# Patient Record
Sex: FEMALE | Race: WHITE | NOT HISPANIC OR LATINO | Employment: OTHER | URBAN - METROPOLITAN AREA
[De-identification: names, ages, dates, MRNs, and addresses within clinical notes are randomized per-mention and may not be internally consistent; named-entity substitution may affect disease eponyms.]

---

## 2017-12-14 ENCOUNTER — OFFICE VISIT (OUTPATIENT)
Dept: FAMILY MEDICINE | Facility: CLINIC | Age: 47
End: 2017-12-14
Payer: MEDICARE

## 2017-12-14 VITALS
OXYGEN SATURATION: 96 % | BODY MASS INDEX: 44.41 KG/M2 | HEART RATE: 81 BPM | HEIGHT: 68 IN | SYSTOLIC BLOOD PRESSURE: 118 MMHG | TEMPERATURE: 100 F | RESPIRATION RATE: 16 BRPM | DIASTOLIC BLOOD PRESSURE: 86 MMHG | WEIGHT: 293 LBS

## 2017-12-14 DIAGNOSIS — I27.20 PULMONARY HYPERTENSION: ICD-10-CM

## 2017-12-14 DIAGNOSIS — J45.909 ASTHMA, UNSPECIFIED ASTHMA SEVERITY, UNSPECIFIED WHETHER COMPLICATED, UNSPECIFIED WHETHER PERSISTENT: ICD-10-CM

## 2017-12-14 DIAGNOSIS — M79.7 FIBROMYALGIA: ICD-10-CM

## 2017-12-14 DIAGNOSIS — F32.A DEPRESSION, UNSPECIFIED DEPRESSION TYPE: ICD-10-CM

## 2017-12-14 DIAGNOSIS — B96.89 ACUTE BACTERIAL PHARYNGITIS: ICD-10-CM

## 2017-12-14 DIAGNOSIS — J02.8 ACUTE BACTERIAL PHARYNGITIS: ICD-10-CM

## 2017-12-14 DIAGNOSIS — F41.9 ANXIETY: ICD-10-CM

## 2017-12-14 DIAGNOSIS — B37.0 ORAL CANDIDIASIS: ICD-10-CM

## 2017-12-14 DIAGNOSIS — Z23 NEEDS FLU SHOT: Primary | ICD-10-CM

## 2017-12-14 PROCEDURE — 94640 AIRWAY INHALATION TREATMENT: CPT | Mod: PBBFAC,59,PO

## 2017-12-14 PROCEDURE — 99999 PR PBB SHADOW E&M-NEW PATIENT-LVL III: CPT | Mod: PBBFAC,,, | Performed by: FAMILY MEDICINE

## 2017-12-14 PROCEDURE — 99214 OFFICE O/P EST MOD 30 MIN: CPT | Mod: S$PBB,,, | Performed by: FAMILY MEDICINE

## 2017-12-14 PROCEDURE — 96372 THER/PROPH/DIAG INJ SC/IM: CPT | Mod: PBBFAC,PO

## 2017-12-14 PROCEDURE — 99203 OFFICE O/P NEW LOW 30 MIN: CPT | Mod: PBBFAC,PO | Performed by: FAMILY MEDICINE

## 2017-12-14 RX ORDER — NAPROXEN 500 MG/1
500 TABLET ORAL 2 TIMES DAILY
COMMUNITY
End: 2018-02-15

## 2017-12-14 RX ORDER — TOPIRAMATE 200 MG/1
TABLET ORAL DAILY
COMMUNITY
End: 2018-04-17 | Stop reason: SDUPTHER

## 2017-12-14 RX ORDER — OXYBUTYNIN CHLORIDE 5 MG/1
5 TABLET ORAL 3 TIMES DAILY
COMMUNITY
End: 2018-06-04 | Stop reason: SDUPTHER

## 2017-12-14 RX ORDER — GABAPENTIN 800 MG/1
800 TABLET ORAL 2 TIMES DAILY
COMMUNITY
End: 2018-05-02 | Stop reason: DRUGHIGH

## 2017-12-14 RX ORDER — ALBUTEROL SULFATE 90 UG/1
2 AEROSOL, METERED RESPIRATORY (INHALATION) EVERY 6 HOURS PRN
COMMUNITY
End: 2017-12-14 | Stop reason: SDUPTHER

## 2017-12-14 RX ORDER — GABAPENTIN 600 MG/1
600 TABLET ORAL DAILY
COMMUNITY
End: 2018-04-17 | Stop reason: SDUPTHER

## 2017-12-14 RX ORDER — NABUMETONE 750 MG/1
750 TABLET, FILM COATED ORAL 2 TIMES DAILY
COMMUNITY
End: 2018-02-15

## 2017-12-14 RX ORDER — ALBUTEROL SULFATE 2.5 MG/.5ML
2.5 SOLUTION RESPIRATORY (INHALATION)
Status: COMPLETED | OUTPATIENT
Start: 2017-12-14 | End: 2017-12-14

## 2017-12-14 RX ORDER — LITHIUM CARBONATE 300 MG/1
300 CAPSULE ORAL
COMMUNITY
End: 2018-06-01 | Stop reason: SDUPTHER

## 2017-12-14 RX ORDER — METHOCARBAMOL 750 MG/1
750 TABLET, FILM COATED ORAL 4 TIMES DAILY
COMMUNITY
End: 2020-02-26

## 2017-12-14 RX ORDER — ALBUTEROL SULFATE 90 UG/1
2 AEROSOL, METERED RESPIRATORY (INHALATION) EVERY 6 HOURS PRN
Qty: 18 G | Refills: 2 | Status: SHIPPED | OUTPATIENT
Start: 2017-12-14 | End: 2019-03-19 | Stop reason: SDUPTHER

## 2017-12-14 RX ORDER — AZITHROMYCIN 250 MG/1
TABLET, FILM COATED ORAL
Qty: 6 TABLET | Refills: 0 | Status: SHIPPED | OUTPATIENT
Start: 2017-12-14 | End: 2018-02-07 | Stop reason: ALTCHOICE

## 2017-12-14 RX ORDER — FLUOXETINE 20 MG/1
20 TABLET ORAL 3 TIMES DAILY
COMMUNITY
End: 2018-04-20 | Stop reason: SDUPTHER

## 2017-12-14 RX ORDER — LORAZEPAM 1 MG/1
1 TABLET ORAL
COMMUNITY
End: 2018-09-13 | Stop reason: SDUPTHER

## 2017-12-14 RX ORDER — NYSTATIN 100000 [USP'U]/ML
4 SUSPENSION ORAL 2 TIMES DAILY
Qty: 80 ML | Refills: 0 | Status: SHIPPED | OUTPATIENT
Start: 2017-12-14 | End: 2017-12-24

## 2017-12-14 RX ORDER — OMEPRAZOLE 20 MG/1
20 CAPSULE, DELAYED RELEASE ORAL 2 TIMES DAILY
COMMUNITY
End: 2018-04-30 | Stop reason: SDUPTHER

## 2017-12-14 RX ORDER — FUROSEMIDE 40 MG/1
40 TABLET ORAL 2 TIMES DAILY
COMMUNITY
End: 2020-02-26 | Stop reason: SDUPTHER

## 2017-12-14 RX ORDER — POTASSIUM CHLORIDE 750 MG/1
10 TABLET, EXTENDED RELEASE ORAL 2 TIMES DAILY
COMMUNITY

## 2017-12-14 RX ORDER — RISPERIDONE 0.5 MG/1
0.5 TABLET ORAL 2 TIMES DAILY
COMMUNITY
End: 2018-04-17 | Stop reason: SDUPTHER

## 2017-12-14 RX ADMIN — ALBUTEROL SULFATE 2.5 MG: 2.5 SOLUTION RESPIRATORY (INHALATION) at 11:12

## 2017-12-14 RX ADMIN — METHYLPREDNISOLONE SODIUM SUCCINATE 125 MG: 125 INJECTION, POWDER, FOR SOLUTION INTRAMUSCULAR; INTRAVENOUS at 11:12
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
## 2017-12-14 NOTE — PROGRESS NOTES
Patient given solumedrol 125 mg  Injection left deltoid, tolerated well, no complaints, no reaction noted  Breathing treatment administered as ordered, patient tolerated well, no complaints, no reactions

## 2018-01-04 ENCOUNTER — LAB VISIT (OUTPATIENT)
Dept: LAB | Facility: HOSPITAL | Age: 48
End: 2018-01-04
Attending: FAMILY MEDICINE
Payer: MEDICARE

## 2018-01-04 ENCOUNTER — OFFICE VISIT (OUTPATIENT)
Dept: FAMILY MEDICINE | Facility: CLINIC | Age: 48
End: 2018-01-04
Payer: MEDICARE

## 2018-01-04 VITALS
DIASTOLIC BLOOD PRESSURE: 80 MMHG | RESPIRATION RATE: 16 BRPM | BODY MASS INDEX: 44.41 KG/M2 | WEIGHT: 293 LBS | OXYGEN SATURATION: 97 % | HEIGHT: 68 IN | TEMPERATURE: 98 F | HEART RATE: 90 BPM | SYSTOLIC BLOOD PRESSURE: 112 MMHG

## 2018-01-04 DIAGNOSIS — J45.901 EXACERBATION OF ASTHMA, UNSPECIFIED ASTHMA SEVERITY, UNSPECIFIED WHETHER PERSISTENT: ICD-10-CM

## 2018-01-04 DIAGNOSIS — Z23 NEEDS FLU SHOT: Primary | ICD-10-CM

## 2018-01-04 DIAGNOSIS — F41.9 ANXIETY: ICD-10-CM

## 2018-01-04 DIAGNOSIS — I27.20 PULMONARY HYPERTENSION: ICD-10-CM

## 2018-01-04 DIAGNOSIS — F31.9 BIPOLAR 1 DISORDER: ICD-10-CM

## 2018-01-04 DIAGNOSIS — F32.A DEPRESSION, UNSPECIFIED DEPRESSION TYPE: ICD-10-CM

## 2018-01-04 DIAGNOSIS — E78.00 ELEVATED CHOLESTEROL: ICD-10-CM

## 2018-01-04 LAB
ALBUMIN SERPL BCP-MCNC: 3.1 G/DL
ALP SERPL-CCNC: 75 U/L
ALT SERPL W/O P-5'-P-CCNC: 12 U/L
ANION GAP SERPL CALC-SCNC: 5 MMOL/L
AST SERPL-CCNC: 10 U/L
BASOPHILS # BLD AUTO: 0.09 K/UL
BASOPHILS NFR BLD: 1.3 %
BILIRUB SERPL-MCNC: 0.3 MG/DL
BUN SERPL-MCNC: 10 MG/DL
CALCIUM SERPL-MCNC: 9 MG/DL
CHLORIDE SERPL-SCNC: 109 MMOL/L
CHOLEST SERPL-MCNC: 188 MG/DL
CHOLEST/HDLC SERPL: 3.5 {RATIO}
CO2 SERPL-SCNC: 24 MMOL/L
CREAT SERPL-MCNC: 0.8 MG/DL
DIFFERENTIAL METHOD: ABNORMAL
EOSINOPHIL # BLD AUTO: 0.3 K/UL
EOSINOPHIL NFR BLD: 4.6 %
ERYTHROCYTE [DISTWIDTH] IN BLOOD BY AUTOMATED COUNT: 13.2 %
EST. GFR  (AFRICAN AMERICAN): >60 ML/MIN/1.73 M^2
EST. GFR  (NON AFRICAN AMERICAN): >60 ML/MIN/1.73 M^2
GLUCOSE SERPL-MCNC: 98 MG/DL
HCT VFR BLD AUTO: 42.5 %
HDLC SERPL-MCNC: 54 MG/DL
HDLC SERPL: 28.7 %
HGB BLD-MCNC: 13.2 G/DL
IMM GRANULOCYTES # BLD AUTO: 0.02 K/UL
IMM GRANULOCYTES NFR BLD AUTO: 0.3 %
LDLC SERPL CALC-MCNC: 112.6 MG/DL
LITHIUM SERPL-SCNC: 0.9 MMOL/L
LYMPHOCYTES # BLD AUTO: 2.3 K/UL
LYMPHOCYTES NFR BLD: 32.2 %
MCH RBC QN AUTO: 29.1 PG
MCHC RBC AUTO-ENTMCNC: 31.1 G/DL
MCV RBC AUTO: 94 FL
MONOCYTES # BLD AUTO: 0.6 K/UL
MONOCYTES NFR BLD: 8.3 %
NEUTROPHILS # BLD AUTO: 3.8 K/UL
NEUTROPHILS NFR BLD: 53.3 %
NONHDLC SERPL-MCNC: 134 MG/DL
NRBC BLD-RTO: 0 /100 WBC
PLATELET # BLD AUTO: 296 K/UL
PMV BLD AUTO: 9.7 FL
POTASSIUM SERPL-SCNC: 4.6 MMOL/L
PROT SERPL-MCNC: 6.8 G/DL
RBC # BLD AUTO: 4.54 M/UL
SODIUM SERPL-SCNC: 138 MMOL/L
T4 FREE SERPL-MCNC: 0.89 NG/DL
TRIGL SERPL-MCNC: 107 MG/DL
TSH SERPL DL<=0.005 MIU/L-ACNC: 2.33 UIU/ML
WBC # BLD AUTO: 7.2 K/UL

## 2018-01-04 PROCEDURE — 80061 LIPID PANEL: CPT

## 2018-01-04 PROCEDURE — 80178 ASSAY OF LITHIUM: CPT

## 2018-01-04 PROCEDURE — 36415 COLL VENOUS BLD VENIPUNCTURE: CPT | Mod: PO

## 2018-01-04 PROCEDURE — 99214 OFFICE O/P EST MOD 30 MIN: CPT | Mod: S$PBB,25,, | Performed by: FAMILY MEDICINE

## 2018-01-04 PROCEDURE — 94640 AIRWAY INHALATION TREATMENT: CPT | Mod: PBBFAC,PO

## 2018-01-04 PROCEDURE — 99213 OFFICE O/P EST LOW 20 MIN: CPT | Mod: PBBFAC,PO,25 | Performed by: FAMILY MEDICINE

## 2018-01-04 PROCEDURE — 84439 ASSAY OF FREE THYROXINE: CPT

## 2018-01-04 PROCEDURE — 85025 COMPLETE CBC W/AUTO DIFF WBC: CPT

## 2018-01-04 PROCEDURE — 99999 PR PBB SHADOW E&M-EST. PATIENT-LVL III: CPT | Mod: PBBFAC,,, | Performed by: FAMILY MEDICINE

## 2018-01-04 PROCEDURE — 80053 COMPREHEN METABOLIC PANEL: CPT

## 2018-01-04 PROCEDURE — 84443 ASSAY THYROID STIM HORMONE: CPT

## 2018-01-04 RX ORDER — ALBUTEROL SULFATE 0.83 MG/ML
2.5 SOLUTION RESPIRATORY (INHALATION) EVERY 6 HOURS PRN
Qty: 60 ML | Refills: 1 | Status: SHIPPED | OUTPATIENT
Start: 2018-01-04 | End: 2019-01-04

## 2018-01-04 RX ORDER — ALBUTEROL SULFATE 2.5 MG/.5ML
2.5 SOLUTION RESPIRATORY (INHALATION)
Status: COMPLETED | OUTPATIENT
Start: 2018-01-04 | End: 2018-01-04

## 2018-01-04 RX ORDER — PREDNISONE 20 MG/1
TABLET ORAL
Qty: 12 TABLET | Refills: 0 | Status: SHIPPED | OUTPATIENT
Start: 2018-01-04 | End: 2018-02-07 | Stop reason: SDUPTHER

## 2018-01-04 RX ADMIN — ALBUTEROL SULFATE 2.5 MG: 2.5 SOLUTION RESPIRATORY (INHALATION) at 08:01

## 2018-01-04 NOTE — PROGRESS NOTES
Chief Complaint   Patient presents with    Asthma     having uncontrollable coughing       HPI  Zaira Vinson is a 47 y.o. female with multiple medical diagnoses as listed in the medical history and problem list that presents for URI.    URI/cough - previously treated with abx, improved for about a week, now cough has returned/worse, inhaler/breathing machine only helps, states neb meds have . Non prod cough, prog worsening, no emesis, +chest congestion, no fever, no diarrhea. Neb therapy few times per day improves. Meds: OTC mild relief. Hx of asthma.     Pt is known to me and was last seen by me on 2017.    PAST MEDICAL HISTORY:  Past Medical History:   Diagnosis Date    Anxiety     Arthritis     Asthma     Depression     GERD (gastroesophageal reflux disease)     Hypertension     Neuromuscular disorder        PAST SURGICAL HISTORY:  Past Surgical History:   Procedure Laterality Date    CARPAL TUNNEL RELEASE      CHOLECYSTECTOMY      HYSTERECTOMY      JOINT REPLACEMENT      KNEE SURGERY      pinched nerve      SHOULDER SURGERY      WRIST SURGERY      had ganlin cyst       SOCIAL HISTORY:  Social History     Social History    Marital status: Single     Spouse name: N/A    Number of children: N/A    Years of education: N/A     Occupational History    Not on file.     Social History Main Topics    Smoking status: Current Every Day Smoker    Smokeless tobacco: Never Used    Alcohol use Not on file    Drug use: Unknown    Sexual activity: Not on file     Other Topics Concern    Not on file     Social History Narrative    No narrative on file       FAMILY HISTORY:  Family History   Problem Relation Age of Onset    Diabetes Mother     [place]'s disease Mother     Hypertension Mother     Heart disease Father     Stroke Father     Mental illness Father     Hypertension Father     Diabetes Father     Depression Father     Heart disease Maternal Grandmother     Depression  Maternal Grandmother     COPD Maternal Grandfather     Heart disease Maternal Grandfather     Stroke Maternal Grandfather     Kidney disease Paternal Grandmother     Heart disease Paternal Grandmother     Heart disease Paternal Grandfather        ALLERGIES AND MEDICATIONS: updated and reviewed.  Review of patient's allergies indicates:  No Known Allergies  Current Outpatient Prescriptions   Medication Sig Dispense Refill    albuterol 90 mcg/actuation inhaler Inhale 2 puffs into the lungs every 6 (six) hours as needed for Wheezing. Rescue 18 g 2    FLUoxetine 20 MG tablet Take 20 mg by mouth 3 (three) times daily.      furosemide (LASIX) 40 MG tablet Take 40 mg by mouth 2 (two) times daily.      gabapentin (NEURONTIN) 600 MG tablet Take 600 mg by mouth once daily.      gabapentin (NEURONTIN) 800 MG tablet Take 800 mg by mouth 2 (two) times daily.      lithium (ESKALITH) 300 MG capsule Take 300 mg by mouth 3 (three) times daily with meals.      LORazepam (ATIVAN) 1 MG tablet Take 1 mg by mouth as needed for Anxiety.      methocarbamol (ROBAXIN) 750 MG Tab Take 750 mg by mouth 4 (four) times daily.       nabumetone (RELAFEN) 750 MG tablet Take 750 mg by mouth 2 (two) times daily.      naproxen (NAPROSYN) 500 MG tablet Take 500 mg by mouth 2 (two) times daily.      omeprazole (PRILOSEC) 20 MG capsule Take 20 mg by mouth 2 (two) times daily.      oxybutynin (DITROPAN) 5 MG Tab Take 5 mg by mouth 3 (three) times daily.      potassium chloride (KLOR-CON) 10 MEQ TbSR Take 10 mEq by mouth 2 (two) times daily.      risperiDONE (RISPERDAL) 0.5 MG Tab Take 0.5 mg by mouth 2 (two) times daily.      topiramate (TOPAMAX) 200 MG Tab Take by mouth once daily.      albuterol (PROVENTIL) 2.5 mg /3 mL (0.083 %) nebulizer solution Take 3 mLs (2.5 mg total) by nebulization every 6 (six) hours as needed for Wheezing. Rescue 60 mL 1    azithromycin (ZITHROMAX Z-ERIN) 250 MG tablet 2 tabs today, then 1 tab per day for  "4 days. 6 tablet 0    predniSONE (DELTASONE) 20 MG tablet 3 tablets per day for 2 days, 2 tablets per day for 2 days, 1 tablet per day for 2 days. 12 tablet 0     No current facility-administered medications for this visit.        ROS  Review of Systems   Constitutional: Negative for chills and fever.   HENT: Positive for rhinorrhea. Negative for ear pain and postnasal drip.    Respiratory: Positive for cough and shortness of breath.    Cardiovascular: Negative for chest pain.   Allergic/Immunologic: Negative for environmental allergies.       Physical Exam  Vitals:    01/04/18 0750   BP: 112/80   Pulse: 90   Resp: 16   Temp: 98.4 °F (36.9 °C)    Body mass index is 49.33 kg/m².  Weight: (!) 145 kg (319 lb 10.7 oz)   Height: 5' 7.5" (171.5 cm)     Physical Exam   Constitutional: She is oriented to person, place, and time. She appears well-developed and well-nourished.   HENT:   Head: Normocephalic and atraumatic.   Mouth/Throat: No oropharyngeal exudate.   Erythematous pharynx  Erythematous, edematous nares  Mild dull TMs bilaterally   Eyes: Conjunctivae and EOM are normal. Pupils are equal, round, and reactive to light. No scleral icterus.   Neck: Normal range of motion. Neck supple.   Cardiovascular: Normal rate, regular rhythm and normal heart sounds.    Pulmonary/Chest: Effort normal and breath sounds normal. No respiratory distress.   Abdominal: Soft. Bowel sounds are normal. There is no tenderness.   Musculoskeletal: Normal range of motion.   Lymphadenopathy:     She has cervical adenopathy.   Neurological: She is alert and oriented to person, place, and time. She has normal reflexes.   Skin: Skin is warm and dry. No rash noted.   Psychiatric: She has a normal mood and affect. Her behavior is normal. Judgment and thought content normal.       Health Maintenance       Date Due Completion Date    Lipid Panel 1971 ---    TETANUS VACCINE 11/24/1989 ---    Pneumococcal PPSV23 (Medium Risk) (1) 11/24/1989 --- "    Influenza Vaccine 08/01/2017 ---    Mammogram 04/26/2018 (Originally 11/24/2011) ---          Assessment & Plan    Anxiety, Depression, unspecified depression type, Bipolar 1 disorder  -     Lithium level; Future; Expected date: 01/04/2018  -     CBC auto differential; Future; Expected date: 01/04/2018  -     Comprehensive metabolic panel; Future; Expected date: 01/04/2018  -     T4, free; Future; Expected date: 01/04/2018  -     TSH; Future; Expected date: 01/04/2018    Exacerbation of asthma, unspecified asthma severity, unspecified whether persistent  -     albuterol sulfate nebulizer solution 2.5 mg; Take 2.5 mg by nebulization one time.  -     predniSONE (DELTASONE) 20 MG tablet; 3 tablets per day for 2 days, 2 tablets per day for 2 days, 1 tablet per day for 2 days.  Dispense: 12 tablet; Refill: 0  -     albuterol (PROVENTIL) 2.5 mg /3 mL (0.083 %) nebulizer solution; Take 3 mLs (2.5 mg total) by nebulization every 6 (six) hours as needed for Wheezing. Rescue  Dispense: 60 mL; Refill: 1    Pulmonary hypertension  - Continue current medication regimen as prescribed    Elevated cholesterol  -     Lipid panel; Future; Expected date: 01/04/2018      Return in about 4 weeks (around 2/1/2018), or if symptoms worsen or fail to improve.      Answers for HPI/ROS submitted by the patient on 1/4/2018   Cough  Chronicity: recurrent  Onset: 1 to 4 weeks ago  Progression since onset: waxing and waning  Frequency: hourly  Cough characteristics: productive of sputum  heartburn: No  hemoptysis: No  nasal congestion: Yes  sweats: No  weight loss: No  Aggravated by: nothing, dust, fumes, lying down, pollens  Risk factors for lung disease: animal exposure, smoking/tobacco exposure  asthma: Yes  bronchiectasis: No  bronchitis: Yes  emphysema: No  pneumonia: No  Treatments tried: OTC cough suppressant, a beta-agonist inhaler, body position changes, cool air, rest  Improvement on treatment: mild

## 2018-02-07 ENCOUNTER — OFFICE VISIT (OUTPATIENT)
Dept: FAMILY MEDICINE | Facility: CLINIC | Age: 48
End: 2018-02-07
Payer: MEDICARE

## 2018-02-07 VITALS
RESPIRATION RATE: 20 BRPM | HEART RATE: 73 BPM | WEIGHT: 293 LBS | TEMPERATURE: 98 F | DIASTOLIC BLOOD PRESSURE: 80 MMHG | BODY MASS INDEX: 44.41 KG/M2 | HEIGHT: 68 IN | SYSTOLIC BLOOD PRESSURE: 118 MMHG | OXYGEN SATURATION: 97 %

## 2018-02-07 DIAGNOSIS — J45.909 ASTHMA, UNSPECIFIED ASTHMA SEVERITY, UNSPECIFIED WHETHER COMPLICATED, UNSPECIFIED WHETHER PERSISTENT: ICD-10-CM

## 2018-02-07 DIAGNOSIS — M79.7 FIBROMYALGIA: Primary | ICD-10-CM

## 2018-02-07 DIAGNOSIS — J45.901 EXACERBATION OF ASTHMA, UNSPECIFIED ASTHMA SEVERITY, UNSPECIFIED WHETHER PERSISTENT: ICD-10-CM

## 2018-02-07 PROCEDURE — 99999 PR PBB SHADOW E&M-EST. PATIENT-LVL III: CPT | Mod: PBBFAC,,, | Performed by: FAMILY MEDICINE

## 2018-02-07 PROCEDURE — 99213 OFFICE O/P EST LOW 20 MIN: CPT | Mod: PBBFAC,PO | Performed by: FAMILY MEDICINE

## 2018-02-07 PROCEDURE — 99214 OFFICE O/P EST MOD 30 MIN: CPT | Mod: S$PBB,,, | Performed by: FAMILY MEDICINE

## 2018-02-07 RX ORDER — PREDNISONE 20 MG/1
TABLET ORAL
Qty: 12 TABLET | Refills: 0 | Status: SHIPPED | OUTPATIENT
Start: 2018-02-07 | End: 2018-05-02 | Stop reason: SDUPTHER

## 2018-02-07 RX ORDER — AZITHROMYCIN 250 MG/1
TABLET, FILM COATED ORAL
Qty: 6 TABLET | Refills: 0 | Status: SHIPPED | OUTPATIENT
Start: 2018-02-07 | End: 2018-02-19 | Stop reason: ALTCHOICE

## 2018-02-07 NOTE — PROGRESS NOTES
Chief Complaint   Patient presents with    Sinus Problem    Asthma       HPI  Zaira Vinson is a 47 y.o. female with multiple medical diagnoses as listed in the medical history and problem list that presents for asthma.     Asthma/URI - 3 day hx of worsening HA, sinus congestion, then used nasal spray (azelastin), had asthma exacerbation.     Hx of SHARON, previously seen by Cardiology for Pulm HTN.     Fibromyalgia - Followed     Migraine - followed by Neurology    Pt is known to me and was last seen by me on 1/4/2018.    PAST MEDICAL HISTORY:  Past Medical History:   Diagnosis Date    Anxiety     Arthritis     Asthma     Depression     GERD (gastroesophageal reflux disease)     Hypertension     Neuromuscular disorder        PAST SURGICAL HISTORY:  Past Surgical History:   Procedure Laterality Date    CARPAL TUNNEL RELEASE      CHOLECYSTECTOMY      HYSTERECTOMY      JOINT REPLACEMENT      KNEE SURGERY      pinched nerve      SHOULDER SURGERY      WRIST SURGERY      had ganlin cyst       SOCIAL HISTORY:  Social History     Social History    Marital status: Single     Spouse name: N/A    Number of children: N/A    Years of education: N/A     Occupational History    Not on file.     Social History Main Topics    Smoking status: Current Every Day Smoker    Smokeless tobacco: Never Used    Alcohol use Not on file    Drug use: Unknown    Sexual activity: Not on file     Other Topics Concern    Not on file     Social History Narrative    No narrative on file       FAMILY HISTORY:  Family History   Problem Relation Age of Onset    Diabetes Mother     [place]'s disease Mother     Hypertension Mother     Heart disease Father     Stroke Father     Mental illness Father     Hypertension Father     Diabetes Father     Depression Father     Heart disease Maternal Grandmother     Depression Maternal Grandmother     COPD Maternal Grandfather     Heart disease Maternal Grandfather     Stroke  Maternal Grandfather     Kidney disease Paternal Grandmother     Heart disease Paternal Grandmother     Heart disease Paternal Grandfather        ALLERGIES AND MEDICATIONS: updated and reviewed.  Review of patient's allergies indicates:  No Known Allergies  Current Outpatient Prescriptions   Medication Sig Dispense Refill    albuterol (PROVENTIL) 2.5 mg /3 mL (0.083 %) nebulizer solution Take 3 mLs (2.5 mg total) by nebulization every 6 (six) hours as needed for Wheezing. Rescue 60 mL 1    albuterol 90 mcg/actuation inhaler Inhale 2 puffs into the lungs every 6 (six) hours as needed for Wheezing. Rescue 18 g 2    FLUoxetine 20 MG tablet Take 20 mg by mouth 3 (three) times daily.      furosemide (LASIX) 40 MG tablet Take 40 mg by mouth 2 (two) times daily.      gabapentin (NEURONTIN) 600 MG tablet Take 600 mg by mouth once daily.      gabapentin (NEURONTIN) 800 MG tablet Take 800 mg by mouth 2 (two) times daily.      lithium (ESKALITH) 300 MG capsule Take 300 mg by mouth 3 (three) times daily with meals.      LORazepam (ATIVAN) 1 MG tablet Take 1 mg by mouth as needed for Anxiety.      methocarbamol (ROBAXIN) 750 MG Tab Take 750 mg by mouth 4 (four) times daily.       omeprazole (PRILOSEC) 20 MG capsule Take 20 mg by mouth 2 (two) times daily.      oxybutynin (DITROPAN) 5 MG Tab Take 5 mg by mouth 3 (three) times daily.      potassium chloride (KLOR-CON) 10 MEQ TbSR Take 10 mEq by mouth 2 (two) times daily.      risperiDONE (RISPERDAL) 0.5 MG Tab Take 0.5 mg by mouth 2 (two) times daily.      topiramate (TOPAMAX) 200 MG Tab Take by mouth once daily.      azithromycin (ZITHROMAX Z-ERIN) 250 MG tablet 2 tabs today, then 1 tab per day for 4 days. 6 tablet 0    indomethacin (INDOCIN) 50 MG capsule Take 1 capsule (50 mg total) by mouth 3 (three) times daily. 30 capsule 1    predniSONE (DELTASONE) 20 MG tablet 3 tablets per day for 2 days, 2 tablets per day for 2 days, 1 tablet per day for 2 days. 12  "tablet 0     No current facility-administered medications for this visit.        ROS  Review of Systems   Constitutional: Negative for activity change and unexpected weight change.   HENT: Negative for hearing loss, rhinorrhea and trouble swallowing.    Eyes: Positive for discharge. Negative for visual disturbance.   Respiratory: Positive for chest tightness and wheezing.    Cardiovascular: Negative for chest pain and palpitations.   Gastrointestinal: Negative for blood in stool, constipation, diarrhea and vomiting.   Endocrine: Negative for polydipsia and polyuria.   Genitourinary: Negative for difficulty urinating, dysuria, hematuria and menstrual problem.   Musculoskeletal: Positive for arthralgias, joint swelling and neck pain.   Neurological: Positive for weakness and headaches.   Psychiatric/Behavioral: Positive for dysphoric mood. Negative for confusion.       Physical Exam  Vitals:    02/07/18 0934   BP: 118/80   Pulse: 73   Resp: 20   Temp: 98 °F (36.7 °C)    Body mass index is 50.62 kg/m².  Weight: (!) 148.8 kg (328 lb 0.7 oz)   Height: 5' 7.5" (171.5 cm)     Physical Exam   Constitutional: She is oriented to person, place, and time. She appears well-developed and well-nourished.   HENT:   Head: Normocephalic and atraumatic.   Mouth/Throat: No oropharyngeal exudate.   Erythematous pharynx  Erythematous, edematous nares  Mild dull TMs bilaterally   Eyes: Conjunctivae and EOM are normal. Pupils are equal, round, and reactive to light. No scleral icterus.   Neck: Normal range of motion. Neck supple.   Cardiovascular: Normal rate, regular rhythm and normal heart sounds.    Pulmonary/Chest: Effort normal and breath sounds normal. No respiratory distress.   Abdominal: Soft. Bowel sounds are normal. There is no tenderness.   Musculoskeletal: Normal range of motion.   Lymphadenopathy:     She has cervical adenopathy.   Neurological: She is alert and oriented to person, place, and time. She has normal reflexes. "   Skin: Skin is warm and dry. No rash noted.   Psychiatric: She has a normal mood and affect. Her behavior is normal. Judgment and thought content normal.       Health Maintenance       Date Due Completion Date    Mammogram 04/26/2018 (Originally 11/24/2011) ---    Pneumococcal PPSV23 (Medium Risk) (1) 02/07/2019 (Originally 11/24/1989) ---    Lipid Panel 01/04/2023 1/4/2018    TETANUS VACCINE 02/07/2028 2/7/2018 (Declined)    Override on 2/7/2018: Declined          Assessment & Plan    Fibromyalgia  - Continue current medication regimen as prescribed    Exacerbation of asthma, unspecified asthma severity, unspecified whether persistent  -     predniSONE (DELTASONE) 20 MG tablet; 3 tablets per day for 2 days, 2 tablets per day for 2 days, 1 tablet per day for 2 days.  Dispense: 12 tablet; Refill: 0    Asthma, unspecified asthma severity, unspecified whether complicated, unspecified whether persistent  - Will treat with steroid pack at this time  -     azithromycin (ZITHROMAX Z-ERIN) 250 MG tablet; 2 tabs today, then 1 tab per day for 4 days.  Dispense: 6 tablet; Refill: 0        Follow-up in about 4 weeks (around 3/7/2018).

## 2018-02-15 ENCOUNTER — OFFICE VISIT (OUTPATIENT)
Dept: FAMILY MEDICINE | Facility: CLINIC | Age: 48
End: 2018-02-15
Payer: MEDICARE

## 2018-02-15 ENCOUNTER — TELEPHONE (OUTPATIENT)
Dept: FAMILY MEDICINE | Facility: CLINIC | Age: 48
End: 2018-02-15

## 2018-02-15 VITALS
DIASTOLIC BLOOD PRESSURE: 84 MMHG | BODY MASS INDEX: 44.41 KG/M2 | SYSTOLIC BLOOD PRESSURE: 120 MMHG | HEART RATE: 86 BPM | RESPIRATION RATE: 18 BRPM | TEMPERATURE: 98 F | OXYGEN SATURATION: 97 % | WEIGHT: 293 LBS | HEIGHT: 68 IN

## 2018-02-15 DIAGNOSIS — M10.072 ACUTE IDIOPATHIC GOUT INVOLVING TOE OF LEFT FOOT: Primary | ICD-10-CM

## 2018-02-15 PROCEDURE — 99999 PR PBB SHADOW E&M-EST. PATIENT-LVL III: CPT | Mod: PBBFAC,,, | Performed by: FAMILY MEDICINE

## 2018-02-15 PROCEDURE — 99213 OFFICE O/P EST LOW 20 MIN: CPT | Mod: S$PBB,,, | Performed by: FAMILY MEDICINE

## 2018-02-15 PROCEDURE — 99213 OFFICE O/P EST LOW 20 MIN: CPT | Mod: PBBFAC,PO | Performed by: FAMILY MEDICINE

## 2018-02-15 RX ORDER — INDOMETHACIN 50 MG/1
50 CAPSULE ORAL 3 TIMES DAILY
Qty: 30 CAPSULE | Refills: 1 | Status: SHIPPED | OUTPATIENT
Start: 2018-02-15 | End: 2018-09-13

## 2018-02-15 NOTE — TELEPHONE ENCOUNTER
Spoke with patient. Notified of appointment scheduled for 11:40 with Dr.Lombard. Verbalized understanding.

## 2018-02-15 NOTE — PROGRESS NOTES
Chief Complaint   Patient presents with    Foot Swelling     lt foot        Zaira Vinson is a 47 y.o. female who presents per the Chief Complaint.  Pt is known to this practice but has not been seen by me previously.  All known chronic medical issues have been documented.          Toe Pain    The incident occurred 3 to 5 days ago. The incident occurred at home. There was no injury mechanism. The pain is present in the left toes. The quality of the pain is described as aching and burning. The pain is at a severity of 7/10. The pain is moderate. The pain has been constant since onset. Associated symptoms include an inability to bear weight. Pertinent negatives include no loss of motion, loss of sensation, muscle weakness, numbness or tingling. She reports no foreign bodies present. The symptoms are aggravated by movement, weight bearing and palpation. She has tried nothing for the symptoms.        ROS  Review of Systems   Constitutional: Negative.  Negative for activity change, appetite change, chills, diaphoresis, fatigue, fever and unexpected weight change.   HENT: Negative.  Negative for congestion, ear pain, hearing loss, nosebleeds, postnasal drip, rhinorrhea, sinus pressure, sneezing, sore throat and trouble swallowing.    Eyes: Negative for pain and visual disturbance.   Respiratory: Negative for cough, choking and shortness of breath.    Cardiovascular: Negative for chest pain and leg swelling.   Gastrointestinal: Positive for abdominal pain. Negative for constipation, diarrhea, nausea and vomiting.   Genitourinary: Negative for difficulty urinating, dysuria, frequency and urgency.   Musculoskeletal: Positive for arthralgias (left big toe), gait problem, joint swelling and myalgias. Negative for back pain, neck pain and neck stiffness.   Skin: Positive for color change.   Allergic/Immunologic: Negative for environmental allergies and food allergies.   Neurological: Negative for dizziness, tingling,  "seizures, syncope, weakness, light-headedness, numbness and headaches.   Psychiatric/Behavioral: Negative.  Negative for confusion, decreased concentration, dysphoric mood and sleep disturbance. The patient is not nervous/anxious.        Physical Exam  Vitals:    02/15/18 1208   BP: 120/84   Pulse: 86   Resp: 18   Temp: 98.4 °F (36.9 °C)    Body mass index is 49.67 kg/m².  Weight: (!) 146 kg (321 lb 14 oz)   Height: 5' 7.5" (171.5 cm)     Physical Exam   Constitutional: She is oriented to person, place, and time. She appears well-developed and well-nourished. She is active and cooperative.  Non-toxic appearance. She does not have a sickly appearance. She does not appear ill. No distress.   HENT:   Head: Normocephalic and atraumatic.   Right Ear: Hearing and external ear normal. No decreased hearing is noted.   Left Ear: Hearing and external ear normal. No decreased hearing is noted.   Nose: Nose normal. No rhinorrhea or nasal deformity.   Mouth/Throat: Uvula is midline and oropharynx is clear and moist. She does not have dentures. Normal dentition.   Eyes: Conjunctivae, EOM and lids are normal. Pupils are equal, round, and reactive to light. Right eye exhibits no chemosis, no discharge and no exudate. No foreign body present in the right eye. Left eye exhibits no chemosis, no discharge and no exudate. No foreign body present in the left eye. No scleral icterus.   Neck: Normal range of motion and full passive range of motion without pain. Neck supple.   Cardiovascular: Normal rate, regular rhythm, S1 normal, S2 normal and normal heart sounds.  Exam reveals no gallop and no friction rub.    No murmur heard.  Pulmonary/Chest: Effort normal and breath sounds normal. No accessory muscle usage. No respiratory distress. She has no decreased breath sounds. She has no wheezes. She has no rhonchi. She has no rales.   Abdominal: Soft. Normal appearance. She exhibits no distension. There is no hepatosplenomegaly. There is no " tenderness. There is no rigidity, no rebound and no guarding.   Musculoskeletal: Normal range of motion.        Left foot: There is tenderness and swelling. There is normal range of motion, no bony tenderness, normal capillary refill, no crepitus, no deformity and no laceration.        Feet:    Neurological: She is alert and oriented to person, place, and time. She has normal strength. No cranial nerve deficit or sensory deficit. She exhibits normal muscle tone. She displays no seizure activity. Coordination and gait normal.   Skin: Skin is warm, dry and intact. No rash noted. She is not diaphoretic.   Psychiatric: She has a normal mood and affect. Her speech is normal and behavior is normal. Judgment and thought content normal. Cognition and memory are normal. She is attentive.       Assessment & Plan    1. Acute idiopathic gout involving toe of left foot  Acute episode unrelated to ankle injury.  Likely acute gout; patient reports seafood intake over the weekend for [place].  Will start NSAID therapy as needed and consider oral steroid if necessary; patient recently discontinued steroid use.  - indomethacin (INDOCIN) 50 MG capsule; Take 1 capsule (50 mg total) by mouth 3 (three) times daily.  Dispense: 30 capsule; Refill: 1      Follow up documented    ACTIVE MEDICAL ISSUES:  Documented in Problem List    PAST MEDICAL HISTORY  Documented    PAST SURGICAL HISTORY:  Documented    SOCIAL HISTORY:  Documented    FAMILY HISTORY:  Documented    ALLERGIES AND MEDICATIONS: updated and reviewed.  Documented    Health Maintenance       Date Due Completion Date    Mammogram 04/26/2018 (Originally 11/24/2011) ---    Pneumococcal PPSV23 (Medium Risk) (1) 02/07/2019 (Originally 11/24/1989) ---    Lipid Panel 01/04/2023 1/4/2018    TETANUS VACCINE 02/07/2028 2/7/2018 (Declined)    Override on 2/7/2018: Declined

## 2018-02-19 ENCOUNTER — OFFICE VISIT (OUTPATIENT)
Dept: FAMILY MEDICINE | Facility: CLINIC | Age: 48
End: 2018-02-19
Payer: MEDICARE

## 2018-02-19 VITALS
SYSTOLIC BLOOD PRESSURE: 112 MMHG | DIASTOLIC BLOOD PRESSURE: 78 MMHG | HEIGHT: 68 IN | WEIGHT: 293 LBS | BODY MASS INDEX: 44.41 KG/M2 | HEART RATE: 92 BPM | OXYGEN SATURATION: 96 % | TEMPERATURE: 99 F | RESPIRATION RATE: 20 BRPM

## 2018-02-19 DIAGNOSIS — F31.9 BIPOLAR 1 DISORDER: ICD-10-CM

## 2018-02-19 DIAGNOSIS — F41.9 ANXIETY: ICD-10-CM

## 2018-02-19 DIAGNOSIS — M79.7 FIBROMYALGIA: ICD-10-CM

## 2018-02-19 DIAGNOSIS — M1A.0720 CHRONIC IDIOPATHIC GOUT INVOLVING TOE OF LEFT FOOT WITHOUT TOPHUS: ICD-10-CM

## 2018-02-19 DIAGNOSIS — I27.20 PULMONARY HYPERTENSION: ICD-10-CM

## 2018-02-19 DIAGNOSIS — F32.A DEPRESSION, UNSPECIFIED DEPRESSION TYPE: Primary | ICD-10-CM

## 2018-02-19 PROCEDURE — 99999 PR PBB SHADOW E&M-EST. PATIENT-LVL III: CPT | Mod: PBBFAC,,, | Performed by: FAMILY MEDICINE

## 2018-02-19 PROCEDURE — 96372 THER/PROPH/DIAG INJ SC/IM: CPT | Mod: PBBFAC,PO

## 2018-02-19 PROCEDURE — 99213 OFFICE O/P EST LOW 20 MIN: CPT | Mod: PBBFAC,PO | Performed by: FAMILY MEDICINE

## 2018-02-19 PROCEDURE — 99214 OFFICE O/P EST MOD 30 MIN: CPT | Mod: S$PBB,,, | Performed by: FAMILY MEDICINE

## 2018-02-19 RX ORDER — COLCHICINE 0.6 MG/1
TABLET ORAL
Qty: 6 TABLET | Refills: 1 | Status: SHIPPED | OUTPATIENT
Start: 2018-02-19 | End: 2018-04-20

## 2018-02-19 RX ORDER — KETOROLAC TROMETHAMINE 30 MG/ML
60 INJECTION, SOLUTION INTRAMUSCULAR; INTRAVENOUS
Status: COMPLETED | OUTPATIENT
Start: 2018-02-19 | End: 2018-02-19

## 2018-02-19 RX ADMIN — KETOROLAC TROMETHAMINE 60 MG: 60 INJECTION, SOLUTION INTRAMUSCULAR at 03:02

## 2018-02-19 NOTE — PROGRESS NOTES
Chief Complaint   Patient presents with    Gout     Left Great Toe       HPI  Zaira Vinson is a 47 y.o. female with multiple medical diagnoses as listed in the medical history and problem list that presents for follow up.    L great toe pain - given indomethacin 1 week ago, states meds = dizziness.     Fibromyalgia - R shoulder, neck, burning sensation, chronic, currently on multiple medications, dec ROM.      Cough - hx of asthma, chronic.     Pt is known to me and was last seen by me on 2/7/2018.    PAST MEDICAL HISTORY:  Past Medical History:   Diagnosis Date    Anxiety     Arthritis     Asthma     Depression     GERD (gastroesophageal reflux disease)     Hypertension     Neuromuscular disorder        PAST SURGICAL HISTORY:  Past Surgical History:   Procedure Laterality Date    CARPAL TUNNEL RELEASE      CHOLECYSTECTOMY      HYSTERECTOMY      JOINT REPLACEMENT      KNEE SURGERY      pinched nerve      SHOULDER SURGERY      WRIST SURGERY      had ganlin cyst       SOCIAL HISTORY:  Social History     Social History    Marital status: Single     Spouse name: N/A    Number of children: N/A    Years of education: N/A     Occupational History    Not on file.     Social History Main Topics    Smoking status: Current Every Day Smoker    Smokeless tobacco: Never Used    Alcohol use Not on file    Drug use: Unknown    Sexual activity: Not on file     Other Topics Concern    Not on file     Social History Narrative    No narrative on file       FAMILY HISTORY:  Family History   Problem Relation Age of Onset    Diabetes Mother     Camden's disease Mother     Hypertension Mother     Heart disease Father     Stroke Father     Mental illness Father     Hypertension Father     Diabetes Father     Depression Father     Heart disease Maternal Grandmother     Depression Maternal Grandmother     COPD Maternal Grandfather     Heart disease Maternal Grandfather     Stroke Maternal  Grandfather     Kidney disease Paternal Grandmother     Heart disease Paternal Grandmother     Heart disease Paternal Grandfather        ALLERGIES AND MEDICATIONS: updated and reviewed.  Review of patient's allergies indicates:  No Known Allergies  Current Outpatient Prescriptions   Medication Sig Dispense Refill    albuterol (PROVENTIL) 2.5 mg /3 mL (0.083 %) nebulizer solution Take 3 mLs (2.5 mg total) by nebulization every 6 (six) hours as needed for Wheezing. Rescue 60 mL 1    albuterol 90 mcg/actuation inhaler Inhale 2 puffs into the lungs every 6 (six) hours as needed for Wheezing. Rescue 18 g 2    colchicine 0.6 mg tablet 2 tablets now, then 1 more in 1 hour. 6 tablet 1    FLUoxetine 20 MG tablet Take 20 mg by mouth 3 (three) times daily.      furosemide (LASIX) 40 MG tablet Take 40 mg by mouth 2 (two) times daily.      gabapentin (NEURONTIN) 600 MG tablet Take 600 mg by mouth once daily.      gabapentin (NEURONTIN) 800 MG tablet Take 800 mg by mouth 2 (two) times daily.      indomethacin (INDOCIN) 50 MG capsule Take 1 capsule (50 mg total) by mouth 3 (three) times daily. 30 capsule 1    lithium (ESKALITH) 300 MG capsule Take 300 mg by mouth 3 (three) times daily with meals.      LORazepam (ATIVAN) 1 MG tablet Take 1 mg by mouth as needed for Anxiety.      methocarbamol (ROBAXIN) 750 MG Tab Take 750 mg by mouth 4 (four) times daily.       omeprazole (PRILOSEC) 20 MG capsule Take 20 mg by mouth 2 (two) times daily.      oxybutynin (DITROPAN) 5 MG Tab Take 5 mg by mouth 3 (three) times daily.      potassium chloride (KLOR-CON) 10 MEQ TbSR Take 10 mEq by mouth 2 (two) times daily.      predniSONE (DELTASONE) 20 MG tablet 3 tablets per day for 2 days, 2 tablets per day for 2 days, 1 tablet per day for 2 days. 12 tablet 0    risperiDONE (RISPERDAL) 0.5 MG Tab Take 0.5 mg by mouth 2 (two) times daily.      topiramate (TOPAMAX) 200 MG Tab Take by mouth once daily.       No current  "facility-administered medications for this visit.        ROS  Review of Systems   Constitutional: Negative for activity change and unexpected weight change.   HENT: Negative for hearing loss, rhinorrhea and trouble swallowing.    Eyes: Negative for discharge and visual disturbance.   Respiratory: Positive for wheezing. Negative for chest tightness.    Cardiovascular: Negative for chest pain and palpitations.   Gastrointestinal: Negative for blood in stool, constipation, diarrhea and vomiting.   Endocrine: Negative for polydipsia and polyuria.   Genitourinary: Negative for difficulty urinating, dysuria, hematuria and menstrual problem.   Musculoskeletal: Positive for arthralgias, joint swelling and neck pain.   Neurological: Negative for weakness and headaches.   Psychiatric/Behavioral: Positive for dysphoric mood. Negative for confusion.       Physical Exam  Vitals:    02/19/18 1404   BP: 112/78   Pulse: 92   Resp: 20   Temp: 98.8 °F (37.1 °C)    Body mass index is 50.69 kg/m².  Weight: (!) 149 kg (328 lb 7.8 oz)   Height: 5' 7.5" (171.5 cm)     Physical Exam   Constitutional: She is oriented to person, place, and time. She appears well-developed and well-nourished.   HENT:   Head: Normocephalic and atraumatic.   Mouth/Throat: No oropharyngeal exudate.   Erythematous pharynx  Erythematous, edematous nares  Mild dull TMs bilaterally   Eyes: Conjunctivae and EOM are normal. Pupils are equal, round, and reactive to light. No scleral icterus.   Neck: Normal range of motion. Neck supple.   Cardiovascular: Normal rate, regular rhythm and normal heart sounds.    Pulmonary/Chest: Effort normal and breath sounds normal. No respiratory distress.   Abdominal: Soft. Bowel sounds are normal. There is no tenderness.   Musculoskeletal: Normal range of motion.   R shoulder, neck - +TTP, burning sensation   Lymphadenopathy:     She has cervical adenopathy.   Neurological: She is alert and oriented to person, place, and time. She has " normal reflexes.   Skin: Skin is warm and dry. No rash noted.   Psychiatric: She has a normal mood and affect. Her behavior is normal. Judgment and thought content normal.       Health Maintenance       Date Due Completion Date    Mammogram 04/26/2018 (Originally 11/24/2011) ---    Pneumococcal PPSV23 (Medium Risk) (1) 02/07/2019 (Originally 11/24/1989) ---    Lipid Panel 01/04/2023 1/4/2018    TETANUS VACCINE 02/07/2028 2/7/2018 (Declined)    Override on 2/7/2018: Declined          Assessment & Plan    Depression, unspecified depression type, Anxiety, Bipolar 1 disorder  - Continue current medication regimen as prescribed    Pulmonary hypertension  - Continue current medication regimen as prescribed    Fibromyalgia  - Continue current medication regimen as prescribed    Chronic idiopathic gout involving toe of left foot without tophus  -     colchicine 0.6 mg tablet; 2 tablets now, then 1 more in 1 hour.  Dispense: 6 tablet; Refill: 1  - Will treat Gout flare today.     Other orders  -     ketorolac injection 60 mg; Inject 2 mLs (60 mg total) into the muscle one time.        Follow-up in about 4 weeks (around 3/19/2018).

## 2018-02-19 NOTE — PROGRESS NOTES
Patient given toradol 60 mg injection left deltoid, tolerated well, no complaints, no reaction noted

## 2018-02-19 NOTE — LETTER
February 19, 2018    Zaira Vinson  [address] [place] [address]      [facility] [address] [place] [address]  Phone: [phone]  Fax: [phone] Zaira Vinson    Was treated here on 2/08/2018.    May Return to work/school on 2/22/2018.    No Restrictions        Sincerely,        Kerwin Mccullough MD

## 2018-04-17 ENCOUNTER — PATIENT MESSAGE (OUTPATIENT)
Dept: FAMILY MEDICINE | Facility: CLINIC | Age: 48
End: 2018-04-17

## 2018-04-17 RX ORDER — TOPIRAMATE 200 MG/1
200 TABLET ORAL DAILY
Qty: 90 TABLET | Refills: 1 | Status: SHIPPED | OUTPATIENT
Start: 2018-04-17 | End: 2018-10-10 | Stop reason: SDUPTHER

## 2018-04-17 RX ORDER — GABAPENTIN 600 MG/1
600 TABLET ORAL DAILY
Qty: 30 TABLET | Refills: 1 | Status: SHIPPED | OUTPATIENT
Start: 2018-04-17 | End: 2018-05-02 | Stop reason: SDUPTHER

## 2018-04-17 RX ORDER — RISPERIDONE 0.5 MG/1
0.5 TABLET ORAL 2 TIMES DAILY
Qty: 60 TABLET | Refills: 0 | Status: SHIPPED | OUTPATIENT
Start: 2018-04-17 | End: 2018-05-18 | Stop reason: SDUPTHER

## 2018-04-20 ENCOUNTER — OFFICE VISIT (OUTPATIENT)
Dept: SLEEP MEDICINE | Facility: CLINIC | Age: 48
End: 2018-04-20
Payer: MEDICARE

## 2018-04-20 VITALS
DIASTOLIC BLOOD PRESSURE: 80 MMHG | SYSTOLIC BLOOD PRESSURE: 122 MMHG | BODY MASS INDEX: 45.99 KG/M2 | HEIGHT: 67 IN | OXYGEN SATURATION: 97 % | WEIGHT: 293 LBS | HEART RATE: 85 BPM

## 2018-04-20 DIAGNOSIS — R09.81 NASAL CONGESTION: ICD-10-CM

## 2018-04-20 DIAGNOSIS — J45.40 ASTHMA, MODERATE PERSISTENT, POORLY-CONTROLLED: ICD-10-CM

## 2018-04-20 DIAGNOSIS — G47.33 OSA (OBSTRUCTIVE SLEEP APNEA): Primary | ICD-10-CM

## 2018-04-20 PROCEDURE — 99999 PR PBB SHADOW E&M-EST. PATIENT-LVL III: CPT | Mod: PBBFAC,,, | Performed by: INTERNAL MEDICINE

## 2018-04-20 PROCEDURE — 99204 OFFICE O/P NEW MOD 45 MIN: CPT | Mod: S$PBB,,, | Performed by: INTERNAL MEDICINE

## 2018-04-20 PROCEDURE — 99213 OFFICE O/P EST LOW 20 MIN: CPT | Mod: PBBFAC,PO | Performed by: INTERNAL MEDICINE

## 2018-04-20 RX ORDER — FLUOXETINE HYDROCHLORIDE 20 MG/1
CAPSULE ORAL
COMMUNITY
Start: 2018-04-09 | End: 2018-05-09 | Stop reason: SDUPTHER

## 2018-04-20 RX ORDER — NABUMETONE 750 MG/1
TABLET, FILM COATED ORAL
COMMUNITY
Start: 2018-04-17 | End: 2018-04-30 | Stop reason: SDUPTHER

## 2018-04-20 RX ORDER — BUDESONIDE AND FORMOTEROL FUMARATE DIHYDRATE 80; 4.5 UG/1; UG/1
2 AEROSOL RESPIRATORY (INHALATION) 2 TIMES DAILY
Qty: 10.2 G | Refills: 3 | Status: SHIPPED | OUTPATIENT
Start: 2018-04-20 | End: 2018-09-13 | Stop reason: SDUPTHER

## 2018-04-20 NOTE — PROGRESS NOTES
Zaira Vinson  was seen as a new patient for the evaluation of SHARON.    CHIEF COMPLAINT:    Chief Complaint   Patient presents with    Sleep Apnea       HISTORY OF PRESENT ILLNESS: Zaira Vinson is a 47 y.o. female is here for sleep evaluation.   Patient was diagnosed with sharon 10/17 in [place], [place].  Patient was using cpap 7 cm H20.  Patient moved to [place] 11/17.  Patient is using cpap nightly without issue.  Sleep quality much improve with cpap.      With cpap, patient denied snoring.  No witnessed apnea.  Feeling rested upon awake.  No parasomnia.  No cataplexy.  No rls symptoms.    [place] Sleepiness Scale score during initial sleep evaluation was 18.    SLEEP ROUTINE:  Activity the hour prior to sleep: tablet in bed    Bed partner:  frances  Time to bed:  11 pm   Lights off:  off  Sleep onset latency:  5 minutes        Disruptions or awakenings:    3-5 times     Wakeup time:      7 am   Perceived sleep quality:  rested       Daytime naps:      Rarely.    Weekend sleep routine:      same  Caffeine use: 5-6 diet Dr. 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 tophus 02/19/2018     Resolved Ambulatory Problems     Diagnosis Date Noted    No Resolved Ambulatory Problems     Past Medical History:   Diagnosis Date    Anxiety     Arthritis     Asthma     Depression     Fibromyalgia     GERD (gastroesophageal reflux disease)     Obesity     SHRAON (obstructive sleep apnea)                 PAST SURGICAL HISTORY:    Past Surgical History:   Procedure Laterality Date    CARPAL TUNNEL RELEASE      CHOLECYSTECTOMY      HYSTERECTOMY      KNEE SURGERY      pinched nerve      SHOULDER SURGERY      WRIST SURGERY      had ganlin cyst          FAMILY HISTORY:                Family History   Problem Relation Age of Onset    Diabetes Mother     Redmond's disease Mother     Hypertension Mother     Heart disease Father     Stroke Father     Mental illness Father     Hypertension Father     Diabetes Father     Depression Father     Heart disease Maternal Grandmother     Depression Maternal Grandmother     COPD Maternal Grandfather     Heart disease Maternal Grandfather     Stroke Maternal Grandfather     Kidney disease Paternal Grandmother     Heart disease Paternal Grandmother     Heart disease Paternal Grandfather        SOCIAL HISTORY:          Tobacco:   History   Smoking Status    Current Every Day Smoker    Packs/day: 1.00    Years: 25.00   Smokeless Tobacco    Never Used       alcohol use:    History   Alcohol Use No                 Occupation:  disable    ALLERGIES:    Review of patient's allergies indicates:   Allergen Reactions    Vicodin [hydrocodone-acetaminophen] Nausea And Vomiting       CURRENT MEDICATIONS:    Current Outpatient Prescriptions   Medication Sig Dispense Refill    albuterol (PROVENTIL) 2.5 mg /3 mL (0.083 %) nebulizer solution Take 3 mLs (2.5 mg total) by nebulization every 6 (six) hours as needed for Wheezing. Rescue 60 mL 1    albuterol 90 mcg/actuation inhaler Inhale 2 puffs into the lungs every 6 (six) hours as needed for Wheezing. Rescue 18 g 2    budesonide-formoterol 80-4.5 mcg (SYMBICORT) 80-4.5 mcg/actuation HFAA Inhale 2 puffs into the lungs 2 (two) times daily. 10.2 g 3    FLUoxetine (PROZAC) 20 MG capsule       furosemide (LASIX) 40 MG tablet Take 40 mg by mouth 2 (two) times daily.      gabapentin (NEURONTIN) 600 MG tablet Take 1 tablet (600 mg total) by mouth once daily. 30 tablet 1    gabapentin (NEURONTIN) 800 MG tablet Take 800 mg by mouth 2 (two) times daily.      indomethacin (INDOCIN) 50 MG capsule Take 1 capsule (50 mg total) by mouth 3 (three) times daily. 30 capsule 1     "lithium (ESKALITH) 300 MG capsule Take 300 mg by mouth 3 (three) times daily with meals.      LORazepam (ATIVAN) 1 MG tablet Take 1 mg by mouth as needed for Anxiety.      methocarbamol (ROBAXIN) 750 MG Tab Take 750 mg by mouth 4 (four) times daily.       nabumetone (RELAFEN) 750 MG tablet       omeprazole (PRILOSEC) 20 MG capsule Take 20 mg by mouth 2 (two) times daily.      oxybutynin (DITROPAN) 5 MG Tab Take 5 mg by mouth 3 (three) times daily.      potassium chloride (KLOR-CON) 10 MEQ TbSR Take 10 mEq by mouth 2 (two) times daily.      predniSONE (DELTASONE) 20 MG tablet 3 tablets per day for 2 days, 2 tablets per day for 2 days, 1 tablet per day for 2 days. 12 tablet 0    risperiDONE (RISPERDAL) 0.5 MG Tab Take 1 tablet (0.5 mg total) by mouth 2 (two) times daily. 60 tablet 0    topiramate (TOPAMAX) 200 MG Tab Take 1 tablet (200 mg total) by mouth once daily. 90 tablet 1     No current facility-administered medications for this visit.                   REVIEW OF SYSTEMS:     Sleep related symptoms as per HPI.  CONST:Denies weight gain    HEENT: + sinus congestion  PULM: Denies dyspnea  CARD:  Denies palpitations   GI:  +intermittent acid reflux  : Denies polyuria  NEURO: + headaches that improve with cpap  PSYCH: Denies mood disturbance  HEME: Denies anemia   Otherwise, a balance of systems reviewed is negative.          PHYSICAL EXAM:  Vitals:    04/20/18 0836   BP: 122/80   Pulse: 85   SpO2: 97%   Weight: (!) 151.5 kg (334 lb)   Height: 5' 7" (1.702 m)   PainSc: 0-No pain     Body mass index is 52.31 kg/m².     GENERAL: Normal development, well groomed  HEENT:  Conjunctivae are non-erythematous; Pupils equal, round, and reactive to light; Nose is symmetrical; Nasal mucosa is pink and moist; Septum is midline; Inferior turbinates are normal; Nasal airflow is mildly congested; Posterior pharynx is pink; Modified Mallampati: 4; Posterior palate is normal; Tonsils +1; Uvula is normal and pink;Tongue is " normal; Dentition is fair; No TMJ tenderness; Jaw opening and protrusion without click and without discomfort.  NECK: Supple. Neck circumference is 17.5 inches. No thyromegaly. No palpable nodes.     SKIN: On face and neck: No abrasions, no rashes, no lesions.  No subcutaneous nodules are palpable.  RESPIRATORY: mild expiratory wheeze.  Normal chest expansion and non-labored breathing at rest.  CARDIOVASCULAR: Normal S1, S2.  No murmurs, gallops or rubs. No carotid bruits bilaterally.  EXTREMITIES: No edema. No clubbing. No cyanosis. Station normal. Gait normal.        NEURO/PSYCH: Oriented to time, place and person. Normal attention span and concentration. Affect is full. Mood is normal.                                              DATA prior sleep study not available.    Lab Results   Component Value Date    TSH 2.325 01/04/2018     ASSESSMENT    ICD-10-CM ICD-9-CM    1. SHARON (obstructive sleep apnea) G47.33 327.23 CPAP/BIPAP SUPPLIES   2. Asthma, moderate persistent, poorly-controlled J45.40 493.90 budesonide-formoterol 80-4.5 mcg (SYMBICORT) 80-4.5 mcg/actuation HFAA   3. Nasal congestion R09.81 478.19    4. Class 3 obesity with serious comorbidity and body mass index (BMI) of 50.0 to 59.9 in adult, unspecified obesity type E66.9 278.00 Ambulatory Referral to [facility] (Results [facility])    Z68.43 V85.43 [facility] (Results [facility])      [facility] Patient Entered [facility] (Results [facility])       PLAN:    Sleep Apnea NEC. - unable to obtained record of sleep study (records from outside reviewed).  Will try to get sleep record from [facility].  Interrogation of cpap confirmed pressure of 7.  Will switched to apap 7-15 to help with air hunger.  Patient is benefiting from cpap.  Will provide chin strap to help with dry mouth.      Obesity - aware of need for drastic weight loss.  Candidate for bariatric surgery.  Will refer to [facility].      Insomnia - maintenance is the issue.  Improve with cpap.   Will observe with apap.     Nasal congestion - sinus irrigation and nasal steroid.      Asthma - prn albuterol for flare up.  Optimize nasal congestion.  Will start ics/laba.  Baseline pft.      Education: During our discussion today, we talked about the etiology of obstructive sleep apnea as well as the potential ramifications of untreated sleep apnea, which could include daytime sleepiness, hypertension, heart disease and/or stroke.     Precautions: The patient was advised to abstain from driving should they feel sleepy or drowsy.     Patient will Follow-up in about 4 months (around 8/20/2018). with md/np.

## 2018-04-20 NOTE — PATIENT INSTRUCTIONS
1.  [facility] Sinus Rinse Regular Kit    Recipe 1/4 teaspoon of salt and 1/4 teaspoon of baking soda in distilled water.  Be sure to tilt head forward as shown in picture.            flonase or nasonex over the counter.  2 sprays per nostril daily. Be sure to tilt head forward when dispensing medication.       Bariatric surgery [phone]     [facility]  [address]  [phone]    Programs include   *30 day free membership   *1 hour complimentary personal training session   *1 hour nutrition talk   *discounted [facility] membership

## 2018-04-29 ENCOUNTER — PATIENT MESSAGE (OUTPATIENT)
Dept: FAMILY MEDICINE | Facility: CLINIC | Age: 48
End: 2018-04-29

## 2018-04-30 RX ORDER — OMEPRAZOLE 20 MG/1
20 CAPSULE, DELAYED RELEASE ORAL 2 TIMES DAILY
Qty: 90 CAPSULE | Refills: 1 | Status: SHIPPED | OUTPATIENT
Start: 2018-04-30 | End: 2018-07-27 | Stop reason: SDUPTHER

## 2018-04-30 RX ORDER — NABUMETONE 750 MG/1
750 TABLET, FILM COATED ORAL DAILY
Qty: 90 TABLET | Refills: 1 | Status: SHIPPED | OUTPATIENT
Start: 2018-04-30 | End: 2018-06-13 | Stop reason: SDUPTHER

## 2018-04-30 NOTE — TELEPHONE ENCOUNTER
LOV 2/19/2018  Patient states she takes Relafen BID. She would also like for you to change her script for Gabapentin to BID. Please advise.

## 2018-05-02 ENCOUNTER — OFFICE VISIT (OUTPATIENT)
Dept: FAMILY MEDICINE | Facility: CLINIC | Age: 48
End: 2018-05-02
Payer: MEDICARE

## 2018-05-02 ENCOUNTER — TELEPHONE (OUTPATIENT)
Dept: FAMILY MEDICINE | Facility: CLINIC | Age: 48
End: 2018-05-02

## 2018-05-02 VITALS
SYSTOLIC BLOOD PRESSURE: 118 MMHG | HEIGHT: 67 IN | DIASTOLIC BLOOD PRESSURE: 80 MMHG | OXYGEN SATURATION: 97 % | HEART RATE: 74 BPM | TEMPERATURE: 99 F | WEIGHT: 293 LBS | BODY MASS INDEX: 45.99 KG/M2 | RESPIRATION RATE: 24 BRPM

## 2018-05-02 DIAGNOSIS — J45.901 EXACERBATION OF ASTHMA, UNSPECIFIED ASTHMA SEVERITY, UNSPECIFIED WHETHER PERSISTENT: ICD-10-CM

## 2018-05-02 DIAGNOSIS — M79.7 FIBROMYALGIA: Primary | ICD-10-CM

## 2018-05-02 PROCEDURE — 94640 AIRWAY INHALATION TREATMENT: CPT | Mod: PBBFAC,59,PO

## 2018-05-02 PROCEDURE — 99214 OFFICE O/P EST MOD 30 MIN: CPT | Mod: S$PBB,,, | Performed by: FAMILY MEDICINE

## 2018-05-02 PROCEDURE — 99213 OFFICE O/P EST LOW 20 MIN: CPT | Mod: PBBFAC,PO | Performed by: FAMILY MEDICINE

## 2018-05-02 PROCEDURE — 96372 THER/PROPH/DIAG INJ SC/IM: CPT | Mod: PBBFAC,PO

## 2018-05-02 PROCEDURE — 99999 PR PBB SHADOW E&M-EST. PATIENT-LVL III: CPT | Mod: PBBFAC,,, | Performed by: FAMILY MEDICINE

## 2018-05-02 RX ORDER — AZITHROMYCIN 250 MG/1
TABLET, FILM COATED ORAL
Qty: 6 TABLET | Refills: 0 | Status: SHIPPED | OUTPATIENT
Start: 2018-05-02 | End: 2018-07-17 | Stop reason: ALTCHOICE

## 2018-05-02 RX ORDER — GABAPENTIN 600 MG/1
600 TABLET ORAL 2 TIMES DAILY
Qty: 60 TABLET | Refills: 2 | Status: SHIPPED | OUTPATIENT
Start: 2018-05-02 | End: 2018-07-27 | Stop reason: SDUPTHER

## 2018-05-02 RX ORDER — PREDNISONE 20 MG/1
TABLET ORAL
Qty: 12 TABLET | Refills: 0 | Status: SHIPPED | OUTPATIENT
Start: 2018-05-02 | End: 2018-07-17 | Stop reason: ALTCHOICE

## 2018-05-02 RX ORDER — GABAPENTIN 800 MG/1
800 TABLET ORAL NIGHTLY
Qty: 30 TABLET | Refills: 2 | Status: SHIPPED | OUTPATIENT
Start: 2018-05-02 | End: 2018-08-10 | Stop reason: SDUPTHER

## 2018-05-02 RX ORDER — METHYLPREDNISOLONE SOD SUCC 125 MG
125 VIAL (EA) INJECTION
Status: COMPLETED | OUTPATIENT
Start: 2018-05-02 | End: 2018-05-02

## 2018-05-02 RX ORDER — LEVALBUTEROL INHALATION SOLUTION 1.25 MG/3ML
1.25 SOLUTION RESPIRATORY (INHALATION)
Status: COMPLETED | OUTPATIENT
Start: 2018-05-02 | End: 2018-05-02

## 2018-05-02 RX ADMIN — METHYLPREDNISOLONE SODIUM SUCCINATE 125 MG: 125 INJECTION, POWDER, FOR SOLUTION INTRAMUSCULAR; INTRAVENOUS at 09:05

## 2018-05-02 RX ADMIN — LEVALBUTEROL 1.25 MG: 1.25 SOLUTION RESPIRATORY (INHALATION) at 09:05

## 2018-05-02 NOTE — PROGRESS NOTES
Solu-Medrol 125 mg administered IM to right ventrogluteal. Tolerated well.No complaints. No adverse reaction noted.  Levalbuterol 1.25 mg administered orally through nebulizer. Tolerated well. No complaints. No adverse reaction noted.

## 2018-05-02 NOTE — PROGRESS NOTES
Chief Complaint   Patient presents with    Asthma    URI    Fibromyalgia       HPI  Zaira Vinson is a 47 y.o. female with multiple medical diagnoses as listed in the medical history and problem list that presents for URI.    URI - 2 weeks, non prod, prog worsening, here from asthma exac, +smoker.     Bipolar - overall doing well, stable on current medications.     Fibromyalgia - 2 day hx, exacerbation, neck, shoulders and legs,     Pt is known to me and was last seen by me on 2/19/2018.    PAST MEDICAL HISTORY:  Past Medical History:   Diagnosis Date    Anxiety     Arthritis     Asthma     Depression     Fibromyalgia     GERD (gastroesophageal reflux disease)     Obesity     SHARON (obstructive sleep apnea)        PAST SURGICAL HISTORY:  Past Surgical History:   Procedure Laterality Date    CARPAL TUNNEL RELEASE      CHOLECYSTECTOMY      HYSTERECTOMY      KNEE SURGERY      pinched nerve      SHOULDER SURGERY      WRIST SURGERY      had ganlin cyst       SOCIAL HISTORY:  Social History     Social History    Marital status: Single     Spouse name: N/A    Number of children: N/A    Years of education: N/A     Occupational History    Not on file.     Social History Main Topics    Smoking status: Current Every Day Smoker     Packs/day: 1.00     Years: 25.00    Smokeless tobacco: Never Used    Alcohol use No    Drug use: No    Sexual activity: Yes     Partners: Male     Other Topics Concern    Not on file     Social History Narrative    No narrative on file       FAMILY HISTORY:  Family History   Problem Relation Age of Onset    Diabetes Mother     Jose Juan's disease Mother     Hypertension Mother     Heart disease Father     Stroke Father     Mental illness Father     Hypertension Father     Diabetes Father     Depression Father     Heart disease Maternal Grandmother     Depression Maternal Grandmother     COPD Maternal Grandfather     Heart disease Maternal Grandfather     Stroke  Maternal Grandfather     Kidney disease Paternal Grandmother     Heart disease Paternal Grandmother     Heart disease Paternal Grandfather        ALLERGIES AND MEDICATIONS: updated and reviewed.  Review of patient's allergies indicates:   Allergen Reactions    Vicodin [hydrocodone-acetaminophen] Nausea And Vomiting     Current Outpatient Prescriptions   Medication Sig Dispense Refill    albuterol (PROVENTIL) 2.5 mg /3 mL (0.083 %) nebulizer solution Take 3 mLs (2.5 mg total) by nebulization every 6 (six) hours as needed for Wheezing. Rescue 60 mL 1    albuterol 90 mcg/actuation inhaler Inhale 2 puffs into the lungs every 6 (six) hours as needed for Wheezing. Rescue 18 g 2    budesonide-formoterol 80-4.5 mcg (SYMBICORT) 80-4.5 mcg/actuation HFAA Inhale 2 puffs into the lungs 2 (two) times daily. 10.2 g 3    FLUoxetine (PROZAC) 20 MG capsule       furosemide (LASIX) 40 MG tablet Take 40 mg by mouth 2 (two) times daily.      gabapentin (NEURONTIN) 600 MG tablet Take 1 tablet (600 mg total) by mouth 2 (two) times daily. 60 tablet 2    indomethacin (INDOCIN) 50 MG capsule Take 1 capsule (50 mg total) by mouth 3 (three) times daily. 30 capsule 1    lithium (ESKALITH) 300 MG capsule Take 300 mg by mouth 3 (three) times daily with meals.      LORazepam (ATIVAN) 1 MG tablet Take 1 mg by mouth as needed for Anxiety.      methocarbamol (ROBAXIN) 750 MG Tab Take 750 mg by mouth 4 (four) times daily.       nabumetone (RELAFEN) 750 MG tablet Take 1 tablet (750 mg total) by mouth once daily. 90 tablet 1    omeprazole (PRILOSEC) 20 MG capsule Take 1 capsule (20 mg total) by mouth 2 (two) times daily. 90 capsule 1    oxybutynin (DITROPAN) 5 MG Tab Take 5 mg by mouth 3 (three) times daily.      potassium chloride (KLOR-CON) 10 MEQ TbSR Take 10 mEq by mouth 2 (two) times daily.      risperiDONE (RISPERDAL) 0.5 MG Tab Take 1 tablet (0.5 mg total) by mouth 2 (two) times daily. 60 tablet 0    topiramate (TOPAMAX) 200  "MG Tab Take 1 tablet (200 mg total) by mouth once daily. 90 tablet 1    azithromycin (ZITHROMAX Z-ERIN) 250 MG tablet 2 tabs today, then 1 tab per day for 4 days. 6 tablet 0    gabapentin (NEURONTIN) 800 MG tablet Take 1 tablet (800 mg total) by mouth every evening. 30 tablet 2    predniSONE (DELTASONE) 20 MG tablet 3 tablets per day for 2 days, 2 tablets per day for 2 days, 1 tablet per day for 2 days. 12 tablet 0     No current facility-administered medications for this visit.        ROS  Review of Systems   Constitutional: Negative for chills and fever.   HENT: Positive for ear pain, postnasal drip and sore throat. Negative for rhinorrhea.    Respiratory: Positive for shortness of breath and wheezing.    Cardiovascular: Negative for chest pain.   Musculoskeletal: Positive for myalgias.   Skin: Negative for rash.   Allergic/Immunologic: Negative for environmental allergies.   Neurological: Positive for headaches.       Physical Exam  Vitals:    05/02/18 0902   BP: 118/80   Pulse: 74   Resp: (!) 24   Temp: 98.7 °F (37.1 °C)    Body mass index is 52.55 kg/m².  Weight: (!) 152.2 kg (335 lb 8.6 oz)   Height: 5' 7" (170.2 cm)     Physical Exam   Constitutional: She is oriented to person, place, and time. She appears well-developed and well-nourished.   HENT:   Head: Normocephalic and atraumatic.   Mouth/Throat: No oropharyngeal exudate.   Erythematous pharynx  Erythematous, edematous nares  Mild dull TMs bilaterally   Eyes: Conjunctivae and EOM are normal. Pupils are equal, round, and reactive to light. No scleral icterus.   Neck: Normal range of motion. Neck supple.   Cardiovascular: Normal rate, regular rhythm and normal heart sounds.    Pulmonary/Chest: Effort normal and breath sounds normal. No respiratory distress.   Abdominal: Soft. Bowel sounds are normal. There is no tenderness.   Musculoskeletal: Normal range of motion. She exhibits tenderness.   Lymphadenopathy:     She has cervical adenopathy. "   Neurological: She is alert and oriented to person, place, and time. She has normal reflexes.   Skin: Skin is warm and dry. No rash noted.   Psychiatric: She has a normal mood and affect. Her behavior is normal. Judgment and thought content normal.       Health Maintenance       Date Due Completion Date    Mammogram 11/24/2011 ---    Pneumococcal PPSV23 (Medium Risk) (1) 02/07/2019 (Originally 11/24/1989) ---    Influenza Vaccine 08/01/2018 2/7/2018 (Done)    Override on 2/7/2018: Done    Lipid Panel 01/04/2023 1/4/2018    TETANUS VACCINE 02/07/2028 2/7/2018 (Declined)    Override on 2/7/2018: Declined          Assessment & Plan    Fibromyalgia  -     gabapentin (NEURONTIN) 600 MG tablet; Take 1 tablet (600 mg total) by mouth 2 (two) times daily.  Dispense: 60 tablet; Refill: 2  -     gabapentin (NEURONTIN) 800 MG tablet; Take 1 tablet (800 mg total) by mouth every evening.  Dispense: 30 tablet; Refill: 2  - Increased dose to 600/600/800    Exacerbation of asthma, unspecified asthma severity, unspecified whether persistent  -     azithromycin (ZITHROMAX Z-ERIN) 250 MG tablet; 2 tabs today, then 1 tab per day for 4 days.  Dispense: 6 tablet; Refill: 0  -     methylPREDNISolone sodium succinate injection 125 mg; Inject 125 mg into the muscle one time.  -     predniSONE (DELTASONE) 20 MG tablet; 3 tablets per day for 2 days, 2 tablets per day for 2 days, 1 tablet per day for 2 days.  Dispense: 12 tablet; Refill: 0  -     levalbuterol nebulizer solution 1.25 mg; Take 3 mLs (1.25 mg total) by nebulization one time.  - Will treat today, ABX provided as well.         Follow-up in about 4 weeks (around 5/30/2018).          Answers for HPI/ROS submitted by the patient on 5/1/2018   Cough  Chronicity: recurrent  Onset: 1 to 4 weeks ago  Progression since onset: gradually worsening  Frequency: every few minutes  Cough characteristics: non-productive, productive of sputum  ear congestion: Yes  heartburn: No  hemoptysis:  No  nasal congestion: Yes  sweats: No  weight loss: No  Aggravated by: dust, exercise, fumes, pollens, stress  Risk factors for lung disease: animal exposure, smoking/tobacco exposure  asthma: Yes  bronchiectasis: No  bronchitis: Yes  emphysema: No  pneumonia: No  Treatments tried: a beta-agonist inhaler, body position changes, cool air, rest, steroid inhaler  Improvement on treatment: no relief

## 2018-05-02 NOTE — TELEPHONE ENCOUNTER
Pharmacy calling to clarify pt is supposed to be on both gabapentin 600mg BID and 800mg every evening; I did tell them yes; please verify

## 2018-05-08 ENCOUNTER — PATIENT MESSAGE (OUTPATIENT)
Dept: FAMILY MEDICINE | Facility: CLINIC | Age: 48
End: 2018-05-08

## 2018-05-09 RX ORDER — FLUOXETINE HYDROCHLORIDE 20 MG/1
20 CAPSULE ORAL DAILY
Qty: 30 CAPSULE | Refills: 2 | Status: SHIPPED | OUTPATIENT
Start: 2018-05-09 | End: 2018-05-10 | Stop reason: SDUPTHER

## 2018-05-10 ENCOUNTER — PATIENT MESSAGE (OUTPATIENT)
Dept: FAMILY MEDICINE | Facility: CLINIC | Age: 48
End: 2018-05-10

## 2018-05-10 DIAGNOSIS — Z12.39 BREAST CANCER SCREENING: ICD-10-CM

## 2018-05-10 RX ORDER — FLUOXETINE HYDROCHLORIDE 20 MG/1
60 CAPSULE ORAL DAILY
Qty: 90 CAPSULE | Refills: 2 | Status: SHIPPED | OUTPATIENT
Start: 2018-05-10 | End: 2018-08-16 | Stop reason: SDUPTHER

## 2018-05-18 RX ORDER — RISPERIDONE 0.5 MG/1
0.5 TABLET ORAL 2 TIMES DAILY
Qty: 60 TABLET | Refills: 0 | Status: CANCELLED | OUTPATIENT
Start: 2018-05-18

## 2018-05-18 RX ORDER — RISPERIDONE 0.5 MG/1
0.5 TABLET ORAL 2 TIMES DAILY
Qty: 60 TABLET | Refills: 3 | Status: SHIPPED | OUTPATIENT
Start: 2018-05-18 | End: 2018-09-12 | Stop reason: SDUPTHER

## 2018-05-31 ENCOUNTER — PATIENT MESSAGE (OUTPATIENT)
Dept: FAMILY MEDICINE | Facility: CLINIC | Age: 48
End: 2018-05-31

## 2018-06-01 RX ORDER — LITHIUM CARBONATE 300 MG/1
300 CAPSULE ORAL
Qty: 90 CAPSULE | Refills: 3 | Status: SHIPPED | OUTPATIENT
Start: 2018-06-01 | End: 2018-10-03 | Stop reason: SDUPTHER

## 2018-06-03 ENCOUNTER — PATIENT MESSAGE (OUTPATIENT)
Dept: FAMILY MEDICINE | Facility: CLINIC | Age: 48
End: 2018-06-03

## 2018-06-04 RX ORDER — OXYBUTYNIN CHLORIDE 5 MG/1
5 TABLET ORAL 3 TIMES DAILY
Qty: 90 TABLET | Refills: 2 | Status: SHIPPED | OUTPATIENT
Start: 2018-06-04 | End: 2018-11-30 | Stop reason: SDUPTHER

## 2018-06-11 ENCOUNTER — PATIENT MESSAGE (OUTPATIENT)
Dept: FAMILY MEDICINE | Facility: CLINIC | Age: 48
End: 2018-06-11

## 2018-06-13 RX ORDER — NABUMETONE 750 MG/1
750 TABLET, FILM COATED ORAL 2 TIMES DAILY
Qty: 180 TABLET | Refills: 2 | Status: SHIPPED | OUTPATIENT
Start: 2018-06-13 | End: 2019-03-19 | Stop reason: SDUPTHER

## 2018-06-25 ENCOUNTER — PATIENT MESSAGE (OUTPATIENT)
Dept: FAMILY MEDICINE | Facility: CLINIC | Age: 48
End: 2018-06-25

## 2018-07-03 DIAGNOSIS — I27.20 PULMONARY HYPERTENSION: ICD-10-CM

## 2018-07-03 DIAGNOSIS — F31.9 BIPOLAR 1 DISORDER: Primary | ICD-10-CM

## 2018-07-05 ENCOUNTER — TELEPHONE (OUTPATIENT)
Dept: ADMINISTRATIVE | Facility: HOSPITAL | Age: 48
End: 2018-07-05

## 2018-07-09 ENCOUNTER — TELEPHONE (OUTPATIENT)
Dept: ADMINISTRATIVE | Facility: HOSPITAL | Age: 48
End: 2018-07-09

## 2018-07-09 NOTE — TELEPHONE ENCOUNTER
----- Message from Danielle Aquino sent at 7/6/2018 12:26 PM CDT -----  Contact: spouse /[phone]  Returning call back to you

## 2018-07-17 ENCOUNTER — OFFICE VISIT (OUTPATIENT)
Dept: CARDIOLOGY | Facility: CLINIC | Age: 48
End: 2018-07-17
Payer: MEDICARE

## 2018-07-17 VITALS
RESPIRATION RATE: 15 BRPM | HEIGHT: 67 IN | HEART RATE: 76 BPM | WEIGHT: 293 LBS | BODY MASS INDEX: 45.99 KG/M2 | DIASTOLIC BLOOD PRESSURE: 56 MMHG | SYSTOLIC BLOOD PRESSURE: 102 MMHG

## 2018-07-17 DIAGNOSIS — R06.09 DOE (DYSPNEA ON EXERTION): ICD-10-CM

## 2018-07-17 DIAGNOSIS — R94.31 ABNORMAL EKG: ICD-10-CM

## 2018-07-17 DIAGNOSIS — G47.33 OSA (OBSTRUCTIVE SLEEP APNEA): ICD-10-CM

## 2018-07-17 DIAGNOSIS — E66.01 MORBID OBESITY: ICD-10-CM

## 2018-07-17 DIAGNOSIS — Z72.0 TOBACCO ABUSE: ICD-10-CM

## 2018-07-17 DIAGNOSIS — I27.20 PULMONARY HYPERTENSION: Primary | ICD-10-CM

## 2018-07-17 PROCEDURE — 99214 OFFICE O/P EST MOD 30 MIN: CPT | Mod: PBBFAC | Performed by: INTERNAL MEDICINE

## 2018-07-17 PROCEDURE — 99204 OFFICE O/P NEW MOD 45 MIN: CPT | Mod: S$PBB,,, | Performed by: INTERNAL MEDICINE

## 2018-07-17 PROCEDURE — 93010 ELECTROCARDIOGRAM REPORT: CPT | Mod: ,,, | Performed by: INTERNAL MEDICINE

## 2018-07-17 PROCEDURE — 99999 PR PBB SHADOW E&M-EST. PATIENT-LVL IV: CPT | Mod: PBBFAC,,, | Performed by: INTERNAL MEDICINE

## 2018-07-17 NOTE — PROGRESS NOTES
CARDIOVASCULAR CONSULTATION    REASON FOR CONSULT:   Zaira Vinson is a 47 y.o. female who presents for eval of SHE PRAKASH.    Req/PCP: Jamel  HISTORY OF PRESENT ILLNESS:   The patient is a very pleasant 47-year-old  woman referred at the request of her primary care physician for evaluation of pulmonary hypertension.  She reports a long history of dyspnea on exertion, without angina.  She does report orthopnea and lower extremity edema.  She has had no palpitations, lightheadedness, dizziness, or syncope.  There is no melena, hematuria, or claudicant symptoms.    Family history is notable for father with coronary artery disease in his 60s.    The patient is a current smoker, but seems to be agreeable to enrollment in the Ambulatory smoking cessation program.    The patient tells me that she has had cardiac testing performed within the last year at a cardiologist in [place] including an echocardiogram and nuclear stress test.  She apparently was told that she had pulmonary hypertension at that time. This seems to be secondary pulmonary hypertension from a multifactorial perspective.    CARDIOVASCULAR HISTORY:   ?PHTN  SHARON on CPAP    PAST MEDICAL HISTORY:     Past Medical History:   Diagnosis Date    Anxiety     Arthritis     Asthma     Depression     Fibromyalgia     GERD (gastroesophageal reflux disease)     Obesity     SHARON (obstructive sleep apnea)        PAST SURGICAL HISTORY:     Past Surgical History:   Procedure Laterality Date    CARPAL TUNNEL RELEASE      CHOLECYSTECTOMY      HYSTERECTOMY      KNEE SURGERY      pinched nerve      SHOULDER SURGERY      WRIST SURGERY      had ganlin cyst       ALLERGIES AND MEDICATION:     Review of patient's allergies indicates:   Allergen Reactions    Vicodin [hydrocodone-acetaminophen] Nausea And Vomiting     Previous Medications    ALBUTEROL (PROVENTIL) 2.5 MG /3 ML (0.083 %) NEBULIZER SOLUTION    Take 3 mLs (2.5 mg total) by nebulization every  6 (six) hours as needed for Wheezing. Rescue    ALBUTEROL 90 MCG/ACTUATION INHALER    Inhale 2 puffs into the lungs every 6 (six) hours as needed for Wheezing. Rescue    BUDESONIDE-FORMOTEROL 80-4.5 MCG (SYMBICORT) 80-4.5 MCG/ACTUATION HFAA    Inhale 2 puffs into the lungs 2 (two) times daily.    FLUOXETINE (PROZAC) 20 MG CAPSULE    Take 3 capsules (60 mg total) by mouth once daily.    FUROSEMIDE (LASIX) 40 MG TABLET    Take 40 mg by mouth 2 (two) times daily.    GABAPENTIN (NEURONTIN) 600 MG TABLET    Take 1 tablet (600 mg total) by mouth 2 (two) times daily.    GABAPENTIN (NEURONTIN) 800 MG TABLET    Take 1 tablet (800 mg total) by mouth every evening.    INDOMETHACIN (INDOCIN) 50 MG CAPSULE    Take 1 capsule (50 mg total) by mouth 3 (three) times daily.    LITHIUM (ESKALITH) 300 MG CAPSULE    Take 1 capsule (300 mg total) by mouth 3 (three) times daily with meals.    LORAZEPAM (ATIVAN) 1 MG TABLET    Take 1 mg by mouth as needed for Anxiety.    METHOCARBAMOL (ROBAXIN) 750 MG TAB    Take 750 mg by mouth 4 (four) times daily.     NABUMETONE (RELAFEN) 750 MG TABLET    Take 1 tablet (750 mg total) by mouth 2 (two) times daily.    OMEPRAZOLE (PRILOSEC) 20 MG CAPSULE    Take 1 capsule (20 mg total) by mouth 2 (two) times daily.    OXYBUTYNIN (DITROPAN) 5 MG TAB    Take 1 tablet (5 mg total) by mouth 3 (three) times daily.    POTASSIUM CHLORIDE (KLOR-CON) 10 MEQ TBSR    Take 10 mEq by mouth 2 (two) times daily.    RISPERIDONE (RISPERDAL) 0.5 MG TAB    Take 1 tablet (0.5 mg total) by mouth 2 (two) times daily.    TOPIRAMATE (TOPAMAX) 200 MG TAB    Take 1 tablet (200 mg total) by mouth once daily.       SOCIAL HISTORY:     Social History     Social History    Marital status:      Spouse name: N/A    Number of children: N/A    Years of education: N/A     Occupational History    Not on file.     Social History Main Topics    Smoking status: Current Every Day Smoker     Packs/day: 1.00     Years: 25.00     "Smokeless tobacco: Never Used    Alcohol use No    Drug use: No    Sexual activity: Yes     Partners: Male     Other Topics Concern    Not on file     Social History Narrative    No narrative on file       FAMILY HISTORY:     Family History   Problem Relation Age of Onset    Diabetes Mother     Jose Juan's disease Mother     Hypertension Mother     Heart disease Father     Stroke Father     Mental illness Father     Hypertension Father     Diabetes Father     Depression Father     Heart disease Maternal Grandmother     Depression Maternal Grandmother     COPD Maternal Grandfather     Heart disease Maternal Grandfather     Stroke Maternal Grandfather     Kidney disease Paternal Grandmother     Heart disease Paternal Grandmother     Heart disease Paternal Grandfather        REVIEW OF SYSTEMS:   Review of Systems   Constitutional: Negative for chills, diaphoresis and fever.   HENT: Negative for nosebleeds.    Eyes: Negative for blurred vision, double vision and photophobia.   Respiratory: Positive for shortness of breath. Negative for hemoptysis and wheezing.    Cardiovascular: Positive for orthopnea and leg swelling. Negative for chest pain, palpitations, claudication and PND.   Gastrointestinal: Negative for abdominal pain, blood in stool, heartburn, melena, nausea and vomiting.   Genitourinary: Negative for flank pain and hematuria.   Musculoskeletal: Negative for falls, myalgias and neck pain.   Skin: Negative for rash.   Neurological: Negative for dizziness, seizures, loss of consciousness, weakness and headaches.   Endo/Heme/Allergies: Negative for polydipsia. Does not bruise/bleed easily.   Psychiatric/Behavioral: Negative for depression and memory loss. The patient is not nervous/anxious.        PHYSICAL EXAM:     Vitals:    07/17/18 1348   BP: (!) 102/56   Pulse: 76   Resp: 15    Body mass index is 51.93 kg/m².  Weight: (!) 150.4 kg (331 lb 9.2 oz)   Height: 5' 7" (170.2 cm)     Physical " Exam   Constitutional: She is oriented to person, place, and time. She appears well-developed and well-nourished. She is cooperative.  Non-toxic appearance. No distress.   HENT:   Head: Normocephalic and atraumatic.   Eyes: Conjunctivae and EOM are normal. Pupils are equal, round, and reactive to light. No scleral icterus.   Neck: Trachea normal and normal range of motion. Neck supple. Normal carotid pulses and no JVD present. Carotid bruit is not present. No neck rigidity. No tracheal deviation and no edema present. No thyromegaly present.   Cardiovascular: Normal rate, regular rhythm, S1 normal and S2 normal.  PMI is not displaced.  Exam reveals no gallop and no friction rub.    No murmur heard.  Pulses:       Carotid pulses are 2+ on the right side, and 2+ on the left side.  Pulmonary/Chest: Effort normal and breath sounds normal. No respiratory distress. She has no wheezes. She has no rales. She exhibits no tenderness.   Abdominal: Soft. She exhibits no distension. There is no hepatosplenomegaly.   obese   Musculoskeletal: She exhibits no edema or tenderness.   Feet:   Right Foot:   Skin Integrity: Negative for ulcer.   Left Foot:   Skin Integrity: Negative for ulcer.   Neurological: She is alert and oriented to person, place, and time. No cranial nerve deficit.   Skin: Skin is warm and dry. No rash noted. No erythema.   Psychiatric: She has a normal mood and affect. Her speech is normal and behavior is normal.   Vitals reviewed.      DATA:   EKG: (personally reviewed tracing)  7/17/18 SR 77, PRWP    Laboratory:  CBC:    Recent Labs  Lab 01/04/18  0848   WHITE BLOOD CELL COUNT 7.20   HEMOGLOBIN 13.2   HEMATOCRIT 42.5   PLATELETS 296       CHEMISTRIES:    Recent Labs  Lab 01/04/18  0848   GLUCOSE 98   SODIUM 138   POTASSIUM 4.6   BUN BLD 10   CREATININE 0.8   EGFR IF AFRICAN AMERICAN >60.0   EGFR IF NON- >60.0   CALCIUM 9.0       CARDIAC BIOMARKERS:        COAGS:        LIPIDS/LFTS:    Recent  Labs  Lab 01/04/18  0848   CHOLESTEROL 188   TRIGLYCERIDES 107   HDL 54   LDL CHOLESTEROL 112.6   NON-HDL CHOLESTEROL 134   AST 10   ALT 12     Lab Results   Component Value Date    TSH 2.325 01/04/2018         Cardiovascular Testing:  Ordered  Echo    ASSESSMENT:   # NOWAK, ?PHTN (likely multifactorial secondary PHTN: SHARON, HTN, BMI, Tob, etc)  # BMI 52  # SHARON on CPAP  # Tob abuse    PLAN:   Cont med rx  Echo  Obtain records from prior cardiologist: echo/MPI  Diet/exercise/weight loss  Amb smoking cessation program  Bariatric surgery referral  RTC 1 month    Ltio Colmenares MD, FACC

## 2018-07-17 NOTE — LETTER
July 17, 2018      Kerwin Mccullough MD  [address] [place] [address]           [facility] - Cardiology  [address] [facility] [address]  [place] [address]  Phone: [phone]          Patient: Zaira Vinson   MR Number: [id number]   YOB: 1970   Date of Visit: 7/17/2018       Dear Dr. Kerwin Mccullough:    Thank you for referring Zaira Vinson to me for evaluation. Attached you will find relevant portions of my assessment and plan of care.    If you have questions, please do not hesitate to call me. I look forward to following Zaira Vinson along with you.    Sincerely,    Lito Colmenares MD    Enclosure  CC:  No Recipients    If you would like to receive this communication electronically, please contact [email or web address] or [phone] to request more information on [employer] Link access.    For providers and/or their staff who would like to refer a patient to [facility], please contact us through our one-stop-shop provider referral line, [facility], at [phone].    If you feel you have received this communication in error or would no longer like to receive these types of communications, please e-mail [email or web address]

## 2018-07-27 DIAGNOSIS — M79.7 FIBROMYALGIA: ICD-10-CM

## 2018-07-27 RX ORDER — GABAPENTIN 600 MG/1
600 TABLET ORAL 2 TIMES DAILY
Qty: 60 TABLET | Refills: 2 | Status: SHIPPED | OUTPATIENT
Start: 2018-07-27 | End: 2019-03-19 | Stop reason: SDUPTHER

## 2018-07-27 RX ORDER — OMEPRAZOLE 20 MG/1
20 CAPSULE, DELAYED RELEASE ORAL 2 TIMES DAILY
Qty: 90 CAPSULE | Refills: 1 | Status: SHIPPED | OUTPATIENT
Start: 2018-07-27 | End: 2018-09-12 | Stop reason: SDUPTHER

## 2018-08-10 ENCOUNTER — HOSPITAL ENCOUNTER (OUTPATIENT)
Dept: CARDIOLOGY | Facility: HOSPITAL | Age: 48
Discharge: HOME | End: 2018-08-10
Attending: INTERNAL MEDICINE
Payer: MEDICARE

## 2018-08-10 DIAGNOSIS — M79.7 FIBROMYALGIA: ICD-10-CM

## 2018-08-10 DIAGNOSIS — I27.20 PULMONARY HYPERTENSION: ICD-10-CM

## 2018-08-10 LAB
ESTIMATED PA SYSTOLIC PRESSURE: 19
GLOBAL PERICARDIAL EFFUSION: NORMAL
RETIRED EF AND QEF - SEE NOTES: 60 (ref 55–65)
TRICUSPID VALVE REGURGITATION: NORMAL

## 2018-08-10 PROCEDURE — 93306 TTE W/DOPPLER COMPLETE: CPT

## 2018-08-10 PROCEDURE — 93306 TTE W/DOPPLER COMPLETE: CPT | Mod: 26,,, | Performed by: INTERNAL MEDICINE

## 2018-08-10 RX ORDER — GABAPENTIN 800 MG/1
800 TABLET ORAL NIGHTLY
Qty: 30 TABLET | Refills: 2 | Status: SHIPPED | OUTPATIENT
Start: 2018-08-10 | End: 2018-08-15 | Stop reason: ALTCHOICE

## 2018-08-10 RX ORDER — GABAPENTIN 800 MG/1
800 TABLET ORAL NIGHTLY
Qty: 30 TABLET | Refills: 2 | Status: CANCELLED | OUTPATIENT
Start: 2018-08-10 | End: 2019-08-10

## 2018-08-15 ENCOUNTER — OFFICE VISIT (OUTPATIENT)
Dept: CARDIOLOGY | Facility: CLINIC | Age: 48
End: 2018-08-15
Payer: MEDICARE

## 2018-08-15 VITALS
RESPIRATION RATE: 15 BRPM | WEIGHT: 293 LBS | HEIGHT: 67 IN | OXYGEN SATURATION: 95 % | SYSTOLIC BLOOD PRESSURE: 116 MMHG | HEART RATE: 74 BPM | DIASTOLIC BLOOD PRESSURE: 78 MMHG | BODY MASS INDEX: 45.99 KG/M2

## 2018-08-15 DIAGNOSIS — R06.09 DOE (DYSPNEA ON EXERTION): Primary | ICD-10-CM

## 2018-08-15 DIAGNOSIS — G47.33 OSA (OBSTRUCTIVE SLEEP APNEA): ICD-10-CM

## 2018-08-15 DIAGNOSIS — Z72.0 TOBACCO ABUSE: ICD-10-CM

## 2018-08-15 DIAGNOSIS — E66.01 MORBID OBESITY: ICD-10-CM

## 2018-08-15 PROCEDURE — 99999 PR PBB SHADOW E&M-EST. PATIENT-LVL IV: CPT | Mod: PBBFAC,,, | Performed by: INTERNAL MEDICINE

## 2018-08-15 PROCEDURE — 99214 OFFICE O/P EST MOD 30 MIN: CPT | Mod: S$PBB,,, | Performed by: INTERNAL MEDICINE

## 2018-08-15 PROCEDURE — 99214 OFFICE O/P EST MOD 30 MIN: CPT | Mod: PBBFAC | Performed by: INTERNAL MEDICINE

## 2018-08-16 RX ORDER — FLUOXETINE HYDROCHLORIDE 20 MG/1
60 CAPSULE ORAL DAILY
Qty: 90 CAPSULE | Refills: 2 | Status: SHIPPED | OUTPATIENT
Start: 2018-08-16 | End: 2018-11-15 | Stop reason: SDUPTHER

## 2018-09-12 RX ORDER — RISPERIDONE 0.5 MG/1
0.5 TABLET ORAL 2 TIMES DAILY
Qty: 60 TABLET | Refills: 3 | Status: SHIPPED | OUTPATIENT
Start: 2018-09-12 | End: 2019-01-11 | Stop reason: SDUPTHER

## 2018-09-12 RX ORDER — OMEPRAZOLE 20 MG/1
20 CAPSULE, DELAYED RELEASE ORAL 2 TIMES DAILY
Qty: 90 CAPSULE | Refills: 1 | Status: SHIPPED | OUTPATIENT
Start: 2018-09-12 | End: 2018-12-14 | Stop reason: SDUPTHER

## 2018-09-13 ENCOUNTER — HOSPITAL ENCOUNTER (OUTPATIENT)
Dept: RADIOLOGY | Facility: HOSPITAL | Age: 48
Discharge: HOME | End: 2018-09-13
Attending: FAMILY MEDICINE
Payer: MEDICARE

## 2018-09-13 ENCOUNTER — OFFICE VISIT (OUTPATIENT)
Dept: FAMILY MEDICINE | Facility: CLINIC | Age: 48
End: 2018-09-13
Payer: MEDICARE

## 2018-09-13 VITALS
WEIGHT: 293 LBS | SYSTOLIC BLOOD PRESSURE: 122 MMHG | BODY MASS INDEX: 45.99 KG/M2 | HEIGHT: 67 IN | DIASTOLIC BLOOD PRESSURE: 80 MMHG | TEMPERATURE: 98 F | OXYGEN SATURATION: 97 % | RESPIRATION RATE: 20 BRPM | HEART RATE: 82 BPM

## 2018-09-13 DIAGNOSIS — J45.40 ASTHMA, MODERATE PERSISTENT, POORLY-CONTROLLED: ICD-10-CM

## 2018-09-13 DIAGNOSIS — M70.72 BURSITIS OF LEFT HIP, UNSPECIFIED BURSA: ICD-10-CM

## 2018-09-13 DIAGNOSIS — M25.552 LEFT HIP PAIN: ICD-10-CM

## 2018-09-13 DIAGNOSIS — I27.20 PULMONARY HYPERTENSION: ICD-10-CM

## 2018-09-13 DIAGNOSIS — E66.01 MORBID OBESITY: ICD-10-CM

## 2018-09-13 DIAGNOSIS — G47.33 OSA (OBSTRUCTIVE SLEEP APNEA): ICD-10-CM

## 2018-09-13 DIAGNOSIS — B96.89 ACUTE BACTERIAL SINUSITIS: ICD-10-CM

## 2018-09-13 DIAGNOSIS — M79.7 FIBROMYALGIA: ICD-10-CM

## 2018-09-13 DIAGNOSIS — F32.A DEPRESSION, UNSPECIFIED DEPRESSION TYPE: ICD-10-CM

## 2018-09-13 DIAGNOSIS — F31.9 BIPOLAR 1 DISORDER: ICD-10-CM

## 2018-09-13 DIAGNOSIS — J01.90 ACUTE BACTERIAL SINUSITIS: ICD-10-CM

## 2018-09-13 DIAGNOSIS — F41.9 ANXIETY: Primary | ICD-10-CM

## 2018-09-13 PROCEDURE — 99999 PR PBB SHADOW E&M-EST. PATIENT-LVL III: CPT | Mod: PBBFAC,,, | Performed by: FAMILY MEDICINE

## 2018-09-13 PROCEDURE — 99213 OFFICE O/P EST LOW 20 MIN: CPT | Mod: PBBFAC,25,PO | Performed by: FAMILY MEDICINE

## 2018-09-13 PROCEDURE — 73502 X-RAY EXAM HIP UNI 2-3 VIEWS: CPT | Mod: 26,LT,, | Performed by: RADIOLOGY

## 2018-09-13 PROCEDURE — 96372 THER/PROPH/DIAG INJ SC/IM: CPT | Mod: PBBFAC,PO

## 2018-09-13 PROCEDURE — 99214 OFFICE O/P EST MOD 30 MIN: CPT | Mod: S$PBB,,, | Performed by: FAMILY MEDICINE

## 2018-09-13 PROCEDURE — 73502 X-RAY EXAM HIP UNI 2-3 VIEWS: CPT | Mod: TC,FY,PO,LT

## 2018-09-13 RX ORDER — LORAZEPAM 1 MG/1
1 TABLET ORAL
Qty: 30 TABLET | Refills: 3 | Status: SHIPPED | OUTPATIENT
Start: 2018-09-13 | End: 2018-10-09 | Stop reason: SDUPTHER

## 2018-09-13 RX ORDER — KETOROLAC TROMETHAMINE 30 MG/ML
60 INJECTION, SOLUTION INTRAMUSCULAR; INTRAVENOUS
Status: COMPLETED | OUTPATIENT
Start: 2018-09-13 | End: 2018-09-13

## 2018-09-13 RX ORDER — BUDESONIDE AND FORMOTEROL FUMARATE DIHYDRATE 80; 4.5 UG/1; UG/1
2 AEROSOL RESPIRATORY (INHALATION) 2 TIMES DAILY
Qty: 10.2 G | Refills: 3 | Status: SHIPPED | OUTPATIENT
Start: 2018-09-13 | End: 2019-06-05

## 2018-09-13 RX ORDER — MELOXICAM 15 MG/1
15 TABLET ORAL DAILY
Qty: 30 TABLET | Refills: 1 | Status: SHIPPED | OUTPATIENT
Start: 2018-09-13 | End: 2018-12-28 | Stop reason: SDUPTHER

## 2018-09-13 RX ORDER — GABAPENTIN 800 MG/1
800 TABLET ORAL 3 TIMES DAILY
COMMUNITY
End: 2018-09-13 | Stop reason: SDUPTHER

## 2018-09-13 RX ORDER — GABAPENTIN 800 MG/1
800 TABLET ORAL DAILY
Qty: 90 TABLET | Refills: 2 | Status: SHIPPED | OUTPATIENT
Start: 2018-09-13 | End: 2019-03-19 | Stop reason: SDUPTHER

## 2018-09-13 RX ORDER — ACETAZOLAMIDE 250 MG/1
TABLET ORAL DAILY
COMMUNITY
Start: 2018-09-04 | End: 2020-04-09 | Stop reason: CLARIF

## 2018-09-13 RX ORDER — AMOXICILLIN AND CLAVULANATE POTASSIUM 500; 125 MG/1; MG/1
1 TABLET, FILM COATED ORAL 2 TIMES DAILY
Qty: 14 TABLET | Refills: 0 | Status: SHIPPED | OUTPATIENT
Start: 2018-09-13 | End: 2018-10-09 | Stop reason: ALTCHOICE

## 2018-09-13 RX ADMIN — KETOROLAC TROMETHAMINE 60 MG: 60 INJECTION, SOLUTION INTRAMUSCULAR at 03:09

## 2018-09-13 NOTE — PROGRESS NOTES
Chief Complaint   Patient presents with    Sinus Problem    Nasal Congestion    Asthma       HPI  Zaiar Dowell is a 47 y.o. female with multiple medical diagnoses as listed in the medical history and problem list that presents for sinus congestion.    Asthma - symbicort out of meds, needs refill.    Fibromyalgia - needs gabapentin refill.     L sided pain/hip - 4-5 month hx, prog worsening, radiating to LLE, worse in 1 past month. Diff ambulating initially, improves with activity, lying on L side worsens pain, no improvement with meds.     Needs disability forms completed.     Pt is known to me and was last seen by me on 5/2/2018.    PAST MEDICAL HISTORY:  Past Medical History:   Diagnosis Date    Anxiety     Arthritis     Asthma     Depression     Fibromyalgia     GERD (gastroesophageal reflux disease)     Obesity     SHARON (obstructive sleep apnea)        PAST SURGICAL HISTORY:  Past Surgical History:   Procedure Laterality Date    CARPAL TUNNEL RELEASE      CHOLECYSTECTOMY      HYSTERECTOMY      KNEE SURGERY      pinched nerve      SHOULDER SURGERY      WRIST SURGERY      had ganlin cyst       SOCIAL HISTORY:  Social History     Socioeconomic History    Marital status:      Spouse name: Not on file    Number of children: Not on file    Years of education: Not on file    Highest education level: Not on file   Social Needs    Financial resource strain: Not on file    Food insecurity - worry: Not on file    Food insecurity - inability: Not on file    Transportation needs - medical: Not on file    Transportation needs - non-medical: Not on file   Occupational History    Not on file   Tobacco Use    Smoking status: Current Every Day Smoker     Packs/day: 1.00     Years: 25.00     Pack years: 25.00    Smokeless tobacco: Never Used   Substance and Sexual Activity    Alcohol use: No    Drug use: No    Sexual activity: Yes     Partners: Male   Other Topics Concern    Not on file    Social History Narrative    Not on file       FAMILY HISTORY:  Family History   Problem Relation Age of Onset    Diabetes Mother     [place]'s disease Mother     Hypertension Mother     Heart disease Father     Stroke Father     Mental illness Father     Hypertension Father     Diabetes Father     Depression Father     Heart disease Maternal Grandmother     Depression Maternal Grandmother     COPD Maternal Grandfather     Heart disease Maternal Grandfather     Stroke Maternal Grandfather     Kidney disease Paternal Grandmother     Heart disease Paternal Grandmother     Heart disease Paternal Grandfather        ALLERGIES AND MEDICATIONS: updated and reviewed.  Review of patient's allergies indicates:   Allergen Reactions    Vicodin [hydrocodone-acetaminophen] Nausea And Vomiting     Current Outpatient Medications   Medication Sig Dispense Refill    acetaZOLAMIDE (DIAMOX) 250 MG tablet 2 (two) times daily.      albuterol (PROVENTIL) 2.5 mg /3 mL (0.083 %) nebulizer solution Take 3 mLs (2.5 mg total) by nebulization every 6 (six) hours as needed for Wheezing. Rescue 60 mL 1    albuterol 90 mcg/actuation inhaler Inhale 2 puffs into the lungs every 6 (six) hours as needed for Wheezing. Rescue 18 g 2    budesonide-formoterol 80-4.5 mcg (SYMBICORT) 80-4.5 mcg/actuation HFAA Inhale 2 puffs into the lungs 2 (two) times daily. 10.2 g 3    FLUoxetine (PROZAC) 20 MG capsule Take 3 capsules (60 mg total) by mouth once daily. 90 capsule 2    furosemide (LASIX) 40 MG tablet Take 40 mg by mouth 2 (two) times daily.      gabapentin (NEURONTIN) 600 MG tablet Take 1 tablet (600 mg total) by mouth 2 (two) times daily. 60 tablet 2    lithium (ESKALITH) 300 MG capsule Take 1 capsule (300 mg total) by mouth 3 (three) times daily with meals. 90 capsule 3    LORazepam (ATIVAN) 1 MG tablet Take 1 tablet (1 mg total) by mouth as needed for Anxiety. 30 tablet 3    nabumetone (RELAFEN) 750 MG tablet Take 1 tablet  (750 mg total) by mouth 2 (two) times daily. 180 tablet 2    omeprazole (PRILOSEC) 20 MG capsule Take 1 capsule (20 mg total) by mouth 2 (two) times daily. 90 capsule 1    oxybutynin (DITROPAN) 5 MG Tab Take 1 tablet (5 mg total) by mouth 3 (three) times daily. 90 tablet 2    potassium chloride (KLOR-CON) 10 MEQ TbSR Take 10 mEq by mouth 2 (two) times daily.      risperiDONE (RISPERDAL) 0.5 MG Tab Take 1 tablet (0.5 mg total) by mouth 2 (two) times daily. 60 tablet 3    topiramate (TOPAMAX) 200 MG Tab Take 1 tablet (200 mg total) by mouth once daily. 90 tablet 1    amoxicillin-clavulanate 500-125mg (AUGMENTIN) 500-125 mg Tab Take 1 tablet (500 mg total) by mouth 2 (two) times daily. 14 tablet 0    gabapentin (NEURONTIN) 800 MG tablet Take 1 tablet (800 mg total) by mouth once daily. 90 tablet 2    meloxicam (MOBIC) 15 MG tablet Take 1 tablet (15 mg total) by mouth once daily. 30 tablet 1    methocarbamol (ROBAXIN) 750 MG Tab Take 750 mg by mouth 4 (four) times daily.        No current facility-administered medications for this visit.        ROS  Review of Systems   Constitutional: Negative for activity change and unexpected weight change.   HENT: Positive for rhinorrhea. Negative for hearing loss and trouble swallowing.    Eyes: Positive for discharge and visual disturbance.   Respiratory: Positive for wheezing. Negative for chest tightness.    Cardiovascular: Negative for chest pain and palpitations.   Gastrointestinal: Positive for constipation. Negative for blood in stool, diarrhea and vomiting.   Endocrine: Positive for polyuria. Negative for polydipsia.   Genitourinary: Negative for difficulty urinating, dysuria, hematuria and menstrual problem.   Musculoskeletal: Positive for arthralgias and neck pain. Negative for joint swelling.   Neurological: Positive for headaches. Negative for weakness.   Psychiatric/Behavioral: Positive for dysphoric mood. Negative for confusion.       Physical Exam  Vitals:     "09/13/18 1413   BP: 122/80   Pulse: 82   Resp: 20   Temp: 98.1 °F (36.7 °C)    Body mass index is 51.45 kg/m².  Weight: (!) 149 kg (328 lb 7.8 oz)   Height: 5' 7" (170.2 cm)     Physical Exam   Constitutional: She is oriented to person, place, and time. She appears well-developed and well-nourished.   HENT:   Head: Normocephalic and atraumatic.   Mouth/Throat: No oropharyngeal exudate.   Erythematous pharynx  Erythematous, edematous nares  Mild dull TMs bilaterally   Eyes: Conjunctivae and EOM are normal. Pupils are equal, round, and reactive to light. No scleral icterus.   Neck: Normal range of motion. Neck supple.   Cardiovascular: Normal rate, regular rhythm and normal heart sounds.   Pulmonary/Chest: Effort normal and breath sounds normal. No respiratory distress.   Abdominal: Soft. Bowel sounds are normal. There is no tenderness.   Musculoskeletal: Normal range of motion. She exhibits tenderness.   Lymphadenopathy:     She has cervical adenopathy.   Neurological: She is alert and oriented to person, place, and time. She has normal reflexes.   Skin: Skin is warm and dry. No rash noted.   Psychiatric: She has a normal mood and affect. Her behavior is normal. Judgment and thought content normal.       Health Maintenance       Date Due Completion Date    Mammogram 11/24/2011 ---    Influenza Vaccine 08/01/2018 2/7/2018 (Done)    Override on 2/7/2018: Done    Pneumococcal PPSV23 (Medium Risk) (1) 02/07/2019 (Originally [birth year]/24/1989) ---    Lipid Panel 01/04/2023 1/4/2018    TETANUS VACCINE 02/07/2028 2/7/2018 (Declined)    Override on 2/7/2018: Declined          Assessment & Plan    Asthma, moderate persistent, poorly-controlled  -     budesonide-formoterol 80-4.5 mcg (SYMBICORT) 80-4.5 mcg/actuation HFAA; Inhale 2 puffs into the lungs 2 (two) times daily.  Dispense: 10.2 g; Refill: 3    Bipolar 1 disorder, Anxiety  -     LORazepam (ATIVAN) 1 MG tablet; Take 1 tablet (1 mg total) by mouth as needed for Anxiety.  " Dispense: 30 tablet; Refill: 3    Depression, unspecified depression type  - Continue current medication regimen as prescribed    Fibromyalgia  -     gabapentin (NEURONTIN) 800 MG tablet; Take 1 tablet (800 mg total) by mouth once daily.  Dispense: 90 tablet; Refill: 2    Acute bacterial sinusitis  -     amoxicillin-clavulanate 500-125mg (AUGMENTIN) 500-125 mg Tab; Take 1 tablet (500 mg total) by mouth 2 (two) times daily.  Dispense: 14 tablet; Refill: 0    Left hip pain  -     X-Ray Hip 2 View Left; Future; Expected date: 09/13/2018    Bursitis of left hip, unspecified bursa  -     ketorolac injection 60 mg; Inject 2 mLs (60 mg total) into the muscle one time.  -     meloxicam (MOBIC) 15 MG tablet; Take 1 tablet (15 mg total) by mouth once daily.  Dispense: 30 tablet; Refill: 1  - Will conservatively treat at this time.         Follow-up in about 4 weeks (around 10/11/2018).

## 2018-09-19 ENCOUNTER — PATIENT MESSAGE (OUTPATIENT)
Dept: FAMILY MEDICINE | Facility: CLINIC | Age: 48
End: 2018-09-19

## 2018-09-28 ENCOUNTER — PATIENT MESSAGE (OUTPATIENT)
Dept: FAMILY MEDICINE | Facility: CLINIC | Age: 48
End: 2018-09-28

## 2018-10-02 DIAGNOSIS — B37.9 CANDIDA INFECTION: Primary | ICD-10-CM

## 2018-10-02 RX ORDER — NYSTATIN 100000 [USP'U]/ML
4 SUSPENSION ORAL 4 TIMES DAILY
Qty: 160 ML | Refills: 0 | Status: SHIPPED | OUTPATIENT
Start: 2018-10-02 | End: 2018-10-12

## 2018-10-02 RX ORDER — FLUCONAZOLE 150 MG/1
150 TABLET ORAL DAILY
Qty: 1 TABLET | Refills: 0 | Status: SHIPPED | OUTPATIENT
Start: 2018-10-02 | End: 2018-10-03

## 2018-10-03 RX ORDER — LITHIUM CARBONATE 300 MG/1
300 CAPSULE ORAL
Qty: 90 CAPSULE | Refills: 3 | Status: SHIPPED | OUTPATIENT
Start: 2018-10-03 | End: 2019-02-02 | Stop reason: SDUPTHER

## 2018-10-09 ENCOUNTER — OFFICE VISIT (OUTPATIENT)
Dept: FAMILY MEDICINE | Facility: CLINIC | Age: 48
End: 2018-10-09
Payer: MEDICARE

## 2018-10-09 ENCOUNTER — HOSPITAL ENCOUNTER (OUTPATIENT)
Dept: RADIOLOGY | Facility: HOSPITAL | Age: 48
Discharge: HOME | End: 2018-10-09
Attending: FAMILY MEDICINE
Payer: MEDICARE

## 2018-10-09 VITALS
DIASTOLIC BLOOD PRESSURE: 76 MMHG | HEIGHT: 67 IN | RESPIRATION RATE: 20 BRPM | BODY MASS INDEX: 45.99 KG/M2 | HEART RATE: 70 BPM | WEIGHT: 293 LBS | OXYGEN SATURATION: 97 % | SYSTOLIC BLOOD PRESSURE: 110 MMHG | TEMPERATURE: 99 F
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
## 2018-10-09 DIAGNOSIS — F41.9 ANXIETY: ICD-10-CM

## 2018-10-09 DIAGNOSIS — M1A.0720 CHRONIC IDIOPATHIC GOUT INVOLVING TOE OF LEFT FOOT WITHOUT TOPHUS: ICD-10-CM

## 2018-10-09 DIAGNOSIS — M25.512 ACUTE PAIN OF LEFT SHOULDER: ICD-10-CM

## 2018-10-09 DIAGNOSIS — F32.A DEPRESSION, UNSPECIFIED DEPRESSION TYPE: ICD-10-CM

## 2018-10-09 DIAGNOSIS — F31.9 BIPOLAR 1 DISORDER: ICD-10-CM

## 2018-10-09 DIAGNOSIS — M79.7 FIBROMYALGIA: ICD-10-CM

## 2018-10-09 DIAGNOSIS — M79.641 RIGHT HAND PAIN: ICD-10-CM

## 2018-10-09 DIAGNOSIS — Z23 NEED FOR INFLUENZA VACCINATION: Primary | ICD-10-CM

## 2018-10-09 PROCEDURE — 73130 X-RAY EXAM OF HAND: CPT | Mod: TC,FY,PO,RT

## 2018-10-09 PROCEDURE — 99214 OFFICE O/P EST MOD 30 MIN: CPT | Mod: S$PBB,,, | Performed by: FAMILY MEDICINE

## 2018-10-09 PROCEDURE — 73030 X-RAY EXAM OF SHOULDER: CPT | Mod: TC,FY,PO,LT

## 2018-10-09 PROCEDURE — 73030 X-RAY EXAM OF SHOULDER: CPT | Mod: 26,LT,, | Performed by: RADIOLOGY

## 2018-10-09 PROCEDURE — 73130 X-RAY EXAM OF HAND: CPT | Mod: 26,RT,, | Performed by: RADIOLOGY

## 2018-10-09 PROCEDURE — 90686 IIV4 VACC NO PRSV 0.5 ML IM: CPT | Mod: PBBFAC,PO

## 2018-10-09 PROCEDURE — 96372 THER/PROPH/DIAG INJ SC/IM: CPT | Mod: PBBFAC,59,PO

## 2018-10-09 PROCEDURE — 99214 OFFICE O/P EST MOD 30 MIN: CPT | Mod: PBBFAC,25,PO | Performed by: FAMILY MEDICINE

## 2018-10-09 PROCEDURE — 99999 PR PBB SHADOW E&M-EST. PATIENT-LVL IV: CPT | Mod: PBBFAC,,, | Performed by: FAMILY MEDICINE

## 2018-10-09 RX ORDER — TRIAMCINOLONE ACETONIDE 40 MG/ML
40 INJECTION, SUSPENSION INTRA-ARTICULAR; INTRAMUSCULAR
Status: COMPLETED | OUTPATIENT
Start: 2018-10-09 | End: 2018-10-09

## 2018-10-09 RX ORDER — LORAZEPAM 1 MG/1
1 TABLET ORAL
Qty: 30 TABLET | Refills: 3 | Status: SHIPPED | OUTPATIENT
Start: 2018-10-09 | End: 2019-03-19 | Stop reason: SDUPTHER

## 2018-10-09 RX ADMIN — TRIAMCINOLONE ACETONIDE 40 MG: 40 INJECTION, SUSPENSION INTRA-ARTICULAR; INTRAMUSCULAR at 02:10

## 2018-10-09 NOTE — PROGRESS NOTES
Chief Complaint   Patient presents with    Fall    Hand Injury     right    Arm Pain     left    Cough       HPI  Zaira Dowell is a 47 y.o. female with multiple medical diagnoses as listed in the medical history and problem list that presents for fall.    Fall - tripped and fell onto L arm, early am, s/p multiple shoulder surgeries, 2 day hx, also associated with R hand pain, throbbing sensation,     Pt is known to me and was last seen by me on 9/13/2018.    PAST MEDICAL HISTORY:  Past Medical History:   Diagnosis Date    Anxiety     Arthritis     Asthma     Depression     Fibromyalgia     GERD (gastroesophageal reflux disease)     Obesity     SHARON (obstructive sleep apnea)        PAST SURGICAL HISTORY:  Past Surgical History:   Procedure Laterality Date    CARPAL TUNNEL RELEASE      CHOLECYSTECTOMY      HYSTERECTOMY      KNEE SURGERY      pinched nerve      SHOULDER SURGERY      WRIST SURGERY      had ganlin cyst       SOCIAL HISTORY:  Social History     Socioeconomic History    Marital status:      Spouse name: Not on file    Number of children: Not on file    Years of education: Not on file    Highest education level: Not on file   Social Needs    Financial resource strain: Not on file    Food insecurity - worry: Not on file    Food insecurity - inability: Not on file    Transportation needs - medical: Not on file    Transportation needs - non-medical: Not on file   Occupational History    Not on file   Tobacco Use    Smoking status: Current Every Day Smoker     Packs/day: 1.00     Years: 25.00     Pack years: 25.00    Smokeless tobacco: Never Used   Substance and Sexual Activity    Alcohol use: No    Drug use: No    Sexual activity: Yes     Partners: Male   Other Topics Concern    Not on file   Social History Narrative    Not on file       FAMILY HISTORY:  Family History   Problem Relation Age of Onset    Diabetes Mother     [place]'s disease Mother      Hypertension Mother     Heart disease Father     Stroke Father     Mental illness Father     Hypertension Father     Diabetes Father     Depression Father     Heart disease Maternal Grandmother     Depression Maternal Grandmother     COPD Maternal Grandfather     Heart disease Maternal Grandfather     Stroke Maternal Grandfather     Kidney disease Paternal Grandmother     Heart disease Paternal Grandmother     Heart disease Paternal Grandfather        ALLERGIES AND MEDICATIONS: updated and reviewed.  Review of patient's allergies indicates:   Allergen Reactions    Vicodin [hydrocodone-acetaminophen] Nausea And Vomiting     Current Outpatient Medications   Medication Sig Dispense Refill    acetaZOLAMIDE (DIAMOX) 250 MG tablet 2 (two) times daily.      albuterol (PROVENTIL) 2.5 mg /3 mL (0.083 %) nebulizer solution Take 3 mLs (2.5 mg total) by nebulization every 6 (six) hours as needed for Wheezing. Rescue 60 mL 1    albuterol 90 mcg/actuation inhaler Inhale 2 puffs into the lungs every 6 (six) hours as needed for Wheezing. Rescue 18 g 2    budesonide-formoterol 80-4.5 mcg (SYMBICORT) 80-4.5 mcg/actuation HFAA Inhale 2 puffs into the lungs 2 (two) times daily. 10.2 g 3    FLUoxetine (PROZAC) 20 MG capsule Take 3 capsules (60 mg total) by mouth once daily. 90 capsule 2    furosemide (LASIX) 40 MG tablet Take 40 mg by mouth 2 (two) times daily.      gabapentin (NEURONTIN) 600 MG tablet Take 1 tablet (600 mg total) by mouth 2 (two) times daily. 60 tablet 2    gabapentin (NEURONTIN) 800 MG tablet Take 1 tablet (800 mg total) by mouth once daily. 90 tablet 2    lithium (ESKALITH) 300 MG capsule Take 1 capsule (300 mg total) by mouth 3 (three) times daily with meals. 90 capsule 3    LORazepam (ATIVAN) 1 MG tablet Take 1 tablet (1 mg total) by mouth as needed for Anxiety. 30 tablet 3    meloxicam (MOBIC) 15 MG tablet Take 1 tablet (15 mg total) by mouth once daily. 30 tablet 1    methocarbamol  "(ROBAXIN) 750 MG Tab Take 750 mg by mouth 4 (four) times daily.       nabumetone (RELAFEN) 750 MG tablet Take 1 tablet (750 mg total) by mouth 2 (two) times daily. 180 tablet 2    omeprazole (PRILOSEC) 20 MG capsule Take 1 capsule (20 mg total) by mouth 2 (two) times daily. 90 capsule 1    oxybutynin (DITROPAN) 5 MG Tab Take 1 tablet (5 mg total) by mouth 3 (three) times daily. 90 tablet 2    potassium chloride (KLOR-CON) 10 MEQ TbSR Take 10 mEq by mouth 2 (two) times daily.      risperiDONE (RISPERDAL) 0.5 MG Tab Take 1 tablet (0.5 mg total) by mouth 2 (two) times daily. 60 tablet 3    topiramate (TOPAMAX) 200 MG Tab Take 1 tablet (200 mg total) by mouth once daily. 90 tablet 1     No current facility-administered medications for this visit.        ROS  Review of Systems   Constitutional: Positive for activity change. Negative for unexpected weight change.   HENT: Negative for hearing loss, rhinorrhea and trouble swallowing.    Eyes: Positive for visual disturbance. Negative for discharge.   Respiratory: Negative for chest tightness and wheezing.    Cardiovascular: Negative for chest pain and palpitations.   Gastrointestinal: Positive for constipation. Negative for blood in stool, diarrhea and vomiting.   Endocrine: Negative for polydipsia and polyuria.   Genitourinary: Negative for difficulty urinating, dysuria, hematuria and menstrual problem.   Musculoskeletal: Positive for arthralgias and neck pain. Negative for joint swelling.   Neurological: Positive for headaches. Negative for weakness.   Psychiatric/Behavioral: Positive for dysphoric mood. Negative for confusion.       Physical Exam  Vitals:    10/09/18 1403   BP: 110/76   Pulse: 70   Resp: 20   Temp: 98.5 °F (36.9 °C)    Body mass index is 51.21 kg/m².  Weight: (!) 148.3 kg (326 lb 15.1 oz)   Height: 5' 7" (170.2 cm)     Physical Exam   Constitutional: She is oriented to person, place, and time. She appears well-developed and well-nourished.   HENT: "   Head: Normocephalic and atraumatic.   Mouth/Throat: No oropharyngeal exudate.   Erythematous pharynx  Erythematous, edematous nares  Mild dull TMs bilaterally   Eyes: Conjunctivae and EOM are normal. Pupils are equal, round, and reactive to light. No scleral icterus.   Neck: Normal range of motion. Neck supple.   Cardiovascular: Normal rate, regular rhythm and normal heart sounds.   Pulmonary/Chest: Effort normal and breath sounds normal. No respiratory distress.   Abdominal: Soft. Bowel sounds are normal. There is no tenderness.   Musculoskeletal: Normal range of motion. She exhibits tenderness.   Lymphadenopathy:     She has cervical adenopathy.   Neurological: She is alert and oriented to person, place, and time. She has normal reflexes.   Skin: Skin is warm and dry. No rash noted.   Psychiatric: She has a normal mood and affect. Her behavior is normal. Judgment and thought content normal.       Health Maintenance       Date Due Completion Date    Mammogram 11/24/2011 ---    Influenza Vaccine 08/01/2018 2/7/2018 (Done)    Override on 2/7/2018: Done    Pneumococcal PPSV23 (Medium Risk) (1) 02/07/2019 (Originally 11[birth year]24/1989) ---    Lipid Panel 01/04/2023 1/4/2018    TETANUS VACCINE 02/07/2028 2/7/2018 (Declined)    Override on 2/7/2018: Declined          Assessment & Plan    Need for influenza vaccination  -     Influenza - Quadrivalent (3 years & older) (PF)    Bipolar 1 disorder, Depression, unspecified depression type, Anxiety-     LORazepam (ATIVAN) 1 MG tablet; Take 1 tablet (1 mg total) by mouth as needed for Anxiety.  Dispense: 30 tablet; Refill: 3    Fibromyalgia  - Continue current medication regimen as prescribed    Chronic idiopathic gout involving toe of left foot without tophus  - Continue current medication regimen as prescribed    Acute pain of left shoulder  -     X-Ray Hand Complete Right; Future; Expected date: 10/09/2018  -     X-Ray Shoulder Trauma 3 view Left; Future; Expected date:  10/09/2018  -     triamcinolone acetonide injection 40 mg; Inject 1 mL (40 mg total) into the muscle one time.  - Assess imaging at this time as pain has worsened    Fall, initial encounter  -     X-Ray Hand Complete Right; Future; Expected date: 10/09/2018  -     X-Ray Shoulder Trauma 3 view Left; Future; Expected date: 10/09/2018  -     triamcinolone acetonide injection 40 mg; Inject 1 mL (40 mg total) into the muscle one time.    Right hand pain  -     X-Ray Hand Complete Right; Future; Expected date: 10/09/2018  -     triamcinolone acetonide injection 40 mg; Inject 1 mL (40 mg total) into the muscle one time.        Follow-up in about 4 weeks (around 11/6/2018).

## 2018-10-09 NOTE — PROGRESS NOTES
Patient given kenalog 40 mg injection right deltoid and influenza vaccine left deltoid, tolerated well, no complaints, no reaction noted

## 2018-10-10 RX ORDER — TOPIRAMATE 200 MG/1
200 TABLET ORAL DAILY
Qty: 90 TABLET | Refills: 1 | Status: SHIPPED | OUTPATIENT
Start: 2018-10-10 | End: 2019-03-19 | Stop reason: SDUPTHER

## 2018-11-15 DIAGNOSIS — F41.9 ANXIETY: Primary | ICD-10-CM

## 2018-11-15 RX ORDER — FLUOXETINE HYDROCHLORIDE 20 MG/1
60 CAPSULE ORAL DAILY
Qty: 90 CAPSULE | Refills: 2 | Status: SHIPPED | OUTPATIENT
Start: 2018-11-15 | End: 2019-02-15 | Stop reason: SDUPTHER

## 2018-11-16 RX ORDER — FLUTICASONE PROPIONATE 50 MCG
1 SPRAY, SUSPENSION (ML) NASAL DAILY
Qty: 16 G | Refills: 12 | Status: SHIPPED | OUTPATIENT
Start: 2018-11-16 | End: 2018-12-16

## 2018-11-16 RX ORDER — AZITHROMYCIN 250 MG/1
TABLET, FILM COATED ORAL
Qty: 6 TABLET | Refills: 0 | Status: SHIPPED | OUTPATIENT
Start: 2018-11-16 | End: 2019-01-10

## 2018-11-30 RX ORDER — OXYBUTYNIN CHLORIDE 5 MG/1
5 TABLET ORAL 3 TIMES DAILY
Qty: 90 TABLET | Refills: 2 | Status: SHIPPED | OUTPATIENT
Start: 2018-11-30 | End: 2019-04-23 | Stop reason: SDUPTHER

## 2018-12-14 RX ORDER — OMEPRAZOLE 20 MG/1
20 CAPSULE, DELAYED RELEASE ORAL 2 TIMES DAILY
Qty: 90 CAPSULE | Refills: 1 | Status: SHIPPED | OUTPATIENT
Start: 2018-12-14 | End: 2019-03-19 | Stop reason: SDUPTHER

## 2018-12-27 ENCOUNTER — HOSPITAL ENCOUNTER (OUTPATIENT)
Dept: RADIOLOGY | Facility: HOSPITAL | Age: 48
Discharge: HOME | End: 2018-12-27
Attending: PHYSICIAN ASSISTANT
Payer: MEDICARE

## 2018-12-27 ENCOUNTER — OFFICE VISIT (OUTPATIENT)
Dept: FAMILY MEDICINE | Facility: CLINIC | Age: 48
End: 2018-12-27
Payer: MEDICARE

## 2018-12-27 ENCOUNTER — TELEPHONE (OUTPATIENT)
Dept: FAMILY MEDICINE | Facility: CLINIC | Age: 48
End: 2018-12-27

## 2018-12-27 VITALS
HEART RATE: 67 BPM | BODY MASS INDEX: 45.99 KG/M2 | DIASTOLIC BLOOD PRESSURE: 76 MMHG | SYSTOLIC BLOOD PRESSURE: 120 MMHG | OXYGEN SATURATION: 96 % | TEMPERATURE: 99 F | RESPIRATION RATE: 16 BRPM | HEIGHT: 67 IN | WEIGHT: 293 LBS
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
## 2018-12-27 DIAGNOSIS — H93.8X3 CONGESTION OF BOTH EARS: Primary | ICD-10-CM
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
## 2018-12-27 DIAGNOSIS — M79.675 GREAT TOE PAIN, LEFT: ICD-10-CM

## 2018-12-27 DIAGNOSIS — M70.72 BURSITIS OF LEFT HIP, UNSPECIFIED BURSA: ICD-10-CM

## 2018-12-27 PROCEDURE — 99214 OFFICE O/P EST MOD 30 MIN: CPT | Mod: S$PBB,,, | Performed by: PHYSICIAN ASSISTANT

## 2018-12-27 PROCEDURE — 73660 X-RAY EXAM OF TOE(S): CPT | Mod: 26,TA,, | Performed by: RADIOLOGY

## 2018-12-27 PROCEDURE — 99215 OFFICE O/P EST HI 40 MIN: CPT | Mod: PBBFAC,25,PO | Performed by: PHYSICIAN ASSISTANT

## 2018-12-27 PROCEDURE — 99999 PR PBB SHADOW E&M-EST. PATIENT-LVL V: CPT | Mod: PBBFAC,,, | Performed by: PHYSICIAN ASSISTANT

## 2018-12-27 PROCEDURE — 73660 X-RAY EXAM OF TOE(S): CPT | Mod: TC,FY,PO,LT

## 2018-12-27 RX ORDER — MELOXICAM 15 MG/1
15 TABLET ORAL DAILY
Qty: 30 TABLET | Refills: 1 | Status: CANCELLED | OUTPATIENT
Start: 2018-12-27

## 2018-12-27 RX ORDER — LEVOCETIRIZINE DIHYDROCHLORIDE 5 MG/1
5 TABLET, FILM COATED ORAL NIGHTLY
Qty: 30 TABLET | Refills: 0 | Status: SHIPPED | OUTPATIENT
Start: 2018-12-27 | End: 2019-03-19

## 2018-12-27 NOTE — TELEPHONE ENCOUNTER
----- Message from Molly Arvizu PA-C sent at 12/27/2018 12:36 PM CST -----  No fracture of the toe! Recommend ice and elevation

## 2018-12-27 NOTE — PROGRESS NOTES
Subjective:       Patient ID: Zaira Dowell is a 48 y.o. female.    Chief Complaint: Sinus Problem (sore throat, ear pain, cough, ); Weight Loss; Fall (neck, elbow, foot); and Medication Management (meloxicam)    Sore Throat    This is a new problem. The current episode started in the past 7 days. The problem has been gradually improving. Neither side of throat is experiencing more pain than the other. There has been no fever. Associated symptoms include congestion, ear pain (both), headaches, swollen glands and trouble swallowing. Pertinent negatives include no coughing. She has had no exposure to strep. She has tried acetaminophen for the symptoms. The treatment provided no relief.   Weight loss: pt states her optic nerve in the right eye is being compressed and was told she needs to lose weight or will lose her vision. She would like a referral to weight loss clinic  Fall: pt fell about 2-3 weeks ago in her home. Slipped down two stairs fell on left side. Swelling in left elbow and great toe.  Chronic left hip pain: sleeping in recliner. Was taking mobic with relief. Dx with bursitis. Xray hip showed no abnormality.   Social History     Socioeconomic History    Marital status:      Spouse name: Not on file    Number of children: Not on file    Years of education: Not on file    Highest education level: Not on file   Social Needs    Financial resource strain: Not on file    Food insecurity - worry: Not on file    Food insecurity - inability: Not on file    Transportation needs - medical: Not on file    Transportation needs - non-medical: Not on file   Occupational History    Not on file   Tobacco Use    Smoking status: Current Every Day Smoker     Packs/day: 1.00     Years: 25.00     Pack years: 25.00    Smokeless tobacco: Never Used   Substance and Sexual Activity    Alcohol use: No    Drug use: No    Sexual activity: Yes     Partners: Male   Other Topics Concern    Not on file   Social  History Narrative    Not on file       Review of Systems   Constitutional: Positive for unexpected weight change (gain). Negative for fever.   HENT: Positive for congestion, ear pain (both), sore throat and trouble swallowing. Negative for postnasal drip and rhinorrhea.    Respiratory: Negative for cough.    Musculoskeletal: Positive for arthralgias.   Neurological: Positive for headaches.       Objective:      Physical Exam   Constitutional: She is oriented to person, place, and time. She appears well-developed.   obese   HENT:   Head: Normocephalic and atraumatic.   Right Ear: Tympanic membrane and ear canal normal.   Left Ear: Tympanic membrane and ear canal normal.   Mouth/Throat: No oropharyngeal exudate, posterior oropharyngeal edema or posterior oropharyngeal erythema.   Pulmonary/Chest: Effort normal and breath sounds normal.   Neurological: She is alert and oriented to person, place, and time.   Psychiatric: She has a normal mood and affect. Her behavior is normal.   Vitals reviewed.      Assessment:       1. Congestion of both ears    2. Bursitis of left hip, unspecified bursa    3. Fall in home, initial encounter    4. Great toe pain, left        Plan:         Zaira was seen today for sinus problem, weight loss, fall and medication management.    Diagnoses and all orders for this visit:    Congestion of both ears  -     levocetirizine (XYZAL) 5 MG tablet; Take 1 tablet (5 mg total) by mouth every evening.    Bursitis of left hip, unspecified bursa  -     Ambulatory referral to Orthopedics  -     Advised pt she cannot take mobic along with relafen    Fall in home, initial encounter  -     X-Ray Toe 2 or More Views Left; no fracture    Great toe pain, left  -     X-Ray Toe 2 or More Views Left; Future

## 2018-12-28 ENCOUNTER — PATIENT MESSAGE (OUTPATIENT)
Dept: FAMILY MEDICINE | Facility: CLINIC | Age: 48
End: 2018-12-28

## 2018-12-28 DIAGNOSIS — M70.72 BURSITIS OF LEFT HIP, UNSPECIFIED BURSA: ICD-10-CM

## 2018-12-28 RX ORDER — MELOXICAM 15 MG/1
15 TABLET ORAL DAILY
Qty: 14 TABLET | Refills: 0 | Status: SHIPPED | OUTPATIENT
Start: 2018-12-28 | End: 2019-03-19

## 2019-01-09 DIAGNOSIS — M25.559 ARTHRALGIA OF HIP, UNSPECIFIED LATERALITY: Primary | ICD-10-CM

## 2019-01-10 ENCOUNTER — OFFICE VISIT (OUTPATIENT)
Dept: ORTHOPEDICS | Facility: CLINIC | Age: 49
End: 2019-01-10
Payer: MEDICARE

## 2019-01-10 VITALS
WEIGHT: 293 LBS | DIASTOLIC BLOOD PRESSURE: 80 MMHG | HEIGHT: 67 IN | HEART RATE: 77 BPM | BODY MASS INDEX: 45.99 KG/M2 | SYSTOLIC BLOOD PRESSURE: 120 MMHG

## 2019-01-10 DIAGNOSIS — M70.61 TROCHANTERIC BURSITIS OF BOTH HIPS: Primary | ICD-10-CM

## 2019-01-10 DIAGNOSIS — M70.62 TROCHANTERIC BURSITIS OF BOTH HIPS: Primary | ICD-10-CM

## 2019-01-10 PROCEDURE — 20610 DRAIN/INJ JOINT/BURSA W/O US: CPT | Mod: 50,PBBFAC,PN | Performed by: ORTHOPAEDIC SURGERY

## 2019-01-10 PROCEDURE — 99999 PR PBB SHADOW E&M-EST. PATIENT-LVL V: CPT | Mod: PBBFAC,,, | Performed by: ORTHOPAEDIC SURGERY

## 2019-01-10 PROCEDURE — 99215 OFFICE O/P EST HI 40 MIN: CPT | Mod: PBBFAC,25,PN | Performed by: ORTHOPAEDIC SURGERY

## 2019-01-10 PROCEDURE — 99204 OFFICE O/P NEW MOD 45 MIN: CPT | Mod: 25,S$PBB,, | Performed by: ORTHOPAEDIC SURGERY

## 2019-01-10 PROCEDURE — 99204 PR OFFICE/OUTPT VISIT, NEW, LEVL IV, 45-59 MIN: ICD-10-PCS | Mod: 25,S$PBB,, | Performed by: ORTHOPAEDIC SURGERY

## 2019-01-10 PROCEDURE — 20610 LARGE JOINT ASPIRATION/INJECTION: R GREATER TROCHANTERIC BURSA, L GREATER TROCHANTERIC BURSA: ICD-10-PCS | Mod: 50,S$PBB,, | Performed by: ORTHOPAEDIC SURGERY

## 2019-01-10 PROCEDURE — 99999 PR PBB SHADOW E&M-EST. PATIENT-LVL V: ICD-10-PCS | Mod: PBBFAC,,, | Performed by: ORTHOPAEDIC SURGERY

## 2019-01-10 RX ORDER — TRIAMCINOLONE ACETONIDE 40 MG/ML
40 INJECTION, SUSPENSION INTRA-ARTICULAR; INTRAMUSCULAR
Status: DISCONTINUED | OUTPATIENT
Start: 2019-01-10 | End: 2019-01-10 | Stop reason: HOSPADM

## 2019-01-10 RX ADMIN — TRIAMCINOLONE ACETONIDE 40 MG: 40 INJECTION, SUSPENSION INTRA-ARTICULAR; INTRAMUSCULAR at 02:01

## 2019-01-10 NOTE — PATIENT INSTRUCTIONS
Your hips were injected with two medications today: a numbing medicine (lidocaine) and a corticosteroid (kenalog).  The numbing medicine works immediately and works for a few hours. The steroid medication takes 2-3 days to work.   You may notice that your pain returns or even worsens after the numbing medicine wears off.    If pain worsens, treat with ice 30 minutes on and 10 minutes off the area, over the counter or prescription anti-inflammatories and rest.    Call for any redness, fevers, chills or inability to move the joint. Call the office if pain does not improve in 2-3 days.

## 2019-01-10 NOTE — PROCEDURES
Large Joint Aspiration/Injection: R greater trochanteric bursa, L greater trochanteric bursa  Date/Time: 1/10/2019 2:35 PM  Performed by: Kylie Thorpe MD  Authorized by: Kylie Thorpe MD     Consent Done?:  Yes (Verbal)  Indications:  Pain  Timeout: Prior to procedure the correct patient, procedure, and site was verified      Location:  Hip  Site:  R greater trochanteric bursa and L greater trochanteric bursa  Prep: Patient was prepped and draped in usual sterile fashion    Ultrasonic Guidance for needle placement: No  Needle size:  22 G  Approach:  Lateral  Medications:  40 mg triamcinolone acetonide 40 mg/mL; 40 mg triamcinolone acetonide 40 mg/mL  Patient tolerance:  Patient tolerated the procedure well with no immediate complications

## 2019-01-10 NOTE — PROGRESS NOTES
Chief Complaint   Patient presents with    Left Hip - Pain    Right Hip - Pain       This patient was seen in consultation at the request of Molly Arvizu     HPI: Zaira Dowell is a 48 y.o. female who presents today complaining of bilateral hip pain -- localized to lateral hip  Duration of symptoms:  Several months  No trauma  Pain is intermittent   3/10 at best and 10/10 at its worst  Aggravating factors: laying on each side, going from sit to stand  Relieving factors: sleeping in recliner, rest  Radicular symptoms: yes numbness and paresthesias -- associated with a medication she takes (diamox for optic nerve swelling), not associated with hip pain  Associated symptoms:  none.    Prior treatment:  prescription NSAIDs with improvement in pain.     Pain does interfere with sleep.    This is the extent of the patient's complaints at this time.      Occupation: Disabled due to fibromyalgia    Review of Systems   Constitutional: Negative.    HENT: Negative.    Eyes: Negative.    Respiratory: Negative.    Cardiovascular: Negative.    Gastrointestinal: Negative.    Genitourinary: Negative.    Skin: Negative.    Neurological: Negative.    Endo/Heme/Allergies: Negative.    Psychiatric/Behavioral: Positive for depression. The patient is nervous/anxious.          Review of patient's allergies indicates:   Allergen Reactions    Vicodin [hydrocodone-acetaminophen] Nausea And Vomiting         Current Outpatient Medications:     acetaZOLAMIDE (DIAMOX) 250 MG tablet, 2 (two) times daily. , Disp: , Rfl:     albuterol 90 mcg/actuation inhaler, Inhale 2 puffs into the lungs every 6 (six) hours as needed for Wheezing. Rescue, Disp: 18 g, Rfl: 2    budesonide-formoterol 80-4.5 mcg (SYMBICORT) 80-4.5 mcg/actuation HFAA, Inhale 2 puffs into the lungs 2 (two) times daily., Disp: 10.2 g, Rfl: 3    FLUoxetine (PROZAC) 20 MG capsule, Take 3 capsules (60 mg total) by mouth once daily., Disp: 90 capsule, Rfl: 2    furosemide  (LASIX) 40 MG tablet, Take 40 mg by mouth 2 (two) times daily., Disp: , Rfl:     gabapentin (NEURONTIN) 600 MG tablet, Take 1 tablet (600 mg total) by mouth 2 (two) times daily., Disp: 60 tablet, Rfl: 2    gabapentin (NEURONTIN) 800 MG tablet, Take 1 tablet (800 mg total) by mouth once daily., Disp: 90 tablet, Rfl: 2    levocetirizine (XYZAL) 5 MG tablet, Take 1 tablet (5 mg total) by mouth every evening., Disp: 30 tablet, Rfl: 0    lithium (ESKALITH) 300 MG capsule, Take 1 capsule (300 mg total) by mouth 3 (three) times daily with meals., Disp: 90 capsule, Rfl: 3    LORazepam (ATIVAN) 1 MG tablet, Take 1 tablet (1 mg total) by mouth as needed for Anxiety., Disp: 30 tablet, Rfl: 3    meloxicam (MOBIC) 15 MG tablet, Take 1 tablet (15 mg total) by mouth once daily., Disp: 14 tablet, Rfl: 0    methocarbamol (ROBAXIN) 750 MG Tab, Take 750 mg by mouth 4 (four) times daily. , Disp: , Rfl:     nabumetone (RELAFEN) 750 MG tablet, Take 1 tablet (750 mg total) by mouth 2 (two) times daily., Disp: 180 tablet, Rfl: 2    omeprazole (PRILOSEC) 20 MG capsule, Take 1 capsule (20 mg total) by mouth 2 (two) times daily., Disp: 90 capsule, Rfl: 1    oxybutynin (DITROPAN) 5 MG Tab, Take 1 tablet (5 mg total) by mouth 3 (three) times daily., Disp: 90 tablet, Rfl: 2    potassium chloride (KLOR-CON) 10 MEQ TbSR, Take 10 mEq by mouth 2 (two) times daily., Disp: , Rfl:     risperiDONE (RISPERDAL) 0.5 MG Tab, Take 1 tablet (0.5 mg total) by mouth 2 (two) times daily., Disp: 60 tablet, Rfl: 3    topiramate (TOPAMAX) 200 MG Tab, Take 1 tablet (200 mg total) by mouth once daily., Disp: 90 tablet, Rfl: 1    Past Medical History:   Diagnosis Date    Anxiety     Arthritis     Asthma     Depression     Fibromyalgia     GERD (gastroesophageal reflux disease)     Obesity     SHARON (obstructive sleep apnea)        Patient Active Problem List   Diagnosis    Fibromyalgia    Pulmonary hypertension    Anxiety    Depression     Bipolar 1 disorder    Chronic idiopathic gout involving toe of left foot without tophus    Tobacco abuse    Morbid obesity    SHARON (obstructive sleep apnea)       Past Surgical History:   Procedure Laterality Date    CARPAL TUNNEL RELEASE      CHOLECYSTECTOMY      HYSTERECTOMY      KNEE SURGERY      pinched nerve      SHOULDER SURGERY      WRIST SURGERY      had ganlin cyst       Social History     Tobacco Use    Smoking status: Current Every Day Smoker     Packs/day: 1.00     Years: 25.00     Pack years: 25.00    Smokeless tobacco: Never Used   Substance Use Topics    Alcohol use: No    Drug use: No       Family History   Problem Relation Age of Onset    Diabetes Mother     Jose Juan's disease Mother     Hypertension Mother     Heart disease Father     Stroke Father     Mental illness Father     Hypertension Father     Diabetes Father     Depression Father     Heart disease Maternal Grandmother     Depression Maternal Grandmother     COPD Maternal Grandfather     Heart disease Maternal Grandfather     Stroke Maternal Grandfather     Kidney disease Paternal Grandmother     Heart disease Paternal Grandmother     Heart disease Paternal Grandfather        Physical Exam:     Vitals:    01/10/19 1353   BP: 120/80   Pulse: 77           General: Weight: (!) 149.7 kg (330 lb 0.5 oz) Body mass index is 51.69 kg/m².  Patient is alert, awake and oriented to time, place and person. Mood and affect are appropriate.  Patient does not appear to be in any distress, denies any constitutional symptoms and appears stated age.   HEENT: Pupils are equal and round, sclera are not injected. External examination of ears and nose reveals no abnormalities. Cranial nerves II-X are grossly intact  Skin: no rashes, abrasions or open wounds on the affected extremity   Resp: No respiratory distress or audible wheezing   CV: 2+  pulses, all extremities warm and well perfused   Bilateral hips:   Flexion 110, ER 40, IR  30 without pain   Negative stinchfield   + TTP over greater trochanter   No swelling or tenderness of calves   ltsi s/s/sp/dp/t  2+ DP  + ehl/fhl/ta/gs     Imagin views bilateral hips: No significant degenerative changes. No acute fracture/dislocation     Assessment: 48 y.o. female with bilateral greater trochanteric bursitis     I explained my diagnostic impression and the reasoning behind it in detail, using layman's terms.  Models and/or pictures were used to help in the explanation.    Plan:   - Continue mobic  - Bilateral injection, see note for details   - Physical therapy referral   - Return to clinic in 4 months. Return sooner if symptoms worsen or fail to improve.    All questions were answered in detail. The patient is in full agreement with the treatment plan and will proceed accordingly.    A note notifying Molly Arvizu of my findings was sent via the electronic medical record

## 2019-01-10 NOTE — LETTER
January 10, 2019      Molly Arvizu PA-C  [address]  [place] [address]           [facility]  [address] [place] [address]  Phone: [phone]          Patient: Zaira Dowell   MR Number: [id number]   YOB: 1970   Date of Visit: 1/10/2019       Dear Molly Arvizu:    Thank you for referring Zaira Dowell to me for evaluation. Attached you will find relevant portions of my assessment and plan of care.    If you have questions, please do not hesitate to call me. I look forward to following Zaira Dowell along with you.    Sincerely,    Kylie Thorpe MD    Enclosure  CC:  No Recipients    If you would like to receive this communication electronically, please contact [email or web address] or [phone] to request more information on [employer] Link access.    For providers and/or their staff who would like to refer a patient to [facility], please contact us through our one-stop-shop provider referral line, [facility], at [phone].    If you feel you have received this communication in error or would no longer like to receive these types of communications, please e-mail [email or web address]

## 2019-01-11 RX ORDER — RISPERIDONE 0.5 MG/1
0.5 TABLET ORAL 2 TIMES DAILY
Qty: 60 TABLET | Refills: 3 | Status: SHIPPED | OUTPATIENT
Start: 2019-01-11 | End: 2019-03-19 | Stop reason: SDUPTHER

## 2019-01-15 ENCOUNTER — OFFICE VISIT (OUTPATIENT)
Dept: FAMILY MEDICINE | Facility: CLINIC | Age: 49
End: 2019-01-15
Payer: MEDICARE

## 2019-01-15 ENCOUNTER — PATIENT OUTREACH (OUTPATIENT)
Dept: ADMINISTRATIVE | Facility: HOSPITAL | Age: 49
End: 2019-01-15

## 2019-01-15 VITALS
HEART RATE: 68 BPM | TEMPERATURE: 99 F | DIASTOLIC BLOOD PRESSURE: 84 MMHG | HEIGHT: 67 IN | OXYGEN SATURATION: 96 % | WEIGHT: 293 LBS | RESPIRATION RATE: 20 BRPM | BODY MASS INDEX: 45.99 KG/M2 | SYSTOLIC BLOOD PRESSURE: 130 MMHG

## 2019-01-15 DIAGNOSIS — M70.61 TROCHANTERIC BURSITIS OF BOTH HIPS: ICD-10-CM

## 2019-01-15 DIAGNOSIS — M70.62 TROCHANTERIC BURSITIS OF BOTH HIPS: ICD-10-CM

## 2019-01-15 DIAGNOSIS — M79.7 FIBROMYALGIA: ICD-10-CM

## 2019-01-15 DIAGNOSIS — R11.0 NAUSEA: ICD-10-CM

## 2019-01-15 DIAGNOSIS — F41.9 ANXIETY: ICD-10-CM

## 2019-01-15 DIAGNOSIS — F31.9 BIPOLAR 1 DISORDER: ICD-10-CM

## 2019-01-15 DIAGNOSIS — E66.01 MORBID OBESITY: ICD-10-CM

## 2019-01-15 DIAGNOSIS — H66.90 OTITIS MEDIA, UNSPECIFIED LATERALITY, UNSPECIFIED OTITIS MEDIA TYPE: Primary | ICD-10-CM

## 2019-01-15 DIAGNOSIS — F32.A DEPRESSION, UNSPECIFIED DEPRESSION TYPE: ICD-10-CM

## 2019-01-15 DIAGNOSIS — J45.909 ASTHMA, UNSPECIFIED ASTHMA SEVERITY, UNSPECIFIED WHETHER COMPLICATED, UNSPECIFIED WHETHER PERSISTENT: ICD-10-CM

## 2019-01-15 DIAGNOSIS — M1A.0720 CHRONIC IDIOPATHIC GOUT INVOLVING TOE OF LEFT FOOT WITHOUT TOPHUS: ICD-10-CM

## 2019-01-15 DIAGNOSIS — R52 BODY ACHES: ICD-10-CM

## 2019-01-15 LAB
BILIRUB SERPL-MCNC: NORMAL MG/DL
BLOOD URINE, POC: NORMAL
COLOR, POC UA: YELLOW
CTP QC/QA: YES
FLUAV AG NPH QL: NEGATIVE
FLUBV AG NPH QL: NEGATIVE
GLUCOSE UR QL STRIP: NORMAL
KETONES UR QL STRIP: NORMAL
LEUKOCYTE ESTERASE URINE, POC: NORMAL
NITRITE, POC UA: NORMAL
PH, POC UA: 5
PROTEIN, POC: NORMAL
SPECIFIC GRAVITY, POC UA: 1.02
UROBILINOGEN, POC UA: NORMAL

## 2019-01-15 PROCEDURE — 87804 INFLUENZA ASSAY W/OPTIC: CPT | Mod: PBBFAC,PO | Performed by: FAMILY MEDICINE

## 2019-01-15 PROCEDURE — 99214 OFFICE O/P EST MOD 30 MIN: CPT | Mod: S$PBB,,, | Performed by: FAMILY MEDICINE

## 2019-01-15 PROCEDURE — 99999 PR PBB SHADOW E&M-EST. PATIENT-LVL IV: CPT | Mod: PBBFAC,,, | Performed by: FAMILY MEDICINE

## 2019-01-15 PROCEDURE — 99214 OFFICE O/P EST MOD 30 MIN: CPT | Mod: PBBFAC,PO | Performed by: FAMILY MEDICINE

## 2019-01-15 PROCEDURE — 99999 PR PBB SHADOW E&M-EST. PATIENT-LVL IV: ICD-10-PCS | Mod: PBBFAC,,, | Performed by: FAMILY MEDICINE

## 2019-01-15 PROCEDURE — 99214 PR OFFICE/OUTPT VISIT, EST, LEVL IV, 30-39 MIN: ICD-10-PCS | Mod: S$PBB,,, | Performed by: FAMILY MEDICINE

## 2019-01-15 PROCEDURE — 81001 URINALYSIS AUTO W/SCOPE: CPT | Mod: PBBFAC,PO | Performed by: FAMILY MEDICINE

## 2019-01-15 RX ORDER — ONDANSETRON 4 MG/1
4 TABLET, ORALLY DISINTEGRATING ORAL EVERY 8 HOURS PRN
Qty: 30 TABLET | Refills: 1 | Status: SHIPPED | OUTPATIENT
Start: 2019-01-15 | End: 2019-05-08 | Stop reason: SDUPTHER

## 2019-01-15 RX ORDER — FLUTICASONE PROPIONATE AND SALMETEROL 250; 50 UG/1; UG/1
1 POWDER RESPIRATORY (INHALATION) 2 TIMES DAILY
Qty: 60 EACH | Refills: 3 | Status: SHIPPED | OUTPATIENT
Start: 2019-01-15 | End: 2019-06-05

## 2019-01-15 RX ORDER — AMOXICILLIN AND CLAVULANATE POTASSIUM 500; 125 MG/1; MG/1
1 TABLET, FILM COATED ORAL 2 TIMES DAILY
Qty: 14 TABLET | Refills: 0 | Status: SHIPPED | OUTPATIENT
Start: 2019-01-15 | End: 2019-03-19

## 2019-01-15 NOTE — PROGRESS NOTES
Chief Complaint   Patient presents with    Dizziness     with vomiting past week    Chills       HPI  Zaira Dowell is a 48 y.o. female with multiple medical diagnoses as listed in the medical history and problem list that presents for dizziness.    5 day hx of +nausea, +emesis x2, +diarrhea x2, +chills, +body aches, +fluids adequate. +off balanced, +congestion mild.       Pt is known to me and was last seen by me on 10/9/2018.    PAST MEDICAL HISTORY:  Past Medical History:   Diagnosis Date    Anxiety     Arthritis     Asthma     Depression     Fibromyalgia     GERD (gastroesophageal reflux disease)     Obesity     SHARON (obstructive sleep apnea)        PAST SURGICAL HISTORY:  Past Surgical History:   Procedure Laterality Date    CARPAL TUNNEL RELEASE      CHOLECYSTECTOMY      HYSTERECTOMY      KNEE SURGERY      pinched nerve      SHOULDER SURGERY      WRIST SURGERY      had ganlin cyst       SOCIAL HISTORY:  Social History     Socioeconomic History    Marital status:      Spouse name: Not on file    Number of children: Not on file    Years of education: Not on file    Highest education level: Not on file   Social Needs    Financial resource strain: Not on file    Food insecurity - worry: Not on file    Food insecurity - inability: Not on file    Transportation needs - medical: Not on file    Transportation needs - non-medical: Not on file   Occupational History    Not on file   Tobacco Use    Smoking status: Former Smoker     Types: Vaping with nicotine    Smokeless tobacco: Never Used    Tobacco comment: 1/15/19 vaping with 12% nicotine   Substance and Sexual Activity    Alcohol use: No    Drug use: No    Sexual activity: Yes     Partners: Male   Other Topics Concern    Not on file   Social History Narrative    Not on file       FAMILY HISTORY:  Family History   Problem Relation Age of Onset    Diabetes Mother     Jose Juan's disease Mother     Hypertension Mother      Heart disease Father     Stroke Father     Mental illness Father     Hypertension Father     Diabetes Father     Depression Father     Heart disease Maternal Grandmother     Depression Maternal Grandmother     COPD Maternal Grandfather     Heart disease Maternal Grandfather     Stroke Maternal Grandfather     Kidney disease Paternal Grandmother     Heart disease Paternal Grandmother     Heart disease Paternal Grandfather        ALLERGIES AND MEDICATIONS: updated and reviewed.  Review of patient's allergies indicates:   Allergen Reactions    Vicodin [hydrocodone-acetaminophen] Nausea And Vomiting     Current Outpatient Medications   Medication Sig Dispense Refill    acetaZOLAMIDE (DIAMOX) 250 MG tablet 2 (two) times daily.       albuterol 90 mcg/actuation inhaler Inhale 2 puffs into the lungs every 6 (six) hours as needed for Wheezing. Rescue 18 g 2    FLUoxetine (PROZAC) 20 MG capsule Take 3 capsules (60 mg total) by mouth once daily. 90 capsule 2    furosemide (LASIX) 40 MG tablet Take 40 mg by mouth 2 (two) times daily.      gabapentin (NEURONTIN) 600 MG tablet Take 1 tablet (600 mg total) by mouth 2 (two) times daily. 60 tablet 2    gabapentin (NEURONTIN) 800 MG tablet Take 1 tablet (800 mg total) by mouth once daily. 90 tablet 2    levocetirizine (XYZAL) 5 MG tablet Take 1 tablet (5 mg total) by mouth every evening. 30 tablet 0    lithium (ESKALITH) 300 MG capsule Take 1 capsule (300 mg total) by mouth 3 (three) times daily with meals. 90 capsule 3    LORazepam (ATIVAN) 1 MG tablet Take 1 tablet (1 mg total) by mouth as needed for Anxiety. 30 tablet 3    meloxicam (MOBIC) 15 MG tablet Take 1 tablet (15 mg total) by mouth once daily. 14 tablet 0    methocarbamol (ROBAXIN) 750 MG Tab Take 750 mg by mouth 4 (four) times daily.       nabumetone (RELAFEN) 750 MG tablet Take 1 tablet (750 mg total) by mouth 2 (two) times daily. 180 tablet 2    omeprazole (PRILOSEC) 20 MG capsule Take 1  "capsule (20 mg total) by mouth 2 (two) times daily. 90 capsule 1    oxybutynin (DITROPAN) 5 MG Tab Take 1 tablet (5 mg total) by mouth 3 (three) times daily. 90 tablet 2    potassium chloride (KLOR-CON) 10 MEQ TbSR Take 10 mEq by mouth 2 (two) times daily.      risperiDONE (RISPERDAL) 0.5 MG Tab Take 1 tablet (0.5 mg total) by mouth 2 (two) times daily. 60 tablet 3    topiramate (TOPAMAX) 200 MG Tab Take 1 tablet (200 mg total) by mouth once daily. 90 tablet 1    amoxicillin-clavulanate 500-125mg (AUGMENTIN) 500-125 mg Tab Take 1 tablet (500 mg total) by mouth 2 (two) times daily. 14 tablet 0    budesonide-formoterol 80-4.5 mcg (SYMBICORT) 80-4.5 mcg/actuation HFAA Inhale 2 puffs into the lungs 2 (two) times daily. 10.2 g 3    fluticasone-salmeterol 250-50 mcg/dose (ADVAIR) 250-50 mcg/dose diskus inhaler Inhale 1 puff into the lungs 2 (two) times daily. Controller 60 each 3    ondansetron (ZOFRAN-ODT) 4 MG TbDL Take 1 tablet (4 mg total) by mouth every 8 (eight) hours as needed. 30 tablet 1     No current facility-administered medications for this visit.        ROS  Review of Systems   PER HPI    Physical Exam  Vitals:    01/15/19 1617   BP: 130/84   Pulse: 68   Resp: 20   Temp: 98.5 °F (36.9 °C)    Body mass index is 51.21 kg/m².  Weight: (!) 148.3 kg (326 lb 15.1 oz)   Height: 5' 7" (170.2 cm)     Physical Exam   Constitutional: She is oriented to person, place, and time. She appears well-developed.   obese   HENT:   Head: Normocephalic and atraumatic.   Right Ear: Tympanic membrane and ear canal normal.   Left Ear: Tympanic membrane and ear canal normal.   Mouth/Throat: No oropharyngeal exudate, posterior oropharyngeal edema or posterior oropharyngeal erythema.   Pulmonary/Chest: Effort normal and breath sounds normal.   Neurological: She is alert and oriented to person, place, and time.   Psychiatric: She has a normal mood and affect. Her behavior is normal.   Vitals reviewed.      Health Maintenance     "   Date Due Completion Date    Mammogram 11/24/2011 ---    Pneumococcal Vaccine (Medium Risk) (1 of 1 - PPSV23) 02/07/2019 (Originally 11/24/1990) ---    Lipid Panel 01/04/2023 1/4/2018    TETANUS VACCINE 02/07/2028 2/7/2018 (Declined)    Override on 2/7/2018: Declined          Assessment & Plan    Otitis media, unspecified laterality, unspecified otitis media type  -     amoxicillin-clavulanate 500-125mg (AUGMENTIN) 500-125 mg Tab; Take 1 tablet (500 mg total) by mouth 2 (two) times daily.  Dispense: 14 tablet; Refill: 0    Nausea  -     ondansetron (ZOFRAN-ODT) 4 MG TbDL; Take 1 tablet (4 mg total) by mouth every 8 (eight) hours as needed.  Dispense: 30 tablet; Refill: 1  -     POCT URINE DIPSTICK WITH MICROSCOPE, AUTOMATED  -     POCT Influenza A/B  -     T4, free; Future; Expected date: 01/15/2019  -     TSH; Future; Expected date: 01/15/2019  -     CBC auto differential; Future; Expected date: 01/15/2019  -     Comprehensive metabolic panel; Future; Expected date: 01/15/2019  - Assess labs    Body aches  -     POCT Influenza A/B  -     T4, free; Future; Expected date: 01/15/2019  -     TSH; Future; Expected date: 01/15/2019  -     CBC auto differential; Future; Expected date: 01/15/2019  -     Comprehensive metabolic panel; Future; Expected date: 01/15/2019    Asthma, unspecified asthma severity, unspecified whether complicated, unspecified whether persistent  -     fluticasone-salmeterol 250-50 mcg/dose (ADVAIR) 250-50 mcg/dose diskus inhaler; Inhale 1 puff into the lungs 2 (two) times daily. Controller  Dispense: 60 each; Refill: 3    Anxiety, Depression, unspecified depression type, Bipolar 1 disorder  - Continue current medication regimen as prescribed    Morbid obesity    Fibromyalgia, Chronic idiopathic gout involving toe of left foot without tophus, Trochanteric bursitis of both hips  - Continue current medication regimen as prescribed        Follow-up in about 4 weeks (around 2/12/2019).

## 2019-01-16 ENCOUNTER — LAB VISIT (OUTPATIENT)
Dept: LAB | Facility: HOSPITAL | Age: 49
End: 2019-01-16
Attending: FAMILY MEDICINE
Payer: MEDICARE

## 2019-01-16 DIAGNOSIS — R52 BODY ACHES: ICD-10-CM

## 2019-01-16 DIAGNOSIS — R11.0 NAUSEA: ICD-10-CM

## 2019-01-16 LAB
ALBUMIN SERPL BCP-MCNC: 3.6 G/DL
ALP SERPL-CCNC: 75 U/L
ALT SERPL W/O P-5'-P-CCNC: 13 U/L
ANION GAP SERPL CALC-SCNC: 7 MMOL/L
AST SERPL-CCNC: 9 U/L
BASOPHILS # BLD AUTO: 0.09 K/UL
BASOPHILS NFR BLD: 0.8 %
BILIRUB SERPL-MCNC: 0.3 MG/DL
BUN SERPL-MCNC: 12 MG/DL
CALCIUM SERPL-MCNC: 9.4 MG/DL
CHLORIDE SERPL-SCNC: 108 MMOL/L
CO2 SERPL-SCNC: 23 MMOL/L
CREAT SERPL-MCNC: 1 MG/DL
DIFFERENTIAL METHOD: ABNORMAL
EOSINOPHIL # BLD AUTO: 0.2 K/UL
EOSINOPHIL NFR BLD: 1.6 %
ERYTHROCYTE [DISTWIDTH] IN BLOOD BY AUTOMATED COUNT: 13.1 %
EST. GFR  (AFRICAN AMERICAN): >60 ML/MIN/1.73 M^2
EST. GFR  (NON AFRICAN AMERICAN): >60 ML/MIN/1.73 M^2
GLUCOSE SERPL-MCNC: 99 MG/DL
HCT VFR BLD AUTO: 45.7 %
HGB BLD-MCNC: 14.2 G/DL
IMM GRANULOCYTES # BLD AUTO: 0.08 K/UL
IMM GRANULOCYTES NFR BLD AUTO: 0.7 %
LYMPHOCYTES # BLD AUTO: 2.7 K/UL
LYMPHOCYTES NFR BLD: 23.9 %
MCH RBC QN AUTO: 29.8 PG
MCHC RBC AUTO-ENTMCNC: 31.1 G/DL
MCV RBC AUTO: 96 FL
MONOCYTES # BLD AUTO: 0.6 K/UL
MONOCYTES NFR BLD: 5.7 %
NEUTROPHILS # BLD AUTO: 7.6 K/UL
NEUTROPHILS NFR BLD: 67.3 %
NRBC BLD-RTO: 0 /100 WBC
PLATELET # BLD AUTO: 317 K/UL
PMV BLD AUTO: 9.5 FL
POTASSIUM SERPL-SCNC: 4.6 MMOL/L
PROT SERPL-MCNC: 7.5 G/DL
RBC # BLD AUTO: 4.77 M/UL
SODIUM SERPL-SCNC: 138 MMOL/L
T4 FREE SERPL-MCNC: 0.84 NG/DL
TSH SERPL DL<=0.005 MIU/L-ACNC: 3.24 UIU/ML
WBC # BLD AUTO: 11.29 K/UL

## 2019-01-16 PROCEDURE — 85025 COMPLETE CBC W/AUTO DIFF WBC: CPT

## 2019-01-16 PROCEDURE — 84439 ASSAY OF FREE THYROXINE: CPT

## 2019-01-16 PROCEDURE — 84443 ASSAY THYROID STIM HORMONE: CPT

## 2019-01-16 PROCEDURE — 80053 COMPREHEN METABOLIC PANEL: CPT

## 2019-01-16 PROCEDURE — 36415 COLL VENOUS BLD VENIPUNCTURE: CPT | Mod: PO

## 2019-01-17 ENCOUNTER — TELEPHONE (OUTPATIENT)
Dept: FAMILY MEDICINE | Facility: CLINIC | Age: 49
End: 2019-01-17

## 2019-01-17 ENCOUNTER — HOSPITAL ENCOUNTER (OUTPATIENT)
Dept: RADIOLOGY | Facility: HOSPITAL | Age: 49
Discharge: HOME | End: 2019-01-17
Attending: FAMILY MEDICINE
Payer: MEDICARE

## 2019-01-17 DIAGNOSIS — Z12.39 BREAST CANCER SCREENING: ICD-10-CM

## 2019-01-17 PROCEDURE — 77067 MAMMO DIGITAL SCREENING BILAT WITH TOMOSYNTHESIS_CAD: ICD-10-PCS | Mod: 26,,, | Performed by: RADIOLOGY

## 2019-01-17 PROCEDURE — 77063 MAMMO DIGITAL SCREENING BILAT WITH TOMOSYNTHESIS_CAD: ICD-10-PCS | Mod: 26,,, | Performed by: RADIOLOGY

## 2019-01-17 PROCEDURE — 77063 BREAST TOMOSYNTHESIS BI: CPT | Mod: 26,,, | Performed by: RADIOLOGY

## 2019-01-17 PROCEDURE — 77067 SCR MAMMO BI INCL CAD: CPT | Mod: 26,,, | Performed by: RADIOLOGY

## 2019-01-17 PROCEDURE — 77067 SCR MAMMO BI INCL CAD: CPT | Mod: TC,PO

## 2019-01-17 NOTE — TELEPHONE ENCOUNTER
----- Message from Swathi Resee sent at 1/17/2019  1:00 PM CST -----  Contact: PT Portal Request  Appointment Request From: Zaira Dowell    With Provider: Burak smith    Preferred Date Range: 1/15/2019 - 1/15/2019    Preferred Times: Any time    Reason for visit: Existing Patient    Comments:  Dizzy, and flushed

## 2019-01-21 ENCOUNTER — CLINICAL SUPPORT (OUTPATIENT)
Dept: REHABILITATION | Facility: HOSPITAL | Age: 49
End: 2019-01-21
Attending: ORTHOPAEDIC SURGERY
Payer: MEDICARE

## 2019-01-21 DIAGNOSIS — M70.61 TROCHANTERIC BURSITIS OF BOTH HIPS: ICD-10-CM

## 2019-01-21 DIAGNOSIS — M25.551 PAIN OF BOTH HIP JOINTS: ICD-10-CM

## 2019-01-21 DIAGNOSIS — M25.552 PAIN OF BOTH HIP JOINTS: ICD-10-CM

## 2019-01-21 DIAGNOSIS — M70.62 TROCHANTERIC BURSITIS OF BOTH HIPS: ICD-10-CM

## 2019-01-21 PROBLEM — M70.60 TROCHANTERIC BURSITIS: Status: ACTIVE | Noted: 2019-01-21

## 2019-01-21 PROCEDURE — 97161 PT EVAL LOW COMPLEX 20 MIN: CPT | Mod: PN

## 2019-01-21 PROCEDURE — 97110 THERAPEUTIC EXERCISES: CPT | Mod: PN

## 2019-01-21 NOTE — PLAN OF CARE
[facility]  Physical Therapy Initial Evaluation    Name: Zaira Dowell  Clinic Number: [id number]    Therapy Diagnosis:   Encounter Diagnoses   Name Primary?    Trochanteric bursitis of both hips     Pain of both hip joints      Physician: Kylie Thorpe MD    Physician Orders: PT Eval and Treat   Medical Diagnosis from Referral: Trochanteric bursitis of both hips  Evaluation Date: 1/21/2019  Authorization Period Expiration: 1/10/2020  Plan of Care Expiration: 3/21/19  Visit # / Visits authorized: 2/ 1    Time In: 1:00 pm  Time Out: 2:00 pm  Total Billable Time: 60  minutes    Precautions: Fibromyalgia     Subjective   Date of onset: 8 weeks   History of current condition - Zaira reports: that she has pain in B hip. Pt states she has pain in the lateral side of both hips. Pt states that she feels her hip is not connected with her right hip. Pt states she has problem with sit to stand. Pt reports that she also has back pain and she has fibromyalgia. Pt states her left hip hurts more when she lie down on it. Right hip her more when she stand up. Pt describe B hip pain is burning, sharp, aching. Pt states pain radiates down to radiates side of her left thigh. Pt states she has been sleeping in recliner due to pain.  Pt states B hip pain alleviates when she walks for long period of time.  Pt states she has had steroid injection in both hip last Friday. Pt states she had injection 1 week and half ago. Pt states B hip injection did not help her. Pt states she cannot take pain medication. Pt insisted that her right hip feels like it is not connected to her body.      Medical History:   Past Medical History:   Diagnosis Date    Anxiety     Arthritis     Asthma     Depression     Fibromyalgia     GERD (gastroesophageal reflux disease)     Obesity     SHARON (obstructive sleep apnea)        Surgical History:   Zaira Dowell  has a past surgical history that includes  Cholecystectomy; pinched nerve; Hysterectomy; Shoulder surgery; Knee surgery; Wrist surgery; and Carpal tunnel release.    Medications:   Zaira has a current medication list which includes the following prescription(s): acetazolamide, albuterol, amoxicillin-clavulanate 500-125mg, budesonide-formoterol 80-4.5 mcg, fluoxetine, fluticasone-salmeterol 250-50 mcg/dose, furosemide, gabapentin, gabapentin, levocetirizine, lithium, lorazepam, meloxicam, methocarbamol, nabumetone, omeprazole, ondansetron, oxybutynin, potassium chloride, risperidone, and topiramate.    Allergies:   Review of patient's allergies indicates:   Allergen Reactions    Vicodin [hydrocodone-acetaminophen] Nausea And Vomiting        Imaging, x-rays:   FINDINGS:  The bones are intact.  There is no evidence for acute fracture or bone destruction.  Hip joints appear well maintained.  Sacroiliac joints are unremarkable.  Phleboliths are present within the pelvis.    Prior Therapy: left Shoulder 2012 and knee surgeries 2013.   Social History: she lives with their spouse  Occupation: Disability  Prior Level of Function: Independent, but she could sleep in her bed, she could walk for prolong distance .  Current Level of Function: Impaired     Pain:  Current 6/10, worst 9/10, best 3/10   Location: bilateral hip   Description: Aching, Burning and Sharp  Aggravating Factors: Sitting, Standing, Laying, Walking and Getting out of bed/chair  Easing Factors: None    Pts goals: That her goal her to alleviate some of the pain.     Objective     Observation: BMI: 51.21 km/m2    Posture Alignment:     Sensation: Light touch: intact to light touch    DTR: decrease LE.     GAIT DEVIATIONS: Zaira displays antalgic gait;decreased step length;decreased base of support;decreased arm swing    Hip Range of Motion:   Left active Right active    Flexion 90 degrees with pain 90 degrees with pain   Abduction 15 degrees with pain  35 degrees with pain    Extension NP NP     Supine Ext. Rotation 5 degrees with pain 10 degrees  With pain   Supine Int. Rotation WFL with pain WFL with pain       Lower Extremity Strength  Right LE  Left LE    Knee extension: 4+/5 Knee extension: 4+/5   Knee flexion: 4+/5 Knee flexion: 4+/5   Hip flexion: 3+/5 Hip flexion: 3+/5   Hip Internal Rotation:  3+/5    Hip Internal Rotation: 3+/5      Hip External Rotation: 3+/5    Hip External Rotation: 3+/5      Hip extension:  3+/5 Hip extension: 3+/5   Hip abduction: 3+/5 Hip abduction: 3+/5   Hip adduction: 3+/5 Hip adduction: 3+/5   Ankle dorsiflexion: 4+/5 Ankle dorsiflexion: 4+/5   Ankle plantarflexion: 4+/5 Ankle plantarflexion: 4+/5     Special Tests:  Bridge Test:positive  VALDEZ: positive  FADIR: negative  Scour: negative  Hip extension movement pattern: NP    Flexibility:    Ely's test: NP   Hamstring length 90/90: R = 131 degrees ; L = 136 degrees   Terence's test: NP   Molina test: NP   Piriformis test: negative     Joint Mobility: Hypomobile with hip ER     Palpation:Increase pain and tenderness right and left trochanter bursa region.     Edema: No      CMS Impairment/Limitation/Restriction for FOTO Hip Survey  Status Limitation G-Code CMS Severity Modifier  Intake 40% 60% Current Status CL - At least 60 percent but less than 80 percent  Predicted 52% 48% Goal Status+ CK - At least 40 percent but less than 60 percent  D/C Status CL **only report if this is a one time visit  +Based on FOTO predicted change score    TREATMENT   Treatment Time In: 1:45 pm  Treatment Time Out: 1:55 pm  Total Treatment time separate from Evaluation: 10 minutes    Zaira received therapeutic exercises to develop strength, endurance and ROM for 10 minutes including:  ITB stretch   Hamstring stretch   hooklying hip abd yellow T-band       Zaira received cold pack for 10 minutes to B hip to decrease inflammation    Home Exercises and Patient Education Provided    Education provided:     Written Home Exercises Provided:  Patient instructed to cont prior HEP.  Exercises were reviewed and Zaira was able to demonstrate them prior to the end of the session.  Zaira demonstrated good  understanding of the education provided.     See EMR under Patient Instructions for exercises provided 1/21/2019.    Assessment   Zaira is a 48 y.o. female referred to outpatient Physical Therapy with a medical diagnosis of bilaterally hip pain . Pt presents with signs and symptoms of bilateral trochanteric bursitis. Pt demonstrated with high BMI contributing with issue. Pt demonstrated  Bilateral hip pain to impair functional mobility and quality of life. Pt has fibromyalgia, which aggravates at times. Pt demonstrated decrease bilateral hip ROM and hip muscle strength. Pt presented with limitations B hip ER, which caused hip abd back pain. During hip joint mobility, pt demonstrated hypomobility of hip ER. Pt demonstrated poor hamstring flexibility contributing for hip pain. During palpation, pt demonstrated severe pain and tenderness at trochanteric bursa. Pt was positive for VALDEZ test and bridge test. FOTO hip survey demonstrated 60% hip impairment. Pt will benefit from skilled PT services to decrease functional limitations.     Pt prognosis is Good minus.  Pt will benefit from skilled outpatient Physical Therapy to address the deficits stated above and in the chart below, provide pt/family education, and to maximize pt's level of independence.     Plan of care discussed with patient: Yes  Pt's spiritual, cultural and educational needs considered and patient is agreeable to the plan of care and goals as stated below:     Anticipated Barriers for therapy: Fibromyalgia     Medical Necessity is demonstrated by the following  History  Co-morbidities and personal factors that may impact the plan of care Co-morbidities:   Fibromyalgia and BMI    Personal Factors:   no deficits     low   Examination  Body Structures and Functions, activity limitations and  participation restrictions that may impact the plan of care Body Regions:   hip     Body Systems:    ROM  strength    Participation Restrictions:   Community participations     Activity limitations:   Learning and applying knowledge  no deficits    General Tasks and Commands  no deficits    Communication  no deficits    Mobility  walking    Self care  no deficits    Domestic Life  shopping  cooking  doing house work (cleaning house, washing dishes, laundry)    Interactions/Relationships  no deficits    Life Areas  no deficits    Community and Social Life  community life  recreation and leisure         low   Clinical Presentation stable and uncomplicated low   Decision Making/ Complexity Score: low     GOALS: Short Term Goals:  2-3 weeks  1.Report decreased in pain at worse less than <   / =  5  /10  to increase tolerance for functional activities.  2. Pt to improve B hip range of motion by 15% to allow for improved functional mobility to allow for improvement in IADLs.   3. Increased B hip muscles MMT 1/2  to increase tolerance for ADL and work activities.  4. Pt to tolerate HEP to improve ROM and independence with ADL's    Long Term Goals:  6-8 weeks  1.Report decreased in pain at worse less than  <   / =  1 /10  to increase tolerance for functional mobility  2.Pt to improve B hip muscles  range of motion by 50% to allow for improved functional mobility to allow for improvement in IADLs.   3.Increased B hip muscles  MMT 1 grade  to increase tolerance for ADL and work activities.  4. Pt will scores report at less 45% limitations on FOTO hip survey  to demonstrate increase in LE function with every day tasks.   5. Pt to be Independent with HEP to improve ROM and independence with ADL's    Plan   Plan of care Certification: 1/21/2019 to 4/21/19.    Outpatient Physical Therapy 2 times weekly for 6 weeks to include the following interventions: Iontophoresis (with dexamethasone ), Manual Therapy, Neuromuscular Re-ed,  Patient Education, Self Care and Therapeutic Exercise.     Elton Fowler, PT

## 2019-01-23 ENCOUNTER — CLINICAL SUPPORT (OUTPATIENT)
Dept: REHABILITATION | Facility: HOSPITAL | Age: 49
End: 2019-01-23
Attending: ORTHOPAEDIC SURGERY
Payer: MEDICARE

## 2019-01-23 DIAGNOSIS — M70.62 TROCHANTERIC BURSITIS OF BOTH HIPS: Primary | ICD-10-CM

## 2019-01-23 DIAGNOSIS — M25.552 PAIN OF BOTH HIP JOINTS: ICD-10-CM

## 2019-01-23 DIAGNOSIS — M25.551 PAIN OF BOTH HIP JOINTS: ICD-10-CM

## 2019-01-23 DIAGNOSIS — M70.61 TROCHANTERIC BURSITIS OF BOTH HIPS: Primary | ICD-10-CM

## 2019-01-23 PROCEDURE — 97110 THERAPEUTIC EXERCISES: CPT | Mod: PN

## 2019-01-23 NOTE — PROGRESS NOTES
"  Physical Therapy Daily Treatment Note     Name: Zaira Dowell  Clinic Number: [id number]    Therapy Diagnosis:   Encounter Diagnoses   Name Primary?    Trochanteric bursitis of both hips Yes    Pain of both hip joints      Physician: Kylie Thorpe MD    Visit Date: 1/23/2019    Physician Orders: PT to Eval and Treat   Medical Diagnosis: Trochanteric bursitis of both hips  Evaluation Date: 1/21/19  Authorization Period Expiration: 12/31/19  Plan of Care Certification Period: 1/21/2019 to 4/21/19    Visit #/Visits authorized: 2/ 20     Time In: 1435  Time Out: 1530  Total Billable Time: 30 minutes    Precautions: Fibromyalgia     Subjective     Pt reports: Increased pain today, has been walking at store. Noticed increased low back pain recently as well.    .  She was compliant with home exercise program.  Response to previous treatment: Sore  Functional change: No change since initial evaluation.     Pain: 9/10  Location: bilateral hips.     Objective     Zaira received therapeutic exercises to develop strength and ROM for 50 minutes including:    +LTR x 15 ea  ITB stretch w/strap 3 x 30" ea  Hamstring stretch 3 x 30" ea  Hooklying hip abd yellow T-band 2 x 15  +HL hip add 2 x 15  +Bridge 3 x 10   +Supine quad stretch w/strap 3 x 30"  +Clamshell x 15   +SLR 2 x 10       Zaira received the following manual therapy techniques: Joint mobilizations and Myofacial release were applied for 10 minutes, including:  IASTM with theraband roller IT band bilaterally     Zaira participated in neuromuscular re-education activities to improve: Balance and Coordination for 0 minutes. The following activities were included:      Zaira received hot pack for 0 minutes to .    Zaira received cold pack for 10 minutes to Bilateral hips.      Home Exercises Provided and Patient Education Provided     Education provided:   - Reviewed HEP from initial session     Written Home Exercises Provided: Patient instructed to cont " prior HEP.  Exercises were reviewed and Zaira was able to demonstrate them prior to the end of the session.  Zaira demonstrated good  understanding of the education provided.     See EMR under Patient Instructions for exercises provided prior visit.    Assessment     Reviewed HEP from initial session with patient demonstrating proper technique. Patient encouraged to continue with exercises for maximum benefit. Minor cues required for proper technique with exercises.   Zaira is progressing well towards her goals.   Pt prognosis is Good.     Pt will continue to benefit from skilled outpatient physical therapy to address the deficits listed in the problem list box on initial evaluation, provide pt/family education and to maximize pt's level of independence in the home and community environment.     Pt's spiritual, cultural and educational needs considered and pt agreeable to plan of care and goals.     Anticipated barriers to physical therapy: Pain    Goals:  Short Term Goals:  2-3 weeks  1.Report decreased in pain at worse less than <   / =  5  /10  to increase tolerance for functional activities.  2. Pt to improve B hip range of motion by 15% to allow for improved functional mobility to allow for improvement in IADLs.   3. Increased B hip muscles MMT 1/2  to increase tolerance for ADL and work activities.  4. Pt to tolerate HEP to improve ROM and independence with ADL's     Long Term Goals:  6-8 weeks  1.Report decreased in pain at worse less than  <   / =  1 /10  to increase tolerance for functional mobility  2.Pt to improve B hip muscles  range of motion by 50% to allow for improved functional mobility to allow for improvement in IADLs.   3.Increased B hip muscles  MMT 1 grade  to increase tolerance for ADL and work activities.  4. Pt will scores report at less 45% limitations on FOTO hip survey  to demonstrate increase in LE function with every day tasks.   5. Pt to be Independent with HEP to improve ROM  and independence with ADL's      Plan     Plan of care Certification: 1/21/2019 to 4/21/19.     Outpatient Physical Therapy 2 times weekly for 6 weeks to include the following interventions: Iontophoresis (with dexamethasone ), Manual Therapy, Neuromuscular Re-ed, Patient Education, Self Care and Therapeutic Exercise.         Gio Rea, PTA

## 2019-01-25 ENCOUNTER — CLINICAL SUPPORT (OUTPATIENT)
Dept: REHABILITATION | Facility: HOSPITAL | Age: 49
End: 2019-01-25
Attending: ORTHOPAEDIC SURGERY
Payer: MEDICARE

## 2019-01-25 DIAGNOSIS — M70.62 TROCHANTERIC BURSITIS OF BOTH HIPS: ICD-10-CM

## 2019-01-25 DIAGNOSIS — M70.61 TROCHANTERIC BURSITIS OF BOTH HIPS: ICD-10-CM

## 2019-01-25 DIAGNOSIS — M25.552 PAIN OF BOTH HIP JOINTS: ICD-10-CM

## 2019-01-25 DIAGNOSIS — M25.551 PAIN OF BOTH HIP JOINTS: ICD-10-CM

## 2019-01-25 PROCEDURE — 97110 THERAPEUTIC EXERCISES: CPT | Mod: PN

## 2019-01-25 NOTE — PROGRESS NOTES
"  Physical Therapy Daily Treatment Note     Name: Zaira Dowell  Clinic Number: [id number]    Therapy Diagnosis:   Encounter Diagnoses   Name Primary?    Trochanteric bursitis of both hips     Pain of both hip joints      Physician: Kylie Thorpe MD    Visit Date: 1/25/2019    Physician Orders: PT to Eval and Treat   Medical Diagnosis: Trochanteric bursitis of both hips  Evaluation Date: 1/21/19  Authorization Period Expiration: 12/31/19  Plan of Care Certification Period: 1/21/2019 to 4/21/19    Visit #/Visits authorized: 3/ 20     Time In: 1103 am  Time Out: 1200 pm   Total Billable Time: 57  Minutes (1:1 with PT 57 minutes)    Precautions: Fibromyalgia     Subjective     Pt reports: having a good day today.  No new complaints.   .  She was compliant with home exercise program.  Response to previous treatment: Sore  Functional change: No change since initial evaluation.     Pain: 4/10  Location: bilateral hips.     Objective     Zaira received therapeutic exercises to develop strength and ROM for 47 minutes including:    +Nu step 6' - rest break required  + standing calf stretch 30"x3  LTR x 15 ea  ITB stretch w/strap 3 x 30" ea  Hamstring stretch 3 x 30" ea  Hooklying hip abd yellow T-band 2 x 15  HL hip add 2 x 15  Bridge 3 x 10   Supine quad stretch w/strap 3 x 30"  Clamshell x 15   SLR 2 x 10   +supine marching with brace 2x10  +SAQ    Zaira received the following manual therapy techniques: Joint mobilizations and Myofacial release were applied for 10 minutes, including:  IASTM with theraband roller IT band bilaterally     Zaira participated in neuromuscular re-education activities to improve: Balance and Coordination for 0 minutes. The following activities were included:      Zaira received hot pack for 0 minutes to .    Zaira received cold pack for 0 minutes to Bilateral hips.      Home Exercises Provided and Patient Education Provided     Education provided:   - Reviewed HEP from initial " session     Written Home Exercises Provided: Patient instructed to cont prior HEP. New exercise was provided and reviewed with patient.   Exercises were reviewed and Zaira was able to demonstrate them prior to the end of the session.  Zaira demonstrated good  understanding of the education provided.     See EMR under Patient Instructions for exercises provided prior visit.    Assessment     Patient demonstrates decreased endurance and requires occasional rest breaks between sets of exercises. She demonstrates significant glute medius weakness and quickly fatigues with exercises which target this muscle.  She requires encouragement throughout the session to continue progression of exercises.  She will continue to benefit from advancing her exercises in order to challenge her and maximize overall strength and endurance gains.     Zaira is progressing well towards her goals.   Pt prognosis is Good.     Pt will continue to benefit from skilled outpatient physical therapy to address the deficits listed in the problem list box on initial evaluation, provide pt/family education and to maximize pt's level of independence in the home and community environment.     Pt's spiritual, cultural and educational needs considered and pt agreeable to plan of care and goals.     Anticipated barriers to physical therapy: Pain    Goals:  Short Term Goals:  2-3 weeks  1.Report decreased in pain at worse less than <   / =  5  /10  to increase tolerance for functional activities.  2. Pt to improve B hip range of motion by 15% to allow for improved functional mobility to allow for improvement in IADLs.   3. Increased B hip muscles MMT 1/2  to increase tolerance for ADL and work activities.  4. Pt to tolerate HEP to improve ROM and independence with ADL's     Long Term Goals:  6-8 weeks  1.Report decreased in pain at worse less than  <   / =  1 /10  to increase tolerance for functional mobility  2.Pt to improve B hip muscles  range of  motion by 50% to allow for improved functional mobility to allow for improvement in IADLs.   3.Increased B hip muscles  MMT 1 grade  to increase tolerance for ADL and work activities.  4. Pt will scores report at less 45% limitations on FOTO hip survey  to demonstrate increase in LE function with every day tasks.   5. Pt to be Independent with HEP to improve ROM and independence with ADL's      Plan     Plan of care Certification: 1/21/2019 to 4/21/19.     Outpatient Physical Therapy 2 times weekly for 6 weeks to include the following interventions: Iontophoresis (with dexamethasone ), Manual Therapy, Neuromuscular Re-ed, Patient Education, Self Care and Therapeutic Exercise.         Quin Hicks, PT

## 2019-01-29 ENCOUNTER — OFFICE VISIT (OUTPATIENT)
Dept: FAMILY MEDICINE | Facility: CLINIC | Age: 49
End: 2019-01-29
Payer: MEDICARE

## 2019-01-29 VITALS
DIASTOLIC BLOOD PRESSURE: 78 MMHG | BODY MASS INDEX: 45.99 KG/M2 | WEIGHT: 293 LBS | HEART RATE: 80 BPM | TEMPERATURE: 98 F | HEIGHT: 67 IN | SYSTOLIC BLOOD PRESSURE: 120 MMHG | OXYGEN SATURATION: 98 %

## 2019-01-29 DIAGNOSIS — I27.20 PULMONARY HYPERTENSION: Primary | ICD-10-CM

## 2019-01-29 DIAGNOSIS — E66.01 MORBID OBESITY: ICD-10-CM

## 2019-01-29 PROCEDURE — 99999 PR PBB SHADOW E&M-EST. PATIENT-LVL IV: ICD-10-PCS | Mod: PBBFAC,,, | Performed by: FAMILY MEDICINE

## 2019-01-29 PROCEDURE — 99214 OFFICE O/P EST MOD 30 MIN: CPT | Mod: S$PBB,,, | Performed by: FAMILY MEDICINE

## 2019-01-29 PROCEDURE — 99214 PR OFFICE/OUTPT VISIT, EST, LEVL IV, 30-39 MIN: ICD-10-PCS | Mod: S$PBB,,, | Performed by: FAMILY MEDICINE

## 2019-01-29 PROCEDURE — 99999 PR PBB SHADOW E&M-EST. PATIENT-LVL IV: CPT | Mod: PBBFAC,,, | Performed by: FAMILY MEDICINE

## 2019-01-29 PROCEDURE — 99214 OFFICE O/P EST MOD 30 MIN: CPT | Mod: PBBFAC,PO | Performed by: FAMILY MEDICINE

## 2019-01-29 RX ORDER — PHENTERMINE HYDROCHLORIDE 37.5 MG/1
18.25 TABLET ORAL
Qty: 15 TABLET | Refills: 0 | Status: SHIPPED | OUTPATIENT
Start: 2019-01-29 | End: 2019-02-26 | Stop reason: SDUPTHER

## 2019-01-29 NOTE — PROGRESS NOTES
Assessment & Plan  Problem List Items Addressed This Visit        Pulmonary    Pulmonary hypertension - Primary       Endocrine    Morbid obesity    Relevant Medications    phentermine (ADIPEX-P) 37.5 mg tablet        Continue to monitor patient's pulmonary status.  Continue to monitor blood pressure.  Patient can benefit from weight loss.  Consider use of bupropion in this patient.  Patient may benefit from Qsymia or metformin.     Health Maintenance reviewed.    Follow-up: No Follow-up on file.    ______________________________________________________________________    Chief Complaint  Chief Complaint   Patient presents with    Weight Loss       HPI  Zaira Dowell is a 48 y.o. female with multiple medical diagnoses as listed in the medical history and problem list that presents for weight loss.  Pt is known to me with last appointment Visit date not found.    She has used adipex in the past for weight loss.  She used it when she lived in [place].  She went to a weight loss clinic.    She has talked about weight     PAST MEDICAL HISTORY:  Past Medical History:   Diagnosis Date    Anxiety     Arthritis     Asthma     Depression     Fibromyalgia     GERD (gastroesophageal reflux disease)     Obesity     SHARON (obstructive sleep apnea)        PAST SURGICAL HISTORY:  Past Surgical History:   Procedure Laterality Date    CARPAL TUNNEL RELEASE      CHOLECYSTECTOMY      HYSTERECTOMY      KNEE SURGERY      pinched nerve      SHOULDER SURGERY      WRIST SURGERY      had ganlin cyst       SOCIAL HISTORY:  Social History     Socioeconomic History    Marital status:      Spouse name: Not on file    Number of children: Not on file    Years of education: Not on file    Highest education level: Not on file   Social Needs    Financial resource strain: Not on file    Food insecurity - worry: Not on file    Food insecurity - inability: Not on file    Transportation needs - medical: Not on file     Transportation needs - non-medical: Not on file   Occupational History    Not on file   Tobacco Use    Smoking status: Former Smoker     Types: Vaping with nicotine    Smokeless tobacco: Never Used    Tobacco comment: 1/15/19 vaping with 12% nicotine   Substance and Sexual Activity    Alcohol use: No    Drug use: No    Sexual activity: Yes     Partners: Male   Other Topics Concern    Not on file   Social History Narrative    Not on file       FAMILY HISTORY:  Family History   Problem Relation Age of Onset    Diabetes Mother     Lampasas's disease Mother     Hypertension Mother     Heart disease Father     Stroke Father     Mental illness Father     Hypertension Father     Diabetes Father     Depression Father     Heart disease Maternal Grandmother     Depression Maternal Grandmother     COPD Maternal Grandfather     Heart disease Maternal Grandfather     Stroke Maternal Grandfather     Kidney disease Paternal Grandmother     Heart disease Paternal Grandmother     Heart disease Paternal Grandfather        ALLERGIES AND MEDICATIONS: updated and reviewed.  Review of patient's allergies indicates:   Allergen Reactions    Vicodin [hydrocodone-acetaminophen] Nausea And Vomiting     Current Outpatient Medications   Medication Sig Dispense Refill    acetaZOLAMIDE (DIAMOX) 250 MG tablet 2 (two) times daily.       albuterol 90 mcg/actuation inhaler Inhale 2 puffs into the lungs every 6 (six) hours as needed for Wheezing. Rescue 18 g 2    amoxicillin-clavulanate 500-125mg (AUGMENTIN) 500-125 mg Tab Take 1 tablet (500 mg total) by mouth 2 (two) times daily. 14 tablet 0    budesonide-formoterol 80-4.5 mcg (SYMBICORT) 80-4.5 mcg/actuation HFAA Inhale 2 puffs into the lungs 2 (two) times daily. 10.2 g 3    FLUoxetine (PROZAC) 20 MG capsule Take 3 capsules (60 mg total) by mouth once daily. 90 capsule 2    fluticasone-salmeterol 250-50 mcg/dose (ADVAIR) 250-50 mcg/dose diskus inhaler Inhale 1  puff into the lungs 2 (two) times daily. Controller 60 each 3    furosemide (LASIX) 40 MG tablet Take 40 mg by mouth 2 (two) times daily.      gabapentin (NEURONTIN) 600 MG tablet Take 1 tablet (600 mg total) by mouth 2 (two) times daily. 60 tablet 2    gabapentin (NEURONTIN) 800 MG tablet Take 1 tablet (800 mg total) by mouth once daily. 90 tablet 2    levocetirizine (XYZAL) 5 MG tablet Take 1 tablet (5 mg total) by mouth every evening. 30 tablet 0    LORazepam (ATIVAN) 1 MG tablet Take 1 tablet (1 mg total) by mouth as needed for Anxiety. 30 tablet 3    meloxicam (MOBIC) 15 MG tablet Take 1 tablet (15 mg total) by mouth once daily. 14 tablet 0    methocarbamol (ROBAXIN) 750 MG Tab Take 750 mg by mouth 4 (four) times daily.       nabumetone (RELAFEN) 750 MG tablet Take 1 tablet (750 mg total) by mouth 2 (two) times daily. 180 tablet 2    omeprazole (PRILOSEC) 20 MG capsule Take 1 capsule (20 mg total) by mouth 2 (two) times daily. 90 capsule 1    ondansetron (ZOFRAN-ODT) 4 MG TbDL Take 1 tablet (4 mg total) by mouth every 8 (eight) hours as needed. 30 tablet 1    oxybutynin (DITROPAN) 5 MG Tab Take 1 tablet (5 mg total) by mouth 3 (three) times daily. 90 tablet 2    potassium chloride (KLOR-CON) 10 MEQ TbSR Take 10 mEq by mouth 2 (two) times daily.      risperiDONE (RISPERDAL) 0.5 MG Tab Take 1 tablet (0.5 mg total) by mouth 2 (two) times daily. 60 tablet 3    topiramate (TOPAMAX) 200 MG Tab Take 1 tablet (200 mg total) by mouth once daily. 90 tablet 1    lithium (ESKALITH) 300 MG capsule Take 1 capsule (300 mg total) by mouth 3 (three) times daily with meals. 90 capsule 0    phentermine (ADIPEX-P) 37.5 mg tablet Take 0.5 tablets (18.75 mg total) by mouth before breakfast. 15 tablet 0     No current facility-administered medications for this visit.          ROS  Review of Systems   Constitutional: Negative for activity change, appetite change, fatigue, fever and unexpected weight change.   HENT:  "Negative.  Negative for ear discharge, ear pain, rhinorrhea and sore throat.    Eyes: Negative.    Respiratory: Negative for apnea, cough, chest tightness, shortness of breath and wheezing.    Cardiovascular: Negative for chest pain, palpitations and leg swelling.   Gastrointestinal: Negative for abdominal distention, abdominal pain, constipation, diarrhea and vomiting.   Endocrine: Negative for cold intolerance, heat intolerance, polydipsia and polyuria.   Genitourinary: Negative for decreased urine volume and urgency.   Musculoskeletal: Negative.    Skin: Negative for rash.   Neurological: Negative for dizziness and headaches.   Hematological: Does not bruise/bleed easily.   Psychiatric/Behavioral: Negative for agitation, sleep disturbance and suicidal ideas.           Physical Exam  Vitals:    01/29/19 0921   BP: 120/78   BP Location: Left arm   Patient Position: Sitting   BP Method: Large (Manual)   Pulse: 80   Temp: 97.8 °F (36.6 °C)   TempSrc: Oral   SpO2: 98%   Weight: (!) 149.3 kg (329 lb 2.4 oz)   Height: 5' 7" (1.702 m)    Body mass index is 51.55 kg/m².  Weight: (!) 149.3 kg (329 lb 2.4 oz)   Height: 5' 7" (170.2 cm)   Physical Exam   Constitutional: She is oriented to person, place, and time. She appears well-developed and well-nourished.   HENT:   Head: Normocephalic.   Right Ear: External ear normal.   Left Ear: External ear normal.   Nose: Nose normal.   Mouth/Throat: Oropharynx is clear and moist.   Eyes: Conjunctivae and EOM are normal. Pupils are equal, round, and reactive to light.   Cardiovascular: Normal rate, regular rhythm and normal heart sounds.   Pulmonary/Chest: Effort normal and breath sounds normal.   Neurological: She is alert and oriented to person, place, and time.   Skin: Skin is warm and dry.   Vitals reviewed.        Health Maintenance       Date Due Completion Date    Mammogram 01/17/2021 1/17/2019    Lipid Panel 01/04/2023 1/4/2018    TETANUS VACCINE 02/07/2028 2/7/2018 " (Declined)    Override on 2/7/2018: Declined              Patient note was created using [employer].  Any errors in syntax or even information may not have been identified and edited on initial review prior to signing this note.

## 2019-02-01 ENCOUNTER — CLINICAL SUPPORT (OUTPATIENT)
Dept: REHABILITATION | Facility: HOSPITAL | Age: 49
End: 2019-02-01
Attending: ORTHOPAEDIC SURGERY
Payer: MEDICARE

## 2019-02-01 DIAGNOSIS — M25.552 PAIN OF BOTH HIP JOINTS: ICD-10-CM

## 2019-02-01 DIAGNOSIS — M70.61 TROCHANTERIC BURSITIS OF BOTH HIPS: ICD-10-CM

## 2019-02-01 DIAGNOSIS — M25.551 PAIN OF BOTH HIP JOINTS: ICD-10-CM

## 2019-02-01 DIAGNOSIS — M70.62 TROCHANTERIC BURSITIS OF BOTH HIPS: ICD-10-CM

## 2019-02-01 PROCEDURE — 97110 THERAPEUTIC EXERCISES: CPT | Mod: PN

## 2019-02-01 NOTE — PROGRESS NOTES
"  Physical Therapy Daily Treatment Note     Name: Zaira Dowell  Clinic Number: [id number]    Therapy Diagnosis:   Encounter Diagnoses   Name Primary?    Trochanteric bursitis of both hips     Pain of both hip joints      Physician: Kylie Thorpe MD    Visit Date: 2/1/2019    Physician Orders: PT to Eval and Treat   Medical Diagnosis: Trochanteric bursitis of both hips  Evaluation Date: 1/21/19  Authorization Period Expiration: 12/31/19  Plan of Care Certification Period: 1/21/2019 to 4/21/19    Visit #/Visits authorized: 4/ 20   PTA 1/6     Time In: 1330  Time Out: 1430  Total Billable Time: 30  Minutes (1:1 with PTA 30 minutes)    Precautions: Fibromyalgia     Subjective     Pt reports: Pain in about the same today.  .  She was compliant with home exercise program.  Response to previous treatment: Sore  Functional change: No change since initial evaluation.     Pain: 4/10  Location: bilateral hips.     Objective     Zaira received therapeutic exercises to develop strength and ROM for 60 minutes including:    Nu step 6' - rest break required  Standing calf stretch 30"x3  LTR x 15 ea  ITB stretch w/strap 3 x 30" ea  Hamstring stretch 3 x 30" ea  Hooklying hip abd red T-band 2 x 15  HL hip add 2 x 15  Bridge 3 x 10   Supine quad stretch w/strap 3 x 30"  Clamshell x 15   SLR 2 x 10   Supine marching with brace 2x10  SAQ x 25  +Standing hip abd x 15    Zaira received the following manual therapy techniques: Joint mobilizations and Myofacial release were applied for 0 minutes, including:  IASTM with theraband roller IT band bilaterally     Zaira participated in neuromuscular re-education activities to improve: Balance and Coordination for 0 minutes. The following activities were included:      Zaira received hot pack for 0 minutes to .    Zaira received cold pack for 0 minutes to Bilateral hips.      Home Exercises Provided and Patient Education Provided     Education provided:   - Reviewed HEP from " initial session     Written Home Exercises Provided: Patient instructed to cont prior HEP. New exercise was provided and reviewed with patient.   Exercises were reviewed and Zaira was able to demonstrate them prior to the end of the session.  Zaira demonstrated good  understanding of the education provided.     See EMR under Patient Instructions for exercises provided prior visit.    Assessment     Patient tolerated today's session well. Staff progressed to more weight bearing exercises with increased LE fatigue noted. Patient continues to progress in therapies and benefit from skilled progression of exercise routine.     Zaira is progressing well towards her goals.   Pt prognosis is Good.     Pt will continue to benefit from skilled outpatient physical therapy to address the deficits listed in the problem list box on initial evaluation, provide pt/family education and to maximize pt's level of independence in the home and community environment.     Pt's spiritual, cultural and educational needs considered and pt agreeable to plan of care and goals.     Anticipated barriers to physical therapy: Pain    Goals:  Short Term Goals:  2-3 weeks  1.Report decreased in pain at worse less than <   / =  5  /10  to increase tolerance for functional activities.  2. Pt to improve B hip range of motion by 15% to allow for improved functional mobility to allow for improvement in IADLs.   3. Increased B hip muscles MMT 1/2  to increase tolerance for ADL and work activities.  4. Pt to tolerate HEP to improve ROM and independence with ADL's     Long Term Goals:  6-8 weeks  1.Report decreased in pain at worse less than  <   / =  1 /10  to increase tolerance for functional mobility  2.Pt to improve B hip muscles  range of motion by 50% to allow for improved functional mobility to allow for improvement in IADLs.   3.Increased B hip muscles  MMT 1 grade  to increase tolerance for ADL and work activities.  4. Pt will scores report  at less 45% limitations on FOTO hip survey  to demonstrate increase in LE function with every day tasks.   5. Pt to be Independent with HEP to improve ROM and independence with ADL's      Plan     Plan of care Certification: 1/21/2019 to 4/21/19.     Outpatient Physical Therapy 2 times weekly for 6 weeks to include the following interventions: Iontophoresis (with dexamethasone ), Manual Therapy, Neuromuscular Re-ed, Patient Education, Self Care and Therapeutic Exercise.         Gio Rea, PTA

## 2019-02-02 ENCOUNTER — NURSE TRIAGE (OUTPATIENT)
Dept: ADMINISTRATIVE | Facility: CLINIC | Age: 49
End: 2019-02-02

## 2019-02-02 RX ORDER — LITHIUM CARBONATE 300 MG/1
300 CAPSULE ORAL
Qty: 9 CAPSULE | Refills: 0 | Status: SHIPPED | OUTPATIENT
Start: 2019-02-02 | End: 2019-02-04

## 2019-02-02 NOTE — TELEPHONE ENCOUNTER
"    Reason for Disposition   [1] Request for URGENT new prescription or refill of "essential" medication (i.e., likelihood of harm to patient if not taken) AND [2] triager unable to fill per unit policy    Protocols used: ST MEDICATION QUESTION CALL-A-     calling regarding medication refill for Lithium.   states Pt is becoming out of control and needs medication.  Called and discussed with On Call (SANAZ Jimenez MD); MD approved for 3 days and  must follow up with Dr. Mccullough on Monday.  Medication phoned into pharmacy.  Called and notified , verbalized understanding.  "

## 2019-02-04 RX ORDER — LITHIUM CARBONATE 300 MG/1
300 CAPSULE ORAL
Qty: 90 CAPSULE | Refills: 0 | Status: SHIPPED | OUTPATIENT
Start: 2019-02-04 | End: 2019-03-01 | Stop reason: SDUPTHER

## 2019-02-12 ENCOUNTER — CLINICAL SUPPORT (OUTPATIENT)
Dept: REHABILITATION | Facility: HOSPITAL | Age: 49
End: 2019-02-12
Attending: ORTHOPAEDIC SURGERY
Payer: MEDICARE

## 2019-02-12 DIAGNOSIS — M70.61 TROCHANTERIC BURSITIS OF BOTH HIPS: ICD-10-CM

## 2019-02-12 DIAGNOSIS — M25.552 PAIN OF BOTH HIP JOINTS: ICD-10-CM

## 2019-02-12 DIAGNOSIS — M25.551 PAIN OF BOTH HIP JOINTS: ICD-10-CM

## 2019-02-12 DIAGNOSIS — M70.62 TROCHANTERIC BURSITIS OF BOTH HIPS: ICD-10-CM

## 2019-02-12 PROCEDURE — 97110 THERAPEUTIC EXERCISES: CPT | Mod: PN

## 2019-02-12 NOTE — PROGRESS NOTES
Physical Therapy Daily Treatment Note   Discharge Note:     Name: Zaira Dowell  Clinic Number: [id number]    Therapy Diagnosis:   Encounter Diagnoses   Name Primary?    Trochanteric bursitis of both hips     Pain of both hip joints      Physician: Kylie Thorpe MD    Visit Date: 2/12/2019    Physician Orders: PT to Eval and Treat   Medical Diagnosis: Trochanteric bursitis of both hips  Evaluation Date: 1/21/19  Authorization Period Expiration: 12/31/19  Plan of Care Certification Period: 1/21/2019 to 4/21/19    Visit #/Visits authorized: 5/ 20   PTA 1/6     Time In: 2:00p  Time Out: 2:50p  Total Billable Time: 30 Minutes (1:1 with PT 30 minutes)    Precautions: Fibromyalgia     Subjective     Pt reports: Pain in about the same today. Pt reports she wants to be discharged today because she feels like she wants to do exercise on her own and she thinks PT has helped as much as it could due to her fibromyalgia.   She was compliant with home exercise program.  Response to previous treatment: Sore  Functional change: No change since initial evaluation.     Pain: 4/10  Location: bilateral hips.     Objective     CMS Impairment/Limitation/Restriction for FOTO Hip Survey  Status Limitation G-Code CMS Severity Modifier  Intake 40% 60%  Predicted 52% 48% Goal Status+ CK - At least 40 percent but less than 60 percent  2/12/2019 48% 52% Current Status CK - At least 40 percent but less than 60 percent     Hip Range of Motion:    Left active Right active    Flexion 98 degrees with no pain 105 degrees with no pain (just sore)   Abduction 30 degrees with no pain  35 degrees with no pain    Extension NP NP    Supine Ext. Rotation 30 degrees with no pain 25 degrees  With pain   Supine Int. Rotation WFL with no pain WFL with no pain     Lower Extremity Strength  Right LE   Left LE       Hip flexion: 4-/5 Hip flexion: 4/5   Hip Internal Rotation:  4-/5    Hip Internal Rotation: 4/5      Hip External Rotation: 3+/5    Hip  "External Rotation: 4/5      Hip extension:  3+/5 Hip extension: 3+/5   Hip abduction: 4/5 Hip abduction: 4-/5   Hip adduction: 3+/5 Hip adduction: 3+/5         Zaira received therapeutic exercises to develop strength and ROM for 60 minutes including:    Nu step 6' - rest break required  Standing calf stretch 30"x3  LTR x 2 min  ITB stretch w/strap 3 x 30" ea  Hamstring stretch 3 x 30" ea  Hooklying hip abd red T-band 2 x 15  HL hip add 2 x 15  Bridge, RTB around thighs 3 x 10   Supine quad stretch w/strap 3 x 30"  Clamshell x 15   SLR 2 x 15  Supine marching with brace 2x10  SAQ x 25  +Standing hip abd x 15    Zaira received the following manual therapy techniques: Joint mobilizations and Myofacial release were applied for 0 minutes, including:  IASTM with theraband roller IT band bilaterally     Zaira participated in neuromuscular re-education activities to improve: Balance and Coordination for 0 minutes. The following activities were included:      Zaira received hot pack for 0 minutes to .    Zaira received cold pack for 0 minutes to Bilateral hips.      Home Exercises Provided and Patient Education Provided     Education provided:   - Reviewed HEP from initial session     Written Home Exercises Provided: Patient instructed to cont prior HEP. New exercise was provided and reviewed with patient.   Exercises were reviewed and Zaira was able to demonstrate them prior to the end of the session.  Zaira demonstrated good  understanding of the education provided.     See EMR under Patient Instructions for exercises provided D/c     Assessment     Discharge summary: Pt is being self-discharged from therapy today, despite recommendations from therapist to continue further and improve strength and functional mobility and to decrease pain. Pt has met goals as noted. Since start of care, pt has been able to return to sleeping in bed instead of recliner due to decrease in pain, she has improved her hip ROM " (abduction and external rotation) and strength (flexion, IR/ER, abduction). However, pt would continue to benefit further from therapy. Pt was given an HEP to continue with self-management. She demonstrated good understanding and would come back to therapy if she noticed regression. Patient tolerated today's session well.    Zaira is progressing well towards her goals.   Pt prognosis is Good.     Pt will continue to benefit from skilled outpatient physical therapy to address the deficits listed in the problem list box on initial evaluation, provide pt/family education and to maximize pt's level of independence in the home and community environment.     Pt's spiritual, cultural and educational needs considered and pt agreeable to plan of care and goals.     Anticipated barriers to physical therapy: Pain    Goals:  Short Term Goals:  2-3 weeks updated 2/12/2019  1.Report decreased in pain at worse less than <   / =  5  /10  to increase tolerance for functional activities. Goal not met (8/10 - randomly at any part of the day)   2. Pt to improve B hip range of motion by 15% to allow for improved functional mobility to allow for improvement in IADLs. Partially met.   3. Increased B hip muscles MMT 1/2  to increase tolerance for ADL and work activities. Met mostly, except hip adduction and extension.   4. Pt to tolerate HEP to improve ROM and independence with ADL's. Goal met      Long Term Goals:  6-8 weeks updated 2/12/2019  1.Report decreased in pain at worse less than  <   / =  1 /10  to increase tolerance for functional mobility. Goal not met (8/10)   2.Pt to improve B hip muscles  range of motion by 50% to allow for improved functional mobility to allow for improvement in IADLs. Goal not met   3.Increased B hip muscles  MMT 1 grade  to increase tolerance for ADL and work activities. Mostly met  4. Pt will scores report at less 45% limitations on FOTO hip survey  to demonstrate increase in LE function with every  day tasks. Goal not met, pt has met 52% limitation   5. Pt to be Independent with HEP to improve ROM and independence with ADL's. Goal met       Plan     Pt will be d/c today with HEP.       Elton Fowler, PT    02/12/2019

## 2019-02-15 DIAGNOSIS — F41.9 ANXIETY: ICD-10-CM

## 2019-02-15 RX ORDER — FLUOXETINE HYDROCHLORIDE 20 MG/1
60 CAPSULE ORAL DAILY
Qty: 90 CAPSULE | Refills: 2 | Status: SHIPPED | OUTPATIENT
Start: 2019-02-15 | End: 2019-03-19 | Stop reason: SDUPTHER

## 2019-02-21 ENCOUNTER — PATIENT OUTREACH (OUTPATIENT)
Dept: ADMINISTRATIVE | Facility: HOSPITAL | Age: 49
End: 2019-02-21

## 2019-02-26 ENCOUNTER — OFFICE VISIT (OUTPATIENT)
Dept: FAMILY MEDICINE | Facility: CLINIC | Age: 49
End: 2019-02-26
Payer: MEDICARE

## 2019-02-26 VITALS
TEMPERATURE: 98 F | WEIGHT: 293 LBS | BODY MASS INDEX: 45.99 KG/M2 | DIASTOLIC BLOOD PRESSURE: 70 MMHG | OXYGEN SATURATION: 97 % | SYSTOLIC BLOOD PRESSURE: 114 MMHG | HEIGHT: 67 IN | HEART RATE: 76 BPM

## 2019-02-26 DIAGNOSIS — M79.7 FIBROMYALGIA: Primary | ICD-10-CM

## 2019-02-26 DIAGNOSIS — E66.01 MORBID OBESITY: ICD-10-CM

## 2019-02-26 PROCEDURE — 99999 PR PBB SHADOW E&M-EST. PATIENT-LVL III: ICD-10-PCS | Mod: PBBFAC,,, | Performed by: FAMILY MEDICINE

## 2019-02-26 PROCEDURE — 99213 OFFICE O/P EST LOW 20 MIN: CPT | Mod: PBBFAC,PO | Performed by: FAMILY MEDICINE

## 2019-02-26 PROCEDURE — 99214 OFFICE O/P EST MOD 30 MIN: CPT | Mod: S$PBB,,, | Performed by: FAMILY MEDICINE

## 2019-02-26 PROCEDURE — 99214 PR OFFICE/OUTPT VISIT, EST, LEVL IV, 30-39 MIN: ICD-10-PCS | Mod: S$PBB,,, | Performed by: FAMILY MEDICINE

## 2019-02-26 PROCEDURE — 99999 PR PBB SHADOW E&M-EST. PATIENT-LVL III: CPT | Mod: PBBFAC,,, | Performed by: FAMILY MEDICINE

## 2019-02-26 RX ORDER — PHENTERMINE HYDROCHLORIDE 37.5 MG/1
37.5 TABLET ORAL
Qty: 30 TABLET | Refills: 0 | Status: SHIPPED | OUTPATIENT
Start: 2019-03-28 | End: 2019-04-27

## 2019-02-26 RX ORDER — PHENTERMINE HYDROCHLORIDE 37.5 MG/1
37.5 TABLET ORAL
Qty: 30 TABLET | Refills: 0 | Status: SHIPPED | OUTPATIENT
Start: 2019-04-27 | End: 2019-05-27

## 2019-02-26 RX ORDER — PHENTERMINE HYDROCHLORIDE 37.5 MG/1
37.5 TABLET ORAL
Qty: 30 TABLET | Refills: 0 | Status: SHIPPED | OUTPATIENT
Start: 2019-02-26 | End: 2019-03-28

## 2019-02-26 NOTE — PROGRESS NOTES
Assessment & Plan  Problem List Items Addressed This Visit        Endocrine    Morbid obesity    Relevant Medications    phentermine (ADIPEX-P) 37.5 mg tablet    phentermine (ADIPEX-P) 37.5 mg tablet (Start on 3/28/2019)    phentermine (ADIPEX-P) 37.5 mg tablet (Start on 4/27/2019)       Orthopedic    Fibromyalgia - Primary        Continue with weight loss and exercise.  Unlimited green vegetables, meat, poultry, seafood, eggs and hard cheeses.   Avoid fried foods  Dressings, seasonings, condiments, etc should have less than 2 g sugars.   beans or nuts can have 1 x a day.   2-3 servings of citrus fruits, berries, pineapple or melon a day (1 cup)  Milk and plain yogurt     No soda, sweet tea, juices or lemonade     Exercise 20 min 3 times a week this month     Patient warned of common side effects of phentermine including anxiety, insomnia, palpitations and increased blood pressure. It was also explained that it is for short-term usage along with diet and exercise, and that stopping the medication without making lifestyle changes will result in regain of weight. Patient states understanding.     Start phentermine with 1/2 pill a day to see if that will control your appetite. Go up to a full pill when needed.         Weight loss medications are controlled substances. They require routine follow up. Prescription or pills that are lost or destroyed will not be replaced.     Inconsistency in diet will cause no change or increase in weight.        Health Maintenance reviewed.    Follow-up: Follow-up in about 3 months (around 5/26/2019).    ______________________________________________________________________    Chief Complaint  Chief Complaint   Patient presents with    Weight Check       HPI  Zaira Dowell is a 48 y.o. female with multiple medical diagnoses as listed in the medical history and problem list that presents for weight check.  Pt is known to me with last appointment 1/29/2019.    She has not lost  weight since her last evaluation.  She reports that she knows what to do to improve her weight loss.  She does report increased stress.  She is working on this at this time.  Patient denies any new symptoms including chest pain, SOB, blurry vision, N/V, diarrhea.  She has made diet changes.      PAST MEDICAL HISTORY:  Past Medical History:   Diagnosis Date    Anxiety     Arthritis     Asthma     Depression     Fibromyalgia     GERD (gastroesophageal reflux disease)     Obesity     SHARON (obstructive sleep apnea)        PAST SURGICAL HISTORY:  Past Surgical History:   Procedure Laterality Date    CARPAL TUNNEL RELEASE      CHOLECYSTECTOMY      HYSTERECTOMY      KNEE SURGERY      pinched nerve      SHOULDER SURGERY      WRIST SURGERY      had ganlin cyst       SOCIAL HISTORY:  Social History     Socioeconomic History    Marital status:      Spouse name: Not on file    Number of children: Not on file    Years of education: Not on file    Highest education level: Not on file   Social Needs    Financial resource strain: Not on file    Food insecurity - worry: Not on file    Food insecurity - inability: Not on file    Transportation needs - medical: Not on file    Transportation needs - non-medical: Not on file   Occupational History    Not on file   Tobacco Use    Smoking status: Former Smoker     Types: Vaping with nicotine    Smokeless tobacco: Never Used    Tobacco comment: 1/15/19 vaping with 12% nicotine   Substance and Sexual Activity    Alcohol use: No    Drug use: No    Sexual activity: Yes     Partners: Male   Other Topics Concern    Not on file   Social History Narrative    Not on file       FAMILY HISTORY:  Family History   Problem Relation Age of Onset    Diabetes Mother     Fauquier's disease Mother     Hypertension Mother     Heart disease Father     Stroke Father     Mental illness Father     Hypertension Father     Diabetes Father     Depression Father      Heart disease Maternal Grandmother     Depression Maternal Grandmother     COPD Maternal Grandfather     Heart disease Maternal Grandfather     Stroke Maternal Grandfather     Kidney disease Paternal Grandmother     Heart disease Paternal Grandmother     Heart disease Paternal Grandfather        ALLERGIES AND MEDICATIONS: updated and reviewed.  Review of patient's allergies indicates:   Allergen Reactions    Vicodin [hydrocodone-acetaminophen] Nausea And Vomiting     Current Outpatient Medications   Medication Sig Dispense Refill    acetaZOLAMIDE (DIAMOX) 250 MG tablet 2 (two) times daily.       albuterol 90 mcg/actuation inhaler Inhale 2 puffs into the lungs every 6 (six) hours as needed for Wheezing. Rescue 18 g 2    amoxicillin-clavulanate 500-125mg (AUGMENTIN) 500-125 mg Tab Take 1 tablet (500 mg total) by mouth 2 (two) times daily. 14 tablet 0    budesonide-formoterol 80-4.5 mcg (SYMBICORT) 80-4.5 mcg/actuation HFAA Inhale 2 puffs into the lungs 2 (two) times daily. 10.2 g 3    FLUoxetine 20 MG capsule Take 3 capsules (60 mg total) by mouth once daily. 90 capsule 2    fluticasone-salmeterol 250-50 mcg/dose (ADVAIR) 250-50 mcg/dose diskus inhaler Inhale 1 puff into the lungs 2 (two) times daily. Controller 60 each 3    furosemide (LASIX) 40 MG tablet Take 40 mg by mouth 2 (two) times daily.      gabapentin (NEURONTIN) 600 MG tablet Take 1 tablet (600 mg total) by mouth 2 (two) times daily. 60 tablet 2    gabapentin (NEURONTIN) 800 MG tablet Take 1 tablet (800 mg total) by mouth once daily. 90 tablet 2    levocetirizine (XYZAL) 5 MG tablet Take 1 tablet (5 mg total) by mouth every evening. 30 tablet 0    LORazepam (ATIVAN) 1 MG tablet Take 1 tablet (1 mg total) by mouth as needed for Anxiety. 30 tablet 3    meloxicam (MOBIC) 15 MG tablet Take 1 tablet (15 mg total) by mouth once daily. 14 tablet 0    methocarbamol (ROBAXIN) 750 MG Tab Take 750 mg by mouth 4 (four) times daily.        nabumetone (RELAFEN) 750 MG tablet Take 1 tablet (750 mg total) by mouth 2 (two) times daily. 180 tablet 2    omeprazole (PRILOSEC) 20 MG capsule Take 1 capsule (20 mg total) by mouth 2 (two) times daily. 90 capsule 1    ondansetron (ZOFRAN-ODT) 4 MG TbDL Take 1 tablet (4 mg total) by mouth every 8 (eight) hours as needed. 30 tablet 1    oxybutynin (DITROPAN) 5 MG Tab Take 1 tablet (5 mg total) by mouth 3 (three) times daily. 90 tablet 2    phentermine (ADIPEX-P) 37.5 mg tablet Take 1 tablet (37.5 mg total) by mouth before breakfast. 30 tablet 0    [START ON 3/28/2019] phentermine (ADIPEX-P) 37.5 mg tablet Take 1 tablet (37.5 mg total) by mouth before breakfast. 30 tablet 0    [START ON 4/27/2019] phentermine (ADIPEX-P) 37.5 mg tablet Take 1 tablet (37.5 mg total) by mouth before breakfast. 30 tablet 0    potassium chloride (KLOR-CON) 10 MEQ TbSR Take 10 mEq by mouth 2 (two) times daily.      risperiDONE (RISPERDAL) 0.5 MG Tab Take 1 tablet (0.5 mg total) by mouth 2 (two) times daily. 60 tablet 3    topiramate (TOPAMAX) 200 MG Tab Take 1 tablet (200 mg total) by mouth once daily. 90 tablet 1    lithium (ESKALITH) 300 MG capsule Take 1 capsule (300 mg total) by mouth 3 (three) times daily with meals. 90 capsule 0     No current facility-administered medications for this visit.          ROS  Review of Systems   Constitutional: Negative for activity change and unexpected weight change.   HENT: Negative for hearing loss, rhinorrhea and trouble swallowing.    Eyes: Negative for discharge and visual disturbance.   Respiratory: Negative for chest tightness and wheezing.    Cardiovascular: Negative for chest pain and palpitations.   Gastrointestinal: Positive for constipation. Negative for blood in stool, diarrhea and vomiting.   Endocrine: Negative for polydipsia and polyuria.   Genitourinary: Negative for difficulty urinating, dysuria, hematuria and menstrual problem.   Musculoskeletal: Positive for arthralgias  "and neck pain. Negative for joint swelling.   Neurological: Positive for headaches. Negative for weakness.   Psychiatric/Behavioral: Positive for dysphoric mood. Negative for confusion.         Physical Exam  Vitals:    02/26/19 1003   BP: 114/70   BP Location: Left arm   Patient Position: Sitting   BP Method: Large (Manual)   Pulse: 76   Temp: 97.6 °F (36.4 °C)   TempSrc: Oral   SpO2: 97%   Weight: (!) 149.3 kg (329 lb 2.4 oz)   Height: 5' 7" (1.702 m)    Body mass index is 51.55 kg/m².  Weight: (!) 149.3 kg (329 lb 2.4 oz)   Height: 5' 7" (170.2 cm)   Physical Exam   Constitutional: She is oriented to person, place, and time. She appears well-developed and well-nourished.   HENT:   Head: Normocephalic.   Right Ear: External ear normal.   Left Ear: External ear normal.   Nose: Nose normal.   Mouth/Throat: Oropharynx is clear and moist.   Eyes: Conjunctivae and EOM are normal. Pupils are equal, round, and reactive to light.   Cardiovascular: Normal rate, regular rhythm and normal heart sounds.   Pulmonary/Chest: Effort normal and breath sounds normal.   Neurological: She is alert and oriented to person, place, and time.   Skin: Skin is warm and dry.   Vitals reviewed.      Health Maintenance       Date Due Completion Date    Mammogram 01/17/2021 1/17/2019    Lipid Panel 01/04/2023 1/4/2018    TETANUS VACCINE 02/07/2028 2/7/2018 (Declined)    Override on 2/7/2018: Declined              Patient note was created using [employer].  Any errors in syntax or even information may not have been identified and edited on initial review prior to signing this note.  "

## 2019-03-01 RX ORDER — LITHIUM CARBONATE 300 MG/1
300 CAPSULE ORAL
Qty: 90 CAPSULE | Refills: 0 | Status: SHIPPED | OUTPATIENT
Start: 2019-03-01 | End: 2019-03-19 | Stop reason: SDUPTHER

## 2019-03-19 ENCOUNTER — OFFICE VISIT (OUTPATIENT)
Dept: FAMILY MEDICINE | Facility: CLINIC | Age: 49
End: 2019-03-19
Payer: MEDICARE

## 2019-03-19 ENCOUNTER — TELEPHONE (OUTPATIENT)
Dept: FAMILY MEDICINE | Facility: CLINIC | Age: 49
End: 2019-03-19

## 2019-03-19 VITALS
OXYGEN SATURATION: 98 % | BODY MASS INDEX: 45.99 KG/M2 | WEIGHT: 293 LBS | SYSTOLIC BLOOD PRESSURE: 118 MMHG | RESPIRATION RATE: 20 BRPM | DIASTOLIC BLOOD PRESSURE: 84 MMHG | HEART RATE: 82 BPM | HEIGHT: 67 IN | TEMPERATURE: 98 F

## 2019-03-19 DIAGNOSIS — F31.9 BIPOLAR 1 DISORDER: ICD-10-CM

## 2019-03-19 DIAGNOSIS — K21.9 GASTROESOPHAGEAL REFLUX DISEASE WITHOUT ESOPHAGITIS: ICD-10-CM

## 2019-03-19 DIAGNOSIS — J45.909 ASTHMA, UNSPECIFIED ASTHMA SEVERITY, UNSPECIFIED WHETHER COMPLICATED, UNSPECIFIED WHETHER PERSISTENT: ICD-10-CM

## 2019-03-19 DIAGNOSIS — F32.A DEPRESSION, UNSPECIFIED DEPRESSION TYPE: ICD-10-CM

## 2019-03-19 DIAGNOSIS — F41.9 ANXIETY: Primary | ICD-10-CM

## 2019-03-19 DIAGNOSIS — M79.7 FIBROMYALGIA: ICD-10-CM

## 2019-03-19 DIAGNOSIS — G43.809 OTHER MIGRAINE WITHOUT STATUS MIGRAINOSUS, NOT INTRACTABLE: ICD-10-CM

## 2019-03-19 PROCEDURE — 99215 OFFICE O/P EST HI 40 MIN: CPT | Mod: S$PBB,,, | Performed by: FAMILY MEDICINE

## 2019-03-19 PROCEDURE — 99999 PR PBB SHADOW E&M-EST. PATIENT-LVL IV: ICD-10-PCS | Mod: PBBFAC,,, | Performed by: FAMILY MEDICINE

## 2019-03-19 PROCEDURE — 99214 OFFICE O/P EST MOD 30 MIN: CPT | Mod: PBBFAC,PO | Performed by: FAMILY MEDICINE

## 2019-03-19 PROCEDURE — 99215 PR OFFICE/OUTPT VISIT, EST, LEVL V, 40-54 MIN: ICD-10-PCS | Mod: S$PBB,,, | Performed by: FAMILY MEDICINE

## 2019-03-19 PROCEDURE — 99999 PR PBB SHADOW E&M-EST. PATIENT-LVL IV: CPT | Mod: PBBFAC,,, | Performed by: FAMILY MEDICINE

## 2019-03-19 RX ORDER — ALBUTEROL SULFATE 90 UG/1
2 AEROSOL, METERED RESPIRATORY (INHALATION) EVERY 6 HOURS PRN
Qty: 18 G | Refills: 2 | Status: SHIPPED | OUTPATIENT
Start: 2019-03-19 | End: 2023-01-10 | Stop reason: SDUPTHER

## 2019-03-19 RX ORDER — OMEPRAZOLE 20 MG/1
20 CAPSULE, DELAYED RELEASE ORAL 2 TIMES DAILY
Qty: 90 CAPSULE | Refills: 1 | Status: SHIPPED | OUTPATIENT
Start: 2019-03-19 | End: 2019-11-04 | Stop reason: ALTCHOICE

## 2019-03-19 RX ORDER — NABUMETONE 750 MG/1
750 TABLET, FILM COATED ORAL 2 TIMES DAILY
Qty: 180 TABLET | Refills: 2 | Status: SHIPPED | OUTPATIENT
Start: 2019-03-19 | End: 2019-11-26

## 2019-03-19 RX ORDER — FLUOXETINE HYDROCHLORIDE 20 MG/1
60 CAPSULE ORAL DAILY
Qty: 90 CAPSULE | Refills: 2 | Status: SHIPPED | OUTPATIENT
Start: 2019-03-19 | End: 2019-05-17 | Stop reason: SDUPTHER

## 2019-03-19 RX ORDER — LORAZEPAM 1 MG/1
1 TABLET ORAL
Qty: 30 TABLET | Refills: 2 | Status: SHIPPED | OUTPATIENT
Start: 2019-03-19 | End: 2021-02-17 | Stop reason: DRUGHIGH

## 2019-03-19 RX ORDER — GABAPENTIN 600 MG/1
600 TABLET ORAL 2 TIMES DAILY
Qty: 60 TABLET | Refills: 2 | Status: SHIPPED | OUTPATIENT
Start: 2019-03-19 | End: 2019-06-19 | Stop reason: SDUPTHER

## 2019-03-19 RX ORDER — TOPIRAMATE 200 MG/1
200 TABLET ORAL DAILY
Qty: 90 TABLET | Refills: 1 | Status: SHIPPED | OUTPATIENT
Start: 2019-03-19 | End: 2019-06-19 | Stop reason: SDUPTHER

## 2019-03-19 RX ORDER — RISPERIDONE 0.5 MG/1
0.5 TABLET ORAL 2 TIMES DAILY
Qty: 180 TABLET | Refills: 0 | Status: SHIPPED | OUTPATIENT
Start: 2019-03-19 | End: 2019-05-16 | Stop reason: SDUPTHER

## 2019-03-19 RX ORDER — LITHIUM CARBONATE 300 MG/1
300 CAPSULE ORAL
Qty: 90 CAPSULE | Refills: 0 | Status: SHIPPED | OUTPATIENT
Start: 2019-03-19 | End: 2019-05-08 | Stop reason: SDUPTHER

## 2019-03-19 RX ORDER — GABAPENTIN 800 MG/1
800 TABLET ORAL DAILY
Qty: 90 TABLET | Refills: 2 | Status: SHIPPED | OUTPATIENT
Start: 2019-03-19 | End: 2020-03-10 | Stop reason: SDUPTHER

## 2019-03-19 NOTE — TELEPHONE ENCOUNTER
----- Message from Lalitlauren Graham sent at 3/19/2019  4:38 PM CDT -----  Contact: [facility]  Name of Who is Calling: Pharmacist        What is the request in detail: Please verify which mg should be dispensed regarding  gabapentin (NEURONTIN). 600 mg  or 800 mg. Thanks-          Can the clinic reply by [facility]: N    What Number to Call Back if not in [facility]: [facility] - [place], [place] - [address] [phone]

## 2019-03-20 NOTE — TELEPHONE ENCOUNTER
Spoke to pharmacy. States they were inquiring if patient was on 2 strengths of Gabapentin. Per  she is on both strengths. Verbalized understanding.

## 2019-03-20 NOTE — PROGRESS NOTES
Subjective:       Patient ID: Zaira Dowell is a 48 y.o. female.    Chief Complaint: Hypertension; Fibromyalgia; Anxiety; Depression; and Otalgia (Bilateral)    HPI   49 yo female presents for multiple complaints. Need refills on most of her medications. Has a hx of anxiety, depression, bipolar, HTN, asthma, GERD, migraines, and fibromyalgia. She does not follow with psych. Pt stated she did before. Complains about b/l ear pain as well. Denies any f/c.     Review of Systems   Constitutional: Negative for activity change and unexpected weight change.   HENT: Negative for hearing loss, rhinorrhea and trouble swallowing.    Eyes: Negative for discharge and visual disturbance.   Respiratory: Negative for chest tightness and wheezing.    Cardiovascular: Negative for chest pain and palpitations.   Gastrointestinal: Positive for constipation. Negative for blood in stool, diarrhea and vomiting.   Endocrine: Negative for polydipsia and polyuria.   Genitourinary: Negative for difficulty urinating, dysuria, hematuria and menstrual problem.   Musculoskeletal: Positive for arthralgias and neck pain. Negative for joint swelling.   Neurological: Positive for headaches. Negative for weakness.   Psychiatric/Behavioral: Positive for dysphoric mood. Negative for confusion.        Past Medical History:   Diagnosis Date    Anxiety     Arthritis     Asthma     Depression     Fibromyalgia     GERD (gastroesophageal reflux disease)     Obesity     SHARON (obstructive sleep apnea)      Past Surgical History:   Procedure Laterality Date    CARPAL TUNNEL RELEASE      CHOLECYSTECTOMY      HYSTERECTOMY      KNEE SURGERY      pinched nerve      SHOULDER SURGERY      WRIST SURGERY      had ganlin cyst     Family History   Problem Relation Age of Onset    Diabetes Mother     Banco's disease Mother     Hypertension Mother     Heart disease Father     Stroke Father     Mental illness Father     Hypertension Father      Diabetes Father     Depression Father     Heart disease Maternal Grandmother     Depression Maternal Grandmother     COPD Maternal Grandfather     Heart disease Maternal Grandfather     Stroke Maternal Grandfather     Kidney disease Paternal Grandmother     Heart disease Paternal Grandmother     Heart disease Paternal Grandfather      Social History     Socioeconomic History    Marital status:      Spouse name: None    Number of children: None    Years of education: None    Highest education level: None   Social Needs    Financial resource strain: None    Food insecurity - worry: None    Food insecurity - inability: None    Transportation needs - medical: None    Transportation needs - non-medical: None   Occupational History    None   Tobacco Use    Smoking status: Former Smoker     Types: Vaping with nicotine    Smokeless tobacco: Never Used    Tobacco comment: 1/15/19 vaping with 12% nicotine   Substance and Sexual Activity    Alcohol use: No    Drug use: No    Sexual activity: Yes     Partners: Male   Other Topics Concern    None   Social History Narrative    None        Objective:      Vitals:    03/19/19 1525   BP: 118/84   Pulse: 82   Resp: 20   Temp: 98.3 °F (36.8 °C)     Physical Exam   Constitutional: She is oriented to person, place, and time. No distress.   HENT:   Head: Normocephalic and atraumatic.   Right Ear: Tympanic membrane and ear canal normal.   Left Ear: Tympanic membrane and ear canal normal.   Eyes: Conjunctivae are normal.   Neck: Neck supple. No JVD present.   Cardiovascular: Normal rate, regular rhythm and normal heart sounds. Exam reveals no gallop and no friction rub.   No murmur heard.  Pulmonary/Chest: Effort normal and breath sounds normal. She has no wheezes. She has no rales.   Neurological: She is alert and oriented to person, place, and time.   Skin: Skin is warm and dry.   Psychiatric: She has a normal mood and affect. Her behavior is normal.  Judgment and thought content normal.        Assessment:       1. Anxiety    2. Depression, unspecified depression type    3. Bipolar 1 disorder    4. Asthma, unspecified asthma severity, unspecified whether complicated, unspecified whether persistent    5. Fibromyalgia    6. Gastroesophageal reflux disease without esophagitis    7. Other migraine without status migrainosus, not intractable        Plan:       Anxiety  -     Ambulatory referral to Psychiatry  -     FLUoxetine 20 MG capsule; Take 3 capsules (60 mg total) by mouth once daily.  Dispense: 90 capsule; Refill: 2  -     LORazepam (ATIVAN) 1 MG tablet; Take 1 tablet (1 mg total) by mouth as needed for Anxiety.  Dispense: 30 tablet; Refill: 2    Depression, unspecified depression type  -     Ambulatory referral to Psychiatry    Bipolar 1 disorder  - Meds were started by previous pcp. Will wean off ativan.   -     Ambulatory referral to Psychiatry  -     lithium (ESKALITH) 300 MG capsule; Take 1 capsule (300 mg total) by mouth 3 (three) times daily with meals.  Dispense: 90 capsule; Refill: 0  -     risperiDONE (RISPERDAL) 0.5 MG Tab; Take 1 tablet (0.5 mg total) by mouth 2 (two) times daily.  Dispense: 180 tablet; Refill: 0    Asthma, unspecified asthma severity, unspecified whether complicated, unspecified whether persistent  -     albuterol (PROVENTIL/VENTOLIN HFA) 90 mcg/actuation inhaler; Inhale 2 puffs into the lungs every 6 (six) hours as needed for Wheezing. Rescue  Dispense: 18 g; Refill: 2    Fibromyalgia  -     Ambulatory referral to Psychiatry  -     FLUoxetine 20 MG capsule; Take 3 capsules (60 mg total) by mouth once daily.  Dispense: 90 capsule; Refill: 2  -     gabapentin (NEURONTIN) 600 MG tablet; Take 1 tablet (600 mg total) by mouth 2 (two) times daily.  Dispense: 60 tablet; Refill: 2  -     gabapentin (NEURONTIN) 800 MG tablet; Take 1 tablet (800 mg total) by mouth once daily.  Dispense: 90 tablet; Refill: 2  -     nabumetone (RELAFEN) 750  MG tablet; Take 1 tablet (750 mg total) by mouth 2 (two) times daily.  Dispense: 180 tablet; Refill: 2    Gastroesophageal reflux disease without esophagitis  - Has been on prilosec qd chronically. States she gets GERD whenever she stops. Referred to gi.  -     omeprazole (PRILOSEC) 20 MG capsule; Take 1 capsule (20 mg total) by mouth 2 (two) times daily.  Dispense: 90 capsule; Refill: 1  -     Ambulatory referral to Gastroenterology    Other migraine without status migrainosus, not intractable  -     topiramate (TOPAMAX) 200 MG Tab; Take 1 tablet (200 mg total) by mouth once daily.  Dispense: 90 tablet; Refill: 1    Most medications were given by previous PCP. Advised pt she will be weaned on her ativan. Unsure why patient is not following psych due to her hx of multiple psych issues and multiple psych meds. Placed a referral.       Follow-up in about 3 months (around 6/19/2019).            Burak Rosales MD  3/19/2019 8:14 PM

## 2019-04-26 RX ORDER — OXYBUTYNIN CHLORIDE 5 MG/1
5 TABLET ORAL 3 TIMES DAILY
Qty: 90 TABLET | Refills: 2 | Status: SHIPPED | OUTPATIENT
Start: 2019-04-26 | End: 2019-06-19 | Stop reason: SDUPTHER

## 2019-05-08 ENCOUNTER — OFFICE VISIT (OUTPATIENT)
Dept: GASTROENTEROLOGY | Facility: CLINIC | Age: 49
End: 2019-05-08
Payer: MEDICARE

## 2019-05-08 ENCOUNTER — OFFICE VISIT (OUTPATIENT)
Dept: FAMILY MEDICINE | Facility: CLINIC | Age: 49
End: 2019-05-08
Payer: MEDICARE

## 2019-05-08 VITALS
RESPIRATION RATE: 16 BRPM | DIASTOLIC BLOOD PRESSURE: 76 MMHG | WEIGHT: 293 LBS | HEART RATE: 77 BPM | TEMPERATURE: 98 F | OXYGEN SATURATION: 98 % | BODY MASS INDEX: 45.99 KG/M2 | HEIGHT: 67 IN | SYSTOLIC BLOOD PRESSURE: 122 MMHG

## 2019-05-08 VITALS
HEART RATE: 69 BPM | BODY MASS INDEX: 51.62 KG/M2 | WEIGHT: 293 LBS | DIASTOLIC BLOOD PRESSURE: 66 MMHG | SYSTOLIC BLOOD PRESSURE: 132 MMHG

## 2019-05-08 DIAGNOSIS — L60.0 INGROWING NAIL, RIGHT GREAT TOE: Primary | ICD-10-CM

## 2019-05-08 DIAGNOSIS — Z12.11 SCREENING FOR MALIGNANT NEOPLASM OF COLON: ICD-10-CM

## 2019-05-08 DIAGNOSIS — F31.9 BIPOLAR 1 DISORDER: ICD-10-CM

## 2019-05-08 DIAGNOSIS — H81.10 BENIGN PAROXYSMAL POSITIONAL VERTIGO, UNSPECIFIED LATERALITY: ICD-10-CM

## 2019-05-08 DIAGNOSIS — J32.0 MAXILLARY SINUSITIS, UNSPECIFIED CHRONICITY: ICD-10-CM

## 2019-05-08 DIAGNOSIS — K21.9 GASTROESOPHAGEAL REFLUX DISEASE, ESOPHAGITIS PRESENCE NOT SPECIFIED: Primary | ICD-10-CM

## 2019-05-08 PROCEDURE — 99999 PR PBB SHADOW E&M-EST. PATIENT-LVL V: ICD-10-PCS | Mod: PBBFAC,,, | Performed by: INTERNAL MEDICINE

## 2019-05-08 PROCEDURE — 99204 OFFICE O/P NEW MOD 45 MIN: CPT | Mod: S$PBB,,, | Performed by: INTERNAL MEDICINE

## 2019-05-08 PROCEDURE — 99215 OFFICE O/P EST HI 40 MIN: CPT | Mod: PBBFAC,27,PO | Performed by: FAMILY MEDICINE

## 2019-05-08 PROCEDURE — 99999 PR PBB SHADOW E&M-EST. PATIENT-LVL V: ICD-10-PCS | Mod: PBBFAC,,, | Performed by: FAMILY MEDICINE

## 2019-05-08 PROCEDURE — 99999 PR PBB SHADOW E&M-EST. PATIENT-LVL V: CPT | Mod: PBBFAC,,, | Performed by: FAMILY MEDICINE

## 2019-05-08 PROCEDURE — 99999 PR PBB SHADOW E&M-EST. PATIENT-LVL V: CPT | Mod: PBBFAC,,, | Performed by: INTERNAL MEDICINE

## 2019-05-08 PROCEDURE — 99204 PR OFFICE/OUTPT VISIT, NEW, LEVL IV, 45-59 MIN: ICD-10-PCS | Mod: S$PBB,,, | Performed by: INTERNAL MEDICINE

## 2019-05-08 PROCEDURE — 99214 OFFICE O/P EST MOD 30 MIN: CPT | Mod: S$PBB,,, | Performed by: FAMILY MEDICINE

## 2019-05-08 PROCEDURE — 99214 PR OFFICE/OUTPT VISIT, EST, LEVL IV, 30-39 MIN: ICD-10-PCS | Mod: S$PBB,,, | Performed by: FAMILY MEDICINE

## 2019-05-08 PROCEDURE — 99215 OFFICE O/P EST HI 40 MIN: CPT | Mod: PBBFAC,PO | Performed by: INTERNAL MEDICINE

## 2019-05-08 RX ORDER — ONDANSETRON 4 MG/1
TABLET, FILM COATED ORAL
Refills: 0 | COMMUNITY
Start: 2019-03-23 | End: 2019-11-04

## 2019-05-08 RX ORDER — FLUTICASONE PROPIONATE 50 MCG
1 SPRAY, SUSPENSION (ML) NASAL DAILY
Qty: 1 BOTTLE | Refills: 0 | Status: SHIPPED | OUTPATIENT
Start: 2019-05-08 | End: 2019-09-10 | Stop reason: SDUPTHER

## 2019-05-08 RX ORDER — LITHIUM CARBONATE 300 MG/1
300 CAPSULE ORAL
Qty: 270 CAPSULE | Refills: 1 | Status: SHIPPED | OUTPATIENT
Start: 2019-05-08 | End: 2019-09-06 | Stop reason: SDUPTHER

## 2019-05-08 RX ORDER — MECLIZINE HYDROCHLORIDE 25 MG/1
25 TABLET ORAL 3 TIMES DAILY PRN
Qty: 30 TABLET | Refills: 0 | Status: SHIPPED | OUTPATIENT
Start: 2019-05-08 | End: 2023-04-02

## 2019-05-08 RX ORDER — LORATADINE 10 MG/1
TABLET ORAL
Refills: 0 | COMMUNITY
Start: 2019-03-23 | End: 2019-11-04

## 2019-05-08 RX ORDER — SULFAMETHOXAZOLE AND TRIMETHOPRIM 800; 160 MG/1; MG/1
1 TABLET ORAL 2 TIMES DAILY
Qty: 14 TABLET | Refills: 0 | Status: SHIPPED | OUTPATIENT
Start: 2019-05-08 | End: 2019-05-15

## 2019-05-08 NOTE — LETTER
May 8, 2019      Burak Rosales MD  [address]  [place] [address]           [facility]  [address] [place] [id number]  Phone: [phone]          Patient: Zaira Dowell   MR Number: [id number]   YOB: 1970   Date of Visit: 5/8/2019       Dear Dr. Burak Rosales:    Thank you for referring Zaira Dowell to me for evaluation. Attached you will find relevant portions of my assessment and plan of care.    If you have questions, please do not hesitate to call me. I look forward to following Zaira Dowell along with you.    Sincerely,    Michelle Mc MD    Enclosure  CC:  No Recipients    If you would like to receive this communication electronically, please contact [email or web address] or [phone] to request more information on [employer] Link access.    For providers and/or their staff who would like to refer a patient to [facility], please contact us through our one-stop-shop provider referral line, [facility], at [phone].    If you feel you have received this communication in error or would no longer like to receive these types of communications, please e-mail [email or web address]

## 2019-05-08 NOTE — PROGRESS NOTES
Subjective:       Patient ID: Zaira Dowell is a 48 y.o. female.    Chief Complaint: Gastroesophageal Reflux    Gastroesophageal Reflux   She complains of heartburn and nausea. She reports no abdominal pain or no chest pain. This is a chronic problem. The current episode started more than 1 year ago. The problem occurs constantly. The problem has been unchanged. The heartburn duration is more than one hour. The heartburn is located in the substernum. The heartburn is of severe intensity. The heartburn doesn't change with position. Nothing aggravates the symptoms. Pertinent negatives include no anemia or melena. Risk factors include obesity and smoking/tobacco exposure. She has tried a PPI for the symptoms. The treatment provided moderate relief. Past procedures do not include an abdominal ultrasound or an EGD. Past invasive treatments do not include gastroplasty.     Been on PPI x 15+ years, reports remote UGI series with HH. Considering gastric bypass. +tob use until recently. No dysphagia.     Review of Systems   Constitutional: Negative for appetite change and fever.   HENT: Negative for postnasal drip and trouble swallowing.    Eyes: Negative for pain and redness.   Respiratory: Negative for chest tightness and shortness of breath.    Cardiovascular: Negative for chest pain and leg swelling.   Gastrointestinal: Positive for heartburn and nausea. Negative for abdominal distention, abdominal pain, anal bleeding, blood in stool, melena and rectal pain.   Endocrine: Negative for cold intolerance and heat intolerance.   Genitourinary: Negative for difficulty urinating and hematuria.   Musculoskeletal: Positive for arthralgias and back pain.   Skin: Negative for color change and pallor.   Allergic/Immunologic: Negative for environmental allergies and food allergies.   Neurological: Negative for dizziness and light-headedness.   Hematological: Negative for adenopathy. Does not bruise/bleed easily.    Psychiatric/Behavioral: Negative for agitation and behavioral problems.       Objective:      Physical Exam   Constitutional: She is oriented to person, place, and time. She appears well-developed and well-nourished. No distress.   HENT:   Head: Normocephalic and atraumatic.   Eyes: Conjunctivae are normal. No scleral icterus.   Neck: Normal range of motion. Neck supple. No tracheal deviation present. No thyromegaly present.   Cardiovascular: Normal rate and regular rhythm. Exam reveals no gallop and no friction rub.   No murmur heard.  Pulmonary/Chest: Effort normal and breath sounds normal. No respiratory distress. She has no wheezes.   Abdominal: Soft. Bowel sounds are normal. She exhibits no distension. There is no tenderness.   Musculoskeletal:        Right wrist: She exhibits normal range of motion and no tenderness.        Left wrist: She exhibits normal range of motion and no tenderness.   Lymphadenopathy:        Head (right side): No submental and no submandibular adenopathy present.        Head (left side): No submental and no submandibular adenopathy present.   Neurological: She is alert and oriented to person, place, and time.   Skin: Skin is warm and dry. No rash noted. She is not diaphoretic. No erythema.   Psychiatric: She has a normal mood and affect. Her behavior is normal.   Nursing note and vitals reviewed.      Assessment:       1. Gastroesophageal reflux disease, esophagitis presence not specified    2. Screening for malignant neoplasm of colon        Plan:   1. EGD, barretts screening  2. Continue ppi bid for now, will do h2 blocker trial after PPI  3. Will need bariatric surgery referral to consider bypass options, consideration for fundoplication if needed  4. Colon oscopy age 51

## 2019-05-08 NOTE — PATIENT INSTRUCTIONS
EGD Prep Instructions    [facility] [address] Clinic Office [phone]  Endoscopy Lab [phone]    You are scheduled for an EGD with Dr. Mc on 5/21/2019 at [facility].  You will check in at Admit on the first floor of the hospital.    Nothing to eat or drink after midnight before the procedure.  You MAY brush your teeth.    You MAY take your blood pressure, heart, and seizure medication on the morning of the procedure, with a SIP of water.  Hold ALL other medications until after the procedure.    If you are on blood thinners THAT YOU HAVE BEEN INSTRUCTED TO HOLD BY YOUR DOCTOR FOR THIS PROCEDURE, then do NOT take this the morning of your EGD.  Do NOT stop these medications on your own, they must be approved to be held by your doctor.  Your EGD can NOT be done if you are on these medications.  Examples of blood thinners include: Coumadin, Aggrenox, Plavix, Pradaxa, Reapro, Pletal, Xarelto, Ticagrelor, Brilinta, Eliquis, and high dose aspirin (325 mg).  You do not have to stop baby aspirin 81 mg.    You will receive a call 2 days before your EGD to tell you the time to arrive.  If you have not received a call by the day before your procedure, call the Endoscopy Lab at [phone].

## 2019-05-14 NOTE — PROGRESS NOTES
Subjective:       Patient ID: Zaira Dowell is a 48 y.o. female.    Chief Complaint: Nail Problem (Big toe on right foot )    HPI   47 yo female presents for toe pain. Pt had multiple ingrown nail issues. States she had multiple removals but states she still has pain and swelling. Pt complains of dizziness as well. Endorses congestion and would like a refill on flonase and antivert for her dizziness. States she had an episode of BPPV previously many yrs ago. Denies any f/c.    Review of Systems   Constitutional: Negative for activity change and unexpected weight change.   HENT: Positive for congestion. Negative for hearing loss, rhinorrhea and trouble swallowing.    Eyes: Positive for visual disturbance. Negative for discharge.   Respiratory: Negative for chest tightness and wheezing.    Cardiovascular: Negative for chest pain and palpitations.   Gastrointestinal: Negative for blood in stool, constipation, diarrhea and vomiting.   Endocrine: Negative for polydipsia and polyuria.   Genitourinary: Negative for difficulty urinating, dysuria, hematuria and menstrual problem.   Musculoskeletal: Positive for arthralgias and neck pain. Negative for joint swelling.   Neurological: Positive for dizziness and headaches. Negative for weakness.   Psychiatric/Behavioral: Negative for confusion and dysphoric mood.        Past Medical History:   Diagnosis Date    Anxiety     Arthritis     Asthma     Depression     Fibromyalgia     GERD (gastroesophageal reflux disease)     Obesity     SHARON (obstructive sleep apnea)      Past Surgical History:   Procedure Laterality Date    CARPAL TUNNEL RELEASE      CHOLECYSTECTOMY      HYSTERECTOMY      KNEE SURGERY      pinched nerve      SHOULDER SURGERY      WRIST SURGERY      had ganlin cyst     Family History   Problem Relation Age of Onset    Diabetes Mother     [place]'s disease Mother     Hypertension Mother     Heart disease Father     Stroke Father     Mental  illness Father     Hypertension Father     Diabetes Father     Depression Father     Heart disease Maternal Grandmother     Depression Maternal Grandmother     COPD Maternal Grandfather     Heart disease Maternal Grandfather     Stroke Maternal Grandfather     Kidney disease Paternal Grandmother     Heart disease Paternal Grandmother     Heart disease Paternal Grandfather      Social History     Socioeconomic History    Marital status:      Spouse name: Not on file    Number of children: Not on file    Years of education: Not on file    Highest education level: Not on file   Occupational History    Not on file   Social Needs    Financial resource strain: Not very hard    Food insecurity:     Worry: Never true     Inability: Never true    Transportation needs:     Medical: No     Non-medical: No   Tobacco Use    Smoking status: Former Smoker     Types: Vaping with nicotine    Smokeless tobacco: Never Used    Tobacco comment: 1/15/19 vaping with 12% nicotine   Substance and Sexual Activity    Alcohol use: No     Frequency: Monthly or less     Drinks per session: 1 or 2     Binge frequency: Never    Drug use: No    Sexual activity: Yes     Partners: Male   Lifestyle    Physical activity:     Days per week: 1 day     Minutes per session: 30 min    Stress: Rather much   Relationships    Social connections:     Talks on phone: More than three times a week     Gets together: Patient refused     Attends Shinto service: Not on file     Active member of club or organization: No     Attends meetings of clubs or organizations: Never     Relationship status:    Other Topics Concern    Not on file   Social History Narrative    Not on file        Objective:      Vitals:    05/08/19 1326   BP: 122/76   Pulse: 77   Resp: 16   Temp: 98.4 °F (36.9 °C)     Physical Exam   Constitutional: She is oriented to person, place, and time. No distress.   HENT:   Head: Normocephalic and atraumatic.    B/l nasal turbinate swelling   Eyes: Conjunctivae are normal.   Neck: Neck supple.   Cardiovascular: Normal rate, regular rhythm and normal heart sounds. Exam reveals no gallop and no friction rub.   No murmur heard.  Pulmonary/Chest: Effort normal and breath sounds normal. She has no wheezes. She has no rales.   Abdominal: Soft. Bowel sounds are normal. There is no tenderness.   Musculoskeletal:   R toe tenderness and swelling   Lymphadenopathy:     She has no cervical adenopathy.   Neurological: She is alert and oriented to person, place, and time.   Skin: Skin is warm and dry.   Psychiatric: She has a normal mood and affect. Her behavior is normal. Judgment and thought content normal.        Assessment:       1. Ingrowing nail, right great toe    2. Bipolar 1 disorder    3. Benign paroxysmal positional vertigo, unspecified laterality    4. Maxillary sinusitis, unspecified chronicity        Plan:       Ingrowing nail, right great toe  -     Ambulatory referral to Podiatry  -     sulfamethoxazole-trimethoprim 800-160mg (BACTRIM DS) 800-160 mg Tab; Take 1 tablet by mouth 2 (two) times daily. for 7 days  Dispense: 14 tablet; Refill: 0    Bipolar 1 disorder  -     lithium (ESKALITH) 300 MG capsule; Take 1 capsule (300 mg total) by mouth 3 (three) times daily with meals.  Dispense: 270 capsule; Refill: 1    Benign paroxysmal positional vertigo, unspecified laterality  - started on antivert. Most likely due to sinusitis     Maxillary sinusitis, unspecified chronicity  -     fluticasone propionate (FLONASE) 50 mcg/actuation nasal spray; 1 spray (50 mcg total) by Each Nare route once daily.  Dispense: 1 Bottle; Refill: 0  -     meclizine (ANTIVERT) 25 mg tablet; Take 1 tablet (25 mg total) by mouth 3 (three) times daily as needed for Dizziness or Nausea.  Dispense: 30 tablet; Refill: 0      Follow up if symptoms worsen or fail to improve.            Burak Rosales MD  5/14/2019 3:09 PM

## 2019-05-16 DIAGNOSIS — F31.9 BIPOLAR 1 DISORDER: ICD-10-CM

## 2019-05-17 ENCOUNTER — TELEPHONE (OUTPATIENT)
Dept: ENDOSCOPY | Facility: HOSPITAL | Age: 49
End: 2019-05-17

## 2019-05-17 DIAGNOSIS — F41.9 ANXIETY: ICD-10-CM

## 2019-05-17 DIAGNOSIS — M79.7 FIBROMYALGIA: ICD-10-CM

## 2019-05-17 DIAGNOSIS — F31.9 BIPOLAR 1 DISORDER: ICD-10-CM

## 2019-05-17 NOTE — TELEPHONE ENCOUNTER
Spoke with patient about arrival time @ *.     NPO status reviewed: Patient must have nothing to eat after midnight.  Pt may have CLEAR liquids ONLY until completely NPO after midnight.    Medications: Do not take Insulin or oral diabetic medications the day of the procedure.  Take as prescribed: heart, seizure and blood pressure medication in the morning with a sip of water (less than an ounce).  Take any breathing medications and bring inhalers to hospital with you Leave all valuables and jewelry at home.     Wear comfortable clothes to procedure to change into hospital gown You cannot drive for 24 hours after your procedure because you will receive sedation for your procedure to make you comfortable.  A ride must be provided at discharge.

## 2019-05-20 RX ORDER — RISPERIDONE 0.5 MG/1
0.5 TABLET ORAL 2 TIMES DAILY
Qty: 180 TABLET | Refills: 0 | Status: SHIPPED | OUTPATIENT
Start: 2019-05-20 | End: 2019-06-19 | Stop reason: SDUPTHER

## 2019-05-20 RX ORDER — RISPERIDONE 0.5 MG/1
0.5 TABLET ORAL 2 TIMES DAILY
Qty: 180 TABLET | Refills: 1 | OUTPATIENT
Start: 2019-05-20

## 2019-05-20 RX ORDER — FLUOXETINE HYDROCHLORIDE 20 MG/1
60 CAPSULE ORAL DAILY
Qty: 90 CAPSULE | Refills: 1 | Status: SHIPPED | OUTPATIENT
Start: 2019-05-20 | End: 2019-06-19

## 2019-05-21 ENCOUNTER — HOSPITAL ENCOUNTER (OUTPATIENT)
Facility: HOSPITAL | Age: 49
Discharge: HOME | End: 2019-05-21
Attending: INTERNAL MEDICINE | Admitting: INTERNAL MEDICINE
Payer: MEDICARE

## 2019-05-21 ENCOUNTER — ANESTHESIA EVENT (OUTPATIENT)
Dept: ENDOSCOPY | Facility: HOSPITAL | Age: 49
End: 2019-05-21
Payer: MEDICARE

## 2019-05-21 ENCOUNTER — ANESTHESIA (OUTPATIENT)
Dept: ENDOSCOPY | Facility: HOSPITAL | Age: 49
End: 2019-05-21
Payer: MEDICARE

## 2019-05-21 VITALS
WEIGHT: 293 LBS | HEART RATE: 62 BPM | RESPIRATION RATE: 16 BRPM | OXYGEN SATURATION: 98 % | SYSTOLIC BLOOD PRESSURE: 135 MMHG | BODY MASS INDEX: 44.41 KG/M2 | DIASTOLIC BLOOD PRESSURE: 81 MMHG | HEIGHT: 68 IN | TEMPERATURE: 99 F

## 2019-05-21 DIAGNOSIS — K21.9 GERD (GASTROESOPHAGEAL REFLUX DISEASE): ICD-10-CM

## 2019-05-21 PROCEDURE — 27201012 HC FORCEPS, HOT/COLD, DISP: Performed by: INTERNAL MEDICINE

## 2019-05-21 PROCEDURE — 88305 TISSUE EXAM BY PATHOLOGIST: CPT | Mod: 26,,, | Performed by: PATHOLOGY

## 2019-05-21 PROCEDURE — 88305 TISSUE SPECIMEN TO PATHOLOGY - SURGERY: ICD-10-PCS | Mod: 26,,, | Performed by: PATHOLOGY

## 2019-05-21 PROCEDURE — 43239 EGD BIOPSY SINGLE/MULTIPLE: CPT | Mod: ,,, | Performed by: INTERNAL MEDICINE

## 2019-05-21 PROCEDURE — 37000009 HC ANESTHESIA EA ADD 15 MINS: Performed by: INTERNAL MEDICINE

## 2019-05-21 PROCEDURE — 63600175 PHARM REV CODE 636 W HCPCS: Performed by: NURSE ANESTHETIST, CERTIFIED REGISTERED

## 2019-05-21 PROCEDURE — 43239 PR EGD, FLEX, W/BIOPSY, SGL/MULTI: ICD-10-PCS | Mod: ,,, | Performed by: INTERNAL MEDICINE

## 2019-05-21 PROCEDURE — 37000008 HC ANESTHESIA 1ST 15 MINUTES: Performed by: INTERNAL MEDICINE

## 2019-05-21 PROCEDURE — 43239 EGD BIOPSY SINGLE/MULTIPLE: CPT | Performed by: INTERNAL MEDICINE

## 2019-05-21 PROCEDURE — 25000003 PHARM REV CODE 250: Performed by: INTERNAL MEDICINE

## 2019-05-21 PROCEDURE — 88305 TISSUE EXAM BY PATHOLOGIST: CPT | Performed by: PATHOLOGY

## 2019-05-21 RX ORDER — ONDANSETRON 2 MG/ML
INJECTION INTRAMUSCULAR; INTRAVENOUS
Status: DISCONTINUED | OUTPATIENT
Start: 2019-05-21 | End: 2019-05-21

## 2019-05-21 RX ORDER — SODIUM CHLORIDE 9 MG/ML
INJECTION, SOLUTION INTRAVENOUS CONTINUOUS
Status: DISCONTINUED | OUTPATIENT
Start: 2019-05-21 | End: 2019-05-21 | Stop reason: HOSPADM

## 2019-05-21 RX ORDER — LIDOCAINE HCL/PF 100 MG/5ML
SYRINGE (ML) INTRAVENOUS
Status: DISCONTINUED | OUTPATIENT
Start: 2019-05-21 | End: 2019-05-21

## 2019-05-21 RX ORDER — PROPOFOL 10 MG/ML
VIAL (ML) INTRAVENOUS
Status: DISCONTINUED | OUTPATIENT
Start: 2019-05-21 | End: 2019-05-21

## 2019-05-21 RX ORDER — SODIUM CHLORIDE 0.9 % (FLUSH) 0.9 %
10 SYRINGE (ML) INJECTION
Status: DISCONTINUED | OUTPATIENT
Start: 2019-05-21 | End: 2019-05-21 | Stop reason: HOSPADM

## 2019-05-21 RX ADMIN — SODIUM CHLORIDE: 0.9 INJECTION, SOLUTION INTRAVENOUS at 07:05

## 2019-05-21 RX ADMIN — LIDOCAINE HYDROCHLORIDE 100 MG: 20 INJECTION, SOLUTION INTRAVENOUS at 08:05

## 2019-05-21 RX ADMIN — PROPOFOL 50 MG: 10 INJECTION, EMULSION INTRAVENOUS at 08:05

## 2019-05-21 RX ADMIN — ONDANSETRON 4 MG: 2 INJECTION, SOLUTION INTRAMUSCULAR; INTRAVENOUS at 07:05

## 2019-05-21 NOTE — ANESTHESIA POSTPROCEDURE EVALUATION
Anesthesia Post Evaluation    Patient: Zaira Dowell    Procedure(s) Performed: Procedure(s) (LRB):  EGD (ESOPHAGOGASTRODUODENOSCOPY) (N/A)    Final Anesthesia Type: MAC  Patient location during evaluation: GI PACU  Patient participation: Yes- Able to Participate  Level of consciousness: awake and alert  Post-procedure vital signs: reviewed and stable  Pain management: adequate  Airway patency: patent  PONV status at discharge: No PONV  Anesthetic complications: no      Cardiovascular status: hemodynamically stable and blood pressure returned to baseline  Respiratory status: unassisted, spontaneous ventilation and room air  Hydration status: euvolemic  Follow-up not needed.          Vitals Value Taken Time   /81 5/21/2019  8:45 AM   Temp 36.9 °C (98.5 °F) 5/21/2019  8:15 AM   Pulse 62 5/21/2019  8:45 AM   Resp 16 5/21/2019  8:45 AM   SpO2 98 % 5/21/2019  8:45 AM         Event Time     Out of Recovery 08:45:52          Pain/Tima Score: Tima Score: 10 (5/21/2019  8:45 AM)

## 2019-05-21 NOTE — PLAN OF CARE
Past Medical History:   Diagnosis Date    Anxiety     Arthritis     Asthma     Depression     Fibromyalgia     GERD (gastroesophageal reflux disease)     Obesity     SHARON (obstructive sleep apnea)      Admission process complete. Patient ready for procedure. Plan of care reviewed with patient. Preoperative fasting appropriate for procedure and sedation. Call light in reach and bed in lowest position.

## 2019-05-21 NOTE — TRANSFER OF CARE
"Anesthesia Transfer of Care Note    Patient: Zaira Dowell    Procedure(s) Performed: Procedure(s) (LRB):  EGD (ESOPHAGOGASTRODUODENOSCOPY) (N/A)    Patient location: GI    Anesthesia Type: MAC    Transport from OR: Transported from OR on room air with adequate spontaneous ventilation    Post pain: adequate analgesia    Post assessment: no apparent anesthetic complications and tolerated procedure well    Post vital signs: stable    Level of consciousness: awake, alert and oriented    Nausea/Vomiting: no nausea/vomiting    Complications: none    Transfer of care protocol was followed      Last vitals:   Visit Vitals  /67   Pulse 60   Temp 36.7 °C (98.1 °F)   Resp 20   Ht 5' 7.5" (1.715 m)   Wt (!) 149.2 kg (329 lb)   SpO2 97%   Breastfeeding? No   BMI 50.77 kg/m²     "

## 2019-05-21 NOTE — [employer] PATIENT INSTRUCTIONS
Discharge Summary/Instructions after an Endoscopic Procedure  Patient Name: Zaira Dowell  Patient MRN: [id number]  Patient YOB: 1970  Tuesday, May 21, 2019  Michelle Mc MD  RESTRICTIONS:  During your procedure today, you received medications for sedation.  These   medications may affect your judgment, balance and coordination.  Therefore,   for 24 hours, you have the following restrictions:   - DO NOT drive a car, operate machinery, make legal/financial decisions,   sign important papers or drink alcohol.    ACTIVITY:  Today: no heavy lifting, straining or running due to procedural   sedation/anesthesia.  The following day: return to full activity including work.  DIET:  Eat and drink normally unless instructed otherwise.     TREATMENT FOR COMMON SIDE EFFECTS:  - Mild abdominal pain, nausea, belching, bloating or excessive gas:  rest,   eat lightly and use a heating pad.  - Sore Throat: treat with throat lozenges and/or gargle with warm salt   water.  - Because air was used during the procedure, expelling large amounts of air   from your rectum or belching is normal.  - If a bowel prep was taken, you may not have a bowel movement for 1-3 days.    This is normal.  SYMPTOMS TO WATCH FOR AND REPORT TO YOUR PHYSICIAN:  1. Abdominal pain or bloating, other than gas cramps.  2. Chest pain.  3. Back pain.  4. Signs of infection such as: chills or fever occurring within 24 hours   after the procedure.  5. Rectal bleeding, which would show as bright red, maroon, or black stools.   (A tablespoon of blood from the rectum is not serious, especially if   hemorrhoids are present.)  6. Vomiting.  7. Weakness or dizziness.  GO DIRECTLY TO THE NEAREST EMERGENCY ROOM IF YOU HAVE ANY OF THE FOLLOWING:      Difficulty breathing              Chills and/or fever over 101 F   Persistent vomiting and/or vomiting blood   Severe abdominal pain   Severe chest pain   Black, tarry stools   Bleeding- more than one  tablespoon   Any other symptom or condition that you feel may need urgent attention  Your doctor recommends these additional instructions:  If any biopsies were taken, your doctors clinic will contact you in 1 to 2   weeks with any results.  - Discharge patient to home (via wheelchair).   - Patient has a contact number available for emergencies.  The signs and   symptoms of potential delayed complications were discussed with the   patient.  Return to normal activities tomorrow.  Written discharge   instructions were provided to the patient.   - Resume previous diet.   - Continue present medications.   - Await pathology results.  For questions, problems or results please call your physician - Michelle Mc MD at Work:  [phone].  EMERGENCY PHONE NUMBER: [phone],  LAB RESULTS: [phone]  IF A COMPLICATION OR EMERGENCY SITUATION ARISES AND YOU ARE UNABLE TO REACH   YOUR PHYSICIAN - GO DIRECTLY TO THE EMERGENCY ROOM.  Michelle Mc MD  5/21/2019 8:12:38 AM  This report has been verified and signed electronically.  [employer]

## 2019-05-21 NOTE — ANESTHESIA PREPROCEDURE EVALUATION
05/21/2019  Zaira Dowell is a 48 y.o., female for EGD under MAC    Past Medical History:   Diagnosis Date    Anxiety     Arthritis     Asthma     Depression     Fibromyalgia     GERD (gastroesophageal reflux disease)     Obesity     SHARON (obstructive sleep apnea)          Anesthesia Evaluation    I have reviewed the Patient Summary Reports.    I have reviewed the Nursing Notes.   I have reviewed the Medications.     Review of Systems  Social:  Former Smoker        Physical Exam  General:  Obesity    Airway/Jaw/Neck:  Airway Findings: Mallampati: II      Chest/Lungs:  Chest/Lungs Clear    Heart/Vascular:  Heart Findings: Normal          CONCLUSIONS     1 - Normal left ventricular systolic function (EF 60-65%).     2 - Concentric hypertrophy.     3 - Grade I LV diastolic function.             This document has been electronically    SIGNED BY: [place] MD On: 08/10/2018 12:36            Anesthesia Plan  Type of Anesthesia, risks & benefits discussed:  Anesthesia Type:  MAC  Patient's Preference:   Intra-op Monitoring Plan:   Intra-op Monitoring Plan Comments:   Post Op Pain Control Plan:   Post Op Pain Control Plan Comments:   Induction:    Beta Blocker:  Patient is not currently on a Beta-Blocker (No further documentation required).       Informed Consent: Patient understands risks and agrees with Anesthesia plan.  Questions answered. Anesthesia consent signed with patient.  ASA Score: 2     Day of Surgery Review of History & Physical:            Ready For Surgery From Anesthesia Perspective.

## 2019-05-23 ENCOUNTER — OFFICE VISIT (OUTPATIENT)
Dept: FAMILY MEDICINE | Facility: CLINIC | Age: 49
End: 2019-05-23
Payer: MEDICARE

## 2019-05-23 VITALS
SYSTOLIC BLOOD PRESSURE: 120 MMHG | HEIGHT: 67 IN | WEIGHT: 293 LBS | HEART RATE: 91 BPM | OXYGEN SATURATION: 97 % | TEMPERATURE: 99 F | BODY MASS INDEX: 45.99 KG/M2 | DIASTOLIC BLOOD PRESSURE: 85 MMHG

## 2019-05-23 DIAGNOSIS — E66.01 MORBID OBESITY: ICD-10-CM

## 2019-05-23 DIAGNOSIS — G93.2 PSEUDOTUMOR CEREBRI: Primary | ICD-10-CM

## 2019-05-23 PROCEDURE — 99999 PR PBB SHADOW E&M-EST. PATIENT-LVL III: ICD-10-PCS | Mod: PBBFAC,,, | Performed by: FAMILY MEDICINE

## 2019-05-23 PROCEDURE — 99214 OFFICE O/P EST MOD 30 MIN: CPT | Mod: S$PBB,,, | Performed by: FAMILY MEDICINE

## 2019-05-23 PROCEDURE — 99999 PR PBB SHADOW E&M-EST. PATIENT-LVL III: CPT | Mod: PBBFAC,,, | Performed by: FAMILY MEDICINE

## 2019-05-23 PROCEDURE — 99213 OFFICE O/P EST LOW 20 MIN: CPT | Mod: PBBFAC,PO | Performed by: FAMILY MEDICINE

## 2019-05-23 PROCEDURE — 99214 PR OFFICE/OUTPT VISIT, EST, LEVL IV, 30-39 MIN: ICD-10-PCS | Mod: S$PBB,,, | Performed by: FAMILY MEDICINE

## 2019-05-23 NOTE — PROGRESS NOTES
Assessment & Plan  Problem List Items Addressed This Visit        Neuro    Pseudotumor cerebri - Primary    Current Assessment & Plan     Increased risk for vision loss.  Continue with weight loss endeavors            Endocrine    Morbid obesity    Relevant Orders    Ambulatory Referral to Bariatric Surgery ([facility])      Greater than 25 minutes was spent with this patient with greater than 50% spent with face-to-face counseling on above listed conditions.          Health Maintenance reviewed.    Follow-up: Follow up if symptoms worsen or fail to improve.    ______________________________________________________________________    Chief Complaint  Chief Complaint   Patient presents with    Weight Check       HPI  Zaira Dowell is a 48 y.o. female with multiple medical diagnoses as listed in the medical history and problem list that presents for weight check.  Pt is known to me with last appointment 2019.    She has been going to the hospital due to her sick mother in law.  Her mother in law recently .  She discovered that she is an emotional eater.  She was not able to take the medication.    She would like to pursue with surgical intervention.  Patient has a condition noted as pseudotumor cerebral.  She is very concerned about losing her vision.  We discussed that weight loss will be imperative in order prevent vision loss.  She would like to proceed with gastric sleeve surgery. Patient denies any new symptoms including chest pain, SOB, blurry vision, N/V, diarrhea.      PAST MEDICAL HISTORY:  Past Medical History:   Diagnosis Date    Anxiety     Arthritis     Asthma     Depression     Fibromyalgia     GERD (gastroesophageal reflux disease)     Obesity     SHARON (obstructive sleep apnea)        PAST SURGICAL HISTORY:  Past Surgical History:   Procedure Laterality Date    CARPAL TUNNEL RELEASE      CHOLECYSTECTOMY      EGD (ESOPHAGOGASTRODUODENOSCOPY) N/A 2019    Performed by Michelle  LAZARO Mc MD at [facility] ENDO    HYSTERECTOMY      KNEE SURGERY      pinched nerve      SHOULDER SURGERY      TONSILLECTOMY      WRIST SURGERY      had ganlin cyst       SOCIAL HISTORY:  Social History     Socioeconomic History    Marital status:      Spouse name: Not on file    Number of children: Not on file    Years of education: Not on file    Highest education level: Not on file   Occupational History    Not on file   Social Needs    Financial resource strain: Not very hard    Food insecurity:     Worry: Never true     Inability: Never true    Transportation needs:     Medical: No     Non-medical: No   Tobacco Use    Smoking status: Former Smoker     Types: Vaping with nicotine    Smokeless tobacco: Never Used    Tobacco comment: 1/15/19 vaping with 12% nicotine   Substance and Sexual Activity    Alcohol use: No     Frequency: Monthly or less     Drinks per session: 1 or 2     Binge frequency: Never    Drug use: No    Sexual activity: Yes     Partners: Male   Lifestyle    Physical activity:     Days per week: 1 day     Minutes per session: 30 min    Stress: Rather much   Relationships    Social connections:     Talks on phone: More than three times a week     Gets together: Patient refused     Attends Uatsdin service: Not on file     Active member of club or organization: No     Attends meetings of clubs or organizations: Never     Relationship status:    Other Topics Concern    Not on file   Social History Narrative    Not on file       FAMILY HISTORY:  Family History   Problem Relation Age of Onset    Diabetes Mother     Jose Juan's disease Mother     Hypertension Mother     Heart disease Father     Stroke Father     Mental illness Father     Hypertension Father     Diabetes Father     Depression Father     Heart disease Maternal Grandmother     Depression Maternal Grandmother     COPD Maternal Grandfather     Heart disease Maternal Grandfather     Stroke  Maternal Grandfather     Kidney disease Paternal Grandmother     Heart disease Paternal Grandmother     Heart disease Paternal Grandfather        ALLERGIES AND MEDICATIONS: updated and reviewed.  Review of patient's allergies indicates:   Allergen Reactions    Vicodin [hydrocodone-acetaminophen] Nausea And Vomiting     Current Outpatient Medications   Medication Sig Dispense Refill    acetaZOLAMIDE (DIAMOX) 250 MG tablet 2 (two) times daily.       albuterol (PROVENTIL/VENTOLIN HFA) 90 mcg/actuation inhaler Inhale 2 puffs into the lungs every 6 (six) hours as needed for Wheezing. Rescue 18 g 2    budesonide-formoterol 80-4.5 mcg (SYMBICORT) 80-4.5 mcg/actuation HFAA Inhale 2 puffs into the lungs 2 (two) times daily. 10.2 g 3    FLUoxetine 20 MG capsule Take 3 capsules (60 mg total) by mouth once daily. 90 capsule 1    fluticasone propionate (FLONASE) 50 mcg/actuation nasal spray 1 spray (50 mcg total) by Each Nare route once daily. 1 Bottle 0    fluticasone-salmeterol 250-50 mcg/dose (ADVAIR) 250-50 mcg/dose diskus inhaler Inhale 1 puff into the lungs 2 (two) times daily. Controller 60 each 3    furosemide (LASIX) 40 MG tablet Take 40 mg by mouth 2 (two) times daily.      gabapentin (NEURONTIN) 600 MG tablet Take 1 tablet (600 mg total) by mouth 2 (two) times daily. 60 tablet 2    gabapentin (NEURONTIN) 800 MG tablet Take 1 tablet (800 mg total) by mouth once daily. 90 tablet 2    lithium (ESKALITH) 300 MG capsule Take 1 capsule (300 mg total) by mouth 3 (three) times daily with meals. 270 capsule 1    loratadine (CLARITIN) 10 mg tablet   0    LORazepam (ATIVAN) 1 MG tablet Take 1 tablet (1 mg total) by mouth as needed for Anxiety. 30 tablet 2    meclizine (ANTIVERT) 25 mg tablet Take 1 tablet (25 mg total) by mouth 3 (three) times daily as needed for Dizziness or Nausea. 30 tablet 0    methocarbamol (ROBAXIN) 750 MG Tab Take 750 mg by mouth 4 (four) times daily.       nabumetone (RELAFEN) 750 MG  tablet Take 1 tablet (750 mg total) by mouth 2 (two) times daily. 180 tablet 2    omeprazole (PRILOSEC) 20 MG capsule Take 1 capsule (20 mg total) by mouth 2 (two) times daily. 90 capsule 1    ondansetron (ZOFRAN) 4 MG tablet   0    oxybutynin (DITROPAN) 5 MG Tab Take 1 tablet (5 mg total) by mouth 3 (three) times daily. 90 tablet 2    phentermine (ADIPEX-P) 37.5 mg tablet Take 1 tablet (37.5 mg total) by mouth before breakfast. 30 tablet 0    potassium chloride (KLOR-CON) 10 MEQ TbSR Take 10 mEq by mouth 2 (two) times daily.      risperiDONE (RISPERDAL) 0.5 MG Tab Take 1 tablet (0.5 mg total) by mouth 2 (two) times daily. 180 tablet 0    topiramate (TOPAMAX) 200 MG Tab Take 1 tablet (200 mg total) by mouth once daily. 90 tablet 1     No current facility-administered medications for this visit.          ROS  Review of Systems   Constitutional: Negative for activity change, appetite change, fatigue, fever and unexpected weight change.   HENT: Negative.  Negative for ear discharge, ear pain, rhinorrhea and sore throat.    Eyes: Negative.    Respiratory: Negative for apnea, cough, chest tightness, shortness of breath and wheezing.    Cardiovascular: Negative for chest pain, palpitations and leg swelling.   Gastrointestinal: Negative for abdominal distention, abdominal pain, constipation, diarrhea and vomiting.   Endocrine: Negative for cold intolerance, heat intolerance, polydipsia and polyuria.   Genitourinary: Negative for decreased urine volume and urgency.   Musculoskeletal: Negative.    Skin: Negative for rash.   Neurological: Negative for dizziness and headaches.   Hematological: Does not bruise/bleed easily.   Psychiatric/Behavioral: Negative for agitation, sleep disturbance and suicidal ideas.       Physical Exam  Vitals:    05/23/19 1418   BP: 120/85   BP Location: Right arm   Patient Position: Sitting   BP Method: Large (Manual)   Pulse: 91   Temp: 98.7 °F (37.1 °C)   TempSrc: Oral   SpO2: 97%  "  Weight: (!) 150.1 kg (330 lb 14.6 oz)   Height: 5' 7" (1.702 m)    Body mass index is 51.83 kg/m².  Weight: (!) 150.1 kg (330 lb 14.6 oz)   Height: 5' 7" (170.2 cm)   Physical Exam   Constitutional: She is oriented to person, place, and time. She appears well-developed and well-nourished.   HENT:   Head: Normocephalic and atraumatic.   Eyes: Pupils are equal, round, and reactive to light. Conjunctivae and EOM are normal.   Neurological: She is alert and oriented to person, place, and time.   Skin: Skin is warm and dry.   Vitals reviewed.      Health Maintenance       Date Due Completion Date    Influenza Vaccine 08/01/2019 10/9/2018    Override on 2/7/2018: Done    Mammogram 01/17/2021 1/17/2019    Lipid Panel 01/04/2023 1/4/2018    TETANUS VACCINE 02/07/2028 2/7/2018 (Declined)    Override on 2/7/2018: Declined              Patient note was created using [employer].  Any errors in syntax or even information may not have been identified and edited on initial review prior to signing this note.  "

## 2019-05-28 ENCOUNTER — TELEPHONE (OUTPATIENT)
Dept: FAMILY MEDICINE | Facility: CLINIC | Age: 49
End: 2019-05-28

## 2019-05-28 NOTE — TELEPHONE ENCOUNTER
----- Message from Danielle Mohamud sent at 5/28/2019 10:16 AM CDT -----  Contact: tawny [phone]  Type: RX Refill Request    Who Called:     Refill or New Rx:gabapentin (NEURONTIN) 600 MG tablet - dosage clarification. Call pharmacy    RX Name and Strength:    How is the patient currently taking it? (ex. 1XDay):    Is this a 30 day or 90 day RX:    Preferred Pharmacy with phone number:    Local or Mail Order:    Ordering Provider:    Would the patient rather a call back or a response via [facility]?     Best Call Back Number:    Additional Information:

## 2019-05-28 NOTE — TELEPHONE ENCOUNTER
Called pt to clarify which dosage pt is taking. Pt states that she is taking the 600 MG in the morning and the 800 MG  nightly.     Called pharmacy and clarified the directions.

## 2019-05-30 ENCOUNTER — OFFICE VISIT (OUTPATIENT)
Dept: PODIATRY | Facility: CLINIC | Age: 49
End: 2019-05-30
Payer: MEDICARE

## 2019-05-30 VITALS — HEIGHT: 67 IN | BODY MASS INDEX: 45.99 KG/M2 | WEIGHT: 293 LBS

## 2019-05-30 DIAGNOSIS — M79.674 GREAT TOE PAIN, RIGHT: ICD-10-CM

## 2019-05-30 DIAGNOSIS — L60.0 INGROWN NAIL: Primary | ICD-10-CM

## 2019-05-30 PROCEDURE — 99214 OFFICE O/P EST MOD 30 MIN: CPT | Mod: PBBFAC,PO | Performed by: PODIATRIST

## 2019-05-30 PROCEDURE — 99203 OFFICE O/P NEW LOW 30 MIN: CPT | Mod: S$PBB,,, | Performed by: PODIATRIST

## 2019-05-30 PROCEDURE — 99999 PR PBB SHADOW E&M-EST. PATIENT-LVL IV: ICD-10-PCS | Mod: PBBFAC,,, | Performed by: PODIATRIST

## 2019-05-30 PROCEDURE — 99203 PR OFFICE/OUTPT VISIT, NEW, LEVL III, 30-44 MIN: ICD-10-PCS | Mod: S$PBB,,, | Performed by: PODIATRIST

## 2019-05-30 PROCEDURE — 99999 PR PBB SHADOW E&M-EST. PATIENT-LVL IV: CPT | Mod: PBBFAC,,, | Performed by: PODIATRIST

## 2019-05-30 RX ORDER — KETOCONAZOLE 20 MG/G
CREAM TOPICAL DAILY
Qty: 1 TUBE | Refills: 3 | Status: SHIPPED | OUTPATIENT
Start: 2019-05-30 | End: 2019-11-04

## 2019-05-30 NOTE — LETTER
May 30, 2019      Burak Rosales MD  [address] [place] [address]           [facility]  [address] [facility] [address]  Phone: [phone]          Patient: Zaira Dowell   MR Number: [id number]   YOB: 1970   Date of Visit: 5/30/2019       Dear Dr. Burak Rosales:    Thank you for referring Zaira Dowell to me for evaluation. Attached you will find relevant portions of my assessment and plan of care.    If you have questions, please do not hesitate to call me. I look forward to following Zaira Dowell along with you.    Sincerely,    Nahomi Elise DPM    Enclosure  CC:  No Recipients    If you would like to receive this communication electronically, please contact [email or web address] or [phone] to request more information on [facility] Link access.    For providers and/or their staff who would like to refer a patient to [facility], please contact us through our one-stop-shop provider referral line, [facility], at [phone].    If you feel you have received this communication in error or would no longer like to receive these types of communications, please e-mail [email or web address]

## 2019-05-30 NOTE — PROGRESS NOTES
Subjective:      Patient ID: Zaira Dowell is a 48 y.o. female.    Chief Complaint: Ingrown Toenail (right foot great toenail (PCP Dr Rosales)) and Nail Care    Zaira is a 48 y.o. female who presents to the clinic complaining of painful ingrown toenail on the right foot. Reports h/o nail avulsion in the past 3 years however continues to have pain. Also complains of dry, flaky skin B/L plantar foot.     Review of Systems   Constitution: Negative for chills, diaphoresis and fever.   Cardiovascular: Negative for claudication, cyanosis, leg swelling and syncope.   Respiratory: Negative for cough and shortness of breath.    Skin: Positive for color change, nail changes and suspicious lesions.   Musculoskeletal: Negative for falls, joint pain, muscle cramps and muscle weakness.   Gastrointestinal: Negative for diarrhea, nausea and vomiting.   Neurological: Negative for disturbances in coordination, numbness, paresthesias, sensory change, tremors and weakness.   Psychiatric/Behavioral: Negative for altered mental status.           Objective:      Physical Exam   Constitutional: She appears well-developed. She is cooperative.   Oriented to time, place, and person.   Cardiovascular:   DP and PT pulses are diminished bilaterally. 3 sec capillary refill time and toes and feet are warm to touch proximally .  There is  hair growth on the feet and toes b/l. There is no edema b/l. No spider veins or varicosities present b/l.      Musculoskeletal:   Equinus noted b/l ankles with < 10 deg DF noted. MMT 5/5 in DF/PF/Inv/Ev resistance with no reproduction of pain in any direction. Passive range of motion of ankle and pedal joints is painless b/l.     Feet:   Right Foot:   Skin Integrity: Negative for callus or dry skin.   Left Foot:   Skin Integrity: Negative for callus or dry skin.   Lymphadenopathy:   Negative lymphadenopathy bilateral popliteal fossa and tarsal tunnel.   Neurological: She is alert.   Light touch,  proprioception, and sharp/dull sensation are all intact bilaterally. Protective threshold with the [place] monofilament is intact bilaterally.    Skin:   No open lesions, lacerations or wounds noted.Interdigital spaces clean, dry and intact b/l. No erythema noted to b/l foot.      Lateral hallux nail margin of right foot with ingrown nail plate. No purulent drainage noted.        Psychiatric: She has a normal mood and affect.             Assessment:       Encounter Diagnoses   Name Primary?    Ingrown nail Yes    Great toe pain, right          Plan:       Zaira was seen today for ingrown toenail and nail care.    Diagnoses and all orders for this visit:    Ingrown nail  -     CV Ankle Brachial Indices W Stress W Toe Tracings; Future    Great toe pain, right  -     CV Ankle Brachial Indices W Stress W Toe Tracings; Future    Other orders  -     ketoconazole (NIZORAL) 2 % cream; Apply topically once daily.      I counseled the patient on her conditions, their implications and medical management.    Diminished pulses OLIVER ordered.     Utilizing sterile toenail clippers I aggressively trimmed  the offending right hallux lateral nail border approximately 3 mm from its edge and carried the nail plate incision down at an angle in order to wedge out the offending cryptotic portion of the nail plate. The offending border was then removed in total. The remaining nail was grinded down with an electric  down to nail bed.  No blood was drawn. Patient tolerated the procedure well and related significant relief.    Ketoconazole 2% topical cream prescribed for treatment of aforementioned tinea pedis. Patient will use this medication as directed in addition to thourougly drying between toes daily, and applying powder as needed    Will plan for ingrown nail removal pending OLIVER.     F/u 6 weeks    Nahomi Elise DPM

## 2019-05-31 ENCOUNTER — TELEPHONE (OUTPATIENT)
Dept: BARIATRICS | Facility: CLINIC | Age: 49
End: 2019-05-31

## 2019-05-31 ENCOUNTER — TELEPHONE (OUTPATIENT)
Dept: PULMONOLOGY | Facility: CLINIC | Age: 49
End: 2019-05-31

## 2019-05-31 NOTE — TELEPHONE ENCOUNTER
----- Message from Cedric Pretty sent at 5/31/2019 12:27 PM CDT -----  Contact: pt   Needs Advice    Reason for call: pt states she would like to schedule an appt with the doctor from her referral. Pt states she can come Tuesday through Thursday any time before 11. I was unable to schedule the pts appt as it did not give me any options.         Communication Preference: [phone]     Additional Information: please contact pt regarding this matter

## 2019-06-05 ENCOUNTER — CLINICAL SUPPORT (OUTPATIENT)
Dept: BARIATRICS | Facility: CLINIC | Age: 49
End: 2019-06-05
Payer: MEDICARE

## 2019-06-05 ENCOUNTER — LAB VISIT (OUTPATIENT)
Dept: LAB | Facility: HOSPITAL | Age: 49
End: 2019-06-05
Payer: MEDICARE

## 2019-06-05 ENCOUNTER — INITIAL CONSULT (OUTPATIENT)
Dept: BARIATRICS | Facility: CLINIC | Age: 49
End: 2019-06-05
Payer: MEDICARE

## 2019-06-05 VITALS
HEIGHT: 67 IN | HEART RATE: 70 BPM | DIASTOLIC BLOOD PRESSURE: 72 MMHG | BODY MASS INDEX: 45.99 KG/M2 | WEIGHT: 293 LBS | SYSTOLIC BLOOD PRESSURE: 106 MMHG

## 2019-06-05 VITALS — WEIGHT: 293 LBS | BODY MASS INDEX: 50.69 KG/M2

## 2019-06-05 DIAGNOSIS — R73.9 HYPERGLYCEMIA: ICD-10-CM

## 2019-06-05 DIAGNOSIS — G47.33 OSA (OBSTRUCTIVE SLEEP APNEA): ICD-10-CM

## 2019-06-05 DIAGNOSIS — F31.9 BIPOLAR 1 DISORDER: ICD-10-CM

## 2019-06-05 DIAGNOSIS — G93.2 PSEUDOTUMOR CEREBRI: ICD-10-CM

## 2019-06-05 DIAGNOSIS — F32.A DEPRESSION, UNSPECIFIED DEPRESSION TYPE: ICD-10-CM

## 2019-06-05 DIAGNOSIS — Z72.0 TOBACCO ABUSE: Primary | ICD-10-CM

## 2019-06-05 DIAGNOSIS — D52.0 DIETARY FOLATE DEFICIENCY ANEMIA: ICD-10-CM

## 2019-06-05 DIAGNOSIS — R78.71 ABNORMAL LEAD LEVEL IN BLOOD: ICD-10-CM

## 2019-06-05 DIAGNOSIS — E66.01 MORBID OBESITY WITH BODY MASS INDEX (BMI) OF 50.0 TO 59.9 IN ADULT: ICD-10-CM

## 2019-06-05 DIAGNOSIS — R94.31 EKG ABNORMALITIES: ICD-10-CM

## 2019-06-05 DIAGNOSIS — D51.9 ANEMIA DUE TO VITAMIN B12 DEFICIENCY, UNSPECIFIED B12 DEFICIENCY TYPE: ICD-10-CM

## 2019-06-05 DIAGNOSIS — F41.9 ANXIETY: ICD-10-CM

## 2019-06-05 DIAGNOSIS — I27.20 PULMONARY HYPERTENSION: ICD-10-CM

## 2019-06-05 DIAGNOSIS — K21.9 GASTROESOPHAGEAL REFLUX DISEASE, ESOPHAGITIS PRESENCE NOT SPECIFIED: ICD-10-CM

## 2019-06-05 DIAGNOSIS — R79.89 LOW VITAMIN D LEVEL: Primary | ICD-10-CM

## 2019-06-05 LAB
25(OH)D3+25(OH)D2 SERPL-MCNC: 6 NG/ML (ref 30–96)
ALBUMIN SERPL BCP-MCNC: 3.8 G/DL (ref 3.5–5.2)
ALP SERPL-CCNC: 75 U/L (ref 55–135)
ALT SERPL W/O P-5'-P-CCNC: 23 U/L (ref 10–44)
ANION GAP SERPL CALC-SCNC: 6 MMOL/L (ref 8–16)
AST SERPL-CCNC: 13 U/L (ref 10–40)
BASOPHILS # BLD AUTO: 0.06 K/UL (ref 0–0.2)
BASOPHILS NFR BLD: 0.8 % (ref 0–1.9)
BILIRUB DIRECT SERPL-MCNC: 0.2 MG/DL (ref 0.1–0.3)
BILIRUB SERPL-MCNC: 0.4 MG/DL (ref 0.1–1)
BUN SERPL-MCNC: 10 MG/DL (ref 6–20)
CALCIUM SERPL-MCNC: 9.8 MG/DL (ref 8.7–10.5)
CHLORIDE SERPL-SCNC: 112 MMOL/L (ref 95–110)
CHOLEST SERPL-MCNC: 185 MG/DL (ref 120–199)
CHOLEST/HDLC SERPL: 3.3 {RATIO} (ref 2–5)
CO2 SERPL-SCNC: 23 MMOL/L (ref 23–29)
CREAT SERPL-MCNC: 0.8 MG/DL (ref 0.5–1.4)
DIFFERENTIAL METHOD: ABNORMAL
EOSINOPHIL # BLD AUTO: 0.2 K/UL (ref 0–0.5)
EOSINOPHIL NFR BLD: 2.4 % (ref 0–8)
ERYTHROCYTE [DISTWIDTH] IN BLOOD BY AUTOMATED COUNT: 12.7 % (ref 11.5–14.5)
EST. GFR  (AFRICAN AMERICAN): >60 ML/MIN/1.73 M^2
EST. GFR  (NON AFRICAN AMERICAN): >60 ML/MIN/1.73 M^2
ESTIMATED AVG GLUCOSE: 100 MG/DL (ref 68–131)
FOLATE SERPL-MCNC: 8.3 NG/ML (ref 4–24)
GLUCOSE SERPL-MCNC: 91 MG/DL (ref 70–110)
HBA1C MFR BLD HPLC: 5.1 % (ref 4–5.6)
HCT VFR BLD AUTO: 44.3 % (ref 37–48.5)
HDLC SERPL-MCNC: 56 MG/DL (ref 40–75)
HDLC SERPL: 30.3 % (ref 20–50)
HGB BLD-MCNC: 14 G/DL (ref 12–16)
IMM GRANULOCYTES # BLD AUTO: 0.02 K/UL (ref 0–0.04)
IMM GRANULOCYTES NFR BLD AUTO: 0.3 % (ref 0–0.5)
IRON SERPL-MCNC: 74 UG/DL (ref 30–160)
LDLC SERPL CALC-MCNC: 103.4 MG/DL (ref 63–159)
LYMPHOCYTES # BLD AUTO: 2 K/UL (ref 1–4.8)
LYMPHOCYTES NFR BLD: 28.4 % (ref 18–48)
MAGNESIUM SERPL-MCNC: 1.9 MG/DL (ref 1.6–2.6)
MCH RBC QN AUTO: 30.6 PG (ref 27–31)
MCHC RBC AUTO-ENTMCNC: 31.6 G/DL (ref 32–36)
MCV RBC AUTO: 97 FL (ref 82–98)
MONOCYTES # BLD AUTO: 0.4 K/UL (ref 0.3–1)
MONOCYTES NFR BLD: 6.1 % (ref 4–15)
NEUTROPHILS # BLD AUTO: 4.4 K/UL (ref 1.8–7.7)
NEUTROPHILS NFR BLD: 62 % (ref 38–73)
NONHDLC SERPL-MCNC: 129 MG/DL
NRBC BLD-RTO: 0 /100 WBC
PHOSPHATE SERPL-MCNC: 3.3 MG/DL (ref 2.7–4.5)
PLATELET # BLD AUTO: 318 K/UL (ref 150–350)
PMV BLD AUTO: 9.3 FL (ref 9.2–12.9)
POTASSIUM SERPL-SCNC: 4.1 MMOL/L (ref 3.5–5.1)
PROT SERPL-MCNC: 7.4 G/DL (ref 6–8.4)
RBC # BLD AUTO: 4.57 M/UL (ref 4–5.4)
SATURATED IRON: 18 % (ref 20–50)
SODIUM SERPL-SCNC: 141 MMOL/L (ref 136–145)
T3 SERPL-MCNC: 90 NG/DL (ref 60–180)
T4 FREE SERPL-MCNC: 0.88 NG/DL (ref 0.71–1.51)
T4 SERPL-MCNC: 7.8 UG/DL (ref 4.5–11.5)
TOTAL IRON BINDING CAPACITY: 420 UG/DL (ref 250–450)
TRANSFERRIN SERPL-MCNC: 284 MG/DL (ref 200–375)
TRIGL SERPL-MCNC: 128 MG/DL (ref 30–150)
TSH SERPL DL<=0.005 MIU/L-ACNC: 2.15 UIU/ML (ref 0.4–4)
VIT B12 SERPL-MCNC: 274 PG/ML (ref 210–950)
WBC # BLD AUTO: 7.07 K/UL (ref 3.9–12.7)

## 2019-06-05 PROCEDURE — 80061 LIPID PANEL: CPT

## 2019-06-05 PROCEDURE — 99499 UNLISTED E&M SERVICE: CPT | Mod: S$PBB,,, | Performed by: DIETITIAN, REGISTERED

## 2019-06-05 PROCEDURE — 84100 ASSAY OF PHOSPHORUS: CPT

## 2019-06-05 PROCEDURE — 84436 ASSAY OF TOTAL THYROXINE: CPT

## 2019-06-05 PROCEDURE — 99999 PR PBB SHADOW E&M-EST. PATIENT-LVL IV: CPT | Mod: PBBFAC,,, | Performed by: PHYSICIAN ASSISTANT

## 2019-06-05 PROCEDURE — 84439 ASSAY OF FREE THYROXINE: CPT

## 2019-06-05 PROCEDURE — 99215 PR OFFICE/OUTPT VISIT, EST, LEVL V, 40-54 MIN: ICD-10-PCS | Mod: S$PBB,,, | Performed by: PHYSICIAN ASSISTANT

## 2019-06-05 PROCEDURE — 84425 ASSAY OF VITAMIN B-1: CPT

## 2019-06-05 PROCEDURE — 80048 BASIC METABOLIC PNL TOTAL CA: CPT

## 2019-06-05 PROCEDURE — 84480 ASSAY TRIIODOTHYRONINE (T3): CPT

## 2019-06-05 PROCEDURE — 99999 PR PBB SHADOW E&M-EST. PATIENT-LVL IV: ICD-10-PCS | Mod: PBBFAC,,, | Performed by: PHYSICIAN ASSISTANT

## 2019-06-05 PROCEDURE — 80076 HEPATIC FUNCTION PANEL: CPT

## 2019-06-05 PROCEDURE — 36415 COLL VENOUS BLD VENIPUNCTURE: CPT

## 2019-06-05 PROCEDURE — 99999 PR PBB SHADOW E&M-EST. PATIENT-LVL I: CPT | Mod: PBBFAC,,, | Performed by: DIETITIAN, REGISTERED

## 2019-06-05 PROCEDURE — 83540 ASSAY OF IRON: CPT

## 2019-06-05 PROCEDURE — 84443 ASSAY THYROID STIM HORMONE: CPT

## 2019-06-05 PROCEDURE — 99499 NO LOS: ICD-10-PCS | Mod: S$PBB,,, | Performed by: DIETITIAN, REGISTERED

## 2019-06-05 PROCEDURE — 83735 ASSAY OF MAGNESIUM: CPT

## 2019-06-05 PROCEDURE — 82607 VITAMIN B-12: CPT

## 2019-06-05 PROCEDURE — 99214 OFFICE O/P EST MOD 30 MIN: CPT | Mod: PBBFAC | Performed by: PHYSICIAN ASSISTANT

## 2019-06-05 PROCEDURE — 85025 COMPLETE CBC W/AUTO DIFF WBC: CPT

## 2019-06-05 PROCEDURE — 86677 HELICOBACTER PYLORI ANTIBODY: CPT

## 2019-06-05 PROCEDURE — 82306 VITAMIN D 25 HYDROXY: CPT

## 2019-06-05 PROCEDURE — 82746 ASSAY OF FOLIC ACID SERUM: CPT

## 2019-06-05 PROCEDURE — 99215 OFFICE O/P EST HI 40 MIN: CPT | Mod: S$PBB,,, | Performed by: PHYSICIAN ASSISTANT

## 2019-06-05 PROCEDURE — 99999 PR PBB SHADOW E&M-EST. PATIENT-LVL I: ICD-10-PCS | Mod: PBBFAC,,, | Performed by: DIETITIAN, REGISTERED

## 2019-06-05 PROCEDURE — 83036 HEMOGLOBIN GLYCOSYLATED A1C: CPT

## 2019-06-05 PROCEDURE — 99211 OFF/OP EST MAY X REQ PHY/QHP: CPT | Mod: PBBFAC,27 | Performed by: DIETITIAN, REGISTERED

## 2019-06-05 RX ORDER — PHENTERMINE HYDROCHLORIDE 37.5 MG/1
37.5 TABLET ORAL
COMMUNITY
End: 2019-11-04

## 2019-06-05 RX ORDER — ERGOCALCIFEROL 1.25 MG/1
50000 CAPSULE ORAL
Qty: 24 CAPSULE | Refills: 0 | Status: SHIPPED | OUTPATIENT
Start: 2019-06-06 | End: 2019-09-03

## 2019-06-05 NOTE — PROGRESS NOTES
BARIATRIC NEW PATIENT EVALUATION    CHIEF COMPLAINT:   Morbid Obesity Body mass index is 50.69 kg/m². and inability to lose weight and maintain weight loss .    HISTORY OF PRESENT ILLNESS:  Zaira Dowell  is a 48 y.o. female presenting for morbid obesity Body mass index is 50.69 kg/m². and inability to lose weight and maintain weight loss . Pt states that she has always been overweight, states that the older she has gotten the worse the weight has been. The patient has tried portion control, Adipex, atkins diet, low carb/high protein. States that she notices that the medication and diet changes work, but something occurs, and she is an emotional eater so she gains the weight back. Presently at  highest weight. Needs to lose weight to avoid a shunt for psuedotumor cerebri. Pt states that she would like to weigh 175 lbs.       Admitted in patient psych facility in 2015, in [facility], at [facility]. PEC, for medication changes. No SI attempts.     PAST MEDICAL HISTORY:  Past Medical History:   Diagnosis Date    Anxiety     Arthritis     Asthma     Depression     Fibromyalgia     GERD (gastroesophageal reflux disease)     Obesity     SHARON (obstructive sleep apnea)          PAST SURGICAL HISTORY:  Past Surgical History:   Procedure Laterality Date    CARPAL TUNNEL RELEASE      CHOLECYSTECTOMY      EGD (ESOPHAGOGASTRODUODENOSCOPY) N/A 5/21/2019    Performed by [facility] at [facility]    HYSTERECTOMY      partial     KNEE SURGERY      pinched nerve      SHOULDER SURGERY      TONSILLECTOMY      WRIST SURGERY      had ganlin cyst       FAMILY HISTORY:  Family History   Problem Relation Age of Onset    Diabetes Mother     Jose Juan's disease Mother     Hypertension Mother     Heart disease Father     Stroke Father     Mental illness Father     Hypertension Father     Diabetes Father     Depression Father     Heart disease Maternal Grandmother     Depression Maternal Grandmother      COPD Maternal Grandfather     Heart disease Maternal Grandfather     Stroke Maternal Grandfather     Kidney disease Paternal Grandmother     Heart disease Paternal Grandmother     Heart disease Paternal Grandfather     No Known Problems Brother     Hypertension Son        SOCIAL HISTORY:  Social History     Socioeconomic History    Marital status:      Spouse name: Not on file    Number of children: Not on file    Years of education: Not on file    Highest education level: Not on file   Occupational History    Not on file   Social Needs    Financial resource strain: Not very hard    Food insecurity:     Worry: Never true     Inability: Never true    Transportation needs:     Medical: No     Non-medical: No   Tobacco Use    Smoking status: Former Smoker     Types: Vaping with nicotine    Smokeless tobacco: Never Used    Tobacco comment: 1/15/19 vaping with 12% nicotine. Smoking cessation refused will quit cold turkey.   Substance and Sexual Activity    Alcohol use: No     Frequency: Monthly or less     Drinks per session: 1 or 2     Binge frequency: Never    Drug use: No    Sexual activity: Yes     Partners: Male   Lifestyle    Physical activity:     Days per week: 1 day     Minutes per session: 30 min    Stress: Rather much   Relationships    Social connections:     Talks on phone: More than three times a week     Gets together: Patient refused     Attends [employer] service: Not on file     Active member of club or organization: No     Attends meetings of clubs or organizations: Never     Relationship status:    Other Topics Concern    Not on file   Social History Narrative    Not on file       MEDICATIONS:  Current Outpatient Medications   Medication Sig Dispense Refill    acetaZOLAMIDE (DIAMOX) 250 MG tablet 2 (two) times daily.       albuterol (PROVENTIL/VENTOLIN HFA) 90 mcg/actuation inhaler Inhale 2 puffs into the lungs every 6 (six) hours as needed for Wheezing.  Rescue 18 g 2    FLUoxetine 20 MG capsule Take 3 capsules (60 mg total) by mouth once daily. 90 capsule 1    fluticasone propionate (FLONASE) 50 mcg/actuation nasal spray 1 spray (50 mcg total) by Each Nare route once daily. 1 Bottle 0    furosemide (LASIX) 40 MG tablet Take 40 mg by mouth 2 (two) times daily.      gabapentin (NEURONTIN) 600 MG tablet Take 1 tablet (600 mg total) by mouth 2 (two) times daily. 60 tablet 2    gabapentin (NEURONTIN) 800 MG tablet Take 1 tablet (800 mg total) by mouth once daily. 90 tablet 2    ketoconazole (NIZORAL) 2 % cream Apply topically once daily. 1 Tube 3    lithium (ESKALITH) 300 MG capsule Take 1 capsule (300 mg total) by mouth 3 (three) times daily with meals. 270 capsule 1    loratadine (CLARITIN) 10 mg tablet   0    LORazepam (ATIVAN) 1 MG tablet Take 1 tablet (1 mg total) by mouth as needed for Anxiety. 30 tablet 2    meclizine (ANTIVERT) 25 mg tablet Take 1 tablet (25 mg total) by mouth 3 (three) times daily as needed for Dizziness or Nausea. 30 tablet 0    methocarbamol (ROBAXIN) 750 MG Tab Take 750 mg by mouth 4 (four) times daily.       nabumetone (RELAFEN) 750 MG tablet Take 1 tablet (750 mg total) by mouth 2 (two) times daily. 180 tablet 2    omeprazole (PRILOSEC) 20 MG capsule Take 1 capsule (20 mg total) by mouth 2 (two) times daily. 90 capsule 1    ondansetron (ZOFRAN) 4 MG tablet   0    oxybutynin (DITROPAN) 5 MG Tab Take 1 tablet (5 mg total) by mouth 3 (three) times daily. 90 tablet 2    phentermine (ADIPEX-P) 37.5 mg tablet Take 37.5 mg by mouth before breakfast.      potassium chloride (KLOR-CON) 10 MEQ TbSR Take 10 mEq by mouth 2 (two) times daily.      risperiDONE (RISPERDAL) 0.5 MG Tab Take 1 tablet (0.5 mg total) by mouth 2 (two) times daily. 180 tablet 0    topiramate (TOPAMAX) 200 MG Tab Take 1 tablet (200 mg total) by mouth once daily. 90 tablet 1     No current facility-administered medications for this visit.   "      ALLERGIES:  Review of patient's allergies indicates:   Allergen Reactions    Vicodin [hydrocodone-acetaminophen] Nausea And Vomiting       ROS:  Review of Systems   Constitutional: Negative for chills and fever.   HENT: Negative for tinnitus.    Eyes: Positive for blurred vision. Negative for double vision.   Respiratory: Negative for cough and shortness of breath.    Cardiovascular: Negative for chest pain, palpitations and leg swelling.   Gastrointestinal: Negative for abdominal pain, constipation, diarrhea, heartburn, nausea and vomiting.   Genitourinary: Negative for dysuria and hematuria.   Musculoskeletal: Positive for joint pain and neck pain. Negative for back pain and falls.   Skin: Negative for rash.   Neurological: Negative for dizziness, tingling and headaches.   Psychiatric/Behavioral: Negative for depression and suicidal ideas. The patient is not nervous/anxious.        PE:  Vitals:    06/05/19 0930   BP: 106/72   Pulse: 70   Weight: (!) 146.8 kg (323 lb 10.2 oz)   Height: 5' 7" (1.702 m)       Physical Exam   Constitutional: She is oriented to person, place, and time. She appears well-developed and well-nourished.   HENT:   Head: Normocephalic and atraumatic.   Eyes: Pupils are equal, round, and reactive to light. Conjunctivae and EOM are normal.   Neck: Normal range of motion. Neck supple. No JVD present. No thyromegaly present.   Cardiovascular: Normal rate, regular rhythm, normal heart sounds and intact distal pulses.   No murmur heard.  Pulmonary/Chest: Effort normal and breath sounds normal.   Abdominal: Soft. Bowel sounds are normal. There is no tenderness. There is no rebound and no guarding.   Musculoskeletal: Normal range of motion. She exhibits no edema or tenderness.   Neurological: She is alert and oriented to person, place, and time. Coordination normal.   Skin: Skin is warm and dry. Capillary refill takes less than 2 seconds. No rash noted.   Psychiatric: She has a normal mood and " affect. Her behavior is normal. Judgment and thought content normal.        DIAGNOSIS:  1. Morbid Obesity with Body mass index is 50.69 kg/m². and inability to lose weight and maintain weight loss .  2. Co-morbidities: Bipolar disorder, anxiety and depression.     PLAN:  The patient is  Good  candidate for Bariatric Surgery. she is interested in sleeve gastrectomy with Dr. Kohli. The surgery and post-op care were discussed in detail with the patient. All questions were answered.    she understands that bariatric surgery is a tool to aid in weight loss and that she needs to be committed to the diet and exercise post-operatively for successful weight loss.  Discussed expected weight loss outcomes after surgery which is 50% of the excess weight on her frame.  Goal weight is 175#s.  Discussed with patient that bariatric surgery is not the easy way out and that it will take plenty of dedication on the patient's part to be successful. Also discussed the possibility of weight regain if the patient strays from the diet guidelines or exercise requirements. Patient verbalized understanding and wishes to proceed with the work-up.    ORDERS:  1. Chest X-Ray and EKG  2. Psychological Consult, Bariatric Dietician Consult and PCP Clearance  3. Bariatric Labs: BMP, CBC, Folate Serum, H. pylori, HgA1C, Hepatic Panel/LFT, Iron & TIBC, Lipid Profile, Magnesium, Phosphate, T3, T4, TSH, Free T4, Vitamin B12, and Vitamin B1.  4. Nicotine testing  5. Mental health contract    Primary Physician: Burak Rosales MD  RTC: As scheduled.    60 minute visit, over 50% of time spent counseling patient face to face on surgical options, risks, benefits, expected diet, recommended exercise regimen, and expected weight loss.

## 2019-06-05 NOTE — PROGRESS NOTES
NUTRITIONAL CONSULT    Referring Physician: Ean Kohli M.D.  Reason for MNT Referral: Initial assessment for sleeve gastrectomy work-up    PAST MEDICAL HISTORY:   48 y.o. female  Body mass index is 50.69 kg/m².     Weight history includes states that she has always been overweight, states that the older she has gotten the worse the weight has been.     Dieting attempts include portion control, Adipex, atkins diet, low carb/high protein. States that she notices that the medication and diet changes work, but something occurs, and she is an emotional eater so she gains the weight back. Presently at  highest weight. Needs to lose weight to avoid a shunt for psuedotumor cerebri. Pt states that she would like to weigh 175 lbs.     In the past 2 weeks she restarted her Adipex and has been trying to cook more at home, healthier cooking, healthier snacking (fruits and yogurt instead of junk), eating smaller portions. Ready to resume walking.      Past Medical History:   Diagnosis Date    Anxiety     Arthritis     Asthma     Depression     Fibromyalgia     GERD (gastroesophageal reflux disease)     Obesity     SHARON (obstructive sleep apnea)        CLINICAL DATA:  48 y.o.-year-old White female.  Height: 5 ft. 7 in.  Weight: 323 lbs  IBW: 147 lbs  BMI: 50.6  The patient's goal weight (50% EBW): 235 lbs  Personal goal weight: 175 lbs    Goal for Bariatric Surgery: to improve health, to improve quality of life, to lose weight and to prevent future medical conditions    NUTRITIONAL NEEDS:  3790-2069 Calories (for 1-2 lbs wt loss per week)   Grams Protein    NUTRITION & HEALTH HISTORY:  Greatest challenge: starchy CHO, portion control, irregular meal patterns and emotional eating     Current diet recall:     9:30am wake up  10am: Atkins shake  Watch tv  12pm: home cooked spaghetti and meatsauce  Watch tv  Nap  5pm housework  9pm: orange    Current Diet:  Meal pattern: 1 meal + 1 shake + snacking  Protein  supplements: Atkins shakes  Snacking: Used to be junk food/sweets. Now healthier choices like fruit and yogurt.  Vegetables: Likes a few. Eats 2-3 times per week.  Fruits: Likes a variety. Eats almost daily.  Beverages: water, diet soda, sugar-free beverages and sweet tea  Dining out: Weekly. Mostly fast food, restaurants and take-out.  Cooking at home: Almost Daily. Mostly baked, grilled and smothered meat, fish, starchy CHO and vegetables.    Exercise:  Past exercise: Fair. Walking with  in the mornings.    Current exercise: None. Wants to buy a recumbent bike.  Restrictions to exercise: hip pain - painful when walking. Can walk for about 1/2 mile, 30 min.     Vitamins / Minerals / Herbs:   none      Labs:   none available at this time    Food Allergies:   None known    Social:  Disabled.  Lives with her .  Grocery shopping and food prep done by the pt.  Patient believes the household will be supportive after surgery.  Alcohol: None.  Smoking: Trying to quit. Vapes daily with nicotine.    ASSESSMENT:  · Patient reports attempts at weight loss, only to regain lost weight.  · Patient demonstrated knowledge of healthy eating behaviors and exercise patterns; admits to not eating healthy and not exercising at this point.  · Patient states willingness and demonstrates willingness to change lifestyle and make behavior modifications as evidenced by weight loss, dietary changes, including protein drinks, increased fruits, increased vegetables, healthier cooking at home and healthier snacking at home.    Insurance requires no medically supervised diet prior to consideration for bariatric surgery.    Barriers to Education: none    Stage of change: action    NUTRITION DIAGNOSIS:    Obesity related to Excessive carbohydrate intake as evidence by BMI.    BARIATRIC DIET DISCUSSION/PLAN:  Discussed diet after surgery and related to patient's food record.  Reviewed nutrition guidelines for before and after  surgery.  Answered all questions.  Continue to review Bariatric Nutrition Guidebook at home and call with any questions.  Work on Bariatric Nutrition Checklist.  Work on expanding variety of vegetables.  Work on gradually cutting back on starchy CHO in the diet.  Begin trying various protein supplements to determine preference.  1200-calorie diet.  5-6 meals per day.  Increase exercise.     Try Premier shakes for double the protein per shake.  Eliminate sweet tea. May have unsweetened or diet tea or CL tea.    RECOMMENDATIONS:  Patient is a good candidate for bariatric surgery - Sleeve.    Patient and Spouse verbalized understanding.    Expect good  compliance after surgery at this time.    Communicated nutrition plan with bariatric team.    SESSION TIME:  60 minutes

## 2019-06-05 NOTE — PATIENT INSTRUCTIONS
Prior to surgery you will need to complete:  - Dietitian consult and follow up appointments as needed  - PCP clearance  - Labs  - Chest X-ray  - EKG  - Psychological evaluation, Please call psychiatry [phone] to schedule    In preparation for bariatric surgery, please complete the following:   · Discuss your current medications with your primary care provider, remember your medications will need to be crushed, chewable, or in liquid form for the first 3-6 months after a gastric bypass or sleeve.  For a gastric band, your medications will need to be crushed indefinitely.    · If you take a blood thinner such as: Coumadin (warfarin), Pradaxa (dabigatran), or Plavix (clopidogrel), you will need to speak with your prescribing provider on how or if this medication can be stopped before surgery.   · If you take a medication for depression or anxiety, you will need to begin crushing or opening the capsule 1-3 months prior to surgery.  Remember to discuss this with the psychologist or psychiatrist that you see.   · If you take medication for arthritis on a daily basis that is considered a non-steroidal anti-inflammatory (NSAID), please discuss with your prescribing physician an alternative medication.  After having gastric bypass or gastric sleeve, this group of medications is not appropriate to take due to increased risk of bleeding stomach ulcers.

## 2019-06-05 NOTE — LETTER
June 5, 2019      Michelle Mc MD  [address]  [address]  [place] [address]           [facility] - Bariatric Surgery  [address]  [place] [address]  Phone: [phone]  Fax: [phone]          Patient: Zaira Dowell   MR Number: [id number]   YOB: 1970   Date of Visit: 6/5/2019       Dear Dr. Michelle Mc:    Thank you for referring Zaira Dowell to me for evaluation. Attached you will find relevant portions of my assessment and plan of care.    If you have questions, please do not hesitate to call me. I look forward to following Zaira Dowell along with you.    Sincerely,    Terri Astudillo PA-C    Enclosure  CC:  No Recipients    If you would like to receive this communication electronically, please contact [email or web address] or [phone] to request more information on [facility] Link access.    For providers and/or their staff who would like to refer a patient to [facility], please contact us through our one-stop-shop provider referral line, [facility] , at [phone].    If you feel you have received this communication in error or would no longer like to receive these types of communications, please e-mail [email or web address]

## 2019-06-06 ENCOUNTER — DOCUMENTATION ONLY (OUTPATIENT)
Dept: BARIATRICS | Facility: CLINIC | Age: 49
End: 2019-06-06

## 2019-06-06 ENCOUNTER — HOSPITAL ENCOUNTER (OUTPATIENT)
Dept: CARDIOLOGY | Facility: HOSPITAL | Age: 49
Discharge: HOME | End: 2019-06-06
Attending: PODIATRIST
Payer: MEDICARE

## 2019-06-06 ENCOUNTER — TELEPHONE (OUTPATIENT)
Dept: PODIATRY | Facility: CLINIC | Age: 49
End: 2019-06-06

## 2019-06-06 ENCOUNTER — HOSPITAL ENCOUNTER (OUTPATIENT)
Dept: RADIOLOGY | Facility: HOSPITAL | Age: 49
Discharge: HOME | End: 2019-06-06
Attending: PHYSICIAN ASSISTANT
Payer: MEDICARE

## 2019-06-06 DIAGNOSIS — F31.9 BIPOLAR 1 DISORDER: ICD-10-CM

## 2019-06-06 DIAGNOSIS — G47.33 OSA (OBSTRUCTIVE SLEEP APNEA): ICD-10-CM

## 2019-06-06 DIAGNOSIS — F41.9 ANXIETY: ICD-10-CM

## 2019-06-06 DIAGNOSIS — I27.20 PULMONARY HYPERTENSION: ICD-10-CM

## 2019-06-06 DIAGNOSIS — K21.9 GASTROESOPHAGEAL REFLUX DISEASE, ESOPHAGITIS PRESENCE NOT SPECIFIED: ICD-10-CM

## 2019-06-06 DIAGNOSIS — G93.2 PSEUDOTUMOR CEREBRI: ICD-10-CM

## 2019-06-06 DIAGNOSIS — L60.0 INGROWN NAIL: ICD-10-CM

## 2019-06-06 DIAGNOSIS — F32.A DEPRESSION, UNSPECIFIED DEPRESSION TYPE: ICD-10-CM

## 2019-06-06 DIAGNOSIS — M79.674 GREAT TOE PAIN, RIGHT: ICD-10-CM

## 2019-06-06 DIAGNOSIS — E66.01 MORBID OBESITY WITH BODY MASS INDEX (BMI) OF 50.0 TO 59.9 IN ADULT: ICD-10-CM

## 2019-06-06 DIAGNOSIS — M79.674 GREAT TOE PAIN, RIGHT: Primary | ICD-10-CM

## 2019-06-06 LAB
IMMEDIATE ARM BP: 121 MMHG
IMMEDIATE LEFT ABI: 1.02
IMMEDIATE LEFT TIBIAL: 123 MMHG
IMMEDIATE RIGHT ABI: 1.05
IMMEDIATE RIGHT TIBIAL: 127 MMHG
LEFT ABI: 1.34
LEFT ARM BP: 111 MMHG
LEFT DORSALIS PEDIS: 155 MMHG
LEFT POSTERIOR TIBIAL: 149 MMHG
LEFT TBI: 2.19
LEFT TOE PRESSURE: 254 MMHG
RIGHT ABI: 1.36
RIGHT ARM BP: 116 MMHG
RIGHT DORSALIS PEDIS: 152 MMHG
RIGHT POSTERIOR TIBIAL: 158 MMHG
RIGHT TBI: 2.19
RIGHT TOE PRESSURE: 254 MMHG
TOE RAISES: 44

## 2019-06-06 PROCEDURE — 93005 ELECTROCARDIOGRAM TRACING: CPT

## 2019-06-06 PROCEDURE — 93924 CV US ANKLE BRACHIAL INDICES W STRESS W TOE TRACINGS (CUPID ONLY): ICD-10-PCS | Mod: 26,,, | Performed by: INTERNAL MEDICINE

## 2019-06-06 PROCEDURE — 93924 LWR XTR VASC STDY BILAT: CPT | Mod: 26,,, | Performed by: INTERNAL MEDICINE

## 2019-06-06 PROCEDURE — 93010 EKG 12-LEAD: ICD-10-PCS | Mod: ,,, | Performed by: INTERNAL MEDICINE

## 2019-06-06 PROCEDURE — 71046 X-RAY EXAM CHEST 2 VIEWS: CPT | Mod: 26,,, | Performed by: RADIOLOGY

## 2019-06-06 PROCEDURE — 71046 XR CHEST PA AND LATERAL: ICD-10-PCS | Mod: 26,,, | Performed by: RADIOLOGY

## 2019-06-06 PROCEDURE — 71046 X-RAY EXAM CHEST 2 VIEWS: CPT | Mod: TC,FY

## 2019-06-06 PROCEDURE — 93924 LWR XTR VASC STDY BILAT: CPT | Mod: 50

## 2019-06-06 PROCEDURE — 93010 ELECTROCARDIOGRAM REPORT: CPT | Mod: ,,, | Performed by: INTERNAL MEDICINE

## 2019-06-06 NOTE — TELEPHONE ENCOUNTER
----- Message from Nahomi Elise DPM sent at 6/6/2019  2:17 PM CDT -----  Hi could you please contact patient regarding her abnormal OLIVER result. Also could you set her up with a vascular surgery appt. Referral placed. Thanks.

## 2019-06-06 NOTE — PROGRESS NOTES
Seminar date added  Nutrition consult date added    Date: 05- 11:24:59  Patient's Name: Zaira Dowell  YOB: 1970 Email: [email or web address]  Phone: [phone]   Patient Address: [address] [place] [address]  Preferred Surgical Location: [facility] [place]   I certify that I am 18 years of age or older: Yes   Confirmation Code: [id number]  Verification of Bariatric Seminar: yes  Insurance Information  Insurance or Self Pay? Insurance - Fill out fields below  Insurance Company Name: Medicare   Type of Coverage/Coverage Plan (i.e. PPO, HMO): Part A and Part B   Group Name:   Subscriber Name:   Member Name (patient's name): Zaira Dowell   Member ID/Policy #:[id number]   Plan Effective Date: 02/01/2015  Card Issuance date: 02/01/2015

## 2019-06-06 NOTE — TELEPHONE ENCOUNTER
Spoke to pt about abnormal pablo and vascular appt is 7/11/19 at 11 am and also put pt on waiting list.

## 2019-06-08 LAB — H PYLORI IGG SERPL QL IA: ABNORMAL

## 2019-06-10 ENCOUNTER — INITIAL CONSULT (OUTPATIENT)
Dept: VASCULAR SURGERY | Facility: CLINIC | Age: 49
End: 2019-06-10
Payer: MEDICARE

## 2019-06-10 ENCOUNTER — TELEPHONE (OUTPATIENT)
Dept: GASTROENTEROLOGY | Facility: CLINIC | Age: 49
End: 2019-06-10

## 2019-06-10 VITALS
HEIGHT: 67 IN | BODY MASS INDEX: 45.99 KG/M2 | SYSTOLIC BLOOD PRESSURE: 152 MMHG | HEART RATE: 95 BPM | DIASTOLIC BLOOD PRESSURE: 90 MMHG | WEIGHT: 293 LBS

## 2019-06-10 DIAGNOSIS — I83.90 SPIDER VEIN OF LOWER EXTREMITY: Primary | ICD-10-CM

## 2019-06-10 DIAGNOSIS — R60.0 BILATERAL LEG EDEMA: ICD-10-CM

## 2019-06-10 PROCEDURE — 99999 PR PBB SHADOW E&M-EST. PATIENT-LVL III: ICD-10-PCS | Mod: PBBFAC,,, | Performed by: SURGERY

## 2019-06-10 PROCEDURE — 99213 OFFICE O/P EST LOW 20 MIN: CPT | Mod: PBBFAC | Performed by: SURGERY

## 2019-06-10 PROCEDURE — 99204 OFFICE O/P NEW MOD 45 MIN: CPT | Mod: S$PBB,,, | Performed by: SURGERY

## 2019-06-10 PROCEDURE — 99999 PR PBB SHADOW E&M-EST. PATIENT-LVL III: CPT | Mod: PBBFAC,,, | Performed by: SURGERY

## 2019-06-10 PROCEDURE — 99204 PR OFFICE/OUTPT VISIT, NEW, LEVL IV, 45-59 MIN: ICD-10-PCS | Mod: S$PBB,,, | Performed by: SURGERY

## 2019-06-10 NOTE — TELEPHONE ENCOUNTER
----- Message from Michelle Mc MD sent at 6/3/2019  8:33 PM CDT -----  Gastric polyps benign. Ok for gastric bypass surgery from my standpoint. If still with significant GI symptoms I'm happy to f/u in GI clinic. Esophageal bx negative

## 2019-06-10 NOTE — PROGRESS NOTES
Luis Wolf MD [facility]                         06/10/2019    HPI:  Zaira Dowell is a 48 y.o. female with   Patient Active Problem List   Diagnosis    Fibromyalgia    Pulmonary hypertension    Anxiety    Depression    Bipolar 1 disorder    Chronic idiopathic gout involving toe of left foot without tophus    Tobacco abuse    Morbid obesity    SHARON (obstructive sleep apnea)    Trochanteric bursitis of both hips    Trochanteric bursitis    Pain of both hip joints    GERD (gastroesophageal reflux disease)    Pseudotumor cerebri    being managed by PCP and specialists who is here today for evaluation of abnormal vascular studies and concern for PVD.  States recently had a R great toe ingrown toenail treatment by Dr. Elise.  Decreased pulses for which exercise OLIVER were obtained with abnormal results.  Referred here for further mgmt.  Patient states location is R great ingrown toenail and L calf pain occurring for a few weeks to months.  Associated signs and symptoms include leg edema and varicose veins BLE.  Quality is dull/aching in calf and severity is 10/10 at worst.  Symptoms began a few months ago.  Alleviating factors include massage.  Worsening factors include sitting.  No claudication, rest pain or tissue loss.    no MI  no Stroke  Tobacco use: quit 10/2018, 1-1.5 ppd x 20 yrs    Past Medical History:   Diagnosis Date    Anxiety     Arthritis     Asthma     Depression     Fibromyalgia     GERD (gastroesophageal reflux disease)     Obesity     SHARON (obstructive sleep apnea)      Past Surgical History:   Procedure Laterality Date    CARPAL TUNNEL RELEASE      CHOLECYSTECTOMY      EGD (ESOPHAGOGASTRODUODENOSCOPY) N/A 5/21/2019    Performed by Michelle Mc [facility] at [facility]    HYSTERECTOMY      partial     KNEE SURGERY      pinched nerve      SHOULDER SURGERY      TONSILLECTOMY      WRIST SURGERY      had ganlin cyst      Family History   Problem Relation Age of Onset    Diabetes Mother     Cimarron's disease Mother     Hypertension Mother     Heart disease Father     Stroke Father     Mental illness Father     Hypertension Father     Diabetes Father     Depression Father     Heart disease Maternal Grandmother     Depression Maternal Grandmother     COPD Maternal Grandfather     Heart disease Maternal Grandfather     Stroke Maternal Grandfather     Kidney disease Paternal Grandmother     Heart disease Paternal Grandmother     Heart disease Paternal Grandfather     No Known Problems Brother     Hypertension Son      Social History     Socioeconomic History    Marital status:      Spouse name: Not on file    Number of children: Not on file    Years of education: Not on file    Highest education level: Not on file   Occupational History    Not on file   Social Needs    Financial resource strain: Not very hard    Food insecurity:     Worry: Never true     Inability: Never true    Transportation needs:     Medical: No     Non-medical: No   Tobacco Use    Smoking status: Former Smoker     Types: Vaping with nicotine    Smokeless tobacco: Never Used    Tobacco comment: 1/15/19 vaping with 12% nicotine. Smoking cessation refused will quit cold turkey.   Substance and Sexual Activity    Alcohol use: No     Frequency: Monthly or less     Drinks per session: 1 or 2     Binge frequency: Never    Drug use: No    Sexual activity: Yes     Partners: Male   Lifestyle    Physical activity:     Days per week: 1 day     Minutes per session: 30 min    Stress: Rather much   Relationships    Social connections:     Talks on phone: More than three times a week     Gets together: Patient refused     Attends Yarsanism service: Not on file     Active member of club or organization: No     Attends meetings of clubs or organizations: Never     Relationship status:    Other Topics Concern    Not on file    Social History Narrative    Not on file       Current Outpatient Medications:     acetaZOLAMIDE (DIAMOX) 250 MG tablet, 2 (two) times daily. , Disp: , Rfl:     albuterol (PROVENTIL/VENTOLIN HFA) 90 mcg/actuation inhaler, Inhale 2 puffs into the lungs every 6 (six) hours as needed for Wheezing. Rescue, Disp: 18 g, Rfl: 2    ergocalciferol (ERGOCALCIFEROL) 50,000 unit Cap, Take 1 capsule (50,000 Units total) by mouth twice a week., Disp: 24 capsule, Rfl: 0    FLUoxetine 20 MG capsule, Take 3 capsules (60 mg total) by mouth once daily., Disp: 90 capsule, Rfl: 1    fluticasone propionate (FLONASE) 50 mcg/actuation nasal spray, 1 spray (50 mcg total) by Each Nare route once daily., Disp: 1 Bottle, Rfl: 0    furosemide (LASIX) 40 MG tablet, Take 40 mg by mouth 2 (two) times daily., Disp: , Rfl:     gabapentin (NEURONTIN) 600 MG tablet, Take 1 tablet (600 mg total) by mouth 2 (two) times daily., Disp: 60 tablet, Rfl: 2    gabapentin (NEURONTIN) 800 MG tablet, Take 1 tablet (800 mg total) by mouth once daily., Disp: 90 tablet, Rfl: 2    ketoconazole (NIZORAL) 2 % cream, Apply topically once daily., Disp: 1 Tube, Rfl: 3    lithium (ESKALITH) 300 MG capsule, Take 1 capsule (300 mg total) by mouth 3 (three) times daily with meals., Disp: 270 capsule, Rfl: 1    loratadine (CLARITIN) 10 mg tablet, , Disp: , Rfl: 0    LORazepam (ATIVAN) 1 MG tablet, Take 1 tablet (1 mg total) by mouth as needed for Anxiety., Disp: 30 tablet, Rfl: 2    meclizine (ANTIVERT) 25 mg tablet, Take 1 tablet (25 mg total) by mouth 3 (three) times daily as needed for Dizziness or Nausea., Disp: 30 tablet, Rfl: 0    methocarbamol (ROBAXIN) 750 MG Tab, Take 750 mg by mouth 4 (four) times daily. , Disp: , Rfl:     nabumetone (RELAFEN) 750 MG tablet, Take 1 tablet (750 mg total) by mouth 2 (two) times daily., Disp: 180 tablet, Rfl: 2    omeprazole (PRILOSEC) 20 MG capsule, Take 1 capsule (20 mg total) by mouth 2 (two) times daily., Disp: 90  capsule, Rfl: 1    ondansetron (ZOFRAN) 4 MG tablet, , Disp: , Rfl: 0    oxybutynin (DITROPAN) 5 MG Tab, Take 1 tablet (5 mg total) by mouth 3 (three) times daily., Disp: 90 tablet, Rfl: 2    phentermine (ADIPEX-P) 37.5 mg tablet, Take 37.5 mg by mouth before breakfast., Disp: , Rfl:     potassium chloride (KLOR-CON) 10 MEQ TbSR, Take 10 mEq by mouth 2 (two) times daily., Disp: , Rfl:     risperiDONE (RISPERDAL) 0.5 MG Tab, Take 1 tablet (0.5 mg total) by mouth 2 (two) times daily., Disp: 180 tablet, Rfl: 0    topiramate (TOPAMAX) 200 MG Tab, Take 1 tablet (200 mg total) by mouth once daily., Disp: 90 tablet, Rfl: 1    REVIEW OF SYSTEMS:  General: No fevers or chills; ENT: No sore throat; Allergy and Immunology: no persistent infections; Hematological and Lymphatic: No history of bleeding or easy bruising; Endocrine: negative; Respiratory: no cough, shortness of breath, or wheezing; Cardiovascular: no chest pain or dyspnea on exertion; Gastrointestinal: no abdominal pain/back, change in bowel habits, or bloody stools; Genito-Urinary: no dysuria, trouble voiding, or hematuria; Musculoskeletal: negative; Neurological: no TIA or stroke symptoms; Psychiatric: no nervousness, anxiety or depression.    PHYSICAL EXAM:      Pulse: 95         General appearance:  Alert, well-appearing, and in no distress.  Oriented to person, place, and time                    Neurological: Normal speech, no focal findings noted; CN II - XII grossly intact. RLE with sensation to light touch, LLE with sensation to light touch.            Musculoskeletal: Digits/nail without cyanosis/clubbing.  Strength 5/5 BLE.                    Neck: Supple, no significant adenopathy, no carotid bruit can be auscultated                  Chest:  Clear to auscultation, no wheezes, rales or rhonchi, symmetric air entry. No use of accessory muscles               Cardiac: Normal rate and regular rhythm, S1 and S2 normal            Abdomen: Soft, nontender,  nondistended, no masses or organomegaly, no hernia     No rebound tenderness noted; bowel sounds normal     Pulsatile aortic mass is non palpable.     No groin adenopathy      Extremities:   2+ R femoral pulse, 2+ L femoral pulse     2+ R popliteal pulse, 2+ L popliteal pulse     2+ R PT pulse, 2+ L PT pulse     2+ R DP pulse, 2+ L DP pulse     2+ RLE edema, 2+ LLE edema    Skin: RLE without tissue loss; LLE without tissue loss    LAB RESULTS:  No results found for: CBC  No results found for: LABPROT, INR  Lab Results   Component Value Date     06/05/2019    K 4.1 06/05/2019     (H) 06/05/2019    CO2 23 06/05/2019    GLU 91 06/05/2019    BUN 10 06/05/2019    CREATININE 0.8 06/05/2019    CALCIUM 9.8 06/05/2019    ANIONGAP 6 (L) 06/05/2019    EGFRNONAA >60.0 06/05/2019     Lab Results   Component Value Date    WBC 7.07 06/05/2019    RBC 4.57 06/05/2019    HGB 14.0 06/05/2019    HCT 44.3 06/05/2019    MCV 97 06/05/2019    MCH 30.6 06/05/2019    MCHC 31.6 (L) 06/05/2019    RDW 12.7 06/05/2019     06/05/2019    MPV 9.3 06/05/2019    GRAN 4.4 06/05/2019    GRAN 62.0 06/05/2019    LYMPH 2.0 06/05/2019    LYMPH 28.4 06/05/2019    MONO 0.4 06/05/2019    MONO 6.1 06/05/2019    EOS 0.2 06/05/2019    BASO 0.06 06/05/2019    EOSINOPHIL 2.4 06/05/2019    BASOPHIL 0.8 06/05/2019    DIFFMETHOD Automated 06/05/2019     .  Lab Results   Component Value Date    HGBA1C 5.1 06/05/2019       IMAGING:  All pertinent imaging has been reviewed and interpreted independently.    OLIVER 1.36/1.34, normal waveforms at rest; Exercise OLIVER 1.05/1.02    IMP/PLAN:  48 y.o. female with   Patient Active Problem List   Diagnosis    Fibromyalgia    Pulmonary hypertension    Anxiety    Depression    Bipolar 1 disorder    Chronic idiopathic gout involving toe of left foot without tophus    Tobacco abuse    Morbid obesity    SHARON (obstructive sleep apnea)    Trochanteric bursitis of both hips    Trochanteric bursitis    Pain of  both hip joints    GERD (gastroesophageal reflux disease)    Pseudotumor cerebri    being managed by PCP and specialists who is here today for evaluation of PVD.    -BLE edema - recommend compression with Rx stockings, elevation, dietary changes associated with water and sodium intake discussed at length with patient; not interested in evaluation for EVLT as symptoms not terribly bothersome  -OLIVER normal at rest, decreased with exercise although >1 bilaterally without claudication or CLI, toenail fully treated by Dr. Elise - rec heart healthy lifestyle  -Cont smoking cessation  -RTC prn      I spent 20 minutes evaluating this patient and greater than 50% of the time was spent counseling, coordinator care and discussing the plan of care.  All questions were answered and patient stated understanding with agreement with the above treatment plan.    Luis Wolf MD [facility]  Vascular and Endovascular Surgery

## 2019-06-10 NOTE — PATIENT INSTRUCTIONS
Putting on Compression Stockings     Turn the stocking inside-out, then fit it over your toes and heel.          Roll the stocking up your leg.            Once stockings are on, make sure the top of the stocking is about two fingers width below the crease of the knee (or the groin if you wear thigh-high stockings).          Use equipment, such as a stocking ap, or wear rubber gloves to make it easier to put on compression stockings.         Elastic compression stockings are prescribed to treat many vein problems. Wearing them may be the most important thing you do to manage your symptoms. The stockings fit tightly around your ankle, gradually reducing in pressure as they go up your legs. This helps keep blood flowing to your heart. As a result, swelling is reduced. Your healthcare provider will prescribe stockings at a safe pressure for you. He or she will also tell you how often to wear and remove the stockings. Follow these instructions closely. Also, do not buy or wear compression stockings without first seeing your healthcare provider.  Tips for wear and care  To wear stockings safely and to get the most benefit:  · Wear the length prescribed by your healthcare provider.  · Pull them to the designated height and no farther. Dont let them bunch at the top. This can restrict blood flow and increase swelling.  · Wear the stockings for the amount of time your healthcare provider recommends. Replace them when they start to feel loose. This will likely be every 3 to 6 months.  · Remove them as your healthcare provider directs. When removed, wash your legs. Then check your legs and feet for sores. Call your healthcare provider if you find a sore. Dont put the stockings back on unless your healthcare provider directs.   · Wash the stockings as instructed. They may need to be hand-washed.  Date Last Reviewed: 5/1/2016  © 7896-4005 [employer]. [address] [place] [address], [place] [address]. All rights  reserved. This information is not intended as a substitute for professional medical care. Always follow your healthcare professional's instructions.        Tips for Using Less Salt    Most people with heart problems need to eat less salt (sodium). Reducing the amount of salt you eat may help control your blood pressure. The higher your blood pressure, the greater your risk for heart disease, stroke, blindness, and kidney problems.  At the store  · Make low-salt choices by reading labels carefully. Look for the total amount of sodium per serving.  · Use more fresh food. Buy more fruits and vegetables. Select lean meats, fish, and poultry.  · Use fewer frozen, canned, and packaged foods which often contain a lot of sodium.  · Use plain frozen vegetables without sauces or toppings. These products are often low- or no-sodium.  · Opt for reduced-sodium or no-salt-added versions of canned vegetables and soups.  In the kitchen  · Don't add salt to food when you're cooking. Season with flavorings such as onion, garlic, pepper, salt-free herbal blends, and lemon or lime juice.  · Use a cookbook containing low-salt recipes. It can give you ideas for tasty meals that are healthy for your heart.  · Sprinkle salt-free herbal blends on vegetables and meat.  · Drain and rinse canned foods, such as canned beans and vegetables, before cooking or eating.  Eating out  · Tell the  you're on a low-salt diet. Ask questions about the menu.  · Order fish, chicken, and meat broiled, baked, poached, or grilled without salt, butter, or breading.  · Use lemon, pepper, and salt-free herb mixes to add flavor.  · Choose plain steamed rice, boiled noodles, and baked or boiled potatoes. Top potatoes with chives and a little sour cream.     Beware! Salt goes by many other names. Limit foods with these words listed as ingredients: salt, sodium, soy sauce, baking soda, baking powder, MSG, monosodium, Na (the chemical symbol for sodium). Some  antacids are also high in salt.   Date Last Reviewed: 6/19/2015  © 5095-4795 [employer]. [address] [place] [address], [place] [address]. All rights reserved. This information is not intended as a substitute for professional medical care. Always follow your healthcare professional's instructions.        Low-Salt Diet  This diet removes foods that are high in salt. It also limits the amount of salt you use when cooking. It is most often used for people with high blood pressure, edema (fluid retention), and kidney, liver, or heart disease.  Table salt contains the mineral sodium. Your body needs sodium to work normally. But too much sodium can make your health problems worse. Your healthcare provider is recommending a low-salt (also called low-sodium) diet for you. Your total daily allowance of salt is 1,500 to 2,300 milligrams (mg). It is less than 1 teaspoon of table salt. This means you can have only about 500 to 700 mg of sodium at each meal. People with certain health problems should limit salt intake to the lower end of the recommended range.    When you cook, dont add much salt. If you can cook without using salt, even better. Dont add salt to your food at the table.  When shopping, read food labels. Salt is often called sodium on the label. Choose foods that are salt-free, low salt, or very low salt. Note that foods with reduced salt may not lower your salt intake enough.    Beans, potatoes, and pasta  Ok: Dry beans, split peas, lentils, potatoes, rice, macaroni, pasta, spaghetti without added salt  Avoid: Potato chips, tortilla chips, and similar products  Breads and cereals  Ok: Low-sodium breads, rolls, cereals, and cakes; low-salt crackers, matzo crackers  Avoid: Salted crackers, pretzels, popcorn, Malagasy toast, pancakes, muffins  Dairy  Ok: Milk, chocolate milk, hot chocolate mix, low-salt cheeses, and yogurt  Avoid: Processed cheese and cheese spreads; Roquefort, Camembert, and cottage cheese;  buttermilk, instant breakfast drink  Desserts  Ok: Ice cream, frozen yogurt, juice bars, gelatin, cookies and pies, sugar, honey, jelly, hard candy  Avoid: Most pies, cakes and cookies prepared or processed with salt; instant pudding  Drinks  Ok: Tea, coffee, fizzy (carbonated) drinks, juices  Avoid: Flavored coffees, electrolyte replacement drinks, sports drinks  Meats  Ok: All fresh meat, fish, poultry, low-salt tuna, eggs, egg substitute  Avoid: Smoked, pickled, brine-cured, or salted meats and fish. This includes chin, chipped beef, corned beef, hot dogs, deli meats, ham, kosher meats, salt pork, sausage, canned tuna, salted codfish, smoked salmon, herring, sardines, or anchovies.  Seasonings and spices  Ok: Most seasonings are okay. Good substitutes for salt include: fresh herb blends, hot sauce, lemon, garlic, madera, vinegar, dry mustard, parsley, cilantro, horseradish, tomato paste, regular margarine, mayonnaise, unsalted butter, cream cheese, vegetable oil, cream, low-salt salad dressing and gravy.  Avoid: Regular ketchup, relishes, pickles, soy sauce, teriyaki sauce, Worcestershire sauce, BBQ sauce, tartar sauce, meat tenderizer, chili sauce, regular gravy, regular salad dressing, salted butter  Soups  Ok: Low-salt soups and broths made with allowed foods  Avoid: Bouillon cubes, soups with smoked or salted meats, regular soup and broth  Vegetables  Ok: Most vegetables are okay; also low-salt tomato and vegetable juices  Avoid: Sauerkraut and other brine-soaked vegetables; pickles and other pickled vegetables; tomato juice, olives  Date Last Reviewed: 8/1/2016  © 4336-1826 [employer]. [address] [place] [address]. All rights reserved. This information is not intended as a substitute for professional medical care. Always follow your healthcare professional's instructions.        Low-Salt Choices  Eating salt (sodium) can make your body retain too much water. Excess water makes  your heart work harder. Canned, packaged, and frozen foods are easy to prepare, but they are often high in sodium. Here are some ideas for low-salt foods you can easily prepare yourself.    For breakfast  · Fruit or 100% fruit juice  · Whole-wheat bread or an English muffin. Compare sodium content on labels.  · Low-fat milk or yogurt  · Unsalted eggs  · Shredded wheat  · Corn tortillas  · Unsalted steamed rice  · Regular (not instant) hot cereal, made without salt  Stay away from:  · Sausage, chin, and ham  · Flour tortillas  · Packaged muffins, pancakes, and biscuits  · Instant hot cereals  · Cottage cheese  For lunch and dinner  · Fresh fish, chicken, turkey, or meat--baked, broiled, or roasted without salt  · Dry beans, cooked without salt  · Tofu, stir-fried without salt  · Unsalted fresh fruit and vegetables, or frozen or canned fruit and vegetables with no added salt  Stay away from:  · Lunch or deli meat that is cured or smoked  · Cheese  · Tomato juice and catsup  · Canned vegetables, soups, and fish not labeled as no-salt-added or reduced sodium  · Packaged gravies and sauces  · Olives, pickles, and relish  · Bottled salad dressings  For snacks and desserts  · Yogurt  · Unsalted, air popped popcorn  · Unsalted nuts or seeds  Stay away from:  · Pies and cakes  · Packaged dessert mixes  · Pizza  · Canned and packaged puddings  · Pretzels, chips, crackers, and nuts--unless the label says unsalted  Date Last Reviewed: 6/17/2015  © 2004-0976 [employer]. [address] [place] [address], [place] [address]. All rights reserved. This information is not intended as a substitute for professional medical care. Always follow your healthcare professional's instructions.

## 2019-06-10 NOTE — LETTER
Florencia 10, 2019      Nahomi Elise, DPGAIL  [address]  [place] [address]           [facility]  [address] [facility] [address]  [place] [address]  Phone: [phone]  Fax: [phone]          Patient: Zaira Dowell   MR Number: [id number]   YOB: 1970   Date of Visit: 6/10/2019       Dear Dr. Nahomi Elise:    Thank you for referring Zaira Dowell to me for evaluation. Attached you will find relevant portions of my assessment and plan of care.    If you have questions, please do not hesitate to call me. I look forward to following Zaira Dowell along with you.    Sincerely,    Luis Wolf MD    Enclosure  CC:  No Recipients    If you would like to receive this communication electronically, please contact [email or web address] or [phone] to request more information on [employer] access.    For providers and/or their staff who would like to refer a patient to [facility], please contact us through our one-stop-shop provider referral line, [facility], at [phone].    If you feel you have received this communication in error or would no longer like to receive these types of communications, please e-mail [email or web address]

## 2019-06-11 LAB — VIT B1 BLD-MCNC: 67 UG/L (ref 38–122)

## 2019-06-12 ENCOUNTER — PATIENT MESSAGE (OUTPATIENT)
Dept: BARIATRICS | Facility: CLINIC | Age: 49
End: 2019-06-12

## 2019-06-12 DIAGNOSIS — A04.8 POSITIVE H. PYLORI TEST: Primary | ICD-10-CM

## 2019-06-12 RX ORDER — AMOXICILLIN 500 MG/1
1000 TABLET, FILM COATED ORAL EVERY 12 HOURS
Qty: 56 TABLET | Refills: 0 | Status: SHIPPED | OUTPATIENT
Start: 2019-06-12 | End: 2019-06-14 | Stop reason: SDUPTHER

## 2019-06-12 RX ORDER — OMEPRAZOLE 20 MG/1
20 CAPSULE, DELAYED RELEASE ORAL 2 TIMES DAILY
Qty: 28 CAPSULE | Refills: 0 | Status: SHIPPED | OUTPATIENT
Start: 2019-06-12 | End: 2019-06-14 | Stop reason: SDUPTHER

## 2019-06-12 RX ORDER — CLARITHROMYCIN 500 MG/1
500 TABLET, FILM COATED ORAL EVERY 12 HOURS
Qty: 28 TABLET | Refills: 0 | Status: SHIPPED | OUTPATIENT
Start: 2019-06-12 | End: 2019-06-14 | Stop reason: SDUPTHER

## 2019-06-12 NOTE — PROGRESS NOTES
+ H. Pylori , pt notified via [facility]. Rx sent to pharmacy.    Please make sure pt picks up prescription and takes.    Thanks,  BM

## 2019-06-14 DIAGNOSIS — A04.8 POSITIVE H. PYLORI TEST: Primary | ICD-10-CM

## 2019-06-14 RX ORDER — AMOXICILLIN 500 MG/1
1000 TABLET, FILM COATED ORAL EVERY 12 HOURS
Qty: 56 TABLET | Refills: 0 | Status: SHIPPED | OUTPATIENT
Start: 2019-06-14 | End: 2019-06-28

## 2019-06-14 RX ORDER — CLARITHROMYCIN 500 MG/1
500 TABLET, FILM COATED ORAL EVERY 12 HOURS
Qty: 28 TABLET | Refills: 0 | Status: SHIPPED | OUTPATIENT
Start: 2019-06-14 | End: 2019-06-28

## 2019-06-14 RX ORDER — OMEPRAZOLE 20 MG/1
20 CAPSULE, DELAYED RELEASE ORAL 2 TIMES DAILY
Qty: 28 CAPSULE | Refills: 0 | Status: SHIPPED | OUTPATIENT
Start: 2019-06-14 | End: 2019-06-19 | Stop reason: SDUPTHER

## 2019-06-19 ENCOUNTER — TELEPHONE (OUTPATIENT)
Dept: FAMILY MEDICINE | Facility: CLINIC | Age: 49
End: 2019-06-19

## 2019-06-19 ENCOUNTER — LAB VISIT (OUTPATIENT)
Dept: LAB | Facility: HOSPITAL | Age: 49
End: 2019-06-19
Attending: FAMILY MEDICINE
Payer: MEDICARE

## 2019-06-19 ENCOUNTER — OFFICE VISIT (OUTPATIENT)
Dept: FAMILY MEDICINE | Facility: CLINIC | Age: 49
End: 2019-06-19
Payer: MEDICARE

## 2019-06-19 VITALS
TEMPERATURE: 99 F | DIASTOLIC BLOOD PRESSURE: 82 MMHG | OXYGEN SATURATION: 96 % | HEIGHT: 67 IN | WEIGHT: 293 LBS | RESPIRATION RATE: 20 BRPM | HEART RATE: 99 BPM | SYSTOLIC BLOOD PRESSURE: 124 MMHG | BODY MASS INDEX: 45.99 KG/M2

## 2019-06-19 DIAGNOSIS — A04.8 POSITIVE H. PYLORI TEST: ICD-10-CM

## 2019-06-19 DIAGNOSIS — Z79.899 LITHIUM USE: ICD-10-CM

## 2019-06-19 DIAGNOSIS — F41.9 ANXIETY: ICD-10-CM

## 2019-06-19 DIAGNOSIS — F31.9 BIPOLAR 1 DISORDER: ICD-10-CM

## 2019-06-19 DIAGNOSIS — M79.7 FIBROMYALGIA: ICD-10-CM

## 2019-06-19 DIAGNOSIS — G43.809 OTHER MIGRAINE WITHOUT STATUS MIGRAINOSUS, NOT INTRACTABLE: ICD-10-CM

## 2019-06-19 DIAGNOSIS — R79.89 LOW VITAMIN D LEVEL: Primary | ICD-10-CM

## 2019-06-19 DIAGNOSIS — N32.89 BLADDER SPASM: Primary | ICD-10-CM

## 2019-06-19 LAB — LITHIUM SERPL-SCNC: 0.6 MMOL/L (ref 0.6–1.2)

## 2019-06-19 PROCEDURE — 99999 PR PBB SHADOW E&M-EST. PATIENT-LVL IV: CPT | Mod: PBBFAC,,, | Performed by: FAMILY MEDICINE

## 2019-06-19 PROCEDURE — 99214 OFFICE O/P EST MOD 30 MIN: CPT | Mod: PBBFAC,PO | Performed by: FAMILY MEDICINE

## 2019-06-19 PROCEDURE — 36415 COLL VENOUS BLD VENIPUNCTURE: CPT | Mod: PO

## 2019-06-19 PROCEDURE — 99214 PR OFFICE/OUTPT VISIT, EST, LEVL IV, 30-39 MIN: ICD-10-PCS | Mod: S$PBB,,, | Performed by: FAMILY MEDICINE

## 2019-06-19 PROCEDURE — 99214 OFFICE O/P EST MOD 30 MIN: CPT | Mod: S$PBB,,, | Performed by: FAMILY MEDICINE

## 2019-06-19 PROCEDURE — 80178 ASSAY OF LITHIUM: CPT

## 2019-06-19 PROCEDURE — 99999 PR PBB SHADOW E&M-EST. PATIENT-LVL IV: ICD-10-PCS | Mod: PBBFAC,,, | Performed by: FAMILY MEDICINE

## 2019-06-19 RX ORDER — OMEPRAZOLE 20 MG/1
20 CAPSULE, DELAYED RELEASE ORAL DAILY PRN
Qty: 90 CAPSULE | Refills: 1 | Status: SHIPPED | OUTPATIENT
Start: 2019-06-19 | End: 2019-07-03

## 2019-06-19 RX ORDER — TOPIRAMATE 200 MG/1
200 TABLET ORAL DAILY
Qty: 90 TABLET | Refills: 1 | Status: SHIPPED | OUTPATIENT
Start: 2019-06-19 | End: 2020-03-30 | Stop reason: SDUPTHER

## 2019-06-19 RX ORDER — RISPERIDONE 0.5 MG/1
0.5 TABLET ORAL 2 TIMES DAILY
Qty: 180 TABLET | Refills: 1 | Status: SHIPPED | OUTPATIENT
Start: 2019-06-19 | End: 2019-08-15 | Stop reason: SDUPTHER

## 2019-06-19 RX ORDER — LITHIUM CARBONATE 300 MG/1
300 CAPSULE ORAL
Qty: 270 CAPSULE | Refills: 1 | Status: CANCELLED | OUTPATIENT
Start: 2019-06-19

## 2019-06-19 RX ORDER — GABAPENTIN 600 MG/1
600 TABLET ORAL 2 TIMES DAILY
Qty: 60 TABLET | Refills: 2 | Status: SHIPPED | OUTPATIENT
Start: 2019-06-19 | End: 2020-01-03 | Stop reason: SDUPTHER

## 2019-06-19 RX ORDER — OXYBUTYNIN CHLORIDE 5 MG/1
5 TABLET ORAL 3 TIMES DAILY
Qty: 180 TABLET | Refills: 1 | Status: SHIPPED | OUTPATIENT
Start: 2019-06-19 | End: 2020-06-06 | Stop reason: SDUPTHER

## 2019-06-19 RX ORDER — LITHIUM CARBONATE 300 MG
300 TABLET ORAL 3 TIMES DAILY
Qty: 270 TABLET | Refills: 1 | Status: SHIPPED | OUTPATIENT
Start: 2019-06-19 | End: 2019-12-17 | Stop reason: SDUPTHER

## 2019-06-19 RX ORDER — FLUOXETINE HYDROCHLORIDE 20 MG/1
60 CAPSULE ORAL DAILY
Qty: 90 CAPSULE | Refills: 1 | Status: CANCELLED | OUTPATIENT
Start: 2019-06-19

## 2019-06-19 RX ORDER — FLUOXETINE 20 MG/1
60 TABLET ORAL DAILY
Qty: 270 TABLET | Refills: 1 | Status: SHIPPED | OUTPATIENT
Start: 2019-06-19 | End: 2019-06-20

## 2019-06-19 NOTE — LETTER
June 19, 2019      [facility] [address] [place] [address]  Phone: [phone]  Fax: [phone]       Patient: Zaira Dowell   YOB: 1970  Date of Visit: 06/19/2019    To Whom It May Concern:    Reese Dowell  was at [facility] on 06/19/2019. Her psychiatry medications can be crushed. If you have any questions or concerns, or if I can be of further assistance, please do not hesitate to contact me.    Sincerely,      Burak Rosales MD

## 2019-06-19 NOTE — PROGRESS NOTES
Subjective:       Patient ID: Zaira Dowell is a 48 y.o. female.    Chief Complaint: Follow-up    HPI   47 yo female presents for 3 month f/u. Pt states she needs refills on her psych meds. Pt is unable to find psych for follow. Pt has hx of anxiety and bipolar. Last lithium lvl was checked on 1/2018. Pt is going through bariatric med. States she was found to have h pylori. Pt is on abx and ppi currently. States she is being evaluated currently. Has a f/u with psych for surgery. Has lost about 8lbs. States she was told her meds need to be able to be crushed and she needs a note. Denies any other issues at this time.     Review of Systems   Constitutional: Negative for activity change and unexpected weight change.   HENT: Negative for hearing loss, rhinorrhea and trouble swallowing.    Eyes: Positive for visual disturbance. Negative for discharge.   Respiratory: Negative for chest tightness and wheezing.    Cardiovascular: Negative for chest pain and palpitations.   Gastrointestinal: Negative for blood in stool, constipation and vomiting.   Endocrine: Negative for polyuria.   Genitourinary: Negative for difficulty urinating, dysuria, hematuria and menstrual problem.   Musculoskeletal: Positive for arthralgias and neck pain. Negative for joint swelling.   Neurological: Negative.    Psychiatric/Behavioral: Positive for dysphoric mood. Negative for confusion.        Past Medical History:   Diagnosis Date    Anxiety     Arthritis     Asthma     Depression     Fibromyalgia     GERD (gastroesophageal reflux disease)     Obesity     SHARON (obstructive sleep apnea)      Past Surgical History:   Procedure Laterality Date    CARPAL TUNNEL RELEASE      CHOLECYSTECTOMY      EGD (ESOPHAGOGASTRODUODENOSCOPY) N/A 5/21/2019    Performed by Michelle Mc MD at [facility]    HYSTERECTOMY      partial     KNEE SURGERY      pinched nerve      SHOULDER SURGERY      TONSILLECTOMY      WRIST SURGERY      had ganlin  cyst     Family History   Problem Relation Age of Onset    Diabetes Mother     Jose Juan's disease Mother     Hypertension Mother     Heart disease Father     Stroke Father     Mental illness Father     Hypertension Father     Diabetes Father     Depression Father     Heart disease Maternal Grandmother     Depression Maternal Grandmother     COPD Maternal Grandfather     Heart disease Maternal Grandfather     Stroke Maternal Grandfather     Kidney disease Paternal Grandmother     Heart disease Paternal Grandmother     Heart disease Paternal Grandfather     No Known Problems Brother     Hypertension Son      Social History     Socioeconomic History    Marital status:      Spouse name: Not on file    Number of children: Not on file    Years of education: Not on file    Highest education level: Not on file   Occupational History    Not on file   Social Needs    Financial resource strain: Not very hard    Food insecurity:     Worry: Never true     Inability: Never true    Transportation needs:     Medical: No     Non-medical: No   Tobacco Use    Smoking status: Former Smoker     Types: Vaping with nicotine    Smokeless tobacco: Never Used    Tobacco comment: 1/15/19 vaping with 12% nicotine. Smoking cessation refused will quit cold turkey.   Substance and Sexual Activity    Alcohol use: No     Frequency: Monthly or less     Drinks per session: 1 or 2     Binge frequency: Never    Drug use: No    Sexual activity: Yes     Partners: Male   Lifestyle    Physical activity:     Days per week: 1 day     Minutes per session: 30 min    Stress: Rather much   Relationships    Social connections:     Talks on phone: More than three times a week     Gets together: Patient refused     Attends [employer] service: Not on file     Active member of club or organization: No     Attends meetings of clubs or organizations: Never     Relationship status:    Other Topics Concern    Not on file    Social History Narrative    Not on file        Objective:      Vitals:    06/19/19 1357   BP: 124/82   Pulse: 99   Resp: 20   Temp: 98.5 °F (36.9 °C)     Physical Exam   Constitutional: She is oriented to person, place, and time. No distress.   HENT:   Head: Normocephalic and atraumatic.   Eyes: Conjunctivae are normal.   Neck: Neck supple.   Cardiovascular: Normal rate, regular rhythm and normal heart sounds. Exam reveals no gallop and no friction rub.   No murmur heard.  Pulmonary/Chest: Effort normal and breath sounds normal. She has no wheezes. She has no rales.   Neurological: She is alert and oriented to person, place, and time.   Skin: Skin is warm and dry.   Psychiatric: She has a normal mood and affect. Her behavior is normal. Judgment and thought content normal.        Assessment:       1. Bladder spasm    2. Bipolar 1 disorder    3. Positive H. pylori test    4. Fibromyalgia    5. Other migraine without status migrainosus, not intractable    6. Anxiety    7. Lithium use        Plan:       Bladder spasm  -     oxybutynin (DITROPAN) 5 MG Tab; Take 1 tablet (5 mg total) by mouth 3 (three) times daily.  Dispense: 180 tablet; Refill: 1    Bipolar 1 disorder  -     risperiDONE (RISPERDAL) 0.5 MG Tab; Take 1 tablet (0.5 mg total) by mouth 2 (two) times daily.  Dispense: 180 tablet; Refill: 1  -     lithium (LITHOTAB) 300 mg tablet; Take 1 tablet (300 mg total) by mouth 3 (three) times daily.  Dispense: 270 tablet; Refill: 1    Positive H. pylori test  -     omeprazole (PRILOSEC) 20 MG capsule; Take 1 capsule (20 mg total) by mouth daily as needed (GERD). Take with amoxil and biaxin  Dispense: 90 capsule; Refill: 1    Fibromyalgia  -     gabapentin (NEURONTIN) 600 MG tablet; Take 1 tablet (600 mg total) by mouth 2 (two) times daily.  Dispense: 60 tablet; Refill: 2    Other migraine without status migrainosus, not intractable  -     topiramate (TOPAMAX) 200 MG Tab; Take 1 tablet (200 mg total) by mouth once  daily.  Dispense: 90 tablet; Refill: 1    Anxiety  -     FLUoxetine 20 MG tablet; Take 3 tablets (60 mg total) by mouth once daily.  Dispense: 270 tablet; Refill: 1    Lithium use  -     Lithium level; Future; Expected date: 06/19/2019    changed all meds to tabs and wrote pt a note. Pt was again advised to f/u with psych due to her psych meds. She was asked for a referral from psych for her surgery eval.            Burak Rosales MD

## 2019-06-19 NOTE — TELEPHONE ENCOUNTER
----- Message from Lucretia Steele sent at 6/19/2019  2:46 PM CDT -----  Contact: Iban  Type:  Pharmacy Calling to Clarify an RX    Name of Caller: Jennifer    Pharmacy Name: [facility]    Prescription Name: oxybutynin (DITROPAN) 5 MG Tab    What do they need to clarify? Qty of script 180 for 2 months or 270 for a 90 day supply instructions state 3 times a day    Best Call Back Number: [phone]

## 2019-06-19 NOTE — TELEPHONE ENCOUNTER
Pharmacy calling for clarification for ditropan Qty of script 180 for 2 months or 270 for a 90 day supply instructions state 3 times a day  Please advise, thank you

## 2019-06-20 ENCOUNTER — TELEPHONE (OUTPATIENT)
Dept: FAMILY MEDICINE | Facility: CLINIC | Age: 49
End: 2019-06-20

## 2019-06-20 DIAGNOSIS — F31.9 BIPOLAR 1 DISORDER: Primary | ICD-10-CM

## 2019-06-20 RX ORDER — SERTRALINE HYDROCHLORIDE 50 MG/1
50 TABLET, FILM COATED ORAL DAILY
Qty: 30 TABLET | Refills: 0 | Status: SHIPPED | OUTPATIENT
Start: 2019-06-20 | End: 2019-07-11

## 2019-06-20 NOTE — TELEPHONE ENCOUNTER
Pharmacy stated fluoxetine is not covered, they will cover citalopram, sertraline, lexapro or paroxetine . Please advise, thank you

## 2019-06-20 NOTE — TELEPHONE ENCOUNTER
Jennifer notified of instructions as noted below,       Pharmacy also inquiring if patient can have lithium capsules instead of tablets stating the patient has always gotten capsules, please advise, thank you

## 2019-06-20 NOTE — TELEPHONE ENCOUNTER
Sent zoloft. Discussed it with patient about medication. Pt was advised to stop prozac. Advised pt about w/d effects but should be minimum since pt is taking similar med in same class.

## 2019-07-02 ENCOUNTER — PATIENT MESSAGE (OUTPATIENT)
Dept: BARIATRICS | Facility: CLINIC | Age: 49
End: 2019-07-02

## 2019-07-02 ENCOUNTER — OFFICE VISIT (OUTPATIENT)
Dept: PSYCHIATRY | Facility: CLINIC | Age: 49
End: 2019-07-02
Payer: MEDICARE

## 2019-07-02 DIAGNOSIS — G93.2 PSEUDOTUMOR CEREBRI: ICD-10-CM

## 2019-07-02 DIAGNOSIS — F41.9 ANXIETY: ICD-10-CM

## 2019-07-02 DIAGNOSIS — F41.0 PANIC ATTACKS: ICD-10-CM

## 2019-07-02 DIAGNOSIS — G47.00 INSOMNIA, UNSPECIFIED TYPE: ICD-10-CM

## 2019-07-02 DIAGNOSIS — Z87.891 HISTORY OF TOBACCO ABUSE: ICD-10-CM

## 2019-07-02 DIAGNOSIS — E66.01 MORBID OBESITY DUE TO EXCESS CALORIES: ICD-10-CM

## 2019-07-02 DIAGNOSIS — I27.20 PULMONARY HYPERTENSION: ICD-10-CM

## 2019-07-02 DIAGNOSIS — F31.75 BIPOLAR AFFECTIVE DISORDER, DEPRESSED IN PARTIAL REMISSION: ICD-10-CM

## 2019-07-02 DIAGNOSIS — G47.33 OSA (OBSTRUCTIVE SLEEP APNEA): ICD-10-CM

## 2019-07-02 DIAGNOSIS — K21.9 GASTROESOPHAGEAL REFLUX DISEASE, ESOPHAGITIS PRESENCE NOT SPECIFIED: ICD-10-CM

## 2019-07-02 DIAGNOSIS — Z01.818 PREOP EXAMINATION: Primary | ICD-10-CM

## 2019-07-02 PROCEDURE — 90791 PR PSYCHIATRIC DIAGNOSTIC EVALUATION: ICD-10-PCS | Mod: ,,, | Performed by: PSYCHOLOGIST

## 2019-07-02 PROCEDURE — 96131 PSYCL TST EVAL PHYS/QHP EA: CPT | Mod: ,,, | Performed by: PSYCHOLOGIST

## 2019-07-02 PROCEDURE — 90791 PSYCH DIAGNOSTIC EVALUATION: CPT | Mod: ,,, | Performed by: PSYCHOLOGIST

## 2019-07-02 PROCEDURE — 99999 PR PBB SHADOW E&M-EST. PATIENT-LVL II: ICD-10-PCS | Mod: PBBFAC,,, | Performed by: PSYCHOLOGIST

## 2019-07-02 PROCEDURE — 99212 OFFICE O/P EST SF 10 MIN: CPT | Mod: PBBFAC | Performed by: PSYCHOLOGIST

## 2019-07-02 PROCEDURE — 96138 PR PSYCH/NEUROPSYCH TEST ADMIN/SCORING, BY TECH, 2+ TESTS, 1ST 30 MIN: ICD-10-PCS | Mod: ,,, | Performed by: PSYCHOLOGIST

## 2019-07-02 PROCEDURE — 96131 PR PSYCHOLOGIC TEST EVAL SVCS, EA ADDTL HR: ICD-10-PCS | Mod: ,,, | Performed by: PSYCHOLOGIST

## 2019-07-02 PROCEDURE — 96139 PSYCL/NRPSYC TST TECH EA: CPT | Mod: ,,, | Performed by: PSYCHOLOGIST

## 2019-07-02 PROCEDURE — 96139 PR PSYCH/NEUROPSYCH TEST ADMIN/SCORING, BY TECH, 2+ TESTS, EA ADDTL 30 MIN: ICD-10-PCS | Mod: ,,, | Performed by: PSYCHOLOGIST

## 2019-07-02 PROCEDURE — 96130 PSYCL TST EVAL PHYS/QHP 1ST: CPT | Mod: ,,, | Performed by: PSYCHOLOGIST

## 2019-07-02 PROCEDURE — 99999 PR PBB SHADOW E&M-EST. PATIENT-LVL II: CPT | Mod: PBBFAC,,, | Performed by: PSYCHOLOGIST

## 2019-07-02 PROCEDURE — 96138 PSYCL/NRPSYC TECH 1ST: CPT | Mod: ,,, | Performed by: PSYCHOLOGIST

## 2019-07-02 PROCEDURE — 96130 PR PSYCHOLOGIC TEST EVAL SVCS, 1ST HR: ICD-10-PCS | Mod: ,,, | Performed by: PSYCHOLOGIST
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
## 2019-07-02 NOTE — PSYCH TESTING
[facility] [address] [place]  [address]  [phone]    REPORT OF PSYCHOLOGICAL TESTING    NAME: Zaira Dowell  OC #: [id number]  : 1970    REFERRED BY: Ean Kohli M.D.    EVALUATED BY:  Breana Helm, Ph.D., Clinical Psychologist  NIKKIE Casey Psychometrician    DATES OF EVALUATION: 2019, 2019    EVALUATION PROCEDURES AND TIMES:  Conducted by Psychologist (2 hours, 30 minutes):  Integration of patient data, interpretation of standardized test results and clinical data, clinical decision-making, treatment planning and report, and interactive feedback to the patient  CPT Codes:  16448 - 1 hour; 18424 - 1 hour  Conducted by Technician (1 hour, 42 minutes):  Psychological test administration and scoring by technician, two or more tests, any method:  [place] Multiphasic Personality Inventory - 2 - Restructured Form (MMPI-2-RF); [facility] Behavioral Medicine Diagnostic (MBMD)  CPT Codes:  08639 - 30 minutes; 44322 - 30 minutes, 76169 - 30 minutes, 97479 - 30 minutes (3 additional units)    EVALUATION FINDINGS:  Before this evaluation was initiated, the purposes and process of the assessment and the limits of confidentiality were discussed with the patient who expressed understanding of these issues and orally consented to proceed with the evaluation.    Ms. Zaira Dowell is a 48-year-old White  female who is pursuing bariatric surgery to improve her health and quality of life.  She has a history of Bipolar Disorder, depression, anxiety, and panic attacks that appear to be somewhat managed with psychotropic medication (lithium, Zoloft, Ativan, and Risperdal).  Her symptoms currently meet criteria for Other Specified Bipolar and Related Disorder, Other Specified Anxiety Disorder, and Insomnia.  She acknowledged past passive suicidal ideation but denied active suicidal ideation and intent, suicide attempts, and non-suicidal self-injury.       Ms. Dowell has struggled with weight since childhood.  Emotional eating and unhealthy foods have contributed to her weight gain over the years.  She denied problems with late-night eating and grazing.  She is working on increasing her coping mechanisms for stress.  She has tried many weight loss methods on her own with some success and regain.  Her motivation for seeking surgery now is to improve health.      At her initial consultation with Terri Astudillo PA-C, on 06/05/2019, her BMI was 50.69.  Her medical comorbidities include: Pulmonary hypertension; Gastroesophageal reflux disease, esophagitis presence not specified; SHARON (obstructive sleep apnea); Pseudotumor cerebri; Dietary folate deficiency anemia;  Hyperglycemia;  Abnormal lead level in blood;  Anemia due to vitamin B12 deficiency, unspecified B12 deficiency type; Tobacco abuse.  She completed a nutritional consultation with Sharla Santana RD, bariatric nutritionist, and reports that she has made many changes to her diet since beginning the program.  She has a good understanding regarding the risks and benefits of the procedure and appears motivated for change.  She was fully cooperative and engaged in the assessment process.     Ms. Dowell produced an interpretable MMPI-2-RF profile.  Of note, validity scale results suggest Ms. Dowell tended to endorse items that may be considered infrequent as compared to the population.  This level of responding may occur in individuals with genuine, severe problems; however, in the absence of corroborating evidence it may indicate over-reporting or exaggeration.  This profile should be interpreted with some caution due to these issues.  Somatic/Cognitive.  Ms. Dowell reports somatic complaints including poor health, head pain, neurological symptoms, and gastrointestinal problems.  She reports cognitive complaints such as difficulties in memory and concentration.  Emotional.  Ms. Dowell reports significant  emotional distress including demoralization, depression and self-doubt.  She also experiences anxiety, stress and worry, and anger.  Overall, her profile indicates the presence of significant negative emotional experiences.  Thought.  Ms. Dowell reports persecutory ideation such as believing that others seek to harm her.  She also reports unusual thoughts and perceptions, which may include somatic delusions.  Behavioral.  Ms. Dowell reports excessive activation, and should be queried for a history of hypomania or lida.  Interpersonal.  Ms. Dowell reports conflictual family relationships, and is likely to have a lack of support from family members.  She reports some social avoidance and may not enjoy social situations.    The MBMD was marginally valid with indicators of debasement, in which Ms. Dowell may have reported more emotional symptoms than objectively exist.  Although scoring adjustments were made to correct for these tendencies, this profile should be interpreted with some caution due to these issues.  Ms. Dowell is reporting significant difficulties in managing her emotions and anxiety, which could be due to recent medical diagnoses.  She may be prone toward impulsive emotionality, and may react to medical procedures with impulsive displays.  She typically lizzie with problems by denying them, but this may result in bursts of emotionality that are not characteristic for her.  She reports a marked detachment from social support, but could benefit from a support group for low-intimacy support.  Patients with similar profiles may experience somewhat slower recoveries due to anxiety, risk for impulsive reactions, lack of social support, overeating, lack of routine exercise, and excessive caffeine and tobacco.  Compared to other patients seeking bariatric surgery, her likelihood to experience improvements and follow through on behavioral changes is low.  Test data suggests pre-surgery and post-surgery  psychological intervention to improve her chances for success with surgery.    Of note, Ms. Dowell responded to test results acknowledging a history of depression, anxiety, and activation; however, she denied current distress.  She reported, I do not feel like Im in distress now.  When queried about her elevations, she stated that she was extremely agitated and irritated when I started taking the test because she felt brushed off by the test cowart.  She felt all worked up, pissed off, upset prior to taking the tests.  She believes that this mind set may have elevated some of her responses.    DIAGNOSTIC IMPRESSIONS:    ICD-10-CM ICD-9-CM   1. Preop examination Z01.818 V72.84   2. Morbid obesity due to excess calories E66.01 278.01   3. Pulmonary hypertension I27.20 416.8   4. Gastroesophageal reflux disease, esophagitis presence not specified K21.9 530.81   5. SHARON (obstructive sleep apnea) G47.33 327.23   6. Pseudotumor cerebri G93.2 348.2   7. BMI 50.0-59.9, adult Z68.43 V85.43   8. Bipolar affective disorder, depressed in partial remission F31.75 296.55   9. Anxiety F41.9 300.00   10. Panic attacks F41.0 300.01   11. Insomnia, unspecified type G47.00 780.52   12. History of tobacco abuse Z87.891 V15.82       SUMMARY AND RECOMMENDATIONS:  Ms. Dowell has a long history of weight problems and is pursuing bariatric surgery at this time in an effort to improve her health and quality of life.  Test results revealed a tendency for Ms. Dowell to overreport symptoms, which may have inflated elevations on her profiles.  Her results indicated complaints of poor health and memory, as well as significant emotional distress (including symptoms related to depression, anxiety, hypomania/lida) and lack of social support.  She acknowledged the presence of these symptoms, but denied feeling distressed and noted her irritation during testing may have impacted her scores.      In the clinical interview, Ms. Dowell  reports a history of Bipolar Disorder, depression, anxiety, panic attacks, and insomnia.  It appears her symptoms are somewhat managed with psychotropic medication, but are not ideally controlled (she would like Zoloft increased to improve mood).  She has been encouraged to establish care with a psychiatrist due to her history, and she is scheduled to meet with Dr. Nagy on 09/06/2019.  Based on her test results and history, Ms. Dowell was provided with recommendations to establish treatment with a psychiatrist (and therapist if needed) prior to or concurrently with bariatric surgery.  Based on prior department guidelines, it is also recommended that she crush psychotropic medications for 3 months prior to clearance to ensure emotional stability.  This may be especially important since she does not believe her Zoloft has been titrated to an adequate dosage level for her at this time.  Ms. Dowell was provided with these recommendations and was notified that her case would likely be discussed at the selection committee meeting, who ultimately makes the decision whether she proceeds.

## 2019-07-02 NOTE — Clinical Note
"[facility] 3 mo. lithium, Zoloft, Ativan, Risperdal. Please see note for more info. May be cleared 10/02/19. I will send a message to Dr. Rosales. She will establish care with psychiatry on 09/06/19. Her test results indicated complaints of health and memory, as well as significant emotional distress (including symptoms related to depression, anxiety, hypomania/lida) and lack of social support. She acknowledged these symptoms, but denied feeling "distressed" and noted her irritation during testing may have impacted her scores. In the clinical interview, she reports a history of Bipolar Disorder, depression, anxiety, panic attacks, and insomnia.  It appears her symptoms are somewhat managed with psychotropic medication, but are not ideally controlled (she would like Zoloft increased to improve mood).  She was provided with these recommendations and was notified that her case would likely be discussed at the selection committee meeting, who ultimately makes the decision whether she proceeds. JR Rich."

## 2019-07-03 ENCOUNTER — OFFICE VISIT (OUTPATIENT)
Dept: PULMONOLOGY | Facility: CLINIC | Age: 49
End: 2019-07-03
Payer: MEDICARE

## 2019-07-03 VITALS
DIASTOLIC BLOOD PRESSURE: 80 MMHG | HEART RATE: 73 BPM | SYSTOLIC BLOOD PRESSURE: 134 MMHG | WEIGHT: 293 LBS | HEIGHT: 67 IN | OXYGEN SATURATION: 98 % | BODY MASS INDEX: 45.99 KG/M2

## 2019-07-03 DIAGNOSIS — E66.01 MORBID OBESITY: ICD-10-CM

## 2019-07-03 DIAGNOSIS — G47.33 OSA (OBSTRUCTIVE SLEEP APNEA): Primary | ICD-10-CM

## 2019-07-03 DIAGNOSIS — J45.20 MILD INTERMITTENT ASTHMA WITHOUT COMPLICATION: ICD-10-CM

## 2019-07-03 PROCEDURE — 99999 PR PBB SHADOW E&M-EST. PATIENT-LVL IV: ICD-10-PCS | Mod: PBBFAC,,, | Performed by: INTERNAL MEDICINE

## 2019-07-03 PROCEDURE — 99214 PR OFFICE/OUTPT VISIT, EST, LEVL IV, 30-39 MIN: ICD-10-PCS | Mod: S$PBB,,, | Performed by: INTERNAL MEDICINE

## 2019-07-03 PROCEDURE — 99214 OFFICE O/P EST MOD 30 MIN: CPT | Mod: S$PBB,,, | Performed by: INTERNAL MEDICINE

## 2019-07-03 PROCEDURE — 99214 OFFICE O/P EST MOD 30 MIN: CPT | Mod: PBBFAC | Performed by: INTERNAL MEDICINE

## 2019-07-03 PROCEDURE — 99999 PR PBB SHADOW E&M-EST. PATIENT-LVL IV: CPT | Mod: PBBFAC,,, | Performed by: INTERNAL MEDICINE

## 2019-07-03 NOTE — ASSESSMENT & PLAN NOTE
improve with smoking cessation.  Optimize nasal congestion. Unable to get coverage for advair.  Baseline pft.

## 2019-07-03 NOTE — ASSESSMENT & PLAN NOTE
unable to obtained record of sleep study (records from outside reviewed).  Interrogation of cpap confirmed pressure of 7-15.  Poor compliance with apap.  Low residual ahi when using apap.  Compliance requirement d/w patient.  Poor compliance may relate to motivation vs claustrophobia.  Patient endorse pulling mask off in the middle of the night.  Will switch to more secure mask.  Desensitization protocol d/w patient.  May consider requalification and retitration if sleep does not improve.

## 2019-07-03 NOTE — PROGRESS NOTES
Zaira Dowell  was seen as a follow up.    CHIEF COMPLAINT:    Chief Complaint   Patient presents with    Sleep Apnea     Cpap machine with        HISTORY OF PRESENT ILLNESS: Zaira Dowell is a 48 y.o. female is here for sleep evaluation.   Patient was diagnosed with atruro 10/17 in [place], [place].  Patient was using cpap 7 cm H20.  Patient moved to [place] 11/17.  Patient is using cpap nightly without issue.  Sleep quality much improve with cpap.      With cpap, patient denied snoring.  No witnessed apnea.  Feeling rested upon awake.  No parasomnia.  No cataplexy.  No rls symptoms.    [place] Sleepiness Scale score during initial sleep evaluation was 18.  Today's ESS 14    During last visit, patient was switched from cpap 7 cm H20 to APAP.  Patient admitted to not using apap due to bursitis requiring her to sleep in recliner.  Patient denied issue with apap.      SLEEP ROUTINE:  Activity the hour prior to sleep: tablet in bed    Bed partner:  fiprema  Time to bed:  11 pm   Lights off:  off  Sleep onset latency:  5 minutes        Disruptions or awakenings:    3-5 times     Wakeup time:      7 am   Perceived sleep quality:  rested       Daytime naps:      Rarely.    Weekend sleep routine:      same  Caffeine use: 5-6 diet Dr. 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 tophus 02/19/2018    History of tobacco abuse 07/17/2018    Morbid obesity 07/17/2018    ARTURO (obstructive sleep apnea) 07/17/2018    Trochanteric bursitis of both hips 01/10/2019    Trochanteric bursitis 01/21/2019    Pain of both hip joints 01/21/2019    GERD (gastroesophageal reflux disease) 05/21/2019    Pseudotumor cerebri 05/23/2019    Mild intermittent  asthma without complication 07/03/2019     Resolved Ambulatory Problems     Diagnosis Date Noted    No Resolved Ambulatory Problems     Past Medical History:   Diagnosis Date    Anxiety     Arthritis     Asthma     Depression     Fibromyalgia     GERD (gastroesophageal reflux disease)     Obesity     SHARON (obstructive sleep apnea)                 PAST SURGICAL HISTORY:    Past Surgical History:   Procedure Laterality Date    CARPAL TUNNEL RELEASE      CHOLECYSTECTOMY      EGD (ESOPHAGOGASTRODUODENOSCOPY) N/A 5/21/2019    Per[facility]    HYSTERECTOMY      partial     KNEE SURGERY      pinched nerve      SHOULDER SURGERY      TONSILLECTOMY      WRIST SURGERY      had ganlin cyst         FAMILY HISTORY:                Family History   Problem Relation Age of Onset    Diabetes Mother     Vanderburgh's disease Mother     Hypertension Mother     Heart disease Father     Stroke Father     Mental illness Father     Hypertension Father     Diabetes Father     Depression Father     Heart disease Maternal Grandmother     Depression Maternal Grandmother     COPD Maternal Grandfather     Heart disease Maternal Grandfather     Stroke Maternal Grandfather     Kidney disease Paternal Grandmother     Heart disease Paternal Grandmother     Heart disease Paternal Grandfather     No Known Problems Brother     Hypertension Son        SOCIAL HISTORY:          Tobacco:   Social History     Tobacco Use   Smoking Status Former Smoker    Types: Vaping with nicotine   Smokeless Tobacco Never Used   Tobacco Comment    1/15/19 vaping with 12% nicotine. Smoking cessation refused will quit cold turkey.       alcohol use:    Social History     Substance and Sexual Activity   Alcohol Use No    Frequency: Monthly or less    Drinks per session: 1 or 2    Binge frequency: Never                 Occupation:  disable    ALLERGIES:    Review of patient's allergies indicates:   Allergen  Reactions    Vicodin [hydrocodone-acetaminophen] Nausea And Vomiting       CURRENT MEDICATIONS:    Current Outpatient Medications   Medication Sig Dispense Refill    acetaZOLAMIDE (DIAMOX) 250 MG tablet 2 (two) times daily.       albuterol (PROVENTIL/VENTOLIN HFA) 90 mcg/actuation inhaler Inhale 2 puffs into the lungs every 6 (six) hours as needed for Wheezing. Rescue 18 g 2    ergocalciferol (ERGOCALCIFEROL) 50,000 unit Cap Take 1 capsule (50,000 Units total) by mouth twice a week. 24 capsule 0    fluticasone propionate (FLONASE) 50 mcg/actuation nasal spray 1 spray (50 mcg total) by Each Nare route once daily. 1 Bottle 0    furosemide (LASIX) 40 MG tablet Take 40 mg by mouth 2 (two) times daily.      gabapentin (NEURONTIN) 600 MG tablet Take 1 tablet (600 mg total) by mouth 2 (two) times daily. 60 tablet 2    gabapentin (NEURONTIN) 800 MG tablet Take 1 tablet (800 mg total) by mouth once daily. 90 tablet 2    ketoconazole (NIZORAL) 2 % cream Apply topically once daily. 1 Tube 3    lithium (ESKALITH) 300 MG capsule Take 1 capsule (300 mg total) by mouth 3 (three) times daily with meals. 270 capsule 1    lithium (LITHOTAB) 300 mg tablet Take 1 tablet (300 mg total) by mouth 3 (three) times daily. 270 tablet 1    loratadine (CLARITIN) 10 mg tablet   0    LORazepam (ATIVAN) 1 MG tablet Take 1 tablet (1 mg total) by mouth as needed for Anxiety. 30 tablet 2    meclizine (ANTIVERT) 25 mg tablet Take 1 tablet (25 mg total) by mouth 3 (three) times daily as needed for Dizziness or Nausea. 30 tablet 0    methocarbamol (ROBAXIN) 750 MG Tab Take 750 mg by mouth 4 (four) times daily.       nabumetone (RELAFEN) 750 MG tablet Take 1 tablet (750 mg total) by mouth 2 (two) times daily. 180 tablet 2    omeprazole (PRILOSEC) 20 MG capsule Take 1 capsule (20 mg total) by mouth 2 (two) times daily. 90 capsule 1    omeprazole (PRILOSEC) 20 MG capsule Take 1 capsule (20 mg total) by mouth daily as needed (GERD). Take  "with amoxil and biaxin 90 capsule 1    ondansetron (ZOFRAN) 4 MG tablet   0    oxybutynin (DITROPAN) 5 MG Tab Take 1 tablet (5 mg total) by mouth 3 (three) times daily. 180 tablet 1    phentermine (ADIPEX-P) 37.5 mg tablet Take 37.5 mg by mouth before breakfast.      potassium chloride (KLOR-CON) 10 MEQ TbSR Take 10 mEq by mouth 2 (two) times daily.      risperiDONE (RISPERDAL) 0.5 MG Tab Take 1 tablet (0.5 mg total) by mouth 2 (two) times daily. 180 tablet 1    sertraline (ZOLOFT) 50 MG tablet Take 1 tablet (50 mg total) by mouth once daily. 30 tablet 0    topiramate (TOPAMAX) 200 MG Tab Take 1 tablet (200 mg total) by mouth once daily. 90 tablet 1     No current facility-administered medications for this visit.                   REVIEW OF SYSTEMS:     Sleep related symptoms as per HPI.  CONST:Denies weight gain    HEENT: + sinus congestion  PULM: Denies dyspnea  CARD:  Denies palpitations   GI:  +intermittent acid reflux  : Denies polyuria  NEURO: + headaches that improve with cpap  PSYCH: Denies mood disturbance  HEME: Denies anemia   Otherwise, a balance of systems reviewed is negative.          PHYSICAL EXAM:  Vitals:    07/03/19 1502   BP: 134/80   Pulse: 73   SpO2: 98%   Weight: (!) 150.1 kg (330 lb 14.2 oz)   Height: 5' 7" (1.702 m)   PainSc:   5   PainLoc: Generalized     Body mass index is 51.82 kg/m².     GENERAL: Normal development, well groomed  HEENT:  Conjunctivae are non-erythematous; Pupils equal, round, and reactive to light; Nose is symmetrical; Nasal mucosa is pink and moist; Septum is midline; Inferior turbinates are normal; Nasal airflow is mildly congested; Posterior pharynx is pink; Modified Mallampati: 4; Posterior palate is normal; Tonsils +1; Uvula is normal and pink;Tongue is normal; Dentition is fair; No TMJ tenderness; Jaw opening and protrusion without click and without discomfort.  NECK: Supple. Neck circumference is 17.5 inches. No thyromegaly. No palpable nodes.     SKIN: " On face and neck: No abrasions, no rashes, no lesions.  No subcutaneous nodules are palpable.  RESPIRATORY: mild expiratory wheeze.  Normal chest expansion and non-labored breathing at rest.  CARDIOVASCULAR: Normal S1, S2.  No murmurs, gallops or rubs. No carotid bruits bilaterally.  EXTREMITIES: No edema. No clubbing. No cyanosis. Station normal. Gait normal.        NEURO/PSYCH: Oriented to time, place and person. Normal attention span and concentration. Affect is full. Mood is normal.                                              DATA prior sleep study not available.    Lab Results   Component Value Date    TSH 2.155 06/05/2019     ASSESSMENT  Problem List Items Addressed This Visit     Mild intermittent asthma without complication    Current Assessment & Plan      improve with smoking cessation.  Optimize nasal congestion. Unable to get coverage for advair.  Baseline pft.           Relevant Orders    Complete PFT with bronchodilator    Morbid obesity    Current Assessment & Plan     Enrolled in bariatric surgery         SHARON (obstructive sleep apnea) - Primary    Current Assessment & Plan     unable to obtained record of sleep study (records from outside reviewed).  Interrogation of cpap confirmed pressure of 7-15.  Poor compliance with apap.  Low residual ahi when using apap.  Compliance requirement d/w patient.  Poor compliance may relate to motivation vs claustrophobia.  Patient endorse pulling mask off in the middle of the night.  Will switch to more secure mask.  Desensitization protocol d/w patient.  May consider requalification and retitration if sleep does not improve.             Relevant Orders    CPAP/BIPAP SUPPLIES           Insomnia - maintenance is the issue.  Improve with apap.     Nasal congestion - sinus irrigation and nasal steroid.      Education: During our discussion today, we talked about the etiology of obstructive sleep apnea as well as the potential ramifications of untreated sleep apnea,  which could include daytime sleepiness, hypertension, heart disease and/or stroke.     Precautions: The patient was advised to abstain from driving should they feel sleepy or drowsy.     Patient will No follow-ups on file. with md/np.

## 2019-07-09 ENCOUNTER — OFFICE VISIT (OUTPATIENT)
Dept: PODIATRY | Facility: CLINIC | Age: 49
End: 2019-07-09
Payer: MEDICARE

## 2019-07-09 VITALS
WEIGHT: 293 LBS | DIASTOLIC BLOOD PRESSURE: 92 MMHG | SYSTOLIC BLOOD PRESSURE: 138 MMHG | BODY MASS INDEX: 45.99 KG/M2 | HEIGHT: 67 IN

## 2019-07-09 DIAGNOSIS — B35.1 PAIN DUE TO ONYCHOMYCOSIS OF TOENAIL OF RIGHT FOOT: Primary | ICD-10-CM

## 2019-07-09 DIAGNOSIS — M79.674 PAIN DUE TO ONYCHOMYCOSIS OF TOENAIL OF RIGHT FOOT: Primary | ICD-10-CM

## 2019-07-09 PROCEDURE — 99214 OFFICE O/P EST MOD 30 MIN: CPT | Mod: S$PBB,,, | Performed by: PODIATRIST

## 2019-07-09 PROCEDURE — 99213 OFFICE O/P EST LOW 20 MIN: CPT | Mod: PBBFAC,PO | Performed by: PODIATRIST

## 2019-07-09 PROCEDURE — 99214 PR OFFICE/OUTPT VISIT, EST, LEVL IV, 30-39 MIN: ICD-10-PCS | Mod: S$PBB,,, | Performed by: PODIATRIST

## 2019-07-09 PROCEDURE — 99999 PR PBB SHADOW E&M-EST. PATIENT-LVL III: ICD-10-PCS | Mod: PBBFAC,,, | Performed by: PODIATRIST

## 2019-07-09 PROCEDURE — 99999 PR PBB SHADOW E&M-EST. PATIENT-LVL III: CPT | Mod: PBBFAC,,, | Performed by: PODIATRIST

## 2019-07-09 NOTE — PATIENT INSTRUCTIONS
Recommend lotions: eucerin, eucerin for diabetics, aquaphor, A&D ointment, gold bond for diabetics, sween, Kristopher's Bees all purpose baby ointment,  urea 40 with aloe (found on amazon.com)    Shoe recommendations: (try 6pm.com, zappos.K[place] , nordstromrack.[place] or shoes.Kili [place] for discounted prices) you can visit [employer] shoes in [place]  or [facility] in the [facility] (there are also several shoe brand outlets in the [facility])    Asics (GT 2000 or gel foundations), new balance stability type shoes (such as the 940 series), saucony (stabil c3),  Howard (GTS or Beast or transcend), propet (tennis shoe)    Sofft Brand (women) Audrey&Herbie (men), clarks, crocs, aerosoles, naturalizers, SAS, ecco, born, diomedes ann, rockports (dress shoes)    Vionic, burkenstocks, fitflops, propet (sandals)  Nike comfort thong sandals, crocs, propet (house shoes)    Nail Home remedy:  Vicks Vapor rub to nails for easier manageability    Athletes Foot     Athletes Foot is caused by a fungal infection in the skin. It affects the skin between the toes where it causes fissures (cracks in the skin). It can also affect the bottom of the foot where it causes dry white scales and peeling of the skin. This infection is more likely to occur when the foot is in hot, sweaty socks and shoes for long periods of time.   This infection is treated with skin creams or oral medicine.     Home Care:   It is important to keep the feet dry. Use absorbent cotton socks and change them if they become sweaty; or wear an open-toe shoe or sandal. Wash the feet at least once a day with soap and water.   Rotate your shoes. If you must wear the same shoes everyday then spray the shoes with lysol or antifungal spray and allow that to dry overnight before wearing the shoes again  Apply the antifungal cream as prescribed. Some antifungal creams are available without a prescription (Lotrimin, Tinactin).   It may take a week before the rash starts to improve and it can  take about three to four weeks to completely clear. Continue the medicine until the rash is all gone.   Use over-the-counter antifungal powders or sprays on your feet after exposure to high-risk environments (public showers, gyms and locker rooms) may prevent future infections. You may wish to use appropriate footwear to reduce exposure.  Clean tubs and bathroom floor with bleach  Clean feet with Nizoral shampoo or dial antibacterial soap and then dry completely.    Follow Up   with your doctor as recommended by our staff if the rash is not starting to improve after TEN days of treatment, or if the rash continues to spread.     Get Prompt Medical Attention   if any of the following occur:   Increasing redness or swelling of the foot   Pus draining from cracks in the skin   Fever of 100.4ºF (38ºC) or higher, or as directed by your healthcare provider    © 0479-3264 Lorraine Murillo, [address] [place] [address]. All rights reserved. This information is not intended as a substitute for professional medical care. Always follow your healthcare professional's instructions.

## 2019-07-09 NOTE — PROGRESS NOTES
Subjective:      Patient ID: Zaira Dowell is a 48 y.o. female.    Chief Complaint: Ingrown Toenail (right foot great toe Pcp Dr. Rosales 6/19/19)      Zaira Dowell is a 48 y.o. female who presents to the clinic complaining of thick yellowing right hallux nail toenail. Patient has attempted self treatment with OTC antifungals, rx antifungals without change.  She has also had the nail removed twice previously and it returned in the same condition.  Patient denies history of trauma. Patient  denies purulent drainage.  She is here to inquire on if she removes the nail again if it will return clear.    Current shoe gear:  Slip-on shoes    Patient Active Problem List   Diagnosis    Fibromyalgia    Pulmonary hypertension    Anxiety    Depression    Bipolar 1 disorder    Chronic idiopathic gout involving toe of left foot without tophus    History of tobacco abuse    Morbid obesity    SHARON (obstructive sleep apnea)    Trochanteric bursitis of both hips    Trochanteric bursitis    Pain of both hip joints    GERD (gastroesophageal reflux disease)    Pseudotumor cerebri    Mild intermittent asthma without complication       Current Outpatient Medications on File Prior to Visit   Medication Sig Dispense Refill    acetaZOLAMIDE (DIAMOX) 250 MG tablet 2 (two) times daily.       albuterol (PROVENTIL/VENTOLIN HFA) 90 mcg/actuation inhaler Inhale 2 puffs into the lungs every 6 (six) hours as needed for Wheezing. Rescue 18 g 2    ergocalciferol (ERGOCALCIFEROL) 50,000 unit Cap Take 1 capsule (50,000 Units total) by mouth twice a week. 24 capsule 0    fluticasone propionate (FLONASE) 50 mcg/actuation nasal spray 1 spray (50 mcg total) by Each Nare route once daily. 1 Bottle 0    furosemide (LASIX) 40 MG tablet Take 40 mg by mouth 2 (two) times daily.      gabapentin (NEURONTIN) 600 MG tablet Take 1 tablet (600 mg total) by mouth 2 (two) times daily. 60 tablet 2    gabapentin (NEURONTIN) 800 MG tablet Take  1 tablet (800 mg total) by mouth once daily. 90 tablet 2    ketoconazole (NIZORAL) 2 % cream Apply topically once daily. 1 Tube 3    lithium (ESKALITH) 300 MG capsule Take 1 capsule (300 mg total) by mouth 3 (three) times daily with meals. 270 capsule 1    lithium (LITHOTAB) 300 mg tablet Take 1 tablet (300 mg total) by mouth 3 (three) times daily. 270 tablet 1    loratadine (CLARITIN) 10 mg tablet   0    LORazepam (ATIVAN) 1 MG tablet Take 1 tablet (1 mg total) by mouth as needed for Anxiety. 30 tablet 2    meclizine (ANTIVERT) 25 mg tablet Take 1 tablet (25 mg total) by mouth 3 (three) times daily as needed for Dizziness or Nausea. 30 tablet 0    methocarbamol (ROBAXIN) 750 MG Tab Take 750 mg by mouth 4 (four) times daily.       nabumetone (RELAFEN) 750 MG tablet Take 1 tablet (750 mg total) by mouth 2 (two) times daily. 180 tablet 2    omeprazole (PRILOSEC) 20 MG capsule Take 1 capsule (20 mg total) by mouth 2 (two) times daily. 90 capsule 1    ondansetron (ZOFRAN) 4 MG tablet   0    oxybutynin (DITROPAN) 5 MG Tab Take 1 tablet (5 mg total) by mouth 3 (three) times daily. 180 tablet 1    phentermine (ADIPEX-P) 37.5 mg tablet Take 37.5 mg by mouth before breakfast.      potassium chloride (KLOR-CON) 10 MEQ TbSR Take 10 mEq by mouth 2 (two) times daily.      risperiDONE (RISPERDAL) 0.5 MG Tab Take 1 tablet (0.5 mg total) by mouth 2 (two) times daily. 180 tablet 1    sertraline (ZOLOFT) 50 MG tablet Take 1 tablet (50 mg total) by mouth once daily. 30 tablet 0    topiramate (TOPAMAX) 200 MG Tab Take 1 tablet (200 mg total) by mouth once daily. 90 tablet 1     No current facility-administered medications on file prior to visit.        Review of patient's allergies indicates:   Allergen Reactions    Vicodin [hydrocodone-acetaminophen] Nausea And Vomiting       Past Surgical History:   Procedure Laterality Date    CARPAL TUNNEL RELEASE      CHOLECYSTECTOMY      EGD (ESOPHAGOGASTRODUODENOSCOPY) N/A  5/21/2019    [facility]    HYSTERECTOMY      partial     KNEE SURGERY      pinched nerve      SHOULDER SURGERY      TONSILLECTOMY      WRIST SURGERY      had ganlin cyst       Family History   Problem Relation Age of Onset    Diabetes Mother     [place]'s disease Mother     Hypertension Mother     Heart disease Father     Stroke Father     Mental illness Father     Hypertension Father     Diabetes Father     Depression Father     Heart disease Maternal Grandmother     Depression Maternal Grandmother     COPD Maternal Grandfather     Heart disease Maternal Grandfather     Stroke Maternal Grandfather     Kidney disease Paternal Grandmother     Heart disease Paternal Grandmother     Heart disease Paternal Grandfather     No Known Problems Brother     Hypertension Son        Social History     Socioeconomic History    Marital status:      Spouse name: Not on file    Number of children: Not on file    Years of education: Not on file    Highest education level: Not on file   Occupational History    Not on file   Social Needs    Financial resource strain: Not very hard    Food insecurity:     Worry: Never true     Inability: Never true    Transportation needs:     Medical: No     Non-medical: No   Tobacco Use    Smoking status: Former Smoker     Types: Vaping with nicotine    Smokeless tobacco: Never Used    Tobacco comment: 1/15/19 vaping with 12% nicotine. Smoking cessation refused will quit cold turkey.   Substance and Sexual Activity    Alcohol use: No     Frequency: Monthly or less     Drinks per session: 1 or 2     Binge frequency: Never    Drug use: No    Sexual activity: Yes     Partners: Male   Lifestyle    Physical activity:     Days per week: 1 day     Minutes per session: 30 min    Stress: Rather much   Relationships    Social connections:     Talks on phone: More than three times a week     Gets together: Patient refused      "Attends [employer] service: Not on file     Active member of club or organization: No     Attends meetings of clubs or organizations: Never     Relationship status:    Other Topics Concern    Not on file   Social History Narrative    Not on file       Review of Systems   Constitution: Negative for chills and fever.   Cardiovascular: Negative for claudication and leg swelling.   Respiratory: Negative for cough and shortness of breath.    Skin: Positive for dry skin and nail changes. Negative for itching and rash.   Musculoskeletal: Positive for joint pain and myalgias. Negative for falls, joint swelling and muscle weakness.   Gastrointestinal: Negative for diarrhea, nausea and vomiting.   Neurological: Positive for paresthesias. Negative for numbness, tremors and weakness.   Psychiatric/Behavioral: Negative for altered mental status and hallucinations.           Objective:      Vitals:    07/09/19 0820   BP: (!) 138/92   Weight: (!) 149.7 kg (330 lb)   Height: 5' 7" (1.702 m)   PainSc:   4   PainLoc: Toe       Physical Exam   Constitutional:  Non-toxic appearance. She does not have a sickly appearance. No distress.   Cardiovascular:   Pulses:       Dorsalis pedis pulses are 2+ on the right side, and 2+ on the left side.        Posterior tibial pulses are 2+ on the right side, and 2+ on the left side.   dorsalis pedis and posterior tibial pulses are palpable bilaterally. Capillary refill time is within normal limits.   Pulmonary/Chest: No respiratory distress.   Musculoskeletal:        Right ankle: She exhibits no swelling. No tenderness. No lateral malleolus, no medial malleolus, no AITFL, no CF ligament and no posterior TFL tenderness found. Achilles tendon exhibits no pain, no defect and normal Villa's test results.        Left ankle: She exhibits no swelling. No tenderness. No lateral malleolus, no medial malleolus, no AITFL, no CF ligament and no posterior TFL tenderness found. Achilles tendon exhibits " no pain, no defect and normal Villa's test results.        Right foot: There is tenderness (toenail). There is no bony tenderness.        Left foot: There is no tenderness and no bony tenderness.   Equinus noted b/l ankles with < 10 deg DF noted. MMT 5/5 in DF/PF/Inv/Ev resistance with no reproduction of pain in any direction. Passive range of motion of ankle and pedal joints is painless b/l.   Neurological: She has normal reflexes. She displays no atrophy and no tremor. No sensory deficit. She exhibits normal muscle tone.   Berwick-Dorothy 5.07 monofilament is intact bilateral feet. Sharp/dull sensation is also intact Bilateral feet.     Skin: Skin is warm, dry and intact. No bruising, no burn, no laceration, no lesion and no rash noted. She is not diaphoretic. No cyanosis. No pallor. Nails show no clubbing.   Right hallux and bilateral 5th digit nails are thickened by 2-3 mm, discolored/yellowed, dystrophic, brittle with subungual debris. Tender to distal nail plate pressure, without periungual skin abnormality of each.         Psychiatric: Her mood appears not anxious. Her affect is not inappropriate. Her speech is not slurred. She is not combative. She is communicative. She is attentive.   Nursing note reviewed.            Assessment:       Encounter Diagnosis   Name Primary?    Pain due to onychomycosis of toenail of right foot Yes         Plan:       Zaira was seen today for ingrown toenail.    Diagnoses and all orders for this visit:    Pain due to onychomycosis of toenail of right foot      I counseled the patient on her conditions, their implications and medical management.    Discussed condition and treatment options with pt, as well as poor results with most treatments. Discussed filing nails, using antifungal topical ointment vs oral treatment options. Instructed patient on the importance of keeping fungus off of skin while treating nails. Patient instructed to use absorbent cotton socks and  change them if they become sweaty; or wear an open-toe shoe or sandal. Wash the feet at least once a day with soap and water. Patient wants to try topical. Rx medicated foot soaks/antifungal topicals from [facility] to be delivered to patient home.  Soaks not to exceed 10 minutes, patient is to dry thoroughly between toes     Instructed patient that it takes time for symptoms to completely dissipate. Patient instructed to use lysol or over-the-counter antifungal powders or sprays to shoes daily and allow them to air dry, switching shoes from every other day would be optimal. Patient is to avoid barefoot walking in  high-risk environments (public showers, gyms and locker rooms) may prevent future infections.     Informed patient that many nail problems can be prevented by wearing the right shoes and trimming your nails properly.   The right shoes: Feet were measured.  Patient is to wear shoes that are supportive and roomy enough for toes to wiggle. Look for shoes made of natural materials such as leather, which allow  feet to breathe.   Proper trimming: To avoid problems, she was instructed to trim toenails straight across without cutting down into the corners.      RTC PRN or if patient wishes to pursue ingrown nail procedure        Procedures

## 2019-07-11 ENCOUNTER — HOSPITAL ENCOUNTER (OUTPATIENT)
Dept: RESPIRATORY THERAPY | Facility: HOSPITAL | Age: 49
Discharge: HOME | End: 2019-07-11
Attending: INTERNAL MEDICINE
Payer: MEDICARE

## 2019-07-11 ENCOUNTER — OFFICE VISIT (OUTPATIENT)
Dept: FAMILY MEDICINE | Facility: CLINIC | Age: 49
End: 2019-07-11
Payer: MEDICARE

## 2019-07-11 VITALS
WEIGHT: 293 LBS | OXYGEN SATURATION: 96 % | HEIGHT: 67 IN | SYSTOLIC BLOOD PRESSURE: 128 MMHG | RESPIRATION RATE: 20 BRPM | DIASTOLIC BLOOD PRESSURE: 82 MMHG | HEART RATE: 71 BPM | TEMPERATURE: 98 F | BODY MASS INDEX: 45.99 KG/M2

## 2019-07-11 VITALS — HEART RATE: 82 BPM | OXYGEN SATURATION: 95 % | RESPIRATION RATE: 18 BRPM

## 2019-07-11 DIAGNOSIS — F31.9 BIPOLAR 1 DISORDER: Primary | ICD-10-CM

## 2019-07-11 DIAGNOSIS — J45.20 MILD INTERMITTENT ASTHMA WITHOUT COMPLICATION: ICD-10-CM

## 2019-07-11 DIAGNOSIS — B37.31 CANDIDA VAGINITIS: ICD-10-CM

## 2019-07-11 LAB
BRPFT: ABNORMAL
DLCO ADJ PRE: 24.67 ML/(MIN*MMHG) (ref 20.73–32.2)
DLCO SINGLE BREATH LLN: 20.73
DLCO SINGLE BREATH PRE REF: 93.2 %
DLCO SINGLE BREATH REF: 26.46
DLCOC SBVA LLN: 3.49
DLCOC SBVA PRE REF: 105.5 %
DLCOC SBVA REF: 4.87
DLCOC SINGLE BREATH LLN: 20.73
DLCOC SINGLE BREATH PRE REF: 93.2 %
DLCOC SINGLE BREATH REF: 26.46
DLCOVA LLN: 3.49
DLCOVA PRE REF: 105.5 %
DLCOVA PRE: 5.14 ML/(MIN*MMHG*L) (ref 3.49–6.25)
DLCOVA REF: 4.87
DLVAADJ PRE: 5.14 ML/(MIN*MMHG*L) (ref 3.49–6.25)
ERV LLN: 1.03
ERV REF: 1.03
ERVN2 LLN: 1.03
ERVN2 PRE REF: 1.9 %
ERVN2 PRE: 0.02 L (ref 1.03–1.03)
ERVN2 REF: 1.03
FEF 25 75 CHG: 8.6 %
FEF 25 75 LLN: 1.74
FEF 25 75 POST REF: 108 %
FEF 25 75 PRE REF: 99.5 %
FEF 25 75 REF: 3.03
FET100 CHG: -8.1 %
FEV1 CHG: 1.6 %
FEV1 FVC CHG: 2.1 %
FEV1 FVC LLN: 70
FEV1 FVC POST REF: 101.8 %
FEV1 FVC PRE REF: 99.7 %
FEV1 FVC REF: 80
FEV1 LLN: 2.44
FEV1 POST REF: 93.2 %
FEV1 PRE REF: 91.7 %
FEV1 REF: 3.11
FRCN2 LLN: 2.03
FRCN2 PRE REF: 47.6 %
FRCN2 REF: 2.86
FRCPLETH LLN: 2.03
FRCPLETH REF: 2.86
FVC CHG: -0.5 %
FVC LLN: 3.06
FVC POST REF: 91 %
FVC PRE REF: 91.4 %
FVC REF: 3.89
IVC PRE: 3.36 L (ref 3.06–4.72)
IVC SINGLE BREATH LLN: 3.06
IVC SINGLE BREATH PRE REF: 86.4 %
IVC SINGLE BREATH REF: 3.89
MVV LLN: 100
MVV REF: 118
PEF CHG: 14.3 %
PEF LLN: 5.42
PEF POST REF: 105.6 %
PEF PRE REF: 92.4 %
PEF REF: 7.29
POST FEF 25 75: 3.27 L/S (ref 1.74–4.32)
POST FET 100: 8.66 SEC
POST FEV1 FVC: 81.93 % (ref 69.5–91.47)
POST FEV1: 2.9 L (ref 2.44–3.78)
POST FVC: 3.54 L (ref 3.06–4.72)
POST PEF: 7.7 L/S (ref 5.42–9.16)
PRE DLCO: 24.67 ML/(MIN*MMHG) (ref 20.73–32.2)
PRE FEF 25 75: 3.01 L/S (ref 1.74–4.32)
PRE FET 100: 9.42 SEC
PRE FEV1 FVC: 80.22 % (ref 69.5–91.47)
PRE FEV1: 2.85 L (ref 2.44–3.78)
PRE FRC N2: 1.36 L
PRE FVC: 3.55 L (ref 3.06–4.72)
PRE PEF: 6.74 L/S (ref 5.42–9.16)
RAW LLN: 3.06
RAW REF: 3.06
RV LLN: 1.25
RV REF: 1.83
RVN2 LLN: 1.25
RVN2 PRE REF: 73.3 %
RVN2 PRE: 1.34 L (ref 1.25–2.4)
RVN2 REF: 1.83
RVN2TLCN2 LLN: 25.35
RVN2TLCN2 PRE REF: 82.8 %
RVN2TLCN2 PRE: 28.94 % (ref 25.35–44.53)
RVN2TLCN2 REF: 34.94
RVTLC LLN: 25
RVTLC REF: 35
TLC LLN: 4.44
TLC REF: 5.43
TLCN2 LLN: 4.44
TLCN2 PRE REF: 85.3 %
TLCN2 PRE: 4.63 L (ref 4.44–6.42)
TLCN2 REF: 5.43
VA PRE: 4.8 L (ref 5.28–5.28)
VA SINGLE BREATH LLN: 5.28
VA SINGLE BREATH PRE REF: 90.9 %
VA SINGLE BREATH REF: 5.28
VC LLN: 3.06
VC REF: 3.89
VCMAXN2 LLN: 3.06
VCMAXN2 PRE REF: 84.6 %
VCMAXN2 PRE: 3.29 L (ref 3.06–4.72)
VCMAXN2 REF: 3.89

## 2019-07-11 PROCEDURE — 99214 PR OFFICE/OUTPT VISIT, EST, LEVL IV, 30-39 MIN: ICD-10-PCS | Mod: S$PBB,,, | Performed by: FAMILY MEDICINE

## 2019-07-11 PROCEDURE — 25000242 PHARM REV CODE 250 ALT 637 W/ HCPCS: Performed by: INTERNAL MEDICINE

## 2019-07-11 PROCEDURE — 99214 OFFICE O/P EST MOD 30 MIN: CPT | Mod: S$PBB,,, | Performed by: FAMILY MEDICINE

## 2019-07-11 PROCEDURE — 94060 EVALUATION OF WHEEZING: CPT | Mod: 26,,, | Performed by: INTERNAL MEDICINE

## 2019-07-11 PROCEDURE — 94729 DIFFUSING CAPACITY: CPT | Mod: 26,,, | Performed by: INTERNAL MEDICINE

## 2019-07-11 PROCEDURE — 99999 PR PBB SHADOW E&M-EST. PATIENT-LVL V: ICD-10-PCS | Mod: PBBFAC,,, | Performed by: FAMILY MEDICINE

## 2019-07-11 PROCEDURE — 94727 GAS DIL/WSHOT DETER LNG VOL: CPT | Mod: 26,,, | Performed by: INTERNAL MEDICINE

## 2019-07-11 PROCEDURE — 94727 PR PULM FUNCTION TEST BY GAS: ICD-10-PCS | Mod: 26,,, | Performed by: INTERNAL MEDICINE

## 2019-07-11 PROCEDURE — 94060 PR EVAL OF BRONCHOSPASM: ICD-10-PCS | Mod: 26,,, | Performed by: INTERNAL MEDICINE

## 2019-07-11 PROCEDURE — 99999 PR PBB SHADOW E&M-EST. PATIENT-LVL V: CPT | Mod: PBBFAC,,, | Performed by: FAMILY MEDICINE

## 2019-07-11 PROCEDURE — 99215 OFFICE O/P EST HI 40 MIN: CPT | Mod: PBBFAC,PO | Performed by: FAMILY MEDICINE

## 2019-07-11 PROCEDURE — 94729 PR C02/MEMBANE DIFFUSE CAPACITY: ICD-10-PCS | Mod: 26,,, | Performed by: INTERNAL MEDICINE

## 2019-07-11 RX ORDER — SERTRALINE HYDROCHLORIDE 100 MG/1
100 TABLET, FILM COATED ORAL DAILY
Qty: 30 TABLET | Refills: 0 | Status: SHIPPED | OUTPATIENT
Start: 2019-07-11 | End: 2019-08-05 | Stop reason: SDUPTHER

## 2019-07-11 RX ORDER — ALBUTEROL SULFATE 2.5 MG/.5ML
2.5 SOLUTION RESPIRATORY (INHALATION) ONCE
Status: COMPLETED | OUTPATIENT
Start: 2019-07-11 | End: 2019-07-11

## 2019-07-11 RX ORDER — FLUCONAZOLE 150 MG/1
150 TABLET ORAL DAILY
Qty: 1 TABLET | Refills: 0 | Status: SHIPPED | OUTPATIENT
Start: 2019-07-11 | End: 2019-07-12

## 2019-07-11 RX ADMIN — ALBUTEROL SULFATE 2.5 MG: 2.5 SOLUTION RESPIRATORY (INHALATION) at 01:07

## 2019-07-11 NOTE — PROGRESS NOTES
Subjective:       Patient ID: Zaira Dowell is a 48 y.o. female.    Chief Complaint: Follow-up and Vaginitis    HPI   49 yo female presents for follow up. Pt was switched from prozac to zoloft. Pt states initially the medicine was working but feels the last few days she has mood swings and feels she is crying over everything. Denies SI or HI.     Had finished her course of abx for her h.pylori infection. Feels she has a fungal infection now. Endorses pruritus around her inguinal region.    Review of Systems   Constitutional: Negative for activity change and unexpected weight change.   HENT: Negative for hearing loss, rhinorrhea and trouble swallowing.    Eyes: Negative for discharge and visual disturbance.   Respiratory: Negative for chest tightness and wheezing.    Cardiovascular: Negative for chest pain and palpitations.   Gastrointestinal: Negative for blood in stool, constipation and vomiting.   Endocrine: Positive for polydipsia. Negative for polyuria.   Genitourinary: Negative for difficulty urinating, dysuria, hematuria and menstrual problem.   Musculoskeletal: Positive for arthralgias, joint swelling and neck pain.   Neurological: Positive for weakness and headaches.   Psychiatric/Behavioral: Positive for dysphoric mood. Negative for confusion.          Past Medical History:   Diagnosis Date    Anxiety     Arthritis     Asthma     Depression     Fibromyalgia     GERD (gastroesophageal reflux disease)     Obesity     SHARON (obstructive sleep apnea)      Past Surgical History:   Procedure Laterality Date    CARPAL TUNNEL RELEASE      CHOLECYSTECTOMY      EGD (ESOPHAGOGASTRODUODENOSCOPY) N/A 5/21/2019    [facility] ENDO    HYSTERECTOMY      partial     KNEE SURGERY      pinched nerve      SHOULDER SURGERY      TONSILLECTOMY      WRIST SURGERY      had ganlin cyst     Family History   Problem Relation Age of Onset    Diabetes Mother     [place]'s disease Mother      Hypertension Mother     Heart disease Father     Stroke Father     Mental illness Father     Hypertension Father     Diabetes Father     Depression Father     Heart disease Maternal Grandmother     Depression Maternal Grandmother     COPD Maternal Grandfather     Heart disease Maternal Grandfather     Stroke Maternal Grandfather     Kidney disease Paternal Grandmother     Heart disease Paternal Grandmother     Heart disease Paternal Grandfather     No Known Problems Brother     Hypertension Son      Social History     Socioeconomic History    Marital status:      Spouse name: Not on file    Number of children: Not on file    Years of education: Not on file    Highest education level: Not on file   Occupational History    Not on file   Social Needs    Financial resource strain: Not very hard    Food insecurity:     Worry: Never true     Inability: Never true    Transportation needs:     Medical: No     Non-medical: No   Tobacco Use    Smoking status: Former Smoker     Types: Vaping with nicotine    Smokeless tobacco: Never Used    Tobacco comment: 1/15/19 vaping with 12% nicotine. Smoking cessation refused will quit cold turkey.   Substance and Sexual Activity    Alcohol use: No     Frequency: Monthly or less     Drinks per session: 1 or 2     Binge frequency: Never    Drug use: No    Sexual activity: Yes     Partners: Male   Lifestyle    Physical activity:     Days per week: 1 day     Minutes per session: 30 min    Stress: Rather much   Relationships    Social connections:     Talks on phone: More than three times a week     Gets together: Patient refused     Attends Buddhist service: Not on file     Active member of club or organization: No     Attends meetings of clubs or organizations: Never     Relationship status:    Other Topics Concern    Not on file   Social History Narrative    Not on file       Current Outpatient Medications:     acetaZOLAMIDE (DIAMOX)  250 MG tablet, 2 (two) times daily. , Disp: , Rfl:     albuterol (PROVENTIL/VENTOLIN HFA) 90 mcg/actuation inhaler, Inhale 2 puffs into the lungs every 6 (six) hours as needed for Wheezing. Rescue, Disp: 18 g, Rfl: 2    ergocalciferol (ERGOCALCIFEROL) 50,000 unit Cap, Take 1 capsule (50,000 Units total) by mouth twice a week., Disp: 24 capsule, Rfl: 0    fluticasone propionate (FLONASE) 50 mcg/actuation nasal spray, 1 spray (50 mcg total) by Each Nare route once daily., Disp: 1 Bottle, Rfl: 0    furosemide (LASIX) 40 MG tablet, Take 40 mg by mouth 2 (two) times daily., Disp: , Rfl:     gabapentin (NEURONTIN) 600 MG tablet, Take 1 tablet (600 mg total) by mouth 2 (two) times daily., Disp: 60 tablet, Rfl: 2    gabapentin (NEURONTIN) 800 MG tablet, Take 1 tablet (800 mg total) by mouth once daily., Disp: 90 tablet, Rfl: 2    ketoconazole (NIZORAL) 2 % cream, Apply topically once daily., Disp: 1 Tube, Rfl: 3    lithium (ESKALITH) 300 MG capsule, Take 1 capsule (300 mg total) by mouth 3 (three) times daily with meals., Disp: 270 capsule, Rfl: 1    lithium (LITHOTAB) 300 mg tablet, Take 1 tablet (300 mg total) by mouth 3 (three) times daily., Disp: 270 tablet, Rfl: 1    loratadine (CLARITIN) 10 mg tablet, , Disp: , Rfl: 0    LORazepam (ATIVAN) 1 MG tablet, Take 1 tablet (1 mg total) by mouth as needed for Anxiety., Disp: 30 tablet, Rfl: 2    meclizine (ANTIVERT) 25 mg tablet, Take 1 tablet (25 mg total) by mouth 3 (three) times daily as needed for Dizziness or Nausea., Disp: 30 tablet, Rfl: 0    methocarbamol (ROBAXIN) 750 MG Tab, Take 750 mg by mouth 4 (four) times daily. , Disp: , Rfl:     nabumetone (RELAFEN) 750 MG tablet, Take 1 tablet (750 mg total) by mouth 2 (two) times daily., Disp: 180 tablet, Rfl: 2    omeprazole (PRILOSEC) 20 MG capsule, Take 1 capsule (20 mg total) by mouth 2 (two) times daily., Disp: 90 capsule, Rfl: 1    ondansetron (ZOFRAN) 4 MG tablet, , Disp: , Rfl: 0    oxybutynin  "(DITROPAN) 5 MG Tab, Take 1 tablet (5 mg total) by mouth 3 (three) times daily., Disp: 180 tablet, Rfl: 1    phentermine (ADIPEX-P) 37.5 mg tablet, Take 37.5 mg by mouth before breakfast., Disp: , Rfl:     potassium chloride (KLOR-CON) 10 MEQ TbSR, Take 10 mEq by mouth 2 (two) times daily., Disp: , Rfl:     risperiDONE (RISPERDAL) 0.5 MG Tab, Take 1 tablet (0.5 mg total) by mouth 2 (two) times daily., Disp: 180 tablet, Rfl: 1    sertraline (ZOLOFT) 100 MG tablet, Take 1 tablet (100 mg total) by mouth once daily., Disp: 30 tablet, Rfl: 0    topiramate (TOPAMAX) 200 MG Tab, Take 1 tablet (200 mg total) by mouth once daily., Disp: 90 tablet, Rfl: 1    fluconazole (DIFLUCAN) 150 MG Tab, Take 1 tablet (150 mg total) by mouth once daily. for 1 day, Disp: 1 tablet, Rfl: 0   Objective:      Vitals:    07/11/19 1023   BP: 128/82   BP Location: Right arm   Patient Position: Sitting   BP Method: Large (Manual)   Pulse: 71   Resp: 20   Temp: 98.4 °F (36.9 °C)   TempSrc: Oral   SpO2: 96%   Weight: (!) 151.6 kg (334 lb 3.5 oz)   Height: 5' 7" (1.702 m)       Physical Exam   Constitutional: She is oriented to person, place, and time. No distress.   HENT:   Head: Normocephalic and atraumatic.   Eyes: Conjunctivae are normal.   Neck: Neck supple.   Cardiovascular: Normal rate, regular rhythm and normal heart sounds. Exam reveals no gallop and no friction rub.   No murmur heard.  Pulmonary/Chest: Effort normal and breath sounds normal. She has no wheezes. She has no rales.   Neurological: She is alert and oriented to person, place, and time.   Skin: Skin is warm and dry.   Psychiatric: She has a normal mood and affect. Her behavior is normal. Judgment and thought content normal.        Assessment:       1. Bipolar 1 disorder    2. Candida vaginitis        Plan:       Bipolar 1 disorder  - Increased zoloft to 100mg. May be having w/d from prozac since she was on it chronically and zoloft was too low of a dose. Pt has a f/u w/ " psych in 2 months. Advised pt to call in 3 weeks to see if medicine is working well.  -     sertraline (ZOLOFT) 100 MG tablet; Take 1 tablet (100 mg total) by mouth once daily.  Dispense: 30 tablet; Refill: 0    Candida vaginitis  -     fluconazole (DIFLUCAN) 150 MG Tab; Take 1 tablet (150 mg total) by mouth once daily. for 1 day  Dispense: 1 tablet; Refill: 0      Follow up in about 3 months (around 10/11/2019).            Burak Rosales MD

## 2019-07-16 DIAGNOSIS — Z72.0 TOBACCO ABUSE: Primary | ICD-10-CM

## 2019-07-17 ENCOUNTER — LAB VISIT (OUTPATIENT)
Dept: LAB | Facility: HOSPITAL | Age: 49
End: 2019-07-17
Attending: PHYSICIAN ASSISTANT
Payer: MEDICARE

## 2019-07-17 ENCOUNTER — PATIENT MESSAGE (OUTPATIENT)
Dept: BARIATRICS | Facility: CLINIC | Age: 49
End: 2019-07-17

## 2019-07-17 DIAGNOSIS — A04.8 POSITIVE H. PYLORI TEST: ICD-10-CM

## 2019-07-17 DIAGNOSIS — Z72.0 TOBACCO ABUSE: ICD-10-CM

## 2019-07-17 PROCEDURE — 36415 COLL VENOUS BLD VENIPUNCTURE: CPT

## 2019-07-17 PROCEDURE — 87338 HPYLORI STOOL AG IA: CPT

## 2019-07-19 LAB
COTININE SERPL-MCNC: <3 NG/ML
NICOTINE SERPL-MCNC: <3 NG/ML

## 2019-07-22 ENCOUNTER — PATIENT MESSAGE (OUTPATIENT)
Dept: PULMONOLOGY | Facility: CLINIC | Age: 49
End: 2019-07-22

## 2019-07-25 LAB — H PYLORI AG STL QL IA: NOT DETECTED

## 2019-08-05 DIAGNOSIS — F31.9 BIPOLAR 1 DISORDER: ICD-10-CM

## 2019-08-07 RX ORDER — SERTRALINE HYDROCHLORIDE 100 MG/1
100 TABLET, FILM COATED ORAL DAILY
Qty: 90 TABLET | Refills: 0 | Status: SHIPPED | OUTPATIENT
Start: 2019-08-07 | End: 2019-09-06 | Stop reason: DRUGHIGH

## 2019-08-15 DIAGNOSIS — F31.9 BIPOLAR 1 DISORDER: ICD-10-CM

## 2019-08-21 RX ORDER — RISPERIDONE 0.5 MG/1
0.5 TABLET ORAL 2 TIMES DAILY
Qty: 180 TABLET | Refills: 1 | Status: SHIPPED | OUTPATIENT
Start: 2019-08-21 | End: 2020-01-14 | Stop reason: SDUPTHER

## 2019-08-26 ENCOUNTER — PATIENT MESSAGE (OUTPATIENT)
Dept: BARIATRICS | Facility: CLINIC | Age: 49
End: 2019-08-26

## 2019-08-30 ENCOUNTER — LAB VISIT (OUTPATIENT)
Dept: LAB | Facility: HOSPITAL | Age: 49
End: 2019-08-30
Attending: CHIROPRACTOR
Payer: MEDICARE

## 2019-08-30 DIAGNOSIS — R79.89 LOW VITAMIN D LEVEL: ICD-10-CM

## 2019-08-30 LAB — 25(OH)D3+25(OH)D2 SERPL-MCNC: 23 NG/ML (ref 30–96)

## 2019-08-30 PROCEDURE — 36415 COLL VENOUS BLD VENIPUNCTURE: CPT

## 2019-08-30 PROCEDURE — 82306 VITAMIN D 25 HYDROXY: CPT

## 2019-09-03 ENCOUNTER — TELEPHONE (OUTPATIENT)
Dept: BARIATRICS | Facility: CLINIC | Age: 49
End: 2019-09-03

## 2019-09-03 DIAGNOSIS — R79.89 LOW VITAMIN D LEVEL: Primary | ICD-10-CM

## 2019-09-03 RX ORDER — ERGOCALCIFEROL 1.25 MG/1
50000 CAPSULE ORAL
Qty: 12 CAPSULE | Refills: 0 | Status: SHIPPED | OUTPATIENT
Start: 2019-09-03 | End: 2019-11-20

## 2019-09-03 NOTE — TELEPHONE ENCOUNTER
Called and spoke with pt concerning vitamin D rx.  Pt asked if low vitamin D affected surgery date.  I replied that it does not however we will retest her in 12 weeks.  Pt v/u.----- Message from Terri Astudillo PA-C sent at 9/3/2019 10:12 AM CDT -----  Low Vitamin D, pt notified via [email or web address], rx sent to pharmacy

## 2019-09-06 ENCOUNTER — OFFICE VISIT (OUTPATIENT)
Dept: PSYCHIATRY | Facility: CLINIC | Age: 49
End: 2019-09-06
Payer: MEDICARE

## 2019-09-06 VITALS
DIASTOLIC BLOOD PRESSURE: 81 MMHG | SYSTOLIC BLOOD PRESSURE: 138 MMHG | BODY MASS INDEX: 45.99 KG/M2 | HEART RATE: 64 BPM | WEIGHT: 293 LBS | HEIGHT: 67 IN

## 2019-09-06 DIAGNOSIS — F31.9 BIPOLAR 1 DISORDER: Primary | ICD-10-CM

## 2019-09-06 PROCEDURE — 99999 PR PBB SHADOW E&M-EST. PATIENT-LVL II: CPT | Mod: PBBFAC,,, | Performed by: PSYCHIATRY & NEUROLOGY

## 2019-09-06 PROCEDURE — 90791 PSYCH DIAGNOSTIC EVALUATION: CPT | Mod: ,,, | Performed by: PSYCHIATRY & NEUROLOGY

## 2019-09-06 PROCEDURE — 99999 PR PBB SHADOW E&M-EST. PATIENT-LVL II: ICD-10-PCS | Mod: PBBFAC,,, | Performed by: PSYCHIATRY & NEUROLOGY

## 2019-09-06 PROCEDURE — 99212 OFFICE O/P EST SF 10 MIN: CPT | Mod: PBBFAC | Performed by: PSYCHIATRY & NEUROLOGY

## 2019-09-06 PROCEDURE — 90791 PR PSYCHIATRIC DIAGNOSTIC EVALUATION: ICD-10-PCS | Mod: ,,, | Performed by: PSYCHIATRY & NEUROLOGY

## 2019-09-06 RX ORDER — SERTRALINE HYDROCHLORIDE 100 MG/1
150 TABLET, FILM COATED ORAL DAILY
Qty: 45 TABLET | Refills: 3 | Status: SHIPPED | OUTPATIENT
Start: 2019-09-06 | End: 2019-10-17
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
## 2019-09-10 ENCOUNTER — LAB VISIT (OUTPATIENT)
Dept: LAB | Facility: HOSPITAL | Age: 49
End: 2019-09-10
Attending: FAMILY MEDICINE
Payer: MEDICARE

## 2019-09-10 ENCOUNTER — OFFICE VISIT (OUTPATIENT)
Dept: FAMILY MEDICINE | Facility: CLINIC | Age: 49
End: 2019-09-10
Payer: MEDICARE

## 2019-09-10 VITALS
TEMPERATURE: 99 F | BODY MASS INDEX: 45.99 KG/M2 | WEIGHT: 293 LBS | DIASTOLIC BLOOD PRESSURE: 76 MMHG | SYSTOLIC BLOOD PRESSURE: 110 MMHG | OXYGEN SATURATION: 96 % | HEIGHT: 67 IN | RESPIRATION RATE: 20 BRPM | HEART RATE: 72 BPM

## 2019-09-10 DIAGNOSIS — J32.0 MAXILLARY SINUSITIS, UNSPECIFIED CHRONICITY: ICD-10-CM

## 2019-09-10 DIAGNOSIS — R63.1 POLYDIPSIA: ICD-10-CM

## 2019-09-10 DIAGNOSIS — Z23 NEED FOR INFLUENZA VACCINATION: ICD-10-CM

## 2019-09-10 DIAGNOSIS — R22.32 MASS OF SKIN OF LEFT SHOULDER: ICD-10-CM

## 2019-09-10 DIAGNOSIS — M25.561 ACUTE PAIN OF RIGHT KNEE: ICD-10-CM

## 2019-09-10 DIAGNOSIS — B96.89 ACUTE BACTERIAL SINUSITIS: Primary | ICD-10-CM

## 2019-09-10 DIAGNOSIS — R79.9 ABNORMAL FINDING OF BLOOD CHEMISTRY: ICD-10-CM

## 2019-09-10 DIAGNOSIS — J01.90 ACUTE BACTERIAL SINUSITIS: Primary | ICD-10-CM

## 2019-09-10 DIAGNOSIS — R22.31 MASS OF SKIN OF RIGHT SHOULDER: ICD-10-CM

## 2019-09-10 LAB
ALBUMIN SERPL BCP-MCNC: 3.7 G/DL (ref 3.5–5.2)
ALP SERPL-CCNC: 66 U/L (ref 55–135)
ALT SERPL W/O P-5'-P-CCNC: 29 U/L (ref 10–44)
ANION GAP SERPL CALC-SCNC: 6 MMOL/L (ref 8–16)
AST SERPL-CCNC: 16 U/L (ref 10–40)
BILIRUB SERPL-MCNC: 0.3 MG/DL (ref 0.1–1)
BUN SERPL-MCNC: 8 MG/DL (ref 6–20)
CALCIUM SERPL-MCNC: 9.6 MG/DL (ref 8.7–10.5)
CHLORIDE SERPL-SCNC: 113 MMOL/L (ref 95–110)
CO2 SERPL-SCNC: 22 MMOL/L (ref 23–29)
CREAT SERPL-MCNC: 0.8 MG/DL (ref 0.5–1.4)
EST. GFR  (AFRICAN AMERICAN): >60 ML/MIN/1.73 M^2
EST. GFR  (NON AFRICAN AMERICAN): >60 ML/MIN/1.73 M^2
ESTIMATED AVG GLUCOSE: 100 MG/DL (ref 68–131)
GLUCOSE SERPL-MCNC: 88 MG/DL (ref 70–110)
HBA1C MFR BLD HPLC: 5.1 % (ref 4–5.6)
POTASSIUM SERPL-SCNC: 4.9 MMOL/L (ref 3.5–5.1)
PROT SERPL-MCNC: 7 G/DL (ref 6–8.4)
SODIUM SERPL-SCNC: 141 MMOL/L (ref 136–145)

## 2019-09-10 PROCEDURE — 36415 COLL VENOUS BLD VENIPUNCTURE: CPT | Mod: PO

## 2019-09-10 PROCEDURE — 99214 OFFICE O/P EST MOD 30 MIN: CPT | Mod: S$PBB,,, | Performed by: FAMILY MEDICINE

## 2019-09-10 PROCEDURE — 99999 PR PBB SHADOW E&M-EST. PATIENT-LVL IV: CPT | Mod: PBBFAC,,, | Performed by: FAMILY MEDICINE

## 2019-09-10 PROCEDURE — 99999 PR PBB SHADOW E&M-EST. PATIENT-LVL IV: ICD-10-PCS | Mod: PBBFAC,,, | Performed by: FAMILY MEDICINE

## 2019-09-10 PROCEDURE — 80053 COMPREHEN METABOLIC PANEL: CPT

## 2019-09-10 PROCEDURE — 99214 OFFICE O/P EST MOD 30 MIN: CPT | Mod: PBBFAC,PO,25 | Performed by: FAMILY MEDICINE

## 2019-09-10 PROCEDURE — 96372 THER/PROPH/DIAG INJ SC/IM: CPT | Mod: PBBFAC,59,PO

## 2019-09-10 PROCEDURE — 99214 PR OFFICE/OUTPT VISIT, EST, LEVL IV, 30-39 MIN: ICD-10-PCS | Mod: S$PBB,,, | Performed by: FAMILY MEDICINE

## 2019-09-10 PROCEDURE — 83036 HEMOGLOBIN GLYCOSYLATED A1C: CPT

## 2019-09-10 PROCEDURE — 90686 IIV4 VACC NO PRSV 0.5 ML IM: CPT | Mod: PBBFAC,PO

## 2019-09-10 RX ORDER — CLINDAMYCIN HYDROCHLORIDE 150 MG/1
CAPSULE ORAL
Refills: 1 | COMMUNITY
Start: 2019-07-11 | End: 2020-03-13

## 2019-09-10 RX ORDER — METHYLPREDNISOLONE SODIUM SUCCINATE 125 MG/2ML
125 INJECTION INTRAMUSCULAR; INTRAVENOUS
Status: COMPLETED | OUTPATIENT
Start: 2019-09-10 | End: 2019-09-10

## 2019-09-10 RX ORDER — TERBINAFINE HYDROCHLORIDE 250 MG/1
TABLET ORAL
Refills: 1 | COMMUNITY
Start: 2019-07-11 | End: 2020-03-13

## 2019-09-10 RX ORDER — AZITHROMYCIN 250 MG/1
TABLET, FILM COATED ORAL
Qty: 6 TABLET | Refills: 0 | Status: SHIPPED | OUTPATIENT
Start: 2019-09-10 | End: 2019-09-15

## 2019-09-10 RX ORDER — FLUTICASONE PROPIONATE 50 MCG
1 SPRAY, SUSPENSION (ML) NASAL DAILY
Qty: 1 BOTTLE | Refills: 0 | Status: SHIPPED | OUTPATIENT
Start: 2019-09-10 | End: 2020-06-06

## 2019-09-10 RX ADMIN — METHYLPREDNISOLONE SODIUM SUCCINATE 125 MG: 125 INJECTION, POWDER, FOR SOLUTION INTRAMUSCULAR; INTRAVENOUS at 03:09

## 2019-09-10 NOTE — PROGRESS NOTES
"Subjective:       Patient ID: Zaira Dowell is a 48 y.o. female.    Chief Complaint: Sinus Problem; Headache; Knee Pain (Right); and Cyst    49 yo F presents with a one week h/o R knee pain. Pain is worse with movement especially walking up or down stairs. She reports that it occasionally locks up on her. She reports no trauma. She has a h/o R meniscus tear about 6 years ago. She had an arthroscopy with meniscal repair "per patient". She has not had recent imaging of her knee.   She also reports a 1 week history of URI symptoms causing her to have sinus pressure and congestion. She also thinks her headaches are related to the congestion. She has had no fever or cough  She is also reporting a lump on her left anterior shoulder. She says it has been growing over the past several weeks. It is not painful.   She is also reporting increased urination and increased thirst. Her parents both had diabetes.     Review of Systems   Constitutional: Negative for activity change, fever and unexpected weight change.   HENT: Negative for hearing loss, rhinorrhea and trouble swallowing.    Eyes: Negative for discharge and visual disturbance.   Respiratory: Negative for chest tightness and wheezing.    Cardiovascular: Negative for chest pain and palpitations.   Gastrointestinal: Positive for diarrhea. Negative for blood in stool, constipation and vomiting.   Endocrine: Positive for polydipsia and polyuria.   Genitourinary: Negative for difficulty urinating, dysuria, hematuria and menstrual problem.   Musculoskeletal: Positive for arthralgias, joint swelling and neck pain.   Neurological: Positive for weakness and headaches.   Psychiatric/Behavioral: Positive for dysphoric mood. Negative for confusion.       Past Medical History:   Diagnosis Date    Anxiety     Arthritis     Asthma     BMI 50.0-59.9, adult     Depression     Fibromyalgia     GERD (gastroesophageal reflux disease)     Obesity     SHARON (obstructive sleep " apnea)        Past Surgical History:   Procedure Laterality Date    CARPAL TUNNEL RELEASE      CHOLECYSTECTOMY      ESOPHAGOGASTRODUODENOSCOPY N/A 5/21/2019    Procedure: EGD (ESOPHAGOGASTRODUODENOSCOPY);  Surgeon: Michelle Mc MD;  Location[facility];  Service: Endoscopy;  Laterality: N/A;    HYSTERECTOMY      partial     KNEE SURGERY      pinched nerve      SHOULDER SURGERY      TONSILLECTOMY      WRIST SURGERY      had ganlin cyst       Family History   Problem Relation Age of Onset    Diabetes Mother     Jose Juan's disease Mother     Hypertension Mother     Heart disease Father     Stroke Father     Mental illness Father     Hypertension Father     Diabetes Father     Depression Father     Heart disease Maternal Grandmother     Depression Maternal Grandmother     COPD Maternal Grandfather     Heart disease Maternal Grandfather     Stroke Maternal Grandfather     Kidney disease Paternal Grandmother     Heart disease Paternal Grandmother     Heart disease Paternal Grandfather     No Known Problems Brother     Hypertension Son        Social History     Socioeconomic History    Marital status:      Spouse name: Not on file    Number of children: Not on file    Years of education: Not on file    Highest education level: Not on file   Occupational History    Not on file   Social Needs    Financial resource strain: Not very hard    Food insecurity:     Worry: Never true     Inability: Never true    Transportation needs:     Medical: No     Non-medical: No   Tobacco Use    Smoking status: Former Smoker     Types: Vaping with nicotine    Smokeless tobacco: Never Used    Tobacco comment: Quit vaping 7/19, 1/15/19 vaping with 12% nicotine. Smoking cessation refused will quit cold turkey.   Substance and Sexual Activity    Alcohol use: No     Frequency: Monthly or less     Drinks per session: 1 or 2     Binge frequency: Never    Drug use: No    Sexual activity: Yes      Partners: Male   Lifestyle    Physical activity:     Days per week: 1 day     Minutes per session: 30 min    Stress: Rather much   Relationships    Social connections:     Talks on phone: More than three times a week     Gets together: Patient refused     Attends [employer] service: Not on file     Active member of club or organization: No     Attends meetings of clubs or organizations: Never     Relationship status:    Other Topics Concern    Not on file   Social History Narrative    Not on file       Current Outpatient Medications   Medication Sig Dispense Refill    acetaZOLAMIDE (DIAMOX) 250 MG tablet 2 (two) times daily.       albuterol (PROVENTIL/VENTOLIN HFA) 90 mcg/actuation inhaler Inhale 2 puffs into the lungs every 6 (six) hours as needed for Wheezing. Rescue 18 g 2    clindamycin (CLEOCIN) 150 MG capsule ADD 3 CAPSULES TO THE SOAKING DEVICE AND ALLOW WATER TO AGITATE. PLACE AFFECTED AREAS INTO WATER AND SOAK FOR 20 MINUTES ONCE DAILY  1    ergocalciferol (ERGOCALCIFEROL) 50,000 unit Cap Take 1 capsule (50,000 Units total) by mouth every 7 days. for 12 doses 12 capsule 0    fluticasone propionate (FLONASE) 50 mcg/actuation nasal spray 1 spray (50 mcg total) by Each Nostril route once daily. 1 Bottle 0    furosemide (LASIX) 40 MG tablet Take 40 mg by mouth 2 (two) times daily.      gabapentin (NEURONTIN) 600 MG tablet Take 1 tablet (600 mg total) by mouth 2 (two) times daily. 60 tablet 2    gabapentin (NEURONTIN) 800 MG tablet Take 1 tablet (800 mg total) by mouth once daily. 90 tablet 2    hydrocodone-acetaminophen (HYCET) solution 7.5-325 mg/15mL Take 15 mLs by mouth 4 (four) times daily as needed for Pain. 473 mL 0    ketoconazole (NIZORAL) 2 % cream Apply topically once daily. (Patient not taking: Reported on 10/1/2019) 1 Tube 3    lithium (LITHOTAB) 300 mg tablet Take 1 tablet (300 mg total) by mouth 3 (three) times daily. 270 tablet 1    loratadine (CLARITIN) 10 mg tablet   0     LORazepam (ATIVAN) 1 MG tablet Take 1 tablet (1 mg total) by mouth as needed for Anxiety. 30 tablet 2    meclizine (ANTIVERT) 25 mg tablet Take 1 tablet (25 mg total) by mouth 3 (three) times daily as needed for Dizziness or Nausea. 30 tablet 0    methocarbamol (ROBAXIN) 750 MG Tab Take 750 mg by mouth 4 (four) times daily.       nabumetone (RELAFEN) 750 MG tablet Take 1 tablet (750 mg total) by mouth 2 (two) times daily. 180 tablet 2    omeprazole (PRILOSEC) 20 MG capsule Take 1 capsule (20 mg total) by mouth 2 (two) times daily. 90 capsule 1    ondansetron (ZOFRAN) 4 MG tablet   0    ondansetron (ZOFRAN-ODT) 8 MG TbDL Dissolve 1 tablet (8 mg total) by mouth every 6 (six) hours as needed. 30 tablet 0    oxybutynin (DITROPAN) 5 MG Tab Take 1 tablet (5 mg total) by mouth 3 (three) times daily. 180 tablet 1    phentermine (ADIPEX-P) 37.5 mg tablet Take 37.5 mg by mouth before breakfast.      polyethylene glycol (GLYCOLAX) 17 gram/dose powder Mix 1 capful (17 g) with fluids and drink by mouth once daily. 510 g 3    potassium chloride (KLOR-CON) 10 MEQ TbSR Take 10 mEq by mouth 2 (two) times daily.      risperiDONE (RISPERDAL) 0.5 MG Tab Take 1 tablet (0.5 mg total) by mouth 2 (two) times daily. 180 tablet 1    sertraline (ZOLOFT) 100 MG tablet Take 1.5 tablets (150 mg total) by mouth once daily. 45 tablet 3    terbinafine HCl (LAMISIL) 250 mg tablet PLACE 3 TABLETS INTO THE SOAKING DEVICE AND ALLOW WATER TO AGITATE. PLACE AFFECTED AREAS INTO WATER AND SOAK FOR 20 MINUTES ONCE DAILY  1    topiramate (TOPAMAX) 200 MG Tab Take 1 tablet (200 mg total) by mouth once daily. 90 tablet 1     No current facility-administered medications for this visit.        Review of patient's allergies indicates:   Allergen Reactions    Vicodin [hydrocodone-acetaminophen] Nausea And Vomiting     Objective:       Vitals:    09/10/19 1400   BP: 110/76   BP Location: Left arm   Patient Position: Sitting   BP Method: Large  "(Manual)   Pulse: 72   Resp: 20   Temp: 98.6 °F (37 °C)   TempSrc: Oral   SpO2: 96%   Weight: (!) 152 kg (335 lb 1.6 oz)   Height: 5' 7" (1.702 m)     Physical Exam   Constitutional: She is oriented to person, place, and time. She appears well-developed and well-nourished.   HENT:   Head: Normocephalic and atraumatic.   Eyes: Pupils are equal, round, and reactive to light. EOM are normal.   Erythematous conjunctivae   Neck: Normal range of motion. Neck supple. No thyromegaly present.   Cardiovascular: Normal rate, regular rhythm, normal heart sounds and intact distal pulses. Exam reveals no gallop and no friction rub.   No murmur heard.  Pulmonary/Chest: Effort normal and breath sounds normal. No stridor. She has no wheezes. She has no rales.   Abdominal: Soft. Bowel sounds are normal. There is no tenderness.   Musculoskeletal: She exhibits tenderness.   Tenderness to palpation just medial to the R inferior patella. Kiersten test positive on R knee   Neurological: She is alert and oriented to person, place, and time. No sensory deficit.       Assessment:       1. Need for influenza vaccination    2. Maxillary sinusitis, unspecified chronicity    3. Acute bacterial sinusitis        5. Acute pain of right knee    6. Mass of skin of left shoulder    7. Polydipsia    8. Abnormal finding of blood chemistry         Plan:       Acute bacterial sinusitis  -     azithromycin (Z-ERIN) 250 MG tablet; Take 2 tablets by mouth on day 1; Take 1 tablet by mouth on days 2-5  Dispense: 6 tablet; Refill: 0  -     fluticasone propionate (FLONASE) 50 mcg/actuation nasal spray; 1 spray (50 mcg total) by Each Nostril route once daily.  Dispense: 1 Bottle; Refill: 0  -     methylPREDNISolone sodium succinate injection 125 mg    Need for influenza vaccination  -     Influenza - Quadrivalent (3 years & older) (PF)    Maxillary sinusitis, unspecified chronicity        - fluticasone propionate (FLONASE) 50 mcg/actuation nasal spray; 1 spray (50 " mcg total) by Each Nostril route once daily.  Dispense: 1 Bottle; Refill: 0    Acute pain of right knee  -     Ambulatory referral/consult to Orthopedics; Future; Expected date: 09/10/2019    Mass of skin of left shoulder  -     US Soft Tissue Misc; Future; Expected date: 09/10/2019    Polydipsia  -     Hemoglobin A1c; Future; Expected date: 09/10/2019  -     Comprehensive metabolic panel; Future; Expected date: 09/10/2019

## 2019-09-10 NOTE — PROGRESS NOTES
Patient given solumedrol 125 mg injection right ventrogluteal, tolerated well, no complaints, no reaction noted  Patient given influenza vaccine left deltoi, tolerated well, no complaints, no reaction noted

## 2019-09-17 ENCOUNTER — HOSPITAL ENCOUNTER (OUTPATIENT)
Dept: RADIOLOGY | Facility: HOSPITAL | Age: 49
Discharge: HOME | End: 2019-09-17
Attending: FAMILY MEDICINE
Payer: MEDICARE

## 2019-09-17 ENCOUNTER — TELEPHONE (OUTPATIENT)
Dept: FAMILY MEDICINE | Facility: CLINIC | Age: 49
End: 2019-09-17

## 2019-09-17 DIAGNOSIS — R22.32 MASS OF SKIN OF LEFT SHOULDER: Primary | ICD-10-CM

## 2019-09-17 DIAGNOSIS — R22.32 MASS OF SKIN OF LEFT SHOULDER: ICD-10-CM

## 2019-09-17 PROCEDURE — 76882 US LMTD JT/FCL EVL NVASC XTR: CPT | Mod: TC,LT

## 2019-09-17 PROCEDURE — 76882 US LMTD JT/FCL EVL NVASC XTR: CPT | Mod: 26,LT,, | Performed by: RADIOLOGY

## 2019-09-17 PROCEDURE — 76882 US EXTREMITY NON VASCULAR LIMITED LEFT: ICD-10-PCS | Mod: 26,LT,, | Performed by: RADIOLOGY

## 2019-09-17 NOTE — TELEPHONE ENCOUNTER
----- Message from Molly Olivarez PA-C sent at 9/17/2019  4:55 PM CDT -----  Unable to categorize mass on US, recommend referral to general surgery for further eval

## 2019-09-17 NOTE — TELEPHONE ENCOUNTER
Patient notified of results as noted below, patient verbalized understanding. Patient ok with referral, notified of referral process, verbalized understanding

## 2019-09-18 ENCOUNTER — PATIENT MESSAGE (OUTPATIENT)
Dept: FAMILY MEDICINE | Facility: CLINIC | Age: 49
End: 2019-09-18

## 2019-09-18 ENCOUNTER — PATIENT MESSAGE (OUTPATIENT)
Dept: BARIATRICS | Facility: CLINIC | Age: 49
End: 2019-09-18

## 2019-09-19 ENCOUNTER — PATIENT MESSAGE (OUTPATIENT)
Dept: FAMILY MEDICINE | Facility: CLINIC | Age: 49
End: 2019-09-19

## 2019-09-23 ENCOUNTER — TELEPHONE (OUTPATIENT)
Dept: BARIATRICS | Facility: CLINIC | Age: 49
End: 2019-09-23

## 2019-09-23 DIAGNOSIS — Z98.84 S/P LAPAROSCOPIC SLEEVE GASTRECTOMY: ICD-10-CM

## 2019-09-23 DIAGNOSIS — K21.9 GASTROESOPHAGEAL REFLUX DISEASE, ESOPHAGITIS PRESENCE NOT SPECIFIED: ICD-10-CM

## 2019-09-23 DIAGNOSIS — G47.33 OSA (OBSTRUCTIVE SLEEP APNEA): ICD-10-CM

## 2019-09-23 DIAGNOSIS — K91.2 POSTSURGICAL MALABSORPTION, NOT ELSEWHERE CLASSIFIED: ICD-10-CM

## 2019-09-23 DIAGNOSIS — R73.9 HYPERGLYCEMIA: ICD-10-CM

## 2019-09-23 DIAGNOSIS — I27.20 PULMONARY HYPERTENSION: ICD-10-CM

## 2019-09-23 DIAGNOSIS — E66.01 MORBID OBESITY: Primary | ICD-10-CM

## 2019-09-23 DIAGNOSIS — R79.89 LOW VITAMIN D LEVEL: ICD-10-CM

## 2019-09-23 NOTE — TELEPHONE ENCOUNTER
Spoke with patient and scheduled preop appts/ surgery date/ post op appts. All dates and times agreed upon. Pt aware that they must have PCP clearance within 60 days of surgery- it should be dated, signed and in chart for preop appointment. Pt aware that protein liquid diet start date is 10/4/19.  Screened patient for history of UTI per protocol.   Discussed with patient to avoid antibiotics and elective procedures involving sedation/anesthesia within 30 days of surgery.  Patient instructed to call the bariatric clinic post op for any s/s of UTI.  Pt aware of all appts can be seen in my [facility] patient portal at this time and appt reminders mailed to patient's address today by RN.  Office phone and fax number given to patient for any future questions/concerns.

## 2019-09-24 ENCOUNTER — PATIENT MESSAGE (OUTPATIENT)
Dept: INTENSIVE CARE | Facility: OTHER | Age: 49
End: 2019-09-24

## 2019-09-24 ENCOUNTER — PATIENT MESSAGE (OUTPATIENT)
Dept: FAMILY MEDICINE | Facility: CLINIC | Age: 49
End: 2019-09-24

## 2019-09-24 NOTE — PROGRESS NOTES
Based on my evaluation on September 6 of this year, patient is stable at this time for scheduled bariatric surgery. She was cognitively intact at the time of the evaluation and in agreement with the plan.

## 2019-09-25 ENCOUNTER — HOSPITAL ENCOUNTER (OUTPATIENT)
Dept: RADIOLOGY | Facility: HOSPITAL | Age: 49
Discharge: HOME | End: 2019-09-25
Attending: FAMILY MEDICINE
Payer: MEDICARE

## 2019-09-25 ENCOUNTER — OFFICE VISIT (OUTPATIENT)
Dept: FAMILY MEDICINE | Facility: CLINIC | Age: 49
End: 2019-09-25
Payer: MEDICARE

## 2019-09-25 VITALS
SYSTOLIC BLOOD PRESSURE: 116 MMHG | BODY MASS INDEX: 45.99 KG/M2 | DIASTOLIC BLOOD PRESSURE: 82 MMHG | HEART RATE: 71 BPM | HEIGHT: 67 IN | WEIGHT: 293 LBS | TEMPERATURE: 99 F | RESPIRATION RATE: 20 BRPM | OXYGEN SATURATION: 97 %

## 2019-09-25 DIAGNOSIS — M79.7 FIBROMYALGIA: ICD-10-CM

## 2019-09-25 DIAGNOSIS — Z01.818 PREOP EXAMINATION: ICD-10-CM

## 2019-09-25 DIAGNOSIS — E66.01 MORBID OBESITY: ICD-10-CM

## 2019-09-25 DIAGNOSIS — Z01.818 PREOP EXAMINATION: Primary | ICD-10-CM

## 2019-09-25 PROCEDURE — 99999 PR PBB SHADOW E&M-EST. PATIENT-LVL III: ICD-10-PCS | Mod: PBBFAC,,, | Performed by: FAMILY MEDICINE

## 2019-09-25 PROCEDURE — 99999 PR PBB SHADOW E&M-EST. PATIENT-LVL III: CPT | Mod: PBBFAC,,, | Performed by: FAMILY MEDICINE

## 2019-09-25 PROCEDURE — 93010 ELECTROCARDIOGRAM REPORT: CPT | Mod: S$PBB,,, | Performed by: INTERNAL MEDICINE

## 2019-09-25 PROCEDURE — 99214 PR OFFICE/OUTPT VISIT, EST, LEVL IV, 30-39 MIN: ICD-10-PCS | Mod: S$PBB,,, | Performed by: FAMILY MEDICINE

## 2019-09-25 PROCEDURE — 71045 X-RAY EXAM CHEST 1 VIEW: CPT | Mod: 26,,, | Performed by: RADIOLOGY

## 2019-09-25 PROCEDURE — 99213 OFFICE O/P EST LOW 20 MIN: CPT | Mod: PBBFAC,25,PO | Performed by: FAMILY MEDICINE

## 2019-09-25 PROCEDURE — 71045 XR CHEST 1 VIEW: ICD-10-PCS | Mod: 26,,, | Performed by: RADIOLOGY

## 2019-09-25 PROCEDURE — 99214 OFFICE O/P EST MOD 30 MIN: CPT | Mod: S$PBB,,, | Performed by: FAMILY MEDICINE

## 2019-09-25 PROCEDURE — 93010 EKG 12-LEAD: ICD-10-PCS | Mod: S$PBB,,, | Performed by: INTERNAL MEDICINE

## 2019-09-25 PROCEDURE — 71045 X-RAY EXAM CHEST 1 VIEW: CPT | Mod: TC,FY,PO

## 2019-09-25 PROCEDURE — 93005 ELECTROCARDIOGRAM TRACING: CPT | Mod: PBBFAC,PO | Performed by: INTERNAL MEDICINE

## 2019-09-25 NOTE — PROGRESS NOTES
Subjective:       Patient ID: Zaira Dowell is a 48 y.o. female.    Chief Complaint: Pre-op Exam    HPI   49 yo female presents for preop. Is scheduled for a gastric sleeve in 10/2019. Pt states she is doing well overall. Had tx for her h. Pylori. Is on vit d supplement. Denies issues w/ anesthesia in the past. Denies any other issues at this time.    Review of Systems   Constitutional: Negative for activity change and unexpected weight change.   HENT: Negative for hearing loss, rhinorrhea and trouble swallowing.    Eyes: Positive for visual disturbance. Negative for discharge.   Respiratory: Negative for chest tightness and wheezing.    Cardiovascular: Negative for chest pain and palpitations.   Gastrointestinal: Positive for constipation and diarrhea. Negative for blood in stool and vomiting.   Endocrine: Positive for polydipsia and polyuria.   Genitourinary: Negative for difficulty urinating, dysuria, hematuria and menstrual problem.   Musculoskeletal: Positive for arthralgias, joint swelling and neck pain.   Neurological: Positive for headaches. Negative for weakness.   Psychiatric/Behavioral: Positive for dysphoric mood. Negative for confusion.          Past Medical History:   Diagnosis Date    Anxiety     Arthritis     Asthma     Depression     Fibromyalgia     GERD (gastroesophageal reflux disease)     Obesity     SHARON (obstructive sleep apnea)      Past Surgical History:   Procedure Laterality Date    CARPAL TUNNEL RELEASE      CHOLECYSTECTOMY      ESOPHAGOGASTRODUODENOSCOPY N/A 5/21/2019    Procedure: EGD (ESOPHAGOGASTRODUODENOSCOPY);  Surgeon: Michelle Mc MD;  Location: [facility];  Service: Endoscopy;  Laterality: N/A;    HYSTERECTOMY      partial     KNEE SURGERY      pinched nerve      SHOULDER SURGERY      TONSILLECTOMY      WRIST SURGERY      had ganlin cyst     Family History   Problem Relation Age of Onset    Diabetes Mother     Jose Juan's disease Mother     Hypertension  Mother     Heart disease Father     Stroke Father     Mental illness Father     Hypertension Father     Diabetes Father     Depression Father     Heart disease Maternal Grandmother     Depression Maternal Grandmother     COPD Maternal Grandfather     Heart disease Maternal Grandfather     Stroke Maternal Grandfather     Kidney disease Paternal Grandmother     Heart disease Paternal Grandmother     Heart disease Paternal Grandfather     No Known Problems Brother     Hypertension Son      Social History     Socioeconomic History    Marital status:      Spouse name: Not on file    Number of children: Not on file    Years of education: Not on file    Highest education level: Not on file   Occupational History    Not on file   Social Needs    Financial resource strain: Not very hard    Food insecurity:     Worry: Never true     Inability: Never true    Transportation needs:     Medical: No     Non-medical: No   Tobacco Use    Smoking status: Former Smoker     Types: Vaping with nicotine    Smokeless tobacco: Never Used    Tobacco comment: 1/15/19 vaping with 12% nicotine. Smoking cessation refused will quit cold turkey.   Substance and Sexual Activity    Alcohol use: No     Frequency: Monthly or less     Drinks per session: 1 or 2     Binge frequency: Never    Drug use: No    Sexual activity: Yes     Partners: Male   Lifestyle    Physical activity:     Days per week: 1 day     Minutes per session: 30 min    Stress: Rather much   Relationships    Social connections:     Talks on phone: More than three times a week     Gets together: Patient refused     Attends [employer] service: Not on file     Active member of club or organization: No     Attends meetings of clubs or organizations: Never     Relationship status:    Other Topics Concern    Not on file   Social History Narrative    Not on file       Current Outpatient Medications:     acetaZOLAMIDE (DIAMOX) 250 MG tablet, 2  (two) times daily. , Disp: , Rfl:     albuterol (PROVENTIL/VENTOLIN HFA) 90 mcg/actuation inhaler, Inhale 2 puffs into the lungs every 6 (six) hours as needed for Wheezing. Rescue, Disp: 18 g, Rfl: 2    clindamycin (CLEOCIN) 150 MG capsule, ADD 3 CAPSULES TO THE SOAKING DEVICE AND ALLOW WATER TO AGITATE. PLACE AFFECTED AREAS INTO WATER AND SOAK FOR 20 MINUTES ONCE DAILY, Disp: , Rfl: 1    ergocalciferol (ERGOCALCIFEROL) 50,000 unit Cap, Take 1 capsule (50,000 Units total) by mouth every 7 days. for 12 doses, Disp: 12 capsule, Rfl: 0    fluticasone propionate (FLONASE) 50 mcg/actuation nasal spray, 1 spray (50 mcg total) by Each Nostril route once daily., Disp: 1 Bottle, Rfl: 0    furosemide (LASIX) 40 MG tablet, Take 40 mg by mouth 2 (two) times daily., Disp: , Rfl:     gabapentin (NEURONTIN) 600 MG tablet, Take 1 tablet (600 mg total) by mouth 2 (two) times daily., Disp: 60 tablet, Rfl: 2    gabapentin (NEURONTIN) 800 MG tablet, Take 1 tablet (800 mg total) by mouth once daily., Disp: 90 tablet, Rfl: 2    ketoconazole (NIZORAL) 2 % cream, Apply topically once daily., Disp: 1 Tube, Rfl: 3    lithium (LITHOTAB) 300 mg tablet, Take 1 tablet (300 mg total) by mouth 3 (three) times daily., Disp: 270 tablet, Rfl: 1    loratadine (CLARITIN) 10 mg tablet, , Disp: , Rfl: 0    LORazepam (ATIVAN) 1 MG tablet, Take 1 tablet (1 mg total) by mouth as needed for Anxiety., Disp: 30 tablet, Rfl: 2    meclizine (ANTIVERT) 25 mg tablet, Take 1 tablet (25 mg total) by mouth 3 (three) times daily as needed for Dizziness or Nausea., Disp: 30 tablet, Rfl: 0    methocarbamol (ROBAXIN) 750 MG Tab, Take 750 mg by mouth 4 (four) times daily. , Disp: , Rfl:     nabumetone (RELAFEN) 750 MG tablet, Take 1 tablet (750 mg total) by mouth 2 (two) times daily., Disp: 180 tablet, Rfl: 2    omeprazole (PRILOSEC) 20 MG capsule, Take 1 capsule (20 mg total) by mouth 2 (two) times daily., Disp: 90 capsule, Rfl: 1    ondansetron (ZOFRAN)  "4 MG tablet, , Disp: , Rfl: 0    oxybutynin (DITROPAN) 5 MG Tab, Take 1 tablet (5 mg total) by mouth 3 (three) times daily., Disp: 180 tablet, Rfl: 1    phentermine (ADIPEX-P) 37.5 mg tablet, Take 37.5 mg by mouth before breakfast., Disp: , Rfl:     potassium chloride (KLOR-CON) 10 MEQ TbSR, Take 10 mEq by mouth 2 (two) times daily., Disp: , Rfl:     risperiDONE (RISPERDAL) 0.5 MG Tab, Take 1 tablet (0.5 mg total) by mouth 2 (two) times daily., Disp: 180 tablet, Rfl: 1    sertraline (ZOLOFT) 100 MG tablet, Take 1.5 tablets (150 mg total) by mouth once daily., Disp: 45 tablet, Rfl: 3    terbinafine HCl (LAMISIL) 250 mg tablet, PLACE 3 TABLETS INTO THE SOAKING DEVICE AND ALLOW WATER TO AGITATE. PLACE AFFECTED AREAS INTO WATER AND SOAK FOR 20 MINUTES ONCE DAILY, Disp: , Rfl: 1    topiramate (TOPAMAX) 200 MG Tab, Take 1 tablet (200 mg total) by mouth once daily., Disp: 90 tablet, Rfl: 1   Objective:      Vitals:    09/25/19 0803   BP: 116/82   BP Location: Left arm   Patient Position: Sitting   BP Method: Large (Manual)   Pulse: 71   Resp: 20   Temp: 98.6 °F (37 °C)   TempSrc: Oral   SpO2: 97%   Weight: (!) 151.2 kg (333 lb 5.4 oz)   Height: 5' 7" (1.702 m)       Physical Exam   Constitutional: She is oriented to person, place, and time. No distress.   HENT:   Head: Normocephalic and atraumatic.   Eyes: Conjunctivae are normal.   Neck: Neck supple.   Cardiovascular: Normal rate, regular rhythm and normal heart sounds. Exam reveals no gallop and no friction rub.   No murmur heard.  Pulmonary/Chest: Effort normal and breath sounds normal. She has no wheezes. She has no rales.   Neurological: She is alert and oriented to person, place, and time.   Skin: Skin is warm and dry.   Psychiatric: She has a normal mood and affect. Her behavior is normal. Judgment and thought content normal.          Assessment:       1. Preop examination    2. Morbid obesity        Plan:       Preop examination  - EKG was wnl. CXR was wnl. " Lab work wnl. Pt is cleared for surgery.  - CBC auto differential; Future; Expected date: 09/25/2019  -     Protime-INR; Future; Expected date: 09/25/2019  -     IN OFFICE EKG 12-LEAD (to Muse)  -     X-Ray Chest 1 View; Future; Expected date: 09/25/2019    Morbid obesity              Burak Rosales MD

## 2019-09-27 ENCOUNTER — OFFICE VISIT (OUTPATIENT)
Dept: PULMONOLOGY | Facility: CLINIC | Age: 49
End: 2019-09-27
Payer: MEDICARE

## 2019-09-27 VITALS
HEART RATE: 82 BPM | OXYGEN SATURATION: 79 % | HEIGHT: 67 IN | SYSTOLIC BLOOD PRESSURE: 144 MMHG | DIASTOLIC BLOOD PRESSURE: 89 MMHG | BODY MASS INDEX: 45.99 KG/M2 | WEIGHT: 293 LBS

## 2019-09-27 DIAGNOSIS — E66.01 MORBID OBESITY: ICD-10-CM

## 2019-09-27 DIAGNOSIS — G47.33 OSA (OBSTRUCTIVE SLEEP APNEA): ICD-10-CM

## 2019-09-27 PROCEDURE — 99214 OFFICE O/P EST MOD 30 MIN: CPT | Mod: PBBFAC | Performed by: INTERNAL MEDICINE

## 2019-09-27 PROCEDURE — 99999 PR PBB SHADOW E&M-EST. PATIENT-LVL IV: ICD-10-PCS | Mod: PBBFAC,,, | Performed by: INTERNAL MEDICINE

## 2019-09-27 PROCEDURE — 99999 PR PBB SHADOW E&M-EST. PATIENT-LVL IV: CPT | Mod: PBBFAC,,, | Performed by: INTERNAL MEDICINE

## 2019-09-27 PROCEDURE — 99214 PR OFFICE/OUTPT VISIT, EST, LEVL IV, 30-39 MIN: ICD-10-PCS | Mod: S$PBB,,, | Performed by: INTERNAL MEDICINE

## 2019-09-27 PROCEDURE — 99214 OFFICE O/P EST MOD 30 MIN: CPT | Mod: S$PBB,,, | Performed by: INTERNAL MEDICINE

## 2019-09-27 NOTE — ASSESSMENT & PLAN NOTE
unable to obtained record of sleep study.  Interrogation of cpap confirmed pressure of 7-15.  Poor compliance with apap.  Low residual ahi when using apap.  Compliance requirement d/w patient.  Poor compliance may relate to motivation vs claustrophobia.  doing better with nasal eson mask.  Encourage nightly usage>4 hours.  Patient is optimized from sleep and pulmonary perspective for bariatric surgery.

## 2019-09-27 NOTE — PROGRESS NOTES
Zaira Dowell  was seen as a follow up.    CHIEF COMPLAINT:    Chief Complaint   Patient presents with    Apnea       HISTORY OF PRESENT ILLNESS: Zaira Dowell is a 48 y.o. female is here for sleep evaluation.   Our first encounter was 4/20/18.  Patient was diagnosed with arturo 10/17 in [place].  Patient was using cpap 7 cm H20.  Patient moved to [place] 11/17.  Patient is using cpap nightly without issue.  Sleep quality much improve with cpap.      With cpap, patient denied snoring.  No witnessed apnea.  Feeling rested upon awake.  No parasomnia.  No cataplexy.  No rls symptoms.    [place] Sleepiness Scale score during initial sleep evaluation was 18.  Today's ESS 14    Patient was switched from cpap 7 cm H20 to APAP during initial evaluation.  Patient has been using apap routinely for past 2 weeks.  Patient stopped apap for few days due to skin irritation.  Patient is scheduled for bariatric surgery.      SLEEP ROUTINE:  Activity the hour prior to sleep: tablet in bed    Bed partner:  fiprema  Time to bed:  11 pm   Lights off:  off  Sleep onset latency:  5 minutes        Disruptions or awakenings:    3-5 times     Wakeup time:      7 am   Perceived sleep quality:  rested       Daytime naps:      Rarely.    Weekend sleep routine:      same  Caffeine use: 5-6 diet Dr. 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 tophus 02/19/2018    History of tobacco abuse 07/17/2018    Morbid obesity 07/17/2018    ARTURO (obstructive sleep apnea) 07/17/2018    Trochanteric bursitis of both hips 01/10/2019    Trochanteric bursitis 01/21/2019    Pain of both hip joints 01/21/2019    GERD (gastroesophageal reflux disease) 05/21/2019     Pseudotumor cerebri 05/23/2019    Mild intermittent asthma without complication 07/03/2019     Resolved Ambulatory Problems     Diagnosis Date Noted    No Resolved Ambulatory Problems     Past Medical History:   Diagnosis Date    Arthritis     Asthma     Obesity                 PAST SURGICAL HISTORY:    Past Surgical History:   Procedure Laterality Date    CARPAL TUNNEL RELEASE      CHOLECYSTECTOMY      ESOPHAGOGASTRODUODENOSCOPY N/A 5/21/2019    Procedure: EGD (ESOPHAGOGASTRODUODENOSCOPY);  Surgeon: Michelle Mc MD;  Location: [facility];  Service: Endoscopy;  Laterality: N/A;    HYSTERECTOMY      partial     KNEE SURGERY      pinched nerve      SHOULDER SURGERY      TONSILLECTOMY      WRIST SURGERY      had ganlin cyst         FAMILY HISTORY:                Family History   Problem Relation Age of Onset    Diabetes Mother     Hyde's disease Mother     Hypertension Mother     Heart disease Father     Stroke Father     Mental illness Father     Hypertension Father     Diabetes Father     Depression Father     Heart disease Maternal Grandmother     Depression Maternal Grandmother     COPD Maternal Grandfather     Heart disease Maternal Grandfather     Stroke Maternal Grandfather     Kidney disease Paternal Grandmother     Heart disease Paternal Grandmother     Heart disease Paternal Grandfather     No Known Problems Brother     Hypertension Son        SOCIAL HISTORY:          Tobacco:   Social History     Tobacco Use   Smoking Status Former Smoker    Types: Vaping with nicotine   Smokeless Tobacco Never Used   Tobacco Comment    1/15/19 vaping with 12% nicotine. Smoking cessation refused will quit cold turkey.       alcohol use:    Social History     Substance and Sexual Activity   Alcohol Use No    Frequency: Monthly or less    Drinks per session: 1 or 2    Binge frequency: Never                 Occupation:  disable    ALLERGIES:    Review of patient's allergies indicates:    Allergen Reactions    Vicodin [hydrocodone-acetaminophen] Nausea And Vomiting       CURRENT MEDICATIONS:    Current Outpatient Medications   Medication Sig Dispense Refill    acetaZOLAMIDE (DIAMOX) 250 MG tablet 2 (two) times daily.       albuterol (PROVENTIL/VENTOLIN HFA) 90 mcg/actuation inhaler Inhale 2 puffs into the lungs every 6 (six) hours as needed for Wheezing. Rescue 18 g 2    clindamycin (CLEOCIN) 150 MG capsule ADD 3 CAPSULES TO THE SOAKING DEVICE AND ALLOW WATER TO AGITATE. PLACE AFFECTED AREAS INTO WATER AND SOAK FOR 20 MINUTES ONCE DAILY  1    ergocalciferol (ERGOCALCIFEROL) 50,000 unit Cap Take 1 capsule (50,000 Units total) by mouth every 7 days. for 12 doses 12 capsule 0    fluticasone propionate (FLONASE) 50 mcg/actuation nasal spray 1 spray (50 mcg total) by Each Nostril route once daily. 1 Bottle 0    furosemide (LASIX) 40 MG tablet Take 40 mg by mouth 2 (two) times daily.      gabapentin (NEURONTIN) 600 MG tablet Take 1 tablet (600 mg total) by mouth 2 (two) times daily. 60 tablet 2    gabapentin (NEURONTIN) 800 MG tablet Take 1 tablet (800 mg total) by mouth once daily. 90 tablet 2    ketoconazole (NIZORAL) 2 % cream Apply topically once daily. 1 Tube 3    lithium (LITHOTAB) 300 mg tablet Take 1 tablet (300 mg total) by mouth 3 (three) times daily. 270 tablet 1    loratadine (CLARITIN) 10 mg tablet   0    LORazepam (ATIVAN) 1 MG tablet Take 1 tablet (1 mg total) by mouth as needed for Anxiety. 30 tablet 2    meclizine (ANTIVERT) 25 mg tablet Take 1 tablet (25 mg total) by mouth 3 (three) times daily as needed for Dizziness or Nausea. 30 tablet 0    methocarbamol (ROBAXIN) 750 MG Tab Take 750 mg by mouth 4 (four) times daily.       nabumetone (RELAFEN) 750 MG tablet Take 1 tablet (750 mg total) by mouth 2 (two) times daily. 180 tablet 2    omeprazole (PRILOSEC) 20 MG capsule Take 1 capsule (20 mg total) by mouth 2 (two) times daily. 90 capsule 1    ondansetron (ZOFRAN) 4 MG  "tablet   0    oxybutynin (DITROPAN) 5 MG Tab Take 1 tablet (5 mg total) by mouth 3 (three) times daily. 180 tablet 1    phentermine (ADIPEX-P) 37.5 mg tablet Take 37.5 mg by mouth before breakfast.      potassium chloride (KLOR-CON) 10 MEQ TbSR Take 10 mEq by mouth 2 (two) times daily.      risperiDONE (RISPERDAL) 0.5 MG Tab Take 1 tablet (0.5 mg total) by mouth 2 (two) times daily. 180 tablet 1    sertraline (ZOLOFT) 100 MG tablet Take 1.5 tablets (150 mg total) by mouth once daily. 45 tablet 3    terbinafine HCl (LAMISIL) 250 mg tablet PLACE 3 TABLETS INTO THE SOAKING DEVICE AND ALLOW WATER TO AGITATE. PLACE AFFECTED AREAS INTO WATER AND SOAK FOR 20 MINUTES ONCE DAILY  1    topiramate (TOPAMAX) 200 MG Tab Take 1 tablet (200 mg total) by mouth once daily. 90 tablet 1     No current facility-administered medications for this visit.                   REVIEW OF SYSTEMS:     Sleep related symptoms as per HPI.  CONST:Denies weight gain    HEENT: + sinus congestion  PULM: Denies dyspnea  CARD:  Denies palpitations   GI:  +intermittent acid reflux  : Denies polyuria  NEURO: + headaches that improve with cpap  PSYCH: Denies mood disturbance  HEME: Denies anemia   Otherwise, a balance of systems reviewed is negative.          PHYSICAL EXAM:  Vitals:    09/27/19 0956   BP: (!) 144/89   Pulse: 82   SpO2: (!) 79%   Weight: (!) 150.7 kg (332 lb 3.7 oz)   Height: 5' 7" (1.702 m)   PainSc:   7   PainLoc: Knee     Body mass index is 52.04 kg/m².     GENERAL: Normal development, well groomed  HEENT:  Conjunctivae are non-erythematous; Pupils equal, round, and reactive to light; Nose is symmetrical; Nasal mucosa is pink and moist; Septum is midline; Inferior turbinates are normal; Nasal airflow is mildly congested; Posterior pharynx is pink; Modified Mallampati: 4; Posterior palate is normal; Tonsils +1; Uvula is normal and pink;Tongue is normal; Dentition is fair; No TMJ tenderness; Jaw opening and protrusion without click " and without discomfort.  NECK: Supple. Neck circumference is 17.5 inches. No thyromegaly. No palpable nodes.     SKIN: On face and neck: No abrasions, no rashes, no lesions.  No subcutaneous nodules are palpable.  RESPIRATORY: mild expiratory wheeze.  Normal chest expansion and non-labored breathing at rest.  CARDIOVASCULAR: Normal S1, S2.  No murmurs, gallops or rubs. No carotid bruits bilaterally.  EXTREMITIES: No edema. No clubbing. No cyanosis. Station normal. Gait normal.        NEURO/PSYCH: Oriented to time, place and person. Normal attention span and concentration. Affect is full. Mood is normal.                                              DATA prior sleep study not available.    Lab Results   Component Value Date    TSH 2.155 06/05/2019     ASSESSMENT  Problem List Items Addressed This Visit     Morbid obesity    Current Assessment & Plan     Planned for bariatric surgery.           SHARON (obstructive sleep apnea)    Overview     ahi unknown.  Original cpap order by Dr. Sanderson.           Current Assessment & Plan     unable to obtained record of sleep study.  Interrogation of cpap confirmed pressure of 7-15.  Poor compliance with apap.  Low residual ahi when using apap.  Compliance requirement d/w patient.  Poor compliance may relate to motivation vs claustrophobia.  doing better with nasal eson mask.  Encourage nightly usage>4 hours.  Patient is optimized from sleep and pulmonary perspective for bariatric surgery.                ashtma - off maintenance without issue.      Insomnia - maintenance is the issue.  Improve with apap.     Nasal congestion - sinus irrigation and nasal steroid.      Education: During our discussion today, we talked about the etiology of obstructive sleep apnea as well as the potential ramifications of untreated sleep apnea, which could include daytime sleepiness, hypertension, heart disease and/or stroke.     Precautions: The patient was advised to abstain from driving should  they feel sleepy or drowsy.     Patient will No follow-ups on file. with md/np.    This is 45 minutes visit, over 50% of time spent in direct consultation with patient.

## 2019-10-01 ENCOUNTER — CLINICAL SUPPORT (OUTPATIENT)
Dept: BARIATRICS | Facility: CLINIC | Age: 49
End: 2019-10-01
Payer: MEDICARE

## 2019-10-01 ENCOUNTER — OFFICE VISIT (OUTPATIENT)
Dept: BARIATRICS | Facility: CLINIC | Age: 49
End: 2019-10-01
Payer: MEDICARE

## 2019-10-01 ENCOUNTER — LAB VISIT (OUTPATIENT)
Dept: LAB | Facility: HOSPITAL | Age: 49
End: 2019-10-01
Payer: MEDICARE

## 2019-10-01 VITALS
WEIGHT: 293 LBS | HEIGHT: 67 IN | HEART RATE: 62 BPM | SYSTOLIC BLOOD PRESSURE: 123 MMHG | DIASTOLIC BLOOD PRESSURE: 64 MMHG | BODY MASS INDEX: 45.99 KG/M2

## 2019-10-01 DIAGNOSIS — I27.20 PULMONARY HYPERTENSION: ICD-10-CM

## 2019-10-01 DIAGNOSIS — K21.9 GASTROESOPHAGEAL REFLUX DISEASE, ESOPHAGITIS PRESENCE NOT SPECIFIED: ICD-10-CM

## 2019-10-01 DIAGNOSIS — E66.01 MORBID OBESITY: Primary | ICD-10-CM

## 2019-10-01 DIAGNOSIS — Z98.84 S/P LAPAROSCOPIC SLEEVE GASTRECTOMY: ICD-10-CM

## 2019-10-01 DIAGNOSIS — G47.33 OSA (OBSTRUCTIVE SLEEP APNEA): ICD-10-CM

## 2019-10-01 DIAGNOSIS — K91.2 POSTSURGICAL MALABSORPTION, NOT ELSEWHERE CLASSIFIED: ICD-10-CM

## 2019-10-01 DIAGNOSIS — E66.01 MORBID OBESITY: ICD-10-CM

## 2019-10-01 DIAGNOSIS — Z09 POSTOP CHECK: ICD-10-CM

## 2019-10-01 DIAGNOSIS — R73.9 HYPERGLYCEMIA: ICD-10-CM

## 2019-10-01 DIAGNOSIS — R79.89 LOW VITAMIN D LEVEL: ICD-10-CM

## 2019-10-01 LAB
BASOPHILS # BLD AUTO: 0.09 K/UL (ref 0–0.2)
BASOPHILS NFR BLD: 0.9 % (ref 0–1.9)
DIFFERENTIAL METHOD: ABNORMAL
EOSINOPHIL # BLD AUTO: 0.2 K/UL (ref 0–0.5)
EOSINOPHIL NFR BLD: 2.3 % (ref 0–8)
ERYTHROCYTE [DISTWIDTH] IN BLOOD BY AUTOMATED COUNT: 13.1 % (ref 11.5–14.5)
HCT VFR BLD AUTO: 42.4 % (ref 37–48.5)
HGB BLD-MCNC: 13.5 G/DL (ref 12–16)
IMM GRANULOCYTES # BLD AUTO: 0.06 K/UL (ref 0–0.04)
IMM GRANULOCYTES NFR BLD AUTO: 0.6 % (ref 0–0.5)
LYMPHOCYTES # BLD AUTO: 2.8 K/UL (ref 1–4.8)
LYMPHOCYTES NFR BLD: 28.6 % (ref 18–48)
MCH RBC QN AUTO: 31.5 PG (ref 27–31)
MCHC RBC AUTO-ENTMCNC: 31.8 G/DL (ref 32–36)
MCV RBC AUTO: 99 FL (ref 82–98)
MONOCYTES # BLD AUTO: 0.8 K/UL (ref 0.3–1)
MONOCYTES NFR BLD: 7.9 % (ref 4–15)
NEUTROPHILS # BLD AUTO: 5.8 K/UL (ref 1.8–7.7)
NEUTROPHILS NFR BLD: 59.7 % (ref 38–73)
NRBC BLD-RTO: 0 /100 WBC
PLATELET # BLD AUTO: 276 K/UL (ref 150–350)
PMV BLD AUTO: 9.8 FL (ref 9.2–12.9)
RBC # BLD AUTO: 4.29 M/UL (ref 4–5.4)
WBC # BLD AUTO: 9.73 K/UL (ref 3.9–12.7)

## 2019-10-01 PROCEDURE — 85025 COMPLETE CBC W/AUTO DIFF WBC: CPT

## 2019-10-01 PROCEDURE — 99211 OFF/OP EST MAY X REQ PHY/QHP: CPT | Mod: PBBFAC | Performed by: DIETITIAN, REGISTERED

## 2019-10-01 PROCEDURE — 99999 PR PBB SHADOW E&M-EST. PATIENT-LVL III: ICD-10-PCS | Mod: PBBFAC,,, | Performed by: SURGERY

## 2019-10-01 PROCEDURE — 99499 UNLISTED E&M SERVICE: CPT | Mod: S$PBB,,, | Performed by: DIETITIAN, REGISTERED

## 2019-10-01 PROCEDURE — 99499 NO LOS: ICD-10-PCS | Mod: S$PBB,,, | Performed by: DIETITIAN, REGISTERED

## 2019-10-01 PROCEDURE — 99999 PR PBB SHADOW E&M-EST. PATIENT-LVL III: CPT | Mod: PBBFAC,,, | Performed by: SURGERY

## 2019-10-01 PROCEDURE — 99999 PR PBB SHADOW E&M-EST. PATIENT-LVL I: ICD-10-PCS | Mod: PBBFAC,,, | Performed by: DIETITIAN, REGISTERED

## 2019-10-01 PROCEDURE — 99999 PR PBB SHADOW E&M-EST. PATIENT-LVL I: CPT | Mod: PBBFAC,,, | Performed by: DIETITIAN, REGISTERED

## 2019-10-01 PROCEDURE — 99214 OFFICE O/P EST MOD 30 MIN: CPT | Mod: S$PBB,,, | Performed by: SURGERY

## 2019-10-01 PROCEDURE — 99214 PR OFFICE/OUTPT VISIT, EST, LEVL IV, 30-39 MIN: ICD-10-PCS | Mod: S$PBB,,, | Performed by: SURGERY

## 2019-10-01 PROCEDURE — 36415 COLL VENOUS BLD VENIPUNCTURE: CPT

## 2019-10-01 PROCEDURE — 99213 OFFICE O/P EST LOW 20 MIN: CPT | Mod: PBBFAC,27 | Performed by: SURGERY

## 2019-10-01 RX ORDER — SODIUM CITRATE AND CITRIC ACID MONOHYDRATE 334; 500 MG/5ML; MG/5ML
15 SOLUTION ORAL ONCE
Status: CANCELLED | OUTPATIENT
Start: 2019-10-01 | End: 2019-10-01

## 2019-10-01 RX ORDER — ONDANSETRON 8 MG/1
8 TABLET, ORALLY DISINTEGRATING ORAL EVERY 6 HOURS PRN
Qty: 30 TABLET | Refills: 0 | Status: SHIPPED | OUTPATIENT
Start: 2019-10-01 | End: 2020-11-23 | Stop reason: SDUPTHER

## 2019-10-01 RX ORDER — METOCLOPRAMIDE HYDROCHLORIDE 5 MG/ML
10 INJECTION INTRAMUSCULAR; INTRAVENOUS ONCE
Status: CANCELLED | OUTPATIENT
Start: 2019-10-01 | End: 2019-10-01

## 2019-10-01 RX ORDER — POLYETHYLENE GLYCOL 3350 17 G/17G
17 POWDER, FOR SOLUTION ORAL DAILY
Qty: 510 G | Refills: 3 | Status: SHIPPED | OUTPATIENT
Start: 2019-10-01 | End: 2021-11-10

## 2019-10-01 RX ORDER — HYDROCODONE BITARTRATE AND ACETAMINOPHEN 7.5; 325 MG/15ML; MG/15ML
15 SOLUTION ORAL 4 TIMES DAILY PRN
Qty: 473 ML | Refills: 0 | Status: SHIPPED | OUTPATIENT
Start: 2019-10-01 | End: 2019-11-04

## 2019-10-01 RX ORDER — HEPARIN SODIUM 5000 [USP'U]/ML
5000 INJECTION, SOLUTION INTRAVENOUS; SUBCUTANEOUS ONCE
Status: CANCELLED | OUTPATIENT
Start: 2019-10-01 | End: 2019-10-01

## 2019-10-01 RX ORDER — FAMOTIDINE 10 MG/ML
20 INJECTION INTRAVENOUS ONCE
Status: CANCELLED | OUTPATIENT
Start: 2019-10-01 | End: 2019-10-01

## 2019-10-01 RX ORDER — PROMETHAZINE HYDROCHLORIDE 6.25 MG/5ML
6.25 SYRUP ORAL EVERY 6 HOURS PRN
Qty: 118 ML | Refills: 0 | Status: SHIPPED | OUTPATIENT
Start: 2019-10-01 | End: 2019-10-25

## 2019-10-01 NOTE — LETTER
October 1, 2019      Molly Olivarez PA-C  [address]  [place] [address]           [facility]  [address]  [place] [address]  Phone: [phone]  Fax: [phone]          Patient: Zaira Dowell   MR Number: [id number]   YOB: 1970   Date of Visit: 10/1/2019       Dear Molly Olivarez:    Thank you for referring Zaira Dowell to me for evaluation. Attached you will find relevant portions of my assessment and plan of care.    If you have questions, please do not hesitate to call me. I look forward to following Zaira Dowell along with you.    Sincerely,    Ean Kohli MD    Enclosure  CC:  No Recipients    If you would like to receive this communication electronically, please contact [email or web address] or [phone] to request more information on [employer] access.    For providers and/or their staff who would like to refer a patient to [facility], please contact us through our one-stop-shop provider referral line, [facility] , at [phone].    If you feel you have received this communication in error or would no longer like to receive these types of communications, please e-mail [email or web address]

## 2019-10-01 NOTE — PROGRESS NOTES
History & Physical    SUBJECTIVE:     History of Present Illness:  Zaira Dowell is a 48 y.o. female who presents for pre-operative exam for weight loss surgery.  she has completed her pre-operative evaluation.  she has failed medical treatment for obesity.  1. Morbid Obesity with Body mass index is 50.69 kg/m². and inability to lose weight and maintain weight loss .  2. Co-morbidities: Bipolar disorder, anxiety and depression.  Also has optic nerve trouble that she says can be helped with weight loss.  3. 2 cm sub q mass anterior left shoulder (u/s hyperechoic)       No chief complaint on file.      Review of patient's allergies indicates:   Allergen Reactions    Vicodin [hydrocodone-acetaminophen] Nausea And Vomiting       Current Outpatient Medications   Medication Sig    acetaZOLAMIDE (DIAMOX) 250 MG tablet 2 (two) times daily.     albuterol (PROVENTIL/VENTOLIN HFA) 90 mcg/actuation inhaler Inhale 2 puffs into the lungs every 6 (six) hours as needed for Wheezing. Rescue    ergocalciferol (ERGOCALCIFEROL) 50,000 unit Cap Take 1 capsule (50,000 Units total) by mouth every 7 days. for 12 doses    fluticasone propionate (FLONASE) 50 mcg/actuation nasal spray 1 spray (50 mcg total) by Each Nostril route once daily.    furosemide (LASIX) 40 MG tablet Take 40 mg by mouth 2 (two) times daily.    gabapentin (NEURONTIN) 600 MG tablet Take 1 tablet (600 mg total) by mouth 2 (two) times daily.    gabapentin (NEURONTIN) 800 MG tablet Take 1 tablet (800 mg total) by mouth once daily.    lithium (LITHOTAB) 300 mg tablet Take 1 tablet (300 mg total) by mouth 3 (three) times daily.    LORazepam (ATIVAN) 1 MG tablet Take 1 tablet (1 mg total) by mouth as needed for Anxiety.    meclizine (ANTIVERT) 25 mg tablet Take 1 tablet (25 mg total) by mouth 3 (three) times daily as needed for Dizziness or Nausea.    methocarbamol (ROBAXIN) 750 MG Tab Take 750 mg by mouth 4 (four) times daily.     nabumetone (RELAFEN) 750 MG  tablet Take 1 tablet (750 mg total) by mouth 2 (two) times daily.    omeprazole (PRILOSEC) 20 MG capsule Take 1 capsule (20 mg total) by mouth 2 (two) times daily.    ondansetron (ZOFRAN) 4 MG tablet     oxybutynin (DITROPAN) 5 MG Tab Take 1 tablet (5 mg total) by mouth 3 (three) times daily.    potassium chloride (KLOR-CON) 10 MEQ TbSR Take 10 mEq by mouth 2 (two) times daily.    risperiDONE (RISPERDAL) 0.5 MG Tab Take 1 tablet (0.5 mg total) by mouth 2 (two) times daily.    sertraline (ZOLOFT) 100 MG tablet Take 1.5 tablets (150 mg total) by mouth once daily.    terbinafine HCl (LAMISIL) 250 mg tablet PLACE 3 TABLETS INTO THE SOAKING DEVICE AND ALLOW WATER TO AGITATE. PLACE AFFECTED AREAS INTO WATER AND SOAK FOR 20 MINUTES ONCE DAILY    topiramate (TOPAMAX) 200 MG Tab Take 1 tablet (200 mg total) by mouth once daily.    clindamycin (CLEOCIN) 150 MG capsule ADD 3 CAPSULES TO THE SOAKING DEVICE AND ALLOW WATER TO AGITATE. PLACE AFFECTED AREAS INTO WATER AND SOAK FOR 20 MINUTES ONCE DAILY    ketoconazole (NIZORAL) 2 % cream Apply topically once daily. (Patient not taking: Reported on 10/1/2019)    loratadine (CLARITIN) 10 mg tablet     phentermine (ADIPEX-P) 37.5 mg tablet Take 37.5 mg by mouth before breakfast.     No current facility-administered medications for this visit.        Past Medical History:   Diagnosis Date    Anxiety     Arthritis     Asthma     BMI 50.0-59.9, adult     Depression     Fibromyalgia     GERD (gastroesophageal reflux disease)     Obesity     SHARON (obstructive sleep apnea)        Past Surgical History:   Procedure Laterality Date    CARPAL TUNNEL RELEASE      CHOLECYSTECTOMY      ESOPHAGOGASTRODUODENOSCOPY N/A 5/21/2019    Procedure: EGD (ESOPHAGOGASTRODUODENOSCOPY);  Surgeon: Michelle Mc MD;  Location: [facility];  Service: Endoscopy;  Laterality: N/A;    HYSTERECTOMY      partial     KNEE SURGERY      pinched nerve      SHOULDER SURGERY      TONSILLECTOMY   "    WRIST SURGERY      had ganlin cyst       Review of Systems   Constitutional: Positive for malaise/fatigue. Negative for fever.        Fatigue from fibromyalgia   HENT: Negative for congestion and sinus pain.    Respiratory: Negative for cough and shortness of breath.    Cardiovascular: Negative for chest pain and palpitations.   Gastrointestinal: Positive for constipation, diarrhea and heartburn. Negative for abdominal pain, nausea and vomiting.        Has occasional heartburn easily controlled on daily medication   Genitourinary: Negative for dysuria and urgency.   Musculoskeletal: Positive for neck pain. Negative for back pain.   Neurological: Positive for headaches. Negative for seizures.        Has migraines   Endo/Heme/Allergies: Does not bruise/bleed easily.          OBJECTIVE:     Vitals:    10/01/19 1418   BP: 123/64   Pulse: 62   Weight: (!) 153.2 kg (337 lb 11.9 oz)   Height: 5' 7" (1.702 m)       Physical Exam     Laboratory  CBC: Reviewed  CMP: Reviewed    Diagnostic Results:  ECG: Reviewed  X-Ray: Reviewed     Dietitian: Patient has participated in pre-operative nutritional program with understanding of necessary lifelong dietary changes required with surgery.    Psych: No overt contraindications to bariatric surgery, patient has completed psychological evaluation and is able to give informed consent.    Clearance: pcp    ASSESSMENT/PLAN:     Morbid obesity with failure of medical conservative therapy.    Co Morbid Conditions:   Patient Active Problem List   Diagnosis    Fibromyalgia    Pulmonary hypertension    Anxiety    Depression    Bipolar 1 disorder    Chronic idiopathic gout involving toe of left foot without tophus    History of tobacco abuse    Morbid obesity    SHARON (obstructive sleep apnea)    Trochanteric bursitis of both hips    Trochanteric bursitis    Pain of both hip joints    GERD (gastroesophageal reflux disease)    Pseudotumor cerebri    Mild intermittent asthma " without complication       Patient wishes to undergo sleeve gastrectomy with possible hh repair.   Removal left shoulder sub q mass.    The patient was informed that risks include, but are not limited to: death, leak, obstruction, bleeding, and sepsis. Any of these could require further surgery. Other risks include DVT, PE, pneumonia, wound dehiscence, hernia, wound infection, the need for dilatations and the inability to lose appropriate weight and keep it off.     We discussed that our goal is to ameliorate her medical problems and not to obtain a specific body mass index. she understands the risks and benefits and wishes to proceed with the procedure.  she has signed a consent form.

## 2019-10-01 NOTE — H&P (VIEW-ONLY)
History & Physical    SUBJECTIVE:     History of Present Illness:  Zaira Dowell is a 48 y.o. female who presents for pre-operative exam for weight loss surgery.  she has completed her pre-operative evaluation.  she has failed medical treatment for obesity.  1. Morbid Obesity with Body mass index is 50.69 kg/m². and inability to lose weight and maintain weight loss .  2. Co-morbidities: Bipolar disorder, anxiety and depression.  Also has optic nerve trouble that she says can be helped with weight loss.  3. 2 cm sub q mass anterior left shoulder (u/s hyperechoic)       No chief complaint on file.      Review of patient's allergies indicates:   Allergen Reactions    Vicodin [hydrocodone-acetaminophen] Nausea And Vomiting       Current Outpatient Medications   Medication Sig    acetaZOLAMIDE (DIAMOX) 250 MG tablet 2 (two) times daily.     albuterol (PROVENTIL/VENTOLIN HFA) 90 mcg/actuation inhaler Inhale 2 puffs into the lungs every 6 (six) hours as needed for Wheezing. Rescue    ergocalciferol (ERGOCALCIFEROL) 50,000 unit Cap Take 1 capsule (50,000 Units total) by mouth every 7 days. for 12 doses    fluticasone propionate (FLONASE) 50 mcg/actuation nasal spray 1 spray (50 mcg total) by Each Nostril route once daily.    furosemide (LASIX) 40 MG tablet Take 40 mg by mouth 2 (two) times daily.    gabapentin (NEURONTIN) 600 MG tablet Take 1 tablet (600 mg total) by mouth 2 (two) times daily.    gabapentin (NEURONTIN) 800 MG tablet Take 1 tablet (800 mg total) by mouth once daily.    lithium (LITHOTAB) 300 mg tablet Take 1 tablet (300 mg total) by mouth 3 (three) times daily.    LORazepam (ATIVAN) 1 MG tablet Take 1 tablet (1 mg total) by mouth as needed for Anxiety.    meclizine (ANTIVERT) 25 mg tablet Take 1 tablet (25 mg total) by mouth 3 (three) times daily as needed for Dizziness or Nausea.    methocarbamol (ROBAXIN) 750 MG Tab Take 750 mg by mouth 4 (four) times daily.     nabumetone (RELAFEN) 750 MG  tablet Take 1 tablet (750 mg total) by mouth 2 (two) times daily.    omeprazole (PRILOSEC) 20 MG capsule Take 1 capsule (20 mg total) by mouth 2 (two) times daily.    ondansetron (ZOFRAN) 4 MG tablet     oxybutynin (DITROPAN) 5 MG Tab Take 1 tablet (5 mg total) by mouth 3 (three) times daily.    potassium chloride (KLOR-CON) 10 MEQ TbSR Take 10 mEq by mouth 2 (two) times daily.    risperiDONE (RISPERDAL) 0.5 MG Tab Take 1 tablet (0.5 mg total) by mouth 2 (two) times daily.    sertraline (ZOLOFT) 100 MG tablet Take 1.5 tablets (150 mg total) by mouth once daily.    terbinafine HCl (LAMISIL) 250 mg tablet PLACE 3 TABLETS INTO THE SOAKING DEVICE AND ALLOW WATER TO AGITATE. PLACE AFFECTED AREAS INTO WATER AND SOAK FOR 20 MINUTES ONCE DAILY    topiramate (TOPAMAX) 200 MG Tab Take 1 tablet (200 mg total) by mouth once daily.    clindamycin (CLEOCIN) 150 MG capsule ADD 3 CAPSULES TO THE SOAKING DEVICE AND ALLOW WATER TO AGITATE. PLACE AFFECTED AREAS INTO WATER AND SOAK FOR 20 MINUTES ONCE DAILY    ketoconazole (NIZORAL) 2 % cream Apply topically once daily. (Patient not taking: Reported on 10/1/2019)    loratadine (CLARITIN) 10 mg tablet     phentermine (ADIPEX-P) 37.5 mg tablet Take 37.5 mg by mouth before breakfast.     No current facility-administered medications for this visit.        Past Medical History:   Diagnosis Date    Anxiety     Arthritis     Asthma     BMI 50.0-59.9, adult     Depression     Fibromyalgia     GERD (gastroesophageal reflux disease)     Obesity     SHARON (obstructive sleep apnea)        Past Surgical History:   Procedure Laterality Date    CARPAL TUNNEL RELEASE      CHOLECYSTECTOMY      ESOPHAGOGASTRODUODENOSCOPY N/A 5/21/2019    Procedure: EGD (ESOPHAGOGASTRODUODENOSCOPY);  Surgeon: Michelle Mc MD;  Location: [facility];  Service: Endoscopy;  Laterality: N/A;    HYSTERECTOMY      partial     KNEE SURGERY      pinched nerve      SHOULDER SURGERY      TONSILLECTOMY   "    WRIST SURGERY      had ganlin cyst       Review of Systems   Constitutional: Positive for malaise/fatigue. Negative for fever.        Fatigue from fibromyalgia   HENT: Negative for congestion and sinus pain.    Respiratory: Negative for cough and shortness of breath.    Cardiovascular: Negative for chest pain and palpitations.   Gastrointestinal: Positive for constipation, diarrhea and heartburn. Negative for abdominal pain, nausea and vomiting.        Has occasional heartburn easily controlled on daily medication   Genitourinary: Negative for dysuria and urgency.   Musculoskeletal: Positive for neck pain. Negative for back pain.   Neurological: Positive for headaches. Negative for seizures.        Has migraines   Endo/Heme/Allergies: Does not bruise/bleed easily.          OBJECTIVE:     Vitals:    10/01/19 1418   BP: 123/64   Pulse: 62   Weight: (!) 153.2 kg (337 lb 11.9 oz)   Height: 5' 7" (1.702 m)       Physical Exam     Laboratory  CBC: Reviewed  CMP: Reviewed    Diagnostic Results:  ECG: Reviewed  X-Ray: Reviewed     Dietitian: Patient has participated in pre-operative nutritional program with understanding of necessary lifelong dietary changes required with surgery.    Psych: No overt contraindications to bariatric surgery, patient has completed psychological evaluation and is able to give informed consent.    Clearance: pcp    ASSESSMENT/PLAN:     Morbid obesity with failure of medical conservative therapy.    Co Morbid Conditions:   Patient Active Problem List   Diagnosis    Fibromyalgia    Pulmonary hypertension    Anxiety    Depression    Bipolar 1 disorder    Chronic idiopathic gout involving toe of left foot without tophus    History of tobacco abuse    Morbid obesity    SHARON (obstructive sleep apnea)    Trochanteric bursitis of both hips    Trochanteric bursitis    Pain of both hip joints    GERD (gastroesophageal reflux disease)    Pseudotumor cerebri    Mild intermittent asthma " without complication       Patient wishes to undergo sleeve gastrectomy with possible hh repair.   Removal left shoulder sub q mass.    The patient was informed that risks include, but are not limited to: death, leak, obstruction, bleeding, and sepsis. Any of these could require further surgery. Other risks include DVT, PE, pneumonia, wound dehiscence, hernia, wound infection, the need for dilatations and the inability to lose appropriate weight and keep it off.     We discussed that our goal is to ameliorate her medical problems and not to obtain a specific body mass index. she understands the risks and benefits and wishes to proceed with the procedure.  she has signed a consent form.

## 2019-10-01 NOTE — PROGRESS NOTES
Pt will use premier protein shakes.  If using protein powder, reminded pt of mixing with water for days 1 and 2, by day 3 may try using skim, 1% milk or unsweetened almond/soy milk.  By Day 4, may use RTD protein shakes as tolerated  Pt has the following vitamins and minerals to start taking once discharged from hospital  Multivitamin One a Day Women's Petits two per day  B-complex 100 mg every other day  Calcium citrate 600 mg twice a day Vitamin D 2000 IU twice a day  Vitamin B-12 500 mcg sublingually once daily  Reviewed dosage and timing of vitamin/mineral guidelines.  Reviewed nutritional guidelines for protein and fluid requirements for week 1 and week 2 post-surgery.  Handout provided to log protein and fluid daily.  1 ounce medicine cups provided for patient to measure fluid intake after surgery.  Reviewed common nutritional concerns and prevention tips after bariatric surgery  Pt verbalized understanding of information provided with appropriate questions and comments.

## 2019-10-04 ENCOUNTER — TELEPHONE (OUTPATIENT)
Dept: BARIATRICS | Facility: CLINIC | Age: 49
End: 2019-10-04

## 2019-10-04 NOTE — TELEPHONE ENCOUNTER
Called patient to discuss liquid diet and vitamins.    Pt using  4 premier protein shakes/ day or 3 premier shakes a day with 2 clear premier shakes/ day.     Discussion:  -  gms of protein per day  - 600-800 calories per day   - Less than 4gm sugar per shake  - SF, Decaf, non-carbonated Fluids  - No Fruits, juices, yogurt or pudding on liquid diet  - No vitamins or minerals for 1 week prior to surgery    Remind pt per nursing and medical team to inform our department if taking antibiotics within the 30 days post bariatric surgery or it any other surgeries/procedures are scheduled within 30 days after bariatric surgery.    Reminded pt of pre-op and surgery dates.    Pt to call back with any questions.        ----- Message from Yoanna Garcia RN sent at 9/23/2019 11:45 AM CDT -----  Regarding: liquid diet  2 week liquid diet starts 10/4/19  sleeve surgery scheduled 10/18/19  preop appt scheduled 10/1/19

## 2019-10-06 RX ORDER — NABUMETONE 750 MG/1
750 TABLET, FILM COATED ORAL 2 TIMES DAILY
Qty: 180 TABLET | Refills: 2 | OUTPATIENT
Start: 2019-10-06

## 2019-10-17 ENCOUNTER — TELEPHONE (OUTPATIENT)
Dept: BARIATRICS | Facility: CLINIC | Age: 49
End: 2019-10-17

## 2019-10-17 ENCOUNTER — ANESTHESIA EVENT (OUTPATIENT)
Dept: SURGERY | Facility: HOSPITAL | Age: 49
DRG: 621 | End: 2019-10-17
Payer: MEDICARE

## 2019-10-17 NOTE — ANESTHESIA PREPROCEDURE EVALUATION
[facility]  Anesthesia Pre-Operative Evaluation         Patient Name: Zaira Dowell  YOB: 1970  MRN: [id number]    SUBJECTIVE:     Pre-operative evaluation for Procedure(s) (LRB):  GASTRECTOMY, SLEEVE, LAPAROSCOPIC, with intraop EGD (N/A)  REPAIR, HERNIA, HIATAL, LAPAROSCOPIC (N/A)  EXCISION, MASS - subq mass left shoulder (Left)     10/17/2019    Zaira Dowell is a 48 y.o. female w/ a significant PMHx of morbid obesity, fibromyalgia, anxiety, SHARON, GERD, asthma.    Patient now presents for the above procedure(s).    Prev airway: None documented.        Patient Active Problem List   Diagnosis    Fibromyalgia    Pulmonary hypertension    Anxiety    Depression    Bipolar 1 disorder    Chronic idiopathic gout involving toe of left foot without tophus    History of tobacco abuse    Morbid obesity    SHARON (obstructive sleep apnea)    Trochanteric bursitis of both hips    Trochanteric bursitis    Pain of both hip joints    GERD (gastroesophageal reflux disease)    Pseudotumor cerebri    Mild intermittent asthma without complication       Review of patient's allergies indicates:   Allergen Reactions    Vicodin [hydrocodone-acetaminophen] Nausea And Vomiting       Current Inpatient Medications:      No current facility-administered medications on file prior to encounter.      Current Outpatient Medications on File Prior to Encounter   Medication Sig Dispense Refill    acetaZOLAMIDE (DIAMOX) 250 MG tablet 2 (two) times daily.       albuterol (PROVENTIL/VENTOLIN HFA) 90 mcg/actuation inhaler Inhale 2 puffs into the lungs every 6 (six) hours as needed for Wheezing. Rescue 18 g 2    ergocalciferol (ERGOCALCIFEROL) 50,000 unit Cap Take 1 capsule (50,000 Units total) by mouth every 7 days. for 12 doses 12 capsule 0    fluticasone propionate (FLONASE) 50 mcg/actuation nasal spray 1 spray (50 mcg total) by Each Nostril route once daily. 1 Bottle 0    gabapentin (NEURONTIN)  600 MG tablet Take 1 tablet (600 mg total) by mouth 2 (two) times daily. 60 tablet 2    gabapentin (NEURONTIN) 800 MG tablet Take 1 tablet (800 mg total) by mouth once daily. 90 tablet 2    lithium (LITHOTAB) 300 mg tablet Take 1 tablet (300 mg total) by mouth 3 (three) times daily. 270 tablet 1    nabumetone (RELAFEN) 750 MG tablet Take 1 tablet (750 mg total) by mouth 2 (two) times daily. 180 tablet 2    omeprazole (PRILOSEC) 20 MG capsule Take 1 capsule (20 mg total) by mouth 2 (two) times daily. 90 capsule 1    oxybutynin (DITROPAN) 5 MG Tab Take 1 tablet (5 mg total) by mouth 3 (three) times daily. 180 tablet 1    risperiDONE (RISPERDAL) 0.5 MG Tab Take 1 tablet (0.5 mg total) by mouth 2 (two) times daily. 180 tablet 1    sertraline (ZOLOFT) 100 MG tablet Take 1.5 tablets (150 mg total) by mouth once daily. 45 tablet 3    topiramate (TOPAMAX) 200 MG Tab Take 1 tablet (200 mg total) by mouth once daily. 90 tablet 1    clindamycin (CLEOCIN) 150 MG capsule ADD 3 CAPSULES TO THE SOAKING DEVICE AND ALLOW WATER TO AGITATE. PLACE AFFECTED AREAS INTO WATER AND SOAK FOR 20 MINUTES ONCE DAILY  1    furosemide (LASIX) 40 MG tablet Take 40 mg by mouth 2 (two) times daily.      ketoconazole (NIZORAL) 2 % cream Apply topically once daily. (Patient not taking: Reported on 10/1/2019) 1 Tube 3    loratadine (CLARITIN) 10 mg tablet   0    LORazepam (ATIVAN) 1 MG tablet Take 1 tablet (1 mg total) by mouth as needed for Anxiety. 30 tablet 2    meclizine (ANTIVERT) 25 mg tablet Take 1 tablet (25 mg total) by mouth 3 (three) times daily as needed for Dizziness or Nausea. 30 tablet 0    methocarbamol (ROBAXIN) 750 MG Tab Take 750 mg by mouth 4 (four) times daily.       ondansetron (ZOFRAN) 4 MG tablet   0    phentermine (ADIPEX-P) 37.5 mg tablet Take 37.5 mg by mouth before breakfast.      potassium chloride (KLOR-CON) 10 MEQ TbSR Take 10 mEq by mouth 2 (two) times daily.      terbinafine HCl (LAMISIL) 250 mg  tablet PLACE 3 TABLETS INTO THE SOAKING DEVICE AND ALLOW WATER TO AGITATE. PLACE AFFECTED AREAS INTO WATER AND SOAK FOR 20 MINUTES ONCE DAILY  1       Past Surgical History:   Procedure Laterality Date    CARPAL TUNNEL RELEASE      CHOLECYSTECTOMY      ESOPHAGOGASTRODUODENOSCOPY N/A 5/21/2019    Procedure: EGD (ESOPHAGOGASTRODUODENOSCOPY);  Surgeon: Michelle Mc MD;  Location: [facility];  Service: Endoscopy;  Laterality: N/A;    HYSTERECTOMY      partial     KNEE SURGERY      pinched nerve      SHOULDER SURGERY      TONSILLECTOMY      WRIST SURGERY      had ganlin cyst       Social History     Socioeconomic History    Marital status:      Spouse name: Not on file    Number of children: Not on file    Years of education: Not on file    Highest education level: Not on file   Occupational History    Not on file   Social Needs    Financial resource strain: Not very hard    Food insecurity:     Worry: Never true     Inability: Never true    Transportation needs:     Medical: No     Non-medical: No   Tobacco Use    Smoking status: Former Smoker     Types: Vaping with nicotine    Smokeless tobacco: Never Used    Tobacco comment: Quit vaping 7/19, 1/15/19 vaping with 12% nicotine. Smoking cessation refused will quit cold turkey.   Substance and Sexual Activity    Alcohol use: No     Frequency: Monthly or less     Drinks per session: 1 or 2     Binge frequency: Never    Drug use: No    Sexual activity: Yes     Partners: Male   Lifestyle    Physical activity:     Days per week: 1 day     Minutes per session: 30 min    Stress: Rather much   Relationships    Social connections:     Talks on phone: More than three times a week     Gets together: Patient refused     Attends [employer] service: Not on file     Active member of club or organization: No     Attends meetings of clubs or organizations: Never     Relationship status:    Other Topics Concern    Not on file   Social History  Narrative    Not on file       OBJECTIVE:     Vital Signs Range (Last 24H):         Significant Labs:  Lab Results   Component Value Date    WBC 9.73 10/01/2019    HGB 13.5 10/01/2019    HCT 42.4 10/01/2019     10/01/2019    CHOL 185 06/05/2019    TRIG 128 06/05/2019    HDL 56 06/05/2019    ALT 29 09/10/2019    AST 16 09/10/2019     09/10/2019    K 4.9 09/10/2019     (H) 09/10/2019    CREATININE 0.8 09/10/2019    BUN 8 09/10/2019    CO2 22 (L) 09/10/2019    TSH 2.155 06/05/2019    INR 0.9 09/25/2019    HGBA1C 5.1 09/10/2019       Diagnostic Studies: No relevant studies.    EKG:   Results for orders placed or performed in visit on 09/25/19   IN OFFICE EKG 12-LEAD (to [place])    Collection Time: 09/25/19  7:22 AM    Narrative    Test Reason : Z01.818,Z01.818,    Vent. Rate : 065 BPM     Atrial Rate : 065 BPM     P-R Int : 168 ms          QRS Dur : 084 ms      QT Int : 426 ms       P-R-T Axes : 014 001 041 degrees     QTc Int : 443 ms    Normal sinus rhythm  Normal ECG  When compared with ECG of 06-JUN-2019 10:43,  No significant change was found  Confirmed by Darlin Camp MD ([id number]) on 9/29/2019 8:45:59 PM    Referred By: MITCH SWAN           Confirmed By:Darlin Camp MD       ECHOCARDIOGRAM:  TTE:  8/2018    CONCLUSIONS     1 - Normal left ventricular systolic function (EF 60-65%).     2 - Concentric hypertrophy.     3 - Grade I LV diastolic function.       ASSESSMENT/PLAN:         Anesthesia Evaluation    I have reviewed the Patient Summary Reports.    I have reviewed the Nursing Notes.   I have reviewed the Medications.     Review of Systems  Anesthesia Hx:  No problems with previous Anesthesia Denies Hx of Anesthetic complications  History of prior surgery of interest to airway management or planning: Denies Family Hx of Anesthesia complications.   Denies Personal Hx of Anesthesia complications.   Social:  No Alcohol Use, Non-Smoker    Hematology/Oncology:  Hematology Normal         Cardiovascular:  Cardiovascular Normal  ECG has been reviewed.    Pulmonary:   Asthma Sleep Apnea    Renal/:  Renal/ Normal     Hepatic/GI:   GERD    Musculoskeletal:  Musculoskeletal Normal    Neurological:   Neuromuscular Disease,    Psych:   Psychiatric History          Physical Exam  General:  Well nourished, Morbid Obesity    Airway/Jaw/Neck:  Airway Findings: Mouth Opening: Normal Tongue: Normal  General Airway Assessment: Adult  Mallampati: II  TM Distance: Normal, at least 6 cm  Jaw/Neck Findings:  Neck ROM: Normal ROM     Eyes/Ears/Nose:  EYES/EARS/NOSE FINDINGS: Normal   Dental:  Dental Findings: In tact   Chest/Lungs:  Chest/Lungs Findings: Clear to auscultation     Heart/Vascular:  Heart Findings: Rate: Normal  Rhythm: Regular Rhythm  Sounds: Normal     Abdomen:  Abdomen Findings:  Normal     Musculoskeletal:  Musculoskeletal Findings:    Skin:  Skin Findings:     Mental Status:  Mental Status Findings:  Cooperative, Alert and Oriented         Anesthesia Plan  Type of Anesthesia, risks & benefits discussed:  Anesthesia Type:  general  Patient's Preference:   Intra-op Monitoring Plan: standard ASA monitors  Intra-op Monitoring Plan Comments:   Post Op Pain Control Plan: multimodal analgesia  Post Op Pain Control Plan Comments:   Induction:   IV  Beta Blocker:  Patient is not currently on a Beta-Blocker (No further documentation required).       Informed Consent: Patient understands risks and agrees with Anesthesia plan.  Questions answered. Anesthesia consent signed with patient.  ASA Score: 3     Day of Surgery Review of History & Physical:    H&P update referred to the surgeon.         Ready For Surgery From Anesthesia Perspective.

## 2019-10-17 NOTE — TELEPHONE ENCOUNTER
Notified patient of arrival time to the [facility] 2nd floor Surgery Center at 0800 with expected surgery start time 1000 on 10/18/19.   Instructed patient regarding pre-op instructions including npo status, showering and preop medications to hold/take per anesthesia/preop.  Instructed patient on the s/s of dehydration and for patient to call at the first sign of dehydration.  Informed patient that someone from bariatrics will be call them 1 week post op to review diet/fluid intake and to ensure adequate hydration.   Pt verbalized understanding. Pt given office phone number for any additional questions/concerns. Also reminded to not take ABx or schedule elective procedures for 30 days after surgery and the rationale.  She verbalized understanding.  She also inquired about her  staying with her because of her anxiety. Directed her to notify the nurses in the pre op department upon arrival and they might be able to assure she gets a private room.  She will definitely do that in the AM.

## 2019-10-17 NOTE — PRE-PROCEDURE INSTRUCTIONS
PREOP INSTRUCTIONS:Instructed patient to follow dietary instructions provided by ..Shower instructions as well as directions to the 2nd floor Surgery Center were given.Patient encouraged to wear loose fitting,comfortable clothing.Medication instructions for pm prior to and am of procedure reviewed.Instructed patient to avoid taking vitamins,supplements,aspirin and ibuprofen the morning of surgery.Patient stated an understanding.    Patient denies any side effects or issues with anesthesia or sedation other than PONV    Patient does not know arrival time.Explained that this information comes from the surgeon's office and if they haven't heard from them by 2 or 3 pm to call the office.Patient stated an understanding.

## 2019-10-18 ENCOUNTER — HOSPITAL ENCOUNTER (INPATIENT)
Facility: HOSPITAL | Age: 49
LOS: 1 days | Discharge: HOME | DRG: 621 | End: 2019-10-19
Attending: SURGERY | Admitting: SURGERY
Payer: MEDICARE

## 2019-10-18 ENCOUNTER — ANESTHESIA (OUTPATIENT)
Dept: SURGERY | Facility: HOSPITAL | Age: 49
DRG: 621 | End: 2019-10-18
Payer: MEDICARE

## 2019-10-18 DIAGNOSIS — E66.01 MORBID OBESITY: Primary | ICD-10-CM

## 2019-10-18 PROCEDURE — 25000003 PHARM REV CODE 250: Performed by: SURGERY

## 2019-10-18 PROCEDURE — 25000003 PHARM REV CODE 250: Performed by: STUDENT IN AN ORGANIZED HEALTH CARE EDUCATION/TRAINING PROGRAM

## 2019-10-18 PROCEDURE — 37000009 HC ANESTHESIA EA ADD 15 MINS: Performed by: SURGERY

## 2019-10-18 PROCEDURE — 88305 TISSUE EXAM BY PATHOLOGIST: CPT | Mod: 26,,, | Performed by: PATHOLOGY

## 2019-10-18 PROCEDURE — 25000003 PHARM REV CODE 250: Performed by: ANESTHESIOLOGY

## 2019-10-18 PROCEDURE — 43775 LAP SLEEVE GASTRECTOMY: CPT | Mod: GC,,, | Performed by: SURGERY

## 2019-10-18 PROCEDURE — 36000711: Performed by: SURGERY

## 2019-10-18 PROCEDURE — D9220A PRA ANESTHESIA: Mod: ,,, | Performed by: ANESTHESIOLOGY

## 2019-10-18 PROCEDURE — 25000242 PHARM REV CODE 250 ALT 637 W/ HCPCS: Performed by: STUDENT IN AN ORGANIZED HEALTH CARE EDUCATION/TRAINING PROGRAM

## 2019-10-18 PROCEDURE — 63600175 PHARM REV CODE 636 W HCPCS: Performed by: STUDENT IN AN ORGANIZED HEALTH CARE EDUCATION/TRAINING PROGRAM

## 2019-10-18 PROCEDURE — 94761 N-INVAS EAR/PLS OXIMETRY MLT: CPT

## 2019-10-18 PROCEDURE — S0028 INJECTION, FAMOTIDINE, 20 MG: HCPCS | Performed by: STUDENT IN AN ORGANIZED HEALTH CARE EDUCATION/TRAINING PROGRAM

## 2019-10-18 PROCEDURE — 23075 PR EXCISION TUMOR SOFT TISSUE SHOULDER SUBQ <3CM: ICD-10-PCS | Mod: 51,LT,GC, | Performed by: SURGERY

## 2019-10-18 PROCEDURE — 36000710: Performed by: SURGERY

## 2019-10-18 PROCEDURE — 88305 TISSUE EXAM BY PATHOLOGIST: CPT | Performed by: PATHOLOGY

## 2019-10-18 PROCEDURE — 37000008 HC ANESTHESIA 1ST 15 MINUTES: Performed by: SURGERY

## 2019-10-18 PROCEDURE — 88305 TISSUE SPECIMEN TO PATHOLOGY - SURGERY: ICD-10-PCS | Mod: 26,,, | Performed by: PATHOLOGY

## 2019-10-18 PROCEDURE — 71000033 HC RECOVERY, INTIAL HOUR: Performed by: SURGERY

## 2019-10-18 PROCEDURE — 88304 TISSUE EXAM BY PATHOLOGIST: CPT | Mod: 26,,, | Performed by: PATHOLOGY

## 2019-10-18 PROCEDURE — 27201423 OPTIME MED/SURG SUP & DEVICES STERILE SUPPLY: Performed by: SURGERY

## 2019-10-18 PROCEDURE — 63600175 PHARM REV CODE 636 W HCPCS

## 2019-10-18 PROCEDURE — D9220A PRA ANESTHESIA: ICD-10-PCS | Mod: ,,, | Performed by: ANESTHESIOLOGY

## 2019-10-18 PROCEDURE — 63600175 PHARM REV CODE 636 W HCPCS: Performed by: SURGERY

## 2019-10-18 PROCEDURE — 23075 EXC SHOULDER LES SC < 3 CM: CPT | Mod: 51,LT,GC, | Performed by: SURGERY

## 2019-10-18 PROCEDURE — 43775 PR LAP, GAST RESTRICT PROC, LONGITUDINAL GASTRECTOMY: ICD-10-PCS | Mod: GC,,, | Performed by: SURGERY

## 2019-10-18 PROCEDURE — 11000001 HC ACUTE MED/SURG PRIVATE ROOM

## 2019-10-18 PROCEDURE — 88304 TISSUE SPECIMEN TO PATHOLOGY - SURGERY: ICD-10-PCS | Mod: 26,,, | Performed by: PATHOLOGY

## 2019-10-18 PROCEDURE — S0028 INJECTION, FAMOTIDINE, 20 MG: HCPCS | Performed by: SURGERY

## 2019-10-18 RX ORDER — ACETAMINOPHEN 10 MG/ML
INJECTION, SOLUTION INTRAVENOUS
Status: DISCONTINUED | OUTPATIENT
Start: 2019-10-18 | End: 2019-10-18

## 2019-10-18 RX ORDER — SODIUM CITRATE AND CITRIC ACID MONOHYDRATE 334; 500 MG/5ML; MG/5ML
15 SOLUTION ORAL ONCE
Status: COMPLETED | OUTPATIENT
Start: 2019-10-18 | End: 2019-10-18

## 2019-10-18 RX ORDER — NEOSTIGMINE METHYLSULFATE 0.5 MG/ML
INJECTION, SOLUTION INTRAVENOUS
Status: DISCONTINUED | OUTPATIENT
Start: 2019-10-18 | End: 2019-10-18

## 2019-10-18 RX ORDER — TOPIRAMATE 100 MG/1
200 TABLET, FILM COATED ORAL DAILY
Status: DISCONTINUED | OUTPATIENT
Start: 2019-10-19 | End: 2019-10-19 | Stop reason: HOSPADM

## 2019-10-18 RX ORDER — ONDANSETRON 2 MG/ML
4 INJECTION INTRAMUSCULAR; INTRAVENOUS EVERY 6 HOURS PRN
Status: DISCONTINUED | OUTPATIENT
Start: 2019-10-18 | End: 2019-10-19 | Stop reason: HOSPADM

## 2019-10-18 RX ORDER — LIDOCAINE HYDROCHLORIDE 20 MG/ML
INJECTION, SOLUTION EPIDURAL; INFILTRATION; INTRACAUDAL; PERINEURAL
Status: DISCONTINUED | OUTPATIENT
Start: 2019-10-18 | End: 2019-10-18

## 2019-10-18 RX ORDER — OXYBUTYNIN CHLORIDE 5 MG/1
5 TABLET ORAL 3 TIMES DAILY
Status: DISCONTINUED | OUTPATIENT
Start: 2019-10-18 | End: 2019-10-19 | Stop reason: HOSPADM

## 2019-10-18 RX ORDER — FUROSEMIDE 40 MG/1
40 TABLET ORAL 2 TIMES DAILY
Status: DISCONTINUED | OUTPATIENT
Start: 2019-10-19 | End: 2019-10-19 | Stop reason: HOSPADM

## 2019-10-18 RX ORDER — HEPARIN SODIUM 5000 [USP'U]/ML
5000 INJECTION, SOLUTION INTRAVENOUS; SUBCUTANEOUS ONCE
Status: COMPLETED | OUTPATIENT
Start: 2019-10-18 | End: 2019-10-18

## 2019-10-18 RX ORDER — RISPERIDONE 0.25 MG/1
0.5 TABLET ORAL 2 TIMES DAILY
Status: DISCONTINUED | OUTPATIENT
Start: 2019-10-18 | End: 2019-10-19 | Stop reason: HOSPADM

## 2019-10-18 RX ORDER — SCOLOPAMINE TRANSDERMAL SYSTEM 1 MG/1
1 PATCH, EXTENDED RELEASE TRANSDERMAL ONCE
Status: DISCONTINUED | OUTPATIENT
Start: 2019-10-18 | End: 2019-10-19 | Stop reason: HOSPADM

## 2019-10-18 RX ORDER — FLUTICASONE PROPIONATE 50 MCG
1 SPRAY, SUSPENSION (ML) NASAL DAILY
Status: DISCONTINUED | OUTPATIENT
Start: 2019-10-18 | End: 2019-10-19 | Stop reason: HOSPADM

## 2019-10-18 RX ORDER — SODIUM CHLORIDE 9 MG/ML
INJECTION, SOLUTION INTRAVENOUS CONTINUOUS
Status: DISCONTINUED | OUTPATIENT
Start: 2019-10-18 | End: 2019-10-19

## 2019-10-18 RX ORDER — BUPIVACAINE HYDROCHLORIDE 2.5 MG/ML
INJECTION, SOLUTION EPIDURAL; INFILTRATION; INTRACAUDAL
Status: DISCONTINUED | OUTPATIENT
Start: 2019-10-18 | End: 2019-10-18

## 2019-10-18 RX ORDER — HYDROMORPHONE HCL IN 0.9% NACL 6 MG/30 ML
PATIENT CONTROLLED ANALGESIA SYRINGE INTRAVENOUS
Status: COMPLETED
Start: 2019-10-18 | End: 2019-10-18

## 2019-10-18 RX ORDER — ONDANSETRON 2 MG/ML
INJECTION INTRAMUSCULAR; INTRAVENOUS
Status: DISCONTINUED | OUTPATIENT
Start: 2019-10-18 | End: 2019-10-18

## 2019-10-18 RX ORDER — HYDROMORPHONE HCL IN 0.9% NACL 6 MG/30 ML
PATIENT CONTROLLED ANALGESIA SYRINGE INTRAVENOUS CONTINUOUS
Status: DISCONTINUED | OUTPATIENT
Start: 2019-10-18 | End: 2019-10-19

## 2019-10-18 RX ORDER — CEFAZOLIN SODIUM 1 G/3ML
INJECTION, POWDER, FOR SOLUTION INTRAMUSCULAR; INTRAVENOUS
Status: DISCONTINUED | OUTPATIENT
Start: 2019-10-18 | End: 2019-10-18

## 2019-10-18 RX ORDER — SODIUM CHLORIDE 9 MG/ML
INJECTION, SOLUTION INTRAVENOUS CONTINUOUS PRN
Status: DISCONTINUED | OUTPATIENT
Start: 2019-10-18 | End: 2019-10-18

## 2019-10-18 RX ORDER — FAMOTIDINE 10 MG/ML
20 INJECTION INTRAVENOUS 2 TIMES DAILY
Status: DISCONTINUED | OUTPATIENT
Start: 2019-10-18 | End: 2019-10-19 | Stop reason: HOSPADM

## 2019-10-18 RX ORDER — MIDAZOLAM HYDROCHLORIDE 1 MG/ML
INJECTION, SOLUTION INTRAMUSCULAR; INTRAVENOUS
Status: DISCONTINUED | OUTPATIENT
Start: 2019-10-18 | End: 2019-10-18

## 2019-10-18 RX ORDER — NALOXONE HCL 0.4 MG/ML
0.02 VIAL (ML) INJECTION
Status: DISCONTINUED | OUTPATIENT
Start: 2019-10-18 | End: 2019-10-19 | Stop reason: HOSPADM

## 2019-10-18 RX ORDER — ALBUTEROL SULFATE 90 UG/1
2 AEROSOL, METERED RESPIRATORY (INHALATION) EVERY 6 HOURS PRN
Status: DISCONTINUED | OUTPATIENT
Start: 2019-10-18 | End: 2019-10-19 | Stop reason: HOSPADM

## 2019-10-18 RX ORDER — SODIUM CHLORIDE 0.9 % (FLUSH) 0.9 %
10 SYRINGE (ML) INJECTION
Status: DISCONTINUED | OUTPATIENT
Start: 2019-10-18 | End: 2019-10-19 | Stop reason: HOSPADM

## 2019-10-18 RX ORDER — FAMOTIDINE 10 MG/ML
20 INJECTION INTRAVENOUS ONCE
Status: COMPLETED | OUTPATIENT
Start: 2019-10-18 | End: 2019-10-18

## 2019-10-18 RX ORDER — FENTANYL CITRATE 50 UG/ML
INJECTION, SOLUTION INTRAMUSCULAR; INTRAVENOUS
Status: DISCONTINUED | OUTPATIENT
Start: 2019-10-18 | End: 2019-10-18

## 2019-10-18 RX ORDER — PROPOFOL 10 MG/ML
VIAL (ML) INTRAVENOUS CONTINUOUS PRN
Status: DISCONTINUED | OUTPATIENT
Start: 2019-10-18 | End: 2019-10-18

## 2019-10-18 RX ORDER — METOCLOPRAMIDE HYDROCHLORIDE 5 MG/ML
10 INJECTION INTRAMUSCULAR; INTRAVENOUS ONCE
Status: COMPLETED | OUTPATIENT
Start: 2019-10-18 | End: 2019-10-18

## 2019-10-18 RX ORDER — DEXAMETHASONE SODIUM PHOSPHATE 4 MG/ML
INJECTION, SOLUTION INTRA-ARTICULAR; INTRALESIONAL; INTRAMUSCULAR; INTRAVENOUS; SOFT TISSUE
Status: DISCONTINUED | OUTPATIENT
Start: 2019-10-18 | End: 2019-10-18

## 2019-10-18 RX ORDER — KETAMINE HCL IN 0.9 % NACL 50 MG/5 ML
SYRINGE (ML) INTRAVENOUS
Status: DISCONTINUED | OUTPATIENT
Start: 2019-10-18 | End: 2019-10-18

## 2019-10-18 RX ORDER — ENOXAPARIN SODIUM 100 MG/ML
40 INJECTION SUBCUTANEOUS EVERY 12 HOURS
Status: DISCONTINUED | OUTPATIENT
Start: 2019-10-18 | End: 2019-10-19 | Stop reason: HOSPADM

## 2019-10-18 RX ORDER — ACETAZOLAMIDE 250 MG/1
250 TABLET ORAL 3 TIMES DAILY
Status: DISCONTINUED | OUTPATIENT
Start: 2019-10-19 | End: 2019-10-19 | Stop reason: HOSPADM

## 2019-10-18 RX ORDER — ROCURONIUM BROMIDE 10 MG/ML
INJECTION, SOLUTION INTRAVENOUS
Status: DISCONTINUED | OUTPATIENT
Start: 2019-10-18 | End: 2019-10-18

## 2019-10-18 RX ORDER — ACETAMINOPHEN 10 MG/ML
1000 INJECTION, SOLUTION INTRAVENOUS ONCE
Status: ACTIVE | OUTPATIENT
Start: 2019-10-18 | End: 2019-10-19

## 2019-10-18 RX ORDER — GLYCOPYRROLATE 0.2 MG/ML
INJECTION INTRAMUSCULAR; INTRAVENOUS
Status: DISCONTINUED | OUTPATIENT
Start: 2019-10-18 | End: 2019-10-18

## 2019-10-18 RX ORDER — ALBUTEROL SULFATE 90 UG/1
AEROSOL, METERED RESPIRATORY (INHALATION)
Status: DISCONTINUED | OUTPATIENT
Start: 2019-10-18 | End: 2019-10-18

## 2019-10-18 RX ORDER — HYDROCODONE BITARTRATE AND ACETAMINOPHEN 7.5; 325 MG/15ML; MG/15ML
15 SOLUTION ORAL EVERY 4 HOURS PRN
Status: DISCONTINUED | OUTPATIENT
Start: 2019-10-19 | End: 2019-10-19 | Stop reason: HOSPADM

## 2019-10-18 RX ADMIN — Medication: at 11:10

## 2019-10-18 RX ADMIN — HEPARIN SODIUM 5000 UNITS: 5000 INJECTION, SOLUTION INTRAVENOUS; SUBCUTANEOUS at 08:10

## 2019-10-18 RX ADMIN — SODIUM CHLORIDE: 0.9 INJECTION, SOLUTION INTRAVENOUS at 09:10

## 2019-10-18 RX ADMIN — ENOXAPARIN SODIUM 40 MG: 100 INJECTION SUBCUTANEOUS at 09:10

## 2019-10-18 RX ADMIN — ROCURONIUM BROMIDE 10 MG: 10 INJECTION, SOLUTION INTRAVENOUS at 10:10

## 2019-10-18 RX ADMIN — NEOSTIGMINE METHYLSULFATE 4 MG: 0.5 INJECTION INTRAVENOUS at 11:10

## 2019-10-18 RX ADMIN — GLYCOPYRROLATE 0.4 MG: 0.2 INJECTION, SOLUTION INTRAMUSCULAR; INTRAVENOUS at 11:10

## 2019-10-18 RX ADMIN — SCOPALAMINE 1 PATCH: 1 PATCH, EXTENDED RELEASE TRANSDERMAL at 08:10

## 2019-10-18 RX ADMIN — ONDANSETRON 4 MG: 2 INJECTION INTRAMUSCULAR; INTRAVENOUS at 11:10

## 2019-10-18 RX ADMIN — ROCURONIUM BROMIDE 50 MG: 10 INJECTION, SOLUTION INTRAVENOUS at 10:10

## 2019-10-18 RX ADMIN — Medication 30 MG: at 10:10

## 2019-10-18 RX ADMIN — SODIUM CITRATE AND CITRIC ACID MONOHYDRATE 15 ML: 500; 334 SOLUTION ORAL at 08:10

## 2019-10-18 RX ADMIN — FAMOTIDINE 20 MG: 10 INJECTION, SOLUTION INTRAVENOUS at 09:10

## 2019-10-18 RX ADMIN — FLUTICASONE PROPIONATE 50 MCG: 50 SPRAY, METERED NASAL at 01:10

## 2019-10-18 RX ADMIN — ACETAMINOPHEN 1000 MG: 10 INJECTION, SOLUTION INTRAVENOUS at 10:10

## 2019-10-18 RX ADMIN — SODIUM CHLORIDE: 0.9 INJECTION, SOLUTION INTRAVENOUS at 11:10

## 2019-10-18 RX ADMIN — ALBUTEROL SULFATE 4 PUFF: 90 AEROSOL, METERED RESPIRATORY (INHALATION) at 11:10

## 2019-10-18 RX ADMIN — LIDOCAINE HYDROCHLORIDE 80 MG: 20 INJECTION, SOLUTION EPIDURAL; INFILTRATION; INTRACAUDAL; PERINEURAL at 10:10

## 2019-10-18 RX ADMIN — METOCLOPRAMIDE 10 MG: 5 INJECTION, SOLUTION INTRAMUSCULAR; INTRAVENOUS at 08:10

## 2019-10-18 RX ADMIN — DEXAMETHASONE SODIUM PHOSPHATE 4 MG: 4 INJECTION, SOLUTION INTRAMUSCULAR; INTRAVENOUS at 10:10

## 2019-10-18 RX ADMIN — FENTANYL CITRATE 50 MCG: 50 INJECTION INTRAMUSCULAR; INTRAVENOUS at 10:10

## 2019-10-18 RX ADMIN — MIDAZOLAM HYDROCHLORIDE 2 MG: 1 INJECTION, SOLUTION INTRAMUSCULAR; INTRAVENOUS at 09:10

## 2019-10-18 RX ADMIN — CEFAZOLIN 3 G: 330 INJECTION, POWDER, FOR SOLUTION INTRAMUSCULAR; INTRAVENOUS at 10:10

## 2019-10-18 RX ADMIN — FAMOTIDINE 20 MG: 10 INJECTION, SOLUTION INTRAVENOUS at 08:10

## 2019-10-18 RX ADMIN — PROPOFOL 150 MCG/KG/MIN: 10 INJECTION, EMULSION INTRAVENOUS at 10:10

## 2019-10-18 RX ADMIN — ENOXAPARIN SODIUM 40 MG: 100 INJECTION SUBCUTANEOUS at 12:10

## 2019-10-18 NOTE — TRANSFER OF CARE
"Anesthesia Transfer of Care Note    Patient: Zaira Dowell    Procedure(s) Performed: Procedure(s) (LRB):  GASTRECTOMY, SLEEVE, LAPAROSCOPIC, with intraop EGD (N/A)  EXCISION, MASS - subq mass left shoulder (Left)    Patient location: PACU    Anesthesia Type: general    Transport from OR: Transported from OR on 6-10 L/min O2 by face mask with adequate spontaneous ventilation    Post pain: adequate analgesia    Post assessment: no apparent anesthetic complications    Post vital signs: stable    Level of consciousness: sedated    Nausea/Vomiting: no nausea/vomiting    Complications: none    Transfer of care protocol was followed      Last vitals:   Visit Vitals  BP (!) 120/58 (BP Location: Right arm, Patient Position: Lying)   Pulse 73   Temp 36.8 °C (98.3 °F) (Oral)   Resp 17   Ht 5' 7" (1.702 m)   Wt (!) 146.9 kg (323 lb 13.7 oz)   SpO2 97%   Breastfeeding? No   BMI 50.72 kg/m²     "

## 2019-10-18 NOTE — OP NOTE
DATE OF PROCEDURE: 10/18/2019    PRE OP DIAGNOSIS: Low vitamin D level [R79.89]  Gastroesophageal reflux disease, esophagitis presence not specified [K21.9]  SHARON (obstructive sleep apnea) [G47.33]  Pulmonary hypertension [I27.20]  Hyperglycemia [R73.9]  Morbid obesity [E66.01]  BMI 50.0-59.9, adult [Z68.43]    POST OP DIAGNOSIS: Low vitamin D level [R79.89]  Gastroesophageal reflux disease, esophagitis presence not specified [K21.9]  SHARON (obstructive sleep apnea) [G47.33]  Pulmonary hypertension [I27.20]  Hyperglycemia [R73.9]  Morbid obesity [E66.01]  BMI 50.0-59.9, adult [Z68.43]    PROCEDURE: Procedure(s) (LRB):  GASTRECTOMY, SLEEVE, LAPAROSCOPIC, with intraop EGD (N/A)  EXCISION, MASS - subq mass left shoulder (Left)    Surgeon(s) and Role:     * Ean Kohli MD - Primary     * Al Dukes MD - Resident - AssistingANESTHESIA: General.   INDICATIONS: A 48 y.o. with 1. Morbid Obesity with Body mass index is 50.69 kg/m². and inability to lose weight and maintain weight loss .  2. Co-morbidities: Bipolar disorder, anxiety and depression.  Also has optic nerve trouble that she says can be helped with weight loss.  3. 2 cm sub q mass anterior left shoulder (u/s hyperechoic)  DESCRIPTION OF PROCEDURE: The patient was placed under general anesthesia. The   abdomen was prepped and draped in usual manner. Access to peritoneum was   gained through the navel using Optiview trocar under direct vision.   Pneumoperitoneum to 15 mmHg with CO2 gas was obtained. Four 5 mm trocars were   placed medially, subcostally at the midclavicular and anterior axial lines   bilaterally. A 10 mm trocar was placed 1 handbreadth caudad to the right   midclavicular trocar. The liver retractor was placed. The greater curve was   taken down starting 6 cm from the pylorus going all the way to the base of the   left josee taking all posterior gastric attachments with the Harmonic scalpel. A   42-Kyrgyz bougie was passed towards the pylorus and  the stomach was resected  along the bougie starting 6 cm from the pylorus and coming out just a   little bit at the angle of His. The staple line was oversewn with a running   Quill stitch. The bougie was removed. Endoscopy was   performed. The sleeve appeared appropriate size and configuration and there   were no air leaks seen. The air was aspirated from the endoscope and the   endoscope was withdrawn. Naty was placed over the surgical areas.  Pneumoperitoneum was released for one minute and re-inflated to check for areas of bleeding which were controlled. The liver retractor was removed. The gastrectomy was   removed through the primary trochar site. That incision was then closed with 0 Vicryl. The trocars removed under direct vision. Prior to   removing the last trocar, the pneumoperitoneum was allowed to escape. The skin   incisions were infiltrated with Marcaine solution, closed with 4-0 plain catgut,   and reinforced with Dermabond.  The left shoulder was prepped and draped and a 1 inch lipomatoid lesion removed with a bovie. That incision was inspected for hemostasis and repaired with 3-0 then 4-0 vicryl and reinforced with dermabond. The patient   tolerated procedure well and was brought to Recovery Room in stable condition.   Sponge and needle counts were correct at the end of the case.    Blood loss was min, complications are none, consent was obtained and pathology was gastrectomy and lipomatoid mass.

## 2019-10-18 NOTE — NURSING TRANSFER
Nursing Transfer Note      10/18/2019     Transfer To: 507A    Transfer via stretcher    Transfer with IVF infusing, PCA Infusion, ETCO2 Monitor    Transported by PCT    Medicines sent: None    Chart send with patient: Yes    Notified: spouse    Patient reassessed at: 10/18/19 1530    Upon arrival to floor: patient oriented to room, call bell in reach and bed in lowest position

## 2019-10-18 NOTE — BRIEF OP NOTE
Operative Note       Surgery Date: 10/18/2019     Surgeon(s) and Role:     * Ean Kohli MD - Primary     * Al Dukes MD - Resident - Assisting    Pre-op Diagnosis:  Low vitamin D level [R79.89]  Gastroesophageal reflux disease, esophagitis presence not specified [K21.9]  SHARON (obstructive sleep apnea) [G47.33]  Pulmonary hypertension [I27.20]  Hyperglycemia [R73.9]  Morbid obesity [E66.01]  BMI 50.0-59.9, adult [Z68.43]    Post-op Diagnosis:  Low vitamin D level [R79.89]  Gastroesophageal reflux disease, esophagitis presence not specified [K21.9]  SHARON (obstructive sleep apnea) [G47.33]  Pulmonary hypertension [I27.20]  Hyperglycemia [R73.9]  Morbid obesity [E66.01]  BMI 50.0-59.9, adult [Z68.43]    Procedure(s) (LRB):  GASTRECTOMY, SLEEVE, LAPAROSCOPIC, with intraop EGD (N/A)  EXCISION, MASS - subq mass left shoulder (Left)    Anesthesia: General    Procedure in Detail/Findings:  Sleeve and mass removal without apparent complication    Estimated Blood Loss: Minimal           Specimens (From admission, onward)     Start     Ordered    10/18/19 1121  Specimen to Pathology - Surgery  Once      10/18/19 1122              Implants: * No implants in log *           Disposition: PACU - hemodynamically stable.           Condition: Good    Attestation:  I was present and scrubbed for the entire procedure.

## 2019-10-18 NOTE — PROGRESS NOTES
Pt reports that she showered last night and this morning with the hibiclens provided for clinic and that made her itchy. Pt requests not to wipe with hibiclens wipes to avoid being itchy again.

## 2019-10-18 NOTE — ANESTHESIA POSTPROCEDURE EVALUATION
Anesthesia Post Evaluation    Patient: Zaira Dowell    Procedure(s) Performed: Procedure(s) (LRB):  GASTRECTOMY, SLEEVE, LAPAROSCOPIC, with intraop EGD (N/A)  EXCISION, MASS - subq mass left shoulder (Left)    Final Anesthesia Type: general  Patient location during evaluation: PACU  Patient participation: No - Unable to Participate, Sedation  Level of consciousness: sedated  Post-procedure vital signs: reviewed and stable  Pain management: adequate  Airway patency: patent  PONV status at discharge: No PONV  Anesthetic complications: no      Cardiovascular status: blood pressure returned to baseline and hemodynamically stable  Respiratory status: unassisted, spontaneous ventilation and room air  Hydration status: euvolemic  Follow-up not needed.          Vitals Value Taken Time   /67 10/18/2019  1:32 PM   Temp 36.7 °C (98.1 °F) 10/18/2019  1:00 PM   Pulse 75 10/18/2019  1:39 PM   Resp 19 10/18/2019  1:39 PM   SpO2 92 % 10/18/2019  1:39 PM   Vitals shown include unvalidated device data.      No case tracking events are documented in the log.      Pain/Tima Score: Pain Rating Prior to Med Admin: 1 (10/18/2019 11:45 AM)  Tima Score: 9 (10/18/2019 12:30 PM)

## 2019-10-19 VITALS
TEMPERATURE: 97 F | OXYGEN SATURATION: 93 % | WEIGHT: 293 LBS | DIASTOLIC BLOOD PRESSURE: 81 MMHG | HEART RATE: 87 BPM | HEIGHT: 67 IN | SYSTOLIC BLOOD PRESSURE: 133 MMHG | RESPIRATION RATE: 20 BRPM | BODY MASS INDEX: 45.99 KG/M2

## 2019-10-19 LAB
ANION GAP SERPL CALC-SCNC: 6 MMOL/L (ref 8–16)
BASOPHILS # BLD AUTO: 0.05 K/UL (ref 0–0.2)
BASOPHILS # BLD AUTO: 0.07 K/UL (ref 0–0.2)
BASOPHILS NFR BLD: 0.5 % (ref 0–1.9)
BASOPHILS NFR BLD: 0.6 % (ref 0–1.9)
BUN SERPL-MCNC: 7 MG/DL (ref 6–20)
CALCIUM SERPL-MCNC: 8.3 MG/DL (ref 8.7–10.5)
CHLORIDE SERPL-SCNC: 112 MMOL/L (ref 95–110)
CO2 SERPL-SCNC: 21 MMOL/L (ref 23–29)
CREAT SERPL-MCNC: 0.7 MG/DL (ref 0.5–1.4)
DIFFERENTIAL METHOD: ABNORMAL
DIFFERENTIAL METHOD: ABNORMAL
EOSINOPHIL # BLD AUTO: 0 K/UL (ref 0–0.5)
EOSINOPHIL # BLD AUTO: 0.1 K/UL (ref 0–0.5)
EOSINOPHIL NFR BLD: 0.2 % (ref 0–8)
EOSINOPHIL NFR BLD: 0.8 % (ref 0–8)
ERYTHROCYTE [DISTWIDTH] IN BLOOD BY AUTOMATED COUNT: 13.2 % (ref 11.5–14.5)
ERYTHROCYTE [DISTWIDTH] IN BLOOD BY AUTOMATED COUNT: 13.3 % (ref 11.5–14.5)
EST. GFR  (AFRICAN AMERICAN): >60 ML/MIN/1.73 M^2
EST. GFR  (NON AFRICAN AMERICAN): >60 ML/MIN/1.73 M^2
GLUCOSE SERPL-MCNC: 116 MG/DL (ref 70–110)
HCT VFR BLD AUTO: 31.3 % (ref 37–48.5)
HCT VFR BLD AUTO: 34.8 % (ref 37–48.5)
HGB BLD-MCNC: 10.1 G/DL (ref 12–16)
HGB BLD-MCNC: 10.6 G/DL (ref 12–16)
IMM GRANULOCYTES # BLD AUTO: 0.03 K/UL (ref 0–0.04)
IMM GRANULOCYTES # BLD AUTO: 0.06 K/UL (ref 0–0.04)
IMM GRANULOCYTES NFR BLD AUTO: 0.3 % (ref 0–0.5)
IMM GRANULOCYTES NFR BLD AUTO: 0.6 % (ref 0–0.5)
LYMPHOCYTES # BLD AUTO: 1.7 K/UL (ref 1–4.8)
LYMPHOCYTES # BLD AUTO: 1.8 K/UL (ref 1–4.8)
LYMPHOCYTES NFR BLD: 16.2 % (ref 18–48)
LYMPHOCYTES NFR BLD: 16.4 % (ref 18–48)
MAGNESIUM SERPL-MCNC: 2 MG/DL (ref 1.6–2.6)
MCH RBC QN AUTO: 30.3 PG (ref 27–31)
MCH RBC QN AUTO: 31.3 PG (ref 27–31)
MCHC RBC AUTO-ENTMCNC: 30.5 G/DL (ref 32–36)
MCHC RBC AUTO-ENTMCNC: 32.3 G/DL (ref 32–36)
MCV RBC AUTO: 97 FL (ref 82–98)
MCV RBC AUTO: 99 FL (ref 82–98)
MONOCYTES # BLD AUTO: 0.7 K/UL (ref 0.3–1)
MONOCYTES # BLD AUTO: 1 K/UL (ref 0.3–1)
MONOCYTES NFR BLD: 7.1 % (ref 4–15)
MONOCYTES NFR BLD: 8.4 % (ref 4–15)
NEUTROPHILS # BLD AUTO: 7.6 K/UL (ref 1.8–7.7)
NEUTROPHILS # BLD AUTO: 8.5 K/UL (ref 1.8–7.7)
NEUTROPHILS NFR BLD: 74.3 % (ref 38–73)
NEUTROPHILS NFR BLD: 74.6 % (ref 38–73)
NRBC BLD-RTO: 0 /100 WBC
NRBC BLD-RTO: 0 /100 WBC
PHOSPHATE SERPL-MCNC: 2.3 MG/DL (ref 2.7–4.5)
PLATELET # BLD AUTO: 256 K/UL (ref 150–350)
PLATELET # BLD AUTO: 294 K/UL (ref 150–350)
PMV BLD AUTO: 10.4 FL (ref 9.2–12.9)
PMV BLD AUTO: 9.6 FL (ref 9.2–12.9)
POTASSIUM SERPL-SCNC: 4 MMOL/L (ref 3.5–5.1)
RBC # BLD AUTO: 3.23 M/UL (ref 4–5.4)
RBC # BLD AUTO: 3.5 M/UL (ref 4–5.4)
SODIUM SERPL-SCNC: 139 MMOL/L (ref 136–145)
WBC # BLD AUTO: 10.21 K/UL (ref 3.9–12.7)
WBC # BLD AUTO: 11.36 K/UL (ref 3.9–12.7)

## 2019-10-19 PROCEDURE — 85025 COMPLETE CBC W/AUTO DIFF WBC: CPT

## 2019-10-19 PROCEDURE — 36415 COLL VENOUS BLD VENIPUNCTURE: CPT

## 2019-10-19 PROCEDURE — 84100 ASSAY OF PHOSPHORUS: CPT

## 2019-10-19 PROCEDURE — 25000003 PHARM REV CODE 250: Performed by: STUDENT IN AN ORGANIZED HEALTH CARE EDUCATION/TRAINING PROGRAM

## 2019-10-19 PROCEDURE — S0028 INJECTION, FAMOTIDINE, 20 MG: HCPCS | Performed by: STUDENT IN AN ORGANIZED HEALTH CARE EDUCATION/TRAINING PROGRAM

## 2019-10-19 PROCEDURE — 83735 ASSAY OF MAGNESIUM: CPT

## 2019-10-19 PROCEDURE — 63600175 PHARM REV CODE 636 W HCPCS: Performed by: STUDENT IN AN ORGANIZED HEALTH CARE EDUCATION/TRAINING PROGRAM

## 2019-10-19 PROCEDURE — 80048 BASIC METABOLIC PNL TOTAL CA: CPT

## 2019-10-19 RX ORDER — DIPHENHYDRAMINE HYDROCHLORIDE 50 MG/ML
25 INJECTION INTRAMUSCULAR; INTRAVENOUS EVERY 6 HOURS PRN
Status: DISCONTINUED | OUTPATIENT
Start: 2019-10-19 | End: 2019-10-19 | Stop reason: HOSPADM

## 2019-10-19 RX ADMIN — FUROSEMIDE 40 MG: 40 TABLET ORAL at 09:10

## 2019-10-19 RX ADMIN — ACETAZOLAMIDE 250 MG: 250 TABLET ORAL at 09:10

## 2019-10-19 RX ADMIN — HYDROCODONE BITARTRATE AND ACETAMINOPHEN 15 ML: 7.5; 325 SOLUTION ORAL at 12:10

## 2019-10-19 RX ADMIN — DIPHENHYDRAMINE HYDROCHLORIDE 25 MG: 50 INJECTION, SOLUTION INTRAMUSCULAR; INTRAVENOUS at 03:10

## 2019-10-19 RX ADMIN — DIPHENHYDRAMINE HYDROCHLORIDE 25 MG: 50 INJECTION, SOLUTION INTRAMUSCULAR; INTRAVENOUS at 12:10

## 2019-10-19 RX ADMIN — RISPERIDONE 0.5 MG: 0.25 TABLET ORAL at 09:10

## 2019-10-19 RX ADMIN — FAMOTIDINE 20 MG: 10 INJECTION, SOLUTION INTRAVENOUS at 09:10

## 2019-10-19 RX ADMIN — OXYBUTYNIN CHLORIDE 5 MG: 5 TABLET ORAL at 09:10

## 2019-10-19 RX ADMIN — ENOXAPARIN SODIUM 40 MG: 100 INJECTION SUBCUTANEOUS at 09:10

## 2019-10-19 NOTE — SUBJECTIVE & OBJECTIVE
Interval History: No issues overnight  AFVSS  Abdominal and left shoulder soreness  Tolerating liquids    Medications:  Continuous Infusions:   sodium chloride 0.9% 75 mL/hr at 10/19/19 0613    hydromorphone in 0.9 % NaCl 6 mg/30 ml       Scheduled Meds:   acetaminophen  1,000 mg Intravenous Once    acetaZOLAMIDE  250 mg Oral TID    enoxparin  40 mg Subcutaneous Q12H    famotidine (PF)  20 mg Intravenous BID    fluticasone propionate  1 spray Each Nostril Daily    furosemide  40 mg Oral BID    oxybutynin  5 mg Oral TID    risperiDONE  0.5 mg Oral BID    scopolamine  1 patch Transdermal Once    sertraline  150 mg Oral Daily    topiramate  200 mg Oral Daily     PRN Meds:albuterol, diphenhydrAMINE, hydrocodone-apap 7.5-325 MG/15 ML, naloxone, ondansetron, promethazine (PHENERGAN) IVPB, sodium chloride 0.9%     Review of patient's allergies indicates:   Allergen Reactions    Vicodin [hydrocodone-acetaminophen] Nausea And Vomiting     Objective:     Vital Signs (Most Recent):  Temp: 98.8 °F (37.1 °C) (10/19/19 0726)  Pulse: 79 (10/19/19 0726)  Resp: 18 (10/19/19 0435)  BP: (!) 140/81 (10/19/19 0726)  SpO2: 95 % (10/19/19 0726) Vital Signs (24h Range):  Temp:  [96.6 °F (35.9 °C)-98.9 °F (37.2 °C)] 98.8 °F (37.1 °C)  Pulse:  [67-79] 79  Resp:  [16-22] 18  SpO2:  [92 %-100 %] 95 %  BP: (119-166)/(62-93) 140/81     Weight: (!) 146.9 kg (323 lb 13.7 oz)  Body mass index is 50.72 kg/m².    Intake/Output - Last 3 Shifts       10/17 0700 - 10/18 0659 10/18 0700 - 10/19 0659 10/19 0700 - 10/20 0659    I.V. (mL/kg)  800 (5.4)     Total Intake(mL/kg)  800 (5.4)     Net  +800            Urine Occurrence  1 x           Physical Exam   Constitutional: She is oriented to person, place, and time. She appears well-developed and well-nourished.   HENT:   Head: Atraumatic.   Eyes: EOM are normal.   Neck: Neck supple.   Cardiovascular: Normal rate and regular rhythm.   Pulmonary/Chest: Effort normal.   Abdominal: Soft. She  exhibits no distension.   Lap sites c/d/i  Appropriately tender   Musculoskeletal: Normal range of motion.   Neurological: She is alert and oriented to person, place, and time.   Skin: Skin is warm and dry.   Psychiatric: She has a normal mood and affect. Her behavior is normal.   Nursing note and vitals reviewed.      Significant Labs:  CBC:   Recent Labs   Lab 10/19/19  0532   WBC 11.36   RBC 3.50*   HGB 10.6*   HCT 34.8*      MCV 99*   MCH 30.3   MCHC 30.5*     BMP:   Recent Labs   Lab 10/19/19  0532   *      K 4.0   *   CO2 21*   BUN 7   CREATININE 0.7   CALCIUM 8.3*   MG 2.0       Significant Diagnostics:  I have reviewed all pertinent imaging results/findings within the past 24 hours.

## 2019-10-19 NOTE — DISCHARGE SUMMARY
[facility]  General Surgery  Discharge Summary      Patient Name: Zaira Dowell  MRN: [id number]  Admission Date: 10/18/2019  Hospital Length of Stay: 1 days  Discharge Date and Time: No discharge date for patient encounter.  Attending Physician: Ean Kohli MD   Discharging Provider: Al Dukes MD  Primary Care Provider: Burak Rosales MD     HPI: Zaira Dowell is a 48 y.o. female who presents for pre-operative exam for weight loss surgery.  she has completed her pre-operative evaluation.  she has failed medical treatment for obesity.  1. Morbid Obesity with Body mass index is 50.69 kg/m². and inability to lose weight and maintain weight loss .  2. Co-morbidities: Bipolar disorder, anxiety and depression.  Also has optic nerve trouble that she says can be helped with weight loss.  3. 2 cm sub q mass anterior left shoulder (u/s hyperechoic)    Procedure(s) (LRB):  GASTRECTOMY, SLEEVE, LAPAROSCOPIC, with intraop EGD (N/A)  EXCISION, MASS - subq mass left shoulder (Left)     Hospital Course: The patient was admitted and underwent the above listed operations without complications. She was kept overnight for observation. By POD#1, the patient was tolerating a clear liquid diet, pain was well controlled with PRN PO medications, and she was ambulating the halls with minimal assistance. Discharged home with family support and instructions to follow up in clinic.       Consults:     Significant Diagnostic Studies: Labs:   BMP:   Recent Labs   Lab 10/19/19  0532   *      K 4.0   *   CO2 21*   BUN 7   CREATININE 0.7   CALCIUM 8.3*   MG 2.0   , CMP   Recent Labs   Lab 10/19/19  0532      K 4.0   *   CO2 21*   *   BUN 7   CREATININE 0.7   CALCIUM 8.3*   ANIONGAP 6*   ESTGFRAFRICA >60.0   EGFRNONAA >60.0    and CBC   Recent Labs   Lab 10/19/19  0532   WBC 11.36   HGB 10.6*   HCT 34.8*          Pending Diagnostic Studies:     None        Final  Active Diagnoses:    Diagnosis Date Noted POA    PRINCIPAL PROBLEM:  Morbid obesity [E66.01] 07/17/2018 Yes      Problems Resolved During this Admission:      Discharged Condition: good    Disposition: Home or Self Care    Follow Up:  Follow-up Information     Ean Kohli MD In 2 weeks.    Specialties:  General Surgery, Bariatrics  Why:  s/p sleeve gastrectomy  Contact information:  [address]  [facility] [address]  [phone]                 Patient Instructions:      Diet clear liquid   Order Comments: Follow diet as instructed by pre-op Nutritionist     Lifting restrictions   Order Comments: Avoid lifting anything heavier than 10lbs for 6 weeks     Notify your health care provider if you experience any of the following:  redness, tenderness, or signs of infection (pain, swelling, redness, odor or green/yellow discharge around incision site)     Notify your health care provider if you experience any of the following:  severe uncontrolled pain     Notify your health care provider if you experience any of the following:  persistent nausea and vomiting or diarrhea     Notify your health care provider if you experience any of the following:  temperature >100.4     No dressing needed   Order Comments: Can shower in 24 hours     Medications:  Reconciled Home Medications:      Medication List      CONTINUE taking these medications    acetaZOLAMIDE 250 MG tablet  Commonly known as:  DIAMOX  2 (two) times daily.     albuterol 90 mcg/actuation inhaler  Commonly known as:  PROVENTIL/VENTOLIN HFA  Inhale 2 puffs into the lungs every 6 (six) hours as needed for Wheezing. Rescue     clindamycin 150 MG capsule  Commonly known as:  CLEOCIN  ADD 3 CAPSULES TO THE SOAKING DEVICE AND ALLOW WATER TO AGITATE. PLACE AFFECTED AREAS INTO WATER AND SOAK FOR 20 MINUTES ONCE DAILY     ergocalciferol 50,000 unit Cap  Commonly known as:  ERGOCALCIFEROL  Take 1 capsule (50,000 Units total) by mouth every 7 days. for 12  doses     fluticasone propionate 50 mcg/actuation nasal spray  Commonly known as:  FLONASE  1 spray (50 mcg total) by Each Nostril route once daily.     furosemide 40 MG tablet  Commonly known as:  LASIX  Take 40 mg by mouth 2 (two) times daily.     * gabapentin 800 MG tablet  Commonly known as:  NEURONTIN  Take 1 tablet (800 mg total) by mouth once daily.     * gabapentin 600 MG tablet  Commonly known as:  NEURONTIN  Take 1 tablet (600 mg total) by mouth 2 (two) times daily.     hydrocodone-apap 7.5-325 MG/15 ML oral solution  Commonly known as:  HYCET  Take 15 mLs by mouth 4 (four) times daily as needed for Pain.     ketoconazole 2 % cream  Commonly known as:  NIZORAL  Apply topically once daily.     lithium 300 mg tablet  Commonly known as:  LITHOTAB  Take 1 tablet (300 mg total) by mouth 3 (three) times daily.     loratadine 10 mg tablet  Commonly known as:  CLARITIN     LORazepam 1 MG tablet  Commonly known as:  ATIVAN  Take 1 tablet (1 mg total) by mouth as needed for Anxiety.     meclizine 25 mg tablet  Commonly known as:  ANTIVERT  Take 1 tablet (25 mg total) by mouth 3 (three) times daily as needed for Dizziness or Nausea.     methocarbamol 750 MG Tab  Commonly known as:  ROBAXIN  Take 750 mg by mouth 4 (four) times daily.     nabumetone 750 MG tablet  Commonly known as:  RELAFEN  Take 1 tablet (750 mg total) by mouth 2 (two) times daily.     omeprazole 20 MG capsule  Commonly known as:  PRILOSEC  Take 1 capsule (20 mg total) by mouth 2 (two) times daily.     ondansetron 4 MG tablet  Commonly known as:  ZOFRAN     ondansetron 8 MG Tbdl  Commonly known as:  ZOFRAN-ODT  Dissolve 1 tablet (8 mg total) by mouth every 6 (six) hours as needed.     oxybutynin 5 MG Tab  Commonly known as:  DITROPAN  Take 1 tablet (5 mg total) by mouth 3 (three) times daily.     phentermine 37.5 mg tablet  Commonly known as:  ADIPEX-P  Take 37.5 mg by mouth before breakfast.     polyethylene glycol 17 gram/dose powder  Commonly  known as:  GLYCOLAX  Mix 1 capful (17 g) with fluids and drink by mouth once daily.     potassium chloride 10 MEQ Tbsr  Commonly known as:  KLOR-CON  Take 10 mEq by mouth 2 (two) times daily.     risperiDONE 0.5 MG Tab  Commonly known as:  RISPERDAL  Take 1 tablet (0.5 mg total) by mouth 2 (two) times daily.     sertraline 100 MG tablet  Commonly known as:  ZOLOFT  Take 1.5 tablets (150 mg total) by mouth once daily.     terbinafine HCl 250 mg tablet  Commonly known as:  LAMISIL  PLACE 3 TABLETS INTO THE SOAKING DEVICE AND ALLOW WATER TO AGITATE. PLACE AFFECTED AREAS INTO WATER AND SOAK FOR 20 MINUTES ONCE DAILY     topiramate 200 MG Tab  Commonly known as:  TOPAMAX  Take 1 tablet (200 mg total) by mouth once daily.         * This list has 2 medication(s) that are the same as other medications prescribed for you. Read the directions carefully, and ask your doctor or other care provider to review them with you.                Al Dukes MD  General Surgery  [facility]

## 2019-10-19 NOTE — ASSESSMENT & PLAN NOTE
48 y.o. Female s/p lap sleeve gastrectomy and left shoulder mass removal on 10/18/19    - Bariatric clear liquids  - D/c PCA, IVF  - PRN Hycet for pain  - Home medications  - Ambulate/IS  - Lovenox ppx  - Dispo: home this afternoon if tolerates diet

## 2019-10-19 NOTE — PROGRESS NOTES
[facility]  General Surgery  Progress Note    Subjective:     History of Present Illness:  No notes on file    Post-Op Info:  Procedure(s) (LRB):  GASTRECTOMY, SLEEVE, LAPAROSCOPIC, with intraop EGD (N/A)  EXCISION, MASS - subq mass left shoulder (Left)   1 Day Post-Op     Interval History: No issues overnight  AFVSS  Abdominal and left shoulder soreness  Tolerating liquids  Hb down from 13.5 to 10.6    Medications:  Continuous Infusions:   sodium chloride 0.9% 75 mL/hr at 10/19/19 0613    hydromorphone in 0.9 % NaCl 6 mg/30 ml       Scheduled Meds:   acetaminophen  1,000 mg Intravenous Once    acetaZOLAMIDE  250 mg Oral TID    enoxparin  40 mg Subcutaneous Q12H    famotidine (PF)  20 mg Intravenous BID    fluticasone propionate  1 spray Each Nostril Daily    furosemide  40 mg Oral BID    oxybutynin  5 mg Oral TID    risperiDONE  0.5 mg Oral BID    scopolamine  1 patch Transdermal Once    sertraline  150 mg Oral Daily    topiramate  200 mg Oral Daily     PRN Meds:albuterol, diphenhydrAMINE, hydrocodone-apap 7.5-325 MG/15 ML, naloxone, ondansetron, promethazine (PHENERGAN) IVPB, sodium chloride 0.9%     Review of patient's allergies indicates:   Allergen Reactions    Vicodin [hydrocodone-acetaminophen] Nausea And Vomiting     Objective:     Vital Signs (Most Recent):  Temp: 98.8 °F (37.1 °C) (10/19/19 0726)  Pulse: 79 (10/19/19 0726)  Resp: 18 (10/19/19 0435)  BP: (!) 140/81 (10/19/19 0726)  SpO2: 95 % (10/19/19 0726) Vital Signs (24h Range):  Temp:  [96.6 °F (35.9 °C)-98.9 °F (37.2 °C)] 98.8 °F (37.1 °C)  Pulse:  [67-79] 79  Resp:  [16-22] 18  SpO2:  [92 %-100 %] 95 %  BP: (119-166)/(62-93) 140/81     Weight: (!) 146.9 kg (323 lb 13.7 oz)  Body mass index is 50.72 kg/m².    Intake/Output - Last 3 Shifts       10/17 0700 - 10/18 0659 10/18 0700 - 10/19 0659 10/19 0700 - 10/20 0659    I.V. (mL/kg)  800 (5.4)     Total Intake(mL/kg)  800 (5.4)     Net  +800            Urine Occurrence   1 x           Physical Exam   Constitutional: She is oriented to person, place, and time. She appears well-developed and well-nourished.   HENT:   Head: Atraumatic.   Eyes: EOM are normal.   Neck: Neck supple.   Cardiovascular: Normal rate and regular rhythm.   Pulmonary/Chest: Effort normal.   Abdominal: Soft. She exhibits no distension.   Lap sites c/d/i  Appropriately tender   Musculoskeletal: Normal range of motion.   Neurological: She is alert and oriented to person, place, and time.   Skin: Skin is warm and dry.   Psychiatric: She has a normal mood and affect. Her behavior is normal.   Nursing note and vitals reviewed.      Significant Labs:  CBC:   Recent Labs   Lab 10/19/19  0532   WBC 11.36   RBC 3.50*   HGB 10.6*   HCT 34.8*      MCV 99*   MCH 30.3   MCHC 30.5*     BMP:   Recent Labs   Lab 10/19/19  0532   *      K 4.0   *   CO2 21*   BUN 7   CREATININE 0.7   CALCIUM 8.3*   MG 2.0       Significant Diagnostics:  I have reviewed all pertinent imaging results/findings within the past 24 hours.    Assessment/Plan:     * Morbid obesity  48 y.o. Female s/p lap sleeve gastrectomy and left shoulder mass removal on 10/18/19    - Recheck CBC at noon   - Bariatric clear liquids  - D/c PCA, IVF  - PRN Hycet for pain  - Home medications  - Ambulate/IS  - Lovenox ppx  - Dispo: home this afternoon if tolerates diet        Al Dukes MD  General Surgery  [facility]

## 2019-10-21 ENCOUNTER — DOCUMENTATION ONLY (OUTPATIENT)
Dept: SURGERY | Facility: CLINIC | Age: 49
End: 2019-10-21

## 2019-10-21 NOTE — PROGRESS NOTES
Phone call: She is doing well with drinking, only had narcotics twice and no fevers.  She has no complaints.

## 2019-10-25 ENCOUNTER — TELEPHONE (OUTPATIENT)
Dept: BARIATRICS | Facility: CLINIC | Age: 49
End: 2019-10-25

## 2019-10-25 NOTE — TELEPHONE ENCOUNTER
Called to check in 1 week post op from bariatric surgery.    Water protocol began at 7 am and completed while in hospital/ medicine cups given to you by nursing to take home (y/n): y    Dehydration assessment:  Urine output/color: pale yellow   Chest pain:n  Persistent increased heart rate:n  Fatigue:n  N/V: n  Dizzy/weak: n  BM: yes   Protein and fluid intake assessment: (food diary)  Fluid intake:  40   Protein supplements: 2  Protein intake yesterday: 60 grams   Vitamins  -What vitamins are you taking?  All requires   -Are you tolerating well? Yes   Medications  Omeprazole: y   Hycet:n  How are you tolerating pain at this time? (rate on a scale from 1 to 10; >7 notify PA/MD)  Are you taking home/other medications or antibiotics as prescribed (if pt not )? Are you having any problems? (f/u with PCP, cardiologist, endocrinologist)  How is your support system at home? Good   Exercise reminder (light exercise at this time, no lifting above 10 lbs)     Questions for nurse/MA/PA:  None      Assessment:  Doing well.     Discussion:  Continue to work on fluid and protein intake.     Confirmed date and time for 2 week po labs and clinic visit

## 2019-11-04 ENCOUNTER — OFFICE VISIT (OUTPATIENT)
Dept: BARIATRICS | Facility: CLINIC | Age: 49
End: 2019-11-04
Payer: MEDICARE

## 2019-11-04 ENCOUNTER — CLINICAL SUPPORT (OUTPATIENT)
Dept: BARIATRICS | Facility: CLINIC | Age: 49
End: 2019-11-04
Payer: MEDICARE

## 2019-11-04 VITALS
BODY MASS INDEX: 45.99 KG/M2 | WEIGHT: 293 LBS | SYSTOLIC BLOOD PRESSURE: 124 MMHG | DIASTOLIC BLOOD PRESSURE: 68 MMHG | HEIGHT: 67 IN | HEART RATE: 74 BPM

## 2019-11-04 DIAGNOSIS — Z98.84 S/P LAPAROSCOPIC SLEEVE GASTRECTOMY: ICD-10-CM

## 2019-11-04 DIAGNOSIS — R63.4 WEIGHT LOSS: ICD-10-CM

## 2019-11-04 DIAGNOSIS — K21.9 GASTROESOPHAGEAL REFLUX DISEASE WITHOUT ESOPHAGITIS: ICD-10-CM

## 2019-11-04 DIAGNOSIS — E87.6 LOW BLOOD POTASSIUM: ICD-10-CM

## 2019-11-04 DIAGNOSIS — Z98.890 POSTOPERATIVE STATE: Primary | ICD-10-CM

## 2019-11-04 PROCEDURE — 99215 OFFICE O/P EST HI 40 MIN: CPT | Mod: PBBFAC | Performed by: NURSE PRACTITIONER

## 2019-11-04 PROCEDURE — 99024 POSTOP FOLLOW-UP VISIT: CPT | Mod: POP,,, | Performed by: NURSE PRACTITIONER

## 2019-11-04 PROCEDURE — 99999 PR PBB SHADOW E&M-EST. PATIENT-LVL V: CPT | Mod: PBBFAC,,, | Performed by: NURSE PRACTITIONER

## 2019-11-04 PROCEDURE — 99499 UNLISTED E&M SERVICE: CPT | Mod: S$PBB,,, | Performed by: DIETITIAN, REGISTERED

## 2019-11-04 PROCEDURE — 99999 PR PBB SHADOW E&M-EST. PATIENT-LVL V: ICD-10-PCS | Mod: PBBFAC,,, | Performed by: NURSE PRACTITIONER

## 2019-11-04 PROCEDURE — 99499 NO LOS: ICD-10-PCS | Mod: S$PBB,,, | Performed by: DIETITIAN, REGISTERED

## 2019-11-04 PROCEDURE — 99024 PR POST-OP FOLLOW-UP VISIT: ICD-10-PCS | Mod: POP,,, | Performed by: NURSE PRACTITIONER

## 2019-11-04 RX ORDER — UBIDECARENONE 75 MG
500 CAPSULE ORAL DAILY
COMMUNITY

## 2019-11-04 RX ORDER — MULTIVIT WITH MINERALS/HERBS
1 TABLET ORAL DAILY
COMMUNITY

## 2019-11-04 RX ORDER — SERTRALINE HYDROCHLORIDE 100 MG/1
TABLET, FILM COATED ORAL
COMMUNITY
Start: 2019-10-21 | End: 2019-11-13 | Stop reason: SDUPTHER

## 2019-11-04 RX ORDER — OMEPRAZOLE 40 MG/1
40 CAPSULE, DELAYED RELEASE ORAL
Qty: 60 CAPSULE | Refills: 2 | Status: SHIPPED | OUTPATIENT
Start: 2019-11-04 | End: 2020-01-27 | Stop reason: SDUPTHER

## 2019-11-04 RX ORDER — IBUPROFEN 200 MG
1 CAPSULE ORAL 2 TIMES DAILY
COMMUNITY

## 2019-11-04 NOTE — PROGRESS NOTES
NUTRITION NOTE    Referring Physician: Ean Kohli M.D.  Reason for MNT Referral: Follow-up 2 Weeks s/p Gastric Sleeve    PAST MEDICAL HISTORY:  Denies nausea, vomiting and constipation.  Reports problems, including diarrhea using laxatives.    Past Medical History:   Diagnosis Date    Anxiety     Arthritis     Asthma     BMI 50.0-59.9, adult     Depression     Fibromyalgia     GERD (gastroesophageal reflux disease)     Obesity     SHARON (obstructive sleep apnea)        CLINICAL DATA:  48 y.o. female.    There were no vitals filed for this visit.    Current Weight: 312 lbs  BMI: 48.89  Total Weight Loss: 25 lbs  Excess Weight Loss: 13%    LABS:  Reviewed.  Slightly low potassium recommended 1/2 mashed banana per day    CURRENT DIET:  Bariatric Liquid Diet    Diet Recall: 72-90 grams of protein/day; 64+ oz of fluids/day    Diet Includes: 2-3 premier protein shakes and 2 eggs  Protein Supplements: Premier Protein twice a day and 2 scrambled eggs  Diatin milk in eggs  Powerade zero or gatorade zero  Or propel water    EXERCISE:  See BA    VITAMINS/MINERALS:  See BA    ASSESSMENT:  Doing fairly well overall.  Adequate protein intake.  Adequate fluid intake.    BARIATRIC DIET DISCUSSION:  Instructed and provided written materials on bariatric pureed diet plan.  Reinforced post-op nutrition guidelines.    PLAN/RECOMMONDATIONS:  Advance to bariatric pureed diet.  Maintain protein intake.  Maintain fluid intake.  Begin light exercise.  Continue appropriate vitamins & minerals.      Return to clinic in 6 weeks.    SESSION TIME: 15 minutes

## 2019-11-04 NOTE — PROGRESS NOTES
"BARIATRIC FOLLOW UP:    Chief Complaint   Patient presents with    Follow-up       HISTORY OF PRESENT ILLNESS: Zaira Dowell is a 48 y.o. female with a Body mass index is 48.89 kg/m². who presents for follow up s/p lap sleeve with Dr. Kohli on 10/18/2019.  she is doing well and tolerating the diet without difficulty. She has nausea, only with pills, none otherwise, no vomiting. she is losing weight appropriately.    Reports diarrhea "pure water" every time she drinks protein and with purees as well. No mucus blood frequency urgency bloating. Takes 3 laxatives nightly which she has done since before surgery x 6 months because otherwise she will have constipation. States she will not take Miralax, states it will make her sick. Unsure if she has tried before. "The though of it makes her sick".     Also had left shoulder cyst removed. Site is healing well.     Review of Systems   Constitutional: Negative for chills, fever and malaise/fatigue.   Eyes: Negative for blurred vision and double vision.   Respiratory: Negative for cough, shortness of breath and wheezing.    Cardiovascular: Negative for chest pain, palpitations and leg swelling.   Gastrointestinal: Positive for diarrhea and heartburn. Negative for abdominal pain, blood in stool, constipation, melena, nausea and vomiting.        Denies dysphagia   Genitourinary: Negative for dysuria, flank pain, frequency, hematuria and urgency.   Musculoskeletal: Positive for back pain, joint pain, myalgias and neck pain.        Attributes all symptoms to FMG   Neurological: Negative for dizziness, tingling and headaches.   Psychiatric/Behavioral: Negative for depression and suicidal ideas. The patient is not nervous/anxious.        EXERCISE & VITAMINS:  Adherent with bariatric vitamin regimen  Exercise- none    DIET:  Liquid/puree Bariatric Diet.  1-2 protein shakes daily, 3 puree meals.  100-130 oz H20 and Clear SF Liquids.    See dietitian's note from today for " further details.       Vitals:    11/04/19 0813   BP: 124/68   Pulse: 74       Physical Exam   Constitutional: She is oriented to person, place, and time. She appears well-developed and well-nourished. No distress.   HENT:   Head: Normocephalic and atraumatic.   Eyes: Conjunctivae are normal. Right eye exhibits no discharge. Left eye exhibits no discharge. No scleral icterus.   Cardiovascular: Normal rate, regular rhythm and normal heart sounds.   No murmur heard.  Pulmonary/Chest: Effort normal and breath sounds normal. No respiratory distress.   Abdominal: Soft. Bowel sounds are normal. She exhibits no distension. There is no tenderness. There is no rebound and no guarding.   Well healing surgical incisions, clean, dry, and intact without signs of infection.     Musculoskeletal: She exhibits no edema.   Neurological: She is alert and oriented to person, place, and time.   Skin: Skin is warm and dry. No rash noted. She is not diaphoretic. No erythema. No pallor.   Psychiatric: She has a normal mood and affect. Her behavior is normal.   Nursing note and vitals reviewed.  Anterior left shoulder site is healing well.    ASSESSMENT:  - Diarrhea  - Heartburn  - Morbid obesity, Body mass index is 48.89 kg/m².,  s/p sleeve gastrectomy .  - Estimated goal weight is 50% EWL  - Co-morbidities:  Bipolar disorder, anxiety and depression  - Good Weight loss, 25 lbs, 13% EWL  - No Exercise regimen  - Good Vitamin Regimen  - Ok Diet    PLAN:  - Stop laxative. Can try OTC Colace. Try Miralax.  Discussed possible referral to GI for chronic constipation if above ineffective.   - STOP NSAID- talk to prescribing provider for alternate.  - Increase PPI to twice daily.  - Regular exercise and adherence to bariatric diet to achieve maximum weight loss.  - Follow-up with dietician to advance/re enforce diet.  - Full bariatric vitamin regimen  - Lifting restriction, no lifting more than 10 lbs for 6 weeks total.  - No NSAIDs, Tylenol for  pain.  - Can swallow whole pills on trial.  - RTC per post op schedule.  - Call the office for any issues.  - Check labs as scheduled.       -- Slightly low K on labs. RD to discuss oral increase. Recheck in 1 week.    30 minute visit, over 50% of time spent counseling patient face to face on diet, exercise, and weight loss.

## 2019-11-04 NOTE — PATIENT INSTRUCTIONS
High Protein Pureed Diet    2 weeks after gastric bypass and sleeve you may be ready to add pureed food to your diet.  All food should be the consistency of baby food, or thinner.  Follow pureed diet for the next 2 weeks.    Protein - It is very important to pay attention to protein intake during this time.      Inadequate protein intake can cause:  ? Delayed Wound Healing  ? Hair Loss  ? Muscle Breakdown    Meal Plan - Eat 3-4 meals per day (2-4 tbsp each), with protein supplements in between to meet protein needs.  Meeting protein needs daily will help increase healing, decrease muscle loss, and increase weight loss.  Your goal is  grams of protein a day.    Protein First - Always eat the foods with the highest protein first.  Foods high in protein include milk, yogurt, cheese, egg whites, and blenderized meat, seafood, and beans.    Fluids - Keep track in your journal of how much you are drinking; you should try to drink at least 64oz of fluids every day.      Foods allowed: Portion size Protein (g)   ? Sugar-free clear liquids As desired 0   ? Skim or 1% milk ½ cup 4   ? Sugar free pudding, light yogurt, custard (use skim or 1% milk in preparation) 3 oz 2.5   ? Strained baby food meats, or home-made pureed lean meats and shrimp 1 oz 7   ? Beans (red, white, black, lima, chacon, fat free refried, hummus) and lentils ¼ cup 4   ? Low-fat/fat free cheese.(cottage cheese, mozzarella string cheese, ricotta cheese, Laughing Cow, Baby Bell, cheddar, etc) ¼ cup 7-8   ? Scrambled eggs or Egg Beaters 1 or ¼ cup 6   ? Edamame or Tofu, mashed ¼ cup 5   ? Unflavored protein powder (add to 1 scoop to  98% fat free soups or SF pudding) 3 Tbsp 9   ? *PB2: peanut powder (45 calories) 2 Tbsp 5     *PB2 powdered peanut butter: 45 calories vs. 190 calories in 2 tbsp of regular peanut butter. Purchase online at [employer], or  at various [employer], [employer], [employer], [employer] and [employer].      Bariatric Liquid/Pureed Sample  Menu    3-4 small meals plus 2-3 protein drinks per day.      8-8:30am 1 egg or ¼ cup Egg Beaters   9-9:30am 1 cup water, or decaf coffee or tea   10-10:30am Protein drink, 30g protein   11-11:30am 2 tbsp low-fat cottage cheese, and 1 tbsp pureed peaches   12-12:30pm 1 cup water, or sugar-free lemonade    1-1:30pm 2 tbsp pureed chicken, and 1 tbsp pureed carrots    2-2:30pm 1 cup water, or sugar-free lemonade   3-3:30pm Protein drink, 30g protein   5-5:30pm 1 cup water    6-6:30pm 1 cup hi-protein creamy chicken soup 14g protein (see Recipe below)   7-7:30pm 1 cup water, or sugar-free fruit punch    8-8:30pm 1 cup water       This sample menu provides approx. 80g protein and 64oz fluids.  Liquid protein supplements should contain 20-30g protein and less than 4 grams of sugar each.    ? Sip fluids continuously in between meals.  Drink at least ¼ cup every 15 minutes.  ? For fluids: ¼ cup = 2 oz = 4 tbsp       RECIPE IDEAS for Bariatric Pureed Diet:    Hi-Protein Creamy Chicken Soup: (10g protein per 1 cup serving)  Empty 1 can of 98% fat free cream of chicken soup into saucepan. Then  blend 1 scoop of unflavored protein powder with 1 can of skim milk until smooth.  Add protein milk to saucepan and heat to warm. (Note: Do NOT boil. Protein powder may clump if heated too hot).     Hi-Protein Pudding: (14g protein per ½ cup serving)  Add 2 scoops protein powder to 2 cups cold skim milk and mix well.  Stir in dry Jell-O Sugar-Free Instant Pudding mix.  Chill and Enjoy!    Tuna Mousse (12g protein per ¼ cup serving) Page 135 in book Eating Well After Weight Loss Surgery.  In a  or , combine all ingredients and pulse until smooth.  2 6-ounce cans tuna packed in water, drained  2 tbsp low-fat mayonnaise  2 tbsp fat-free sour cream  2 tbsp fat-free cream cheese, softened  ½ cup shallots, finely chopped  1 tbsp lemon juice  ¼ tsp ground pepper  ½ tsp celery seed    Chocolate Peanut Butter Mousse  (28g  protein total)  6oz plain Greek yogurt  4 tbsp chocolate PB2

## 2019-11-11 ENCOUNTER — LAB VISIT (OUTPATIENT)
Dept: LAB | Facility: HOSPITAL | Age: 49
End: 2019-11-11
Attending: NURSE PRACTITIONER
Payer: MEDICARE

## 2019-11-11 DIAGNOSIS — E87.6 LOW BLOOD POTASSIUM: ICD-10-CM

## 2019-11-11 LAB
ANION GAP SERPL CALC-SCNC: 7 MMOL/L (ref 8–16)
BUN SERPL-MCNC: 8 MG/DL (ref 6–20)
CALCIUM SERPL-MCNC: 9.4 MG/DL (ref 8.7–10.5)
CHLORIDE SERPL-SCNC: 111 MMOL/L (ref 95–110)
CO2 SERPL-SCNC: 21 MMOL/L (ref 23–29)
CREAT SERPL-MCNC: 0.8 MG/DL (ref 0.5–1.4)
EST. GFR  (AFRICAN AMERICAN): >60 ML/MIN/1.73 M^2
EST. GFR  (NON AFRICAN AMERICAN): >60 ML/MIN/1.73 M^2
GLUCOSE SERPL-MCNC: 96 MG/DL (ref 70–110)
POTASSIUM SERPL-SCNC: 4.1 MMOL/L (ref 3.5–5.1)
SODIUM SERPL-SCNC: 139 MMOL/L (ref 136–145)

## 2019-11-11 PROCEDURE — 80048 BASIC METABOLIC PNL TOTAL CA: CPT

## 2019-11-11 PROCEDURE — 36415 COLL VENOUS BLD VENIPUNCTURE: CPT

## 2019-11-13 ENCOUNTER — OFFICE VISIT (OUTPATIENT)
Dept: PSYCHIATRY | Facility: CLINIC | Age: 49
End: 2019-11-13
Payer: MEDICARE

## 2019-11-13 VITALS
HEIGHT: 67 IN | BODY MASS INDEX: 45.99 KG/M2 | SYSTOLIC BLOOD PRESSURE: 133 MMHG | HEART RATE: 61 BPM | WEIGHT: 293 LBS | DIASTOLIC BLOOD PRESSURE: 61 MMHG

## 2019-11-13 DIAGNOSIS — F31.9 BIPOLAR 1 DISORDER: Primary | ICD-10-CM

## 2019-11-13 PROCEDURE — 99999 PR PBB SHADOW E&M-EST. PATIENT-LVL II: ICD-10-PCS | Mod: PBBFAC,,, | Performed by: PSYCHIATRY & NEUROLOGY

## 2019-11-13 PROCEDURE — 99214 OFFICE O/P EST MOD 30 MIN: CPT | Mod: S$PBB,,, | Performed by: PSYCHIATRY & NEUROLOGY

## 2019-11-13 PROCEDURE — 99999 PR PBB SHADOW E&M-EST. PATIENT-LVL II: CPT | Mod: PBBFAC,,, | Performed by: PSYCHIATRY & NEUROLOGY

## 2019-11-13 PROCEDURE — 99214 PR OFFICE/OUTPT VISIT, EST, LEVL IV, 30-39 MIN: ICD-10-PCS | Mod: S$PBB,,, | Performed by: PSYCHIATRY & NEUROLOGY

## 2019-11-13 PROCEDURE — 99212 OFFICE O/P EST SF 10 MIN: CPT | Mod: PBBFAC | Performed by: PSYCHIATRY & NEUROLOGY

## 2019-11-13 RX ORDER — SERTRALINE HYDROCHLORIDE 100 MG/1
150 TABLET, FILM COATED ORAL DAILY
Qty: 135 TABLET | Refills: 2 | Status: SHIPPED | OUTPATIENT
Start: 2019-11-13 | End: 2020-01-14 | Stop reason: SDUPTHER

## 2019-11-13 NOTE — PROGRESS NOTES
"Outpatient Psychiatry Follow-Up Visit (MD/NP)    11/13/2019    Clinical Status of Patient:  Outpatient (Ambulatory)    Chief Complaint:  Ms Dowell is a 48 y S.o. female who presents today for follow-up of bipolar disorder.     Met with patient at the office.      Interval History and Content of Current Session:  General impression:Ms Dowell successfully completed her surgery for bariatric purposes and has lost 37# of weight since that time. She is pleased with her progress, and referred to herself as "feeling great". Her mood is quite stable and she is upbeat and hopeful. She has no med side effects and is pleased with her progress. She has no signs or symptoms of depression, lida, or psychosis. She is in good self control without thoughts of harming herself or others.  She also talked in some detail today re a family conflict that started a number of years ago, and resulted in a falling out with other family members. She is now trying to help resolve that issue and hoping her efforts will be successful. She was generally brighter and more upbeat today.      Target symptoms:Unstable mood        Compliance:  Pt reports she is taking medications as directed    Side effects:  None at this timel.    Risk Parameters:  Patient reports no suicidal ideation  Patient reports no homicidal ideation  Patient reports no self-injurious behavior  Patient reports no violent behavior    Patient's Response to Intervention:  The patient's response to intervention is very accepting.    Progress Toward Goals and Other Mental Status Changes:  The patient's progress toward goals is good.    Vitals: Most recent vital signs, dated today were reviewed.     Mental Status Evaluation     Appearance:  Casually dressed and well groomed   Behavior:  Good self control   Speech:  Normal volume, tone, projection, and speed   Mood:  Upbeat, but sad re family situation   Affect:  Consistent with mood   Thought Process:  Well organized   Thought " Content:  Appropriate to her mood   Sensorium:  Clear and well oriented   Attention Span & Concentration good   Cognition:  Thinking clearly   Insight:  good   Judgment:  Understands her condition         Diagnosis:Bipolar I Disorder      Plan:(Medication and Therapy Recommendation)  · Continue Zoloft 150 mg daily, Risperdal 0.5 mg bid, and lithium 300 mg q am and 600 mg q pm. Will repeat lithium level following next visit. Renal and thyroid studies recently done were stable. I reviewed the side effects of her medicines and advised she could call if she had difficulties with the medicines or her clinical condition.    Additional Notes: Supportive psychotherapy provided during this session.    Return to Clinic: 2 months

## 2019-11-26 ENCOUNTER — OFFICE VISIT (OUTPATIENT)
Dept: FAMILY MEDICINE | Facility: CLINIC | Age: 49
End: 2019-11-26
Payer: MEDICARE

## 2019-11-26 ENCOUNTER — OFFICE VISIT (OUTPATIENT)
Dept: BARIATRICS | Facility: CLINIC | Age: 49
End: 2019-11-26
Payer: MEDICARE

## 2019-11-26 VITALS
TEMPERATURE: 98 F | WEIGHT: 293 LBS | DIASTOLIC BLOOD PRESSURE: 72 MMHG | HEART RATE: 82 BPM | SYSTOLIC BLOOD PRESSURE: 120 MMHG | OXYGEN SATURATION: 97 % | RESPIRATION RATE: 16 BRPM | BODY MASS INDEX: 45.99 KG/M2 | HEIGHT: 67 IN

## 2019-11-26 VITALS
WEIGHT: 293 LBS | DIASTOLIC BLOOD PRESSURE: 62 MMHG | BODY MASS INDEX: 45.99 KG/M2 | HEART RATE: 68 BPM | SYSTOLIC BLOOD PRESSURE: 109 MMHG | HEIGHT: 67 IN

## 2019-11-26 DIAGNOSIS — E55.9 VITAMIN D INSUFFICIENCY: ICD-10-CM

## 2019-11-26 DIAGNOSIS — N39.0 URINARY TRACT INFECTION WITH HEMATURIA, SITE UNSPECIFIED: ICD-10-CM

## 2019-11-26 DIAGNOSIS — Z98.890 POSTOPERATIVE STATE: ICD-10-CM

## 2019-11-26 DIAGNOSIS — Z98.84 S/P LAPAROSCOPIC SLEEVE GASTRECTOMY: ICD-10-CM

## 2019-11-26 DIAGNOSIS — N89.8 VAGINAL DISCHARGE: Primary | ICD-10-CM

## 2019-11-26 DIAGNOSIS — R30.0 DYSURIA: Primary | ICD-10-CM

## 2019-11-26 DIAGNOSIS — R31.9 URINARY TRACT INFECTION WITH HEMATURIA, SITE UNSPECIFIED: ICD-10-CM

## 2019-11-26 PROCEDURE — 99999 PR PBB SHADOW E&M-EST. PATIENT-LVL V: ICD-10-PCS | Mod: PBBFAC,,, | Performed by: NURSE PRACTITIONER

## 2019-11-26 PROCEDURE — 99214 OFFICE O/P EST MOD 30 MIN: CPT | Mod: S$PBB,,, | Performed by: NURSE PRACTITIONER

## 2019-11-26 PROCEDURE — 99999 PR PBB SHADOW E&M-EST. PATIENT-LVL III: ICD-10-PCS | Mod: PBBFAC,,, | Performed by: NURSE PRACTITIONER

## 2019-11-26 PROCEDURE — 87801 DETECT AGNT MULT DNA AMPLI: CPT

## 2019-11-26 PROCEDURE — 99024 POSTOP FOLLOW-UP VISIT: CPT | Mod: POP,,, | Performed by: NURSE PRACTITIONER

## 2019-11-26 PROCEDURE — 99999 PR PBB SHADOW E&M-EST. PATIENT-LVL V: CPT | Mod: PBBFAC,,, | Performed by: NURSE PRACTITIONER

## 2019-11-26 PROCEDURE — 99215 OFFICE O/P EST HI 40 MIN: CPT | Mod: PBBFAC | Performed by: NURSE PRACTITIONER

## 2019-11-26 PROCEDURE — 99024 PR POST-OP FOLLOW-UP VISIT: ICD-10-PCS | Mod: POP,,, | Performed by: NURSE PRACTITIONER

## 2019-11-26 PROCEDURE — 99999 PR PBB SHADOW E&M-EST. PATIENT-LVL III: CPT | Mod: PBBFAC,,, | Performed by: NURSE PRACTITIONER

## 2019-11-26 PROCEDURE — 99214 PR OFFICE/OUTPT VISIT, EST, LEVL IV, 30-39 MIN: ICD-10-PCS | Mod: S$PBB,,, | Performed by: NURSE PRACTITIONER

## 2019-11-26 PROCEDURE — 99213 OFFICE O/P EST LOW 20 MIN: CPT | Mod: PBBFAC,27,PN | Performed by: NURSE PRACTITIONER

## 2019-11-26 PROCEDURE — 87481 CANDIDA DNA AMP PROBE: CPT | Mod: 59

## 2019-11-26 RX ORDER — NITROFURANTOIN (MACROCRYSTALS) 100 MG/1
100 CAPSULE ORAL EVERY 12 HOURS
Qty: 10 CAPSULE | Refills: 0 | Status: SHIPPED | OUTPATIENT
Start: 2019-11-26 | End: 2020-02-26

## 2019-11-26 NOTE — PROGRESS NOTES
"Routine Office Visit    Patient Name: Zaira Dowell    : 1970  MRN: [id number]    Chief Complaint:  Urinary frequency, dysuria    Subjective:  Zaira is a 49 y.o. female who presents today for:    1.  Urinary frequency, dysuria - patient presents today for evaluation of urinary complaints of urinary frequency, urinary urgency, dysuria, low back pain, and lower abdomen tenderness. She states that the symptoms started 3-4 days ago.  Endorses a cloudy urine as well as some bloody pus"  from either her urethra or her vagina when wiping this morning.  Denies any fevers, chills, nausea, vomiting, or diarrhea.  Denies any overt hematuria, but she does state that she has an odor near her vulva which she is not sure is a vaginal or urinary tract infection.  She has tried nothing for the symptoms.  Of note, she saw a bariatric NP today who wrote urinary studies, but the patient states that she was told by this NP that she was not going to get antibiotics from her and that she needed to follow up for an urgent care appointment with someone else discuss this problem.  Patient is new to me and this is the extent of her concerns at this time.    Past Medical History  Past Medical History:   Diagnosis Date    Anxiety     Arthritis     Asthma     BMI 50.0-59.9, adult     Depression     Fibromyalgia     GERD (gastroesophageal reflux disease)     Obesity     SHARON (obstructive sleep apnea)        Past Surgical History  Past Surgical History:   Procedure Laterality Date    CARPAL TUNNEL RELEASE      CHOLECYSTECTOMY      ESOPHAGOGASTRODUODENOSCOPY N/A 2019    Procedure: EGD (ESOPHAGOGASTRODUODENOSCOPY);  Surgeon: Michelle Mc MD;  Location: [facility];  Service: Endoscopy;  Laterality: N/A;    HYSTERECTOMY      partial     KNEE SURGERY      LAPAROSCOPIC SLEEVE GASTRECTOMY N/A 10/18/2019    Procedure: GASTRECTOMY, SLEEVE, LAPAROSCOPIC, with intraop EGD;  Surgeon: Ean Kohli MD;  Location: " [facility] OR 2ND FLR;  Service: General;  Laterality: N/A;    pinched nerve      SHOULDER SURGERY      TONSILLECTOMY      WRIST SURGERY      had ganlin cyst       Family History  Family History   Problem Relation Age of Onset    Diabetes Mother     Moffat's disease Mother     Hypertension Mother     Heart disease Father     Stroke Father     Mental illness Father     Hypertension Father     Diabetes Father     Depression Father     Heart disease Maternal Grandmother     Depression Maternal Grandmother     COPD Maternal Grandfather     Heart disease Maternal Grandfather     Stroke Maternal Grandfather     Kidney disease Paternal Grandmother     Heart disease Paternal Grandmother     Heart disease Paternal Grandfather     No Known Problems Brother     Hypertension Son        Social History  Social History     Socioeconomic History    Marital status:      Spouse name: Not on file    Number of children: Not on file    Years of education: Not on file    Highest education level: Not on file   Occupational History    Not on file   Social Needs    Financial resource strain: Not very hard    Food insecurity:     Worry: Never true     Inability: Never true    Transportation needs:     Medical: No     Non-medical: No   Tobacco Use    Smoking status: Former Smoker     Types: Vaping with nicotine, Cigarettes    Smokeless tobacco: Never Used    Tobacco comment: Quit vaping 7/19, 1/15/19 vaping with 12% nicotine. Smoking cessation refused will quit cold turkey.   Substance and Sexual Activity    Alcohol use: No     Frequency: Monthly or less     Drinks per session: 1 or 2     Binge frequency: Never    Drug use: No    Sexual activity: Yes     Partners: Male   Lifestyle    Physical activity:     Days per week: 1 day     Minutes per session: 30 min    Stress: Rather much   Relationships    Social connections:     Talks on phone: More than three times a week     Gets together: Patient refused      Attends Church service: Not on file     Active member of club or organization: No     Attends meetings of clubs or organizations: Never     Relationship status:    Other Topics Concern    Not on file   Social History Narrative    Not on file       Current Medications  Current Outpatient Medications on File Prior to Visit   Medication Sig Dispense Refill    acetaZOLAMIDE (DIAMOX) 250 MG tablet 2 (two) times daily.       albuterol (PROVENTIL/VENTOLIN HFA) 90 mcg/actuation inhaler Inhale 2 puffs into the lungs every 6 (six) hours as needed for Wheezing. Rescue 18 g 2    b complex vitamins tablet Take 1 tablet by mouth once daily.      calcium citrate (CALCITRATE) 200 mg (950 mg) tablet Take 1 tablet by mouth 2 (two) times daily.      clindamycin (CLEOCIN) 150 MG capsule ADD 3 CAPSULES TO THE SOAKING DEVICE AND ALLOW WATER TO AGITATE. PLACE AFFECTED AREAS INTO WATER AND SOAK FOR 20 MINUTES ONCE DAILY  1    cyanocobalamin 500 MCG tablet Take 500 mcg by mouth once daily.      ferrous fumarate/vit Bcomp,C (SUPER B COMPLEX ORAL) Take 1 capsule by mouth once daily.      fluticasone propionate (FLONASE) 50 mcg/actuation nasal spray 1 spray (50 mcg total) by Each Nostril route once daily. 1 Bottle 0    furosemide (LASIX) 40 MG tablet Take 40 mg by mouth 2 (two) times daily.      gabapentin (NEURONTIN) 600 MG tablet Take 1 tablet (600 mg total) by mouth 2 (two) times daily. 60 tablet 2    gabapentin (NEURONTIN) 800 MG tablet Take 1 tablet (800 mg total) by mouth once daily. 90 tablet 2    lithium (LITHOTAB) 300 mg tablet Take 1 tablet (300 mg total) by mouth 3 (three) times daily. 270 tablet 1    LORazepam (ATIVAN) 1 MG tablet Take 1 tablet (1 mg total) by mouth as needed for Anxiety. 30 tablet 2    meclizine (ANTIVERT) 25 mg tablet Take 1 tablet (25 mg total) by mouth 3 (three) times daily as needed for Dizziness or Nausea. 30 tablet 0    methocarbamol (ROBAXIN) 750 MG Tab Take 750 mg by mouth 4  (four) times daily.       multivitamin capsule Take 1 capsule by mouth 2 (two) times daily.       omeprazole (PRILOSEC) 40 MG capsule Take 1 capsule (40 mg total) by mouth 2 (two) times daily before meals. Open capsule and take with apple sauce 60 capsule 2    ondansetron (ZOFRAN-ODT) 8 MG TbDL Dissolve 1 tablet (8 mg total) by mouth every 6 (six) hours as needed. 30 tablet 0    oxybutynin (DITROPAN) 5 MG Tab Take 1 tablet (5 mg total) by mouth 3 (three) times daily. 180 tablet 1    polyethylene glycol (GLYCOLAX) 17 gram/dose powder Mix 1 capful (17 g) with fluids and drink by mouth once daily. 510 g 3    potassium chloride (KLOR-CON) 10 MEQ TbSR Take 10 mEq by mouth 2 (two) times daily.      risperiDONE (RISPERDAL) 0.5 MG Tab Take 1 tablet (0.5 mg total) by mouth 2 (two) times daily. 180 tablet 1    sertraline (ZOLOFT) 100 MG tablet Take 1.5 tablets (150 mg total) by mouth once daily. 135 tablet 2    terbinafine HCl (LAMISIL) 250 mg tablet PLACE 3 TABLETS INTO THE SOAKING DEVICE AND ALLOW WATER TO AGITATE. PLACE AFFECTED AREAS INTO WATER AND SOAK FOR 20 MINUTES ONCE DAILY  1    topiramate (TOPAMAX) 200 MG Tab Take 1 tablet (200 mg total) by mouth once daily. 90 tablet 1     No current facility-administered medications on file prior to visit.        Allergies   Review of patient's allergies indicates:   Allergen Reactions    Vicodin [hydrocodone-acetaminophen] Nausea And Vomiting       Review of Systems (Pertinent positives)  Review of Systems   Constitutional: Negative for chills, diaphoresis, fever, malaise/fatigue and weight loss.   HENT: Negative.    Eyes: Negative.    Respiratory: Negative.    Cardiovascular: Negative.    Gastrointestinal: Positive for abdominal pain. Negative for blood in stool, constipation, diarrhea, heartburn, melena, nausea and vomiting.   Genitourinary: Positive for dysuria, frequency, hematuria and urgency. Negative for flank pain.   Musculoskeletal: Positive for back pain.  "Negative for falls, joint pain, myalgias and neck pain.   Skin: Negative.    Neurological: Negative.    Endo/Heme/Allergies: Negative.    Psychiatric/Behavioral: Negative.        /72 (BP Location: Right arm, Patient Position: Sitting, BP Method: Large (Automatic))   Pulse 82   Temp 98.1 °F (36.7 °C) (Oral)   Resp 16   Ht 5' 7" (1.702 m)   Wt (!) 139 kg (306 lb 7 oz)   SpO2 97%   BMI 48.00 kg/m²     Physical Exam   Constitutional: She is oriented to person, place, and time. She appears well-developed and well-nourished. No distress.   Eyes: Pupils are equal, round, and reactive to light. Conjunctivae and EOM are normal.   Neck: Normal range of motion. Neck supple.   Cardiovascular: Normal rate, regular rhythm, normal heart sounds and intact distal pulses. Exam reveals no gallop and no friction rub.   No murmur heard.  Pulmonary/Chest: Effort normal and breath sounds normal. No stridor. No respiratory distress. She has no wheezes. She has no rales. She exhibits no tenderness.   Abdominal: Soft. Normal appearance and bowel sounds are normal. There is no hepatosplenomegaly. There is tenderness in the suprapubic area. There is no rigidity, no rebound, no guarding, no CVA tenderness, no tenderness at McBurney's point and negative Stoner's sign. No hernia.   Musculoskeletal: Normal range of motion.   Neurological: She is alert and oriented to person, place, and time.   Skin: Skin is warm and dry. Capillary refill takes less than 2 seconds. She is not diaphoretic.   Vitals reviewed.       Assessment/Plan:  Zaira Dowell is a 49 y.o. female who presents today for :    Zaira was seen today for urinary frequency, hematuria, abdominal pain and back pain.    Diagnoses and all orders for this visit:    Vaginal discharge  -     VAGINOSIS SCREEN BY DNA PROBE    Urinary tract infection with hematuria, site unspecified  -     nitrofurantoin (MACRODANTIN) 100 MG capsule; Take 1 capsule (100 mg total) by mouth every " 12 (twelve) hours.    I am unsure the reasoning why the patient came to this visit today.  Given that she does report some vaginal odor so I will have the patient perform a self performed vaginal swab to check for any vaginal yeast infections or BV/trich.  Given the patient does have classic symptoms of UTI I will treat with antibiotics at this time and discuss case with previous NP who she saw today.  Potential side effects of the antibiotics were discussed.  I will personally follow up with results of urine culture and reach out to the patient's previous NP her treated her today regarding their visit earlier.    Encouraged patient to drink plenty of fluids and avoid bladder irritants   Like caffeine and alcohol.  If symptoms worsen or she develops any nausea, vomiting, fevers, chills, or flank pain then she needs to follow up in the meantime.    Wednesday 11/27 - I discussed this patient with Jessi Miranda NP over email who had seen this patient  Yesterday.  Spoke with Jessi who  Informed me that she told the patient she would not treat with antibiotics until urine culture resulted until there is a proven source of infection.  I did inform Jessi that I sent the patient antibiotics and will personally follow up with the urine culture results and vaginosis screen results as well.        This office note has been dictated.  This dictation has been generated using M-Modal Fluency Direct dictation; some phonetic errors may occur.   My collaborating physician is Dr. Tobias Lundy.

## 2019-11-26 NOTE — PROGRESS NOTES
BARIATRIC FOLLOW UP:    Chief Complaint   Patient presents with    Follow-up     incision hurts about a 7. Patient states she smells a odor from down below  she feesl she may have a bladder infection abd pain at a 8     HISTORY OF PRESENT ILLNESS: Zaira Dowell is a 49 y.o. female with a Body mass index is 48 kg/m². who presents for follow up s/p lap sleeve with Dr. Kohli on 10/18/2019.  she is doing well and tolerating the diet without difficulty.     She presents today for pain and draining to site of cyst removal to left shoulder. Drainage has occurred 2 times since her last visit 11/4/2019. Drainage described as bloody and yellowish, no odor, no pus. Site is JHONATHAN today. Denies fever.    She also reports vaginal odor and discharge. Reports purulent bloody discharge with wiping today. Also concerned that she has a urinary infection. Reports bladder pressure, urinary frequency; denies hematuria. She denies fever. Denies flank pain. Symptoms started over the weekend (3-4 days).    Denies any stool issues. Reports on Miralax only.    EXERCISE & VITAMINS:  Adherent with bariatric vitamin regimen  Exercise- none    DIET:  Soft Bariatric Diet. 2 meals, 2-3 snacks  Does not count protein  100-130 oz H20 and Clear SF Liquids.      Review of Systems   Constitutional: Negative for chills, fever and malaise/fatigue.   Eyes: Negative for blurred vision and double vision.   Respiratory: Negative for cough, shortness of breath and wheezing.    Cardiovascular: Negative for chest pain, palpitations and leg swelling.   Gastrointestinal: Negative for abdominal pain (suprapubic pain, no pain otherwise), blood in stool, constipation, diarrhea, heartburn, melena, nausea and vomiting.        Denies dysphagia   Genitourinary: Positive for dysuria and urgency. Negative for flank pain, frequency and hematuria.   Musculoskeletal: Positive for back pain, joint pain, myalgias and neck pain.        Attributes all symptoms to FMG    Neurological: Negative for dizziness, tingling and headaches.   Psychiatric/Behavioral: Negative for depression and suicidal ideas. The patient is not nervous/anxious.      Vitals:    11/26/19 0934   BP: 109/62   Pulse: 68     Physical Exam   Constitutional: She is oriented to person, place, and time. She appears well-developed and well-nourished. No distress.   HENT:   Head: Normocephalic and atraumatic.   Eyes: Conjunctivae are normal. Right eye exhibits no discharge. Left eye exhibits no discharge. No scleral icterus.   Cardiovascular: Normal rate, regular rhythm and normal heart sounds.   No murmur heard.  Pulmonary/Chest: Effort normal and breath sounds normal. No respiratory distress.   Abdominal: Soft. Bowel sounds are normal. She exhibits no distension and no mass. There is tenderness (suprapubic). There is no rebound and no guarding. No hernia.   Well healing surgical incisions, clean, dry, and intact without signs of infection.   Musculoskeletal: She exhibits no edema.   Neurological: She is alert and oriented to person, place, and time.   Skin: Skin is warm and dry. No rash noted. She is not diaphoretic. No erythema. No pallor.   Left shoulder cyst removal incision: open to air, site slight open, serosanguinous drainage with firm pressure, no pus, no odor, no warmth, no induration or fluctuance, no erythema, no streaking   Psychiatric: She has a normal mood and affect. Her behavior is normal.   Nursing note and vitals reviewed.    ASSESSMENT:  - delayed wound healing to left shoulder cyst removal site  - dysuria, suprapubic pain  - vaginal discharge  - Morbid obesity, Body mass index is 48 kg/m².,  s/p sleeve gastrectomy .  - Estimated goal weight is 50% EWL  - Co-morbidities:  Bipolar disorder, anxiety and depression  - Good Weight loss, 30 lbs, 16% EWL  - No Exercise regimen  - Good Vitamin Regimen  - Fair Diet, unsure of protein intake    PLAN:  Discussed continued monitoring to left shoulder site.    I discussed her wound site, reports and findings, with Dr. Kohli. Provided reassurance regarding limited concerns for infection at this time, however stressed that continued monitoring is required. Reviewed signs and symptoms of infection at length. Discussed that antibiotics are not currently indicated for the site and she agrees to call us for any concern of infection as discussed during this visit. Advised that she must keep f/u 12/13 to reassess. Call at any time before if she starts to develop concerns for infection. She verbalizes understanding of the plan and agrees to call for any concerns.     Urine studies ordered. Treatment pending results.  I advised her to follow up with PC for vaginal discharge/odor as we are not equipped to conduct pelvic exam in this clinic. She verbalizes understanding of this plan.  She is interested in trying a new PC provider. MA to arrange.    Regular exercise and adherence to bariatric diet to achieve maximum weight loss.  Full bariatric vitamin regimen  Vit D level  Lifting restriction, no lifting more than 10 lbs for 6 weeks total.  No NSAIDs, Tylenol for pain.  RTC per post op schedule.  Call the office for any issues.    30 minute visit, over 50% of time spent counseling patient face to face on diet, exercise, and weight loss.    Late note- I have discussed patient with PC provider who was told by patient that I would not prescribe antibiotics for urinary issues. Macrobid was prescribed at their visit.

## 2019-11-27 LAB
BACTERIAL VAGINOSIS DNA: NEGATIVE
CANDIDA GLABRATA DNA: NEGATIVE
CANDIDA KRUSEI DNA: NEGATIVE
CANDIDA RRNA VAG QL PROBE: NEGATIVE
T VAGINALIS RRNA GENITAL QL PROBE: NEGATIVE

## 2019-11-29 ENCOUNTER — PATIENT MESSAGE (OUTPATIENT)
Dept: BARIATRICS | Facility: CLINIC | Age: 49
End: 2019-11-29

## 2019-11-29 ENCOUNTER — TELEPHONE (OUTPATIENT)
Dept: BARIATRICS | Facility: CLINIC | Age: 49
End: 2019-11-29

## 2019-11-29 NOTE — TELEPHONE ENCOUNTER
Nursing, please call patient.   Are her urinary symptoms improving with antibiotic (nitrofurantoin) and is she still without fevers? If not, please advise our providers or PCP. She may require alternate antibiotic.

## 2019-12-03 ENCOUNTER — TELEPHONE (OUTPATIENT)
Dept: FAMILY MEDICINE | Facility: CLINIC | Age: 49
End: 2019-12-03

## 2019-12-03 NOTE — TELEPHONE ENCOUNTER
Called patient to discuss vaginosis swab and urine culture results.  Patient identification verified by name and date of birth.  No growth on vaginosis screen.  Urine culture should be sensitive to the antibiotics that I sent her.  She states that she took all antibiotics to completion and she is no longer having symptoms.  Encouraged patient to contact clinic or follow up in the future as needed for any further concerns.  Patient verbalized understanding of instructions.

## 2019-12-13 ENCOUNTER — CLINICAL SUPPORT (OUTPATIENT)
Dept: BARIATRICS | Facility: CLINIC | Age: 49
End: 2019-12-13
Payer: MEDICARE

## 2019-12-13 ENCOUNTER — OFFICE VISIT (OUTPATIENT)
Dept: FAMILY MEDICINE | Facility: CLINIC | Age: 49
End: 2019-12-13
Payer: MEDICARE

## 2019-12-13 ENCOUNTER — LAB VISIT (OUTPATIENT)
Dept: LAB | Facility: HOSPITAL | Age: 49
End: 2019-12-13
Payer: MEDICARE

## 2019-12-13 ENCOUNTER — OFFICE VISIT (OUTPATIENT)
Dept: BARIATRICS | Facility: CLINIC | Age: 49
End: 2019-12-13
Payer: MEDICARE

## 2019-12-13 VITALS
HEART RATE: 73 BPM | WEIGHT: 293 LBS | OXYGEN SATURATION: 97 % | SYSTOLIC BLOOD PRESSURE: 114 MMHG | BODY MASS INDEX: 45.99 KG/M2 | HEIGHT: 67 IN | TEMPERATURE: 98 F | DIASTOLIC BLOOD PRESSURE: 74 MMHG | RESPIRATION RATE: 17 BRPM

## 2019-12-13 VITALS
WEIGHT: 293 LBS | DIASTOLIC BLOOD PRESSURE: 68 MMHG | HEART RATE: 82 BPM | SYSTOLIC BLOOD PRESSURE: 130 MMHG | BODY MASS INDEX: 45.99 KG/M2 | HEIGHT: 67 IN

## 2019-12-13 DIAGNOSIS — M79.7 FIBROMYALGIA: ICD-10-CM

## 2019-12-13 DIAGNOSIS — F32.A DEPRESSION, UNSPECIFIED DEPRESSION TYPE: ICD-10-CM

## 2019-12-13 DIAGNOSIS — I27.20 PULMONARY HYPERTENSION: ICD-10-CM

## 2019-12-13 DIAGNOSIS — F31.9 BIPOLAR 1 DISORDER: ICD-10-CM

## 2019-12-13 DIAGNOSIS — R63.4 WEIGHT LOSS: ICD-10-CM

## 2019-12-13 DIAGNOSIS — R79.89 LOW VITAMIN D LEVEL: ICD-10-CM

## 2019-12-13 DIAGNOSIS — K21.9 GASTROESOPHAGEAL REFLUX DISEASE, ESOPHAGITIS PRESENCE NOT SPECIFIED: ICD-10-CM

## 2019-12-13 DIAGNOSIS — M79.7 FIBROMYALGIA MUSCLE PAIN: Primary | ICD-10-CM

## 2019-12-13 DIAGNOSIS — K91.2 POSTSURGICAL MALABSORPTION, NOT ELSEWHERE CLASSIFIED: ICD-10-CM

## 2019-12-13 DIAGNOSIS — G47.33 OSA (OBSTRUCTIVE SLEEP APNEA): ICD-10-CM

## 2019-12-13 DIAGNOSIS — R73.9 HYPERGLYCEMIA: ICD-10-CM

## 2019-12-13 DIAGNOSIS — E66.01 MORBID OBESITY: ICD-10-CM

## 2019-12-13 DIAGNOSIS — Z98.84 S/P LAPAROSCOPIC SLEEVE GASTRECTOMY: ICD-10-CM

## 2019-12-13 DIAGNOSIS — Z98.890 POST-OPERATIVE STATE: Primary | ICD-10-CM

## 2019-12-13 LAB
25(OH)D3+25(OH)D2 SERPL-MCNC: 44 NG/ML (ref 30–96)
ALBUMIN SERPL BCP-MCNC: 3.8 G/DL (ref 3.5–5.2)
ALP SERPL-CCNC: 90 U/L (ref 55–135)
ALT SERPL W/O P-5'-P-CCNC: 33 U/L (ref 10–44)
ANION GAP SERPL CALC-SCNC: 7 MMOL/L (ref 8–16)
AST SERPL-CCNC: 17 U/L (ref 10–40)
BASOPHILS # BLD AUTO: 0.08 K/UL (ref 0–0.2)
BASOPHILS NFR BLD: 0.9 % (ref 0–1.9)
BILIRUB SERPL-MCNC: 0.3 MG/DL (ref 0.1–1)
BUN SERPL-MCNC: 11 MG/DL (ref 6–20)
CALCIUM SERPL-MCNC: 9.5 MG/DL (ref 8.7–10.5)
CHLORIDE SERPL-SCNC: 110 MMOL/L (ref 95–110)
CHOLEST SERPL-MCNC: 151 MG/DL (ref 120–199)
CHOLEST/HDLC SERPL: 2.9 {RATIO} (ref 2–5)
CO2 SERPL-SCNC: 20 MMOL/L (ref 23–29)
CREAT SERPL-MCNC: 0.8 MG/DL (ref 0.5–1.4)
DIFFERENTIAL METHOD: ABNORMAL
EOSINOPHIL # BLD AUTO: 0.2 K/UL (ref 0–0.5)
EOSINOPHIL NFR BLD: 2.6 % (ref 0–8)
ERYTHROCYTE [DISTWIDTH] IN BLOOD BY AUTOMATED COUNT: 13.2 % (ref 11.5–14.5)
EST. GFR  (AFRICAN AMERICAN): >60 ML/MIN/1.73 M^2
EST. GFR  (NON AFRICAN AMERICAN): >60 ML/MIN/1.73 M^2
GLUCOSE SERPL-MCNC: 107 MG/DL (ref 70–110)
HCT VFR BLD AUTO: 43.7 % (ref 37–48.5)
HDLC SERPL-MCNC: 52 MG/DL (ref 40–75)
HDLC SERPL: 34.4 % (ref 20–50)
HGB BLD-MCNC: 13.9 G/DL (ref 12–16)
IMM GRANULOCYTES # BLD AUTO: 0.04 K/UL (ref 0–0.04)
IMM GRANULOCYTES NFR BLD AUTO: 0.4 % (ref 0–0.5)
IRON SERPL-MCNC: 59 UG/DL (ref 30–160)
LDLC SERPL CALC-MCNC: 76.6 MG/DL (ref 63–159)
LYMPHOCYTES # BLD AUTO: 1.6 K/UL (ref 1–4.8)
LYMPHOCYTES NFR BLD: 17.7 % (ref 18–48)
MCH RBC QN AUTO: 30.3 PG (ref 27–31)
MCHC RBC AUTO-ENTMCNC: 31.8 G/DL (ref 32–36)
MCV RBC AUTO: 95 FL (ref 82–98)
MONOCYTES # BLD AUTO: 0.7 K/UL (ref 0.3–1)
MONOCYTES NFR BLD: 7.4 % (ref 4–15)
NEUTROPHILS # BLD AUTO: 6.4 K/UL (ref 1.8–7.7)
NEUTROPHILS NFR BLD: 71 % (ref 38–73)
NONHDLC SERPL-MCNC: 99 MG/DL
NRBC BLD-RTO: 0 /100 WBC
PLATELET # BLD AUTO: 290 K/UL (ref 150–350)
PMV BLD AUTO: 10.7 FL (ref 9.2–12.9)
POTASSIUM SERPL-SCNC: 3.5 MMOL/L (ref 3.5–5.1)
PROT SERPL-MCNC: 7.4 G/DL (ref 6–8.4)
RBC # BLD AUTO: 4.59 M/UL (ref 4–5.4)
SATURATED IRON: 13 % (ref 20–50)
SODIUM SERPL-SCNC: 137 MMOL/L (ref 136–145)
TOTAL IRON BINDING CAPACITY: 459 UG/DL (ref 250–450)
TRANSFERRIN SERPL-MCNC: 310 MG/DL (ref 200–375)
TRIGL SERPL-MCNC: 112 MG/DL (ref 30–150)
VIT B12 SERPL-MCNC: 590 PG/ML (ref 210–950)
WBC # BLD AUTO: 9 K/UL (ref 3.9–12.7)

## 2019-12-13 PROCEDURE — 80053 COMPREHEN METABOLIC PANEL: CPT

## 2019-12-13 PROCEDURE — 99999 PR PBB SHADOW E&M-EST. PATIENT-LVL IV: ICD-10-PCS | Mod: PBBFAC,,, | Performed by: FAMILY MEDICINE

## 2019-12-13 PROCEDURE — 99999 PR PBB SHADOW E&M-EST. PATIENT-LVL IV: CPT | Mod: PBBFAC,,, | Performed by: FAMILY MEDICINE

## 2019-12-13 PROCEDURE — 82607 VITAMIN B-12: CPT

## 2019-12-13 PROCEDURE — 85025 COMPLETE CBC W/AUTO DIFF WBC: CPT

## 2019-12-13 PROCEDURE — 99024 POSTOP FOLLOW-UP VISIT: CPT | Mod: POP,,, | Performed by: PHYSICIAN ASSISTANT

## 2019-12-13 PROCEDURE — 82306 VITAMIN D 25 HYDROXY: CPT

## 2019-12-13 PROCEDURE — 99024 PR POST-OP FOLLOW-UP VISIT: ICD-10-PCS | Mod: POP,,, | Performed by: PHYSICIAN ASSISTANT

## 2019-12-13 PROCEDURE — 84425 ASSAY OF VITAMIN B-1: CPT

## 2019-12-13 PROCEDURE — 99499 UNLISTED E&M SERVICE: CPT | Mod: S$PBB,,, | Performed by: DIETITIAN, REGISTERED

## 2019-12-13 PROCEDURE — 99214 OFFICE O/P EST MOD 30 MIN: CPT | Mod: PBBFAC,25,27,PN | Performed by: FAMILY MEDICINE

## 2019-12-13 PROCEDURE — 83540 ASSAY OF IRON: CPT

## 2019-12-13 PROCEDURE — 99215 OFFICE O/P EST HI 40 MIN: CPT | Mod: PBBFAC,25 | Performed by: PHYSICIAN ASSISTANT

## 2019-12-13 PROCEDURE — 99999 PR PBB SHADOW E&M-EST. PATIENT-LVL V: ICD-10-PCS | Mod: PBBFAC,,, | Performed by: PHYSICIAN ASSISTANT

## 2019-12-13 PROCEDURE — 99214 PR OFFICE/OUTPT VISIT, EST, LEVL IV, 30-39 MIN: ICD-10-PCS | Mod: S$PBB,,, | Performed by: FAMILY MEDICINE

## 2019-12-13 PROCEDURE — 80061 LIPID PANEL: CPT

## 2019-12-13 PROCEDURE — 96372 THER/PROPH/DIAG INJ SC/IM: CPT | Mod: PBBFAC,PN

## 2019-12-13 PROCEDURE — 99499 NO LOS: ICD-10-PCS | Mod: S$PBB,,, | Performed by: DIETITIAN, REGISTERED

## 2019-12-13 PROCEDURE — 99214 OFFICE O/P EST MOD 30 MIN: CPT | Mod: S$PBB,,, | Performed by: FAMILY MEDICINE

## 2019-12-13 PROCEDURE — 99999 PR PBB SHADOW E&M-EST. PATIENT-LVL V: CPT | Mod: PBBFAC,,, | Performed by: PHYSICIAN ASSISTANT

## 2019-12-13 RX ORDER — DEXAMETHASONE SODIUM PHOSPHATE 4 MG/ML
4 INJECTION, SOLUTION INTRA-ARTICULAR; INTRALESIONAL; INTRAMUSCULAR; INTRAVENOUS; SOFT TISSUE
Status: COMPLETED | OUTPATIENT
Start: 2019-12-13 | End: 2019-12-13

## 2019-12-13 RX ADMIN — DEXAMETHASONE SODIUM PHOSPHATE 4 MG: 4 INJECTION INTRA-ARTICULAR; INTRALESIONAL; INTRAMUSCULAR; INTRAVENOUS; SOFT TISSUE at 02:12

## 2019-12-13 NOTE — PROGRESS NOTES
BARIATRIC FOLLOW UP:    Chief Complaint   Patient presents with    Follow-up     HISTORY OF PRESENT ILLNESS: Zaira Dowell is a 49 y.o. female with a Body mass index is 46.3 kg/m². who presents for follow up s/p lap sleeve with Dr. Kohli on 10/18/2019.  she is doing well and tolerating the diet without difficulty.     Pt states her UTI symptoms have resolved finished her Macrobid     Family dr today at 2, due to back pain and knee pain fibromyagia     Pt states that she is now only on one medication for her optic nerve edema.     EXERCISE & VITAMINS:  Adherent with bariatric vitamin regimen  Exercise- none    DIET:  Soft Bariatric Diet. 2 meals, 2-3 snacks  Am: protein shake ( 30g)   Lunch: protein and veggie   Dinner: leftovers from lunch or cheese roll with meat scrambled egg    Drink: h20, propel, country time lemonade packets ( 100 oz) decaf tea     Review of Systems   Constitutional: Negative for chills, fever and malaise/fatigue.   Eyes: Negative for blurred vision and double vision.   Respiratory: Negative for cough, shortness of breath and wheezing.    Cardiovascular: Negative for chest pain, palpitations and leg swelling.   Gastrointestinal: Negative for abdominal pain, blood in stool, constipation, diarrhea, heartburn, melena, nausea and vomiting.        Denies dysphagia   Genitourinary: Negative for dysuria, flank pain, frequency, hematuria and urgency.   Musculoskeletal: Positive for back pain and joint pain (knee). Negative for myalgias and neck pain.        Attributes all symptoms to FMG   Neurological: Negative for dizziness, tingling and headaches.   Psychiatric/Behavioral: Negative for depression and suicidal ideas. The patient is not nervous/anxious.      Vitals:    12/13/19 0818   BP: 130/68   Pulse: 82     Physical Exam   Constitutional: She is oriented to person, place, and time. She appears well-developed and well-nourished. No distress.   HENT:   Head: Normocephalic and atraumatic.    Eyes: Conjunctivae are normal. Right eye exhibits no discharge. Left eye exhibits no discharge. No scleral icterus.   Cardiovascular: Normal rate, regular rhythm and normal heart sounds.   No murmur heard.  Pulmonary/Chest: Effort normal and breath sounds normal. No respiratory distress.   Abdominal: Soft. Bowel sounds are normal. She exhibits no distension and no mass. There is no tenderness (suprapubic). There is no rebound and no guarding. No hernia.   Well healing surgical incisions, clean, dry, and intact without signs of infection.   Musculoskeletal: She exhibits no edema.   Neurological: She is alert and oriented to person, place, and time.   Skin: Skin is warm and dry. No rash noted. She is not diaphoretic. No erythema. No pallor.   Left shoulder wound healing well, scabbed over no erythema no drainage non tender to palpation    Psychiatric: She has a normal mood and affect. Her behavior is normal.   Nursing note and vitals reviewed.    ASSESSMENT:    - Morbid obesity, Body mass index is 46.3 kg/m².,  s/p sleeve gastrectomy .  - Estimated goal weight is 50% EWL  - Co-morbidities:  Bipolar disorder, anxiety and depression  - Good Weight loss, 42 lbs, 22% EWL  - No Exercise regimen  - Good Vitamin Regimen  - Fair Diet, unsure of protein intake    PLAN:  - Increase exercise   - Count protein intake   - F/u with PCP for FMg symptoms   Regular exercise and adherence to bariatric diet to achieve maximum weight loss.  Full bariatric vitamin regimen  Lifting restriction, no lifting more than 10 lbs for 6 weeks total.  No NSAIDs, Tylenol for pain.  RTC per post op schedule.  Call the office for any issues.    30 minute visit, over 50% of time spent counseling patient face to face on diet, exercise, and weight loss.

## 2019-12-13 NOTE — PATIENT INSTRUCTIONS
Meal Ideas for Regular Bariatric Diet  *Recipes and products available at [email or web address].com      Breakfast: (15-20g protein)    - Egg white omelet: 2 egg whites or ½ cup Egg Beaters. (Optional proteins: cheese, shrimp, black beans, chicken, sliced turkey) (Optional veggies: tomatoes, salsa, spinach, mushrooms, onions, green peppers, or small slice avocado)     - Egg and sausage: 1 egg or ¼ cup Egg Beaters (any variety), with 1 tanisha or 2 links of Turkey sausage or Veggie breakfast sausage (TRANSCORP or Popcorn5)    - Crust-less breakfast quiche: To make a glass pie dish, mix 4oz part skim Ricotta, 1 cup skim milk, and 2 eggs as your base. Add protein: shredded cheese, sliced lean ham or turkey, turkey chin/sausage. Add veggies: tomato, onion, green onion, mushroom, green pepper, spinach, etc.    - Yogurt parfait: Mix 1 - 6oz container Dannon Light N Fit vanilla yogurt, with ¼ cup crushed unsalted nuts    - Cottage cheese and fruit: ½ cup part-skim cottage cheese or ricotta cheese topped with fresh fruit or sugar free preserves     - Yanet Graham's Vanilla Egg custard* (add 2 Tbsp instant coffee granules to make Cappuccino Custard*)    - Hi-Protein café latte (skim milk, decaf coffee, 1 scoop protein powder). Optional to add Sugar free syrup or extract flavoring.    - Breakfast Lox: spread fat free cream cheese on slices of smoked salmon. Serve over scrambled or egg over easy (sauteed with nonstick cookspray) OR on a cucumber slice    - Eggwhich: Scramble or cook 1 large egg over easy using nonstick cookspray. Place between 2 slices of [place] chin and low fat cheese.     Lunch: (20-30g protein)    - ½ cup Black bean soup (Homemade or Progresso), with ¼ cup shredded low-fat cheese. Top with chopped tomato or fresh salsa.     - Lean deli turkey breast and low-fat sliced cheese, mustard or light camacho to moisten, rolled up together, or wrapped in a Griffin lettuce leaf    - Chicken salad made from dinner  leftovers, moisten with low-fat salad dressing or light camacho. Also try leftover salmon, shrimp, tuna or boiled eggs. Serve ½ cup over dark green salad    - Fat-free canned refried beans, topped with ¼ cup shredded low-fat cheese. Top with chopped tomato or fresh salsa.     - Greek salad: Top mixed greens with 1-2oz grilled chicken, tomatoes, red onions, 2-3 kalamata olives, and sprinkle lightly with feta cheese. Spritz with Balsamic vinegar to taste.     - Crust-less lunch quiche: To make a glass pie dish, mix 4oz part skim Ricotta, 1 cup skim milk, and 2 eggs as your base. Add protein: shredded cheese, sliced lean ham or turkey, shrimp, chicken. Add veggies: tomato, onion, green onion, mushroom, green pepper, spinach, artichoke, broccoli, etc.    - Pizza bake: spread a  minh kuldip mushroom with tomato sauce, low-fat shredded mozzarella and turkey pepperoni or [place] chin. Add any veggies. Roast for 10-15 minutes, until cheese melted.     - Cucumber crab bites: Spread ¼ cup crab dip (lump crabmeat + light cream cheese and green onions) over sliced cucumber.     - Chicken with light spinach and artichoke dip*: Puree in : 6oz cooked and drained spinach, 2 cloves garlic, 1 can cannelloni beans, ½ cup chopped green onions, 1 can drained artichoke hearts (not marinated in oil), lemon juice and basil. Mix in 2oz chopped up chicken.    Supper: (20-30g protein)    - Serve grilled fish over dark green salad tossed with low-fat dressing, served with grilled asparagus granado     - Rotisserie chicken salad: served with sliced strawberries, walnuts, fat-free feta cheese crumbles and 1 tbsp Ayalas Own Light Raspberry [place] Vinaigrette    - Shrimp cocktail: Dip cold boiled shrimp in homemade low-sugar cocktail sauce (1/2 cup Erwin One Carb ketchup, 2 tbsp horseradish, 1/4 tsp hot sauce, 1 tsp Worcestershire sauce, 1 tbsp freshly-squeezed lemon juice). Serve with dark green salad, walnuts, and crumbled blue  cheese drizzled with olive oil and Balsamic vinegar    - Tuna Melt: Spread tuna salad onto 2 thick slices of tomato. Top with low-fat cheese and broil until cheese is melted. May also be made with chicken salad of shrimp salad. [place] with different types of cheeses.    - Chicken or beef fajitas (no tortilla, rice, beans, chips). Top meat and veggies w/ fresh salsa, fat free sour cream.     - Homemade low-fat Chili using extra lean ground beef or ground turkey. Top with shredded cheese and salsa as desired. May add dollop fat-free sour cream if desired    - Chicken parmesan: Top chicken breast w/ low sugar marinara sauce, mozzarella cheese and bake until chicken reaches 165*.  Serve w/ spaghetti SQUASH or Indonesian cut green beans    - Dinner Omelet with shrimp or chicken and onion, green peppers and chives.    - No noodle lasagna: Use sliced zucchini or eggplant in place of noodles.  Layer with part skim ricotta cheese and low sugar meat sauce (use very lean ground beef or ground turkey).    - Mexican chicken bake: Bake chunks of chicken breast or thigh with taco seasoning, Pace brand enchilada sauce, green onions and low-fat cheese. Serve with ¼ cup black beans or fat free refried beans topped with chopped tomatoes or salsa.    - Brendan frozen meatballs, simmered in Classico Marinara sauce. Different flavors of salsa or spaghetti sauce create different dishes! Sprinkle with parmesan cheese. Serve with grilled or steamed veggies, or a dark green salad.    - Simmer boneless skinless chicken thigh chunks in Classico Marinara sauce or roasted salsa until tender with chopped onion, bell pepper, garlic, mushrooms, spinach, etc.     - Hamburger or veggie burger, without the bun, dressed the way you like. Served with grilled or steamed veggies.    - Eggplant parmesan: Bake slices of eggplant at 350 degrees for 15 minutes. Layer tomato sauce, sliced eggplant and low-fat mozzarella cheese in a baking dish and cover with  foil. Bake 30-40 more minutes or until bubbly. Uncover and bake at 400 degrees for about 15 more minutes, or until top is slightly crisp.    - Fish tacos: grilled/baked white fish, wrapped in [place] lettuce leaf, topped with salsa, shredded low-fat cheese, and light coleslaw.    - Chicken genna: Sprinkle chicken w/ 1 tsp of hidden valley ranch dip mix. Then grill chicken and top with black beans, salsa and 1 tsp fat free sour cream.     - Cauliflower pizza crust: Use cauliflower as crust (see recipe on pinterest, no flour!). Top w/ low fat cheese, turkey pepperoni and veggies and bake again    - chicken or turkey crust pizza: use ground chicken or turkey instead of cauliflower, spread in [place] and bake at 350 for about 20-30 minutes(may want to add garlic, black pepper, oregano and other herbs to ground meat mixture).  Remove and top w/ low fat cheese, turkey pepperoni and veggies and bake again for another 10 minutes or until cheese is browned.     Snacks: (100-200 calories; >5g protein)    - 1 low-fat cheese stick with 8 cherry tomatoes or 1 serving fresh fruit  - 4 thin slices fat-free turkey breast and 1 slice low-fat cheese  - 4 thin slices fat-free honey ham with wedge of melon  - 6-8 edamame pods (equivalent to about 1/4 cup edamame without pods).   - 1/4 cup unsalted nuts with ½ cup fruit  - 6-oz container Dannon Light n Fit vanilla yogurt, topped with 1oz unsalted nuts         - apple, celery or baby carrots spread with 2 Tbsp PB2  - apple slices with 1 oz slice low-fat cheese  - Apple slices dipped in 2 Tbsp of PB2  - celery, cucumber, bell pepper or baby carrots dipped in ¼ cup hummus bean spread or light spinach and artichoke dip (*recipe in lunch section)  - celery, cucumber, baby carrots dipped in high protein greek yogurt (Mix 16 oz plain greek yogurt + 1 packet of hidden valley ranch dip mix)  - Clarence Links Beef Steak - 14g protein! (similar to beef jerky)  - 2 wedges Laughing Cow - Light Herb  & Garlic Cheese with sliced cucumber or green bell pepper  - 1/2 cup low-fat cottage cheese with ¼ cup fruit or ¼ cup salsa  - RTD Protein drinks: Atkins, Low Carb Slim Fast, EAS light, Muscle Milk Light, etc.  - Homemade Protein drinks: GNC Soy95, Isopure, Nectar, UNJURY, Whey Gourmet, etc. Mix 1 scoop powder with 8oz skim/1% milk or light soymilk.  - Protein bars: Atkins, EAS, Pure Protein, Think Thin, Detour, etc. Must have 0-4 grams sugar - Read the label.    Takeout Options: No more than twice/week  Deli - Salads (no pasta or rice), meats, cheeses. Roasted chicken. Lox (salmon)    Mexican - Platters which don't include tortillas, chips, or rice. Go easy on the beans. Example: Fajitas without the tortillas. Ask the  not to bring chips to the table if they are too tempting.    Greek - Meat or fish and vegetable, but no bread or rice. Including hummus, baba ganoush, etc, is OK. Most sit-down Greek restaurants can provide you with cucumber slices for dipping instead of emanuel bread.    Fast Food (Avoid as much as possible) - Salads (no croutons and limit salad dressing to 2 tbsp), grilled chicken sandwich without the bun and ask for no camacho. Lorenes low fat chili or Taco Bell pintos and cheese.    BBQ - The meats are fine if you ask for sauces on the side, but most of the traditional side dishes are loaded with carbs. Galileo slaw, baked beans and BBQ sauce are typically made with sugar.    Chinese - Nothing deep-fried, no rice or noodles. Many Chinese sauces have starch and sugar in them, so you'll have to use your judgement. If you find that these sauces trigger cravings, or cause Dumping, you can ask for the sauce to be made without sugar or just use soy sauce.

## 2019-12-13 NOTE — PROGRESS NOTES
Pt tolerated Decadron injection well. Instructed to wait in the clinic for 15 minutes and report any adverse effects immediately to the nurse. Verbalized understanding.

## 2019-12-13 NOTE — PROGRESS NOTES
"Routine Office Visit    Patient Name: Zaira Dowell    : 1970  MRN: [id number]    Subjective:  Zaira is a 49 y.o. female who presents today for:    1. Left ear swooshing  Patient presenting with one week of "swooshing" sound in her left ear.  There has been no pain or drainage.  She states she has had a runny nose and some post nasal drip.  There has been no fevers, chills, sweats or congestion.  Patient states it sounds like a seashell is next to her ear.  She has poured alcohol into hr ear in case it was water, but that has not helped.      2.  Fibro flare  Patient presenting today stating her fibromyalgia is flaring up.  She states the pain goes across her middle back and both her hips.  She states that since she had gastric sleeve, she can't take NSAIDS>  She doesn't want pain meds due to them causing her to vomit.  There is no numbness, tingling, or weakness      Past Medical History  Past Medical History:   Diagnosis Date    Anxiety     Arthritis     Asthma     BMI 50.0-59.9, adult     Depression     Fibromyalgia     GERD (gastroesophageal reflux disease)     Obesity     SHARON (obstructive sleep apnea)        Past Surgical History  Past Surgical History:   Procedure Laterality Date    CARPAL TUNNEL RELEASE      CHOLECYSTECTOMY      ESOPHAGOGASTRODUODENOSCOPY N/A 2019    Procedure: EGD (ESOPHAGOGASTRODUODENOSCOPY);  Surgeon: Michelle Mc MD;  Location: [facility];  Service: Endoscopy;  Laterality: N/A;    HYSTERECTOMY      partial     KNEE SURGERY      LAPAROSCOPIC SLEEVE GASTRECTOMY N/A 10/18/2019    Procedure: GASTRECTOMY, SLEEVE, LAPAROSCOPIC, with intraop EGD;  Surgeon: Ean Kohli MD;  Location: [address];  Service: General;  Laterality: N/A;    pinched nerve      SHOULDER SURGERY      TONSILLECTOMY      WRIST SURGERY      had ganlin cyst       Family History  Family History   Problem Relation Age of Onset    Diabetes Mother     [place]'s disease " Mother     Hypertension Mother     Heart disease Father     Stroke Father     Mental illness Father     Hypertension Father     Diabetes Father     Depression Father     Heart disease Maternal Grandmother     Depression Maternal Grandmother     COPD Maternal Grandfather     Heart disease Maternal Grandfather     Stroke Maternal Grandfather     Kidney disease Paternal Grandmother     Heart disease Paternal Grandmother     Heart disease Paternal Grandfather     No Known Problems Brother     Hypertension Son        Social History  Social History     Socioeconomic History    Marital status:      Spouse name: Not on file    Number of children: Not on file    Years of education: Not on file    Highest education level: Not on file   Occupational History    Not on file   Social Needs    Financial resource strain: Not very hard    Food insecurity:     Worry: Never true     Inability: Never true    Transportation needs:     Medical: No     Non-medical: No   Tobacco Use    Smoking status: Former Smoker     Types: Vaping with nicotine, Cigarettes    Smokeless tobacco: Never Used    Tobacco comment: Quit vaping 7/19, 1/15/19 vaping with 12% nicotine. Smoking cessation refused will quit cold turkey.   Substance and Sexual Activity    Alcohol use: No     Frequency: Monthly or less     Drinks per session: 1 or 2     Binge frequency: Never    Drug use: No    Sexual activity: Yes     Partners: Male   Lifestyle    Physical activity:     Days per week: 1 day     Minutes per session: 30 min    Stress: Rather much   Relationships    Social connections:     Talks on phone: More than three times a week     Gets together: Patient refused     Attends Religion service: Not on file     Active member of club or organization: No     Attends meetings of clubs or organizations: Never     Relationship status:    Other Topics Concern    Not on file   Social History Narrative    Not on file        Current Medications  Current Outpatient Medications on File Prior to Visit   Medication Sig Dispense Refill    acetaZOLAMIDE (DIAMOX) 250 MG tablet once daily.       albuterol (PROVENTIL/VENTOLIN HFA) 90 mcg/actuation inhaler Inhale 2 puffs into the lungs every 6 (six) hours as needed for Wheezing. Rescue 18 g 2    b complex vitamins tablet Take 1 tablet by mouth once daily.      calcium citrate (CALCITRATE) 200 mg (950 mg) tablet Take 1 tablet by mouth 2 (two) times daily.      clindamycin (CLEOCIN) 150 MG capsule ADD 3 CAPSULES TO THE SOAKING DEVICE AND ALLOW WATER TO AGITATE. PLACE AFFECTED AREAS INTO WATER AND SOAK FOR 20 MINUTES ONCE DAILY  1    cyanocobalamin 500 MCG tablet Take 500 mcg by mouth once daily.      ferrous fumarate/vit Bcomp,C (SUPER B COMPLEX ORAL) Take 1 capsule by mouth once daily.      fluticasone propionate (FLONASE) 50 mcg/actuation nasal spray 1 spray (50 mcg total) by Each Nostril route once daily. 1 Bottle 0    furosemide (LASIX) 40 MG tablet Take 40 mg by mouth 2 (two) times daily.      gabapentin (NEURONTIN) 600 MG tablet Take 1 tablet (600 mg total) by mouth 2 (two) times daily. 60 tablet 2    gabapentin (NEURONTIN) 800 MG tablet Take 1 tablet (800 mg total) by mouth once daily. 90 tablet 2    lithium (LITHOTAB) 300 mg tablet Take 1 tablet (300 mg total) by mouth 3 (three) times daily. 270 tablet 1    LORazepam (ATIVAN) 1 MG tablet Take 1 tablet (1 mg total) by mouth as needed for Anxiety. 30 tablet 2    meclizine (ANTIVERT) 25 mg tablet Take 1 tablet (25 mg total) by mouth 3 (three) times daily as needed for Dizziness or Nausea. 30 tablet 0    methocarbamol (ROBAXIN) 750 MG Tab Take 750 mg by mouth 4 (four) times daily.       multivitamin capsule Take 1 capsule by mouth 2 (two) times daily.       nitrofurantoin (MACRODANTIN) 100 MG capsule Take 1 capsule (100 mg total) by mouth every 12 (twelve) hours. 10 capsule 0    omeprazole (PRILOSEC) 40 MG capsule Take 1  capsule (40 mg total) by mouth 2 (two) times daily before meals. Open capsule and take with apple sauce 60 capsule 2    ondansetron (ZOFRAN-ODT) 8 MG TbDL Dissolve 1 tablet (8 mg total) by mouth every 6 (six) hours as needed. 30 tablet 0    oxybutynin (DITROPAN) 5 MG Tab Take 1 tablet (5 mg total) by mouth 3 (three) times daily. 180 tablet 1    polyethylene glycol (GLYCOLAX) 17 gram/dose powder Mix 1 capful (17 g) with fluids and drink by mouth once daily. 510 g 3    potassium chloride (KLOR-CON) 10 MEQ TbSR Take 10 mEq by mouth 2 (two) times daily.      risperiDONE (RISPERDAL) 0.5 MG Tab Take 1 tablet (0.5 mg total) by mouth 2 (two) times daily. 180 tablet 1    sertraline (ZOLOFT) 100 MG tablet Take 1.5 tablets (150 mg total) by mouth once daily. 135 tablet 2    terbinafine HCl (LAMISIL) 250 mg tablet PLACE 3 TABLETS INTO THE SOAKING DEVICE AND ALLOW WATER TO AGITATE. PLACE AFFECTED AREAS INTO WATER AND SOAK FOR 20 MINUTES ONCE DAILY  1    topiramate (TOPAMAX) 200 MG Tab Take 1 tablet (200 mg total) by mouth once daily. 90 tablet 1     No current facility-administered medications on file prior to visit.        Allergies   Review of patient's allergies indicates:   Allergen Reactions    Hydrocodone-acetaminophen Nausea And Vomiting       Review of Systems (Pertinent positives)  Review of Systems   Constitutional: Negative.    HENT: Negative for hearing loss.    Eyes: Negative for discharge.   Respiratory: Negative for wheezing.    Cardiovascular: Negative for chest pain and palpitations.   Gastrointestinal: Negative for blood in stool, constipation, diarrhea and vomiting.   Genitourinary: Negative for dysuria and hematuria.   Musculoskeletal: Positive for myalgias and neck pain.   Skin: Negative.    Neurological: Positive for weakness and headaches.   Endo/Heme/Allergies: Negative for polydipsia.         /74 (BP Location: Left arm, Patient Position: Sitting, BP Method: Large (Automatic))   Pulse 73   " Temp 98 °F (36.7 °C) (Oral)   Resp 17   Ht 5' 7.01" (1.702 m)   Wt 134.1 kg (295 lb 10.2 oz)   SpO2 97%   BMI 46.29 kg/m²     GENERAL APPEARANCE: in no apparent distress and well developed and well nourished  HEENT: PERRLA, EOMI, Sclera clear, anicteric, Oropharynx clear, no lesions, Neck supple with midline trachea; bilateral TM's normal in appearance  NECK: normal, supple, no adenopathy, thyroid normal in size  RESPIRATORY: appears well, vitals normal, no respiratory distress, acyanotic, normal RR, chest clear, no wheezing, crepitations, rhonchi, normal symmetric air entry  HEART: regular rate and rhythm, S1, S2 normal, no murmur, click, rub or gallop.    ABDOMEN: abdomen is soft without tenderness, no masses, no hernias, no organomegaly, no rebound, no guarding. Suprapubic tenderness absent. No CVA tenderness.  NEUROLOGIC: normal without focal findings, CN II-XII are intact.    SKIN: no rashes, no wounds, no other lesions  PSYCH: Alert, oriented x 3, thought content appropriate, speech normal, pleasant and cooperative, good eye contact, well groomed.    Assessment/Plan:  Zaira Dowell is a 49 y.o. female who presents today for :    Zaira was seen today for establish care, ear drainage, back pain and knee pain.    Diagnoses and all orders for this visit:    Fibromyalgia muscle pain  -     dexamethasone injection 4 mg      1.  Otic exam normal  2.  Decadron for muscle aches  3.  Follow up as needed    Cyril Saucedo MD    "

## 2019-12-13 NOTE — PROGRESS NOTES
NUTRITION NOTE    Referring Physician: Ean Kohli M.D.  Reason for MNT Referral: Follow-up 8 weeks s/p Gastric Sleeve    PAST MEDICAL HISTORY:    Denies nausea, vomiting, constipation and diarrhea.  Reports problems, including inadequate protein intake.    Past Medical History:   Diagnosis Date    Anxiety     Arthritis     Asthma     BMI 50.0-59.9, adult     Depression     Fibromyalgia     GERD (gastroesophageal reflux disease)     Obesity     SHARON (obstructive sleep apnea)        CLINICAL DATA:  49 y.o. female.    There were no vitals filed for this visit.    Current Weight: 296 lbs  BMI: 46.30  Total Weight Loss: 44 lbs  Excess Weight Loss: 22%    LABS:  Being processed at time of visit    CURRENT DIET:  Bariatric Diet.  Diet Recall: 52 grams of protein/day; 64+ oz of fluids/day  B: Equate 30 gms of protein  L: meat and 1-2 veggies 1.5 oz of meat 10 gm  D: Leftovers from lunch or scrambled egg- 2 or cheese with deli lean meat 12 gms     Meal Pattern: 2 meal(s) + 0 snack(s) + 1 protein supplement(s)  Inadequate protein supplement intake.  Adequate dairy intake.  Adequate vegetable intake.   Adequate fruit intake.  Starchy CHO: none reported  Other: water country time sf lemonade, propel    EXERCISE:  See BA    VITAMINS / MINERALS:  See BA    ASSESSMENT:  Doing fairly well overall.  Weight loss.  Inadequate calorie intake.  Inadequate protein intake.  Adequate fluid intake.  Following diet appropriately.  Not exercising.  Adequate vitamins & minerals.    BARIATRIC DIET DISCUSSION:  Instructed and provided written materials on bariatric diet plan.  Reinforced post-op nutrition guidelines.    PLAN / RECOMMENDATIONS:  May begin to incorporate raw vegetables, lettuce, unsalted nuts, and light popcorn as tolerated.  May begin to swallow whole pills as tolerated.  Back on track with diet plan.  Adjust diet by adding additional protein shake or adding 2 high protein snacks.  Increase protein  intake.  Maintain fluid intake.  Begin exercise.  Continue appropriate vitamins & minerals.      Return to clinic in 4 months.    SESSION TIME: 15 minutes

## 2019-12-16 ENCOUNTER — PATIENT MESSAGE (OUTPATIENT)
Dept: PSYCHIATRY | Facility: CLINIC | Age: 49
End: 2019-12-16

## 2019-12-17 RX ORDER — LITHIUM CARBONATE 300 MG
300 TABLET ORAL 3 TIMES DAILY
Qty: 270 TABLET | Refills: 1 | Status: SHIPPED | OUTPATIENT
Start: 2019-12-17 | End: 2020-01-14 | Stop reason: SDUPTHER

## 2019-12-19 LAB — VIT B1 BLD-MCNC: 81 UG/L (ref 38–122)

## 2020-01-03 DIAGNOSIS — M79.7 FIBROMYALGIA: ICD-10-CM

## 2020-01-09 RX ORDER — GABAPENTIN 600 MG/1
600 TABLET ORAL 2 TIMES DAILY
Qty: 60 TABLET | Refills: 2 | Status: SHIPPED | OUTPATIENT
Start: 2020-01-09 | End: 2020-03-10

## 2020-01-14 ENCOUNTER — OFFICE VISIT (OUTPATIENT)
Dept: PSYCHIATRY | Facility: CLINIC | Age: 50
End: 2020-01-14
Payer: MEDICARE

## 2020-01-14 VITALS
HEART RATE: 68 BPM | SYSTOLIC BLOOD PRESSURE: 127 MMHG | DIASTOLIC BLOOD PRESSURE: 75 MMHG | BODY MASS INDEX: 46.12 KG/M2 | WEIGHT: 293 LBS

## 2020-01-14 DIAGNOSIS — F31.9 BIPOLAR 1 DISORDER: Primary | ICD-10-CM

## 2020-01-14 PROCEDURE — 99999 PR PBB SHADOW E&M-EST. PATIENT-LVL II: ICD-10-PCS | Mod: PBBFAC,HCNC,, | Performed by: PSYCHIATRY & NEUROLOGY

## 2020-01-14 PROCEDURE — 99213 PR OFFICE/OUTPT VISIT, EST, LEVL III, 20-29 MIN: ICD-10-PCS | Mod: HCNC,S$GLB,, | Performed by: PSYCHIATRY & NEUROLOGY

## 2020-01-14 PROCEDURE — 99212 OFFICE O/P EST SF 10 MIN: CPT | Mod: PBBFAC,HCNC | Performed by: PSYCHIATRY & NEUROLOGY

## 2020-01-14 PROCEDURE — 99213 OFFICE O/P EST LOW 20 MIN: CPT | Mod: HCNC,S$GLB,, | Performed by: PSYCHIATRY & NEUROLOGY

## 2020-01-14 PROCEDURE — 99999 PR PBB SHADOW E&M-EST. PATIENT-LVL II: CPT | Mod: PBBFAC,HCNC,, | Performed by: PSYCHIATRY & NEUROLOGY

## 2020-01-14 RX ORDER — LITHIUM CARBONATE 300 MG
300 TABLET ORAL 3 TIMES DAILY
Qty: 270 TABLET | Refills: 1 | Status: SHIPPED | OUTPATIENT
Start: 2020-01-14 | End: 2020-04-07 | Stop reason: SDUPTHER

## 2020-01-14 RX ORDER — RISPERIDONE 0.5 MG/1
0.5 TABLET ORAL 2 TIMES DAILY
Qty: 180 TABLET | Refills: 1 | Status: SHIPPED | OUTPATIENT
Start: 2020-01-14 | End: 2020-04-07 | Stop reason: SDUPTHER

## 2020-01-14 RX ORDER — SERTRALINE HYDROCHLORIDE 100 MG/1
150 TABLET, FILM COATED ORAL DAILY
Qty: 135 TABLET | Refills: 2 | Status: SHIPPED | OUTPATIENT
Start: 2020-01-14 | End: 2020-04-07 | Stop reason: DRUGHIGH

## 2020-01-14 NOTE — PROGRESS NOTES
Outpatient Psychiatry Follow-Up Visit (MD/NP)    01/14/2020    Clinical Status of Patient:  Outpatient (Ambulatory)    Chief Complaint:  Ms Dowell is a 49 y.o. female who presents today for follow-up of her bipolar disorder.     Met with patient at the office.      Interval History and Content of Current Session:  General impression:Ms Dowell's mood is stable, and she is more hopeful at this time. She has no signs or symptoms of lida or psychosis and her depression is better. We discussed her concerns re her son who is back home and issues with her  and her expressing her feelings with him so she is not disregarded.      Target symptoms:unstable mood.    Musculoskeletal: Ms Dowell's gait is normal and she has no tremors or signs of dyskinesia.      Compliance:  Pt reports she is taking medications as directed    Side effects:  None at this time.    Risk Parameters:  Patient reports no suicidal ideation  Patient reports no homicidal ideation  Patient reports no self-injurious behavior  Patient reports no violent behavior    Patient's Response to Intervention:  The patient's response to intervention is accepting.    Progress Toward Goals and Other Mental Status Changes:  The patient's progress toward goals is good.    Vitals: Most recent vital signs, dated today were reviewed.     Mental Status Evaluation     Appearance:  Neatly dressed and groomed   Behavior:  cooperative   Speech:  normal   Mood:  stable   Affect:  Consistent with mood   Thought Process:  logical   Thought Content:  organized   Sensorium:  Grossly intact   Attention Span & Concentration Good focus   Cognition:  Grossly intact   Insight:  Understands condition   Judgment:  Adequate to circumstances         Diagnosis:Bipolar 1 Disorder        Plan:(Medication and Therapy Recommendation)  Continue lithium carbonate 300 mg q am, and 600 mg q hs, risperdal 0.5 mg bid, and Zoloft 150 mg q am. I reviewed the side effects of her medicines and  advised she could call if she had problems with her medicines or clinical condition.  Additional Notes: Supportive psychotherapy provided during this session.    Return to Clinic: 3 months.

## 2020-01-15 ENCOUNTER — LAB VISIT (OUTPATIENT)
Dept: LAB | Facility: HOSPITAL | Age: 50
End: 2020-01-15
Attending: PSYCHIATRY & NEUROLOGY
Payer: MEDICARE

## 2020-01-15 DIAGNOSIS — F31.9 BIPOLAR 1 DISORDER: ICD-10-CM

## 2020-01-15 LAB
LITHIUM SERPL-SCNC: 0.6 MMOL/L (ref 0.6–1.2)
TSH SERPL DL<=0.005 MIU/L-ACNC: 1.63 UIU/ML (ref 0.4–4)

## 2020-01-15 PROCEDURE — 36415 COLL VENOUS BLD VENIPUNCTURE: CPT | Mod: HCNC

## 2020-01-15 PROCEDURE — 84443 ASSAY THYROID STIM HORMONE: CPT | Mod: HCNC

## 2020-01-15 PROCEDURE — 80178 ASSAY OF LITHIUM: CPT | Mod: HCNC

## 2020-01-16 ENCOUNTER — OFFICE VISIT (OUTPATIENT)
Dept: FAMILY MEDICINE | Facility: CLINIC | Age: 50
End: 2020-01-16
Payer: MEDICARE

## 2020-01-16 VITALS
HEIGHT: 67 IN | OXYGEN SATURATION: 99 % | BODY MASS INDEX: 45.99 KG/M2 | HEART RATE: 65 BPM | DIASTOLIC BLOOD PRESSURE: 79 MMHG | SYSTOLIC BLOOD PRESSURE: 118 MMHG | RESPIRATION RATE: 17 BRPM | TEMPERATURE: 98 F | WEIGHT: 293 LBS

## 2020-01-16 DIAGNOSIS — M10.072 ACUTE IDIOPATHIC GOUT OF LEFT FOOT: Primary | ICD-10-CM

## 2020-01-16 PROCEDURE — 99214 OFFICE O/P EST MOD 30 MIN: CPT | Mod: 25,HCNC,S$GLB, | Performed by: FAMILY MEDICINE

## 2020-01-16 PROCEDURE — 96372 THER/PROPH/DIAG INJ SC/IM: CPT | Mod: HCNC,S$GLB,, | Performed by: FAMILY MEDICINE

## 2020-01-16 PROCEDURE — 3008F PR BODY MASS INDEX (BMI) DOCUMENTED: ICD-10-PCS | Mod: HCNC,CPTII,S$GLB, | Performed by: FAMILY MEDICINE

## 2020-01-16 PROCEDURE — 96372 PR INJECTION,THERAP/PROPH/DIAG2ST, IM OR SUBCUT: ICD-10-PCS | Mod: HCNC,S$GLB,, | Performed by: FAMILY MEDICINE

## 2020-01-16 PROCEDURE — 99214 PR OFFICE/OUTPT VISIT, EST, LEVL IV, 30-39 MIN: ICD-10-PCS | Mod: 25,HCNC,S$GLB, | Performed by: FAMILY MEDICINE

## 2020-01-16 PROCEDURE — 99999 PR PBB SHADOW E&M-EST. PATIENT-LVL III: CPT | Mod: PBBFAC,HCNC,, | Performed by: FAMILY MEDICINE

## 2020-01-16 PROCEDURE — 99999 PR PBB SHADOW E&M-EST. PATIENT-LVL III: ICD-10-PCS | Mod: PBBFAC,HCNC,, | Performed by: FAMILY MEDICINE

## 2020-01-16 PROCEDURE — 3008F BODY MASS INDEX DOCD: CPT | Mod: HCNC,CPTII,S$GLB, | Performed by: FAMILY MEDICINE

## 2020-01-16 RX ORDER — DEXAMETHASONE SODIUM PHOSPHATE 4 MG/ML
8 INJECTION, SOLUTION INTRA-ARTICULAR; INTRALESIONAL; INTRAMUSCULAR; INTRAVENOUS; SOFT TISSUE
Status: COMPLETED | OUTPATIENT
Start: 2020-01-16 | End: 2020-01-16

## 2020-01-16 RX ORDER — PREDNISONE 20 MG/1
40 TABLET ORAL DAILY
Qty: 8 TABLET | Refills: 0 | Status: SHIPPED | OUTPATIENT
Start: 2020-01-16 | End: 2020-01-16

## 2020-01-16 RX ADMIN — DEXAMETHASONE SODIUM PHOSPHATE 8 MG: 4 INJECTION, SOLUTION INTRA-ARTICULAR; INTRALESIONAL; INTRAMUSCULAR; INTRAVENOUS; SOFT TISSUE at 09:01

## 2020-01-16 NOTE — PROGRESS NOTES
Routine Office Visit    Patient Name: Zaira Dowell    : 1970  MRN: [id number]    Subjective:  Zaira is a 49 y.o. female who presents today for:    1. Left toe pain  Patient presenting with acute onset of left great toe pain, swelling, and redness.  She states she has a history of gout and feels that this is a flare.  She is not on daily medication to prevent gout flares stating she doesn't get them often.  She is not able to take NSAIDs due to gastric sleeve surgery 6 months ago.  She states she does not get flares often.      Past Medical History  Past Medical History:   Diagnosis Date    Anxiety     Arthritis     Asthma     BMI 50.0-59.9, adult     Depression     Fibromyalgia     GERD (gastroesophageal reflux disease)     Obesity     SHARON (obstructive sleep apnea)        Past Surgical History  Past Surgical History:   Procedure Laterality Date    CARPAL TUNNEL RELEASE      CHOLECYSTECTOMY      ESOPHAGOGASTRODUODENOSCOPY N/A 2019    Procedure: EGD (ESOPHAGOGASTRODUODENOSCOPY);  Surgeon: Michelle Mc MD;  Location: [facility];  Service: Endoscopy;  Laterality: N/A;    HYSTERECTOMY      partial     KNEE SURGERY      LAPAROSCOPIC SLEEVE GASTRECTOMY N/A 10/18/2019    Procedure: GASTRECTOMY, SLEEVE, LAPAROSCOPIC, with intraop EGD;  Surgeon: Ean Kohli MD;  Location: [address];  Service: General;  Laterality: N/A;    pinched nerve      SHOULDER SURGERY      TONSILLECTOMY      WRIST SURGERY      had ganlin cyst       Family History  Family History   Problem Relation Age of Onset    Diabetes Mother     Kenefic's disease Mother     Hypertension Mother     Heart disease Father     Stroke Father     Mental illness Father     Hypertension Father     Diabetes Father     Depression Father     Heart disease Maternal Grandmother     Depression Maternal Grandmother     COPD Maternal Grandfather     Heart disease Maternal Grandfather     Stroke Maternal  Grandfather     Kidney disease Paternal Grandmother     Heart disease Paternal Grandmother     Heart disease Paternal Grandfather     No Known Problems Brother     Hypertension Son        Social History  Social History     Socioeconomic History    Marital status:      Spouse name: Not on file    Number of children: Not on file    Years of education: Not on file    Highest education level: Not on file   Occupational History    Not on file   Social Needs    Financial resource strain: Not very hard    Food insecurity:     Worry: Never true     Inability: Never true    Transportation needs:     Medical: No     Non-medical: No   Tobacco Use    Smoking status: Former Smoker     Types: Vaping with nicotine, Cigarettes    Smokeless tobacco: Never Used    Tobacco comment: Quit vaping 7/19, 1/15/19 vaping with 12% nicotine. Smoking cessation refused will quit cold turkey.   Substance and Sexual Activity    Alcohol use: No     Frequency: Monthly or less     Drinks per session: 1 or 2     Binge frequency: Never    Drug use: No    Sexual activity: Yes     Partners: Male   Lifestyle    Physical activity:     Days per week: 1 day     Minutes per session: 30 min    Stress: Rather much   Relationships    Social connections:     Talks on phone: More than three times a week     Gets together: Patient refused     Attends [employer] service: Not on file     Active member of club or organization: No     Attends meetings of clubs or organizations: Never     Relationship status:    Other Topics Concern    Not on file   Social History Narrative    Not on file       Current Medications  Current Outpatient Medications on File Prior to Visit   Medication Sig Dispense Refill    acetaZOLAMIDE (DIAMOX) 250 MG tablet once daily.       albuterol (PROVENTIL/VENTOLIN HFA) 90 mcg/actuation inhaler Inhale 2 puffs into the lungs every 6 (six) hours as needed for Wheezing. Rescue 18 g 2    b complex vitamins tablet  Take 1 tablet by mouth once daily.      calcium citrate (CALCITRATE) 200 mg (950 mg) tablet Take 1 tablet by mouth 2 (two) times daily.      clindamycin (CLEOCIN) 150 MG capsule ADD 3 CAPSULES TO THE SOAKING DEVICE AND ALLOW WATER TO AGITATE. PLACE AFFECTED AREAS INTO WATER AND SOAK FOR 20 MINUTES ONCE DAILY  1    cyanocobalamin 500 MCG tablet Take 500 mcg by mouth once daily.      ferrous fumarate/vit Bcomp,C (SUPER B COMPLEX ORAL) Take 1 capsule by mouth once daily.      fluticasone propionate (FLONASE) 50 mcg/actuation nasal spray 1 spray (50 mcg total) by Each Nostril route once daily. 1 Bottle 0    furosemide (LASIX) 40 MG tablet Take 40 mg by mouth 2 (two) times daily.      gabapentin (NEURONTIN) 600 MG tablet Take 1 tablet (600 mg total) by mouth 2 (two) times daily. 60 tablet 2    gabapentin (NEURONTIN) 800 MG tablet Take 1 tablet (800 mg total) by mouth once daily. 90 tablet 2    lithium (LITHOTAB) 300 mg tablet Take 1 tablet (300 mg total) by mouth 3 (three) times daily. 270 tablet 1    LORazepam (ATIVAN) 1 MG tablet Take 1 tablet (1 mg total) by mouth as needed for Anxiety. 30 tablet 2    meclizine (ANTIVERT) 25 mg tablet Take 1 tablet (25 mg total) by mouth 3 (three) times daily as needed for Dizziness or Nausea. 30 tablet 0    methocarbamol (ROBAXIN) 750 MG Tab Take 750 mg by mouth 4 (four) times daily.       multivitamin capsule Take 1 capsule by mouth 2 (two) times daily.       nitrofurantoin (MACRODANTIN) 100 MG capsule Take 1 capsule (100 mg total) by mouth every 12 (twelve) hours. 10 capsule 0    omeprazole (PRILOSEC) 40 MG capsule Take 1 capsule (40 mg total) by mouth 2 (two) times daily before meals. Open capsule and take with apple sauce 60 capsule 2    ondansetron (ZOFRAN-ODT) 8 MG TbDL Dissolve 1 tablet (8 mg total) by mouth every 6 (six) hours as needed. 30 tablet 0    oxybutynin (DITROPAN) 5 MG Tab Take 1 tablet (5 mg total) by mouth 3 (three) times daily. 180 tablet 1     "polyethylene glycol (GLYCOLAX) 17 gram/dose powder Mix 1 capful (17 g) with fluids and drink by mouth once daily. 510 g 3    potassium chloride (KLOR-CON) 10 MEQ TbSR Take 10 mEq by mouth 2 (two) times daily.      risperiDONE (RISPERDAL) 0.5 MG Tab Take 1 tablet (0.5 mg total) by mouth 2 (two) times daily. 180 tablet 1    sertraline (ZOLOFT) 100 MG tablet Take 1.5 tablets (150 mg total) by mouth once daily. 135 tablet 2    terbinafine HCl (LAMISIL) 250 mg tablet PLACE 3 TABLETS INTO THE SOAKING DEVICE AND ALLOW WATER TO AGITATE. PLACE AFFECTED AREAS INTO WATER AND SOAK FOR 20 MINUTES ONCE DAILY  1    topiramate (TOPAMAX) 200 MG Tab Take 1 tablet (200 mg total) by mouth once daily. 90 tablet 1     No current facility-administered medications on file prior to visit.        Allergies   Review of patient's allergies indicates:   Allergen Reactions    Hydrocodone-acetaminophen Nausea And Vomiting       Review of Systems (Pertinent positives)  Review of Systems   Constitutional: Negative.    HENT: Negative for hearing loss.    Eyes: Negative for discharge.   Respiratory: Negative for wheezing.    Cardiovascular: Negative for chest pain and palpitations.   Gastrointestinal: Negative for blood in stool, constipation, diarrhea and vomiting.   Genitourinary: Negative for dysuria and hematuria.   Musculoskeletal: Positive for joint pain.   Neurological: Negative for weakness.   Endo/Heme/Allergies: Negative for polydipsia.         /79 (BP Location: Left arm, Patient Position: Sitting, BP Method: Large (Automatic))   Pulse 65   Temp 98 °F (36.7 °C) (Oral)   Resp 17   Ht 5' 7.01" (1.702 m)   Wt 133.7 kg (294 lb 12.1 oz)   SpO2 99%   BMI 46.15 kg/m²     GENERAL APPEARANCE: in no apparent distress and well developed and well nourished  HEENT: PERRL, EOMI, Sclera clear, anicteric, Oropharynx clear, no lesions, Neck supple with midline trachea  NECK: normal, supple, no adenopathy, thyroid normal in " size  RESPIRATORY: appears well, vitals normal, no respiratory distress, acyanotic, normal RR, chest clear, no wheezing, crepitations, rhonchi, normal symmetric air entry  HEART: regular rate and rhythm, S1, S2 normal, no murmur, click, rub or gallop.    ABDOMEN: abdomen is soft without tenderness, no masses, no hernias, no organomegaly, no rebound, no guarding. Suprapubic tenderness absent. No CVA tenderness.  Extremities: warm/well perfused.  Left great toe swelling and pain on palpation  SKIN: no rashes, no wounds, no other lesions  PSYCH: Alert, oriented x 3, thought content appropriate, speech normal, pleasant and cooperative, good eye contact, well groomed    Assessment/Plan:  Zaira Dowell is a 49 y.o. female who presents today for :    Zaira was seen today for gout.    Diagnoses and all orders for this visit:    Acute idiopathic gout of left foot  -     Discontinue: predniSONE (DELTASONE) 20 MG tablet; Take 2 tablets (40 mg total) by mouth once daily. for 4 days  -     Uric acid; Future  -     Comprehensive metabolic panel; Future  -     dexamethasone injection 8 mg      1.  Labs to be done in 2 weeks  2.  Decadron in office today as she can't take oral anti-inflammatories  3.  Follow up as needed  4.  Warned of side effects including palpitations      Cyril Saucedo MD

## 2020-01-16 NOTE — PROGRESS NOTES
Patient, Zaira Dowell (MRN #[id number]), presented with a recorded BMI of 46.15 kg/m^2 consistent with the definition of morbid obesity (ICD-10 E66.01). The patient's morbid obesity was monitored, evaluated, addressed and/or treated. This addendum to the medical record is made on 01/16/2020.

## 2020-01-27 DIAGNOSIS — Z98.890 POST-OPERATIVE STATE: ICD-10-CM

## 2020-01-27 DIAGNOSIS — K21.9 GASTROESOPHAGEAL REFLUX DISEASE, ESOPHAGITIS PRESENCE NOT SPECIFIED: Primary | ICD-10-CM

## 2020-01-28 RX ORDER — OMEPRAZOLE 40 MG/1
40 CAPSULE, DELAYED RELEASE ORAL
Qty: 60 CAPSULE | Refills: 1 | Status: SHIPPED | OUTPATIENT
Start: 2020-01-28 | End: 2020-04-07 | Stop reason: SDUPTHER

## 2020-01-29 ENCOUNTER — INITIAL CONSULT (OUTPATIENT)
Dept: RHEUMATOLOGY | Facility: CLINIC | Age: 50
End: 2020-01-29
Payer: MEDICARE

## 2020-01-29 ENCOUNTER — HOSPITAL ENCOUNTER (OUTPATIENT)
Dept: RADIOLOGY | Facility: HOSPITAL | Age: 50
Discharge: HOME | End: 2020-01-29
Attending: INTERNAL MEDICINE
Payer: MEDICARE

## 2020-01-29 VITALS
WEIGHT: 287.5 LBS | HEART RATE: 60 BPM | DIASTOLIC BLOOD PRESSURE: 73 MMHG | SYSTOLIC BLOOD PRESSURE: 117 MMHG | HEIGHT: 67 IN | BODY MASS INDEX: 45.12 KG/M2

## 2020-01-29 DIAGNOSIS — M25.50 POLYARTHRALGIA: Primary | ICD-10-CM

## 2020-01-29 DIAGNOSIS — M25.50 POLYARTHRALGIA: ICD-10-CM

## 2020-01-29 PROCEDURE — 77077 JOINT SURVEY SINGLE VIEW: CPT | Mod: TC,HCNC,PO

## 2020-01-29 PROCEDURE — 99999 PR PBB SHADOW E&M-EST. PATIENT-LVL III: CPT | Mod: PBBFAC,HCNC,, | Performed by: INTERNAL MEDICINE

## 2020-01-29 PROCEDURE — 3008F BODY MASS INDEX DOCD: CPT | Mod: HCNC,CPTII,S$GLB, | Performed by: INTERNAL MEDICINE

## 2020-01-29 PROCEDURE — 99205 PR OFFICE/OUTPT VISIT, NEW, LEVL V, 60-74 MIN: ICD-10-PCS | Mod: HCNC,S$GLB,, | Performed by: INTERNAL MEDICINE

## 2020-01-29 PROCEDURE — 3008F PR BODY MASS INDEX (BMI) DOCUMENTED: ICD-10-PCS | Mod: HCNC,CPTII,S$GLB, | Performed by: INTERNAL MEDICINE

## 2020-01-29 PROCEDURE — 77077 JOINT SURVEY SINGLE VIEW: CPT | Mod: 26,HCNC,, | Performed by: RADIOLOGY

## 2020-01-29 PROCEDURE — 77077 XR ARTHRITIS SURVEY: ICD-10-PCS | Mod: 26,HCNC,, | Performed by: RADIOLOGY

## 2020-01-29 PROCEDURE — 99999 PR PBB SHADOW E&M-EST. PATIENT-LVL III: ICD-10-PCS | Mod: PBBFAC,HCNC,, | Performed by: INTERNAL MEDICINE

## 2020-01-29 PROCEDURE — 99205 OFFICE O/P NEW HI 60 MIN: CPT | Mod: HCNC,S$GLB,, | Performed by: INTERNAL MEDICINE

## 2020-01-29 RX ORDER — BACLOFEN 10 MG/1
TABLET ORAL
Qty: 120 TABLET | Refills: 11 | Status: SHIPPED | OUTPATIENT
Start: 2020-01-29 | End: 2020-08-14

## 2020-01-29 RX ORDER — BACLOFEN 10 MG/1
TABLET ORAL
Qty: 120 TABLET | Refills: 11 | Status: SHIPPED | OUTPATIENT
Start: 2020-01-29 | End: 2020-01-29
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
## 2020-01-29 NOTE — LETTER
February 2, 2020      Cyril Saucedo MD  [address] [facility]  [place] [address]           [facility]  [address]  [place] [address]  Phone: [phone]  Fax: [phone]          Patient: Zaira Dowell   MR Number: [id number]   YOB: 1970   Date of Visit: 1/29/2020       Dear Dr. Cyril SIMMONS Page:    Thank you for referring Zaira Dowell to me for evaluation. Attached you will find relevant portions of my assessment and plan of care.    If you have questions, please do not hesitate to call me. I look forward to following Zaira Dowell along with you.    Sincerely,    Swapna Clark MD    Enclosure  CC:  No Recipients    If you would like to receive this communication electronically, please contact [email or web address] or [phone] to request more information on [facility] Link access.    For providers and/or their staff who would like to refer a patient to [facility], please contact us through our one-stop-shop provider referral line, [facility], at [phone].    If you feel you have received this communication in error or would no longer like to receive these types of communications, please e-mail [email or web address]

## 2020-01-31 ENCOUNTER — LAB VISIT (OUTPATIENT)
Dept: LAB | Facility: HOSPITAL | Age: 50
End: 2020-01-31
Attending: FAMILY MEDICINE
Payer: MEDICARE

## 2020-01-31 DIAGNOSIS — M10.072 ACUTE IDIOPATHIC GOUT OF LEFT FOOT: ICD-10-CM

## 2020-01-31 LAB
ALBUMIN SERPL BCP-MCNC: 3.6 G/DL (ref 3.5–5.2)
ALP SERPL-CCNC: 73 U/L (ref 55–135)
ALT SERPL W/O P-5'-P-CCNC: 16 U/L (ref 10–44)
ANION GAP SERPL CALC-SCNC: 6 MMOL/L (ref 8–16)
AST SERPL-CCNC: 11 U/L (ref 10–40)
BILIRUB SERPL-MCNC: 0.3 MG/DL (ref 0.1–1)
BUN SERPL-MCNC: 15 MG/DL (ref 6–20)
CALCIUM SERPL-MCNC: 9.4 MG/DL (ref 8.7–10.5)
CHLORIDE SERPL-SCNC: 113 MMOL/L (ref 95–110)
CO2 SERPL-SCNC: 21 MMOL/L (ref 23–29)
CREAT SERPL-MCNC: 0.8 MG/DL (ref 0.5–1.4)
EST. GFR  (AFRICAN AMERICAN): >60 ML/MIN/1.73 M^2
EST. GFR  (NON AFRICAN AMERICAN): >60 ML/MIN/1.73 M^2
GLUCOSE SERPL-MCNC: 90 MG/DL (ref 70–110)
POTASSIUM SERPL-SCNC: 4.2 MMOL/L (ref 3.5–5.1)
PROT SERPL-MCNC: 6.9 G/DL (ref 6–8.4)
SODIUM SERPL-SCNC: 140 MMOL/L (ref 136–145)
URATE SERPL-MCNC: 6.7 MG/DL (ref 2.4–5.7)

## 2020-01-31 PROCEDURE — 36415 COLL VENOUS BLD VENIPUNCTURE: CPT | Mod: HCNC,PN

## 2020-01-31 PROCEDURE — 80053 COMPREHEN METABOLIC PANEL: CPT | Mod: HCNC

## 2020-01-31 PROCEDURE — 84550 ASSAY OF BLOOD/URIC ACID: CPT | Mod: HCNC

## 2020-02-03 ENCOUNTER — PATIENT MESSAGE (OUTPATIENT)
Dept: RHEUMATOLOGY | Facility: CLINIC | Age: 50
End: 2020-02-03

## 2020-02-26 ENCOUNTER — OFFICE VISIT (OUTPATIENT)
Dept: FAMILY MEDICINE | Facility: CLINIC | Age: 50
End: 2020-02-26
Payer: MEDICARE

## 2020-02-26 VITALS
HEART RATE: 60 BPM | SYSTOLIC BLOOD PRESSURE: 120 MMHG | RESPIRATION RATE: 17 BRPM | BODY MASS INDEX: 44.68 KG/M2 | DIASTOLIC BLOOD PRESSURE: 78 MMHG | WEIGHT: 285.25 LBS

## 2020-02-26 DIAGNOSIS — J45.20 MILD INTERMITTENT ASTHMA WITHOUT COMPLICATION: ICD-10-CM

## 2020-02-26 DIAGNOSIS — M1A.0720 IDIOPATHIC CHRONIC GOUT OF LEFT FOOT WITHOUT TOPHUS: Primary | ICD-10-CM

## 2020-02-26 DIAGNOSIS — Z91.09 ENVIRONMENTAL ALLERGIES: ICD-10-CM

## 2020-02-26 DIAGNOSIS — R39.15 URINARY URGENCY: ICD-10-CM

## 2020-02-26 DIAGNOSIS — R60.0 BILATERAL LEG EDEMA: ICD-10-CM

## 2020-02-26 DIAGNOSIS — I27.20 PULMONARY HYPERTENSION: ICD-10-CM

## 2020-02-26 DIAGNOSIS — E66.01 MORBID OBESITY: ICD-10-CM

## 2020-02-26 LAB
BILIRUB UR QL STRIP: NEGATIVE
CLARITY UR: ABNORMAL
COLOR UR: YELLOW
GLUCOSE UR QL STRIP: NEGATIVE
HGB UR QL STRIP: NEGATIVE
KETONES UR QL STRIP: NEGATIVE
LEUKOCYTE ESTERASE UR QL STRIP: NEGATIVE
NITRITE UR QL STRIP: NEGATIVE
PH UR STRIP: 7 [PH] (ref 5–8)
PROT UR QL STRIP: NEGATIVE
SP GR UR STRIP: 1 (ref 1–1.03)
URN SPEC COLLECT METH UR: ABNORMAL
UROBILINOGEN UR STRIP-ACNC: NEGATIVE EU/DL

## 2020-02-26 PROCEDURE — 81003 URINALYSIS AUTO W/O SCOPE: CPT | Mod: HCNC

## 2020-02-26 PROCEDURE — 3008F PR BODY MASS INDEX (BMI) DOCUMENTED: ICD-10-PCS | Mod: HCNC,CPTII,S$GLB, | Performed by: FAMILY MEDICINE

## 2020-02-26 PROCEDURE — 96372 THER/PROPH/DIAG INJ SC/IM: CPT | Mod: HCNC,S$GLB,, | Performed by: FAMILY MEDICINE

## 2020-02-26 PROCEDURE — 99499 UNLISTED E&M SERVICE: CPT | Mod: HCNC,S$GLB,, | Performed by: FAMILY MEDICINE

## 2020-02-26 PROCEDURE — 99999 PR PBB SHADOW E&M-EST. PATIENT-LVL III: CPT | Mod: PBBFAC,HCNC,, | Performed by: FAMILY MEDICINE

## 2020-02-26 PROCEDURE — 99214 PR OFFICE/OUTPT VISIT, EST, LEVL IV, 30-39 MIN: ICD-10-PCS | Mod: HCNC,25,S$GLB, | Performed by: FAMILY MEDICINE

## 2020-02-26 PROCEDURE — 99999 PR PBB SHADOW E&M-EST. PATIENT-LVL III: ICD-10-PCS | Mod: PBBFAC,HCNC,, | Performed by: FAMILY MEDICINE

## 2020-02-26 PROCEDURE — 3008F BODY MASS INDEX DOCD: CPT | Mod: HCNC,CPTII,S$GLB, | Performed by: FAMILY MEDICINE

## 2020-02-26 PROCEDURE — 99214 OFFICE O/P EST MOD 30 MIN: CPT | Mod: HCNC,25,S$GLB, | Performed by: FAMILY MEDICINE

## 2020-02-26 PROCEDURE — 96372 PR INJECTION,THERAP/PROPH/DIAG2ST, IM OR SUBCUT: ICD-10-PCS | Mod: HCNC,S$GLB,, | Performed by: FAMILY MEDICINE

## 2020-02-26 PROCEDURE — 99499 RISK ADDL DX/OHS AUDIT: ICD-10-PCS | Mod: HCNC,S$GLB,, | Performed by: FAMILY MEDICINE

## 2020-02-26 RX ORDER — FUROSEMIDE 40 MG/1
40 TABLET ORAL DAILY
Qty: 30 TABLET | Refills: 2 | Status: SHIPPED | OUTPATIENT
Start: 2020-02-26 | End: 2021-10-13 | Stop reason: SDUPTHER

## 2020-02-26 RX ORDER — MONTELUKAST SODIUM 10 MG/1
10 TABLET ORAL NIGHTLY
Qty: 30 TABLET | Refills: 0 | Status: SHIPPED | OUTPATIENT
Start: 2020-02-26 | End: 2020-03-27

## 2020-02-26 RX ORDER — ALLOPURINOL 100 MG/1
100 TABLET ORAL DAILY
Qty: 90 TABLET | Refills: 1 | Status: SHIPPED | OUTPATIENT
Start: 2020-02-26 | End: 2020-12-15

## 2020-02-26 NOTE — PROGRESS NOTES
Routine Office Visit    Patient Name: Zaira Dowell    : 1970  MRN: [id number]    Subjective:  Zaira is a 49 y.o. female who presents today for:    1. Gout  Patient presenting with another acute flare of gout in left great toe.  She states that she started having pain 2 days ago and now cannot wear a shoe due to the pain.  She states that she has severe pain to touch and the joint is warm to touch.  She was treated for her last flare on 20.      2.  Allergies  Patient states that she has having a lot of trouble with sinus pressure, nasal congestion, and occasional dizziness.  She states that this has been a recurring problem, but she does not take any OTC medications for allergies.  Patient does wear perfume daily (heavily).  No fevers, chills, or sweats.  No post nasal drip symptoms.      3.  Urinary urgency  Patient states that she has been taking oxybutinin for several years after seeing urology in [place] and diagnosed with bladder spasms.  Over the past 3 weeks she has had a few episodes of incontinence and is now concerned.  She takes her medication twice a day.  She states that she had one accident when she got out of her truck and one in her sleep.  There has been no associated lower abdominal pain, hematuria, or dysuria.      4.  fibromyalgia  Patient states that she has to fill out her disability paperwork and wanted to make sure I thought she was permanently disabled from her fibromyalgia.  She has recently seen rheumatology who found her RF to be elevated with normal CCP.  She is to follow up with them in 3 months to recheck things.  Patient was diagnosed by another provider with fibromyalgia.  She also is diagnosed with Bipolar 1 disorder and managed by psychiatry.  She states that she got full disability with the diagnosis of fibromyalgia and not the bipolar disorder.  She reports that fibromyalgia flares kept her from being able to maintain employment.  She has recently been started on  baclofen to help with muscle spasms and states the medication does make her tired, but does help with the pain.        Past Medical History  Past Medical History:   Diagnosis Date    Anxiety     Arthritis     Asthma     BMI 50.0-59.9, adult     Depression     Fibromyalgia     GERD (gastroesophageal reflux disease)     Obesity     SHARON (obstructive sleep apnea)        Past Surgical History  Past Surgical History:   Procedure Laterality Date    CARPAL TUNNEL RELEASE      CHOLECYSTECTOMY      ESOPHAGOGASTRODUODENOSCOPY N/A 5/21/2019    Procedure: EGD (ESOPHAGOGASTRODUODENOSCOPY);  Surgeon: Michelle Mc MD;  Location: [facility];  Service: Endoscopy;  Laterality: N/A;    HYSTERECTOMY      partial     KNEE SURGERY      LAPAROSCOPIC SLEEVE GASTRECTOMY N/A 10/18/2019    Procedure: GASTRECTOMY, SLEEVE, LAPAROSCOPIC, with intraop EGD;  Surgeon: Ean Kohli MD;  Location: [facility] OR [address] [place] [address];  Service: General;  Laterality: N/A;    pinched nerve      SHOULDER SURGERY      TONSILLECTOMY      WRIST SURGERY      had ganlin cyst       Family History  Family History   Problem Relation Age of Onset    Diabetes Mother     Salix's disease Mother     Hypertension Mother     Heart disease Father     Stroke Father     Mental illness Father     Hypertension Father     Diabetes Father     Depression Father     Heart disease Maternal Grandmother     Depression Maternal Grandmother     COPD Maternal Grandfather     Heart disease Maternal Grandfather     Stroke Maternal Grandfather     Kidney disease Paternal Grandmother     Heart disease Paternal Grandmother     Heart disease Paternal Grandfather     No Known Problems Brother     Hypertension Son        Social History  Social History     Socioeconomic History    Marital status:      Spouse name: Not on file    Number of children: Not on file    Years of education: Not on file    Highest education level: Not on file   Occupational  History    Not on file   Social Needs    Financial resource strain: Not very hard    Food insecurity:     Worry: Never true     Inability: Never true    Transportation needs:     Medical: No     Non-medical: No   Tobacco Use    Smoking status: Former Smoker     Types: Vaping with nicotine, Cigarettes    Smokeless tobacco: Never Used    Tobacco comment: Quit vaping 7/19, 1/15/19 vaping with 12% nicotine. Smoking cessation refused will quit cold turkey.   Substance and Sexual Activity    Alcohol use: No     Frequency: Monthly or less     Drinks per session: 1 or 2     Binge frequency: Never    Drug use: No    Sexual activity: Yes     Partners: Male   Lifestyle    Physical activity:     Days per week: 1 day     Minutes per session: 30 min    Stress: Rather much   Relationships    Social connections:     Talks on phone: More than three times a week     Gets together: Patient refused     Attends Jain service: Not on file     Active member of club or organization: No     Attends meetings of clubs or organizations: Never     Relationship status:    Other Topics Concern    Not on file   Social History Narrative    Not on file       Current Medications  Current Outpatient Medications on File Prior to Visit   Medication Sig Dispense Refill    acetaZOLAMIDE (DIAMOX) 250 MG tablet once daily.       albuterol (PROVENTIL/VENTOLIN HFA) 90 mcg/actuation inhaler Inhale 2 puffs into the lungs every 6 (six) hours as needed for Wheezing. Rescue 18 g 2    b complex vitamins tablet Take 1 tablet by mouth once daily.      baclofen (LIORESAL) 10 MG tablet Take one tablet in the morning, one tablet in afternoon, and 2 tablets at bedtime 120 tablet 11    calcium citrate (CALCITRATE) 200 mg (950 mg) tablet Take 1 tablet by mouth 2 (two) times daily.      clindamycin (CLEOCIN) 150 MG capsule ADD 3 CAPSULES TO THE SOAKING DEVICE AND ALLOW WATER TO AGITATE. PLACE AFFECTED AREAS INTO WATER AND SOAK FOR 20 MINUTES  ONCE DAILY  1    cyanocobalamin 500 MCG tablet Take 500 mcg by mouth once daily.      ferrous fumarate/vit Bcomp,C (SUPER B COMPLEX ORAL) Take 1 capsule by mouth once daily.      fluticasone propionate (FLONASE) 50 mcg/actuation nasal spray 1 spray (50 mcg total) by Each Nostril route once daily. 1 Bottle 0    gabapentin (NEURONTIN) 600 MG tablet Take 1 tablet (600 mg total) by mouth 2 (two) times daily. 60 tablet 2    gabapentin (NEURONTIN) 800 MG tablet Take 1 tablet (800 mg total) by mouth once daily. 90 tablet 2    lithium (LITHOTAB) 300 mg tablet Take 1 tablet (300 mg total) by mouth 3 (three) times daily. 270 tablet 1    LORazepam (ATIVAN) 1 MG tablet Take 1 tablet (1 mg total) by mouth as needed for Anxiety. 30 tablet 2    meclizine (ANTIVERT) 25 mg tablet Take 1 tablet (25 mg total) by mouth 3 (three) times daily as needed for Dizziness or Nausea. 30 tablet 0    multivitamin capsule Take 1 capsule by mouth 2 (two) times daily.       omeprazole (PRILOSEC) 40 MG capsule Take 1 capsule (40 mg total) by mouth 2 (two) times daily before meals. Open capsule and take with apple sauce 60 capsule 1    ondansetron (ZOFRAN-ODT) 8 MG TbDL Dissolve 1 tablet (8 mg total) by mouth every 6 (six) hours as needed. 30 tablet 0    oxybutynin (DITROPAN) 5 MG Tab Take 1 tablet (5 mg total) by mouth 3 (three) times daily. 180 tablet 1    polyethylene glycol (GLYCOLAX) 17 gram/dose powder Mix 1 capful (17 g) with fluids and drink by mouth once daily. 510 g 3    potassium chloride (KLOR-CON) 10 MEQ TbSR Take 10 mEq by mouth 2 (two) times daily.      risperiDONE (RISPERDAL) 0.5 MG Tab Take 1 tablet (0.5 mg total) by mouth 2 (two) times daily. 180 tablet 1    sertraline (ZOLOFT) 100 MG tablet Take 1.5 tablets (150 mg total) by mouth once daily. 135 tablet 2    terbinafine HCl (LAMISIL) 250 mg tablet PLACE 3 TABLETS INTO THE SOAKING DEVICE AND ALLOW WATER TO AGITATE. PLACE AFFECTED AREAS INTO WATER AND SOAK FOR 20  MINUTES ONCE DAILY  1    topiramate (TOPAMAX) 200 MG Tab Take 1 tablet (200 mg total) by mouth once daily. 90 tablet 1    [DISCONTINUED] furosemide (LASIX) 40 MG tablet Take 40 mg by mouth 2 (two) times daily.      [DISCONTINUED] methocarbamol (ROBAXIN) 750 MG Tab Take 750 mg by mouth 4 (four) times daily.       [DISCONTINUED] nitrofurantoin (MACRODANTIN) 100 MG capsule Take 1 capsule (100 mg total) by mouth every 12 (twelve) hours. 10 capsule 0     No current facility-administered medications on file prior to visit.        Allergies   Review of patient's allergies indicates:   Allergen Reactions    Hydrocodone-acetaminophen Nausea And Vomiting       Review of Systems (Pertinent positives)  Review of Systems   Constitutional: Negative.    HENT: Negative for hearing loss.    Eyes: Negative for discharge.   Respiratory: Positive for wheezing.    Cardiovascular: Negative for chest pain and palpitations.   Gastrointestinal: Negative for blood in stool, constipation, diarrhea and vomiting.   Genitourinary: Negative for dysuria and hematuria.   Musculoskeletal: Positive for neck pain.   Skin: Negative.    Neurological: Positive for weakness. Negative for headaches.   Endo/Heme/Allergies: Negative for polydipsia.         /78 (BP Location: Left arm, Patient Position: Sitting, BP Method: X-Large (Automatic))   Pulse 60   Resp 17   Wt 129.4 kg (285 lb 4.4 oz)   BMI 44.68 kg/m²     GENERAL APPEARANCE: in no apparent distress and well developed and well nourished  HEENT: PERRL, EOMI, Sclera clear, anicteric, Oropharynx clear, no lesions, Neck supple with midline trachea  NECK: normal, supple, no adenopathy, thyroid normal in size  RESPIRATORY: appears well, vitals normal, no respiratory distress, acyanotic, normal RR, chest clear, no wheezing, crepitations, rhonchi, normal symmetric air entry  HEART: regular rate and rhythm, S1, S2 normal, no murmur, click, rub or gallop.    ABDOMEN: abdomen is soft without  "tenderness, no masses, no hernias, no organomegaly, no rebound, no guarding. Suprapubic tenderness absent. No CVA tenderness.  NEUROLOGIC: normal without focal findings, CN II-XII are intact.    Extremities: warm/well perfused.  No abnormal hair patterns.  No clubbing, cyanosis or edema.  Pain on palpation and swelling noted on left great toe  SKIN: no rashes, no wounds, no other lesions  PSYCH: Alert, oriented x 3, thought content appropriate, speech normal, pleasant and cooperative, good eye contact, well groomed    Assessment/Plan:  Zaira Dowell is a 49 y.o. female who presents today for :    Zaira was seen today for gout and sinus problem.    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  -     methylPREDNISolone sod suc(PF) injection 125 mg    Bilateral leg edema  -     furosemide (LASIX) 40 MG tablet; Take 1 tablet (40 mg total) by mouth once daily.    Urinary urgency  -     Urinalysis, Reflex to Urine Culture Urine, Clean Catch    Environmental allergies  -     montelukast (SINGULAIR) 10 mg tablet; Take 1 tablet (10 mg total) by mouth every evening.    Morbid obesity    Pulmonary hypertension    Mild intermittent asthma without complication      1.  Solumedrol for acute gout flare  2.  Lasix for recurring leg edema  3.  Urinalysis to check for UTI causing urgency  4.  singulair to help with "sinuses" and encouraged patient to cut back on perfume use  5.  Continue weight loss  6.  Asthma stable  7.  HTN stable  8.  Allopurinol for chronic gout  9.  Follow up with psychiatry for bipolar disorder  10.  Follow up with Dr. Clark for elevated RF  11.  Follow up with me in 3 months or sooner if needed      Cyril Saucedo MD    "

## 2020-03-10 DIAGNOSIS — M79.7 FIBROMYALGIA: ICD-10-CM

## 2020-03-10 RX ORDER — GABAPENTIN 800 MG/1
800 TABLET ORAL DAILY
Qty: 90 TABLET | Refills: 2 | Status: SHIPPED | OUTPATIENT
Start: 2020-03-10 | End: 2020-06-06 | Stop reason: SDUPTHER

## 2020-03-10 NOTE — TELEPHONE ENCOUNTER
Last Office Visit Info:   The patient's last visit with Cyril Saucedo MD was on 9/25/2019.    The patient's last visit in current department was on Visit date not found.  Saw  9/25/19  Last refill 1/9/20        Last CBC Results:   Lab Results   Component Value Date    WBC 9.00 12/13/2019    HGB 13.9 12/13/2019    HCT 43.7 12/13/2019     12/13/2019       Last CMP Results  Lab Results   Component Value Date     01/31/2020    K 4.2 01/31/2020     (H) 01/31/2020    CO2 21 (L) 01/31/2020    BUN 15 01/31/2020    CREATININE 0.8 01/31/2020    CALCIUM 9.4 01/31/2020    ALBUMIN 3.6 01/31/2020    AST 11 01/31/2020    ALT 16 01/31/2020       Last Lipids  Lab Results   Component Value Date    CHOL 151 12/13/2019    TRIG 112 12/13/2019    HDL 52 12/13/2019    LDLCALC 76.6 12/13/2019       Last A1C  Lab Results   Component Value Date    HGBA1C 5.1 09/10/2019       Last TSH  Lab Results   Component Value Date    TSH 1.634 01/15/2020         Current Med Refills  Medication List with Changes/Refills   Current Medications    ACETAZOLAMIDE (DIAMOX) 250 MG TABLET    once daily.        Start Date: 9/4/2018  End Date: --    ALBUTEROL (PROVENTIL/VENTOLIN HFA) 90 MCG/ACTUATION INHALER    Inhale 2 puffs into the lungs every 6 (six) hours as needed for Wheezing. Rescue       Start Date: 3/19/2019 End Date: --    ALLOPURINOL (ZYLOPRIM) 100 MG TABLET    Take 1 tablet (100 mg total) by mouth once daily.       Start Date: 2/26/2020 End Date: --    B COMPLEX VITAMINS TABLET    Take 1 tablet by mouth once daily.       Start Date: --        End Date: --    BACLOFEN (LIORESAL) 10 MG TABLET    Take one tablet in the morning, one tablet in afternoon, and 2 tablets at bedtime       Start Date: 1/29/2020 End Date: --    CALCIUM CITRATE (CALCITRATE) 200 MG (950 MG) TABLET    Take 1 tablet by mouth 2 (two) times daily.       Start Date: --        End Date: --    CLINDAMYCIN (CLEOCIN) 150 MG CAPSULE    ADD 3 CAPSULES TO THE  SOAKING DEVICE AND ALLOW WATER TO AGITATE. PLACE AFFECTED AREAS INTO WATER AND SOAK FOR 20 MINUTES ONCE DAILY       Start Date: 7/11/2019 End Date: --    CYANOCOBALAMIN 500 MCG TABLET    Take 500 mcg by mouth once daily.       Start Date: --        End Date: --    FERROUS FUMARATE/VIT BCOMP,C (SUPER B COMPLEX ORAL)    Take 1 capsule by mouth once daily.       Start Date: --        End Date: --    FLUTICASONE PROPIONATE (FLONASE) 50 MCG/ACTUATION NASAL SPRAY    1 spray (50 mcg total) by Each Nostril route once daily.       Start Date: 9/10/2019 End Date: --    FUROSEMIDE (LASIX) 40 MG TABLET    Take 1 tablet (40 mg total) by mouth once daily.       Start Date: 2/26/2020 End Date: --    GABAPENTIN (NEURONTIN) 600 MG TABLET    Take 1 tablet (600 mg total) by mouth 2 (two) times daily.       Start Date: 1/9/2020  End Date: --    GABAPENTIN (NEURONTIN) 800 MG TABLET    Take 1 tablet (800 mg total) by mouth once daily.       Start Date: 3/19/2019 End Date: --    LITHIUM (LITHOTAB) 300 MG TABLET    Take 1 tablet (300 mg total) by mouth 3 (three) times daily.       Start Date: 1/14/2020 End Date: 7/12/2020    LORAZEPAM (ATIVAN) 1 MG TABLET    Take 1 tablet (1 mg total) by mouth as needed for Anxiety.       Start Date: 3/19/2019 End Date: --    MECLIZINE (ANTIVERT) 25 MG TABLET    Take 1 tablet (25 mg total) by mouth 3 (three) times daily as needed for Dizziness or Nausea.       Start Date: 5/8/2019  End Date: --    MONTELUKAST (SINGULAIR) 10 MG TABLET    Take 1 tablet (10 mg total) by mouth every evening.       Start Date: 2/26/2020 End Date: 3/27/2020    MULTIVITAMIN CAPSULE    Take 1 capsule by mouth 2 (two) times daily.        Start Date: --        End Date: --    OMEPRAZOLE (PRILOSEC) 40 MG CAPSULE    Take 1 capsule (40 mg total) by mouth 2 (two) times daily before meals. Open capsule and take with apple sauce       Start Date: 1/28/2020 End Date: --    ONDANSETRON (ZOFRAN-ODT) 8 MG TBDL    Dissolve 1 tablet (8 mg  total) by mouth every 6 (six) hours as needed.       Start Date: 10/1/2019 End Date: --    OXYBUTYNIN (DITROPAN) 5 MG TAB    Take 1 tablet (5 mg total) by mouth 3 (three) times daily.       Start Date: 6/19/2019 End Date: --    POLYETHYLENE GLYCOL (GLYCOLAX) 17 GRAM/DOSE POWDER    Mix 1 capful (17 g) with fluids and drink by mouth once daily.       Start Date: 10/1/2019 End Date: --    POTASSIUM CHLORIDE (KLOR-CON) 10 MEQ TBSR    Take 10 mEq by mouth 2 (two) times daily.       Start Date: --        End Date: --    RISPERIDONE (RISPERDAL) 0.5 MG TAB    Take 1 tablet (0.5 mg total) by mouth 2 (two) times daily.       Start Date: 1/14/2020 End Date: --    SERTRALINE (ZOLOFT) 100 MG TABLET    Take 1.5 tablets (150 mg total) by mouth once daily.       Start Date: 1/14/2020 End Date: --    TERBINAFINE HCL (LAMISIL) 250 MG TABLET    PLACE 3 TABLETS INTO THE SOAKING DEVICE AND ALLOW WATER TO AGITATE. PLACE AFFECTED AREAS INTO WATER AND SOAK FOR 20 MINUTES ONCE DAILY       Start Date: 7/11/2019 End Date: --    TOPIRAMATE (TOPAMAX) 200 MG TAB    Take 1 tablet (200 mg total) by mouth once daily.       Start Date: 6/19/2019 End Date: --

## 2020-03-13 ENCOUNTER — LAB VISIT (OUTPATIENT)
Dept: LAB | Facility: HOSPITAL | Age: 50
End: 2020-03-13
Payer: MEDICARE

## 2020-03-13 ENCOUNTER — TELEPHONE (OUTPATIENT)
Dept: BARIATRICS | Facility: CLINIC | Age: 50
End: 2020-03-13

## 2020-03-13 ENCOUNTER — OFFICE VISIT (OUTPATIENT)
Dept: BARIATRICS | Facility: CLINIC | Age: 50
End: 2020-03-13
Payer: MEDICARE

## 2020-03-13 VITALS
BODY MASS INDEX: 44.15 KG/M2 | HEART RATE: 65 BPM | DIASTOLIC BLOOD PRESSURE: 67 MMHG | HEIGHT: 67 IN | WEIGHT: 281.31 LBS | SYSTOLIC BLOOD PRESSURE: 120 MMHG

## 2020-03-13 DIAGNOSIS — R63.4 WEIGHT LOSS: Primary | ICD-10-CM

## 2020-03-13 DIAGNOSIS — K21.9 GASTROESOPHAGEAL REFLUX DISEASE, ESOPHAGITIS PRESENCE NOT SPECIFIED: ICD-10-CM

## 2020-03-13 DIAGNOSIS — I27.20 PULMONARY HYPERTENSION: ICD-10-CM

## 2020-03-13 DIAGNOSIS — M79.7 FIBROMYALGIA: ICD-10-CM

## 2020-03-13 DIAGNOSIS — E66.01 MORBID OBESITY: ICD-10-CM

## 2020-03-13 DIAGNOSIS — R79.89 LOW VITAMIN D LEVEL: ICD-10-CM

## 2020-03-13 DIAGNOSIS — K91.2 POSTSURGICAL MALABSORPTION, NOT ELSEWHERE CLASSIFIED: ICD-10-CM

## 2020-03-13 DIAGNOSIS — G47.33 OSA (OBSTRUCTIVE SLEEP APNEA): ICD-10-CM

## 2020-03-13 DIAGNOSIS — Z98.84 S/P LAPAROSCOPIC SLEEVE GASTRECTOMY: ICD-10-CM

## 2020-03-13 DIAGNOSIS — R73.9 HYPERGLYCEMIA: ICD-10-CM

## 2020-03-13 LAB
ALBUMIN SERPL BCP-MCNC: 3.2 G/DL (ref 3.5–5.2)
ALP SERPL-CCNC: 77 U/L (ref 55–135)
ALT SERPL W/O P-5'-P-CCNC: 15 U/L (ref 10–44)
ANION GAP SERPL CALC-SCNC: 9 MMOL/L (ref 8–16)
AST SERPL-CCNC: 14 U/L (ref 10–40)
BASOPHILS # BLD AUTO: 0.12 K/UL (ref 0–0.2)
BASOPHILS NFR BLD: 1.3 % (ref 0–1.9)
BILIRUB SERPL-MCNC: 0.2 MG/DL (ref 0.1–1)
BUN SERPL-MCNC: 8 MG/DL (ref 6–20)
CALCIUM SERPL-MCNC: 9 MG/DL (ref 8.7–10.5)
CHLORIDE SERPL-SCNC: 115 MMOL/L (ref 95–110)
CO2 SERPL-SCNC: 19 MMOL/L (ref 23–29)
CREAT SERPL-MCNC: 0.7 MG/DL (ref 0.5–1.4)
DIFFERENTIAL METHOD: ABNORMAL
EOSINOPHIL # BLD AUTO: 0.4 K/UL (ref 0–0.5)
EOSINOPHIL NFR BLD: 4.3 % (ref 0–8)
ERYTHROCYTE [DISTWIDTH] IN BLOOD BY AUTOMATED COUNT: 14.5 % (ref 11.5–14.5)
EST. GFR  (AFRICAN AMERICAN): >60 ML/MIN/1.73 M^2
EST. GFR  (NON AFRICAN AMERICAN): >60 ML/MIN/1.73 M^2
GLUCOSE SERPL-MCNC: 89 MG/DL (ref 70–110)
HCT VFR BLD AUTO: 45.8 % (ref 37–48.5)
HGB BLD-MCNC: 14.1 G/DL (ref 12–16)
IMM GRANULOCYTES # BLD AUTO: 0.03 K/UL (ref 0–0.04)
IMM GRANULOCYTES NFR BLD AUTO: 0.3 % (ref 0–0.5)
LYMPHOCYTES # BLD AUTO: 2.3 K/UL (ref 1–4.8)
LYMPHOCYTES NFR BLD: 24.3 % (ref 18–48)
MCH RBC QN AUTO: 29.9 PG (ref 27–31)
MCHC RBC AUTO-ENTMCNC: 30.8 G/DL (ref 32–36)
MCV RBC AUTO: 97 FL (ref 82–98)
MONOCYTES # BLD AUTO: 0.6 K/UL (ref 0.3–1)
MONOCYTES NFR BLD: 6.3 % (ref 4–15)
NEUTROPHILS # BLD AUTO: 6.1 K/UL (ref 1.8–7.7)
NEUTROPHILS NFR BLD: 63.5 % (ref 38–73)
NRBC BLD-RTO: 0 /100 WBC
PLATELET # BLD AUTO: 288 K/UL (ref 150–350)
PMV BLD AUTO: 10.4 FL (ref 9.2–12.9)
POTASSIUM SERPL-SCNC: 4 MMOL/L (ref 3.5–5.1)
PROT SERPL-MCNC: 6.5 G/DL (ref 6–8.4)
RBC # BLD AUTO: 4.71 M/UL (ref 4–5.4)
SODIUM SERPL-SCNC: 143 MMOL/L (ref 136–145)
VIT B12 SERPL-MCNC: 676 PG/ML (ref 210–950)
WBC # BLD AUTO: 9.59 K/UL (ref 3.9–12.7)

## 2020-03-13 PROCEDURE — 99213 OFFICE O/P EST LOW 20 MIN: CPT | Mod: HCNC,S$GLB,, | Performed by: PHYSICIAN ASSISTANT

## 2020-03-13 PROCEDURE — 3008F BODY MASS INDEX DOCD: CPT | Mod: HCNC,CPTII,S$GLB, | Performed by: PHYSICIAN ASSISTANT

## 2020-03-13 PROCEDURE — 80053 COMPREHEN METABOLIC PANEL: CPT | Mod: HCNC

## 2020-03-13 PROCEDURE — 36415 COLL VENOUS BLD VENIPUNCTURE: CPT | Mod: HCNC

## 2020-03-13 PROCEDURE — 99999 PR PBB SHADOW E&M-EST. PATIENT-LVL V: ICD-10-PCS | Mod: PBBFAC,HCNC,, | Performed by: PHYSICIAN ASSISTANT

## 2020-03-13 PROCEDURE — 85025 COMPLETE CBC W/AUTO DIFF WBC: CPT | Mod: HCNC

## 2020-03-13 PROCEDURE — 99213 PR OFFICE/OUTPT VISIT, EST, LEVL III, 20-29 MIN: ICD-10-PCS | Mod: HCNC,S$GLB,, | Performed by: PHYSICIAN ASSISTANT

## 2020-03-13 PROCEDURE — 84425 ASSAY OF VITAMIN B-1: CPT | Mod: HCNC

## 2020-03-13 PROCEDURE — 3008F PR BODY MASS INDEX (BMI) DOCUMENTED: ICD-10-PCS | Mod: HCNC,CPTII,S$GLB, | Performed by: PHYSICIAN ASSISTANT

## 2020-03-13 PROCEDURE — 99999 PR PBB SHADOW E&M-EST. PATIENT-LVL V: CPT | Mod: PBBFAC,HCNC,, | Performed by: PHYSICIAN ASSISTANT

## 2020-03-13 PROCEDURE — 82607 VITAMIN B-12: CPT | Mod: HCNC

## 2020-03-13 NOTE — TELEPHONE ENCOUNTER
Called to schedule f/u visit, back on track. [facility]

## 2020-03-13 NOTE — PROGRESS NOTES
BARIATRIC FOLLOW UP:    Chief Complaint   Patient presents with    Follow-up     HISTORY OF PRESENT ILLNESS: Zaira Dowell is a 49 y.o. female with a Body mass index is 44.06 kg/m². who presents for follow up s/p lap sleeve with Dr. Kohli on 10/18/2019.      Going to rheumatologist for fibromyalgia    Disappointed in weight loss     EXERCISE & VITAMINS:  Adherent with bariatric vitamin regimen  Exercise- none    DIET:    Reg diet    Low in protein   Snacks: chips/cookies    Drink: h20, propel, country time lemonade packets ( 100 oz) decaf tea     Review of Systems   Constitutional: Negative for chills, fever and malaise/fatigue.   Eyes: Negative for blurred vision and double vision.   Respiratory: Negative for cough, shortness of breath and wheezing.    Cardiovascular: Negative for chest pain, palpitations and leg swelling.   Gastrointestinal: Negative for abdominal pain, blood in stool, constipation, diarrhea, heartburn, melena, nausea and vomiting.        Denies dysphagia   Genitourinary: Negative for dysuria, flank pain, frequency, hematuria and urgency.   Musculoskeletal: Positive for back pain and joint pain (knee). Negative for myalgias and neck pain.        Attributes all symptoms to FMG   Neurological: Negative for dizziness, tingling and headaches.   Psychiatric/Behavioral: Negative for depression and suicidal ideas. The patient is not nervous/anxious.      Vitals:    03/13/20 0814   BP: 120/67   Pulse: 65     Physical Exam   Constitutional: She is oriented to person, place, and time. She appears well-developed and well-nourished. No distress.   HENT:   Head: Normocephalic and atraumatic.   Eyes: Conjunctivae are normal. Right eye exhibits no discharge. Left eye exhibits no discharge. No scleral icterus.   Cardiovascular: Normal rate, regular rhythm and normal heart sounds.   No murmur heard.  Pulmonary/Chest: Effort normal and breath sounds normal. No respiratory distress.   Abdominal: Soft.  Bowel sounds are normal. She exhibits no distension and no mass. There is no tenderness (suprapubic). There is no rebound and no guarding. No hernia.   Well healing surgical incisions, clean, dry, and intact without signs of infection.   Musculoskeletal: She exhibits no edema.   Neurological: She is alert and oriented to person, place, and time.   Skin: Skin is warm and dry. No rash noted. She is not diaphoretic. No erythema. No pallor.   Left shoulder wound healing well, scabbed over no erythema no drainage non tender to palpation    Psychiatric: She has a normal mood and affect. Her behavior is normal.   Nursing note and vitals reviewed.    ASSESSMENT:    - Morbid obesity, Body mass index is 44.06 kg/m².,  s/p sleeve gastrectomy .  - Estimated goal weight is 50% EWL  - Co-morbidities:  Bipolar disorder, anxiety and depression  - Poor Weight loss, 56 lbs, 29% EWL  - No Exercise regimen  - Good Vitamin Regimen  - Poor diet       PLAN:  - Increase exercise   - Back on track with dietician   - Food logs   - Increase protein decrease carbs change snacking to better choices.   - RTC in 3 months       30 minute visit, over 50% of time spent counseling patient face to face on diet, exercise, and weight loss.

## 2020-03-13 NOTE — PATIENT INSTRUCTIONS
"Snacks: (100-200 calories; >5g protein)    - 1 low-fat cheese stick with 8 cherry tomatoes or 1 serving fresh fruit  - 4 thin slices fat-free turkey breast and 1 slice low-fat cheese  - 4 thin slices fat-free honey ham with wedge of melon  - 2 slices of turkey chin  - Boiled eggs (can buy at [employer] already boiled w/ shell removed)  - for convenience,  [place] read, snack, go (deli meat and cheese rolls)  - P3 packets (Protein packs w/ cheese, nuts, lean deli meat)  - MHP Fit and Lean Protein Pudding (find at [employer] - per 1 cup serving = 100 calories, 15 g protein, 0 g sugar)  - 6-8 edamame pods (equivalent to about 1/4 cup edamame without pods).   - 1/4 cup unsalted nuts with ½ cup fruit  - 6-oz container Dannon Light n Fit vanilla yogurt, topped with 1oz unsalted nuts         - apple, celery or baby carrots spread with 2 Tbsp PB2  - apple slices with 1 oz slice low-fat cheese  - Apple slices dipped in 2 Tbsp of PB2  - 2 Tbsp PB2 mixed in light or greek yogurt or sugar-free pudding  - celery, cucumber, bell pepper or baby carrots dipped in ¼ cup hummus bean spread   - celery, cucumber, baby carrots dipped in high protein greek yogurt (Mix 16 oz plain greek yogurt + 1 packet of hidden valley ranch dip mix)  - Clarence Links Beef Steak - 14g protein! (similar to beef jerky but very lean)  - 2 wedges Laughing Cow - Light Herb & Garlic Cheese with sliced cucumber or green bell pepper  - 1/2 cup low-fat cottage cheese with ¼ cup fruit or ¼ cup salsa  - 1/2 cup low fat cottage cheese with 10-15 cherry tomatoes  - 8 oz glass of FAIRLIFE fat free milk (13 g protein)  - 8 oz glass of FAIRLIFE fat free milk + 1 packet of sugar-free hot cocoa  - Add Atkins advantage Cafe Caramel shake to decaf coffee. Serve hot or blend with ice for "frappaccino" like drink  - RTD Protein drinks: Atkins, Low Carb Slim Fast, EAS light, Muscle Milk Light, etc.  - Homemade Protein drinks: GNC Soy95, Isopure, Nectar, UNJURY, Whey " Gourmet, etc. Mix 1 scoop powder with 8oz skim/1% milk or light soymilk.  - Protein bars: Atkins, EAS, Pure Protein,  Quest, Think Thin, Detour, etc. Must have 0-4 grams sugar - Read the label.    ** Be CREATIVE. You can always snack on bites of grilled chicken or tuna salad made with low fat camacho, if needed!

## 2020-03-13 NOTE — TELEPHONE ENCOUNTER
----- Message from Terri Astudillo PA-C sent at 3/13/2020  9:59 AM CDT -----  Regarding: back on track visit   Davon Eisenberg,    Can someone please get Mrs. Meneses back in for back on track visits. She has not gained any weight but her diet is way off track where weight gain will start.      Thanks much,  BM

## 2020-03-17 LAB — VIT B1 BLD-MCNC: 107 UG/L (ref 38–122)

## 2020-03-30 DIAGNOSIS — G43.809 OTHER MIGRAINE WITHOUT STATUS MIGRAINOSUS, NOT INTRACTABLE: ICD-10-CM

## 2020-03-30 RX ORDER — TOPIRAMATE 200 MG/1
200 TABLET ORAL DAILY
Qty: 90 TABLET | Refills: 1 | Status: SHIPPED | OUTPATIENT
Start: 2020-03-30 | End: 2020-10-01 | Stop reason: SDUPTHER

## 2020-04-01 ENCOUNTER — PATIENT MESSAGE (OUTPATIENT)
Dept: BARIATRICS | Facility: CLINIC | Age: 50
End: 2020-04-01

## 2020-04-01 DIAGNOSIS — K21.9 GASTROESOPHAGEAL REFLUX DISEASE, ESOPHAGITIS PRESENCE NOT SPECIFIED: ICD-10-CM

## 2020-04-01 DIAGNOSIS — Z98.890 POST-OPERATIVE STATE: ICD-10-CM

## 2020-04-01 RX ORDER — OMEPRAZOLE 40 MG/1
40 CAPSULE, DELAYED RELEASE ORAL
Qty: 60 CAPSULE | Refills: 1 | Status: CANCELLED | OUTPATIENT
Start: 2020-04-01

## 2020-04-06 ENCOUNTER — PATIENT MESSAGE (OUTPATIENT)
Dept: BARIATRICS | Facility: CLINIC | Age: 50
End: 2020-04-06

## 2020-04-06 DIAGNOSIS — Z98.890 POST-OPERATIVE STATE: ICD-10-CM

## 2020-04-06 DIAGNOSIS — K21.9 GASTROESOPHAGEAL REFLUX DISEASE, ESOPHAGITIS PRESENCE NOT SPECIFIED: ICD-10-CM

## 2020-04-07 ENCOUNTER — OFFICE VISIT (OUTPATIENT)
Dept: PSYCHIATRY | Facility: CLINIC | Age: 50
End: 2020-04-07
Payer: MEDICARE

## 2020-04-07 DIAGNOSIS — F31.9 BIPOLAR 1 DISORDER: Primary | ICD-10-CM

## 2020-04-07 PROCEDURE — 99214 OFFICE O/P EST MOD 30 MIN: CPT | Mod: HCNC,95,, | Performed by: PSYCHIATRY & NEUROLOGY

## 2020-04-07 PROCEDURE — 99214 PR OFFICE/OUTPT VISIT, EST, LEVL IV, 30-39 MIN: ICD-10-PCS | Mod: HCNC,95,, | Performed by: PSYCHIATRY & NEUROLOGY

## 2020-04-07 RX ORDER — OMEPRAZOLE 40 MG/1
40 CAPSULE, DELAYED RELEASE ORAL
Qty: 60 CAPSULE | Refills: 0 | Status: SHIPPED | OUTPATIENT
Start: 2020-04-07 | End: 2020-06-06 | Stop reason: SDUPTHER

## 2020-04-07 RX ORDER — RISPERIDONE 0.5 MG/1
0.5 TABLET ORAL 2 TIMES DAILY
Qty: 180 TABLET | Refills: 1 | Status: SHIPPED | OUTPATIENT
Start: 2020-04-07 | End: 2020-07-14 | Stop reason: SDUPTHER

## 2020-04-07 RX ORDER — LITHIUM CARBONATE 300 MG
300 TABLET ORAL 3 TIMES DAILY
Qty: 270 TABLET | Refills: 1 | Status: SHIPPED | OUTPATIENT
Start: 2020-04-07 | End: 2020-07-14 | Stop reason: SDUPTHER

## 2020-04-07 RX ORDER — SERTRALINE HYDROCHLORIDE 100 MG/1
200 TABLET, FILM COATED ORAL DAILY
Qty: 180 TABLET | Refills: 2 | Status: SHIPPED | OUTPATIENT
Start: 2020-04-07 | End: 2020-11-03 | Stop reason: SDUPTHER

## 2020-04-07 NOTE — PROGRESS NOTES
"Outpatient Psychiatry Follow-Up Visit (MD/NP)    04/07/2020 The patient location is: home  The chief complaint leading to consultation is: bipolar disorder  Visit type: Virtual visit with synchronous audio and video  Total time spent with patient: 10"  Each patient to whom he or she provides medical services by telemedicine is:  (1) informed of the relationship between the physician and patient and the respective role of any other health care provider with respect to management of the patient; and (2) notified that he or she may decline to receive medical services by telemedicine and may withdraw from such care at any time.    Notes: see below    Clinical Status of Patient:  Outpatient (Ambulatory)    Chief Complaint:  Followup of Bipolar Disorder    Interval History and Content of Current Session: Patient is crying more frequently and is more anxious due to the isolation from the virus pandemic. He is in good self control, though, and has no symptoms of lida or psychosis and not intent to harm herself or others. She is exercising regularly and attempting to stay healthy as well.  Sleeping and eating are normal at this time.    Compliance:  good    Side effects:  None.   Musculoskeletal: Patient has no signs of dyskinesia nor does she have tremors at this time.  Risk Parameters:Not active danger to self or others at this time.    Patient's Response to Intervention:accepting    Progress Toward Goals and Other Mental Status Changes:good overall   Vital signs this date were reviewed.     Mental Status Evaluation     Appearance:  Neatly dressed and groomed   Behavior:  cooperative                             Speech normal   Mood:  anxious   Affect:  dysphoric   Thought Process:  linear, logical   Thought Content:  organizednormal   Sensorium:  alert and oriented to person, place, time and situation   Attention Span & Concentration able to focus   Cognition:  Grossly intactgrossly intact   Insight:  has awareness of " illness   Judgment:  behavior is adequate to circumstances       Diagnosis:    Bipolar 1 disorder [F31.9]      I reviewed the side effects of the patient's medicines and advised a call if the patient had problems with the medicine or clinical condition.    Plan:(Medication and Therapy Recommendation)  Additional Notes: Patient agreed to increase Zoloft to 200 mg daily, and to continue lithium 300 mg q am, and 600 mg q hs, and Risperdal 0.5 mg bid.  Return to Clinic:3 months

## 2020-04-09 ENCOUNTER — PATIENT MESSAGE (OUTPATIENT)
Dept: BARIATRICS | Facility: CLINIC | Age: 50
End: 2020-04-09

## 2020-04-09 ENCOUNTER — HOSPITAL ENCOUNTER (EMERGENCY)
Facility: HOSPITAL | Age: 50
Discharge: HOME | End: 2020-04-09
Attending: EMERGENCY MEDICINE
Payer: MEDICARE

## 2020-04-09 ENCOUNTER — TELEPHONE (OUTPATIENT)
Dept: FAMILY MEDICINE | Facility: CLINIC | Age: 50
End: 2020-04-09

## 2020-04-09 VITALS
HEART RATE: 51 BPM | WEIGHT: 275 LBS | RESPIRATION RATE: 18 BRPM | TEMPERATURE: 98 F | HEIGHT: 67 IN | DIASTOLIC BLOOD PRESSURE: 59 MMHG | BODY MASS INDEX: 43.16 KG/M2 | SYSTOLIC BLOOD PRESSURE: 116 MMHG | OXYGEN SATURATION: 98 %

## 2020-04-09 DIAGNOSIS — R20.0 NUMBNESS AND TINGLING IN RIGHT HAND: ICD-10-CM

## 2020-04-09 DIAGNOSIS — R29.898 WEAKNESS OF RIGHT HAND: Primary | ICD-10-CM

## 2020-04-09 DIAGNOSIS — R20.2 NUMBNESS AND TINGLING IN RIGHT HAND: ICD-10-CM

## 2020-04-09 PROCEDURE — 99285 PR EMERGENCY DEPT VISIT,LEVEL V: ICD-10-PCS | Mod: HCNC,,, | Performed by: PSYCHIATRY & NEUROLOGY

## 2020-04-09 PROCEDURE — 99285 EMERGENCY DEPT VISIT HI MDM: CPT | Mod: HCNC,,, | Performed by: PSYCHIATRY & NEUROLOGY

## 2020-04-09 PROCEDURE — 99281 EMR DPT VST MAYX REQ PHY/QHP: CPT | Mod: HCNC

## 2020-04-09 NOTE — CONSULTS
[facility]  Neurology  Consult Note    Patient Name: Zaira Dowell  MRN: [id number]  Admission Date: 4/9/2020  Hospital Length of Stay: 0 days  Code Status: Prior   Attending Provider: Sj Abel MD   Consulting Provider: Rajesh Gutierres MD  Primary Care Physician: Cyril Saucedo MD  Principal Problem:<principal problem not specified>    Consults  Subjective:     Chief Complaint: Left forearm/hand is numb    HPI: 50 y/o female with medical Hx as listed below comes to ED after waking up with numbness of LUE from elbow down. Symptoms have not resolved. Pt states that she is unable to  her left hand. Denies involvement of other limbs. No headaches, visual or speech disturbances, vertigo, gait difficulties. No alleviating factors. Ms. Dowell tells me that she had similar symptoms on right UE a while back. According to her, surgery needed to be done to release a nerve in her elbow.     Past Medical History:   Diagnosis Date    Anxiety     Arthritis     Asthma     BMI 50.0-59.9, adult     Depression     Fibromyalgia     GERD (gastroesophageal reflux disease)     Obesity     SHARON (obstructive sleep apnea)        Past Surgical History:   Procedure Laterality Date    CARPAL TUNNEL RELEASE      CHOLECYSTECTOMY      ESOPHAGOGASTRODUODENOSCOPY N/A 5/21/2019    Procedure: EGD (ESOPHAGOGASTRODUODENOSCOPY);  Surgeon: Michelle Mc MD;  Location: [facility];  Service: Endoscopy;  Laterality: N/A;    HYSTERECTOMY      partial     KNEE SURGERY      LAPAROSCOPIC SLEEVE GASTRECTOMY N/A 10/18/2019    Procedure: GASTRECTOMY, SLEEVE, LAPAROSCOPIC, with intraop EGD;  Surgeon: Ean Kohli MD;  Location: [address];  Service: General;  Laterality: N/A;    pinched nerve      SHOULDER SURGERY      TONSILLECTOMY      WRIST SURGERY      had ganlin cyst       Review of patient's allergies indicates:   Allergen Reactions    Hydrocodone-acetaminophen Nausea And Vomiting        Current Neurological Medications:     No current facility-administered medications on file prior to encounter.      Current Outpatient Medications on File Prior to Encounter   Medication Sig    albuterol (PROVENTIL/VENTOLIN HFA) 90 mcg/actuation inhaler Inhale 2 puffs into the lungs every 6 (six) hours as needed for Wheezing. Rescue    allopurinoL (ZYLOPRIM) 100 MG tablet Take 1 tablet (100 mg total) by mouth once daily.    b complex vitamins tablet Take 1 tablet by mouth once daily.    baclofen (LIORESAL) 10 MG tablet Take one tablet in the morning, one tablet in afternoon, and 2 tablets at bedtime    calcium citrate (CALCITRATE) 200 mg (950 mg) tablet Take 1 tablet by mouth 2 (two) times daily.    cyanocobalamin 500 MCG tablet Take 500 mcg by mouth once daily.    ferrous fumarate/vit Bcomp,C (SUPER B COMPLEX ORAL) Take 1 capsule by mouth once daily.    fluticasone propionate (FLONASE) 50 mcg/actuation nasal spray 1 spray (50 mcg total) by Each Nostril route once daily.    furosemide (LASIX) 40 MG tablet Take 1 tablet (40 mg total) by mouth once daily.    gabapentin (NEURONTIN) 800 MG tablet Take 1 tablet (800 mg total) by mouth once daily.    lithium (LITHOTAB) 300 mg tablet Take 1 tablet (300 mg total) by mouth 3 (three) times daily.    multivitamin capsule Take 1 capsule by mouth 2 (two) times daily.     omeprazole (PRILOSEC) 40 MG capsule Take 1 capsule (40 mg total) by mouth 2 (two) times daily before meals.    oxybutynin (DITROPAN) 5 MG Tab Take 1 tablet (5 mg total) by mouth 3 (three) times daily.    potassium chloride (KLOR-CON) 10 MEQ TbSR Take 10 mEq by mouth 2 (two) times daily.    risperiDONE (RISPERDAL) 0.5 MG Tab Take 1 tablet (0.5 mg total) by mouth 2 (two) times daily.    sertraline (ZOLOFT) 100 MG tablet Take 2 tablets (200 mg total) by mouth once daily.    topiramate (TOPAMAX) 200 MG Tab Take 1 tablet (200 mg total) by mouth once daily.    LORazepam (ATIVAN) 1 MG tablet  Take 1 tablet (1 mg total) by mouth as needed for Anxiety.    meclizine (ANTIVERT) 25 mg tablet Take 1 tablet (25 mg total) by mouth 3 (three) times daily as needed for Dizziness or Nausea.    ondansetron (ZOFRAN-ODT) 8 MG TbDL Dissolve 1 tablet (8 mg total) by mouth every 6 (six) hours as needed.    polyethylene glycol (GLYCOLAX) 17 gram/dose powder Mix 1 capful (17 g) with fluids and drink by mouth once daily.    [DISCONTINUED] acetaZOLAMIDE (DIAMOX) 250 MG tablet once daily.       Family History     Problem Relation (Age of Onset)    Trenton's disease Mother    COPD Maternal Grandfather    Depression Father, Maternal Grandmother    Diabetes Mother, Father    Heart disease Father, Maternal Grandmother, Maternal Grandfather, Paternal Grandmother, Paternal Grandfather    Hypertension Mother, Father, Son    Kidney disease Paternal Grandmother    Mental illness Father    No Known Problems Brother    Stroke Father, Maternal Grandfather        Tobacco Use    Smoking status: Current Some Day Smoker     Types: Vaping with nicotine, Cigarettes    Smokeless tobacco: Never Used   Substance and Sexual Activity    Alcohol use: No     Frequency: Monthly or less     Drinks per session: 1 or 2     Binge frequency: Never    Drug use: No    Sexual activity: Yes     Partners: Male     Review of Systems   Constitutional: Negative for fever.   HENT: Negative for trouble swallowing.    Eyes: Negative for photophobia.   Respiratory: Negative for shortness of breath.    Cardiovascular: Negative for chest pain.   Gastrointestinal: Negative for abdominal pain.   Genitourinary: Negative for dysuria.   Musculoskeletal: Negative for back pain.   Neurological: Negative for headaches.     Objective:     Vital Signs (Most Recent):  Temp: 98.4 °F (36.9 °C) (04/09/20 1308)  Pulse: 65 (04/09/20 1431)  Resp: 18 (04/09/20 1431)  BP: 117/60 (04/09/20 1431)  SpO2: 100 % (04/09/20 1431) Vital Signs (24h Range):  Temp:  [98.4 °F (36.9 °C)] 98.4  °F (36.9 °C)  Pulse:  [60-65] 65  Resp:  [18] 18  SpO2:  [98 %-100 %] 100 %  BP: (114-133)/(60-85) 117/60     Weight: 124.7 kg (275 lb)  Body mass index is 43.07 kg/m².    Physical Exam   Constitutional: She is oriented to person, place, and time. No distress.   HENT:   Head: Normocephalic.   Eyes: Right eye exhibits no discharge. Left eye exhibits no discharge.   Neck: Normal range of motion.   Cardiovascular: Regular rhythm.   Pulmonary/Chest: Effort normal.   Abdominal: Soft.   Musculoskeletal: She exhibits no edema.   Neurological: She is oriented to person, place, and time.   Reflex Scores:       Tricep reflexes are 2+ on the right side and 2+ on the left side.       Bicep reflexes are 2+ on the right side and 2+ on the left side.       Brachioradialis reflexes are 2+ on the right side and 0 on the left side.       Patellar reflexes are 2+ on the right side and 2+ on the left side.       Achilles reflexes are 2+ on the right side and 2+ on the left side.  Skin: She is not diaphoretic.   Psychiatric: Her speech is normal.       NEUROLOGICAL EXAMINATION:     MENTAL STATUS   Oriented to person, place, and time.   Speech: speech is normal   Level of consciousness: alert    CRANIAL NERVES     CN III, IV, VI   Right pupil: Size: 3 mm. Shape: regular.   Left pupil: Size: 3 mm. Shape: regular.   Nystagmus: none     CN VII   Right facial weakness: none  Left facial weakness: none    CN IX, X   Palate: symmetric    CN XI   Right trapezius strength: normal  Left trapezius strength: normal    CN XII   Tongue deviation: none    MOTOR EXAM        5/5 on RLE and LLE  5/5 on RUE  LUE shoulder and elbow 5/5; hand/wrist is inconsistent offers 2/5       REFLEXES     Reflexes   Right brachioradialis: 2+  Left brachioradialis: 0  Right biceps: 2+  Left biceps: 2+  Right triceps: 2+  Left triceps: 2+  Right patellar: 2+  Left patellar: 2+  Right achilles: 2+  Left achilles: 2+    SENSORY EXAM        Pt reports decrease light touch  pinprick from left elbow down to fingertip in a glove pattern       Significant Labs: CBC: No results for input(s): WBC, HGB, HCT, PLT in the last 48 hours.  CMP: No results for input(s): GLU, NA, K, CL, CO2, BUN, CREATININE, CALCIUM, MG, PROT, ALBUMIN, BILITOT, ALKPHOS, AST, ALT, ANIONGAP, EGFRNONAA in the last 48 hours.    Significant Imaging: I have reviewed all pertinent imaging results/findings within the past 24 hours.    Assessment and Plan:     48 y/o female consulted for left arm numbness    1. Left arm numbness: given distribution and decreased DTR's this seems to be a neuropathy; likely from entrapment. If this is the case symptoms should improve over the next few days. Exams shows no other findgins oncerning for a neurovascular insult at this point.   -Follow up in neurology clinic. EMG/NCS.    VTE Risk Mitigation (From admission, onward)    None          Thank you for your consult. I will follow-up with patient. Please contact us if you have any additional questions.    Rajesh Gutierres MD  Neurology  [facility]

## 2020-04-09 NOTE — ED PROVIDER NOTES
Encounter Date: 4/9/2020       History     Chief Complaint   Patient presents with    Extremity Weakness     right left lower arm numbness from elbow to hand     HPI    This 41-year-old female presents to the emergency room complaining of waking this morning at 5:30 a.m. With  Numbness decreased sensation and weakness of the left hand.  The numbness starts at the elbow and the extremities affected distally.  Patient has no weakness about the shoulder.  The patient denies trouble with her vision hearing or speech.  There is no history of any dizziness.   The patient is otherwise well.   Review of patient's allergies indicates:   Allergen Reactions    Hydrocodone-acetaminophen Nausea And Vomiting     Past Medical History:   Diagnosis Date    Anxiety     Arthritis     Asthma     BMI 50.0-59.9, adult     Depression     Fibromyalgia     GERD (gastroesophageal reflux disease)     Obesity     SHARON (obstructive sleep apnea)      Past Surgical History:   Procedure Laterality Date    CARPAL TUNNEL RELEASE      CHOLECYSTECTOMY      ESOPHAGOGASTRODUODENOSCOPY N/A 5/21/2019    Procedure: EGD (ESOPHAGOGASTRODUODENOSCOPY);  Surgeon: Michelle Mc MD;  Location: [facility];  Service: Endoscopy;  Laterality: N/A;    HYSTERECTOMY      partial     KNEE SURGERY      LAPAROSCOPIC SLEEVE GASTRECTOMY N/A 10/18/2019    Procedure: GASTRECTOMY, SLEEVE, LAPAROSCOPIC, with intraop EGD;  Surgeon: Ean Kohli MD;  Location: [address];  Service: General;  Laterality: N/A;    pinched nerve      SHOULDER SURGERY      TONSILLECTOMY      WRIST SURGERY      had ganlin cyst     Family History   Problem Relation Age of Onset    Diabetes Mother     Jose Juan's disease Mother     Hypertension Mother     Heart disease Father     Stroke Father     Mental illness Father     Hypertension Father     Diabetes Father     Depression Father     Heart disease Maternal Grandmother     Depression Maternal Grandmother      COPD Maternal Grandfather     Heart disease Maternal Grandfather     Stroke Maternal Grandfather     Kidney disease Paternal Grandmother     Heart disease Paternal Grandmother     Heart disease Paternal Grandfather     No Known Problems Brother     Hypertension Son      Social History     Tobacco Use    Smoking status: Current Some Day Smoker     Types: Vaping with nicotine, Cigarettes    Smokeless tobacco: Never Used   Substance Use Topics    Alcohol use: No     Frequency: Monthly or less     Drinks per session: 1 or 2     Binge frequency: Never    Drug use: No     Review of Systems    The patient was questioned specifically with regard to the following.  General: Fever, chills, sweats. Neuro: Headache. Eyes: eye problems. ENT: Ear pain, sore throat. Cardiovascular: Chest pain. Respiratory: Cough, shortness of breath. Gastrointestinal: Abdominal pain, vomiting, diarrhea. Genitourinary: Painful urination.  Musculoskeletal: Arm and leg problems. Skin: Rash.  The review of systems was negative except for the following:  Numbness left hand and left forearm left elbow.  Physical Exam     Initial Vitals [04/09/20 1308]   BP Pulse Resp Temp SpO2   133/85 60 18 98.4 °F (36.9 °C) 98 %      MAP       --         Physical Exam    The patient was examined specifically for the following:   General:No significant distress, Good color, Warm and dry. Head and neck:Scalp atraumatic, Neck supple. Neurological:Appropriate conversation, Gross motor deficits. Eyes:Conjugate gaze, Clear corneas. ENT: No epistaxis. Cardiac: Regular rate and rhythm, Grossly normal heart tones. Pulmonary: Wheezing, Rales. Gastrointestinal: Abdominal tenderness, Abdominal distention. Musculoskeletal: Extremity deformity, Apparent pain with range of motion of the joints. Skin: Rash.   The findings on examination were normal except for the following:  The patient cannot do pincer grasp or dorsiflex her left wrist.  She has decreased sensation to  light touch globally in the left forearm.  ED Course   Procedures  Labs Reviewed - No data to display       Imaging Results    None         Medical decision making:  Given the above this patient was screen for treatment with tPA.   The patient's symptoms onset were before 5:30 a.m..  She was evaluated by me in triage at 1:00 p.m..  I believe she is outside the window for tPA.  I think stroke is unlikely here.  I believe this is likely a compression neuropathy.  I reviewed the case with Dr. Gutierres , who will evaluate the patient in the emergency room.  He recommends no imaging at this time.   This patient was examined by Neurology at 2:30 p.m..   We feel this is likely a peripheral neuropathy.  We will discharge to outpatient evaluation and treatment.  EMG study was recommended.  We cannot provide that at this time immediately.   I will discharge to follow up with outpatient Neurology                                     Clinical Impression:       ICD-10-CM ICD-9-CM   1. Weakness of right hand R29.898 728.87   2. Numbness and tingling in right hand R20.0 782.0    R20.2              ED Disposition Condition    Discharge Stable        ED Prescriptions     None        Follow-up Information     Follow up With Specialties Details Why Contact Info    Jerson Arriola MD Neurology In 2 days  [address] [facility] [address]  [address]  [facility] [address]  [phone]                                       Sj Abel MD  04/09/20 [id number]

## 2020-04-09 NOTE — ED NOTES
Pt. Was evaluated in triage by Dr. Abel and stated not to call a stoke code. MD states he will consult neurology.

## 2020-04-09 NOTE — DISCHARGE INSTRUCTIONS
Please follow-up with the neurologist above.  Call today for an appointment.  Return immediately if you get worse or if new problems develop.  Rest.

## 2020-04-09 NOTE — TELEPHONE ENCOUNTER
----- Message from Nathan Hyde sent at 4/9/2020 10:44 AM CDT -----  Contact: Mervin (spouse)  Type:  Patient Returning Call    Who Called: Mervin (spouse)    Who Left Message for Patient: Maritza    Does the patient know what this is regarding?: yes    Would the patient rather a call back or a response via [facility]? call    Best Call Back Number: [phone]    Additional Information:

## 2020-04-13 ENCOUNTER — TELEPHONE (OUTPATIENT)
Dept: NEUROLOGY | Facility: CLINIC | Age: 50
End: 2020-04-13

## 2020-04-13 NOTE — TELEPHONE ENCOUNTER
Made an appt for video visit.          ----- Message from Leeanna hSaw MA sent at 4/13/2020  9:00 AM CDT -----  Contact: pt       ----- Message -----  From: Juanita De La O  Sent: 4/13/2020   8:53 AM CDT  To: Flako Arthur Staff    Pt was in the ER on 4/9/2020 and was told by Dr.John BUNNY Abel to follow up with      Pt can be reached at [phone] to discuss this matter further

## 2020-04-14 ENCOUNTER — TELEPHONE (OUTPATIENT)
Dept: NEUROLOGY | Facility: CLINIC | Age: 50
End: 2020-04-14

## 2020-04-14 ENCOUNTER — OFFICE VISIT (OUTPATIENT)
Dept: NEUROLOGY | Facility: CLINIC | Age: 50
End: 2020-04-14
Payer: MEDICARE

## 2020-04-14 VITALS — HEIGHT: 67 IN | BODY MASS INDEX: 43.15 KG/M2 | WEIGHT: 274.94 LBS

## 2020-04-14 DIAGNOSIS — G56.22 ULNAR NEUROPATHY OF LEFT UPPER EXTREMITY: ICD-10-CM

## 2020-04-14 PROCEDURE — 3008F BODY MASS INDEX DOCD: CPT | Mod: HCNC,CPTII,95, | Performed by: NEUROLOGICAL SURGERY

## 2020-04-14 PROCEDURE — 99214 OFFICE O/P EST MOD 30 MIN: CPT | Mod: HCNC,95,, | Performed by: NEUROLOGICAL SURGERY

## 2020-04-14 PROCEDURE — 3008F PR BODY MASS INDEX (BMI) DOCUMENTED: ICD-10-PCS | Mod: HCNC,CPTII,95, | Performed by: NEUROLOGICAL SURGERY

## 2020-04-14 PROCEDURE — 99214 PR OFFICE/OUTPT VISIT, EST, LEVL IV, 30-39 MIN: ICD-10-PCS | Mod: HCNC,95,, | Performed by: NEUROLOGICAL SURGERY

## 2020-04-14 NOTE — TELEPHONE ENCOUNTER
Appt made.        ----- Message from Tigist Rhodes sent at 4/14/2020 12:16 PM CDT -----  Contact: pt  Please call pt at [phone]    Patient is requesting a 4 week f/u appt (no schedule available at this time)    Thank you

## 2020-04-14 NOTE — PROGRESS NOTES
Chief Complaint   Patient presents with    Peripheral Neuropathy    Numbness        Zaira Dowell is a 49 y.o. female with a history of multiple medical diagnoses as listed below that presents for evaluation of numbness in the left arm.  The patient says that she fell asleep sitting up in a recliner chair at home.  She woke up approximately 530 and felt numbness from the fingertips to the level of the elbow on the left side.  She went to the emergency room for evaluation and after her workup was determined not to be due to any skew ischemic event was felt to be due to peripheral nerve compression.  Since the time that she has left the hospital she feels that her strength has been improving and she has more range of motion in the wrist and fingers.  She says difficulty with elevation of the wrist and also has difficulty with flexion of her fingers.  She says that she has numbness in the entire arm from the fingers to the elbow that is noticeably different than the right side.  She feels that her right arm is normal.  She denies any painful sensations.     PAST MEDICAL HISTORY:  Past Medical History:   Diagnosis Date    Anxiety     Arthritis     Asthma     BMI 50.0-59.9, adult     Depression     Fibromyalgia     GERD (gastroesophageal reflux disease)     Obesity     SHARON (obstructive sleep apnea)        PAST SURGICAL HISTORY:  Past Surgical History:   Procedure Laterality Date    CARPAL TUNNEL RELEASE      CHOLECYSTECTOMY      ESOPHAGOGASTRODUODENOSCOPY N/A 5/21/2019    Procedure: EGD (ESOPHAGOGASTRODUODENOSCOPY);  Surgeon: Michelle Mc MD;  Location: [facility];  Service: Endoscopy;  Laterality: N/A;    HYSTERECTOMY      partial     KNEE SURGERY      LAPAROSCOPIC SLEEVE GASTRECTOMY N/A 10/18/2019    Procedure: GASTRECTOMY, SLEEVE, LAPAROSCOPIC, with intraop EGD;  Surgeon: Ean Kohli MD;  Location: [address];  Service: General;  Laterality: N/A;    pinched nerve      SHOULDER  SURGERY      TONSILLECTOMY      WRIST SURGERY      had ganlin cyst       SOCIAL HISTORY:  Social History     Socioeconomic History    Marital status:      Spouse name: Not on file    Number of children: Not on file    Years of education: Not on file    Highest education level: Not on file   Occupational History    Not on file   Social Needs    Financial resource strain: Not very hard    Food insecurity:     Worry: Never true     Inability: Never true    Transportation needs:     Medical: No     Non-medical: No   Tobacco Use    Smoking status: Current Some Day Smoker     Types: Vaping with nicotine, Cigarettes    Smokeless tobacco: Never Used   Substance and Sexual Activity    Alcohol use: No     Frequency: Monthly or less     Drinks per session: 1 or 2     Binge frequency: Never    Drug use: No    Sexual activity: Yes     Partners: Male   Lifestyle    Physical activity:     Days per week: 1 day     Minutes per session: 30 min    Stress: Rather much   Relationships    Social connections:     Talks on phone: More than three times a week     Gets together: Patient refused     Attends [employer] service: Not on file     Active member of club or organization: No     Attends meetings of clubs or organizations: Never     Relationship status:    Other Topics Concern    Not on file   Social History Narrative    Not on file       FAMILY HISTORY:  Family History   Problem Relation Age of Onset    Diabetes Mother     Jose Juan's disease Mother     Hypertension Mother     Heart disease Father     Stroke Father     Mental illness Father     Hypertension Father     Diabetes Father     Depression Father     Heart disease Maternal Grandmother     Depression Maternal Grandmother     COPD Maternal Grandfather     Heart disease Maternal Grandfather     Stroke Maternal Grandfather     Kidney disease Paternal Grandmother     Heart disease Paternal Grandmother     Heart disease Paternal  Grandfather     No Known Problems Brother     Hypertension Son        ALLERGIES AND MEDICATIONS: updated and reviewed.  Review of patient's allergies indicates:   Allergen Reactions    Hydrocodone-acetaminophen Nausea And Vomiting     Current Outpatient Medications   Medication Sig Dispense Refill    albuterol (PROVENTIL/VENTOLIN HFA) 90 mcg/actuation inhaler Inhale 2 puffs into the lungs every 6 (six) hours as needed for Wheezing. Rescue 18 g 2    allopurinoL (ZYLOPRIM) 100 MG tablet Take 1 tablet (100 mg total) by mouth once daily. 90 tablet 1    b complex vitamins tablet Take 1 tablet by mouth once daily.      baclofen (LIORESAL) 10 MG tablet Take one tablet in the morning, one tablet in afternoon, and 2 tablets at bedtime 120 tablet 11    calcium citrate (CALCITRATE) 200 mg (950 mg) tablet Take 1 tablet by mouth 2 (two) times daily.      cyanocobalamin 500 MCG tablet Take 500 mcg by mouth once daily.      ferrous fumarate/vit Bcomp,C (SUPER B COMPLEX ORAL) Take 1 capsule by mouth once daily.      fluticasone propionate (FLONASE) 50 mcg/actuation nasal spray 1 spray (50 mcg total) by Each Nostril route once daily. 1 Bottle 0    furosemide (LASIX) 40 MG tablet Take 1 tablet (40 mg total) by mouth once daily. 30 tablet 2    gabapentin (NEURONTIN) 800 MG tablet Take 1 tablet (800 mg total) by mouth once daily. 90 tablet 2    lithium (LITHOTAB) 300 mg tablet Take 1 tablet (300 mg total) by mouth 3 (three) times daily. 270 tablet 1    LORazepam (ATIVAN) 1 MG tablet Take 1 tablet (1 mg total) by mouth as needed for Anxiety. 30 tablet 2    meclizine (ANTIVERT) 25 mg tablet Take 1 tablet (25 mg total) by mouth 3 (three) times daily as needed for Dizziness or Nausea. 30 tablet 0    multivitamin capsule Take 1 capsule by mouth 2 (two) times daily.       omeprazole (PRILOSEC) 40 MG capsule Take 1 capsule (40 mg total) by mouth 2 (two) times daily before meals. 60 capsule 0    ondansetron (ZOFRAN-ODT) 8 MG  TbDL Dissolve 1 tablet (8 mg total) by mouth every 6 (six) hours as needed. 30 tablet 0    oxybutynin (DITROPAN) 5 MG Tab Take 1 tablet (5 mg total) by mouth 3 (three) times daily. 180 tablet 1    polyethylene glycol (GLYCOLAX) 17 gram/dose powder Mix 1 capful (17 g) with fluids and drink by mouth once daily. 510 g 3    potassium chloride (KLOR-CON) 10 MEQ TbSR Take 10 mEq by mouth 2 (two) times daily.      risperiDONE (RISPERDAL) 0.5 MG Tab Take 1 tablet (0.5 mg total) by mouth 2 (two) times daily. 180 tablet 1    sertraline (ZOLOFT) 100 MG tablet Take 2 tablets (200 mg total) by mouth once daily. 180 tablet 2    topiramate (TOPAMAX) 200 MG Tab Take 1 tablet (200 mg total) by mouth once daily. 90 tablet 1     No current facility-administered medications for this visit.        Review of Systems   Constitutional: Negative for activity change, appetite change, fever and unexpected weight change.   HENT: Negative for trouble swallowing and voice change.    Eyes: Negative for photophobia and visual disturbance.   Respiratory: Negative for apnea and shortness of breath.    Cardiovascular: Negative for chest pain and leg swelling.   Gastrointestinal: Negative for constipation and nausea.   Genitourinary: Negative for difficulty urinating.   Musculoskeletal: Negative for back pain, gait problem and neck pain.   Skin: Negative for color change and pallor.   Neurological: Positive for numbness. Negative for dizziness, seizures, syncope and weakness.   Hematological: Negative for adenopathy.   Psychiatric/Behavioral: Negative for agitation, confusion and decreased concentration.       Neurologic Exam     Mental Status   Oriented to person, place, and time.   Registration: recalls 3 of 3 objects.   Attention: normal. Concentration: normal.   Speech: speech is normal   Level of consciousness: alert  Knowledge: good.     Cranial Nerves     CN II   Right visual field deficit: none  Left visual field deficit: none     CN  "III, IV, VI   Extraocular motions are normal.   Right pupil: Size: 3 mm. Shape: regular.   Left pupil: Size: 3 mm. Shape: regular.   CN III: no CN III palsy  CN VI: no CN VI palsy  Nystagmus: none   Diplopia: none  Ophthalmoparesis: none  Upgaze: normal  Downgaze: normal  Conjugate gaze: present    CN VII   Facial expression full, symmetric.   Right facial weakness: none  Left facial weakness: none    CN VIII   CN VIII normal.     CN XI   CN XI normal.     CN XII   CN XII normal.   Tongue deviation: none    Motor Exam   Muscle bulk: normal  Overall muscle tone: normal  Right arm tone: normal  Left arm tone: normal  Right leg tone: normal  Left leg tone: normal    Strength   Left wrist flexion: 4/5  Left wrist extension: 4/5  Left interossei: 4/5    Sensory Exam   Left arm light touch: decreased from elbow    Gait, Coordination, and Reflexes     Gait  Gait: normal    Coordination   Finger to nose coordination: normal    Tremor   Resting tremor: absent      Physical Exam   Constitutional: She is oriented to person, place, and time. She appears well-developed and well-nourished.   HENT:   Head: Normocephalic and atraumatic.   Eyes: EOM are normal.   Neck: Normal range of motion.   Pulmonary/Chest: Effort normal. No apnea. No respiratory distress.   Musculoskeletal: Normal range of motion.   Neurological: She is alert and oriented to person, place, and time. She has a normal Finger-Nose-Finger Test. Gait normal.   Psychiatric: She has a normal mood and affect. Her speech is normal and behavior is normal. Thought content normal.   Vitals reviewed.      Vitals:    04/14/20 1504   Weight: 124.7 kg (274 lb 14.6 oz)   Height: 5' 7" (1.702 m)       Assessment & Plan:    Problem List Items Addressed This Visit     Ulnar neuropathy of left upper extremity    Overview     Symptoms seem to be mainly from all nerve compression, likely at the elbow on the left.               The patient location is: home  The chief complaint " leading to consultation is: arm numbness  Visit type: Virtual visit with synchronous audio and video  Total time spent with patient: 25  Each patient to whom he or she provides medical services by telemedicine is:  (1) informed of the relationship between the physician and patient and the respective role of any other health care provider with respect to management of the patient; and (2) notified that he or she may decline to receive medical services by telemedicine and may withdraw from such care at any time.    Notes: More than 50% of this 25 minute encounter was spent in counseling and coordinating care.    Follow-up: Follow up in about 3 months (around 7/14/2020) for EMG/NCS.

## 2020-04-15 ENCOUNTER — OFFICE VISIT (OUTPATIENT)
Dept: FAMILY MEDICINE | Facility: CLINIC | Age: 50
End: 2020-04-15
Payer: MEDICARE

## 2020-04-15 DIAGNOSIS — N30.01 ACUTE CYSTITIS WITH HEMATURIA: Primary | ICD-10-CM

## 2020-04-15 PROCEDURE — 99214 OFFICE O/P EST MOD 30 MIN: CPT | Mod: 95,,, | Performed by: FAMILY MEDICINE

## 2020-04-15 PROCEDURE — 99214 PR OFFICE/OUTPT VISIT, EST, LEVL IV, 30-39 MIN: ICD-10-PCS | Mod: 95,,, | Performed by: FAMILY MEDICINE

## 2020-04-15 RX ORDER — NITROFURANTOIN 25; 75 MG/1; MG/1
100 CAPSULE ORAL 2 TIMES DAILY
Qty: 10 CAPSULE | Refills: 0 | Status: SHIPPED | OUTPATIENT
Start: 2020-04-15 | End: 2020-04-20

## 2020-04-15 NOTE — PROGRESS NOTES
Routine Office Visit    Patient Name: Zaira Dowell    : 1970  MRN: [id number]    Subjective:  Zaira is a 49 y.o. female who presents today for:    1. Bladder infection  Patient presenting after starting to have dysuria and lower abdominal pressure/pelvic pressure as of yesterday.  She did see blood in her urine once.  There has been no flank pain, fevers, chills, or sweats.  No n/v/d.  She last had a UTI in 2019 that was pansensitive.  There has been no other conerns.      Past Medical History  Past Medical History:   Diagnosis Date    Anxiety     Arthritis     Asthma     BMI 50.0-59.9, adult     Depression     Fibromyalgia     GERD (gastroesophageal reflux disease)     Obesity     SHARON (obstructive sleep apnea)        Past Surgical History  Past Surgical History:   Procedure Laterality Date    CARPAL TUNNEL RELEASE      CHOLECYSTECTOMY      ESOPHAGOGASTRODUODENOSCOPY N/A 2019    Procedure: EGD (ESOPHAGOGASTRODUODENOSCOPY);  Surgeon: Michelle Mc MD;  Location: [facility];  Service: Endoscopy;  Laterality: N/A;    HYSTERECTOMY      partial     KNEE SURGERY      LAPAROSCOPIC SLEEVE GASTRECTOMY N/A 10/18/2019    Procedure: GASTRECTOMY, SLEEVE, LAPAROSCOPIC, with intraop EGD;  Surgeon: Ean Kohli MD;  Location: [address];  Service: General;  Laterality: N/A;    pinched nerve      SHOULDER SURGERY      TONSILLECTOMY      WRIST SURGERY      had ganlin cyst       Family History  Family History   Problem Relation Age of Onset    Diabetes Mother     [place]'s disease Mother     Hypertension Mother     Heart disease Father     Stroke Father     Mental illness Father     Hypertension Father     Diabetes Father     Depression Father     Heart disease Maternal Grandmother     Depression Maternal Grandmother     COPD Maternal Grandfather     Heart disease Maternal Grandfather     Stroke Maternal Grandfather     Kidney disease Paternal Grandmother      Heart disease Paternal Grandmother     Heart disease Paternal Grandfather     No Known Problems Brother     Hypertension Son        Social History  Social History     Socioeconomic History    Marital status:      Spouse name: Not on file    Number of children: Not on file    Years of education: Not on file    Highest education level: Not on file   Occupational History    Not on file   Social Needs    Financial resource strain: Not very hard    Food insecurity:     Worry: Never true     Inability: Never true    Transportation needs:     Medical: No     Non-medical: No   Tobacco Use    Smoking status: Current Some Day Smoker     Types: Vaping with nicotine, Cigarettes    Smokeless tobacco: Never Used   Substance and Sexual Activity    Alcohol use: No     Frequency: Monthly or less     Drinks per session: 1 or 2     Binge frequency: Never    Drug use: No    Sexual activity: Yes     Partners: Male   Lifestyle    Physical activity:     Days per week: 1 day     Minutes per session: 30 min    Stress: Rather much   Relationships    Social connections:     Talks on phone: More than three times a week     Gets together: Patient refused     Attends Shinto service: Not on file     Active member of club or organization: No     Attends meetings of clubs or organizations: Never     Relationship status:    Other Topics Concern    Not on file   Social History Narrative    Not on file       Current Medications  Current Outpatient Medications on File Prior to Visit   Medication Sig Dispense Refill    albuterol (PROVENTIL/VENTOLIN HFA) 90 mcg/actuation inhaler Inhale 2 puffs into the lungs every 6 (six) hours as needed for Wheezing. Rescue 18 g 2    allopurinoL (ZYLOPRIM) 100 MG tablet Take 1 tablet (100 mg total) by mouth once daily. 90 tablet 1    b complex vitamins tablet Take 1 tablet by mouth once daily.      baclofen (LIORESAL) 10 MG tablet Take one tablet in the morning, one tablet  in afternoon, and 2 tablets at bedtime 120 tablet 11    calcium citrate (CALCITRATE) 200 mg (950 mg) tablet Take 1 tablet by mouth 2 (two) times daily.      cyanocobalamin 500 MCG tablet Take 500 mcg by mouth once daily.      ferrous fumarate/vit Bcomp,C (SUPER B COMPLEX ORAL) Take 1 capsule by mouth once daily.      fluticasone propionate (FLONASE) 50 mcg/actuation nasal spray 1 spray (50 mcg total) by Each Nostril route once daily. 1 Bottle 0    furosemide (LASIX) 40 MG tablet Take 1 tablet (40 mg total) by mouth once daily. 30 tablet 2    gabapentin (NEURONTIN) 800 MG tablet Take 1 tablet (800 mg total) by mouth once daily. 90 tablet 2    lithium (LITHOTAB) 300 mg tablet Take 1 tablet (300 mg total) by mouth 3 (three) times daily. 270 tablet 1    LORazepam (ATIVAN) 1 MG tablet Take 1 tablet (1 mg total) by mouth as needed for Anxiety. 30 tablet 2    meclizine (ANTIVERT) 25 mg tablet Take 1 tablet (25 mg total) by mouth 3 (three) times daily as needed for Dizziness or Nausea. 30 tablet 0    multivitamin capsule Take 1 capsule by mouth 2 (two) times daily.       omeprazole (PRILOSEC) 40 MG capsule Take 1 capsule (40 mg total) by mouth 2 (two) times daily before meals. 60 capsule 0    ondansetron (ZOFRAN-ODT) 8 MG TbDL Dissolve 1 tablet (8 mg total) by mouth every 6 (six) hours as needed. 30 tablet 0    oxybutynin (DITROPAN) 5 MG Tab Take 1 tablet (5 mg total) by mouth 3 (three) times daily. 180 tablet 1    polyethylene glycol (GLYCOLAX) 17 gram/dose powder Mix 1 capful (17 g) with fluids and drink by mouth once daily. 510 g 3    potassium chloride (KLOR-CON) 10 MEQ TbSR Take 10 mEq by mouth 2 (two) times daily.      risperiDONE (RISPERDAL) 0.5 MG Tab Take 1 tablet (0.5 mg total) by mouth 2 (two) times daily. 180 tablet 1    sertraline (ZOLOFT) 100 MG tablet Take 2 tablets (200 mg total) by mouth once daily. 180 tablet 2    topiramate (TOPAMAX) 200 MG Tab Take 1 tablet (200 mg total) by mouth once  daily. 90 tablet 1     No current facility-administered medications on file prior to visit.        Allergies   Review of patient's allergies indicates:   Allergen Reactions    Hydrocodone-acetaminophen Nausea And Vomiting       Review of Systems (Pertinent positives)  Review of Systems   Constitutional: Negative for chills, diaphoresis, fever, malaise/fatigue and weight loss.   HENT: Negative.    Eyes: Negative.    Respiratory: Negative.    Cardiovascular: Negative.    Gastrointestinal: Negative.    Genitourinary: Positive for dysuria, frequency and hematuria. Negative for flank pain and urgency.   Musculoskeletal: Negative.    Skin: Negative.          There were no vitals taken for this visit.    GENERAL APPEARANCE: in no apparent distress and well developed and well nourished  HEENT: PERRL, EOMI, Sclera clear, anicteric  NECK: normal, supple, no adenopathy, thyroid normal in size  RESPIRATORY: spontaneously beating heart  HEART: regular rate and rhythm, S1, S2 normal, no murmur, click, rub or gallop.    SKIN: no rashes, no wounds, no other lesions  PSYCH: Alert, oriented x 3, thought content appropriate, speech normal, pleasant and cooperative, good eye contact    Assessment/Plan:  Zaira Dowell is a 49 y.o. female who presents today for :    Diagnoses and all orders for this visit:    Acute cystitis with hematuria  -     nitrofurantoin, macrocrystal-monohydrate, (MACROBID) 100 MG capsule; Take 1 capsule (100 mg total) by mouth 2 (two) times daily. for 5 days      1.  Will treat based off last culture  2.  If no improvement will need urine culture  3.  Encouraged continued intake of water  4.  Recommended Azo if needed  5.  macrobid bid for 5 days      Cyril Saucedo MD

## 2020-04-16 ENCOUNTER — PATIENT OUTREACH (OUTPATIENT)
Dept: ADMINISTRATIVE | Facility: OTHER | Age: 50
End: 2020-04-16

## 2020-04-17 ENCOUNTER — CLINICAL SUPPORT (OUTPATIENT)
Dept: BARIATRICS | Facility: CLINIC | Age: 50
End: 2020-04-17
Payer: MEDICARE

## 2020-04-17 DIAGNOSIS — G47.33 OSA (OBSTRUCTIVE SLEEP APNEA): ICD-10-CM

## 2020-04-17 DIAGNOSIS — E66.01 MORBID OBESITY WITH BMI OF 40.0-44.9, ADULT: ICD-10-CM

## 2020-04-17 DIAGNOSIS — Z98.84 S/P BARIATRIC SURGERY: ICD-10-CM

## 2020-04-17 DIAGNOSIS — Z71.3 DIETARY COUNSELING: ICD-10-CM

## 2020-04-17 PROCEDURE — 99499 NO LOS: ICD-10-PCS | Mod: HCNC,95,, | Performed by: DIETITIAN, REGISTERED

## 2020-04-17 PROCEDURE — 99499 UNLISTED E&M SERVICE: CPT | Mod: HCNC,95,, | Performed by: DIETITIAN, REGISTERED

## 2020-04-17 PROCEDURE — 97803 MED NUTRITION INDIV SUBSEQ: CPT | Mod: HCNC,95,, | Performed by: DIETITIAN, REGISTERED

## 2020-04-17 PROCEDURE — 97803 PR MED NUTR THER, SUBSQ, INDIV, EA 15 MIN: ICD-10-PCS | Mod: HCNC,95,, | Performed by: DIETITIAN, REGISTERED

## 2020-04-17 NOTE — PROGRESS NOTES
The patient location is: home ([place])  The chief complaint leading to consultation is: s/p bariatric surgery  Visit type: audiovisual  Total time spent with patient: 30 min  Each patient to whom he or she provides medical services by telemedicine is:  (1) informed of the relationship between the physician and patient and the respective role of any other health care provider with respect to management of the patient; and (2) notified that he or she may decline to receive medical services by telemedicine and may withdraw from such care at any time.    NUTRITION NOTE     Referring Physician: Ean Kohli M.D.  Reason for MNT Referral: Follow-up 6 months s/p Gastric Sleeve     Denies nausea, vomiting, constipation and diarrhea.  Reports problems, including emotional eating related to stress from coronavirus quarantine. Admits she eats what she wants, but only a small portions. When quarantine first started she went through a phase of eating 4-5 cookies/day till they were gone. She didn't buy any cookies the last few trips to the grocery store. She is baking cakes regularly. Admits this is a comfort activity. States she does it as a treat for her .          Past Medical History:   Diagnosis Date    Anxiety      Arthritis      Asthma      BMI 50.0-59.9, adult      Depression      Fibromyalgia      GERD (gastroesophageal reflux disease)      Obesity      SHARON (obstructive sleep apnea)           CLINICAL DATA:  49 y.o. female.     Current Weight: 272 lbs  BMI: 42.5  Total Weight Loss: 65 lbs  Excess Weight Loss: 34%     CURRENT DIET:  Bariatric Diet. Regular Diet.  Diet Recall: 52 grams of protein/day; 64+ oz of fluids/day    B: Equate 30 gms of protein OR [employer] protein granola bar  L: meat and veggies   Sn: small slice homemade cake  D: Leftovers from lunch or 1/2 turkey/chicken wrap and chips     Meal Pattern: 2 meal(s) + 0 snack(s) + 1 protein supplement(s)  Inadequate protein supplement  intake.  Adequate dairy intake.  Adequate vegetable intake.   Adequate fruit intake.  Starchy CHO: none reported  Other: water country time sf lemonade, propel     EXERCISE:  None. Pinched nerve from elbow down on right arm.  Was riding stationary bike but was advised not to.    Ordered dance videos to begin soon     VITAMINS / MINERALS:  See BA     ASSESSMENT:  Doing fairly well overall.  Weight loss fair at 34%  Excess calorie intake from cookies/cake.  Adequate protein intake.  Adequate fluid intake.  Following diet appropriately. Some emotional eating d/t stress.  Not exercising.  Reports adequate vitamins & minerals.     BARIATRIC DIET DISCUSSION:  Instructed and provided written materials on bariatric diet plan.  Reinforced post-op nutrition guidelines.  Provided Bariatric Quarantine Survival Guide on AVS     PLAN / RECOMMENDATIONS:  Find alternative comfort activities. Pt states she enjoys painting and crocheting.  Search for low carb/sugar free cake recipes on Pinterest. Limit frequency and limit portions.     Pt states she does not like keeping food journals. Suggested sending me a msg on my [employer] weekly with 1 day food log.  Follow up with RD in 1 month for support and accountability during this difficult time.     SESSION TIME: 30 minutes

## 2020-04-18 NOTE — PATIENT INSTRUCTIONS
[facility]  Tips to Stay on Track During COVID-19      DO DON'T   Keep up with your food log  Maintaining your caloric intake and diet quality is critical for achieving your health and weight loss goals.  Download the Sirena Kipo and use [facility] Code 65841 to track food and fluid intake Feel Discouraged - You can do this!  There are a lot of changes happening in our world but don't let them discourage you. Focus on the future and remind yourself of all the work and effort you've put in so far.     Keep protein-rich foods stocked up  buy chicken and turkey in bulk and freeze them, keep dry or canned beans on hand, get the largest quantity of eggs available. Make sure you are getting your required protein intake (between  grams EACH DAY).   Drink fluid during meals or 30 minutes before/after eating  Your regular routine may have changed which may have caused some of your meal or snack times to change.  keep track of when you consume any liquid and time your meals accordingly. This will also help you make sure you are staying hydrated throughout the day   Continue with your protein drinks or bars  Order online or use grocery delivery service. Always make sure you order more WELL BEFORE you are running low, especially now when deliveries may take longer to arrive.  Remember to check to make sure your protein shakes or bars have 4 gms of sugar or less.     Keep table sugar around  You may be more tempted to add it to your drinks or food if it is visible and easily accessible.   Try new recipes  Use this time to experiment in the kitchen and find some different healthy dishes you enjoy - you can use the nutrition booklet to help guide you.  If you cannot find your copy, please download it from our website @ [email or web address]  Click here to download [facility]'s Nutrition Binder.   Add tough or crunchy foods back  into your diet too quickly  Raw veggies are great snack foods but adding them in too quickly after surgery will cause pain and discomfort   Eat your meals slowly and intentionally  You shouldn't have to rush out to be anywhere so really take your time and aim for each meal to last around 20-30 minutes.   Drink sugary/bubbly drinks or alcohol    It may be tempting especially if you are surrounded by others without these limitations, but these beverages will prevent weight loss and cause gastric  pain/discomfort     Listen to your body - try to recognize when you feel full.  Learning your body's signals can be difficult but it is a key step in your weight loss journey. While you are is this process of working on this step, be sure portion out the recommended quantities of foods and meals/snacks to prevent overeating.   Eat your meals using electronics  This is especially difficult at home where your use of computers, phones, and TVs are pretty much unregulated. Designate a meal spot or spots where there is limited distractions and you can focus on your food - maybe your kitchen table or on an outside porch or deck.   Continue taking vitamins and minerals  Take them at the same time each day and keep a log of when you take your supplements to make sure you don't miss any. These supplements are essential for preventing malnutrition and other health problems that will deter your progress.   'Save' your appetite   You may feel like holding out from food as long as possible so you can eat a large meal later on, but your body needs energy throughout the day in order to properly fuel itself and keep you alert.  Try eating small meals throughout the day - use these meals as mini breaks from work or projects you are doing at home.   Stay active!  It is so important for both your physical and mental health that you get regular physical activity. Even if you can no longer physically go to the gym or to workout classes there are  tons of online resources to keep you moving. Incorporate a specific exercise time into your daily home routine and keep a journal of your activity.   Order food delivery or take out   Most places are now offering delivery services, but it can still be difficult to find and choose healthy options. Choose to do a grocery store  or delivery instead- cooking food at home is less expensive then eating out!   Review the resources you've been provided and keep in touch with your healthcare team.  Let them know if you are struggling or experiencing any problems - they are here to help!  Call us to schedule a telehealth visit at [phone] Isolate yourself   It may be called 'social distancing' but that does not mean we can't still connect with one another. Phone calls or group video chats are great ways to keep in touch while staying at home. Any method of getting regular social time with friends and family will help to remind you that you're not in this alone.     Grocery List    Items to Keep Stocked in Your Kitchen  PROTEINS (Lean)    Eggs  Beans (canned and/or dried)  Skinless chicken/turkey   Tuna/[place]view (canned and/or pouch)  Tofu or Tempeh  Cirilo Veggie Burgers  Fish or shrimp (fresh or frozen)  Ground Beef (90% lean)  Steaks  Chobani Greek Yogurt  Cabot Cottage Cheese  Hummus  Low-fat cheeses (Laughing Cow, Baby Bell, mozzarella string cheese)  Fairlife Non-fat Milk    Vegetables (non-starchy)  *veggies can be fresh or frozen    Broccoli   Cauliflower   Carrots   Onions   Cabbage   Radishes   Zucchini   Okra   Greens   Peppers   Spinach   Turnips   Mushrooms   Tomatoes   Celery   Lettuce   Asparagus  Eggplant   Green Onions   Kale    Fruits  *Fruits can be fresh or frozen    Apples  Oranges  Pears  Kiwi  Melon  Berries  Peaches  Unsweetened Applesauce    *Avoid fruits canned In syrup  *Stick to 1-2 servings of fruit/day   Vitamins    Flintstones Complete  Chewables    Super B-Complex tablets with 50  mg Thiamine    Nature's Way liquid Calcium Citrate + Vit D     Sublingual Vitamin B12   Other    Sugar-free Popsicles    Sugar-free Jello    Crystal Lite    Low Fat Condiments     Decaf Coffee/Tea           Foods to Avoid Having Around the House  Butter/Margarine Cookies Candy Chips  Pretzels Grits  Granola Popcorn Murphy Corn  Bread Alcohol Soda  Pasta  Rice  Cake/Pie Sausage Potatoes Ice Cream                 Tricks to Prevent Emotional Eating During Quarantine      Keep 'trigger foods' out of the house   Keep yourself distracted with work, games, music, or whatever hobby you enjoy. This may be the time to try a new activity!   Try fighting stress with breathing techniques, yoga, meditation, or prayer   Mix up your meals with a variety of dishes   Keep up with your food diary   Call or video chat with a friend or family   Plan your meals for specific time and try for smaller meals throughout the day   Pre-portion all meals and snacks    Step outside for some fresh air or do a quick exercise activity to reset yourself    *Avoid negative thoughts about yourself - if you have a slip-up, you are not a failure. Forgive yourself and focus on learning from it so you can prevent it for the future              Ways to Stay Active While Staying Inside      goOutMapube Videos - free exercise and gym classes at any fitness level   Apps -tons of fitness apps are currently offering free trial periods and offer workouts that don't require equipment   Chores around the house, such as cleaning or gardening   Walk up and down the stairs   Video-chat with your regular workout serena and do an online class together   Make a playlist of your favorite fun songs and dance around - no need to worry about knowing any serious dance moves, just jump around get your heart rate up!    While you are limited to working out at home, you may find it easier to do short, mini workouts multiple times a day instead of all at once. Try to  still get at least 30 minutes of physical activity in each day!   Physical Health = Mental Health    The health of both your mind and body are equally important -be sure you are taking the time to care for both. It is likely that the current health concerns and quarantine mandates caused significant changes to your normal routine. Although this can feel overwhelming and seem difficult to manage, there are ways you can take to manage these feelings and keep your mental and physical health journey on track. Here are some tips for self-care during quarantine:     Meditate, take deep breaths - find any practice that will help you center yourself.   Move around your house throughout the day. Avoid staying in the same seat or room for too long and try to work in an area of your house that get lots of sunlight if possible.   Get fresh air for a bit every day - being outside is a great way to improve your mood! You don't necessarily have to go far from your house. You could even just hang out on your porch for a while or take a walk around the block.    Get good sleep and maintain a regular sleep schedule   Connect with others. While we aren't able to physically be with others right now it is still so important to socialize and interact with other people, even if it is being done remotely.    Keep yourself busy. Make a list of tasks that you want to complete around the house, start a new book, do some art projects, try journaling.

## 2020-04-28 ENCOUNTER — TELEPHONE (OUTPATIENT)
Dept: RHEUMATOLOGY | Facility: CLINIC | Age: 50
End: 2020-04-28

## 2020-04-28 NOTE — TELEPHONE ENCOUNTER
Spoke with patient about her appointment for tomorrow, she is not to go to the [facility]

## 2020-04-29 ENCOUNTER — TELEPHONE (OUTPATIENT)
Dept: RHEUMATOLOGY | Facility: CLINIC | Age: 50
End: 2020-04-29

## 2020-04-29 ENCOUNTER — OFFICE VISIT (OUTPATIENT)
Dept: RHEUMATOLOGY | Facility: CLINIC | Age: 50
End: 2020-04-29
Payer: MEDICARE

## 2020-04-29 DIAGNOSIS — M54.16 LUMBAR RADICULOPATHY: Primary | ICD-10-CM

## 2020-04-29 PROCEDURE — 99214 OFFICE O/P EST MOD 30 MIN: CPT | Mod: HCNC,95,, | Performed by: INTERNAL MEDICINE

## 2020-04-29 PROCEDURE — 99214 PR OFFICE/OUTPT VISIT, EST, LEVL IV, 30-39 MIN: ICD-10-PCS | Mod: HCNC,95,, | Performed by: INTERNAL MEDICINE

## 2020-04-29 RX ORDER — PREDNISONE 20 MG/1
20 TABLET ORAL DAILY
Qty: 10 TABLET | Refills: 0 | Status: SHIPPED | OUTPATIENT
Start: 2020-04-29 | End: 2020-08-14
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
## 2020-05-12 ENCOUNTER — PATIENT OUTREACH (OUTPATIENT)
Dept: ADMINISTRATIVE | Facility: OTHER | Age: 50
End: 2020-05-12

## 2020-05-13 ENCOUNTER — OFFICE VISIT (OUTPATIENT)
Dept: NEUROLOGY | Facility: CLINIC | Age: 50
End: 2020-05-13
Payer: MEDICARE

## 2020-05-13 VITALS — WEIGHT: 274.94 LBS | BODY MASS INDEX: 43.15 KG/M2 | HEIGHT: 67 IN

## 2020-05-13 DIAGNOSIS — G56.22 ULNAR NEUROPATHY OF LEFT UPPER EXTREMITY: Primary | ICD-10-CM

## 2020-05-13 PROCEDURE — 99214 OFFICE O/P EST MOD 30 MIN: CPT | Mod: HCNC,95,, | Performed by: NEUROLOGICAL SURGERY

## 2020-05-13 PROCEDURE — 3008F BODY MASS INDEX DOCD: CPT | Mod: HCNC,CPTII,95, | Performed by: NEUROLOGICAL SURGERY

## 2020-05-13 PROCEDURE — 99214 PR OFFICE/OUTPT VISIT, EST, LEVL IV, 30-39 MIN: ICD-10-PCS | Mod: HCNC,95,, | Performed by: NEUROLOGICAL SURGERY

## 2020-05-13 PROCEDURE — 3008F PR BODY MASS INDEX (BMI) DOCUMENTED: ICD-10-PCS | Mod: HCNC,CPTII,95, | Performed by: NEUROLOGICAL SURGERY

## 2020-05-13 NOTE — PROGRESS NOTES
Chief Complaint   Patient presents with    Numbness    Extremity Weakness        Zaira Dowell is a 49 y.o. female with a history of multiple medical diagnoses as listed below that presents for evaluation of numbness in the left arm.  The patient says that she fell asleep sitting up in a recliner chair at home.  She woke up approximately 530 and felt numbness from the fingertips to the level of the elbow on the left side.  She went to the emergency room for evaluation and after her workup was determined not to be due to any skew ischemic event was felt to be due to peripheral nerve compression.  Since the time that she has left the hospital she feels that her strength has been improving and she has more range of motion in the wrist and fingers.  She says difficulty with elevation of the wrist and also has difficulty with flexion of her fingers.  She says that she has numbness in the entire arm from the fingers to the elbow that is noticeably different than the right side.  She feels that her right arm is normal.  She denies any painful sensations.    Interval History  05/13/2020  She has noted that there has been some improvement in the strength and range of motion in the wrists but mostly with flexion and extension.  She feels that any side to side movement a rotation of the hand and arm seemed to be very limited.  Rotation has been especially difficult as he tends to provoke pain in the lateral aspect of the wrist and hand.  She has been experiencing sharp stabbing pains throughout the arm that is worse with movement but has also been experienced at rest.  She has not noticed any symptoms in any other extremity.      PAST MEDICAL HISTORY:  Past Medical History:   Diagnosis Date    Anxiety     Arthritis     Asthma     BMI 50.0-59.9, adult     Depression     Fibromyalgia     GERD (gastroesophageal reflux disease)     Obesity     SHARON (obstructive sleep apnea)        PAST SURGICAL HISTORY:  Past  Surgical History:   Procedure Laterality Date    CARPAL TUNNEL RELEASE      CHOLECYSTECTOMY      ESOPHAGOGASTRODUODENOSCOPY N/A 5/21/2019    Procedure: EGD (ESOPHAGOGASTRODUODENOSCOPY);  Surgeon: Michelle Mc MD;  Location: [facility];  Service: Endoscopy;  Laterality: N/A;    HYSTERECTOMY      partial     KNEE SURGERY      LAPAROSCOPIC SLEEVE GASTRECTOMY N/A 10/18/2019    Procedure: GASTRECTOMY, SLEEVE, LAPAROSCOPIC, with intraop EGD;  Surgeon: Ean Kohli MD;  Location: [address];  Service: General;  Laterality: N/A;    pinched nerve      SHOULDER SURGERY      TONSILLECTOMY      WRIST SURGERY      had ganlin cyst       SOCIAL HISTORY:  Social History     Socioeconomic History    Marital status:      Spouse name: Not on file    Number of children: Not on file    Years of education: Not on file    Highest education level: Not on file   Occupational History    Not on file   Social Needs    Financial resource strain: Not very hard    Food insecurity:     Worry: Never true     Inability: Never true    Transportation needs:     Medical: No     Non-medical: No   Tobacco Use    Smoking status: Current Some Day Smoker     Types: Vaping with nicotine, Cigarettes    Smokeless tobacco: Never Used   Substance and Sexual Activity    Alcohol use: No     Frequency: Monthly or less     Drinks per session: 1 or 2     Binge frequency: Never    Drug use: No    Sexual activity: Yes     Partners: Male   Lifestyle    Physical activity:     Days per week: 1 day     Minutes per session: 30 min    Stress: Rather much   Relationships    Social connections:     Talks on phone: More than three times a week     Gets together: Patient refused     Attends Christian service: Not on file     Active member of club or organization: No     Attends meetings of clubs or organizations: Never     Relationship status:    Other Topics Concern    Not on file   Social History Narrative    Not on  file       FAMILY HISTORY:  Family History   Problem Relation Age of Onset    Diabetes Mother     Garza's disease Mother     Hypertension Mother     Heart disease Father     Stroke Father     Mental illness Father     Hypertension Father     Diabetes Father     Depression Father     Heart disease Maternal Grandmother     Depression Maternal Grandmother     COPD Maternal Grandfather     Heart disease Maternal Grandfather     Stroke Maternal Grandfather     Kidney disease Paternal Grandmother     Heart disease Paternal Grandmother     Heart disease Paternal Grandfather     No Known Problems Brother     Hypertension Son        ALLERGIES AND MEDICATIONS: updated and reviewed.  Review of patient's allergies indicates:   Allergen Reactions    Hydrocodone-acetaminophen Nausea And Vomiting     Current Outpatient Medications   Medication Sig Dispense Refill    albuterol (PROVENTIL/VENTOLIN HFA) 90 mcg/actuation inhaler Inhale 2 puffs into the lungs every 6 (six) hours as needed for Wheezing. Rescue 18 g 2    allopurinoL (ZYLOPRIM) 100 MG tablet Take 1 tablet (100 mg total) by mouth once daily. 90 tablet 1    b complex vitamins tablet Take 1 tablet by mouth once daily.      baclofen (LIORESAL) 10 MG tablet Take one tablet in the morning, one tablet in afternoon, and 2 tablets at bedtime 120 tablet 11    calcium citrate (CALCITRATE) 200 mg (950 mg) tablet Take 1 tablet by mouth 2 (two) times daily.      cyanocobalamin 500 MCG tablet Take 500 mcg by mouth once daily.      ferrous fumarate/vit Bcomp,C (SUPER B COMPLEX ORAL) Take 1 capsule by mouth once daily.      fluticasone propionate (FLONASE) 50 mcg/actuation nasal spray 1 spray (50 mcg total) by Each Nostril route once daily. 1 Bottle 0    furosemide (LASIX) 40 MG tablet Take 1 tablet (40 mg total) by mouth once daily. 30 tablet 2    gabapentin (NEURONTIN) 800 MG tablet Take 1 tablet (800 mg total) by mouth once daily. 90 tablet 2    lithium  (LITHOTAB) 300 mg tablet Take 1 tablet (300 mg total) by mouth 3 (three) times daily. 270 tablet 1    LORazepam (ATIVAN) 1 MG tablet Take 1 tablet (1 mg total) by mouth as needed for Anxiety. 30 tablet 2    meclizine (ANTIVERT) 25 mg tablet Take 1 tablet (25 mg total) by mouth 3 (three) times daily as needed for Dizziness or Nausea. 30 tablet 0    multivitamin capsule Take 1 capsule by mouth 2 (two) times daily.       omeprazole (PRILOSEC) 40 MG capsule Take 1 capsule (40 mg total) by mouth 2 (two) times daily before meals. 60 capsule 0    ondansetron (ZOFRAN-ODT) 8 MG TbDL Dissolve 1 tablet (8 mg total) by mouth every 6 (six) hours as needed. 30 tablet 0    oxybutynin (DITROPAN) 5 MG Tab Take 1 tablet (5 mg total) by mouth 3 (three) times daily. 180 tablet 1    polyethylene glycol (GLYCOLAX) 17 gram/dose powder Mix 1 capful (17 g) with fluids and drink by mouth once daily. 510 g 3    potassium chloride (KLOR-CON) 10 MEQ TbSR Take 10 mEq by mouth 2 (two) times daily.      predniSONE (DELTASONE) 20 MG tablet Take 1 tablet (20 mg total) by mouth once daily. 10 tablet 0    risperiDONE (RISPERDAL) 0.5 MG Tab Take 1 tablet (0.5 mg total) by mouth 2 (two) times daily. 180 tablet 1    sertraline (ZOLOFT) 100 MG tablet Take 2 tablets (200 mg total) by mouth once daily. 180 tablet 2    topiramate (TOPAMAX) 200 MG Tab Take 1 tablet (200 mg total) by mouth once daily. 90 tablet 1     No current facility-administered medications for this visit.        Review of Systems   Constitutional: Negative for activity change, appetite change, fever and unexpected weight change.   HENT: Negative for trouble swallowing and voice change.    Eyes: Negative for photophobia and visual disturbance.   Respiratory: Negative for apnea and shortness of breath.    Cardiovascular: Negative for chest pain and leg swelling.   Gastrointestinal: Negative for constipation and nausea.   Genitourinary: Negative for difficulty urinating.    Musculoskeletal: Negative for back pain, gait problem and neck pain.   Skin: Negative for color change and pallor.   Neurological: Positive for numbness. Negative for dizziness, seizures, syncope and weakness.   Hematological: Negative for adenopathy.   Psychiatric/Behavioral: Negative for agitation, confusion and decreased concentration.       Neurologic Exam     Mental Status   Oriented to person, place, and time.   Registration: recalls 3 of 3 objects.   Attention: normal. Concentration: normal.   Speech: speech is normal   Level of consciousness: alert  Knowledge: good.     Cranial Nerves     CN II   Right visual field deficit: none  Left visual field deficit: none     CN III, IV, VI   Extraocular motions are normal.   Right pupil: Size: 3 mm. Shape: regular.   Left pupil: Size: 3 mm. Shape: regular.   CN III: no CN III palsy  CN VI: no CN VI palsy  Nystagmus: none   Diplopia: none  Ophthalmoparesis: none  Upgaze: normal  Downgaze: normal  Conjugate gaze: present    CN VII   Facial expression full, symmetric.   Right facial weakness: none  Left facial weakness: none    CN VIII   CN VIII normal.     CN XI   CN XI normal.     CN XII   CN XII normal.   Tongue deviation: none    Motor Exam   Muscle bulk: normal  Overall muscle tone: normal  Right arm tone: normal  Left arm tone: normal  Right leg tone: normal  Left leg tone: normal    Strength   Left wrist flexion: 4/5  Left wrist extension: 4/5  Left interossei: 4/5    Sensory Exam   Left arm light touch: decreased from elbow    Gait, Coordination, and Reflexes     Gait  Gait: normal    Coordination   Finger to nose coordination: normal    Tremor   Resting tremor: absent      Physical Exam   Constitutional: She is oriented to person, place, and time. She appears well-developed and well-nourished.   HENT:   Head: Normocephalic and atraumatic.   Eyes: EOM are normal.   Neck: Normal range of motion.   Pulmonary/Chest: Effort normal. No apnea. No respiratory  "distress.   Musculoskeletal: Normal range of motion.   Neurological: She is alert and oriented to person, place, and time. She has a normal Finger-Nose-Finger Test. Gait normal.   Psychiatric: She has a normal mood and affect. Her speech is normal and behavior is normal. Thought content normal.   Vitals reviewed.      Vitals:    05/13/20 1134   Weight: 124.7 kg (274 lb 14.6 oz)   Height: 5' 7" (1.702 m)       Assessment & Plan:    Problem List Items Addressed This Visit     Ulnar neuropathy of left upper extremity - Primary    Overview     Symptoms seem to be mainly from all nerve compression, likely at the elbow on the left.         Relevant Orders    Ambulatory referral/consult to Physical/Occupational Therapy    EMG W/ ULTRASOUND AND NERVE CONDUCTION TEST 2 Extremities          The patient location is: home  The chief complaint leading to consultation is: arm numbness  Visit type: Virtual visit with synchronous audio and video  Total time spent with patient: 25  Each patient to whom he or she provides medical services by telemedicine is:  (1) informed of the relationship between the physician and patient and the respective role of any other health care provider with respect to management of the patient; and (2) notified that he or she may decline to receive medical services by telemedicine and may withdraw from such care at any time.    Notes:    Follow-up: Follow up for EMG/NCS.    "

## 2020-05-14 ENCOUNTER — TELEPHONE (OUTPATIENT)
Dept: BARIATRICS | Facility: CLINIC | Age: 50
End: 2020-05-14

## 2020-05-14 NOTE — TELEPHONE ENCOUNTER
Left vm for patient to give us a call back at the office she is due for a follow up appt 6/2020. Per meño flores

## 2020-05-15 ENCOUNTER — CLINICAL SUPPORT (OUTPATIENT)
Dept: BARIATRICS | Facility: CLINIC | Age: 50
End: 2020-05-15
Payer: MEDICARE

## 2020-05-15 DIAGNOSIS — E66.01 MORBID OBESITY WITH BMI OF 45.0-49.9, ADULT: ICD-10-CM

## 2020-05-15 DIAGNOSIS — G47.33 OSA (OBSTRUCTIVE SLEEP APNEA): ICD-10-CM

## 2020-05-15 DIAGNOSIS — Z71.3 DIETARY COUNSELING: ICD-10-CM

## 2020-05-15 PROCEDURE — 97803 MED NUTRITION INDIV SUBSEQ: CPT | Mod: HCNC,95,, | Performed by: DIETITIAN, REGISTERED

## 2020-05-15 PROCEDURE — 97803 PR MED NUTR THER, SUBSQ, INDIV, EA 15 MIN: ICD-10-PCS | Mod: HCNC,95,, | Performed by: DIETITIAN, REGISTERED

## 2020-05-15 NOTE — PROGRESS NOTES
The patient location is: home ([place])  The chief complaint leading to consultation is: morbid obesity  Visit type: audiovisual  Total time spent with patient: 30 min  Each patient to whom he or she provides medical services by telemedicine is:  (1) informed of the relationship between the physician and patient and the respective role of any other health care provider with respect to management of the patient; and (2) notified that he or she may decline to receive medical services by telemedicine and may withdraw from such care at any time.    NUTRITION NOTE     Referring Physician: Ean Kohli M.D.  Reason for MNT Referral: Follow-up 6 months s/p Gastric Sleeve     Denies nausea, vomiting, constipation and diarrhea.  Pt present for follow up with self-reported 4 lbs weight loss over the past month.  Admits she eats what she wants, but only a small portions. She states she hasn't been buying cookies at the grocery store, but she has been baking cakes and cookies. She tries to eat a smaller portion. She admits she is not drinking her protein shakes for breakfast anymore. She wanted something different. She has been eating sausage biscuits. She has been eating more bread and rice lately.    She was in the hospital recently d/t a pinched nerve in her arm. Hasn't been able to exercise without pain.             Past Medical History:   Diagnosis Date    Anxiety      Arthritis      Asthma      BMI 50.0-59.9, adult      Depression      Fibromyalgia      GERD (gastroesophageal reflux disease)      Obesity      SHARON (obstructive sleep apnea)           CLINICAL DATA:  49 y.o. female.     Current Weight: 268 lbs  BMI: 42  Total Weight Loss: 69 lbs  Excess Weight Loss: 35%     CURRENT DIET:  Regular Diet.  Diet Recall: 40-80 grams of protein/day; 64+ oz of fluids/day     B: pack of 2 mini sausage biscuits  L: 1/2 chicken breast rolled in Italian dressing and Italian breadcrumbs and baked, brussels sprouts,  cauliflower  D: 1 cup chicken flavored rice a bernadine  Sn: small bowl strawberries and cherries  Sn: fat free ice cream bar  Sn: beef jerky    B: none  L: [employer] - chicken quesadilla  - decaf unsweet tea with S&L  Sn: handful beef jerky  Sn: small bowl strawberries and cherries  D: deli roast beef on 2 slices white bread with camacho and creole mustard  Sn: fat free ice cream bar     Diet includes:  Meal Pattern: 2 meal(s) + 0 snack(s) + 1 protein supplement(s)  Inadequate protein supplement intake. None the past 2 weeks. Wanted something different for breakfast. Equate 30g prot shakes.  Adequate dairy intake.  Adequate vegetable intake.   Adequate fruit intake.  Starchy CHO: daily  Other: water country time sf lemonade, propel, unsweet tea with sugar sub     EXERCISE:  None. Pinched nerve from elbow down on right arm.  Was riding stationary bike but was advised not to.     Ordered dance videos to begin soon     VITAMINS / MINERALS:  See BA     ASSESSMENT:  Doing fair overall.  Weight loss fair at 34%  Excess calorie intake from cookies/cake, starchy CHO.  Inadequate protein intake.  Adequate fluid intake.  Following diet inappropriately.   Not exercising.  Reports adequate vitamins & minerals.     BARIATRIC DIET DISCUSSION:  Instructed and provided written materials on bariatric diet plan.  Reinforced post-op nutrition guidelines.     PLAN / RECOMMENDATIONS:    - Resume Protein shake daily  - Cut back on starches  - Continue to Limit frequency and limit portions of sweets.     Follow up with PA in 1 month as directed.  Follow up with RD in 3 months for support and accountability getting diet back on track.     SESSION TIME: 30 minutes

## 2020-05-15 NOTE — PATIENT INSTRUCTIONS
Meal Ideas for Regular Bariatric Diet  *Recipes and products available at [email or web address]      Breakfast: (15-20g protein)    - Egg white omelet: 2 egg whites or ½ cup Egg Beaters. (Optional proteins: cheese, shrimp, black beans, chicken, sliced turkey) (Optional veggies: tomatoes, salsa, spinach, mushrooms, onions, green peppers, or small slice avocado)     - Egg and sausage: 1 egg or ¼ cup Egg Beaters (any variety), with 1 tanisha or 2 links of Turkey sausage or Veggie breakfast sausage (Simple Tithe or Acquaintable)    - Crust-less breakfast quiche: To make a glass pie dish, mix 4oz part skim Ricotta, 1 cup skim milk, and 2 eggs as your base. Add protein: shredded cheese, sliced lean ham or turkey, turkey chin/sausage. Add veggies: tomato, onion, green onion, mushroom, green pepper, spinach, etc.    - Yogurt parfait: Mix 1 - 6oz container Dannon Light N Fit vanilla yogurt, with ¼ cup crushed unsalted nuts    - Cottage cheese and fruit: ½ cup part-skim cottage cheese or ricotta cheese topped with fresh fruit or sugar free preserves     - Yanet Graham's Vanilla Egg custard* (add 2 Tbsp instant coffee granules to make Cappuccino Custard*)    - Hi-Protein café latte (skim milk, decaf coffee, 1 scoop protein powder). Optional to add Sugar free syrup or extract flavoring.    - Breakfast Lox: spread fat free cream cheese on slices of smoked salmon. Serve over scrambled or egg over easy (sauteed with nonstick cookspray) OR on a cucumber slice    - Eggwhich: Scramble or cook 1 large egg over easy using nonstick cookspray. Place between 2 slices of Cymro cihn and low fat cheese.     Lunch: (20-30g protein)    - ½ cup Black bean soup (Homemade or Progresso), with ¼ cup shredded low-fat cheese. Top with chopped tomato or fresh salsa.     - Lean deli turkey breast and low-fat sliced cheese, mustard or light camacho to moisten, rolled up together, or wrapped in a Griffin lettuce leaf    - Chicken salad made from dinner  leftovers, moisten with low-fat salad dressing or light camacho. Also try leftover salmon, shrimp, tuna or boiled eggs. Serve ½ cup over dark green salad    - Fat-free canned refried beans, topped with ¼ cup shredded low-fat cheese. Top with chopped tomato or fresh salsa.     - Greek salad: Top mixed greens with 1-2oz grilled chicken, tomatoes, red onions, 2-3 kalamata olives, and sprinkle lightly with feta cheese. Spritz with Balsamic vinegar to taste.     - Crust-less lunch quiche: To make a glass pie dish, mix 4oz part skim Ricotta, 1 cup skim milk, and 2 eggs as your base. Add protein: shredded cheese, sliced lean ham or turkey, shrimp, chicken. Add veggies: tomato, onion, green onion, mushroom, green pepper, spinach, artichoke, broccoli, etc.    - Pizza bake: spread a  minh kuldip mushroom with tomato sauce, low-fat shredded mozzarella and turkey pepperoni or [place]rick chin. Add any veggies. Roast for 10-15 minutes, until cheese melted.     - Cucumber crab bites: Spread ¼ cup crab dip (lump crabmeat + light cream cheese and green onions) over sliced cucumber.     - Chicken with light spinach and artichoke dip*: Puree in : 6oz cooked and drained spinach, 2 cloves garlic, 1 can cannelloni beans, ½ cup chopped green onions, 1 can drained artichoke hearts (not marinated in oil), lemon juice and basil. Mix in 2oz chopped up chicken.    Supper: (20-30g protein)    - Serve grilled fish over dark green salad tossed with low-fat dressing, served with grilled asparagus granado     - Rotisserie chicken salad: served with sliced strawberries, walnuts, fat-free feta cheese crumbles and 1 tbsp Ayalas Own Light Raspberry [place] Vinaigrette    - Shrimp cocktail: Dip cold boiled shrimp in homemade low-sugar cocktail sauce (1/2 cup Erwin One Carb ketchup, 2 tbsp horseradish, 1/4 tsp hot sauce, 1 tsp Worcestershire sauce, 1 tbsp freshly-squeezed lemon juice). Serve with dark green salad, walnuts, and crumbled blue  cheese drizzled with olive oil and Balsamic vinegar    - Tuna Melt: Spread tuna salad onto 2 thick slices of tomato. Top with low-fat cheese and broil until cheese is melted. May also be made with chicken salad of shrimp salad. [place] with different types of cheeses.    - Chicken or beef fajitas (no tortilla, rice, beans, chips). Top meat and veggies w/ fresh salsa, fat free sour cream.     - Homemade low-fat Chili using extra lean ground beef or ground turkey. Top with shredded cheese and salsa as desired. May add dollop fat-free sour cream if desired    - Chicken parmesan: Top chicken breast w/ low sugar marinara sauce, mozzarella cheese and bake until chicken reaches 165*.  Serve w/ spaghetti SQUASH or Malian cut green beans    - Dinner Omelet with shrimp or chicken and onion, green peppers and chives.    - No noodle lasagna: Use sliced zucchini or eggplant in place of noodles.  Layer with part skim ricotta cheese and low sugar meat sauce (use very lean ground beef or ground turkey).    - Mexican chicken bake: Bake chunks of chicken breast or thigh with taco seasoning, Pace brand enchilada sauce, green onions and low-fat cheese. Serve with ¼ cup black beans or fat free refried beans topped with chopped tomatoes or salsa.    - Brendan frozen meatballs, simmered in Classico Marinara sauce. Different flavors of salsa or spaghetti sauce create different dishes! Sprinkle with parmesan cheese. Serve with grilled or steamed veggies, or a dark green salad.    - Simmer boneless skinless chicken thigh chunks in Classico Marinara sauce or roasted salsa until tender with chopped onion, bell pepper, garlic, mushrooms, spinach, etc.     - Hamburger or veggie burger, without the bun, dressed the way you like. Served with grilled or steamed veggies.    - Eggplant parmesan: Bake slices of eggplant at 350 degrees for 15 minutes. Layer tomato sauce, sliced eggplant and low-fat mozzarella cheese in a baking dish and cover with  foil. Bake 30-40 more minutes or until bubbly. Uncover and bake at 400 degrees for about 15 more minutes, or until top is slightly crisp.    - Fish tacos: grilled/baked white fish, wrapped in Griffin lettuce leaf, topped with salsa, shredded low-fat cheese, and light coleslaw.    - Chicken genna: Sprinkle chicken w/ 1 tsp of hidden valley ranch dip mix. Then grill chicken and top with black beans, salsa and 1 tsp fat free sour cream.     - Cauliflower pizza crust: Use cauliflower as crust (see recipe on pinterest, no flour!). Top w/ low fat cheese, turkey pepperoni and veggies and bake again    - chicken or turkey crust pizza: use ground chicken or turkey instead of cauliflower, spread in [place] and bake at 350 for about 20-30 minutes(may want to add garlic, black pepper, oregano and other herbs to ground meat mixture).  Remove and top w/ low fat cheese, turkey pepperoni and veggies and bake again for another 10 minutes or until cheese is browned.     Snacks: (100-200 calories; >5g protein)    - 1 low-fat cheese stick with 8 cherry tomatoes or 1 serving fresh fruit  - 4 thin slices fat-free turkey breast and 1 slice low-fat cheese  - 4 thin slices fat-free honey ham with wedge of melon  - 6-8 edamame pods (equivalent to about 1/4 cup edamame without pods).   - 1/4 cup unsalted nuts with ½ cup fruit  - 6-oz container Dannon Light n Fit vanilla yogurt, topped with 1oz unsalted nuts         - apple, celery or baby carrots spread with 2 Tbsp PB2  - apple slices with 1 oz slice low-fat cheese  - Apple slices dipped in 2 Tbsp of PB2  - celery, cucumber, bell pepper or baby carrots dipped in ¼ cup hummus bean spread or light spinach and artichoke dip (*recipe in lunch section)  - celery, cucumber, baby carrots dipped in high protein greek yogurt (Mix 16 oz plain greek yogurt + 1 packet of hidden valley ranch dip mix)  - Clarence Links Beef Steak - 14g protein! (similar to beef jerky)  - 2 wedges Laughing Cow - Light Herb  & Garlic Cheese with sliced cucumber or green bell pepper  - 1/2 cup low-fat cottage cheese with ¼ cup fruit or ¼ cup salsa  - RTD Protein drinks: Atkins, Low Carb Slim Fast, EAS light, Muscle Milk Light, etc.  - Homemade Protein drinks: GNC Soy95, Isopure, Nectar, UNJURY, Whey Gourmet, etc. Mix 1 scoop powder with 8oz skim/1% milk or light soymilk.  - Protein bars: Atkins, EAS, Pure Protein, Think Thin, Detour, etc. Must have 0-4 grams sugar - Read the label.    Takeout Options: No more than twice/week  Deli - Salads (no pasta or rice), meats, cheeses. Roasted chicken. Lox (salmon)    Mexican - Platters which don't include tortillas, chips, or rice. Go easy on the beans. Example: Fajitas without the tortillas. Ask the  not to bring chips to the table if they are too tempting.    Greek - Meat or fish and vegetable, but no bread or rice. Including hummus, baba ganoush, etc, is OK. Most sit-down Greek restaurants can provide you with cucumber slices for dipping instead of emanuel bread.    Fast Food (Avoid as much as possible) - Salads (no croutons and limit salad dressing to 2 tbsp), grilled chicken sandwich without the bun and ask for no camacho. Lorenes low fat chili or Taco Bell pintos and cheese.    BBQ - The meats are fine if you ask for sauces on the side, but most of the traditional side dishes are loaded with carbs. Galileo slaw, baked beans and BBQ sauce are typically made with sugar.    Chinese - Nothing deep-fried, no rice or noodles. Many Chinese sauces have starch and sugar in them, so you'll have to use your judgement. If you find that these sauces trigger cravings, or cause Dumping, you can ask for the sauce to be made without sugar or just use soy sauce.

## 2020-05-19 ENCOUNTER — CLINICAL SUPPORT (OUTPATIENT)
Dept: REHABILITATION | Facility: HOSPITAL | Age: 50
End: 2020-05-19
Attending: NEUROLOGICAL SURGERY
Payer: MEDICARE

## 2020-05-19 DIAGNOSIS — R29.898 DECREASED GRIP STRENGTH OF LEFT HAND: ICD-10-CM

## 2020-05-19 DIAGNOSIS — M62.81 MUSCLE WEAKNESS OF LEFT ARM: ICD-10-CM

## 2020-05-19 DIAGNOSIS — R29.898 DECREASED PINCH STRENGTH: ICD-10-CM

## 2020-05-19 DIAGNOSIS — R29.898 WEAKNESS OF LEFT HAND: ICD-10-CM

## 2020-05-19 DIAGNOSIS — G56.22 ULNAR NEUROPATHY OF LEFT UPPER EXTREMITY: ICD-10-CM

## 2020-05-19 PROCEDURE — 97165 OT EVAL LOW COMPLEX 30 MIN: CPT | Mod: HCNC,PN

## 2020-05-19 NOTE — PATIENT INSTRUCTIONS
RADIAL NERVE: Dynamic Mobility I        Stand with elbows bent, forearms crossed, palms facing body. Turn palms out, sweeping arms down and back.  Do _1__ set of __5_ repetitions holding for 5 seconds per session. Do _5__ sessions per day.    Copyright © I. All rights reserved.   Combination Movement (Active)        Keep elbow firmly at side with wrist straight. Make lazy eights using palm up and down movements.  Repeat _10___ times. Do __3__ sessions per day.    Copyright © I. All rights reserved.   Circumduction (Active)        With fingers curled, move slowly at wrist in clock- wise circles __10__ times. Repeat counterclockwise. Do not move elbow or shoulder.  Do __3__ sessions per day.    Copyright © I. All rights reserved.   Extension (Active With Finger Extension)        With forearm on table and wrist over edge, lift hand with fingers straight. Hold __3__ seconds.  Then move downward and hold 3 seconds  Repeat _10___ times. Do __3__ sessions per day.    Copyright © I. All rights reserved.   Extension (Active With Finger Flexion)        With fingers curled, bend hand back at wrist. Hold __3__ seconds, then move downward and hold 3 seconds  Repeat _10___ times. Do __3__ sessions per day..    Copyright © I. All rights reserved.   Abduction / Adduction (Active)        With hand flat on table, spread all fingers apart, then bring them together as close as possible.  Repeat _10___ times. Do __3__ sessions per day.    Copyright © I. All rights reserved.   Flexor Tendon Gliding (Active Hook Fist)        With fingers and knuckles straight, bend middle and tip joints. Do not bend large knuckles.  Repeat _10___ times. Do __3__ sessions per day.    Copyright © I. All rights reserved.   Flexor Tendon Gliding (Active Full Fist)        Straighten all fingers, then make a fist, bending all joints.  Repeat _10___ times. Do __3__ sessions per day.    Copyright © I. All rights reserved.   Flexor Tendon Gliding  "(Active Straight Fist)        Start with fingers straight. Bend knuckles and middle joints. Keep fingertip joints straight to touch base of palm.  Repeat _10___ times. Do __3__ sessions per day.    Copyright © I. All rights reserved.   Composite Flexion (Passive)        Use other hand to bend both joints of thumb at the same time. Hold _5___ seconds.  Repeat _10___ times. Do __3__ sessions per day.  Copyright © I. All rights reserved.   Opposition (Active)        Touch tip of thumb to nail tip of each finger in turn, making an "O" shape.  Repeat _10___ times. Do __3__ sessions per day.    Copyright © I. All rights reserved.   Composite Movement Circumduction (Active)        Make circles with thumb.  Repeat _10___ times. Do __3__ sessions per day.    Copyright © I. All rights reserved.   Palmar Adduction/Abduction (Active)        Move thumb down, away from palm. Move back to rest along palm.  Repeat _10___ times. Do __3__ sessions per day.    Copyright © I. All rights reserved.   Radial Adduction/Abduction (Active)        Move thumb out to side. Move back alongside index finger.  Repeat _10___ times. Do __3__ sessions per day.    Copyright © I. All rights reserved.   Composite Flexion (Active)        Bend both joints of thumb as far as possible. Try to touch base of little finger.  Repeat _10___ times. Do __3__ sessions per day.    Copyright © I. All rights reserved.     "

## 2020-05-19 NOTE — PLAN OF CARE
[facility] Occupational Therapy  Initial Evaluation     Date:  5/18/2020    Name: Zaira Dowell  Clinic Number: [id number]    Therapy Diagnosis:   1. Ulnar neuropathy of left upper extremity  Ambulatory referral/consult to Physical/Occupational Therapy   2. Weakness of left hand     3. Muscle weakness of left arm     4. Decreased  strength of left hand     5. Decreased pinch strength          Physician: Jerson Arriola MD    Physician Orders: Eval and Treat  Medical Diagnosis: Ulnar neuropathy of left upper extremity  Surgical Procedure and Date: n/a  Evaluation Date: 05/19/2020    Insurance Authorization Period Expiration: TBD  Plan of Care Certification Period: 5/19/20 to 8/8/20  Date of Return to MD: not appointed    Visit # / Visits authorized: 1 / 1    Time In:12:36 pm  Time Out: 1:35 pm  Total Billable Time: 59 minutes    Precautions:  Standard    Subjective     Involved Side: left  Dominant Side: Right  Date of Onset: 4 weeks  Mechanism of Injury: Awakened one morning and my arm was numb from the elbow down  History of Current Condition: Called primary sent her to [facility] who sent her to ER thinking a stroke.  It was identified as a peripheral nerve issue  Surgical Procedure: n/a  Imaging: n/a  Previous Therapy: none for this condition    Past Medical History/Physical Systems Review:   Zaira Dowell  has a past medical history of Anxiety, Arthritis, Asthma, BMI 50.0-59.9, adult, Depression, Fibromyalgia, GERD (gastroesophageal reflux disease), Obesity, and SHARON (obstructive sleep apnea).    Zaira Dwoell  has a past surgical history that includes Cholecystectomy; pinched nerve; Shoulder surgery; Knee surgery; Wrist surgery; Carpal tunnel release; Tonsillectomy; Esophagogastroduodenoscopy (N/A, 5/21/2019); Hysterectomy; and Laparoscopic sleeve gastrectomy (N/A, 10/18/2019).    Zaira has a current medication list which includes the following prescription(s): albuterol,  allopurinol, b complex vitamins, baclofen, calcium citrate, cyanocobalamin, ferrous fumarate/vit bcomp,c, fluticasone propionate, furosemide, gabapentin, lithium, lorazepam, meclizine, multivitamin, omeprazole, ondansetron, oxybutynin, polyethylene glycol, potassium chloride, prednisone, risperidone, sertraline, and topiramate.    Review of patient's allergies indicates:   Allergen Reactions    Hydrocodone-acetaminophen Nausea And Vomiting        Patient's Goals for Therapy:  Improve strength and use of left hand.    Pain:  Functional Pain Scale Rating 0-10:   5/10 on average  3/10 at best  10/10 at worst  Location: dorsal forearm and hand  Description: stinging  Aggravating Factors: increase use of left hand  Easing Factors: rest    Occupation:  disabled    Functional Limitations/Social History:    Previous functional status includes: Independent with all ADLs.    Current FunctionalStatus   Home/Living environment : lives with their spouse      Limitation of Functional Status as follows:   ADLs/IADLs:     - Feeding: increased time    - Bathing: increased time    - Dressing/Grooming: increased time difficulty with fasteners    - Driving: I     Leisure: 5D painting    Objective    Observation: Skin intact    Sensation: Radial, Ulnar, Median Nerve Intact  Ola Dorothy Monofilament Test: Yes  Results: 2.83 radial forearm, 3.61 radial hand                 2.83 Ulnar and median    Stereognosis: Intact    Special Tests: n/t    Edema: Circumferential measurements: as follows:    Thumb right Thumb left      Proximal Plalnx 6.1 cm 6.5 cm      PIP Joint 7.0 cm 7.1 cm                                                          Range of Motion: right and left Active  (Ext/Flex) Thumb left  Thumb right      MP 0/26 0/58      DIP 0/30 0/65        Thumb Opposition: to index long and ring only on left  Palmar Abduction: right  45  Left 40  Radial Abduction: right 45  Left 40    Wrist Ext/Flex: sym/sym  Wrist RD/UD:  sym/sym  Supination/Pronation: sym/sym  Elbow extension/flexion: WFL/WFL    Manual Muscle Test:   Muscle   Strength                    Right        Left   Wrist flex   5/5           3/5  Wrist ext    5/5           3/5  UD/RD       5/5           3/5  Supination  5/5           3/5  Pronation    5/5           3/5     Strength: (DESTINEY Dynamometer in lbs.) Average 3 trials, Position II  Right: 59.9#  Left: 11.5#    Pinch Strength: (Pinch Gauge in psi's), Average 3 trials  Key Pinch R) 13 psi's   L) 4 psi's  3pt Pinch   R) 13.6psi's  L) 7.6 psi's    Fine Lee Coordination Tests:   9 hole Peg Test R) 25 seconds, 1 drop   L) 29 seconds 1 drop      CMS Impairment/Limitation/Restriction for FOTO hand Survey    Therapist reviewed FOTO scores for Zaira Dowell on 5/19/2020.   FOTO documents entered into [employer] - see Media section.    Limitation Score: 67%         Treatment       Home Exercise Program/Education:  Issued HEP (see patient instructions in EMR) and educated on modality use for pain management . Exercises were reviewed and Zaira was able to demonstrate them prior to the end of the session.   Pt received a written copy of exercises to perform at home. Zaira demonstrated good  understanding of the education provided.  Pt was advised to perform these exercises free of pain, and to stop performing them if pain occurs.    Patient/Family Education: role of OT, goals for OT, scheduling/cancellations - pt verbalized understanding. Discussed insurance limitations with patient.    Additional Education provided: length of time required for possible recovery.    Assessment     Zaira Dowell is a 49 y.o. female referred to outpatient occupational therapy and presents with a medical diagnosis of ulnar nerve neuropathy, resulting in Decreased  strength, Decreased pinch strength, Decreased muscle strength and Increased pain and demonstrates limitations as described in the chart below. Following medical record  review it is determined that pt will benefit from occupational therapy services in order to maximize pain free and/or functional use of left hand/arm. The following goals were discussed with the patient and patient is in agreement with them as to be addressed in the treatment plan. The patient's rehab potential is Good.     Anticipated barriers to occupational therapy: diagnosis of depression  Pt has no cultural, educational or language barriers to learning provided.    Profile and History Assessment of Occupational Performance Level of Clinical Decision Making Complexity Score   Occupational Profile:   Zaira Dowell is a 49 y.o. 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 His/her main goal for therapy is  Improve strength and use of left hand.     Comorbidities:   depression and fibromyalgia    Medical and Therapy History Review:   Brief               Performance Deficits    Physical:  Muscle Power/Strength   Strength  Pinch Strength  Pain    Cognitive:  No Deficits    Psychosocial:    No Deficits     Clinical Decision Making:  low    Assessment Process:  Problem-Focused Assessments    Modification/Need for Assistance:  Minimal-Moderate Modifications/Assistance    Intervention Selection:  Several Treatment Options       low  Based on PMHX, co morbidities , data from assessments and functional level of assistance required with task and clinical presentation directly impacting function.       The following goals were discussed with the patient and patient is in agreement with them as to be addressed in the treatment plan.     Goals:   Short term goals to be met in 6 weeks (6/30/20)   Patient to be IND with HEP and modalities for pain/edema managment.,   Increase  strength 15 lbs. to improve functional grasp for ADLs/household tasks. ,    Increase pinch 2 psi's to increase IND with buttoning.    Decrease complaints of pain to at worst to  6 out of 10 to increase functional hand use for ADL/work/leisure activities.     Long term goals to be met by d/c:  Increase  strength to 40# to lift household items   Increase pinch strength 10 psi to allow for opening of packages and fastening of closures  Increase left forearm and wrist strength to 4/5 to hold her grandbabies    Plan   Certification Period/Plan of care expiration: 5/19/2020 to 8/8/20.    Outpatient Occupational Therapy 1 time every other  Week(due to finances) for 12 weeks to include the following interventions: Modalities for pain management, Therapeutic exercises/activities and  strengthening .      AMANDO Monreal    I certify the need for these services furnished under this plan of treatment and while under my care    ____________________________________  Physician/Referring Practitioner    _______________  Date of Signature

## 2020-05-21 ENCOUNTER — PROCEDURE VISIT (OUTPATIENT)
Dept: NEUROLOGY | Facility: CLINIC | Age: 50
End: 2020-05-21
Payer: MEDICARE

## 2020-05-21 VITALS — HEIGHT: 67 IN | BODY MASS INDEX: 43.15 KG/M2 | WEIGHT: 274.94 LBS

## 2020-05-21 DIAGNOSIS — G56.22 ULNAR NEUROPATHY OF LEFT UPPER EXTREMITY: ICD-10-CM

## 2020-05-21 PROCEDURE — 95911 PR NERVE CONDUCTION STUDY; 9-10 STUDIES: ICD-10-PCS | Mod: HCNC,S$GLB,, | Performed by: NEUROLOGICAL SURGERY

## 2020-05-21 PROCEDURE — 95911 NRV CNDJ TEST 9-10 STUDIES: CPT | Mod: HCNC,S$GLB,, | Performed by: NEUROLOGICAL SURGERY

## 2020-05-21 PROCEDURE — 95886 MUSC TEST DONE W/N TEST COMP: CPT | Mod: HCNC,S$GLB,, | Performed by: NEUROLOGICAL SURGERY

## 2020-05-21 PROCEDURE — 95886 PR EMG COMPLETE, W/ NERVE CONDUCTION STUDIES, 5+ MUSCLES: ICD-10-PCS | Mod: HCNC,S$GLB,, | Performed by: NEUROLOGICAL SURGERY

## 2020-05-27 ENCOUNTER — TELEPHONE (OUTPATIENT)
Dept: NEUROLOGY | Facility: CLINIC | Age: 50
End: 2020-05-27

## 2020-05-27 DIAGNOSIS — M54.12 CERVICAL RADICULOPATHY: Primary | ICD-10-CM

## 2020-05-27 NOTE — TELEPHONE ENCOUNTER
Please put in MRI order.            ----- Message from Tamara Guidry sent at 5/27/2020  9:52 AM CDT -----  Pt is calling to get an MRI order and would like for the nurse to give her a call back

## 2020-06-01 ENCOUNTER — HOSPITAL ENCOUNTER (OUTPATIENT)
Dept: RADIOLOGY | Facility: HOSPITAL | Age: 50
Discharge: HOME | End: 2020-06-01
Attending: NEUROLOGICAL SURGERY
Payer: MEDICARE

## 2020-06-01 ENCOUNTER — TELEPHONE (OUTPATIENT)
Dept: RADIOLOGY | Facility: HOSPITAL | Age: 50
End: 2020-06-01

## 2020-06-01 DIAGNOSIS — M54.12 CERVICAL RADICULOPATHY: ICD-10-CM

## 2020-06-01 PROCEDURE — 72141 MRI CERVICAL SPINE WITHOUT CONTRAST: ICD-10-PCS | Mod: 26,HCNC,, | Performed by: RADIOLOGY

## 2020-06-01 PROCEDURE — 72141 MRI NECK SPINE W/O DYE: CPT | Mod: 26,HCNC,, | Performed by: RADIOLOGY

## 2020-06-01 PROCEDURE — 72141 MRI NECK SPINE W/O DYE: CPT | Mod: TC,HCNC,PO

## 2020-06-02 ENCOUNTER — CLINICAL SUPPORT (OUTPATIENT)
Dept: REHABILITATION | Facility: HOSPITAL | Age: 50
End: 2020-06-02
Attending: NEUROLOGICAL SURGERY
Payer: MEDICARE

## 2020-06-02 DIAGNOSIS — R29.898 DECREASED GRIP STRENGTH OF LEFT HAND: ICD-10-CM

## 2020-06-02 DIAGNOSIS — R29.898 DECREASED PINCH STRENGTH: ICD-10-CM

## 2020-06-02 DIAGNOSIS — M62.81 MUSCLE WEAKNESS OF LEFT ARM: ICD-10-CM

## 2020-06-02 DIAGNOSIS — R29.898 WEAKNESS OF LEFT HAND: ICD-10-CM

## 2020-06-02 PROCEDURE — 97110 THERAPEUTIC EXERCISES: CPT | Mod: HCNC,PN

## 2020-06-02 NOTE — PROGRESS NOTES
Occupational Therapy Treatment Note     Date: 6/2/2020  Name: Zaira Dowell  Clinic Number: [id number]    Therapy Diagnosis:   Encounter Diagnoses   Name Primary?    Weakness of left hand     Muscle weakness of left arm     Decreased  strength of left hand     Decreased pinch strength      Physician: Jerson Arriola MD    Physician Orders: Eval and Treat  Medical Diagnosis: Ulnar neuropathy of left upper extremity  Surgical Procedure and Date: n/a  Evaluation Date: 05/19/2020     Insurance Authorization Period Expiration: 12/20/20  Plan of Care Certification Period: 5/19/20 to 8/8/20  Date of Return to MD: not appointed     Visit # / Visits authorized: 1 / 20(2 total visits)     Time In: 12:37 pm  Time Out: 1:17 pm  Total Billable Time: 45 minutes     Precautions:  Standard    Subjective     Pt reports: My arm is about the same but I have been trying to use it more.  she was compliant with home exercise program given last session.   Response to previous treatment: no adverse affects  Functional change: none noted    Pain: 6/10  Location: left forearm      Objective       Zaira received therapeutic exercises for 45 minutes including:  -Scifit with BUE x 6 min with verbal cues for directional changes every minute.- pt c/o difficulty with performance  -wrist curl up bar with 1/2# wgt x 5 reps each direction  -ergo gripper 1 red band resistance x 4 min  -hammer twist x 2 min  -red clip 2 point pinch left hand picking up large and small cotton balls (10 each) dropping in cup  -manipulation of chinese meditation balls x 1 min left hand  -cable cross horizontal rows 3# x 20 reps, multiple verbal and manual cues for performance  -2# left bicep curl x 2 sets 10 reps    Home Exercises and Education Provided     Education provided:   -encouragement for her to continue using her left arm.  - Progress towards goals     Written Home Exercises Provided: Patient instructed to cont prior HEP.  Exercises were reviewed  and Zaira was able to demonstrate them prior to the end of the session.  Zaira demonstrated good  understanding of the HEP provided.   .   See EMR under Patient Instructions for exercises provided 5/19/20.        Assessment     Pt would continue to benefit from skilled OT. Zaira was cooperative and performed all activities in therapy.  Good control of resisted ex with exception of sup/pronation.       Zaira is progressing towards her goals and there are no updates to goals at this time. Pt prognosis is Good.     Pt will continue to benefit from skilled outpatient occupational therapy to address the deficits listed in the problem list on initial evaluation provide pt/family education and to maximize pt's level of independence in the home and community environment.     Anticipated barriers to occupational therapy: none noted    Pt's spiritual, cultural and educational needs considered and pt agreeable to plan of care and goals.    Goals:  Short term goals to be met in 6 weeks (6/30/20)   Patient to be IND with HEP and modalities for pain/edema managment.,   Increase  strength 15 lbs. to improve functional grasp for ADLs/household tasks. ,   Increase pinch 2 psi's to increase IND with buttoning.    Decrease complaints of pain to at worst to  6 out of 10 to increase functional hand use for ADL/work/leisure activities.     Long term goals to be met by d/c:  Increase  strength to 40# to lift household items   Increase pinch strength 10 psi to allow for opening of packages and fastening of closures  Increase left forearm and wrist strength to 4/5 to hold her grandbabies       Plan   Progress HEP next week, progress resistive ex as tolerated.       AMANDO Monreal

## 2020-06-06 ENCOUNTER — OFFICE VISIT (OUTPATIENT)
Dept: FAMILY MEDICINE | Facility: CLINIC | Age: 50
End: 2020-06-06
Payer: MEDICARE

## 2020-06-06 DIAGNOSIS — K21.9 GASTROESOPHAGEAL REFLUX DISEASE, ESOPHAGITIS PRESENCE NOT SPECIFIED: ICD-10-CM

## 2020-06-06 DIAGNOSIS — N32.89 BLADDER SPASM: ICD-10-CM

## 2020-06-06 DIAGNOSIS — Z98.890 POST-OPERATIVE STATE: ICD-10-CM

## 2020-06-06 DIAGNOSIS — J30.2 SEASONAL ALLERGIES: Primary | ICD-10-CM

## 2020-06-06 DIAGNOSIS — M79.7 FIBROMYALGIA: ICD-10-CM

## 2020-06-06 PROCEDURE — 99214 PR OFFICE/OUTPT VISIT, EST, LEVL IV, 30-39 MIN: ICD-10-PCS | Mod: HCNC,95,, | Performed by: FAMILY MEDICINE

## 2020-06-06 PROCEDURE — 99214 OFFICE O/P EST MOD 30 MIN: CPT | Mod: HCNC,95,, | Performed by: FAMILY MEDICINE

## 2020-06-06 RX ORDER — GABAPENTIN 800 MG/1
800 TABLET ORAL DAILY
Qty: 90 TABLET | Refills: 2 | Status: SHIPPED | OUTPATIENT
Start: 2020-06-06 | End: 2020-11-25 | Stop reason: SDUPTHER

## 2020-06-06 RX ORDER — OMEPRAZOLE 40 MG/1
40 CAPSULE, DELAYED RELEASE ORAL
Qty: 180 CAPSULE | Refills: 0 | Status: SHIPPED | OUTPATIENT
Start: 2020-06-06 | End: 2020-11-25 | Stop reason: SDUPTHER

## 2020-06-06 RX ORDER — OXYBUTYNIN CHLORIDE 5 MG/1
5 TABLET ORAL 3 TIMES DAILY
Qty: 270 TABLET | Refills: 1 | Status: SHIPPED | OUTPATIENT
Start: 2020-06-06 | End: 2020-06-23

## 2020-06-06 RX ORDER — FLUTICASONE PROPIONATE 50 MCG
1 SPRAY, SUSPENSION (ML) NASAL DAILY
Qty: 16 G | Refills: 3 | Status: SHIPPED | OUTPATIENT
Start: 2020-06-06 | End: 2021-11-22

## 2020-06-06 RX ORDER — DESLORATADINE 5 MG/1
5 TABLET ORAL DAILY
Qty: 30 TABLET | Refills: 11 | Status: SHIPPED | OUTPATIENT
Start: 2020-06-06 | End: 2020-08-14

## 2020-06-06 NOTE — PROGRESS NOTES
The patient location is: home  The chief complaint leading to consultation is: allergies and medication refill    Visit type: audiovisual    Face to Face time with patient: 15 minutes of total time spent on the encounter, which includes face to face time and non-face to face time preparing to see the patient (eg, review of tests), Obtaining and/or reviewing separately obtained history, Documenting clinical information in the electronic or other health record, Independently interpreting results (not separately reported) and communicating results to the patient/family/caregiver, or Care coordination (not separately reported).         Each patient to whom he or she provides medical services by telemedicine is:  (1) informed of the relationship between the physician and patient and the respective role of any other health care provider with respect to management of the patient; and (2) notified that he or she may decline to receive medical services by telemedicine and may withdraw from such care at any time.    Notes:     Routine Office Visit    Patient Name: Zaira Dowell    : 1970  MRN: [id number]    Subjective:  Zaira is a 49 y.o. female who presents today for:    1. Allergies  Patient presenting with 1 week of frontal sinus pressure/pain and congestion.  She states that she has not had any had any fevers, chills, sweats, shortness of breath, cough, lost of taste or smell.  She states that she does suffer with seasonal allergies.  She has not taken any OTC medications.  She was prescribed flonase last year, but states she has not tried using that either.  She is requesting medication to help with symptom relief    2.  Medication refill  Patient Is requesting refill of gabapentin for her fibromyalgia, oxybutynin for incontinence, and to refill her PPI for GERD.  She states that her gabapentin does help and does not need any adjusting of dosage at this time.  She continues to have issues with enuresis at  night and urgency during the day.  She has had these problems for months and states she feels it is getting worse.  She has not seen urology.  She denies any pain with urination, blood in urine, fevers, chills, abdominal pain, or flank pain.  She states she has seen GI in the past for GERD and was told she could stay on her PPI.  She states that it does control her symptoms and she would like to stay on the medication.  No history of CKD     Past Medical History  Past Medical History:   Diagnosis Date    Anxiety     Arthritis     Asthma     BMI 50.0-59.9, adult     Depression     Fibromyalgia     GERD (gastroesophageal reflux disease)     Obesity     SHARON (obstructive sleep apnea)        Past Surgical History  Past Surgical History:   Procedure Laterality Date    CARPAL TUNNEL RELEASE      CHOLECYSTECTOMY      ESOPHAGOGASTRODUODENOSCOPY N/A 5/21/2019    Procedure: EGD (ESOPHAGOGASTRODUODENOSCOPY);  Surgeon: Michelle Mc MD;  Location: [facility];  Service: Endoscopy;  Laterality: N/A;    HYSTERECTOMY      partial     KNEE SURGERY      LAPAROSCOPIC SLEEVE GASTRECTOMY N/A 10/18/2019    Procedure: GASTRECTOMY, SLEEVE, LAPAROSCOPIC, with intraop EGD;  Surgeon: Ean Kohli MD;  Location: [address];  Service: General;  Laterality: N/A;    pinched nerve      SHOULDER SURGERY      TONSILLECTOMY      WRIST SURGERY      had ganlin cyst       Family History  Family History   Problem Relation Age of Onset    Diabetes Mother     Jose Juan's disease Mother     Hypertension Mother     Heart disease Father     Stroke Father     Mental illness Father     Hypertension Father     Diabetes Father     Depression Father     Heart disease Maternal Grandmother     Depression Maternal Grandmother     COPD Maternal Grandfather     Heart disease Maternal Grandfather     Stroke Maternal Grandfather     Kidney disease Paternal Grandmother     Heart disease Paternal Grandmother     Heart  disease Paternal Grandfather     No Known Problems Brother     Hypertension Son        Social History  Social History     Socioeconomic History    Marital status:      Spouse name: Not on file    Number of children: Not on file    Years of education: Not on file    Highest education level: Not on file   Occupational History    Not on file   Social Needs    Financial resource strain: Not very hard    Food insecurity:     Worry: Never true     Inability: Never true    Transportation needs:     Medical: No     Non-medical: No   Tobacco Use    Smoking status: Current Some Day Smoker     Types: Vaping with nicotine, Cigarettes    Smokeless tobacco: Never Used   Substance and Sexual Activity    Alcohol use: No     Frequency: Monthly or less     Drinks per session: 1 or 2     Binge frequency: Never    Drug use: No    Sexual activity: Yes     Partners: Male   Lifestyle    Physical activity:     Days per week: 1 day     Minutes per session: 30 min    Stress: Rather much   Relationships    Social connections:     Talks on phone: More than three times a week     Gets together: Patient refused     Attends Confucianism service: Not on file     Active member of club or organization: No     Attends meetings of clubs or organizations: Never     Relationship status:    Other Topics Concern    Not on file   Social History Narrative    Not on file       Current Medications  Current Outpatient Medications on File Prior to Visit   Medication Sig Dispense Refill    albuterol (PROVENTIL/VENTOLIN HFA) 90 mcg/actuation inhaler Inhale 2 puffs into the lungs every 6 (six) hours as needed for Wheezing. Rescue 18 g 2    allopurinoL (ZYLOPRIM) 100 MG tablet Take 1 tablet (100 mg total) by mouth once daily. 90 tablet 1    b complex vitamins tablet Take 1 tablet by mouth once daily.      baclofen (LIORESAL) 10 MG tablet Take one tablet in the morning, one tablet in afternoon, and 2 tablets at bedtime 120 tablet  11    calcium citrate (CALCITRATE) 200 mg (950 mg) tablet Take 1 tablet by mouth 2 (two) times daily.      cyanocobalamin 500 MCG tablet Take 500 mcg by mouth once daily.      ferrous fumarate/vit Bcomp,C (SUPER B COMPLEX ORAL) Take 1 capsule by mouth once daily.      furosemide (LASIX) 40 MG tablet Take 1 tablet (40 mg total) by mouth once daily. 30 tablet 2    lithium (LITHOTAB) 300 mg tablet Take 1 tablet (300 mg total) by mouth 3 (three) times daily. 270 tablet 1    LORazepam (ATIVAN) 1 MG tablet Take 1 tablet (1 mg total) by mouth as needed for Anxiety. 30 tablet 2    meclizine (ANTIVERT) 25 mg tablet Take 1 tablet (25 mg total) by mouth 3 (three) times daily as needed for Dizziness or Nausea. 30 tablet 0    multivitamin capsule Take 1 capsule by mouth 2 (two) times daily.       ondansetron (ZOFRAN-ODT) 8 MG TbDL Dissolve 1 tablet (8 mg total) by mouth every 6 (six) hours as needed. 30 tablet 0    polyethylene glycol (GLYCOLAX) 17 gram/dose powder Mix 1 capful (17 g) with fluids and drink by mouth once daily. 510 g 3    potassium chloride (KLOR-CON) 10 MEQ TbSR Take 10 mEq by mouth 2 (two) times daily.      predniSONE (DELTASONE) 20 MG tablet Take 1 tablet (20 mg total) by mouth once daily. 10 tablet 0    risperiDONE (RISPERDAL) 0.5 MG Tab Take 1 tablet (0.5 mg total) by mouth 2 (two) times daily. 180 tablet 1    sertraline (ZOLOFT) 100 MG tablet Take 2 tablets (200 mg total) by mouth once daily. 180 tablet 2    topiramate (TOPAMAX) 200 MG Tab Take 1 tablet (200 mg total) by mouth once daily. 90 tablet 1    [DISCONTINUED] fluticasone propionate (FLONASE) 50 mcg/actuation nasal spray 1 spray (50 mcg total) by Each Nostril route once daily. 1 Bottle 0    [DISCONTINUED] gabapentin (NEURONTIN) 800 MG tablet Take 1 tablet (800 mg total) by mouth once daily. 90 tablet 2    [DISCONTINUED] omeprazole (PRILOSEC) 40 MG capsule Take 1 capsule (40 mg total) by mouth 2 (two) times daily before meals. 60  capsule 0    [DISCONTINUED] oxybutynin (DITROPAN) 5 MG Tab Take 1 tablet (5 mg total) by mouth 3 (three) times daily. 180 tablet 1     No current facility-administered medications on file prior to visit.        Allergies   Review of patient's allergies indicates:   Allergen Reactions    Hydrocodone-acetaminophen Nausea And Vomiting       Review of Systems (Pertinent positives)  Review of Systems   Constitutional: Negative.    HENT: Negative for hearing loss.    Eyes: Negative for discharge.   Respiratory: Negative for wheezing.    Cardiovascular: Negative for chest pain and palpitations.   Gastrointestinal: Negative for blood in stool, constipation, diarrhea and vomiting.   Genitourinary: Positive for urgency. Negative for dysuria, flank pain and hematuria.   Musculoskeletal: Positive for myalgias and neck pain.   Skin: Negative.    Neurological: Positive for weakness and headaches.   Endo/Heme/Allergies: Negative for polydipsia.         There were no vitals taken for this visit.    GENERAL APPEARANCE: in no apparent distress and well developed and well nourished  HEENT: PERRLA, EOMI, Sclera clear, anicteric  RESPIRATORY: appears well, vitals normal, no respiratory distress, acyanotic, normal RR  SKIN: no rashes, no wounds, no other lesions  PSYCH: Alert, oriented x 3, thought content appropriate, speech normal, pleasant and cooperative, good eye contact, well groomed    Assessment/Plan:  Zaira Dowell is a 49 y.o. female who presents today for :    Diagnoses and all orders for this visit:    Seasonal allergies  -     desloratadine (CLARINEX) 5 mg tablet; Take 1 tablet (5 mg total) by mouth once daily.  -     fluticasone propionate (FLONASE) 50 mcg/actuation nasal spray; 1 spray (50 mcg total) by Each Nostril route once daily.    Bladder spasm  -     oxybutynin (DITROPAN) 5 MG Tab; Take 1 tablet (5 mg total) by mouth 3 (three) times daily.  -     Ambulatory referral/consult to Urology;  Future    Fibromyalgia  -     gabapentin (NEURONTIN) 800 MG tablet; Take 1 tablet (800 mg total) by mouth once daily.    Gastroesophageal reflux disease, esophagitis presence not specified  -     omeprazole (PRILOSEC) 40 MG capsule; Take 1 capsule (40 mg total) by mouth 2 (two) times daily before meals.    Post-operative state  -     omeprazole (PRILOSEC) 40 MG capsule; Take 1 capsule (40 mg total) by mouth 2 (two) times daily before meals.      1.  clarinex and flonase for seasonal allergy relief  2.  Refill ditropan and refer to urology for incontinence symptoms and enuresis  3.  Refilled gabapentin for fibromyalgia and she is to follow up with rheumatology as needed  4.  prilosec refilled per patinet request.  She does need to routinely follow up with GI given chronic use of PPI  5.  Follow up with me in 3 months or sooner if needed      Cyril Saucedo MD

## 2020-06-11 ENCOUNTER — TELEPHONE (OUTPATIENT)
Dept: NEUROLOGY | Facility: CLINIC | Age: 50
End: 2020-06-11

## 2020-06-11 ENCOUNTER — OFFICE VISIT (OUTPATIENT)
Dept: NEUROLOGY | Facility: CLINIC | Age: 50
End: 2020-06-11
Payer: MEDICARE

## 2020-06-11 DIAGNOSIS — G56.22 ULNAR NEUROPATHY OF LEFT UPPER EXTREMITY: Primary | ICD-10-CM

## 2020-06-11 PROCEDURE — 99213 OFFICE O/P EST LOW 20 MIN: CPT | Mod: HCNC,95,, | Performed by: NEUROLOGICAL SURGERY

## 2020-06-11 PROCEDURE — 99213 PR OFFICE/OUTPT VISIT, EST, LEVL III, 20-29 MIN: ICD-10-PCS | Mod: HCNC,95,, | Performed by: NEUROLOGICAL SURGERY

## 2020-06-11 NOTE — TELEPHONE ENCOUNTER
Made a virtual visit.          ----- Message from Carolyn Lombardo sent at 6/11/2020 11:22 AM CDT -----  Contact: self @ [phone]  Calling for MRI results from 6-1-20.  Pls call.

## 2020-06-11 NOTE — PROGRESS NOTES
Chief Complaint   Patient presents with    Numbness        Zaira Dowell is a 49 y.o. female with a history of multiple medical diagnoses as listed below that presents for evaluation of numbness in the left arm.  The patient says that she fell asleep sitting up in a recliner chair at home.  She woke up approximately 530 and felt numbness from the fingertips to the level of the elbow on the left side.  She went to the emergency room for evaluation and after her workup was determined not to be due to any skew ischemic event was felt to be due to peripheral nerve compression.  Since the time that she has left the hospital she feels that her strength has been improving and she has more range of motion in the wrist and fingers.  She says difficulty with elevation of the wrist and also has difficulty with flexion of her fingers.  She says that she has numbness in the entire arm from the fingers to the elbow that is noticeably different than the right side.  She feels that her right arm is normal.  She denies any painful sensations.    Interval History  05/13/2020  She has noted that there has been some improvement in the strength and range of motion in the wrists but mostly with flexion and extension.  She feels that any side to side movement a rotation of the hand and arm seemed to be very limited.  Rotation has been especially difficult as he tends to provoke pain in the lateral aspect of the wrist and hand.  She has been experiencing sharp stabbing pains throughout the arm that is worse with movement but has also been experienced at rest.  She has not noticed any symptoms in any other extremity.    06/11/2020  She has had MRI to assess for potential cervical radiculopathy underlying the paresthesias that she has been experiencing in her arm.  Symptoms have been overall stable..      PAST MEDICAL HISTORY:  Past Medical History:   Diagnosis Date    Anxiety     Arthritis     Asthma     BMI 50.0-59.9, adult      Depression     Fibromyalgia     GERD (gastroesophageal reflux disease)     Obesity     SHARON (obstructive sleep apnea)        PAST SURGICAL HISTORY:  Past Surgical History:   Procedure Laterality Date    CARPAL TUNNEL RELEASE      CHOLECYSTECTOMY      ESOPHAGOGASTRODUODENOSCOPY N/A 5/21/2019    Procedure: EGD (ESOPHAGOGASTRODUODENOSCOPY);  Surgeon: Michelle Mc MD;  Location: [facility];  Service: Endoscopy;  Laterality: N/A;    HYSTERECTOMY      partial     KNEE SURGERY      LAPAROSCOPIC SLEEVE GASTRECTOMY N/A 10/18/2019    Procedure: GASTRECTOMY, SLEEVE, LAPAROSCOPIC, with intraop EGD;  Surgeon: Ean Kohli MD;  Location: [address];  Service: General;  Laterality: N/A;    pinched nerve      SHOULDER SURGERY      TONSILLECTOMY      WRIST SURGERY      had ganlin cyst       SOCIAL HISTORY:  Social History     Socioeconomic History    Marital status:      Spouse name: Not on file    Number of children: Not on file    Years of education: Not on file    Highest education level: Not on file   Occupational History    Not on file   Social Needs    Financial resource strain: Not very hard    Food insecurity     Worry: Never true     Inability: Never true    Transportation needs     Medical: No     Non-medical: No   Tobacco Use    Smoking status: Current Some Day Smoker     Types: Vaping with nicotine, Cigarettes    Smokeless tobacco: Never Used   Substance and Sexual Activity    Alcohol use: No     Frequency: Monthly or less     Drinks per session: 1 or 2     Binge frequency: Never    Drug use: No    Sexual activity: Yes     Partners: Male   Lifestyle    Physical activity     Days per week: 1 day     Minutes per session: 30 min    Stress: Rather much   Relationships    Social connections     Talks on phone: More than three times a week     Gets together: Patient refused     Attends [employer] service: Not on file     Active member of club or organization: No     Attends  meetings of clubs or organizations: Never     Relationship status:    Other Topics Concern    Not on file   Social History Narrative    Not on file       FAMILY HISTORY:  Family History   Problem Relation Age of Onset    Diabetes Mother     Metcalfe's disease Mother     Hypertension Mother     Heart disease Father     Stroke Father     Mental illness Father     Hypertension Father     Diabetes Father     Depression Father     Heart disease Maternal Grandmother     Depression Maternal Grandmother     COPD Maternal Grandfather     Heart disease Maternal Grandfather     Stroke Maternal Grandfather     Kidney disease Paternal Grandmother     Heart disease Paternal Grandmother     Heart disease Paternal Grandfather     No Known Problems Brother     Hypertension Son        ALLERGIES AND MEDICATIONS: updated and reviewed.  Review of patient's allergies indicates:   Allergen Reactions    Hydrocodone-acetaminophen Nausea And Vomiting     Current Outpatient Medications   Medication Sig Dispense Refill    albuterol (PROVENTIL/VENTOLIN HFA) 90 mcg/actuation inhaler Inhale 2 puffs into the lungs every 6 (six) hours as needed for Wheezing. Rescue 18 g 2    allopurinoL (ZYLOPRIM) 100 MG tablet Take 1 tablet (100 mg total) by mouth once daily. 90 tablet 1    b complex vitamins tablet Take 1 tablet by mouth once daily.      baclofen (LIORESAL) 10 MG tablet Take one tablet in the morning, one tablet in afternoon, and 2 tablets at bedtime 120 tablet 11    calcium citrate (CALCITRATE) 200 mg (950 mg) tablet Take 1 tablet by mouth 2 (two) times daily.      cyanocobalamin 500 MCG tablet Take 500 mcg by mouth once daily.      desloratadine (CLARINEX) 5 mg tablet Take 1 tablet (5 mg total) by mouth once daily. 30 tablet 11    ferrous fumarate/vit Bcomp,C (SUPER B COMPLEX ORAL) Take 1 capsule by mouth once daily.      fluticasone propionate (FLONASE) 50 mcg/actuation nasal spray 1 spray (50 mcg total) by  Each Nostril route once daily. 16 g 3    furosemide (LASIX) 40 MG tablet Take 1 tablet (40 mg total) by mouth once daily. 30 tablet 2    gabapentin (NEURONTIN) 800 MG tablet Take 1 tablet (800 mg total) by mouth once daily. 90 tablet 2    lithium (LITHOTAB) 300 mg tablet Take 1 tablet (300 mg total) by mouth 3 (three) times daily. 270 tablet 1    LORazepam (ATIVAN) 1 MG tablet Take 1 tablet (1 mg total) by mouth as needed for Anxiety. 30 tablet 2    meclizine (ANTIVERT) 25 mg tablet Take 1 tablet (25 mg total) by mouth 3 (three) times daily as needed for Dizziness or Nausea. 30 tablet 0    multivitamin capsule Take 1 capsule by mouth 2 (two) times daily.       omeprazole (PRILOSEC) 40 MG capsule Take 1 capsule (40 mg total) by mouth 2 (two) times daily before meals. 180 capsule 0    ondansetron (ZOFRAN-ODT) 8 MG TbDL Dissolve 1 tablet (8 mg total) by mouth every 6 (six) hours as needed. 30 tablet 0    polyethylene glycol (GLYCOLAX) 17 gram/dose powder Mix 1 capful (17 g) with fluids and drink by mouth once daily. 510 g 3    potassium chloride (KLOR-CON) 10 MEQ TbSR Take 10 mEq by mouth 2 (two) times daily.      predniSONE (DELTASONE) 20 MG tablet Take 1 tablet (20 mg total) by mouth once daily. (Patient not taking: Reported on 6/23/2020) 10 tablet 0    risperiDONE (RISPERDAL) 0.5 MG Tab Take 1 tablet (0.5 mg total) by mouth 2 (two) times daily. 180 tablet 1    sertraline (ZOLOFT) 100 MG tablet Take 2 tablets (200 mg total) by mouth once daily. 180 tablet 2    solifenacin (VESICARE) 10 MG tablet Take 1 tablet (10 mg total) by mouth once daily. 30 tablet 11    topiramate (TOPAMAX) 200 MG Tab Take 1 tablet (200 mg total) by mouth once daily. 90 tablet 1     No current facility-administered medications for this visit.        Review of Systems   Constitutional: Negative for activity change, appetite change, fever and unexpected weight change.   HENT: Negative for trouble swallowing and voice change.     Eyes: Negative for photophobia and visual disturbance.   Respiratory: Negative for apnea and shortness of breath.    Cardiovascular: Negative for chest pain and leg swelling.   Gastrointestinal: Negative for constipation and nausea.   Genitourinary: Negative for difficulty urinating.   Musculoskeletal: Negative for back pain, gait problem and neck pain.   Skin: Negative for color change and pallor.   Neurological: Positive for numbness. Negative for dizziness, seizures, syncope and weakness.   Hematological: Negative for adenopathy.   Psychiatric/Behavioral: Negative for agitation, confusion and decreased concentration.       Neurologic Exam     Mental Status   Oriented to person, place, and time.   Registration: recalls 3 of 3 objects.   Attention: normal. Concentration: normal.   Speech: speech is normal   Level of consciousness: alert  Knowledge: good.     Cranial Nerves     CN II   Right visual field deficit: none  Left visual field deficit: none     CN III, IV, VI   Extraocular motions are normal.   Right pupil: Size: 3 mm. Shape: regular.   Left pupil: Size: 3 mm. Shape: regular.   CN III: no CN III palsy  CN VI: no CN VI palsy  Nystagmus: none   Diplopia: none  Ophthalmoparesis: none  Upgaze: normal  Downgaze: normal  Conjugate gaze: present    CN VII   Facial expression full, symmetric.   Right facial weakness: none  Left facial weakness: none    CN VIII   CN VIII normal.     CN XI   CN XI normal.     CN XII   CN XII normal.   Tongue deviation: none    Motor Exam   Muscle bulk: normal  Overall muscle tone: normal  Right arm tone: normal  Left arm tone: normal  Right leg tone: normal  Left leg tone: normal    Strength   Left wrist flexion: 4/5  Left wrist extension: 4/5  Left interossei: 4/5    Sensory Exam   Left arm light touch: decreased from elbow    Gait, Coordination, and Reflexes     Gait  Gait: normal    Coordination   Finger to nose coordination: normal    Tremor   Resting tremor:  absent      Physical Exam   Constitutional: She is oriented to person, place, and time. She appears well-developed and well-nourished.   HENT:   Head: Normocephalic and atraumatic.   Eyes: EOM are normal.   Neck: Normal range of motion.   Pulmonary/Chest: Effort normal. No apnea. No respiratory distress.   Musculoskeletal: Normal range of motion.   Neurological: She is alert and oriented to person, place, and time. She has a normal Finger-Nose-Finger Test. Gait normal.   Psychiatric: She has a normal mood and affect. Her speech is normal and behavior is normal. Thought content normal.   Vitals reviewed.      There were no vitals filed for this visit.    Assessment & Plan:    Problem List Items Addressed This Visit     Ulnar neuropathy of left upper extremity - Primary    Overview     Symptoms seem to be mainly from all nerve compression, likely at the elbow on the left.               The patient location is: home  The chief complaint leading to consultation is: arm numbness  Visit type: Virtual visit with synchronous audio and video  Total time spent with patient: 25  Each patient to whom he or she provides medical services by telemedicine is:  (1) informed of the relationship between the physician and patient and the respective role of any other health care provider with respect to management of the patient; and (2) notified that he or she may decline to receive medical services by telemedicine and may withdraw from such care at any time.    Notes:    Follow-up: Follow up in about 6 months (around 12/11/2020).

## 2020-06-14 ENCOUNTER — PATIENT OUTREACH (OUTPATIENT)
Dept: ADMINISTRATIVE | Facility: OTHER | Age: 50
End: 2020-06-14

## 2020-06-15 ENCOUNTER — DOCUMENTATION ONLY (OUTPATIENT)
Dept: REHABILITATION | Facility: HOSPITAL | Age: 50
End: 2020-06-15

## 2020-06-15 DIAGNOSIS — R29.898 DECREASED PINCH STRENGTH: ICD-10-CM

## 2020-06-15 DIAGNOSIS — M62.81 MUSCLE WEAKNESS OF LEFT ARM: ICD-10-CM

## 2020-06-15 DIAGNOSIS — R29.898 DECREASED GRIP STRENGTH OF LEFT HAND: ICD-10-CM

## 2020-06-15 DIAGNOSIS — R29.898 WEAKNESS OF LEFT HAND: Primary | ICD-10-CM

## 2020-06-15 NOTE — TELEPHONE ENCOUNTER
LOV 7/11/19   [facility] Division of Hospital Medicine  Efe Warren MD  Pager [phone]      Patient is a 78y old  Female who presents with a chief complaint of Concern for COVID-19 (15 Hair 2020 11:09)      SUBJECTIVE / OVERNIGHT EVENTS: Pt endorses headache which is typical for her caffeine withdrawl headache as she is unable to drink coffee this AM. No weakness or paresthesia    MEDICATIONS  (STANDING):  apixaban 5 milliGRAM(s) Oral every 12 hours  atorvastatin 20 milliGRAM(s) Oral at bedtime  cefTRIAXone   IVPB 2000 milliGRAM(s) IV Intermittent every 24 hours  dextrose 5%. 1000 milliLiter(s) (50 mL/Hr) IV Continuous <Continuous>  dextrose 50% Injectable 12.5 Gram(s) IV Push once  dextrose 50% Injectable 25 Gram(s) IV Push once  dextrose 50% Injectable 25 Gram(s) IV Push once  escitalopram 5 milliGRAM(s) Oral daily  insulin lispro (HumaLOG) corrective regimen sliding scale   SubCutaneous three times a day before meals  insulin lispro (HumaLOG) corrective regimen sliding scale   SubCutaneous at bedtime  levothyroxine 112 MICROGram(s) Oral daily    MEDICATIONS  (PRN):  acetaminophen   Tablet .. 650 milliGRAM(s) Oral every 6 hours PRN Temp greater or equal to 38C (100.4F)  ALBUTerol    90 MICROgram(s) HFA Inhaler 2 Puff(s) Inhalation every 6 hours PRN Shortness of Breath and/or Wheezing  dextrose 40% Gel 15 Gram(s) Oral once PRN Blood Glucose LESS THAN 70 milliGRAM(s)/deciliter  glucagon  Injectable 1 milliGRAM(s) IntraMuscular once PRN Glucose LESS THAN 70 milligrams/deciliter      CAPILLARY BLOOD GLUCOSE      POCT Blood Glucose.: 123 mg/dL (15 Hair 2020 12:17)  POCT Blood Glucose.: 130 mg/dL (15 Hair 2020 08:44)  POCT Blood Glucose.: 143 mg/dL (15 Hair 2020 06:29)  POCT Blood Glucose.: 149 mg/dL (14 Jun 2020 22:22)  POCT Blood Glucose.: 111 mg/dL (14 Jun 2020 17:27)    I&O's Summary    14 Jun 2020 07:01  -  15 Hair 2020 07:00  --------------------------------------------------------  IN: 700 mL / OUT: 950 mL / NET: -250 mL        PHYSICAL EXAM:  Vital Signs Last 24 Hrs  T(C): 36.5 (15 Hair 2020 09:45), Max: 36.8 (14 Jun 2020 18:00)  T(F): 97.7 (15 Hair 2020 09:45), Max: 98.2 (14 Jun 2020 18:00)  HR: 54 (15 Hair 2020 09:45) (48 - 57)  BP: 152/65 (15 Hair 2020 09:45) (138/65 - 177/55)  BP(mean): --  RR: 16 (15 Hair 2020 09:45) (16 - 20)  SpO2: 98% (15 Hair 2020 09:45) (97% - 100%)    CONSTITUTIONAL: NAD  EYES: conjunctiva and sclera clear  ENMT: mmm  NECK: Supple,  RESPIRATORY: Normal respiratory effort; lungs are clear to auscultation bilaterally  CARDIOVASCULAR: Regular rate and rhythm, + S1 and S2  ABDOMEN: Nontender to palpation, normoactive bowel sounds, no rebound/guarding  MUSCULOSKELETAL:  no clubbing or cyanosis of digits;   PSYCH: A+O x3      LABS:                        12.7   8.54  )-----------( 295      ( 15 Hair 2020 06:30 )             38.3     06-15    138  |  103  |  16  ----------------------------<  149<H>  4.1   |  22  |  0.72    Ca    9.5      15 Hair 2020 06:30    TPro  7.0  /  Alb  3.7  /  TBili  0.3  /  DBili  x   /  AST  52<H>  /  ALT  100<H>  /  AlkPhos  55  06-15              Culture - Blood (collected 12 Jun 2020 16:30)  Source: .Blood Blood-Peripheral  Preliminary Report (13 Jun 2020 17:01):    No growth to date.    Culture - Blood (collected 12 Jun 2020 16:30)  Source: .Blood Blood-Venous  Preliminary Report (13 Jun 2020 17:01):    No growth to date.

## 2020-06-15 NOTE — PROGRESS NOTES
Zaira cancelled her two remaining occupational therapy visits, no reason provided.    No charges dropped.

## 2020-06-16 ENCOUNTER — OFFICE VISIT (OUTPATIENT)
Dept: BARIATRICS | Facility: CLINIC | Age: 50
End: 2020-06-16
Payer: MEDICARE

## 2020-06-16 VITALS — HEIGHT: 67 IN | WEIGHT: 271 LBS | BODY MASS INDEX: 42.53 KG/M2

## 2020-06-16 DIAGNOSIS — F41.9 ANXIETY: ICD-10-CM

## 2020-06-16 DIAGNOSIS — M79.7 FIBROMYALGIA: ICD-10-CM

## 2020-06-16 DIAGNOSIS — Z98.84 S/P BARIATRIC SURGERY: Primary | ICD-10-CM

## 2020-06-16 DIAGNOSIS — G93.2 PSEUDOTUMOR CEREBRI: ICD-10-CM

## 2020-06-16 DIAGNOSIS — D52.0 DIETARY FOLATE DEFICIENCY ANEMIA: ICD-10-CM

## 2020-06-16 DIAGNOSIS — I27.20 PULMONARY HYPERTENSION: ICD-10-CM

## 2020-06-16 DIAGNOSIS — R79.0 ABNORMAL LEVEL OF BLOOD MINERAL: ICD-10-CM

## 2020-06-16 DIAGNOSIS — F32.A DEPRESSION, UNSPECIFIED DEPRESSION TYPE: ICD-10-CM

## 2020-06-16 PROCEDURE — 3008F PR BODY MASS INDEX (BMI) DOCUMENTED: ICD-10-PCS | Mod: HCNC,CPTII,95, | Performed by: PHYSICIAN ASSISTANT

## 2020-06-16 PROCEDURE — 3008F BODY MASS INDEX DOCD: CPT | Mod: HCNC,CPTII,95, | Performed by: PHYSICIAN ASSISTANT

## 2020-06-16 PROCEDURE — 99213 PR OFFICE/OUTPT VISIT, EST, LEVL III, 20-29 MIN: ICD-10-PCS | Mod: HCNC,95,, | Performed by: PHYSICIAN ASSISTANT

## 2020-06-16 PROCEDURE — 99213 OFFICE O/P EST LOW 20 MIN: CPT | Mod: HCNC,95,, | Performed by: PHYSICIAN ASSISTANT

## 2020-06-16 NOTE — PATIENT INSTRUCTIONS
Menu Plan: 800-1000 Calories;  grams of Protein    DAY 1     Breakfast  ½ cup 2% cottage cheese  ¼ cup fruit (no sugar added)    Snack  2% mozzarella string cheese  10 grapes    Lunch  2oz Lean hamburger or turkey antonio  1 slice low-fat cheese  ¼ cup green beans    Snack  200 calorie low-carb protein drink (4 grams sugar or less)    Dinner  2oz chicken thigh  ¼ cup cooked spinach     Snack  Atkins bar (15g protein)      DAY 2    Breakfast  1 egg with 1oz shredded cheddar cheese and 2T salsa    Snack  200 calorie low-carb protein drink (4 grams sugar or less)    Lunch  Lettuce Wraps: 2oz sliced turkey, 1 slice low-fat Swiss cheese, tomato, and mustard wrapped in a Griffin lettuce leaf    Snack  ½ cup low-fat cottage cheese  Pear cup (no sugar added)    Dinner  2oz baked fish  ½ cup cooked broccoli    Snack  Sugar-free pudding cup      DAY 3    Breakfast   ½ cup low-fat ricotta cheese w/ Splenda to flash  ½ scoop Vanilla protein powder   ¼ cup fresh fruit    Snack  2% string cheese  6 unsalted almonds    Lunch  Tuna/Chicken Salad: 2oz canned tuna/chicken, 1 egg white, and 1 tsp light camacho  Pineapple cup (no sugar added)    Snack  200 calorie low-carb protein drink (4 grams sugar or less)    Dinner  ½ baked pork chop   ¼ cup beans      DAY 4    Breakfast  200 calorie low-carb protein drink (4 grams sugar or less)    Snack  Boiled egg    Lunch  ½ cup grilled shrimp  Salad w/ 2 tbsp crumbled fat-free feta  1 tbsp light vinaigrette    Snack  200 calorie low-carb protein drink (4 grams sugar or less)    Dinner  ¾ cup red beans    Snack  Mini Babybell light      DAY 5    Breakfast  Key Lime pie: 3oz Greek yogurt, 1 tbsp Splenda, ½ individual pack Crystal Light lemonade. Top with ¼ cup chopped walnuts     Snack  3-4 lean ham or turkey slices, ¼ - ½ cup fruit    Lunch  Fiesta Chicken: 2oz canned chicken, 1oz shredded cheddar cheese, ¼ cup black beans  Top with 2 tbsp salsa and a small dollop light sour  cream    Snack  200 calorie low-carb protein drink (4 grams sugar or less)    Dinner  Omelette: ¼ cup Egg Beaters, 4 large (1oz) shrimp, 1oz shredded low-fat cheese. Add bell pepper, onion, mushrooms, green onions, or salsa, optional.      DAY 6    Breakfast  1 tanisha or 2 links turkey sausage  ½ cup fruit    Snack  200 calorie low-carb protein drink (4 grams sugar or less)    Lunch  Grilled tilapia  Salad of baby spinach leaves with light dressing    Snack  200 calorie low-carb protein drink (4 grams sugar or less)    Dinner  Chicken thigh simmered in 98% fat free cream of mushroom soup  ½ cup cooked green beans   covid

## 2020-06-16 NOTE — PROGRESS NOTES
BARIATRIC FOLLOW UP:    No chief complaint on file.  The patient location is: home   The chief complaint leading to consultation is: f/u    Visit type: audiovisual    Face to Face time with patient:8 minutes of total time spent on the encounter, which includes face to face time and non-face to face time preparing to see the patient (eg, review of tests), Obtaining and/or reviewing separately obtained history, Documenting clinical information in the electronic or other health record, Independently interpreting results (not separately reported) and communicating results to the patient/family/caregiver, or Care coordination (not separately reported).         Each patient to whom he or she provides medical services by telemedicine is:  (1) informed of the relationship between the physician and patient and the respective role of any other health care provider with respect to management of the patient; and (2) notified that he or she may decline to receive medical services by telemedicine and may withdraw from such care at any time.    Notes:     HISTORY OF PRESENT ILLNESS: Zaira Dowell is a 49 y.o. female with a Body mass index is 42.44 kg/m². who presents for follow up s/p lap sleeve with Dr. Kohli on 10/18/2019.      Going to rheumatologist for fibromyalgia      Found out  she has herniated disc in neck    EXERCISE & VITAMINS:  Adherent with bariatric vitamin regimen  Exercise- stalled since COVID   DIET:    Reg diet  One shake a day   meat  Low in protein 50-80g  Snacks: chips/cookies  Improved snacking     Low protein     Drink: h20, propel, country time lemonade packets ( 100 oz) decaf tea     Review of Systems   Constitutional: Negative for chills, fever and malaise/fatigue.   Eyes: Negative for blurred vision and double vision.   Respiratory: Negative for cough, shortness of breath and wheezing.    Cardiovascular: Negative for chest pain, palpitations and leg swelling.   Gastrointestinal: Negative for  abdominal pain, blood in stool, constipation, diarrhea, heartburn, melena, nausea and vomiting.        Denies dysphagia   Genitourinary: Negative for dysuria, flank pain, frequency, hematuria and urgency.   Musculoskeletal: Positive for back pain and joint pain (knee). Negative for myalgias and neck pain.        Attributes all symptoms to [facility]   Neurological: Negative for dizziness, tingling and headaches.   Psychiatric/Behavioral: Negative for depression and suicidal ideas. The patient is not nervous/anxious.      There were no vitals filed for this visit.  Physical Exam  Vitals signs and nursing note reviewed.   Constitutional:       General: She is not in acute distress.     Appearance: She is well-developed. She is not diaphoretic.   HENT:      Head: Normocephalic and atraumatic.   Eyes:      General: No scleral icterus.        Right eye: No discharge.         Left eye: No discharge.      Conjunctiva/sclera: Conjunctivae normal.   Cardiovascular:      Rate and Rhythm: Normal rate and regular rhythm.      Heart sounds: Normal heart sounds. No murmur.   Pulmonary:      Effort: Pulmonary effort is normal. No respiratory distress.      Breath sounds: Normal breath sounds.   Abdominal:      General: Bowel sounds are normal. There is no distension.      Palpations: Abdomen is soft. There is no mass.      Tenderness: There is no abdominal tenderness (suprapubic). There is no guarding or rebound.      Hernia: No hernia is present.      Comments: Well healing surgical incisions, clean, dry, and intact without signs of infection.   Skin:     General: Skin is warm and dry.      Coloration: Skin is not pale.      Findings: No erythema or rash.      Comments: Left shoulder wound healing well, scabbed over no erythema no drainage non tender to palpation    Neurological:      Mental Status: She is alert and oriented to person, place, and time.   Psychiatric:         Behavior: Behavior normal.       ASSESSMENT:    - Morbid  obesity, Body mass index is 42.44 kg/m².,  s/p sleeve gastrectomy .  - Estimated goal weight is 50% EWL  - Co-morbidities:  Bipolar disorder, anxiety and depression  - Poor Weight loss, 63 lbs, 33% EWL  - No Exercise regimen  - Good Vitamin Regimen  - Poor diet       PLAN:  - Increase protein  - Increase exercise   - decrease carbs change snacking to better choices.   - Check labs   - RTC  For annual visit       30 minute visit, over 50% of time spent counseling patient face to face on diet, exercise, and weight loss.

## 2020-06-23 ENCOUNTER — TELEPHONE (OUTPATIENT)
Dept: UROLOGY | Facility: CLINIC | Age: 50
End: 2020-06-23

## 2020-06-23 ENCOUNTER — OFFICE VISIT (OUTPATIENT)
Dept: UROLOGY | Facility: CLINIC | Age: 50
End: 2020-06-23
Payer: MEDICARE

## 2020-06-23 ENCOUNTER — TELEPHONE (OUTPATIENT)
Dept: NEUROLOGY | Facility: CLINIC | Age: 50
End: 2020-06-23

## 2020-06-23 VITALS — HEIGHT: 67 IN | WEIGHT: 271.63 LBS | TEMPERATURE: 97 F | BODY MASS INDEX: 42.63 KG/M2

## 2020-06-23 DIAGNOSIS — N39.41 URGENCY INCONTINENCE: Primary | ICD-10-CM

## 2020-06-23 DIAGNOSIS — M54.12 CERVICAL RADICULOPATHY: Primary | ICD-10-CM

## 2020-06-23 DIAGNOSIS — R32 ENURESIS: ICD-10-CM

## 2020-06-23 DIAGNOSIS — G56.22 ULNAR NEUROPATHY OF LEFT UPPER EXTREMITY: ICD-10-CM

## 2020-06-23 DIAGNOSIS — N32.89 BLADDER SPASM: ICD-10-CM

## 2020-06-23 LAB
BILIRUB SERPL-MCNC: NORMAL MG/DL
BLOOD URINE, POC: NORMAL
COLOR, POC UA: YELLOW
GLUCOSE UR QL STRIP: NORMAL
KETONES UR QL STRIP: NORMAL
LEUKOCYTE ESTERASE URINE, POC: NORMAL
NITRITE, POC UA: NORMAL
PH, POC UA: 7
PROTEIN, POC: NORMAL
SPECIFIC GRAVITY, POC UA: 1000
UROBILINOGEN, POC UA: NORMAL

## 2020-06-23 PROCEDURE — 99204 PR OFFICE/OUTPT VISIT, NEW, LEVL IV, 45-59 MIN: ICD-10-PCS | Mod: 25,HCNC,S$GLB, | Performed by: UROLOGY

## 2020-06-23 PROCEDURE — 3008F PR BODY MASS INDEX (BMI) DOCUMENTED: ICD-10-PCS | Mod: HCNC,CPTII,S$GLB, | Performed by: UROLOGY

## 2020-06-23 PROCEDURE — 81001 URINALYSIS AUTO W/SCOPE: CPT | Mod: HCNC,S$GLB,, | Performed by: UROLOGY

## 2020-06-23 PROCEDURE — 81001 POCT URINALYSIS, DIPSTICK OR TABLET REAGENT, AUTOMATED, WITH MICROSCOP: ICD-10-PCS | Mod: HCNC,S$GLB,, | Performed by: UROLOGY

## 2020-06-23 PROCEDURE — 87086 URINE CULTURE/COLONY COUNT: CPT | Mod: HCNC

## 2020-06-23 PROCEDURE — 99204 OFFICE O/P NEW MOD 45 MIN: CPT | Mod: 25,HCNC,S$GLB, | Performed by: UROLOGY

## 2020-06-23 PROCEDURE — 3008F BODY MASS INDEX DOCD: CPT | Mod: HCNC,CPTII,S$GLB, | Performed by: UROLOGY

## 2020-06-23 PROCEDURE — 99999 PR PBB SHADOW E&M-EST. PATIENT-LVL V: ICD-10-PCS | Mod: PBBFAC,HCNC,, | Performed by: UROLOGY

## 2020-06-23 PROCEDURE — 99999 PR PBB SHADOW E&M-EST. PATIENT-LVL V: CPT | Mod: PBBFAC,HCNC,, | Performed by: UROLOGY

## 2020-06-23 RX ORDER — SOLIFENACIN SUCCINATE 5 MG/1
10 TABLET, FILM COATED ORAL DAILY
Qty: 30 TABLET | Refills: 11 | Status: SHIPPED | OUTPATIENT
Start: 2020-06-23 | End: 2020-06-23

## 2020-06-23 RX ORDER — SOLIFENACIN SUCCINATE 10 MG/1
10 TABLET, FILM COATED ORAL DAILY
Qty: 30 TABLET | Refills: 11 | Status: SHIPPED | OUTPATIENT
Start: 2020-06-23 | End: 2020-08-04 | Stop reason: SDUPTHER

## 2020-06-23 NOTE — TELEPHONE ENCOUNTER
Put orders in for patient to have PT on her neck.      ----- Message from Haleigh Negrete sent at 6/23/2020 11:49 AM CDT -----  Regarding: speak with nurse  Contact: patient  [phone]-please call above patient need to speak with the nurse concerning the doctor referring her to a physical therapist waiting on a call back thanks.

## 2020-06-23 NOTE — TELEPHONE ENCOUNTER
----- Message from Lyn Finney sent at 6/23/2020  1:18 PM CDT -----  Regarding: Self/  [phone]  Type: Patient Call Back    Who called:  Patient    What is the request in detail:  Insurance will not cover patients prescription for solifenacin (VESICARE) 10 MG tablet.  She's wanting to know if anything else can be called in for her.   Or can a PA be called in for medication.   Thank you    Would the patient rather a call back or a response via [facility]?   Call back    Best call back number:  [phone]

## 2020-06-23 NOTE — PROGRESS NOTES
Subjective:       Patient ID: Zaira Dowell is a 49 y.o. female who was referred by Cyril Saucedo MD    Chief Complaint:   Chief Complaint   Patient presents with    Urinary Urgency     new pt coming in for urinary urgency an bed wetting this has been going on for about 3 months    Nocturnal Enuresis       Urinary Incontinence  Patient complains of urinary incontinence. This has been present for several years.  This issue seems to have worsened over the past several months.  She has also noted now to have enuresis.  She leaks urine with with urge, during the night. Patient describes the symptoms as frequent urination (10x per day) and urge to urinate with little or no warning. Factors associated with symptoms include none known. Evaluation to date includes none. Treatment to date includes oxybutinin, which was somewhat effective for a few years but is now less effective.  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  She denies any handwriting changes.    ACTIVE MEDICAL ISSUES:  Patient Active Problem List   Diagnosis    Fibromyalgia    Pulmonary hypertension    Anxiety    Depression    Bipolar 1 disorder    Chronic idiopathic gout involving toe of left foot without tophus    History of tobacco abuse    Morbid obesity    SHARON (obstructive sleep apnea)    Trochanteric bursitis of both hips    Trochanteric bursitis    Pain of both hip joints    GERD (gastroesophageal reflux disease)    Pseudotumor cerebri    Mild intermittent asthma without complication    Ulnar neuropathy of left upper extremity    Weakness of left hand    Muscle weakness of left arm    Decreased  strength of left hand    Decreased pinch strength       PAST MEDICAL HISTORY  Past Medical History:   Diagnosis Date    Anxiety     Arthritis     Asthma     BMI 50.0-59.9, adult     Depression     Fibromyalgia     GERD  (gastroesophageal reflux disease)     Obesity     SHARON (obstructive sleep apnea)        PAST SURGICAL HISTORY:  Past Surgical History:   Procedure Laterality Date    CARPAL TUNNEL RELEASE      CHOLECYSTECTOMY      ESOPHAGOGASTRODUODENOSCOPY N/A 5/21/2019    Procedure: EGD (ESOPHAGOGASTRODUODENOSCOPY);  Surgeon: Michelle Mc MD;  Location: [facility];  Service: Endoscopy;  Laterality: N/A;    HYSTERECTOMY      partial     KNEE SURGERY      LAPAROSCOPIC SLEEVE GASTRECTOMY N/A 10/18/2019    Procedure: GASTRECTOMY, SLEEVE, LAPAROSCOPIC, with intraop EGD;  Surgeon: Ean Kohli MD;  Location: [address];  Service: General;  Laterality: N/A;    pinched nerve      SHOULDER SURGERY      TONSILLECTOMY      WRIST SURGERY      had ganlin cyst       SOCIAL HISTORY:  Social History     Tobacco Use    Smoking status: Current Some Day Smoker     Types: Vaping with nicotine, Cigarettes    Smokeless tobacco: Never Used   Substance Use Topics    Alcohol use: No     Frequency: Monthly or less     Drinks per session: 1 or 2     Binge frequency: Never    Drug use: No       FAMILY HISTORY:  Family History   Problem Relation Age of Onset    Diabetes Mother     Asbury's disease Mother     Hypertension Mother     Heart disease Father     Stroke Father     Mental illness Father     Hypertension Father     Diabetes Father     Depression Father     Heart disease Maternal Grandmother     Depression Maternal Grandmother     COPD Maternal Grandfather     Heart disease Maternal Grandfather     Stroke Maternal Grandfather     Kidney disease Paternal Grandmother     Heart disease Paternal Grandmother     Heart disease Paternal Grandfather     No Known Problems Brother     Hypertension Son        ALLERGIES AND MEDICATIONS: updated and reviewed.  Review of patient's allergies indicates:   Allergen Reactions    Hydrocodone-acetaminophen Nausea And Vomiting     Current Outpatient Medications    Medication Sig    albuterol (PROVENTIL/VENTOLIN HFA) 90 mcg/actuation inhaler Inhale 2 puffs into the lungs every 6 (six) hours as needed for Wheezing. Rescue    allopurinoL (ZYLOPRIM) 100 MG tablet Take 1 tablet (100 mg total) by mouth once daily.    b complex vitamins tablet Take 1 tablet by mouth once daily.    baclofen (LIORESAL) 10 MG tablet Take one tablet in the morning, one tablet in afternoon, and 2 tablets at bedtime    calcium citrate (CALCITRATE) 200 mg (950 mg) tablet Take 1 tablet by mouth 2 (two) times daily.    cyanocobalamin 500 MCG tablet Take 500 mcg by mouth once daily.    desloratadine (CLARINEX) 5 mg tablet Take 1 tablet (5 mg total) by mouth once daily.    ferrous fumarate/vit Bcomp,C (SUPER B COMPLEX ORAL) Take 1 capsule by mouth once daily.    fluticasone propionate (FLONASE) 50 mcg/actuation nasal spray 1 spray (50 mcg total) by Each Nostril route once daily.    furosemide (LASIX) 40 MG tablet Take 1 tablet (40 mg total) by mouth once daily.    gabapentin (NEURONTIN) 800 MG tablet Take 1 tablet (800 mg total) by mouth once daily.    lithium (LITHOTAB) 300 mg tablet Take 1 tablet (300 mg total) by mouth 3 (three) times daily.    LORazepam (ATIVAN) 1 MG tablet Take 1 tablet (1 mg total) by mouth as needed for Anxiety.    meclizine (ANTIVERT) 25 mg tablet Take 1 tablet (25 mg total) by mouth 3 (three) times daily as needed for Dizziness or Nausea.    multivitamin capsule Take 1 capsule by mouth 2 (two) times daily.     omeprazole (PRILOSEC) 40 MG capsule Take 1 capsule (40 mg total) by mouth 2 (two) times daily before meals.    ondansetron (ZOFRAN-ODT) 8 MG TbDL Dissolve 1 tablet (8 mg total) by mouth every 6 (six) hours as needed.    polyethylene glycol (GLYCOLAX) 17 gram/dose powder Mix 1 capful (17 g) with fluids and drink by mouth once daily.    potassium chloride (KLOR-CON) 10 MEQ TbSR Take 10 mEq by mouth 2 (two) times daily.    risperiDONE (RISPERDAL) 0.5 MG Tab  "Take 1 tablet (0.5 mg total) by mouth 2 (two) times daily.    sertraline (ZOLOFT) 100 MG tablet Take 2 tablets (200 mg total) by mouth once daily.    topiramate (TOPAMAX) 200 MG Tab Take 1 tablet (200 mg total) by mouth once daily.    predniSONE (DELTASONE) 20 MG tablet Take 1 tablet (20 mg total) by mouth once daily. (Patient not taking: Reported on 6/23/2020)    solifenacin (VESICARE) 10 MG tablet Take 1 tablet (10 mg total) by mouth once daily.     No current facility-administered medications for this visit.        Review of Systems    Objective:      Vitals:    06/23/20 1103   Temp: 97 °F (36.1 °C)   Weight: 123.2 kg (271 lb 10.4 oz)   Height: 5' 7" (1.702 m)     Physical Exam   Nursing note and vitals reviewed.  Constitutional: She is oriented to person, place, and time. She appears well-developed.   HENT:   Head: Normocephalic.   Eyes: Conjunctivae are normal.   Neck: Normal range of motion. No tracheal deviation present. No thyromegaly present.   Cardiovascular: Normal rate, normal heart sounds and normal pulses.    Pulmonary/Chest: Effort normal and breath sounds normal. No respiratory distress. She has no wheezes.   Abdominal: Soft. She exhibits no distension and no mass. There is no abdominal tenderness. There is no rebound and no guarding. No hernia.   Musculoskeletal: Normal range of motion. No tenderness.   Lymphadenopathy:     She has no cervical adenopathy.   Neurological: She is alert and oriented to person, place, and time.   Skin: Skin is warm and dry. No rash noted. No erythema.     Psychiatric: Her behavior is normal. Judgment and thought content normal.       Urine dipstick shows negative for all components.  Micro exam: negative for WBC's or RBC's.    Assessment:       1. Urgency incontinence    2. Enuresis    3. Bladder spasm          Plan:       1. Urgency incontinence  She may need urodynamics  - solifenacin (VESICARE) 10 MG tablet; Take 1 tablet (10 mg total) by mouth once daily.  " Dispense: 30 tablet; Refill: 11    2. Enuresis    - Urine culture    3. Bladder spasm    - Ambulatory referral/consult to Urology  - POCT urinalysis, dipstick or tablet reag            No follow-ups on file.

## 2020-06-25 LAB — BACTERIA UR CULT: NORMAL

## 2020-06-29 ENCOUNTER — CLINICAL SUPPORT (OUTPATIENT)
Dept: REHABILITATION | Facility: HOSPITAL | Age: 50
End: 2020-06-29
Attending: NEUROLOGICAL SURGERY
Payer: MEDICARE

## 2020-06-29 DIAGNOSIS — M62.81 MUSCLE WEAKNESS OF UPPER EXTREMITY: ICD-10-CM

## 2020-06-29 DIAGNOSIS — R29.3 POOR POSTURE: ICD-10-CM

## 2020-06-29 DIAGNOSIS — M54.12 CERVICAL RADICULOPATHY: ICD-10-CM

## 2020-06-29 DIAGNOSIS — M54.2 NECK PAIN: ICD-10-CM

## 2020-06-29 PROCEDURE — 97161 PT EVAL LOW COMPLEX 20 MIN: CPT | Mod: HCNC,PN
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
## 2020-07-14 ENCOUNTER — OFFICE VISIT (OUTPATIENT)
Dept: PSYCHIATRY | Facility: CLINIC | Age: 50
End: 2020-07-14
Payer: MEDICARE

## 2020-07-14 DIAGNOSIS — F31.9 BIPOLAR 1 DISORDER: Primary | ICD-10-CM

## 2020-07-14 PROCEDURE — 99214 OFFICE O/P EST MOD 30 MIN: CPT | Mod: HCNC,95,, | Performed by: PSYCHIATRY & NEUROLOGY

## 2020-07-14 PROCEDURE — 99214 PR OFFICE/OUTPT VISIT, EST, LEVL IV, 30-39 MIN: ICD-10-PCS | Mod: HCNC,95,, | Performed by: PSYCHIATRY & NEUROLOGY

## 2020-07-14 RX ORDER — LITHIUM CARBONATE 300 MG
300 TABLET ORAL 3 TIMES DAILY
Qty: 270 TABLET | Refills: 1 | Status: SHIPPED | OUTPATIENT
Start: 2020-07-14 | End: 2020-11-03 | Stop reason: SDUPTHER

## 2020-07-14 RX ORDER — RISPERIDONE 0.5 MG/1
0.5 TABLET ORAL 2 TIMES DAILY
Qty: 180 TABLET | Refills: 1 | Status: SHIPPED | OUTPATIENT
Start: 2020-07-14 | End: 2020-11-03 | Stop reason: SDUPTHER

## 2020-07-14 NOTE — PROGRESS NOTES
"Outpatient Psychiatry Follow-Up Visit (MD/NP)    07/14/2020 Phone visit.(18") Patient had forgotten appointment and could not use virtual system due to her location at this time.)    Clinical Status of Patient:  Outpatient (Ambulatory)    Chief Complaint:  Bipolar Disorder  Interval History and Content of Current Session: Patient's mood is stable. She has no active signs of lida, psychosis, or depression. She focused this visit on her current orthopedic issues due to a pinched nerve and back difficulties causing pain, numbness in her left arm, and trouble sleeping. She is sad re these matters, but is coping well with the concerns. Patient has no active thoughts of harming herself or others at this time. She remains hopeful re her medical problems,and is being followed closely by orrthopedics and medicine also for a urinary difficulty as well. Patient is in good self control, and just visited her grandchildren in [place] and enjoyed the visit despite the physical issues.    Compliance: good     Side effects:  None  Musculoskeletal: patient complained of arm pain which is impacting the use of her left arm. Otherwise she had no complaints of abnormal motor movements at this time.Patient had no complaints of signs of TD.    Risk Parameters:not active danger to self or others at this time.    Patient's Response to Intervention:accepting  Progress Toward Goals and Other Mental Status Changes:stable     Vital signs this date were not reviewed.     Mental Status Evaluation     Appearance:  not assessed   Behavior:  cooperative                             Speech normal   Mood:  anxious   Affect:  anxious   Thought Process:  linear, logical   Thought Content:  organizednormal   Sensorium:  alert and oriented to person, place, time and situation   Attention Span & Concentration able to focus   Cognition:  Grossly intactgrossly intact   Insight:  has awareness of illness   Judgment:  behavior is adequate to circumstances "       Diagnosis:    Bipolar 1 disorder [F31.9]      I reviewed the side effects of the patient's medicines and advised a call if the patient had problems with the medicine or clinical condition.    Plan:(Medication and Therapy Recommendation)  Additional Notes: Patient agrees to next visit in 4 months, and to continue bjbuvon101 mg q am, and 600 mg q hs(lithium level next visit), Risperdal0.5 mg bid, and Zoloft 200 mg daily.  Return to Clinic:4 months

## 2020-07-15 ENCOUNTER — DOCUMENTATION ONLY (OUTPATIENT)
Dept: REHABILITATION | Facility: HOSPITAL | Age: 50
End: 2020-07-15

## 2020-07-15 DIAGNOSIS — R29.898 WEAKNESS OF LEFT HAND: Primary | ICD-10-CM

## 2020-07-15 DIAGNOSIS — R29.898 DECREASED GRIP STRENGTH OF LEFT HAND: ICD-10-CM

## 2020-07-15 DIAGNOSIS — M62.81 MUSCLE WEAKNESS OF LEFT ARM: ICD-10-CM

## 2020-07-15 DIAGNOSIS — R29.898 DECREASED PINCH STRENGTH: ICD-10-CM

## 2020-07-15 NOTE — PROGRESS NOTES
Outpatient Therapy Discharge Summary     Name: Zaira Dowell  [facility] Number: [id number]    Therapy Diagnosis:   Encounter Diagnoses   Name Primary?    Weakness of left hand Yes    Muscle weakness of left arm     Decreased  strength of left hand     Decreased pinch strength      Physician: Jerson Arriola MD     Physician Orders: Eval and Treat  Medical Diagnosis: Ulnar neuropathy of left upper extremity  Surgical Procedure and Date: n/a  Evaluation Date: 05/19/2020    Date of Last visit: 06/02/20  Total Visits Received: 2  Cancelled Visits: 2  No Show Visits: 0    Assessment    Goals: not met as patient cancelled her 2 f/u appointments and did not return to OT.    Discharge reason:  Pt self d/c    Plan   This patient is discharged from Occupational Therapy

## 2020-07-15 NOTE — PROGRESS NOTES
Physical Therapy Treatment Note     Name: Zaira Dowell  Clinic Number: [id number]    Therapy Diagnosis:   Encounter Diagnoses   Name Primary?    Poor posture Yes    Neck pain     Muscle weakness of upper extremity      Physician: Jerson Arriola MD    Visit Date: 7/16/2020    Physician Orders: PT Eval and Treat   Medical Diagnosis from Referral: Cervical radiculopathy  Evaluation Date: 6/29/2020  Authorization Period Expiration: 12/2/20  Plan of Care Expiration: 9/29/20  Visit # / Visits authorized: 1/ 8 (visit 2)    Time In: 1503  Time Out: 1545  Total Billable Time: 42 minutes    Precautions: fibromyalgia     Subjective     Pt reports: no new complaints. Her pain is still across cervicothoracic region. Wants to go 1x/week due to payment issues.   She was not compliant with home exercise program. (none give yet)  Response to previous treatment: good  Functional change: none to note    Pain: 4/10  Location: bilateral neck      Objective     Zaira received therapeutic exercises to develop strength, endurance, ROM, flexibility, posture and core stabilization for 32 minutes including:    Supine pec stretch w half roll horizontally and hands behind head x 3 min  Suboccipital self-mob w half roll x 20 ea  Supine sh flexion AROM w dowel x 20  Upper trap stretch w overpressure 3 x 30 sec ea  Scalenes stretch w overpressure 3 x 30 sec ea  Seated thoracic ext w ball x 20  Rows w GTB x 20    Zaira received the following manual therapy techniques x 10 minutes:    STM to B upper traps, rhomboids, scalenes, and cervical paraspinals  Cervical distraction    Home Exercises Provided and Patient Education Provided     Education provided:   - importance of proper upright posture     Written Home Exercises Provided: yes.  Exercises were reviewed and Zaira was able to demonstrate them prior to the end of the session.  Zaira demonstrated good  understanding of the education provided.     See EMR under Patient  Instructions for exercises provided 7/16/2020.    Assessment     Pt had good tolerance to treatment today with no adverse effects. Post-treatment neck pain rated as 4/10. Pt presents to clinic with increased thoracic kyphosis and postural imbalance. Tightness noted with pec stretching. Soft tissue dysfunction noted in R upper trap and cervical paraspinals. Good response to exercise program. No exacerbation of symptoms with postural strengthening. Appropriate exercise-induced fatigue achieved by end of session.     Zaira is progressing well towards her goals.   Pt prognosis is Good.     Pt will continue to benefit from skilled outpatient physical therapy to address the deficits listed in the problem list box on initial evaluation, provide pt/family education and to maximize pt's level of independence in the home and community environment.     Pt's spiritual, cultural and educational needs considered and pt agreeable to plan of care and goals.     Anticipated barriers to physical therapy: none    GOALS: Short Term Goals:  6 weeks in progress  1. Report decreased neck pain  <  / =  5/10 at worst to increase tolerance for sleep.  2. Pt will increase cervical flexion AROM to 45 deg to indicate improved mobility.   3. Pt will be able to demonstrate proper upright posture without verbal cueing to decrease abnormal spinal stresses.   4. Pt will be able to tolerate multi-directional UE strengthening without pain to improve functional use of UEs.  5. Pt to tolerate HEP to improve ROM and independence with ADL's.     Long Term Goals: 12 weeks in progress  1. Report decreased neck pain  <  / =  3/10 at worst to increase tolerance for sleep.  2. Pt will increase cervical B rotation AROM to 60 deg to indicate improved mobility.   3. Pt will be able to perform 2 x 10 shoulder flexion with 2 lbs to indicate improved strength in B UEs.  4. Pt to be Independent with HEP to improve ROM and independence with ADL's.    Plan      Progress postural strengthening.     Annemarie Ivan, PT

## 2020-07-16 ENCOUNTER — TELEPHONE (OUTPATIENT)
Dept: RHEUMATOLOGY | Facility: CLINIC | Age: 50
End: 2020-07-16

## 2020-07-16 ENCOUNTER — CLINICAL SUPPORT (OUTPATIENT)
Dept: REHABILITATION | Facility: HOSPITAL | Age: 50
End: 2020-07-16
Attending: NEUROLOGICAL SURGERY
Payer: MEDICARE

## 2020-07-16 DIAGNOSIS — R29.3 POOR POSTURE: Primary | ICD-10-CM

## 2020-07-16 DIAGNOSIS — M62.81 MUSCLE WEAKNESS OF UPPER EXTREMITY: ICD-10-CM

## 2020-07-16 DIAGNOSIS — M54.2 NECK PAIN: ICD-10-CM

## 2020-07-16 PROCEDURE — 97110 THERAPEUTIC EXERCISES: CPT | Mod: HCNC,PN

## 2020-07-16 PROCEDURE — 97140 MANUAL THERAPY 1/> REGIONS: CPT | Mod: HCNC,PN

## 2020-07-17 ENCOUNTER — OFFICE VISIT (OUTPATIENT)
Dept: RHEUMATOLOGY | Facility: CLINIC | Age: 50
End: 2020-07-17
Payer: MEDICARE

## 2020-07-17 ENCOUNTER — TELEPHONE (OUTPATIENT)
Dept: RHEUMATOLOGY | Facility: CLINIC | Age: 50
End: 2020-07-17

## 2020-07-17 DIAGNOSIS — M54.9 DORSALGIA, UNSPECIFIED: ICD-10-CM

## 2020-07-17 DIAGNOSIS — M54.12 CERVICAL RADICULOPATHY: Primary | ICD-10-CM

## 2020-07-17 DIAGNOSIS — M54.16 LUMBAR RADICULOPATHY: ICD-10-CM

## 2020-07-17 PROCEDURE — 99215 PR OFFICE/OUTPT VISIT, EST, LEVL V, 40-54 MIN: ICD-10-PCS | Mod: HCNC,95,, | Performed by: INTERNAL MEDICINE

## 2020-07-17 PROCEDURE — 99215 OFFICE O/P EST HI 40 MIN: CPT | Mod: HCNC,95,, | Performed by: INTERNAL MEDICINE
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
## 2020-07-18 ENCOUNTER — HOSPITAL ENCOUNTER (OUTPATIENT)
Dept: RADIOLOGY | Facility: HOSPITAL | Age: 50
Discharge: HOME | End: 2020-07-18
Attending: INTERNAL MEDICINE
Payer: MEDICARE

## 2020-07-18 DIAGNOSIS — M54.9 DORSALGIA, UNSPECIFIED: ICD-10-CM

## 2020-07-18 PROCEDURE — 72100 X-RAY EXAM L-S SPINE 2/3 VWS: CPT | Mod: 26,HCNC,, | Performed by: RADIOLOGY

## 2020-07-18 PROCEDURE — 72100 XR LUMBAR SPINE AP AND LATERAL: ICD-10-PCS | Mod: 26,HCNC,, | Performed by: RADIOLOGY

## 2020-07-18 PROCEDURE — 72100 X-RAY EXAM L-S SPINE 2/3 VWS: CPT | Mod: TC,HCNC,FY

## 2020-07-23 ENCOUNTER — OFFICE VISIT (OUTPATIENT)
Dept: NEUROLOGY | Facility: CLINIC | Age: 50
End: 2020-07-23
Payer: MEDICARE

## 2020-07-23 DIAGNOSIS — G56.22 ULNAR NEUROPATHY OF LEFT UPPER EXTREMITY: Primary | ICD-10-CM

## 2020-07-23 PROCEDURE — 99214 OFFICE O/P EST MOD 30 MIN: CPT | Mod: HCNC,95,, | Performed by: NEUROLOGICAL SURGERY

## 2020-07-23 PROCEDURE — 99214 PR OFFICE/OUTPT VISIT, EST, LEVL IV, 30-39 MIN: ICD-10-PCS | Mod: HCNC,95,, | Performed by: NEUROLOGICAL SURGERY

## 2020-07-23 NOTE — PROGRESS NOTES
Chief Complaint   Patient presents with    Extremity Weakness        Zaira Dowell is a 49 y.o. female with a history of multiple medical diagnoses as listed below that presents for evaluation of numbness in the left arm.  The patient says that she fell asleep sitting up in a recliner chair at home.  She woke up approximately 530 and felt numbness from the fingertips to the level of the elbow on the left side.  She went to the emergency room for evaluation and after her workup was determined not to be due to any skew ischemic event was felt to be due to peripheral nerve compression.  Since the time that she has left the hospital she feels that her strength has been improving and she has more range of motion in the wrist and fingers.  She says difficulty with elevation of the wrist and also has difficulty with flexion of her fingers.  She says that she has numbness in the entire arm from the fingers to the elbow that is noticeably different than the right side.  She feels that her right arm is normal.  She denies any painful sensations.    Interval History  05/13/2020  She has noted that there has been some improvement in the strength and range of motion in the wrists but mostly with flexion and extension.  She feels that any side to side movement a rotation of the hand and arm seemed to be very limited.  Rotation has been especially difficult as he tends to provoke pain in the lateral aspect of the wrist and hand.  She has been experiencing sharp stabbing pains throughout the arm that is worse with movement but has also been experienced at rest.  She has not noticed any symptoms in any other extremity.    06/11/2020  She has had MRI to assess for potential cervical radiculopathy underlying the paresthesias that she has been experiencing in her arm.  Symptoms have been overall stable.    07/23/2020  She tends that weakness in her hand has not been getting much better compared to last visit.  Patient is worried  that her symptoms may be permanent.      PAST MEDICAL HISTORY:  Past Medical History:   Diagnosis Date    Anxiety     Arthritis     Asthma     BMI 50.0-59.9, adult     Depression     Fibromyalgia     GERD (gastroesophageal reflux disease)     Obesity     SHARON (obstructive sleep apnea)        PAST SURGICAL HISTORY:  Past Surgical History:   Procedure Laterality Date    CARPAL TUNNEL RELEASE      CHOLECYSTECTOMY      ESOPHAGOGASTRODUODENOSCOPY N/A 5/21/2019    Procedure: EGD (ESOPHAGOGASTRODUODENOSCOPY);  Surgeon: Michelle Mc MD;  Location: [facility];  Service: Endoscopy;  Laterality: N/A;    HYSTERECTOMY      partial     KNEE SURGERY      LAPAROSCOPIC SLEEVE GASTRECTOMY N/A 10/18/2019    Procedure: GASTRECTOMY, SLEEVE, LAPAROSCOPIC, with intraop EGD;  Surgeon: Ean Kohli MD;  Location: [facility] OR [address] [place] [address];  Service: General;  Laterality: N/A;    pinched nerve      SHOULDER SURGERY      TONSILLECTOMY      WRIST SURGERY      had ganlin cyst       SOCIAL HISTORY:  Social History     Socioeconomic History    Marital status:      Spouse name: Not on file    Number of children: Not on file    Years of education: Not on file    Highest education level: Not on file   Occupational History    Not on file   Social Needs    Financial resource strain: Not very hard    Food insecurity     Worry: Never true     Inability: Never true    Transportation needs     Medical: No     Non-medical: No   Tobacco Use    Smoking status: Current Some Day Smoker     Types: Vaping with nicotine, Cigarettes    Smokeless tobacco: Never Used   Substance and Sexual Activity    Alcohol use: No     Frequency: Monthly or less     Drinks per session: 1 or 2     Binge frequency: Never    Drug use: No    Sexual activity: Yes     Partners: Male   Lifestyle    Physical activity     Days per week: 1 day     Minutes per session: 30 min    Stress: Rather much   Relationships    Social connections     Talks on  phone: More than three times a week     Gets together: Patient refused     Attends [employer] service: Not on file     Active member of club or organization: No     Attends meetings of clubs or organizations: Never     Relationship status:    Other Topics Concern    Not on file   Social History Narrative    Not on file       FAMILY HISTORY:  Family History   Problem Relation Age of Onset    Diabetes Mother     Salem's disease Mother     Hypertension Mother     Heart disease Father     Stroke Father     Mental illness Father     Hypertension Father     Diabetes Father     Depression Father     Heart disease Maternal Grandmother     Depression Maternal Grandmother     COPD Maternal Grandfather     Heart disease Maternal Grandfather     Stroke Maternal Grandfather     Kidney disease Paternal Grandmother     Heart disease Paternal Grandmother     Heart disease Paternal Grandfather     No Known Problems Brother     Hypertension Son        ALLERGIES AND MEDICATIONS: updated and reviewed.  Review of patient's allergies indicates:   Allergen Reactions    Hydrocodone-acetaminophen Nausea And Vomiting     Current Outpatient Medications   Medication Sig Dispense Refill    albuterol (PROVENTIL/VENTOLIN HFA) 90 mcg/actuation inhaler Inhale 2 puffs into the lungs every 6 (six) hours as needed for Wheezing. Rescue 18 g 2    allopurinoL (ZYLOPRIM) 100 MG tablet Take 1 tablet (100 mg total) by mouth once daily. 90 tablet 1    b complex vitamins tablet Take 1 tablet by mouth once daily.      baclofen (LIORESAL) 10 MG tablet Take one tablet in the morning, one tablet in afternoon, and 2 tablets at bedtime (Patient not taking: Reported on 8/4/2020) 120 tablet 11    calcium citrate (CALCITRATE) 200 mg (950 mg) tablet Take 1 tablet by mouth 2 (two) times daily.      cyanocobalamin 500 MCG tablet Take 500 mcg by mouth once daily.      desloratadine (CLARINEX) 5 mg tablet Take 1 tablet (5 mg total) by  mouth once daily. 30 tablet 11    ferrous fumarate/vit Bcomp,C (SUPER B COMPLEX ORAL) Take 1 capsule by mouth once daily.      fluticasone propionate (FLONASE) 50 mcg/actuation nasal spray 1 spray (50 mcg total) by Each Nostril route once daily. 16 g 3    furosemide (LASIX) 40 MG tablet Take 1 tablet (40 mg total) by mouth once daily. 30 tablet 2    gabapentin (NEURONTIN) 800 MG tablet Take 1 tablet (800 mg total) by mouth once daily. 90 tablet 2    lithium (LITHOTAB) 300 mg tablet Take 1 tablet (300 mg total) by mouth 3 (three) times daily. 270 tablet 1    LORazepam (ATIVAN) 1 MG tablet Take 1 tablet (1 mg total) by mouth as needed for Anxiety. 30 tablet 2    meclizine (ANTIVERT) 25 mg tablet Take 1 tablet (25 mg total) by mouth 3 (three) times daily as needed for Dizziness or Nausea. 30 tablet 0    multivitamin capsule Take 1 capsule by mouth 2 (two) times daily.       omeprazole (PRILOSEC) 40 MG capsule Take 1 capsule (40 mg total) by mouth 2 (two) times daily before meals. 180 capsule 0    ondansetron (ZOFRAN-ODT) 8 MG TbDL Dissolve 1 tablet (8 mg total) by mouth every 6 (six) hours as needed. 30 tablet 0    polyethylene glycol (GLYCOLAX) 17 gram/dose powder Mix 1 capful (17 g) with fluids and drink by mouth once daily. 510 g 3    potassium chloride (KLOR-CON) 10 MEQ TbSR Take 10 mEq by mouth 2 (two) times daily.      predniSONE (DELTASONE) 20 MG tablet Take 1 tablet (20 mg total) by mouth once daily. (Patient not taking: Reported on 6/23/2020) 10 tablet 0    risperiDONE (RISPERDAL) 0.5 MG Tab Take 1 tablet (0.5 mg total) by mouth 2 (two) times daily. 180 tablet 1    sertraline (ZOLOFT) 100 MG tablet Take 2 tablets (200 mg total) by mouth once daily. 180 tablet 2    tolterodine (DETROL LA) 4 MG 24 hr capsule Take 1 capsule (4 mg total) by mouth once daily. 30 capsule 11    topiramate (TOPAMAX) 200 MG Tab Take 1 tablet (200 mg total) by mouth once daily. 90 tablet 1     No current  facility-administered medications for this visit.        Review of Systems   Constitutional: Negative for activity change, appetite change, fever and unexpected weight change.   HENT: Negative for trouble swallowing and voice change.    Eyes: Negative for photophobia and visual disturbance.   Respiratory: Negative for apnea and shortness of breath.    Cardiovascular: Negative for chest pain and leg swelling.   Gastrointestinal: Negative for constipation and nausea.   Genitourinary: Negative for difficulty urinating.   Musculoskeletal: Negative for back pain, gait problem and neck pain.   Skin: Negative for color change and pallor.   Neurological: Positive for numbness. Negative for dizziness, seizures, syncope and weakness.   Hematological: Negative for adenopathy.   Psychiatric/Behavioral: Negative for agitation, confusion and decreased concentration.       Neurologic Exam     Mental Status   Oriented to person, place, and time.   Registration: recalls 3 of 3 objects.   Attention: normal. Concentration: normal.   Speech: speech is normal   Level of consciousness: alert  Knowledge: good.     Cranial Nerves     CN II   Right visual field deficit: none  Left visual field deficit: none     CN III, IV, VI   Extraocular motions are normal.   Right pupil: Size: 3 mm. Shape: regular.   Left pupil: Size: 3 mm. Shape: regular.   CN III: no CN III palsy  CN VI: no CN VI palsy  Nystagmus: none   Diplopia: none  Ophthalmoparesis: none  Upgaze: normal  Downgaze: normal  Conjugate gaze: present    CN VII   Facial expression full, symmetric.   Right facial weakness: none  Left facial weakness: none    CN VIII   CN VIII normal.     CN XI   CN XI normal.     CN XII   CN XII normal.   Tongue deviation: none    Motor Exam   Muscle bulk: normal  Overall muscle tone: normal  Right arm tone: normal  Left arm tone: normal  Right leg tone: normal  Left leg tone: normal    Strength   Left wrist flexion: 4/5  Left wrist extension: 4/5  Left  interossei: 4/5    Sensory Exam   Left arm light touch: decreased from elbow    Gait, Coordination, and Reflexes     Gait  Gait: normal    Coordination   Finger to nose coordination: normal    Tremor   Resting tremor: absent      Physical Exam   Constitutional: She is oriented to person, place, and time. She appears well-developed and well-nourished.   HENT:   Head: Normocephalic and atraumatic.   Eyes: EOM are normal.   Neck: Normal range of motion.   Pulmonary/Chest: Effort normal. No apnea. No respiratory distress.   Musculoskeletal: Normal range of motion.   Neurological: She is alert and oriented to person, place, and time. She has a normal Finger-Nose-Finger Test. Gait normal.   Psychiatric: She has a normal mood and affect. Her speech is normal and behavior is normal. Thought content normal.   Vitals reviewed.      There were no vitals filed for this visit.    Assessment & Plan:    Problem List Items Addressed This Visit     Ulnar neuropathy of left upper extremity - Primary    Overview     Symptoms seem to be mainly from all nerve compression, likely at the elbow on the left.         Relevant Orders    Ambulatory referral/consult to Physical/Occupational Therapy          The patient location is: home  The chief complaint leading to consultation is: arm numbness  Visit type: Virtual visit with synchronous audio and video  Total time spent with patient: 25  Each patient to whom he or she provides medical services by telemedicine is:  (1) informed of the relationship between the physician and patient and the respective role of any other health care provider with respect to management of the patient; and (2) notified that he or she may decline to receive medical services by telemedicine and may withdraw from such care at any time.    Notes:    Follow-up: Follow up in about 6 months (around 1/23/2021).

## 2020-07-27 ENCOUNTER — HOSPITAL ENCOUNTER (OUTPATIENT)
Dept: RADIOLOGY | Facility: HOSPITAL | Age: 50
Discharge: HOME | End: 2020-07-27
Attending: UROLOGY
Payer: MEDICARE

## 2020-07-27 DIAGNOSIS — N32.89 BLADDER SPASM: ICD-10-CM

## 2020-07-27 PROCEDURE — 76770 US EXAM ABDO BACK WALL COMP: CPT | Mod: TC,HCNC

## 2020-07-27 PROCEDURE — 76770 US EXAM ABDO BACK WALL COMP: CPT | Mod: 26,HCNC,, | Performed by: RADIOLOGY

## 2020-07-27 PROCEDURE — 76770 US RETROPERITONEAL COMPLETE: ICD-10-PCS | Mod: 26,HCNC,, | Performed by: RADIOLOGY

## 2020-07-27 NOTE — PROGRESS NOTES
Physical Therapy Treatment Note     Name: Zaira Dowell  Clinic Number: [id number]    Therapy Diagnosis:   Encounter Diagnoses   Name Primary?    Poor posture Yes    Neck pain     Muscle weakness of upper extremity      Physician: Jerson Arriola MD    Visit Date: 7/28/2020    Physician Orders: PT Eval and Treat   Medical Diagnosis from Referral: Cervical radiculopathy  Evaluation Date: 6/29/2020  Last PN: 7/28/20 (visit 3)  Authorization Period Expiration: 12/2/20  Plan of Care Expiration: 9/29/20  Visit # / Visits authorized: 2/ 8 (visit 3)    Time In: 1500  Time Out: 1545  Total Billable Time: 45 minutes    Precautions: fibromyalgia     Subjective     Pt reports: she has been more conscious of posture but has not been performing HEP. Pain is at worst 8/10. Turning head to L and looking back bothers her neck  She was not compliant with home exercise program.   Response to previous treatment: good with soreness for 2 days  Functional change: none to note     Pain: 2/10   Location: bilateral neck      Objective     Taken 7/28/20:  Cervical flexion AROM: 45 deg    Zaira received therapeutic exercises to develop strength, endurance, ROM, flexibility, posture and core stabilization for 45 minutes including:    UBE 2'F/2'B  Scapular retraction x 20  Supine pec stretch w towel roll horizontally and hands behind head x 3 min  Suboccipital self-mob w towel roll x 20 ea  Supine sh flexion AROM w dowel x 20  Upper trap stretch w overpressure 3 x 30 sec ea  Scalenes stretch w overpressure 3 x 30 sec ea  Seated thoracic ext w ball x 20  Rows w RTB x 20  Supine B horiz abd w RTB x 20  B sh ext w RTB x 20  B sh ER w RTB x 20    Zaira received the following manual therapy techniques x 0 minutes:    STM to B upper traps, rhomboids, scalenes, and cervical paraspinals  Cervical distraction    Home Exercises Provided and Patient Education Provided     Education provided:   - importance of proper upright posture      Written Home Exercises Provided: Patient instructed to cont prior HEP.   Exercises were reviewed and Zaira was able to demonstrate them prior to the end of the session.  Zaira demonstrated good  understanding of the education provided.     See EMR under Patient Instructions for exercises provided 7/16/2020.    CMS Impairment/Limitation/Restriction for FOTO neck Survey    Therapist reviewed FOTO scores for Zaira Dowell on 7/28/2020.   FOTO documents entered into [email or web address] - see Media section.    Limitation Score: 50%     Assessment     Pt was re-assessed today with 2/5 STGs being met indicating improvements in cervical flexion AROM and postural awareness since start of care. She continues with neck and L shoulder pain, UE weakness, postural imbalance, decreased cervical AROM, and decreased functional UE use. Pt could benefit from continued physical therapy services to address deficits.     She had good tolerance to treatment today with no adverse effects. Post-treatment neck pain rated as 6/10. Pt with appropriate exercise-induced fatigue achieved by end of session. Good response to progression of exercise program. Pt requiring occasional verbal cueing for posture throughout treatment. Some limitation in therapeutic exercises due to L rotator cuff pathology. Emphasized performing HEP.     Zaira is progressing well towards her goals.   Pt prognosis is Good.     Pt will continue to benefit from skilled outpatient physical therapy to address the deficits listed in the problem list box on initial evaluation, provide pt/family education and to maximize pt's level of independence in the home and community environment.     Pt's spiritual, cultural and educational needs considered and pt agreeable to plan of care and goals.     Anticipated barriers to physical therapy: none    GOALS: Short Term Goals:  6 weeks   1. Report decreased neck pain  <  / =  5/10 at worst to increase tolerance for sleep.- in progress  2.  Pt will increase cervical flexion AROM to 45 deg to indicate improved mobility.- met 7/28/20   3. Pt will be able to demonstrate proper upright posture without verbal cueing to decrease abnormal spinal stresses.- met 7/28/20  4. Pt will be able to tolerate multi-directional UE strengthening without pain to improve functional use of UEs.- in progress  5. Pt to tolerate HEP to improve ROM and independence with ADL's. - in progress     Long Term Goals: 12 weeks in progress  1. Report decreased neck pain  <  / =  3/10 at worst to increase tolerance for sleep.  2. Pt will increase cervical B rotation AROM to 60 deg to indicate improved mobility.   3. Pt will be able to perform 2 x 10 shoulder flexion with 2 lbs to indicate improved strength in B UEs.  4. Pt to be Independent with HEP to improve ROM and independence with ADL's.    Plan     Progress postural strengthening.     Annemarie Ivan, PT

## 2020-07-28 ENCOUNTER — CLINICAL SUPPORT (OUTPATIENT)
Dept: REHABILITATION | Facility: HOSPITAL | Age: 50
End: 2020-07-28
Attending: NEUROLOGICAL SURGERY
Payer: MEDICARE

## 2020-07-28 DIAGNOSIS — M62.81 MUSCLE WEAKNESS OF UPPER EXTREMITY: ICD-10-CM

## 2020-07-28 DIAGNOSIS — G56.22 ULNAR NEUROPATHY OF LEFT UPPER EXTREMITY: ICD-10-CM

## 2020-07-28 DIAGNOSIS — M54.2 NECK PAIN: ICD-10-CM

## 2020-07-28 DIAGNOSIS — R29.3 POOR POSTURE: Primary | ICD-10-CM

## 2020-07-28 PROCEDURE — 97110 THERAPEUTIC EXERCISES: CPT | Mod: HCNC,PN

## 2020-08-03 ENCOUNTER — PATIENT OUTREACH (OUTPATIENT)
Dept: ADMINISTRATIVE | Facility: OTHER | Age: 50
End: 2020-08-03

## 2020-08-03 ENCOUNTER — CLINICAL SUPPORT (OUTPATIENT)
Dept: REHABILITATION | Facility: HOSPITAL | Age: 50
End: 2020-08-03
Attending: NEUROLOGICAL SURGERY
Payer: MEDICARE

## 2020-08-03 DIAGNOSIS — M54.2 NECK PAIN: ICD-10-CM

## 2020-08-03 DIAGNOSIS — R29.3 POOR POSTURE: ICD-10-CM

## 2020-08-03 DIAGNOSIS — M62.81 MUSCLE WEAKNESS OF UPPER EXTREMITY: ICD-10-CM

## 2020-08-03 PROCEDURE — 97110 THERAPEUTIC EXERCISES: CPT | Mod: HCNC,PN,CQ

## 2020-08-03 NOTE — PROGRESS NOTES
Requested updates within Care Everywhere.  Patient's chart was reviewed for overdue FORTUNATO topics.  Immunizations reconciled.

## 2020-08-03 NOTE — PROGRESS NOTES
Physical Therapy Treatment Note     Name: Zaira Dowell  Clinic Number: [id number]    Therapy Diagnosis:   Encounter Diagnoses   Name Primary?    Poor posture     Neck pain     Muscle weakness of upper extremity      Physician: Jerson Arriola MD    Visit Date: 8/3/2020    Physician Orders: PT Eval and Treat   Medical Diagnosis from Referral: Cervical radiculopathy  Evaluation Date: 6/29/2020  Last PN: 7/28/20 (visit 3)  Authorization Period Expiration: 12/2/20  Plan of Care Expiration: 9/29/20  Visit # / Visits authorized: 2/ 8 (visit 4)    Time In: 1415  Time Out: 1500  Total Billable Time: 40 minutes    Precautions: fibromyalgia     Subjective     Pt reports: pain that runs down her LUE with difficulty in all things.    She was not compliant with home exercise program.   Response to previous treatment: good with soreness for 2 days  Functional change: none to note     Pain: 3/10   Location: bilateral neck      Objective     Taken 7/28/20:  Cervical flexion AROM: 45 deg    Zaira received therapeutic exercises to develop strength, endurance, ROM, flexibility, posture and core stabilization for 40 minutes including:    UBE 2'F/2'B  Scapular retraction x 20  Supine pec stretch w towel roll horizontally and hands behind head x 3 min  Suboccipital self-mob w towel roll x 20 ea  Supine sh flexion AROM w dowel x 20  Upper trap stretch w overpressure 3 x 30 sec ea  Scalenes stretch w overpressure 3 x 30 sec ea  Seated thoracic ext w ball x 20  Rows w RTB x 20  Est w RTB x 30  Supine B horiz abd w RTB x 20  B sh ext w RTB x 20  B sh ER w RTB x 20    Zaira received the following manual therapy techniques x 0 minutes:    STM to B upper traps, rhomboids, scalenes, and cervical paraspinals  Cervical distraction    Home Exercises Provided and Patient Education Provided     Education provided:   - importance of proper upright posture     Written Home Exercises Provided: Patient instructed to cont prior HEP.    Exercises were reviewed and Zaira was able to demonstrate them prior to the end of the session.  Zaira demonstrated good  understanding of the education provided.     See EMR under Patient Instructions for exercises provided 7/16/2020.    CMS Impairment/Limitation/Restriction for FOTO neck Survey    Therapist reviewed FOTO scores for Zaira Dowell on 8/3/2020.   FOTO documents entered into [employer] - see Media section.    Limitation Score: 50%     Assessment     She had decent tolerance to treatment today with no adverse effects. Post-treatment neck pain rated as 7/10. Pt with appropriate exercise-induced fatigue achieved by end of session. Good response to progression of exercise program. Pt requiring occasional verbal cueing for posture throughout treatment. Some limitation in therapeutic exercises due to L rotator cuff pathology. Emphasized performing HEP.     Zaira is progressing well towards her goals.   Pt prognosis is Good.     Pt will continue to benefit from skilled outpatient physical therapy to address the deficits listed in the problem list box on initial evaluation, provide pt/family education and to maximize pt's level of independence in the home and community environment.     Pt's spiritual, cultural and educational needs considered and pt agreeable to plan of care and goals.     Anticipated barriers to physical therapy: none    GOALS: Short Term Goals:  6 weeks   1. Report decreased neck pain  <  / =  5/10 at worst to increase tolerance for sleep.- in progress  2. Pt will increase cervical flexion AROM to 45 deg to indicate improved mobility.- met 7/28/20   3. Pt will be able to demonstrate proper upright posture without verbal cueing to decrease abnormal spinal stresses.- met 7/28/20  4. Pt will be able to tolerate multi-directional UE strengthening without pain to improve functional use of UEs.- in progress  5. Pt to tolerate HEP to improve ROM and independence with ADL's. - in progress      Long Term Goals: 12 weeks in progress  1. Report decreased neck pain  <  / =  3/10 at worst to increase tolerance for sleep.  2. Pt will increase cervical B rotation AROM to 60 deg to indicate improved mobility.   3. Pt will be able to perform 2 x 10 shoulder flexion with 2 lbs to indicate improved strength in B UEs.  4. Pt to be Independent with HEP to improve ROM and independence with ADL's.    Plan     Progress postural strengthening.     Juan Garcia, PTA

## 2020-08-04 ENCOUNTER — OFFICE VISIT (OUTPATIENT)
Dept: UROLOGY | Facility: CLINIC | Age: 50
End: 2020-08-04
Payer: MEDICARE

## 2020-08-04 VITALS — BODY MASS INDEX: 42.21 KG/M2 | WEIGHT: 268.94 LBS | TEMPERATURE: 97 F | HEIGHT: 67 IN

## 2020-08-04 DIAGNOSIS — R32 ENURESIS: ICD-10-CM

## 2020-08-04 DIAGNOSIS — R35.1 NOCTURIA MORE THAN TWICE PER NIGHT: ICD-10-CM

## 2020-08-04 DIAGNOSIS — N39.41 URGENCY INCONTINENCE: Primary | ICD-10-CM

## 2020-08-04 LAB
BILIRUB SERPL-MCNC: NORMAL MG/DL
BLOOD URINE, POC: NORMAL
COLOR, POC UA: YELLOW
GLUCOSE UR QL STRIP: NORMAL
KETONES UR QL STRIP: NORMAL
LEUKOCYTE ESTERASE URINE, POC: NORMAL
NITRITE, POC UA: NORMAL
PH, POC UA: 6
PROTEIN, POC: NORMAL
SPECIFIC GRAVITY, POC UA: 1000
UROBILINOGEN, POC UA: NORMAL

## 2020-08-04 PROCEDURE — 3008F BODY MASS INDEX DOCD: CPT | Mod: HCNC,CPTII,S$GLB, | Performed by: UROLOGY

## 2020-08-04 PROCEDURE — 99213 PR OFFICE/OUTPT VISIT, EST, LEVL III, 20-29 MIN: ICD-10-PCS | Mod: HCNC,25,S$GLB, | Performed by: UROLOGY

## 2020-08-04 PROCEDURE — 99213 OFFICE O/P EST LOW 20 MIN: CPT | Mod: HCNC,25,S$GLB, | Performed by: UROLOGY

## 2020-08-04 PROCEDURE — 81001 URINALYSIS AUTO W/SCOPE: CPT | Mod: HCNC,S$GLB,, | Performed by: UROLOGY

## 2020-08-04 PROCEDURE — 3008F PR BODY MASS INDEX (BMI) DOCUMENTED: ICD-10-PCS | Mod: HCNC,CPTII,S$GLB, | Performed by: UROLOGY

## 2020-08-04 PROCEDURE — 99999 PR PBB SHADOW E&M-EST. PATIENT-LVL IV: ICD-10-PCS | Mod: PBBFAC,HCNC,, | Performed by: UROLOGY

## 2020-08-04 PROCEDURE — 81001 POCT URINALYSIS, DIPSTICK OR TABLET REAGENT, AUTOMATED, WITH MICROSCOP: ICD-10-PCS | Mod: HCNC,S$GLB,, | Performed by: UROLOGY

## 2020-08-04 PROCEDURE — 99999 PR PBB SHADOW E&M-EST. PATIENT-LVL IV: CPT | Mod: PBBFAC,HCNC,, | Performed by: UROLOGY

## 2020-08-04 RX ORDER — SOLIFENACIN SUCCINATE 10 MG/1
10 TABLET, FILM COATED ORAL DAILY
Qty: 30 TABLET | Refills: 11 | Status: SHIPPED | OUTPATIENT
Start: 2020-08-04 | End: 2020-08-04 | Stop reason: ALTCHOICE

## 2020-08-04 RX ORDER — TOLTERODINE 4 MG/1
4 CAPSULE, EXTENDED RELEASE ORAL DAILY
Qty: 30 CAPSULE | Refills: 11 | Status: SHIPPED | OUTPATIENT
Start: 2020-08-04 | End: 2021-08-09 | Stop reason: SDUPTHER

## 2020-08-04 NOTE — PROGRESS NOTES
Subjective:       Patient ID: Zaira Dowell is a 49 y.o. female who was last seen in this office 6/23/2020    Chief Complaint:   Chief Complaint   Patient presents with    Follow-up     follow up with ultrasound an pt was unable to get vesicare not covered by insurance        Urinary Incontinence  Patient complains of urinary incontinence. This has been present for several years.  This issue seems to have worsened over the past several months.  She has also noted now to have enuresis.  She leaks urine with with urge, during the night. Patient describes the symptoms as frequent urination (10x per day) and urge to urinate with little or no warning. Factors associated with symptoms include none known. Evaluation to date includes none. Treatment to date includes oxybutinin, which was somewhat effective for a few years but is now less effective.  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  She denies any handwriting changes.    She has been taking less baclofen and her enuresis is improved.  She did not take Vesicare due to insurance issues.          ACTIVE MEDICAL ISSUES:  Patient Active Problem List   Diagnosis    Fibromyalgia    Pulmonary hypertension    Anxiety    Depression    Bipolar 1 disorder    Chronic idiopathic gout involving toe of left foot without tophus    History of tobacco abuse    Morbid obesity    SHARON (obstructive sleep apnea)    Trochanteric bursitis of both hips    Trochanteric bursitis    Pain of both hip joints    GERD (gastroesophageal reflux disease)    Pseudotumor cerebri    Mild intermittent asthma without complication    Ulnar neuropathy of left upper extremity    Poor posture    Neck pain    Muscle weakness of upper extremity       ALLERGIES AND MEDICATIONS: updated and reviewed.  Review of patient's allergies indicates:   Allergen Reactions    Hydrocodone-acetaminophen Nausea  And Vomiting     Current Outpatient Medications   Medication Sig    albuterol (PROVENTIL/VENTOLIN HFA) 90 mcg/actuation inhaler Inhale 2 puffs into the lungs every 6 (six) hours as needed for Wheezing. Rescue    allopurinoL (ZYLOPRIM) 100 MG tablet Take 1 tablet (100 mg total) by mouth once daily.    b complex vitamins tablet Take 1 tablet by mouth once daily.    calcium citrate (CALCITRATE) 200 mg (950 mg) tablet Take 1 tablet by mouth 2 (two) times daily.    cyanocobalamin 500 MCG tablet Take 500 mcg by mouth once daily.    ferrous fumarate/vit Bcomp,C (SUPER B COMPLEX ORAL) Take 1 capsule by mouth once daily.    fluticasone propionate (FLONASE) 50 mcg/actuation nasal spray 1 spray (50 mcg total) by Each Nostril route once daily.    furosemide (LASIX) 40 MG tablet Take 1 tablet (40 mg total) by mouth once daily.    gabapentin (NEURONTIN) 800 MG tablet Take 1 tablet (800 mg total) by mouth once daily.    lithium (LITHOTAB) 300 mg tablet Take 1 tablet (300 mg total) by mouth 3 (three) times daily.    LORazepam (ATIVAN) 1 MG tablet Take 1 tablet (1 mg total) by mouth as needed for Anxiety.    meclizine (ANTIVERT) 25 mg tablet Take 1 tablet (25 mg total) by mouth 3 (three) times daily as needed for Dizziness or Nausea.    multivitamin capsule Take 1 capsule by mouth 2 (two) times daily.     omeprazole (PRILOSEC) 40 MG capsule Take 1 capsule (40 mg total) by mouth 2 (two) times daily before meals.    ondansetron (ZOFRAN-ODT) 8 MG TbDL Dissolve 1 tablet (8 mg total) by mouth every 6 (six) hours as needed.    polyethylene glycol (GLYCOLAX) 17 gram/dose powder Mix 1 capful (17 g) with fluids and drink by mouth once daily.    potassium chloride (KLOR-CON) 10 MEQ TbSR Take 10 mEq by mouth 2 (two) times daily.    risperiDONE (RISPERDAL) 0.5 MG Tab Take 1 tablet (0.5 mg total) by mouth 2 (two) times daily.    topiramate (TOPAMAX) 200 MG Tab Take 1 tablet (200 mg total) by mouth once daily.    baclofen  "(LIORESAL) 10 MG tablet Take one tablet in the morning, one tablet in afternoon, and 2 tablets at bedtime (Patient not taking: Reported on 8/4/2020)    desloratadine (CLARINEX) 5 mg tablet Take 1 tablet (5 mg total) by mouth once daily.    predniSONE (DELTASONE) 20 MG tablet Take 1 tablet (20 mg total) by mouth once daily. (Patient not taking: Reported on 6/23/2020)    sertraline (ZOLOFT) 100 MG tablet Take 2 tablets (200 mg total) by mouth once daily.    tolterodine (DETROL LA) 4 MG 24 hr capsule Take 1 capsule (4 mg total) by mouth once daily.     No current facility-administered medications for this visit.        Review of Systems   Constitutional: Negative for activity change, fatigue, fever and unexpected weight change.   Eyes: Negative for redness and visual disturbance.   Respiratory: Negative for chest tightness and shortness of breath.    Cardiovascular: Negative for chest pain and leg swelling.   Gastrointestinal: Negative for abdominal distention, abdominal pain, constipation, diarrhea, nausea and vomiting.   Genitourinary: Negative for difficulty urinating, dysuria, flank pain, frequency, hematuria, pelvic pain, urgency and vaginal bleeding.   Musculoskeletal: Negative for arthralgias and joint swelling.   Neurological: Negative for dizziness, weakness and headaches.   Psychiatric/Behavioral: Negative for confusion. The patient is not nervous/anxious.    All other systems reviewed and are negative.      Objective:      Vitals:    08/04/20 1034   Temp: 96.9 °F (36.1 °C)   Weight: 122 kg (268 lb 15.4 oz)   Height: 5' 7" (1.702 m)     Physical Exam   Nursing note and vitals reviewed.  Constitutional: She is oriented to person, place, and time. She appears well-developed.   HENT:   Head: Normocephalic.   Eyes: Conjunctivae are normal.   Neck: Normal range of motion. No tracheal deviation present. No thyromegaly present.   Cardiovascular: Normal rate, normal heart sounds and normal pulses.  "   Pulmonary/Chest: Effort normal and breath sounds normal. No respiratory distress. She has no wheezes.   Abdominal: Soft. She exhibits no distension and no mass. There is no abdominal tenderness. There is no rebound and no guarding. No hernia.   Musculoskeletal: Normal range of motion. No tenderness.   Lymphadenopathy:     She has no cervical adenopathy.   Neurological: She is alert and oriented to person, place, and time.   Skin: Skin is warm and dry. No rash noted. No erythema.     Psychiatric: Her behavior is normal. Judgment and thought content normal.       Urine dipstick shows negative for all components.  Micro exam: negative for WBC's or RBC's.    Assessment:       1. Urgency incontinence    2. Enuresis    3. Nocturia more than twice per night          Plan:       1. Urgency incontinence    - tolterodine (DETROL LA) 4 MG 24 hr capsule; Take 1 capsule (4 mg total) by mouth once daily.  Dispense: 30 capsule; Refill: 11    2. Enuresis    - POCT urinalysis, dipstick or tablet reag    3. Nocturia more than twice per night  Limit evening fluids            Follow up in about 6 weeks (around 9/15/2020) for Follow up.

## 2020-08-11 ENCOUNTER — CLINICAL SUPPORT (OUTPATIENT)
Dept: REHABILITATION | Facility: HOSPITAL | Age: 50
End: 2020-08-11
Attending: NEUROLOGICAL SURGERY
Payer: MEDICARE

## 2020-08-11 DIAGNOSIS — R29.3 POOR POSTURE: ICD-10-CM

## 2020-08-11 DIAGNOSIS — M54.2 NECK PAIN: ICD-10-CM

## 2020-08-11 DIAGNOSIS — M62.81 MUSCLE WEAKNESS OF UPPER EXTREMITY: ICD-10-CM

## 2020-08-11 PROCEDURE — 97110 THERAPEUTIC EXERCISES: CPT | Mod: HCNC,PN,CQ

## 2020-08-11 PROCEDURE — 97140 MANUAL THERAPY 1/> REGIONS: CPT | Mod: HCNC,PN,CQ

## 2020-08-11 NOTE — PROGRESS NOTES
Physical Therapy Treatment Note     Name: Zaira Dowell  Clinic Number: [id number]    Therapy Diagnosis:   Encounter Diagnoses   Name Primary?    Poor posture     Neck pain     Muscle weakness of upper extremity      Physician: Jerson Arriola MD    Visit Date: 8/11/2020    Physician Orders: PT Eval and Treat   Medical Diagnosis from Referral: Cervical radiculopathy  Evaluation Date: 6/29/2020  Last PN: 7/28/20 (visit 3)  Authorization Period Expiration: 12/2/20  Plan of Care Expiration: 9/29/20  Visit # / Visits authorized: 3/ 8 (visit 5)    Time In: 1330  Time Out: 1415  Total Billable Time: 40 minutes    Precautions: fibromyalgia     Subjective     Pt reports: woke up this morning with pain.  Pt believes slept crooked which has led to pain in her neck.  Difficulty noted in moving her neck without any bias.  She was not compliant with home exercise program.   Response to previous treatment: good with soreness for 2 days  Functional change: none to note     Pain: 5/10   Location: bilateral neck      Objective     Taken 7/28/20:  Cervical flexion AROM: 45 deg    Zaira received therapeutic exercises to develop strength, endurance, ROM, flexibility, posture and core stabilization for 30 minutes including:    UBE 2'F/2'B  Scapular retraction x 20  Supine pec stretch w towel roll horizontally and hands behind head x 3 min  Suboccipital self-mob w towel roll x 20 ea  Supine sh flexion AROM w dowel x 20  Upper trap stretch w overpressure 3 x 30 sec ea  Cervical Side Bending RTB x 30 ea  Ball on Wall Cervical Rotation x 3'  Scalenes stretch w overpressure 3 x 30 sec ea  Seated thoracic ext w ball x 20  Rows w RTB x 20  Est w RTB x 30  Supine B horiz abd w RTB x 20  B sh ext w RTB x 20  B sh ER w RTB x 20    Zaira received the following manual therapy techniques x 10 minutes:    STM to B upper traps, rhomboids, scalenes, and cervical paraspinals  Cervical distraction    Home Exercises Provided and Patient  Education Provided     Education provided:   - importance of proper upright posture     Written Home Exercises Provided: Patient instructed to cont prior HEP.   Exercises were reviewed and Zaira was able to demonstrate them prior to the end of the session.  Zaira demonstrated good  understanding of the education provided.     See EMR under Patient Instructions for exercises provided 7/16/2020.    CMS Impairment/Limitation/Restriction for FOTO neck Survey    Therapist reviewed FOTO scores for Zaira Dowell on 8/11/2020.   FOTO documents entered into [employer] - see Media section.    Limitation Score: 50%     Assessment     She had decent tolerance to treatment today with no adverse effects. Post-treatment neck pain rated as 7/10. Pt with appropriate exercise-induced fatigue achieved by end of session. Good response to progression of exercise program. Pt requiring occasional verbal cueing for posture throughout treatment. Some limitation in therapeutic exercises due to L rotator cuff pathology. Emphasized performing HEP.     Zaira is progressing well towards her goals.   Pt prognosis is Good.     Pt will continue to benefit from skilled outpatient physical therapy to address the deficits listed in the problem list box on initial evaluation, provide pt/family education and to maximize pt's level of independence in the home and community environment.     Pt's spiritual, cultural and educational needs considered and pt agreeable to plan of care and goals.     Anticipated barriers to physical therapy: none    GOALS: Short Term Goals:  6 weeks   1. Report decreased neck pain  <  / =  5/10 at worst to increase tolerance for sleep.- in progress  2. Pt will increase cervical flexion AROM to 45 deg to indicate improved mobility.- met 7/28/20   3. Pt will be able to demonstrate proper upright posture without verbal cueing to decrease abnormal spinal stresses.- met 7/28/20  4. Pt will be able to tolerate multi-directional  UE strengthening without pain to improve functional use of UEs.- in progress  5. Pt to tolerate HEP to improve ROM and independence with ADL's. - in progress     Long Term Goals: 12 weeks in progress  1. Report decreased neck pain  <  / =  3/10 at worst to increase tolerance for sleep.  2. Pt will increase cervical B rotation AROM to 60 deg to indicate improved mobility.   3. Pt will be able to perform 2 x 10 shoulder flexion with 2 lbs to indicate improved strength in B UEs.  4. Pt to be Independent with HEP to improve ROM and independence with ADL's.    Plan     Progress postural strengthening.     Juan Garcia, PTA

## 2020-08-14 ENCOUNTER — OFFICE VISIT (OUTPATIENT)
Dept: SPINE | Facility: CLINIC | Age: 50
End: 2020-08-14
Attending: PHYSICAL MEDICINE & REHABILITATION
Payer: MEDICARE

## 2020-08-14 ENCOUNTER — PATIENT OUTREACH (OUTPATIENT)
Dept: ADMINISTRATIVE | Facility: OTHER | Age: 50
End: 2020-08-14

## 2020-08-14 VITALS
DIASTOLIC BLOOD PRESSURE: 70 MMHG | SYSTOLIC BLOOD PRESSURE: 126 MMHG | HEART RATE: 70 BPM | BODY MASS INDEX: 42.21 KG/M2 | HEIGHT: 67 IN | WEIGHT: 268.94 LBS

## 2020-08-14 DIAGNOSIS — M62.838 MUSCLE SPASM: ICD-10-CM

## 2020-08-14 DIAGNOSIS — M70.61 TROCHANTERIC BURSITIS OF BOTH HIPS: Primary | ICD-10-CM

## 2020-08-14 DIAGNOSIS — R29.3 POOR POSTURE: ICD-10-CM

## 2020-08-14 DIAGNOSIS — M54.2 NECK PAIN: ICD-10-CM

## 2020-08-14 DIAGNOSIS — M54.50 CHRONIC BILATERAL LOW BACK PAIN WITHOUT SCIATICA: ICD-10-CM

## 2020-08-14 DIAGNOSIS — E66.01 MORBID OBESITY: ICD-10-CM

## 2020-08-14 DIAGNOSIS — M70.62 TROCHANTERIC BURSITIS OF BOTH HIPS: Primary | ICD-10-CM

## 2020-08-14 DIAGNOSIS — G89.29 CHRONIC BILATERAL LOW BACK PAIN WITHOUT SCIATICA: ICD-10-CM

## 2020-08-14 PROCEDURE — 3008F PR BODY MASS INDEX (BMI) DOCUMENTED: ICD-10-PCS | Mod: HCNC,CPTII,S$GLB, | Performed by: PHYSICAL MEDICINE & REHABILITATION

## 2020-08-14 PROCEDURE — 20610 LARGE JOINT ASPIRATION/INJECTION: BILATERAL GREATER TROCHANTERIC BURSA: ICD-10-PCS | Mod: 50,HCNC,S$GLB, | Performed by: PHYSICAL MEDICINE & REHABILITATION

## 2020-08-14 PROCEDURE — 3008F BODY MASS INDEX DOCD: CPT | Mod: HCNC,CPTII,S$GLB, | Performed by: PHYSICAL MEDICINE & REHABILITATION

## 2020-08-14 PROCEDURE — 99999 PR PBB SHADOW E&M-EST. PATIENT-LVL V: ICD-10-PCS | Mod: PBBFAC,HCNC,, | Performed by: PHYSICAL MEDICINE & REHABILITATION

## 2020-08-14 PROCEDURE — 99999 PR PBB SHADOW E&M-EST. PATIENT-LVL V: CPT | Mod: PBBFAC,HCNC,, | Performed by: PHYSICAL MEDICINE & REHABILITATION

## 2020-08-14 PROCEDURE — 99204 PR OFFICE/OUTPT VISIT, NEW, LEVL IV, 45-59 MIN: ICD-10-PCS | Mod: 25,HCNC,S$GLB, | Performed by: PHYSICAL MEDICINE & REHABILITATION

## 2020-08-14 PROCEDURE — 20610 DRAIN/INJ JOINT/BURSA W/O US: CPT | Mod: 50,HCNC,S$GLB, | Performed by: PHYSICAL MEDICINE & REHABILITATION

## 2020-08-14 PROCEDURE — 99204 OFFICE O/P NEW MOD 45 MIN: CPT | Mod: 25,HCNC,S$GLB, | Performed by: PHYSICAL MEDICINE & REHABILITATION

## 2020-08-14 RX ORDER — TRIAMCINOLONE ACETONIDE 40 MG/ML
40 INJECTION, SUSPENSION INTRA-ARTICULAR; INTRAMUSCULAR
Status: DISCONTINUED | OUTPATIENT
Start: 2020-08-14 | End: 2020-08-14 | Stop reason: HOSPADM

## 2020-08-14 RX ADMIN — TRIAMCINOLONE ACETONIDE 40 MG: 40 INJECTION, SUSPENSION INTRA-ARTICULAR; INTRAMUSCULAR at 03:08

## 2020-08-14 NOTE — PROGRESS NOTES
Subjective:      Patient ID: Zaira Dowell is a 49 y.o. female.    Chief Complaint: No chief complaint on file.    Ms dowell is a 50 yo female sent in consultation by Dr. Clark for evaluation of spine pain.  She has been having neck pain and she is going to PT.  She has low back pain more left than right and goes to the hip and the top of legs.  On the outside of the hips.  The pain is the worst on the outside of the hip.  She has pain with sleeping on either side at night.  The pain has been getting worse but has been present for several years.  She was diagnosed with fibromyalgia and always thought it was due to fibromyalgia.  The pain is with bending, twisting, standing, walking, sitting.  Sitting with left leg under the right.  The pain is a burning and throbbing pain.  The pain is 6/10 now, worst 10/10 vacuum and mopping, best 2/10 sitting on couch.  She does some painting and if sitting and doing that it is ok, but stiff when she gets up, and stiff when try to move.  She has not been PT, or chiropractor, or injections or surgery    EMG 5/2020  Chronic deinervation of left C7    X-ray lumbar  No fracture.  No marrow replacement process.  There is multilevel degenerative disc disease, noting disc height loss and endplate changes.  Mild levocurvature noted.  Multiple surgical clips in the right upper quadrant suggestive of prior cholecystectomy.  SI joints unremarkable.     Impression:     No acute findings.    MRI cervical 6/1/20  There is mild straightening of the normal cervical lordosis.  Cervical spine alignment otherwise appears within normal limits.  Cervical vertebral body heights are appropriately maintained.  There is no evidence of diffuse bone marrow placement process.     The cervicomeduallary junction is normal.  The cervical cord is normal in contour, caliber, and signal. Limited views of the soft tissues demonstrate an apparent 6-mm T2 hyperintense right thyroid lobe nodule.  The bilateral  vertebral artery T2 flow voids are present.     C2-3:  No disc herniation, spinal canal narrowing, or neuroforaminal narrowing.     C3-4:  No disc herniation, spinal canal narrowing, or neuroforaminal narrowing.     C4-5:  There is a mild broad-based posterior disc osteophyte complex.  No significant spinal canal stenosis or neural foraminal narrowing.     C5-6:  There is a mild broad-based posterior disc osteophyte complex.  No significant spinal canal stenosis or neural foraminal narrowing.     C6-7:  There is a broad-based posterior disc osteophyte complex with prominent central component resulting in slight effacement of the anterior thecal sac.  Spinal canal remains patent.  There is mild bilateral uncovertebral spurring without significant neural foraminal narrowing.     C7-T1:  No disc herniation, spinal canal narrowing, or neuroforaminal narrowing.     Impression:     Mild degenerative changes of the cervical spine without evidence of significant spinal canal stenosis or neural foraminal narrowing.  Please see above for level by level details.       Past Medical History:  No date: Anxiety  No date: Arthritis  No date: Asthma  No date: BMI 50.0-59.9, adult  No date: Depression  No date: Fibromyalgia  No date: GERD (gastroesophageal reflux disease)  No date: Obesity  No date: SHARON (obstructive sleep apnea)    Past Surgical History:  No date: CARPAL TUNNEL RELEASE  No date: CHOLECYSTECTOMY  5/21/2019: ESOPHAGOGASTRODUODENOSCOPY; N/A      Comment:  Procedure: EGD (ESOPHAGOGASTRODUODENOSCOPY);  Surgeon:                Michelle Mc MD;  Location: [facility];  Service:                Endoscopy;  Laterality: N/A;  No date: HYSTERECTOMY      Comment:  partial   No date: KNEE SURGERY  10/18/2019: LAPAROSCOPIC SLEEVE GASTRECTOMY; N/A      Comment:  Procedure: GASTRECTOMY, SLEEVE, LAPAROSCOPIC, with                intraop EGD;  Surgeon: Ean Kohli MD;                 Location: [facility] OR [address] [place] [address];  Service:  General;                 Laterality: N/A;  No date: pinched nerve  No date: SHOULDER SURGERY  No date: TONSILLECTOMY  No date: WRIST SURGERY      Comment:  had ganlin cyst    Review of patient's family history indicates:  Problem: Diabetes      Relation: Mother          Age of Onset: (Not Specified)  Problem: [place]'s disease      Relation: Mother          Age of Onset: (Not Specified)  Problem: Hypertension      Relation: Mother          Age of Onset: (Not Specified)  Problem: Heart disease      Relation: Father          Age of Onset: (Not Specified)  Problem: Stroke      Relation: Father          Age of Onset: (Not Specified)  Problem: Mental illness      Relation: Father          Age of Onset: (Not Specified)  Problem: Hypertension      Relation: Father          Age of Onset: (Not Specified)  Problem: Diabetes      Relation: Father          Age of Onset: (Not Specified)  Problem: Depression      Relation: Father          Age of Onset: (Not Specified)  Problem: Heart disease      Relation: Maternal Grandmother          Age of Onset: (Not Specified)  Problem: Depression      Relation: Maternal Grandmother          Age of Onset: (Not Specified)  Problem: COPD      Relation: Maternal Grandfather          Age of Onset: (Not Specified)  Problem: Heart disease      Relation: Maternal Grandfather          Age of Onset: (Not Specified)  Problem: Stroke      Relation: Maternal Grandfather          Age of Onset: (Not Specified)  Problem: Kidney disease      Relation: Paternal Grandmother          Age of Onset: (Not Specified)  Problem: Heart disease      Relation: Paternal Grandmother          Age of Onset: (Not Specified)  Problem: Heart disease      Relation: Paternal Grandfather          Age of Onset: (Not Specified)  Problem: No Known Problems      Relation: Brother          Age of Onset: (Not Specified)  Problem: Hypertension      Relation: Son          Age of Onset: (Not Specified)      Social History     Socioeconomic History      Marital status:       Spouse name: Not on file      Number of children: Not on file      Years of education: Not on file      Highest education level: Not on file    Occupational History      Not on file    Social Needs      Financial resource strain: Not very hard      Food insecurity        Worry: Never true        Inability: Never true      Transportation needs        Medical: No        Non-medical: No    Tobacco Use      Smoking status: Current Some Day Smoker        Types: Vaping with nicotine, Cigarettes      Smokeless tobacco: Never Used    Substance and Sexual Activity      Alcohol use: No        Frequency: Monthly or less        Drinks per session: 1 or 2        Binge frequency: Never      Drug use: No      Sexual activity: Yes        Partners: Male    Lifestyle      Physical activity        Days per week: 1 day        Minutes per session: 30 min      Stress: Rather much    Relationships      Social connections        Talks on phone: More than three times a week        Gets together: Patient refused        Attends Denominational service: Not on file        Active member of club or organization: No        Attends meetings of clubs or organizations: Never        Relationship status:     Other Topics      Concerns:        Not on file    Social History Narrative      Not on file      Current Outpatient Medications:  albuterol (PROVENTIL/VENTOLIN HFA) 90 mcg/actuation inhaler, Inhale 2 puffs into the lungs every 6 (six) hours as needed for Wheezing. Rescue, Disp: 18 g, Rfl: 2  allopurinoL (ZYLOPRIM) 100 MG tablet, Take 1 tablet (100 mg total) by mouth once daily., Disp: 90 tablet, Rfl: 1  b complex vitamins tablet, Take 1 tablet by mouth once daily., Disp: , Rfl:    calcium citrate (CALCITRATE) 200 mg (950 mg) tablet, Take 1 tablet by mouth 2 (two) times daily., Disp: , Rfl:   cyanocobalamin 500 MCG tablet, Take 500 mcg by mouth once daily., Disp: , Rfl:   ferrous fumarate/vit  Bcomp,C (SUPER B COMPLEX ORAL), Take 1 capsule by mouth once daily., Disp: , Rfl:   fluticasone propionate (FLONASE) 50 mcg/actuation nasal spray, 1 spray (50 mcg total) by Each Nostril route once daily., Disp: 16 g, Rfl: 3  furosemide (LASIX) 40 MG tablet, Take 1 tablet (40 mg total) by mouth once daily., Disp: 30 tablet, Rfl: 2  gabapentin (NEURONTIN) 800 MG tablet, Take 1 tablet (800 mg total) by mouth once daily., Disp: 90 tablet, Rfl: 2  lithium (LITHOTAB) 300 mg tablet, Take 1 tablet (300 mg total) by mouth 3 (three) times daily., Disp: 270 tablet, Rfl: 1  LORazepam (ATIVAN) 1 MG tablet, Take 1 tablet (1 mg total) by mouth as needed for Anxiety., Disp: 30 tablet, Rfl: 2  meclizine (ANTIVERT) 25 mg tablet, Take 1 tablet (25 mg total) by mouth 3 (three) times daily as needed for Dizziness or Nausea., Disp: 30 tablet, Rfl: 0  multivitamin capsule, Take 1 capsule by mouth 2 (two) times daily. , Disp: , Rfl:   omeprazole (PRILOSEC) 40 MG capsule, Take 1 capsule (40 mg total) by mouth 2 (two) times daily before meals., Disp: 180 capsule, Rfl: 0  ondansetron (ZOFRAN-ODT) 8 MG TbDL, Dissolve 1 tablet (8 mg total) by mouth every 6 (six) hours as needed., Disp: 30 tablet, Rfl: 0  polyethylene glycol (GLYCOLAX) 17 gram/dose powder, Mix 1 capful (17 g) with fluids and drink by mouth once daily., Disp: 510 g, Rfl: 3  potassium chloride (KLOR-CON) 10 MEQ TbSR, Take 10 mEq by mouth 2 (two) times daily., Disp: , Rfl:   risperiDONE (RISPERDAL) 0.5 MG Tab, Take 1 tablet (0.5 mg total) by mouth 2 (two) times daily., Disp: 180 tablet, Rfl: 1  tolterodine (DETROL LA) 4 MG 24 hr capsule, Take 1 capsule (4 mg total) by mouth once daily., Disp: 30 capsule, Rfl: 11  topiramate (TOPAMAX) 200 MG Tab, Take 1 tablet (200 mg total) by mouth once daily., Disp: 90 tablet, Rfl: 1  sertraline (ZOLOFT) 100 MG tablet, Take 2 tablets (200 mg total) by mouth once daily., Disp: 180 tablet, Rfl: 2    No current facility-administered medications for  this visit.       Review of patient's allergies indicates:   -- Hydrocodone-acetaminophen -- Nausea And Vomiting        Review of Systems   Constitution: Negative for weight gain and weight loss.   Cardiovascular: Negative for chest pain.   Respiratory: Negative for shortness of breath.    Musculoskeletal: Positive for back pain and neck pain. Negative for joint pain and joint swelling.   Gastrointestinal: Negative for abdominal pain, bowel incontinence, nausea and vomiting.   Genitourinary: Negative for bladder incontinence.   Neurological: Positive for numbness (hands).         Objective:        General: Zaira is well-developed, well-nourished, appears stated age, in no acute distress, alert and oriented to time, place and person.     General    Vitals reviewed.  Constitutional: She is oriented to person, place, and time. She appears well-developed and well-nourished.   HENT:   Head: Normocephalic and atraumatic.   Pulmonary/Chest: Effort normal.   Neurological: She is alert and oriented to person, place, and time.   Psychiatric: She has a normal mood and affect. Her behavior is normal. Judgment and thought content normal.     General Musculoskeletal Exam   Gait: normal (slow)     Right Ankle/Foot Exam     Tests   Heel Walk: able to perform  Tiptoe Walk: able to perform    Left Ankle/Foot Exam     Tests   Heel Walk: able to perform  Tiptoe Walk: able to perform      Right Hip Exam     Tenderness  Also right side trochanteric tenderness.  Left Hip Exam     Tenderness  Also left side trochanteric tenderness.      Back (L-Spine & T-Spine) / Neck (C-Spine) Exam     Tenderness   The patient is tender to palpation of the right side trochanteric and left side trochanteric. Right paramedian tenderness of the Sacrum and Lower L-Spine. Left paramedian tenderness of the Lower L-Spine and Sacrum.     Back (L-Spine & T-Spine) Range of Motion   Extension: 20 (with pain)   Flexion: 80 (with pain)   Lateral bend right: 10    Lateral bend left: 10     Spinal Sensation   Right Side Sensation  C-Spine Level: normal   L-Spine Level: normal  S-Spine Level: normal  Left Side Sensation  C-Spine Level: normal  L-Spine Level: normal  S-Spine Level: normal    Back (L-Spine & T-Spine) Tests   Right Side Tests  Straight leg raise:      Sitting SLR: > 70 degrees      Left Side Tests  Straight leg raise:     Sitting SLR: > 70 degrees          Other She has no scoliosis .  Spinal Kyphosis:  Absent      Muscle Strength   Right Upper Extremity   Biceps: 5/5/5   Deltoid:  5/5  Triceps:  5/5  Wrist extension: 5/5/5   Finger Flexors:  5/5  Left Upper Extremity  Biceps: 5/5/5   Deltoid:  5/5  Triceps:  5/5  Wrist extension: 5/5/5   Finger Flexors:  5/5  Right Lower Extremity   Hip Flexion: 5/5   Quadriceps:  5/5   Anterior tibial:  5/5/5  EHL:  5/5  Left Lower Extremity   Hip Flexion: 5/5   Quadriceps:  5/5   Anterior tibial:  5/5/5   EHL:  5/5    Reflexes     Left Side  Biceps:  2+  Triceps:  2+  Brachioradialis:  2+  Quadriceps:  2+  Achilles:  2+  Left Hood's Sign:  Absent  Babinski Sign:  absent    Right Side   Biceps:  2+  Triceps:  2+  Brachioradialis:  2+  Quadriceps:  2+  Achilles:  2+  Right Hood's Sign:  absent  Babinski Sign:  absent    Vascular Exam     Right Pulses        Carotid:                  2+    Left Pulses        Carotid:                  2+              Assessment:       1. Trochanteric bursitis of both hips    2. Neck pain    3. Chronic bilateral low back pain without sciatica    4. Muscle spasm    5. Morbid obesity    6. Poor posture           Plan:       Orders Placed This Encounter    Large Joint Aspiration/Injection: bilateral greater trochanteric bursa    Ambulatory referral/consult to Physical/Occupational Therapy     1. We discussed back pain and the nature of back pain.  We discussed that it is not one thing that causes the pain but an accumulation of multiple things that we do.  We discussed the x-ray and mild  degenerative changes.  We discussed the bursitis and the pain that bothers her the most. Today and trying injections.  She had right trochanteric bursa 1/2019 with relief  2. We discussed posture sitting and the importance of trying to sit better.  We discussed working on posture and getting shoulders back and trying to take breaks.  We discussed posture important for neck and lower back  3. We discussed the benefits of therapy and exercise and continuing to move.  She is in therapy for her neck.  A regular exercise program is helpful  4. Greater trochanteric bursa injections bilateral.  A time out was performed, the correct patient, procedure, site, and position confirmed.  See procedure note  5. She cannot have nsaids due to gastric sleeve  6. Baclofen made her too sleepy  7. PT for back and core strengthening, ITB stretches glute and quad strengthening and HEP.  She is currently in therapy for neck.  We discuss adding back to neck or starting back after neck.  Najma starks  8. RTC 3 months    More than 50% of the total time  of 45 minutes was spent face to face in counseling on diagnosis and treatment options. I also counseled patient  on common and most usual side effect of prescribed medications.  I reviewed Primary care , and other specialty's notes to better coordinate patient's care. All questions were answered, and patient voiced understanding.     A consultation note will be sent to Dr. Clark through epic.  Thanks for the consult    Follow-up: Follow up in about 3 months (around 11/14/2020). If there are any questions prior to this, the patient was instructed to contact the office.

## 2020-08-14 NOTE — PROCEDURES
Large Joint Aspiration/Injection: bilateral greater trochanteric bursa    Date/Time: 8/14/2020 3:09 PM  Performed by: Wilma Moralez MD  Authorized by: Wilma Moralez MD     Consent Done?:  Yes (Verbal)  Indications:  Pain  Site marked: the procedure site was marked    Timeout: prior to procedure the correct patient, procedure, and site was verified    Prep: patient was prepped and draped in usual sterile fashion      Local anesthesia used?: Yes    Local anesthetic:  Lidocaine 1% with epinephrine  Anesthetic total (ml):  4      Details:  Needle Size:  22 G  Ultrasonic Guidance for needle placement?: No    Approach:  Lateral  Location:  Hip  Laterality:  Bilateral  Site:  Bilateral greater trochanteric bursa  Medications (Right):  40 mg triamcinolone acetonide 40 mg/mL  Medications (Left):  40 mg triamcinolone acetonide 40 mg/mL  Patient tolerance:  Patient tolerated the procedure well with no immediate complications

## 2020-08-14 NOTE — PROGRESS NOTES
Chart reviewed.   Immunizations: Triggered Imm Registry     Orders placed: n/a  Upcoming appts to satisfy FORTUNATO topics: n/a

## 2020-08-14 NOTE — LETTER
August 14, 2020      Swapna Clark MD  [address]  [address]  [facility] [address]           [facility]  [address], [address]  [facility] [address]  Phone: [phone]  Fax: [phone]          Patient: Zaira Dowell   MR Number: [id number]   YOB: 1970   Date of Visit: 8/14/2020       Dear Dr. Swapna Clark:    Thank you for referring Zaira Dowell to me for evaluation. Attached you will find relevant portions of my assessment and plan of care.    If you have questions, please do not hesitate to call me. I look forward to following Zaira Dowell along with you.    Sincerely,    Wilma Moralez MD    Enclosure  CC:  No Recipients    If you would like to receive this communication electronically, please contact [email or web address] or [phone] to request more information on [employer] access.    For providers and/or their staff who would like to refer a patient to [facility], please contact us through our one-stop-shop provider referral line, [facility], [place], at [phone].    If you feel you have received this communication in error or would no longer like to receive these types of communications, please e-mail [email or web address]

## 2020-08-18 NOTE — PROGRESS NOTES
Physical Therapy Treatment Note     Name: Zaira Dowell  Clinic Number: [id number]    Therapy Diagnosis:   Encounter Diagnoses   Name Primary?    Poor posture Yes    Neck pain     Muscle weakness of upper extremity      Physician: Jerson Arriola MD    Visit Date: 8/19/2020    Physician Orders: PT Eval and Treat   Medical Diagnosis from Referral: Cervical radiculopathy  Evaluation Date: 6/29/2020  Last PN: 7/28/20 (visit 3)  Authorization Period Expiration: 12/2/20  Plan of Care Expiration: 9/29/20  Visit # / Visits authorized: 4/ 8 (visit 6)    Time In: 1000  Time Out: 1043  Total Billable Time: 43 minutes    Precautions: fibromyalgia     Subjective     Pt reports: she went to the spine MD and was told she had B hip bursitis. She got injections in B hips last week with some improvements but continues with pain. Plans to have PT evaluation in 2 weeks for B hip pain.   She was not compliant with home exercise program.   Response to previous treatment: good with some relief  Functional change: none to note     Pain: 3/10   Location: bilateral neck      Objective     Taken 7/28/20:  Cervical flexion AROM: 45 deg    Zaira received therapeutic exercises (bold performed) to develop strength, endurance, ROM, flexibility, posture and core stabilization for 33 minutes including:    Standing UBE 2'F/2'B  Scapular retraction x 20  Supine pec stretch w towel roll horizontally and hands behind head x 3 min  Suboccipital self-mob w towel roll x 20 ea  +Cervical isometric ext and B SB w RTB x 20 ea  +Freemotion rows 3# x 20  +Levator stretch w overpressure 3 x 30 sec ea  +Seated cervical ext w rot x 20 ea  Supine sh flexion AROM w dowel x 20  Upper trap stretch w overpressure 3 x 30 sec ea  Cervical Side Bending RTB x 30 ea  Ball on Wall Cervical Rotation x 3'  Scalenes stretch w overpressure 3 x 30 sec ea  Seated thoracic ext w ball x 20  Rows w RTB x 20  Ext w RTB x 30  Supine B horiz abd w RTB x 20  B sh ext w RTB x  20  B sh ER w RTB x 20    Zaira received the following manual therapy techniques x 10 minutes:    STM to R upper trap, suboccipital region, and cervical paraspinals  Cervical distraction    Home Exercises Provided and Patient Education Provided     Education provided:   - importance of proper upright posture   - importance of performing HEP    Written Home Exercises Provided: Patient instructed to cont prior HEP.   Exercises were reviewed and Zaira was able to demonstrate them prior to the end of the session.  Zaira demonstrated good  understanding of the education provided.     See EMR under Patient Instructions for exercises provided 7/16/2020.    Assessment     Pt had good tolerance to treatment today with no adverse effects. Post-treatment neck pain rated as 5/10. Pt with good response to progression of exercise program. Tenderness noted in B suboccipital region with manual therapy techniques despite gentle pressure. No relief with cervical distraction. Pt requiring frequent verbal cueing for proper upright posture. Will continue to progress postural strengthening. Emphasized importance of performing HEP.     Zaira is progressing well towards her goals.   Pt prognosis is Good.     Pt will continue to benefit from skilled outpatient physical therapy to address the deficits listed in the problem list box on initial evaluation, provide pt/family education and to maximize pt's level of independence in the home and community environment.     Pt's spiritual, cultural and educational needs considered and pt agreeable to plan of care and goals.     Anticipated barriers to physical therapy: none    GOALS: Short Term Goals:  6 weeks   1. Report decreased neck pain  <  / =  5/10 at worst to increase tolerance for sleep.- in progress  2. Pt will increase cervical flexion AROM to 45 deg to indicate improved mobility.- met 7/28/20   3. Pt will be able to demonstrate proper upright posture without verbal cueing to  decrease abnormal spinal stresses.- met 7/28/20  4. Pt will be able to tolerate multi-directional UE strengthening without pain to improve functional use of UEs.- in progress  5. Pt to tolerate HEP to improve ROM and independence with ADL's. - in progress     Long Term Goals: 12 weeks in progress  1. Report decreased neck pain  <  / =  3/10 at worst to increase tolerance for sleep.  2. Pt will increase cervical B rotation AROM to 60 deg to indicate improved mobility.   3. Pt will be able to perform 2 x 10 shoulder flexion with 2 lbs to indicate improved strength in B UEs.  4. Pt to be Independent with HEP to improve ROM and independence with ADL's.    Plan     Progress postural strengthening.     Annemarie Ivan, PT

## 2020-08-19 ENCOUNTER — CLINICAL SUPPORT (OUTPATIENT)
Dept: REHABILITATION | Facility: HOSPITAL | Age: 50
End: 2020-08-19
Attending: NEUROLOGICAL SURGERY
Payer: MEDICARE

## 2020-08-19 DIAGNOSIS — M62.81 MUSCLE WEAKNESS OF UPPER EXTREMITY: ICD-10-CM

## 2020-08-19 DIAGNOSIS — R29.3 POOR POSTURE: Primary | ICD-10-CM

## 2020-08-19 DIAGNOSIS — M54.2 NECK PAIN: ICD-10-CM

## 2020-08-19 PROCEDURE — 97110 THERAPEUTIC EXERCISES: CPT | Mod: HCNC,PN

## 2020-08-19 PROCEDURE — 97140 MANUAL THERAPY 1/> REGIONS: CPT | Mod: HCNC,PN

## 2020-09-01 NOTE — PROGRESS NOTES
Physical Therapy Treatment Note     Name: Zaira Dowell  Clinic Number: [id number]    Therapy Diagnosis:   Encounter Diagnoses   Name Primary?    Poor posture Yes    Neck pain     Muscle weakness of upper extremity      Physician: Jerson Arriola MD    Visit Date: 9/2/2020    Physician Orders: PT Eval and Treat   Medical Diagnosis from Referral: Cervical radiculopathy  Evaluation Date: 6/29/2020  Last PN: 9/2/20 (visit 7)  Authorization Period Expiration: 12/2/20  Plan of Care Expiration: 9/29/20  Visit # / Visits authorized: 5/ 8 (visit 7)    Time In: 1001  Time Out: 1047  Total Billable Time: 46 minutes    Precautions: fibromyalgia     Subjective     Pt reports: her neck has been feeling good. Pain is at worst 6/10.   She was compliant with home exercise program.   Response to previous treatment: good with soreness that resolved  Functional change: more range when turning neck    Pain: 0/10   Location: bilateral neck      Objective     Taken 9/2/20:  Cervical rotation AROM: R 60 deg, L 60 deg    Zaira received therapeutic exercises (bold performed) to develop strength, endurance, ROM, flexibility, posture and core stabilization for 36 minutes including:    Standing UBE 2'F/2'B level 2  Scapular retraction x 20  Supine pec stretch w towel roll horizontally and hands behind head x 3 min  +Corner stretch 3 x 30 sec  Suboccipital self-mob w towel roll x 20 ea  Cervical isometric ext and B SB w RTB x 20 ea  Freemotion rows 3# x 20  +Freemotion B sh ext 3# x 20  Levator stretch w overpressure 3 x 30 sec ea  Seated cervical ext w rot x 20 ea  Supine sh flexion AROM w dowel x 20  Upper trap stretch w overpressure 3 x 30 sec ea  Cervical Side Bending RTB x 30 ea  Ball on Wall Cervical Rotation x 3'  Scalenes stretch w overpressure 3 x 30 sec ea  Seated thoracic ext w ball x 20  Rows w RTB x 20  Ext w RTB x 30  Supine B horiz abd w RTB x 20  B sh ext w RTB x 20  B sh ER w RTB x 20    Zaira received the  following manual therapy techniques x 10 minutes:    STM to R upper trap, suboccipital region, and cervical paraspinals  Cervical distraction    Home Exercises Provided and Patient Education Provided     Education provided:   - importance of proper upright posture   - importance of performing HEP    Written Home Exercises Provided: Patient instructed to cont prior HEP.   Exercises were reviewed and Zaira was able to demonstrate them prior to the end of the session.  Zaira demonstrated good  understanding of the education provided.     See EMR under Patient Instructions for exercises provided 7/16/2020.    CMS Impairment/Limitation/Restriction for FOTO neck Survey    Therapist reviewed FOTO scores for Zaira Dowell on 9/2/2020.   FOTO documents entered into [employer] - see Media section.    Limitation Score: 47%     Assessment     Zaira was re-assessed today with 4/5 STGs and 1/4 LTGs being met indicating improvements in and B cervical AROM and tolerance for UE strengthening and HEP since start of care. She continues with cervical pain, poor posture, postural weakness, and decreased functional mobility. Pt could benefit from continued physical therapy services to address deficits.     Pt had good tolerance to treatment today with no adverse effects. Post-treatment neck pain rated as 0/10 but with soreness. Pt with good tolerance to progression of exercise program. She required frequent verbal and manual cueing for posture correction throughout session. Pt with soft tissue dysfunction in L upper trap with improvements following manual therapy techniques. Significant stretch felt in B pecs with corner stretch.     Zaira is progressing well towards her goals.   Pt prognosis is Good.     Pt will continue to benefit from skilled outpatient physical therapy to address the deficits listed in the problem list box on initial evaluation, provide pt/family education and to maximize pt's level of independence in the home  and community environment.     Pt's spiritual, cultural and educational needs considered and pt agreeable to plan of care and goals.     Anticipated barriers to physical therapy: none    GOALS: Short Term Goals:  6 weeks   1. Report decreased neck pain  <  / =  5/10 at worst to increase tolerance for sleep.- in progress  2. Pt will increase cervical flexion AROM to 45 deg to indicate improved mobility.- met 7/28/20   3. Pt will be able to demonstrate proper upright posture without verbal cueing to decrease abnormal spinal stresses.- met 7/28/20  4. Pt will be able to tolerate multi-directional UE strengthening without pain to improve functional use of UEs.- met 9/2/20  5. Pt to tolerate HEP to improve ROM and independence with ADL's. - met 9/2/20     Long Term Goals: 12 weeks   1. Report decreased neck pain  <  / =  3/10 at worst to increase tolerance for sleep.- in progress  2. Pt will increase cervical B rotation AROM to 60 deg to indicate improved mobility.- met 9/2/20   3. Pt will be able to perform 2 x 10 shoulder flexion with 2 lbs to indicate improved strength in B UEs.- in progress  4. Pt to be Independent with HEP to improve ROM and independence with ADL's.- in progress    Plan     Progress postural strengthening.     Annemarie Ivan, PT

## 2020-09-02 ENCOUNTER — CLINICAL SUPPORT (OUTPATIENT)
Dept: REHABILITATION | Facility: HOSPITAL | Age: 50
End: 2020-09-02
Attending: PHYSICAL MEDICINE & REHABILITATION
Payer: MEDICARE

## 2020-09-02 DIAGNOSIS — M62.81 MUSCLE WEAKNESS OF UPPER EXTREMITY: ICD-10-CM

## 2020-09-02 DIAGNOSIS — R29.3 POOR POSTURE: Primary | ICD-10-CM

## 2020-09-02 DIAGNOSIS — M54.2 NECK PAIN: ICD-10-CM

## 2020-09-02 PROCEDURE — 97110 THERAPEUTIC EXERCISES: CPT | Mod: HCNC,PN

## 2020-09-02 PROCEDURE — 97140 MANUAL THERAPY 1/> REGIONS: CPT | Mod: HCNC,PN

## 2020-09-03 ENCOUNTER — CLINICAL SUPPORT (OUTPATIENT)
Dept: REHABILITATION | Facility: HOSPITAL | Age: 50
End: 2020-09-03
Attending: PHYSICAL MEDICINE & REHABILITATION
Payer: MEDICARE

## 2020-09-03 DIAGNOSIS — M25.552 HIP PAIN, BILATERAL: ICD-10-CM

## 2020-09-03 DIAGNOSIS — R26.89 ANTALGIC GAIT: ICD-10-CM

## 2020-09-03 DIAGNOSIS — M25.551 HIP PAIN, BILATERAL: ICD-10-CM

## 2020-09-03 DIAGNOSIS — M54.50 LOW BACK PAIN AT MULTIPLE SITES: ICD-10-CM

## 2020-09-03 PROCEDURE — 97110 THERAPEUTIC EXERCISES: CPT | Mod: HCNC,PN

## 2020-09-03 PROCEDURE — 97162 PT EVAL MOD COMPLEX 30 MIN: CPT | Mod: HCNC,PN

## 2020-09-03 NOTE — PROGRESS NOTES
[facility]  Physical Therapy Initial Evaluation  See full PT evaluation in Plan of Care
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
## 2020-09-09 ENCOUNTER — OFFICE VISIT (OUTPATIENT)
Dept: FAMILY MEDICINE | Facility: CLINIC | Age: 50
End: 2020-09-09
Payer: MEDICARE

## 2020-09-09 VITALS
BODY MASS INDEX: 41.39 KG/M2 | TEMPERATURE: 97 F | WEIGHT: 263.69 LBS | SYSTOLIC BLOOD PRESSURE: 102 MMHG | HEIGHT: 67 IN | RESPIRATION RATE: 18 BRPM | DIASTOLIC BLOOD PRESSURE: 74 MMHG | OXYGEN SATURATION: 97 % | HEART RATE: 54 BPM

## 2020-09-09 DIAGNOSIS — J01.90 ACUTE SINUSITIS, RECURRENCE NOT SPECIFIED, UNSPECIFIED LOCATION: Primary | ICD-10-CM

## 2020-09-09 DIAGNOSIS — R04.0 EPISTAXIS: ICD-10-CM

## 2020-09-09 PROCEDURE — 99999 PR PBB SHADOW E&M-EST. PATIENT-LVL V: CPT | Mod: PBBFAC,HCNC,, | Performed by: NURSE PRACTITIONER

## 2020-09-09 PROCEDURE — 99214 PR OFFICE/OUTPT VISIT, EST, LEVL IV, 30-39 MIN: ICD-10-PCS | Mod: HCNC,25,S$GLB, | Performed by: NURSE PRACTITIONER

## 2020-09-09 PROCEDURE — 3008F PR BODY MASS INDEX (BMI) DOCUMENTED: ICD-10-PCS | Mod: HCNC,CPTII,S$GLB, | Performed by: NURSE PRACTITIONER

## 2020-09-09 PROCEDURE — 99999 PR PBB SHADOW E&M-EST. PATIENT-LVL V: ICD-10-PCS | Mod: PBBFAC,HCNC,, | Performed by: NURSE PRACTITIONER

## 2020-09-09 PROCEDURE — 99214 OFFICE O/P EST MOD 30 MIN: CPT | Mod: HCNC,25,S$GLB, | Performed by: NURSE PRACTITIONER

## 2020-09-09 PROCEDURE — 96372 THER/PROPH/DIAG INJ SC/IM: CPT | Mod: HCNC,S$GLB,, | Performed by: NURSE PRACTITIONER

## 2020-09-09 PROCEDURE — 96372 PR INJECTION,THERAP/PROPH/DIAG2ST, IM OR SUBCUT: ICD-10-PCS | Mod: HCNC,S$GLB,, | Performed by: NURSE PRACTITIONER

## 2020-09-09 PROCEDURE — 3008F BODY MASS INDEX DOCD: CPT | Mod: HCNC,CPTII,S$GLB, | Performed by: NURSE PRACTITIONER

## 2020-09-09 NOTE — PROGRESS NOTES
Pt tolerated Solu-medrol injection well. Instructed to wait in the clinic for 15 minutes and report any adverse effects immediately to the nurse. Verbalized understanding.

## 2020-09-09 NOTE — PATIENT INSTRUCTIONS
Use saline nasal spray, if bleeding continues, stop all nasal sprays    Message in 5-7 days if no relief or worsening of symptoms    Go to ED or urgent care if you develop a severe nosebleed or are swallowing blood      Nosebleed (Adult)    Bleeding from the nose most commonly occurs because of injury or drying and cracking of the inner lining of the nose. Most nosebleeds are because of dry air or nose-picking. They can occur during a common cold or an allergy attack. They can also occur on a very hot day, or from dry air in the winter.  If the bleeding site is found, it may be cauterized. This means it is treated to cause a blood clot to form. This may be done with a chemical, heat, or electricity. If the bleeding continues after the site is cauterized, or if the site cannot be found, packing may be put in your nose. This is to apply pressure and stop the bleeding. The packing may be made of gauze or sponge. A small balloon catheter is sometimes used. These must be removed by your doctor. Some types of packing dissolve on their own.  Home care  · If packing was put in your nose, unless told otherwise, do not pull on it or try to remove it yourself. You will be given an appointment to have it removed. You may also have been given antibiotics to prevent a sinus infection. If so, finish all of the medicine.  · Do not blow your nose for 12 hours after the bleeding stops. This will allow a strong blood clot to form. Do not pick your nose. This may restart bleeding.  · Avoid drinking alcohol and hot liquids for the next 2 days. Alcohol or hot liquids in your mouth can dilate blood vessels in your nose. This can cause bleeding to start again.  · Do not take ibuprofen, naproxen, or medicines that contain aspirin. These thin the blood and may cause your nose to bleed. You may take acetaminophen for pain, unless another pain medicine was prescribed.  · If the bleeding starts again, sit up and lean forward to prevent  swallowing blood. Pinch your nose tightly on both sides, as shown above, for 10 to 15 minutes. Time yourself. Dont release the pressure on your nose until 10 minutes is up. If bleeding does not stop, continue to pinch your nose and call your healthcare provider or return to this facility.  · If you have a cold, allergies, or dry nasal membranes, lubricate the nasal passages. Apply a small amount of petroleum jelly inside the nose with a cotton swab twice a day (morning and night).  · Avoid overheating your home. This can dry the air and make your condition worse.  · Put a humidifier in the room where you sleep. This will add moisture to the air.  · Use a saline nasal spray to keep nasal passages moist.  · Do not pick your nose. Keep fingernails trimmed to decrease risk of bleeds.  · Do not smoke.  Follow-up care  Follow up with your healthcare provider, or as advised. Nasal packing should be rechecked or removed within 2 to 3 days.  When to seek medical advice  Call your healthcare provider right away if any of these occur.  · You have another nosebleed that you cannot control  · Dizziness, weakness, or fainting  · You become tired or confused  · Fever of 100.4ºF (38ºC) or higher, or as directed by your healthcare provider  · Headache  · Sinus or facial pain  · Shortness of breath or trouble breathing  Date Last Reviewed: 3/22/2015  © 1151-1996 [employer]. [address] [place] [address], [place] [address]. All rights reserved. This information is not intended as a substitute for professional medical care. Always follow your healthcare professional's instructions.            Acute Sinusitis    Acute sinusitis is irritation and swelling of the sinuses. It is usually caused by a viral infection after a common cold. Your doctor can help you find relief.  What is acute sinusitis?  Sinuses are air-filled spaces in the skull behind the face. They are kept moist and clean by a lining of mucosa. Things such as  pollen, smoke, and chemical fumes can irritate the mucosa. It can then swell up. As a response to irritation, the mucosa makes more mucus and other fluids. Tiny hairlike cilia cover the mucosa. Cilia help carry mucus toward the opening of the sinus. Too much mucus may cause the cilia to stop working. This blocks the sinus opening. A buildup of fluid in the sinuses then causes pain and pressure. It can also encourage bacteria to grow in the sinuses.  Common symptoms of acute sinusitis  You may have:  · Facial soreness pain  · Headache  · Fever  · Fluid draining in the back of the throat (postnasal drip)  · Congestion  · Drainage that is thick and colored, instead of clear  · Cough  Diagnosing acute sinusitis  Your doctor will ask about your symptoms and health history. He or she will look at your ear, nose, and throat. You usually won't need to have X-rays taken.    The doctor may take a sample of mucus to check for bacteria. If you have sinusitis that keeps coming back, you may need imaging tests such as X-rays or CAT scans. This will help your doctor check for a structural problem that may be causing the infection.  Treating acute sinusitis  Treatment is aimed at unblocking the sinus opening and helping the cilia work again. You may need to take antihistamine and decongestant medicine. These can reduce inflammation and decrease the amount of fluid your sinuses make. If you have a bacterial infection, you will need to take antibiotic medicine for 10 to 14 days. Take this medicine until it is gone, even if you feel better.  Date Last Reviewed: 10/1/2016  © 9506-6171 [employer]. [address] [place] [address], [place] [address]. All rights reserved. This information is not intended as a substitute for professional medical care. Always follow your healthcare professional's instructions.

## 2020-09-09 NOTE — PROGRESS NOTES
Routine Office Visit    Patient Name: Zaira Dowell    : 1970  MRN: [id number]    Chief Complaint:  Nasal congestion    Subjective:  Zaira is a 49 y.o. female who presents today for:    1.  Nasal congestion - patient reports today to discuss some ENT symptoms.  For the last 2-3 days she has had nasal congestion, bilateral sinus tenderness, and an intermittent frontal headache all of which she believes are due to sinus inflammation.  She denies any fevers, but does report some slight chills.  No myalgias.  No recent sick contacts.  No alterations in sense of taste or smell.  She states that sometimes she has a runny nose and postnasal drip, but sometimes her nose is dry.  She endorses a few episodes of epistaxis in the last couple of days when blowing her nose which last for 1-2 minutes at a time and resolve with nasal pressure.  She denies swallowing any blood or any profuse bleeding.   She has not used any nasal sprays in the last couple of days.  She also states that she has had some intermittent dizziness and nausea in the last couple of days. She has not tried anything specific for the symptoms.     Past Medical History  Past Medical History:   Diagnosis Date    Anxiety     Arthritis     Asthma     BMI 50.0-59.9, adult     Depression     Fibromyalgia     GERD (gastroesophageal reflux disease)     Obesity     SHARON (obstructive sleep apnea)        Past Surgical History  Past Surgical History:   Procedure Laterality Date    CARPAL TUNNEL RELEASE      CHOLECYSTECTOMY      ESOPHAGOGASTRODUODENOSCOPY N/A 2019    Procedure: EGD (ESOPHAGOGASTRODUODENOSCOPY);  Surgeon: Michelle Mc MD;  Location[facility];  Service: Endoscopy;  Laterality: N/A;    HYSTERECTOMY      partial     KNEE SURGERY      LAPAROSCOPIC SLEEVE GASTRECTOMY N/A 10/18/2019    Procedure: GASTRECTOMY, SLEEVE, LAPAROSCOPIC, with intraop EGD;  Surgeon: Ean Kohli MD;  Location: [facility] OR [address] [place] [address];  Service:  General;  Laterality: N/A;    pinched nerve      SHOULDER SURGERY      TONSILLECTOMY      WRIST SURGERY      had ganlin cyst       Family History  Family History   Problem Relation Age of Onset    Diabetes Mother     Jose Juan's disease Mother     Hypertension Mother     Heart disease Father     Stroke Father     Mental illness Father     Hypertension Father     Diabetes Father     Depression Father     Heart disease Maternal Grandmother     Depression Maternal Grandmother     COPD Maternal Grandfather     Heart disease Maternal Grandfather     Stroke Maternal Grandfather     Kidney disease Paternal Grandmother     Heart disease Paternal Grandmother     Heart disease Paternal Grandfather     No Known Problems Brother     Hypertension Son        Social History  Social History     Socioeconomic History    Marital status:      Spouse name: Not on file    Number of children: Not on file    Years of education: Not on file    Highest education level: Not on file   Occupational History    Not on file   Social Needs    Financial resource strain: Not very hard    Food insecurity     Worry: Never true     Inability: Never true    Transportation needs     Medical: No     Non-medical: No   Tobacco Use    Smoking status: Current Some Day Smoker     Types: Vaping with nicotine, Cigarettes    Smokeless tobacco: Never Used   Substance and Sexual Activity    Alcohol use: No     Frequency: Monthly or less     Drinks per session: 1 or 2     Binge frequency: Never    Drug use: No    Sexual activity: Yes     Partners: Male   Lifestyle    Physical activity     Days per week: 1 day     Minutes per session: 30 min    Stress: Rather much   Relationships    Social connections     Talks on phone: More than three times a week     Gets together: Patient refused     Attends Caodaism service: Not on file     Active member of club or organization: No     Attends meetings of clubs or organizations: Never      Relationship status:    Other Topics Concern    Not on file   Social History Narrative    Not on file       Current Medications  Current Outpatient Medications on File Prior to Visit   Medication Sig Dispense Refill    albuterol (PROVENTIL/VENTOLIN HFA) 90 mcg/actuation inhaler Inhale 2 puffs into the lungs every 6 (six) hours as needed for Wheezing. Rescue 18 g 2    allopurinoL (ZYLOPRIM) 100 MG tablet Take 1 tablet (100 mg total) by mouth once daily. 90 tablet 1    b complex vitamins tablet Take 1 tablet by mouth once daily.      calcium citrate (CALCITRATE) 200 mg (950 mg) tablet Take 1 tablet by mouth 2 (two) times daily.      cyanocobalamin 500 MCG tablet Take 500 mcg by mouth once daily.      ferrous fumarate/vit Bcomp,C (SUPER B COMPLEX ORAL) Take 1 capsule by mouth once daily.      fluticasone propionate (FLONASE) 50 mcg/actuation nasal spray 1 spray (50 mcg total) by Each Nostril route once daily. 16 g 3    furosemide (LASIX) 40 MG tablet Take 1 tablet (40 mg total) by mouth once daily. 30 tablet 2    gabapentin (NEURONTIN) 800 MG tablet Take 1 tablet (800 mg total) by mouth once daily. 90 tablet 2    lithium (LITHOTAB) 300 mg tablet Take 1 tablet (300 mg total) by mouth 3 (three) times daily. 270 tablet 1    LORazepam (ATIVAN) 1 MG tablet Take 1 tablet (1 mg total) by mouth as needed for Anxiety. 30 tablet 2    meclizine (ANTIVERT) 25 mg tablet Take 1 tablet (25 mg total) by mouth 3 (three) times daily as needed for Dizziness or Nausea. 30 tablet 0    multivitamin capsule Take 1 capsule by mouth 2 (two) times daily.       omeprazole (PRILOSEC) 40 MG capsule Take 1 capsule (40 mg total) by mouth 2 (two) times daily before meals. 180 capsule 0    ondansetron (ZOFRAN-ODT) 8 MG TbDL Dissolve 1 tablet (8 mg total) by mouth every 6 (six) hours as needed. 30 tablet 0    polyethylene glycol (GLYCOLAX) 17 gram/dose powder Mix 1 capful (17 g) with fluids and drink by mouth once daily.  "510 g 3    potassium chloride (KLOR-CON) 10 MEQ TbSR Take 10 mEq by mouth 2 (two) times daily.      risperiDONE (RISPERDAL) 0.5 MG Tab Take 1 tablet (0.5 mg total) by mouth 2 (two) times daily. 180 tablet 1    tolterodine (DETROL LA) 4 MG 24 hr capsule Take 1 capsule (4 mg total) by mouth once daily. 30 capsule 11    topiramate (TOPAMAX) 200 MG Tab Take 1 tablet (200 mg total) by mouth once daily. 90 tablet 1    sertraline (ZOLOFT) 100 MG tablet Take 2 tablets (200 mg total) by mouth once daily. 180 tablet 2     No current facility-administered medications on file prior to visit.        Allergies   Review of patient's allergies indicates:   Allergen Reactions    Hydrocodone-acetaminophen Nausea And Vomiting       Review of Systems (Pertinent positives)  Review of Systems   Constitutional: Negative for chills, diaphoresis, fever, malaise/fatigue and weight loss.   HENT: Positive for congestion and sinus pain. Negative for ear discharge, ear pain, hearing loss, nosebleeds, sore throat and tinnitus.    Eyes: Negative.    Respiratory: Positive for cough. Negative for hemoptysis, sputum production, shortness of breath, wheezing and stridor.    Cardiovascular: Negative.    Gastrointestinal: Positive for nausea (Intermittent). Negative for abdominal pain, blood in stool, constipation, diarrhea, heartburn, melena and vomiting.   Genitourinary: Negative.    Musculoskeletal: Negative.    Skin: Negative.    Neurological: Positive for dizziness (Intermittent) and headaches (Intermittent). Negative for tingling, tremors, sensory change, speech change, focal weakness, seizures, loss of consciousness and weakness.   Endo/Heme/Allergies: Negative.    Psychiatric/Behavioral: Negative.        /74 (BP Location: Right arm, Patient Position: Sitting, BP Method: Large (Manual))   Pulse (!) 54   Temp 97.1 °F (36.2 °C) (Skin)   Resp 18   Ht 5' 7" (1.702 m)   Wt 119.6 kg (263 lb 10.7 oz)   SpO2 97%   BMI 41.30 kg/m² "     Physical Exam  Vitals signs reviewed.   Constitutional:       General: She is not in acute distress.     Appearance: Normal appearance. She is not ill-appearing, toxic-appearing or diaphoretic.   HENT:      Head: Normocephalic and atraumatic.      Right Ear: Tympanic membrane, ear canal and external ear normal.      Left Ear: Tympanic membrane, ear canal and external ear normal.      Nose: Mucosal edema and congestion present. No nasal deformity or rhinorrhea.      Right Nostril: No epistaxis.      Left Nostril: No epistaxis.      Right Turbinates: Swollen. Not enlarged or pale.      Left Turbinates: Swollen. Not enlarged or pale.      Right Sinus: Maxillary sinus tenderness and frontal sinus tenderness present.      Left Sinus: Maxillary sinus tenderness and frontal sinus tenderness present.      Mouth/Throat:      Mouth: Mucous membranes are moist.      Pharynx: Oropharynx is clear. No oropharyngeal exudate or posterior oropharyngeal erythema.      Tonsils: No tonsillar exudate or tonsillar abscesses. 0 on the right. 0 on the left.   Eyes:      Extraocular Movements: Extraocular movements intact.      Conjunctiva/sclera: Conjunctivae normal.      Pupils: Pupils are equal, round, and reactive to light.   Neck:      Musculoskeletal: Normal range of motion and neck supple. No neck rigidity.   Cardiovascular:      Rate and Rhythm: Normal rate and regular rhythm.      Pulses: Normal pulses.      Heart sounds: Normal heart sounds. No murmur. No friction rub. No gallop.    Pulmonary:      Effort: Pulmonary effort is normal. No respiratory distress.      Breath sounds: Normal breath sounds. No stridor. No wheezing, rhonchi or rales.   Chest:      Chest wall: No tenderness.   Abdominal:      General: Bowel sounds are normal.      Palpations: Abdomen is soft.   Musculoskeletal: Normal range of motion.         General: No swelling.   Lymphadenopathy:      Cervical: No cervical adenopathy.   Skin:     General: Skin is  warm and dry.      Capillary Refill: Capillary refill takes less than 2 seconds.   Neurological:      General: No focal deficit present.      Mental Status: She is alert and oriented to person, place, and time.          Assessment/Plan:  Zaira Dowell is a 49 y.o. female who presents today for :    Zaira was seen today for headache, nausea and nasal congestion.    Diagnoses and all orders for this visit:    Acute sinusitis, recurrence not specified, unspecified location  -     methylPREDNISolone sod suc(PF) injection 125 mg    Epistaxis     With acute symptoms of only 2-3 days, no indication for treatment with antibiotics at this time.  No bacterial nidus on examination.  Will attempt to treat with steroid shot and if no relief in the next 3-5 days patient can message me via my chart and we can consider antibiotics at that time.  Patient can use nasal saline to assist the symptoms, but I instructed patient to avoid Flonase.  If she still has epistaxis with use of nasal saline washes, then she should discontinue use of all nasal sprays.  Recommended patient to go to the emergency room or ER if she develops severe nose bleeding or bleeding into the back of her throat.  We can refer to ENT in the future if needed as well if symptoms persist.  Patient verbalized understanding of instructions.        This office note has been dictated.  This dictation has been generated using M-Modal Fluency Direct dictation; some phonetic errors may occur.   My collaborating physician is Dr. Tobias Ludny.

## 2020-09-23 ENCOUNTER — OFFICE VISIT (OUTPATIENT)
Dept: FAMILY MEDICINE | Facility: CLINIC | Age: 50
End: 2020-09-23
Payer: MEDICARE

## 2020-09-23 VITALS
HEIGHT: 67 IN | OXYGEN SATURATION: 98 % | HEART RATE: 65 BPM | DIASTOLIC BLOOD PRESSURE: 72 MMHG | RESPIRATION RATE: 17 BRPM | BODY MASS INDEX: 41.66 KG/M2 | SYSTOLIC BLOOD PRESSURE: 110 MMHG | WEIGHT: 265.44 LBS | TEMPERATURE: 98 F

## 2020-09-23 DIAGNOSIS — M79.642 LEFT HAND PAIN: ICD-10-CM

## 2020-09-23 DIAGNOSIS — B96.89 ACUTE BACTERIAL SINUSITIS: Primary | ICD-10-CM

## 2020-09-23 DIAGNOSIS — E66.01 MORBID OBESITY WITH BMI OF 40.0-44.9, ADULT: ICD-10-CM

## 2020-09-23 DIAGNOSIS — J01.90 ACUTE BACTERIAL SINUSITIS: Primary | ICD-10-CM

## 2020-09-23 DIAGNOSIS — Z23 IMMUNIZATION DUE: ICD-10-CM

## 2020-09-23 PROCEDURE — 99999 PR PBB SHADOW E&M-EST. PATIENT-LVL V: CPT | Mod: PBBFAC,HCNC,, | Performed by: FAMILY MEDICINE

## 2020-09-23 PROCEDURE — 99214 OFFICE O/P EST MOD 30 MIN: CPT | Mod: 25,HCNC,S$GLB, | Performed by: FAMILY MEDICINE

## 2020-09-23 PROCEDURE — 3008F BODY MASS INDEX DOCD: CPT | Mod: HCNC,CPTII,S$GLB, | Performed by: FAMILY MEDICINE

## 2020-09-23 PROCEDURE — 99999 PR PBB SHADOW E&M-EST. PATIENT-LVL V: ICD-10-PCS | Mod: PBBFAC,HCNC,, | Performed by: FAMILY MEDICINE

## 2020-09-23 PROCEDURE — G0008 FLU VACCINE (QUAD) GREATER THAN OR EQUAL TO 3YO PRESERVATIVE FREE IM: ICD-10-PCS | Mod: HCNC,S$GLB,, | Performed by: FAMILY MEDICINE

## 2020-09-23 PROCEDURE — G0008 ADMIN INFLUENZA VIRUS VAC: HCPCS | Mod: HCNC,S$GLB,, | Performed by: FAMILY MEDICINE

## 2020-09-23 PROCEDURE — 90686 FLU VACCINE (QUAD) GREATER THAN OR EQUAL TO 3YO PRESERVATIVE FREE IM: ICD-10-PCS | Mod: HCNC,S$GLB,, | Performed by: FAMILY MEDICINE

## 2020-09-23 PROCEDURE — 3008F PR BODY MASS INDEX (BMI) DOCUMENTED: ICD-10-PCS | Mod: HCNC,CPTII,S$GLB, | Performed by: FAMILY MEDICINE

## 2020-09-23 PROCEDURE — 99214 PR OFFICE/OUTPT VISIT, EST, LEVL IV, 30-39 MIN: ICD-10-PCS | Mod: 25,HCNC,S$GLB, | Performed by: FAMILY MEDICINE

## 2020-09-23 PROCEDURE — 90686 IIV4 VACC NO PRSV 0.5 ML IM: CPT | Mod: HCNC,S$GLB,, | Performed by: FAMILY MEDICINE

## 2020-09-23 RX ORDER — PHENTERMINE HYDROCHLORIDE 37.5 MG/1
37.5 TABLET ORAL
Qty: 30 TABLET | Refills: 0 | Status: SHIPPED | OUTPATIENT
Start: 2020-09-23 | End: 2020-10-21 | Stop reason: SDUPTHER

## 2020-09-23 RX ORDER — DOXYCYCLINE 100 MG/1
100 CAPSULE ORAL EVERY 12 HOURS
Qty: 20 CAPSULE | Refills: 0 | Status: SHIPPED | OUTPATIENT
Start: 2020-09-23 | End: 2020-10-03

## 2020-09-23 NOTE — PROGRESS NOTES
Routine Office Visit    Patient Name: Zaira Dowell    : 1970  MRN: [id number]    Subjective:  Zaira is a 49 y.o. female who presents today for:    1. Sinusitis  Patient presenting today with 1 week of frontal sinus pressure and congestion.  She states that she has been taking allergy medication without any relief.  She states that she has been tired, but she's always tired.  There has been no fevers, chills, cough, or shortness of breath.  She does get recurring sinus infections throughout the year.      2.  Left hand pain  Patient states that she has been having pain in her left thumb for several months and it is very painful.  She states its an aching feel.  There has been no redness or swelling.  She has not injured the thumb.  She states that she can't take ibuprofen due to having gastric sleeve last year.  She take a tylenol arthritis every morning, but it doesn't last long.      3.  Weight loss  Patient had gastric sleeve last year and has lost more than 40 pounds.  However, she is starting to notice a plateau in weight loss.  She states she is sticking with the diet she was given.  She does not eat a lot of sugary foods or drinks.  She states that she wants to continue to lose weight so that she feels better overall.  She has been on adipex in the past without any adverse reactions.  No recent chest pain, shortness of breath, numbness, tingling, or slurred speech    Past Medical History  Past Medical History:   Diagnosis Date    Anxiety     Arthritis     Asthma     BMI 50.0-59.9, adult     Depression     Fibromyalgia     GERD (gastroesophageal reflux disease)     Obesity     SHARON (obstructive sleep apnea)        Past Surgical History  Past Surgical History:   Procedure Laterality Date    CARPAL TUNNEL RELEASE      CHOLECYSTECTOMY      ESOPHAGOGASTRODUODENOSCOPY N/A 2019    Procedure: EGD (ESOPHAGOGASTRODUODENOSCOPY);  Surgeon: Michelle Mc MD;  Location: [facility];  Service:  Endoscopy;  Laterality: N/A;    HYSTERECTOMY      partial     KNEE SURGERY      LAPAROSCOPIC SLEEVE GASTRECTOMY N/A 10/18/2019    Procedure: GASTRECTOMY, SLEEVE, LAPAROSCOPIC, with intraop EGD;  Surgeon: Ean Kohli MD;  Location: [facility] OR [address] [place] [address];  Service: General;  Laterality: N/A;    pinched nerve      SHOULDER SURGERY      TONSILLECTOMY      WRIST SURGERY      had ganlin cyst       Family History  Family History   Problem Relation Age of Onset    Diabetes Mother     LaGr[place]'s disease Mother     Hypertension Mother     Heart disease Father     Stroke Father     Mental illness Father     Hypertension Father     Diabetes Father     Depression Father     Heart disease Maternal Grandmother     Depression Maternal Grandmother     COPD Maternal Grandfather     Heart disease Maternal Grandfather     Stroke Maternal Grandfather     Kidney disease Paternal Grandmother     Heart disease Paternal Grandmother     Heart disease Paternal Grandfather     No Known Problems Brother     Hypertension Son        Social History  Social History     Socioeconomic History    Marital status:      Spouse name: Not on file    Number of children: Not on file    Years of education: Not on file    Highest education level: Not on file   Occupational History    Not on file   Social Needs    Financial resource strain: Not hard at all    Food insecurity     Worry: Never true     Inability: Never true    Transportation needs     Medical: No     Non-medical: No   Tobacco Use    Smoking status: Current Some Day Smoker     Types: Vaping with nicotine, Cigarettes    Smokeless tobacco: Never Used   Substance and Sexual Activity    Alcohol use: No     Frequency: Monthly or less     Drinks per session: 1 or 2     Binge frequency: Never    Drug use: No    Sexual activity: Yes     Partners: Male   Lifestyle    Physical activity     Days per week: 0 days     Minutes per session: 30 min    Stress:  Rather much   Relationships    Social connections     Talks on phone: More than three times a week     Gets together: Patient refused     Attends Jain service: Not on file     Active member of club or organization: No     Attends meetings of clubs or organizations: Never     Relationship status:    Other Topics Concern    Not on file   Social History Narrative    Not on file       Current Medications  Current Outpatient Medications on File Prior to Visit   Medication Sig Dispense Refill    albuterol (PROVENTIL/VENTOLIN HFA) 90 mcg/actuation inhaler Inhale 2 puffs into the lungs every 6 (six) hours as needed for Wheezing. Rescue 18 g 2    allopurinoL (ZYLOPRIM) 100 MG tablet Take 1 tablet (100 mg total) by mouth once daily. 90 tablet 1    b complex vitamins tablet Take 1 tablet by mouth once daily.      calcium citrate (CALCITRATE) 200 mg (950 mg) tablet Take 1 tablet by mouth 2 (two) times daily.      cyanocobalamin 500 MCG tablet Take 500 mcg by mouth once daily.      ferrous fumarate/vit Bcomp,C (SUPER B COMPLEX ORAL) Take 1 capsule by mouth once daily.      fluticasone propionate (FLONASE) 50 mcg/actuation nasal spray 1 spray (50 mcg total) by Each Nostril route once daily. 16 g 3    furosemide (LASIX) 40 MG tablet Take 1 tablet (40 mg total) by mouth once daily. 30 tablet 2    gabapentin (NEURONTIN) 800 MG tablet Take 1 tablet (800 mg total) by mouth once daily. 90 tablet 2    lithium (LITHOTAB) 300 mg tablet Take 1 tablet (300 mg total) by mouth 3 (three) times daily. 270 tablet 1    LORazepam (ATIVAN) 1 MG tablet Take 1 tablet (1 mg total) by mouth as needed for Anxiety. 30 tablet 2    meclizine (ANTIVERT) 25 mg tablet Take 1 tablet (25 mg total) by mouth 3 (three) times daily as needed for Dizziness or Nausea. 30 tablet 0    multivitamin capsule Take 1 capsule by mouth 2 (two) times daily.       omeprazole (PRILOSEC) 40 MG capsule Take 1 capsule (40 mg total) by mouth 2 (two)  "times daily before meals. 180 capsule 0    ondansetron (ZOFRAN-ODT) 8 MG TbDL Dissolve 1 tablet (8 mg total) by mouth every 6 (six) hours as needed. 30 tablet 0    polyethylene glycol (GLYCOLAX) 17 gram/dose powder Mix 1 capful (17 g) with fluids and drink by mouth once daily. 510 g 3    potassium chloride (KLOR-CON) 10 MEQ TbSR Take 10 mEq by mouth 2 (two) times daily.      risperiDONE (RISPERDAL) 0.5 MG Tab Take 1 tablet (0.5 mg total) by mouth 2 (two) times daily. 180 tablet 1    sertraline (ZOLOFT) 100 MG tablet Take 2 tablets (200 mg total) by mouth once daily. 180 tablet 2    tolterodine (DETROL LA) 4 MG 24 hr capsule Take 1 capsule (4 mg total) by mouth once daily. 30 capsule 11    topiramate (TOPAMAX) 200 MG Tab Take 1 tablet (200 mg total) by mouth once daily. 90 tablet 1     No current facility-administered medications on file prior to visit.        Allergies   Review of patient's allergies indicates:   Allergen Reactions    Hydrocodone-acetaminophen Nausea And Vomiting       Review of Systems (Pertinent positives)  Review of Systems   Constitutional: Positive for malaise/fatigue.   HENT: Positive for sinus pain. Negative for hearing loss.    Eyes: Negative for discharge.   Respiratory: Negative for cough, hemoptysis, sputum production and wheezing.    Cardiovascular: Negative for chest pain and palpitations.   Gastrointestinal: Positive for constipation. Negative for blood in stool, diarrhea and vomiting.   Genitourinary: Negative for dysuria and hematuria.   Musculoskeletal: Positive for joint pain and neck pain.   Skin: Negative.    Neurological: Positive for weakness and headaches.   Endo/Heme/Allergies: Negative for polydipsia.         /72 (BP Location: Left arm, Patient Position: Sitting, BP Method: Large (Manual))   Pulse 65   Temp 97.5 °F (36.4 °C) (Oral)   Resp 17   Ht 5' 7.01" (1.702 m)   Wt 120.4 kg (265 lb 6.9 oz)   SpO2 98%   BMI 41.56 kg/m²     GENERAL APPEARANCE: in no " apparent distress and well developed and well nourished  HEENT: PERRL, EOMI, Sclera clear, anicteric, Oropharynx clear, no lesions, Neck supple with midline trachea; pain on palpation of frontal sinuses  NECK: normal, supple, no adenopathy, thyroid normal in size  RESPIRATORY: appears well, vitals normal, no respiratory distress, acyanotic, normal RR, chest clear, no wheezing, crepitations, rhonchi, normal symmetric air entry  HEART: regular rate and rhythm, S1, S2 normal, no murmur, click, rub or gallop.    ABDOMEN: abdomen is soft without tenderness, no masses, no hernias, no organomegaly, no rebound, no guarding. Suprapubic tenderness absent. No CVA tenderness.  Extremities: warm/well perfused.  No abnormal hair patterns.  No clubbing, cyanosis or edema.  Pain on palpation of PIP of left thumb  SKIN: no rashes, no wounds, no other lesions  PSYCH: Alert, oriented x 3, thought content appropriate, speech normal, pleasant and cooperative, good eye contact, well groomed    Assessment/Plan:  Zaira Dowell is a 49 y.o. female who presents today for :    Zaira was seen today for follow-up.    Diagnoses and all orders for this visit:    Acute bacterial sinusitis  -     doxycycline (VIBRAMYCIN) 100 MG Cap; Take 1 capsule (100 mg total) by mouth every 12 (twelve) hours. for 10 days    Immunization due  -     Influenza - Quadrivalent (PF)    Left hand pain  -     X-Ray Hand Complete Left; Future    Morbid obesity with BMI of 40.0-44.9, adult  -     phentermine (ADIPEX-P) 37.5 mg tablet; Take 1 tablet (37.5 mg total) by mouth before breakfast.        1.  Doxycycline for sinusitis  2.  xrays of left hand to look for evidece of OA  3.  adipex started and she was told to notify me of any chest pain, weakness, numnbess, slurred speech, or shortness of breath.  VSS  4.  Follow up 1 month or sooner if needed      Cyril Saucedo MD

## 2020-09-24 ENCOUNTER — CLINICAL SUPPORT (OUTPATIENT)
Dept: REHABILITATION | Facility: HOSPITAL | Age: 50
End: 2020-09-24
Attending: PHYSICAL MEDICINE & REHABILITATION
Payer: MEDICARE

## 2020-09-24 DIAGNOSIS — M54.50 LOW BACK PAIN AT MULTIPLE SITES: ICD-10-CM

## 2020-09-24 DIAGNOSIS — R26.89 ANTALGIC GAIT: ICD-10-CM

## 2020-09-24 DIAGNOSIS — M25.552 HIP PAIN, BILATERAL: Primary | ICD-10-CM

## 2020-09-24 DIAGNOSIS — M25.551 HIP PAIN, BILATERAL: Primary | ICD-10-CM

## 2020-09-24 PROCEDURE — 97140 MANUAL THERAPY 1/> REGIONS: CPT | Mod: HCNC,PN

## 2020-09-24 PROCEDURE — 97110 THERAPEUTIC EXERCISES: CPT | Mod: HCNC,PN

## 2020-09-24 NOTE — PROGRESS NOTES
Physical Therapy Treatment Note     Name: Zaira Dowell  Clinic Number: [id number]    Therapy Diagnosis:   Encounter Diagnoses   Name Primary?    Hip pain, bilateral Yes    Low back pain at multiple sites     Antalgic gait      Physician: Jerson Arriola MD    Visit Date: 9/24/2020    Physician Orders: PT Eval and Treat   Medical Diagnosis from Referral: Bilateral Trochanteric bursitis  Evaluation Date: 9/3/2020  Authorization Period Expiration: 9/24/21  Plan of Care Expiration: 12/3/20  Visit # / Visits authorized: 2/5     Time In: 1300  Time Out: 1333  Total Billable Time: 33 minutes    Precautions: Standard    Subjective     Pt reports: Significant soreness after completion of HEP on Sunday. Pain worst with sit to stand and clamshells. Pain is particularly bad today  She was compliant with home exercise program.  Response to previous treatment: Muscle soreness  Functional change: None    Pain: 8/10  Location: right hip    Objective     Zaira received therapeutic exercises to develop strength, ROM, flexibility, posture and core stabilization for 23 minutes including:    Adduction ball squeezes x20  Supine clams RTB 2x10  Piriformis stretch 3x30 sec  Hamstring stretch- stopped due to pain  Standing crosslegged TFL stretch 2x30 sec  Standing Hip Extension 2x10  Standing Hip abduction 2x10  SL Leg lifts R only 2x10  Hip Hikes x15 - stopped due to pain      Zaira received the following manual therapy techniques: Manual traction and Soft tissue Mobilization were applied to the: R hip for 10 minutes, including:    Distraction  STM to glutes    Zaira received cold pack for 10 minutes to R hip.      Home Exercises Provided and Patient Education Provided     Education provided:   - Updated HEP  - POC  - Daily Icing    Written Home Exercises Provided: yes.  Exercises were reviewed and Zaira was able to demonstrate them prior to the end of the session.  Zaira demonstrated good  understanding of the  education provided.     See EMR under Patient Instructions for exercises provided 9/24/20    Assessment     Patient with significant pain with highly irritable and reproducible symptoms this date. Initiation of dynamic LE strengthening and flexibility exercises with complaints of pain throughout, requiring frequent treatment modification and changing of exercise prescription. Patient chose to stop treatment session early due to painful hip symptoms. Patient was very tender to palpation of R greater trochanter in area of bursa. Due to pain with previous HEP, HEP was updated to include pain free exercises, with plan to reassess tolerance at next visit. Patient encouraged to ice at least once a day until symptoms resolve. Continue to progress as tolerated.    Zaira is progressing well towards her goals.   Pt prognosis is Fair.     Pt will continue to benefit from skilled outpatient physical therapy to address the deficits listed in the problem list box on initial evaluation, provide pt/family education and to maximize pt's level of independence in the home and community environment.     Pt's spiritual, cultural and educational needs considered and pt agreeable to plan of care and goals.     Anticipated barriers to physical therapy: Chronicity of symptoms, multiple treatment areas    Short Term Goals: 6 weeks   1. Patient will be able to ascend/descend stairs with pain no greater than 2/10. In progress  2. Patient will be independent in HEP and progressions.  In progress  3. Patient will be able to sit<>stand without pain. In progress     Long Term Goals: 12 weeks   1. Patient will be able to play light sporting activities with her grandchildren with pain no greater than 2/10. In progress  2. Patient will demonstrate 4+/5 strength throughout lower extremity musculature. In progress  3. Patient will be at least 40 percent but less than 60 percent impaired with functional mobility activities. In progress  4. Patient  will be able to sleep through the night without waking up due to hip pain. In progress    Plan     Outpatient Physical Therapy 2 times weekly for 10 weeks to include the following interventions: Gait Training, Manual Therapy, Moist Heat/ Ice, Neuromuscular Re-ed, Patient Education, Therapeutic Activites and Therapeutic Exercise.    Mickey Kimble, PT, DPT

## 2020-10-01 ENCOUNTER — CLINICAL SUPPORT (OUTPATIENT)
Dept: REHABILITATION | Facility: HOSPITAL | Age: 50
End: 2020-10-01
Attending: PHYSICAL MEDICINE & REHABILITATION
Payer: MEDICARE

## 2020-10-01 DIAGNOSIS — R26.89 ANTALGIC GAIT: ICD-10-CM

## 2020-10-01 DIAGNOSIS — G43.809 OTHER MIGRAINE WITHOUT STATUS MIGRAINOSUS, NOT INTRACTABLE: ICD-10-CM

## 2020-10-01 DIAGNOSIS — M54.50 LOW BACK PAIN AT MULTIPLE SITES: ICD-10-CM

## 2020-10-01 DIAGNOSIS — M25.551 HIP PAIN, BILATERAL: ICD-10-CM

## 2020-10-01 DIAGNOSIS — M25.552 HIP PAIN, BILATERAL: ICD-10-CM

## 2020-10-01 PROCEDURE — 97140 MANUAL THERAPY 1/> REGIONS: CPT | Mod: HCNC,PN

## 2020-10-01 PROCEDURE — 97110 THERAPEUTIC EXERCISES: CPT | Mod: HCNC,PN

## 2020-10-01 NOTE — PROGRESS NOTES
"  Physical Therapy Treatment Note     Name: Zaira Dowell  Clinic Number: [id number]    Therapy Diagnosis:   Encounter Diagnoses   Name Primary?    Hip pain, bilateral     Low back pain at multiple sites     Antalgic gait      Physician: Jerson Arriola MD    Visit Date: 10/1/2020    Physician Orders: PT Eval and Treat   Medical Diagnosis from Referral: Bilateral Trochanteric bursitis  Evaluation Date: 9/3/2020  Authorization Period Expiration: 9/24/21  Plan of Care Expiration: 12/3/20  Visit # / Visits authorized: 3/20     Time In: 1304  Time Out: 1345  Total Billable Time: 41 minutes    Precautions: Standard    Subjective     Pt reports: Hip is not bothering her as much today, some soreness after last visit, but can tell she is walking with less pain  She was compliant with home exercise program.  Response to previous treatment: Muscle soreness  Functional change: None    Pain: 4/10  Location: right hip    Objective     Zaira received therapeutic exercises to develop strength, ROM, flexibility, posture and core stabilization for 31 minutes including:    Nustep x5'    Adduction ball squeezes 5"x20  Supine clams RTB 2x10  Piriformis stretch 3x30 sec  SKTC 2x30  Bridging 2x10  Standing crosslegged TFL stretch 2x30 sec  Standing Hip Extension RTB 2x10  Standing Hip abduction RTB 2x10  Long sitting leg lift up and overs w/cone R only 2x8  Sit to stands 2x6      Zaira received the following manual therapy techniques: Manual traction and Soft tissue Mobilization were applied to the: R hip for 10 minutes, including:    Long axis distraction R only  Short axis distraction R only  STM to R glutes    Zaira received cold pack for 10 minutes to R hip.      Home Exercises Provided and Patient Education Provided     Education provided:   - Updated HEP  - POC  - Daily Icing    Written Home Exercises Provided: yes.  Exercises were reviewed and Zaira was able to demonstrate them prior to the end of the session.  " Zaira demonstrated good  understanding of the education provided.     See EMR under Patient Instructions for exercises provided 9/24/20    Assessment     Patient tolerated today's treatment session well. Increased exercise tolerance this date, able to participate in therex for entire duration of treatment session with fewer rest breaks needed. Decreased tightness and soreness in R hip post manual therapy and stretching, still with very irritable tenderness in area of R greater trochanter. Educated on the importance of daily exercise, walking program and stretching on days when she does not have therapy. Patient demonstrated less antalgic gait this date. Continue to progress as tolerated.    Zaira is progressing well towards her goals.   Pt prognosis is Fair.     Pt will continue to benefit from skilled outpatient physical therapy to address the deficits listed in the problem list box on initial evaluation, provide pt/family education and to maximize pt's level of independence in the home and community environment.     Pt's spiritual, cultural and educational needs considered and pt agreeable to plan of care and goals.     Anticipated barriers to physical therapy: Chronicity of symptoms, multiple treatment areas    Short Term Goals: 6 weeks   1. Patient will be able to ascend/descend stairs with pain no greater than 2/10. In progress  2. Patient will be independent in HEP and progressions.  In progress  3. Patient will be able to sit<>stand without pain. In progress     Long Term Goals: 12 weeks   1. Patient will be able to play light sporting activities with her grandchildren with pain no greater than 2/10. In progress  2. Patient will demonstrate 4+/5 strength throughout lower extremity musculature. In progress  3. Patient will be at least 40 percent but less than 60 percent impaired with functional mobility activities. In progress  4. Patient will be able to sleep through the night without waking up due to hip  pain. In progress    Plan     Outpatient Physical Therapy 2 times weekly for 10 weeks to include the following interventions: Gait Training, Manual Therapy, Moist Heat/ Ice, Neuromuscular Re-ed, Patient Education, Therapeutic Activites and Therapeutic Exercise.    Mickey Kimble, PT, DPT

## 2020-10-02 RX ORDER — TOPIRAMATE 200 MG/1
200 TABLET ORAL DAILY
Qty: 90 TABLET | Refills: 0 | Status: SHIPPED | OUTPATIENT
Start: 2020-10-02 | End: 2020-11-25 | Stop reason: SDUPTHER

## 2020-10-22 ENCOUNTER — PATIENT MESSAGE (OUTPATIENT)
Dept: FAMILY MEDICINE | Facility: CLINIC | Age: 50
End: 2020-10-22

## 2020-11-03 ENCOUNTER — OFFICE VISIT (OUTPATIENT)
Dept: PSYCHIATRY | Facility: CLINIC | Age: 50
End: 2020-11-03
Payer: MEDICARE

## 2020-11-03 DIAGNOSIS — F31.9 BIPOLAR 1 DISORDER: Primary | ICD-10-CM

## 2020-11-03 PROCEDURE — 99214 OFFICE O/P EST MOD 30 MIN: CPT | Mod: HCNC,95,, | Performed by: PSYCHIATRY & NEUROLOGY

## 2020-11-03 PROCEDURE — 99214 PR OFFICE/OUTPT VISIT, EST, LEVL IV, 30-39 MIN: ICD-10-PCS | Mod: HCNC,95,, | Performed by: PSYCHIATRY & NEUROLOGY

## 2020-11-03 RX ORDER — BUPROPION HYDROCHLORIDE 100 MG/1
100 TABLET, EXTENDED RELEASE ORAL DAILY
Qty: 30 TABLET | Refills: 5 | Status: SHIPPED | OUTPATIENT
Start: 2020-11-03 | End: 2021-04-02

## 2020-11-03 RX ORDER — LITHIUM CARBONATE 300 MG
300 TABLET ORAL 3 TIMES DAILY
Qty: 270 TABLET | Refills: 1 | Status: SHIPPED | OUTPATIENT
Start: 2020-11-03 | End: 2021-04-13 | Stop reason: SDUPTHER

## 2020-11-03 RX ORDER — RISPERIDONE 0.5 MG/1
0.5 TABLET ORAL 2 TIMES DAILY
Qty: 180 TABLET | Refills: 1 | Status: SHIPPED | OUTPATIENT
Start: 2020-11-03 | End: 2021-04-13 | Stop reason: SDUPTHER

## 2020-11-03 RX ORDER — SERTRALINE HYDROCHLORIDE 100 MG/1
200 TABLET, FILM COATED ORAL DAILY
Qty: 180 TABLET | Refills: 2 | Status: SHIPPED | OUTPATIENT
Start: 2020-11-03 | End: 2021-04-13 | Stop reason: SDUPTHER

## 2020-11-03 NOTE — PROGRESS NOTES
"Outpatient Psychiatry Follow-Up Visit (MD/NP)    11/3/2020 The patient location is: home  The chief complaint leading to consultation is: bipolar disorder    Visit type: audiovisual    Face to Face time with patient: 28"  35 minutes of total time spent on the encounter, which includes face to face time and non-face to face time preparing to see the patient (eg, review of tests), Obtaining and/or reviewing separately obtained history, Documenting clinical information in the electronic or other health record, Independently interpreting results (not separately reported) and communicating results to the patient/family/caregiver, or Care coordination (not separately reported).         Each patient to whom he or she provides medical services by telemedicine is:  (1) informed of the relationship between the physician and patient and the respective role of any other health care provider with respect to management of the patient; and (2) notified that he or she may decline to receive medical services by telemedicine and may withdraw from such care at any time.    Notes: See below.  Clinical Status of Patient:  Outpatient (Ambulatory)    Chief Complaint:  Zaira Dowell is a 49 y.o. female who presents today for follow-up of mood disorder.  Met with patient and no relatives present.      Interval History and Content of Current Session:  Interim Events/Subjective Report/Content of Current Session: Patient states she is taking her meds regularly, but has the possible side effects of hand shaking. Patient has no signs of lida or psychosis. She seems to have a lithium tremor. She does report some emot ionality however possibly related to problems with her son. Patient has noticed some excess crying episodes. Patient has no thoughts of harming herself. She reports feeling down, sad, and depressed, with a decrease in drive or energy.    Psychotherapy:  · Target symptoms: mood disorder  · Why chosen therapy is appropriate versus " another modality: relevant to diagnosis  · Outcome monitoring methods: self-report  · Therapeutic intervention type: supportive psychotherapy  · Topics discussed/themes: relationships difficulties  · The patient's response to the intervention is accepting. The patient's progress toward treatment goals is fair.   · Duration of intervention: 28 minutes.    Review of Systems   · PSYCHIATRIC: Pertinant items are noted in the narrative.    Past Medical, Family and Social History: The patient's past medical, family and social history have been reviewed and updated as appropriate within the electronic medical record - see encounter notes.    Compliance: yes    Side effects: (AMS) Abnormal involuntary movements Tremor  Risk Parameters:  Patient reports no suicidal ideation  Patient reports no homicidal ideation  Patient reports no self-injurious behavior  Patient reports no violent behavior    Exam (detailed: at least 9 elements; comprehensive: all 15 elements)   Constitutional  Vitals:  Most recent vital signs, dated less than 90 days prior to this appointment, were reviewed.   There were no vitals filed for this visit. Recent vital signs checked and reported by patient to be stable.   General:  unremarkable, age appropriate, casually dressed, neatly groomed     Musculoskeletal  Muscle Strength/Tone:  reduced muscle strength   Gait & Station:  non-ataxic     Psychiatric  Speech:  no latency; no press, spontaneous   Mood & Affect:  depressed  congruent and appropriate   Thought Process:  normal and logical   Associations:  intact   Thought Content:  normal, no suicidality, no homicidality, delusions, or paranoia   Insight:  has awareness of illness   Judgement: behavior is adequate to circumstances   Orientation:  grossly intact   Memory: intact for content of interview   Language: grossly intact   Attention Span & Concentration:  able to focus   Fund of Knowledge:  intact and appropriate to age and level of education      Assessment and Diagnosis   Status/Progress: Based on the examination today, the patient's problem(s) is/are inadequately controlled.  New problems have not been presented today.   no obstacle at this time other than change in mood due her illness of bipolar disorder are not complicating management of the primary condition.  The working differential for this patient includes bipolar disorder.     General Impression: Patient is experiencing a depressive episode at this time, but is in good self control. Patient is dealing with isolation as well.      ICD-10-CM ICD-9-CM   1. Bipolar 1 disorder  F31.9 296.7       Intervention/Counseling/Treatment Plan   · Medication Management: The risks and benefits of medication were discussed with the patient. Patient agrees to start Wellbutrin  mg q am, and continue Zoloft 200mg daily, Risperdal 0.5 mg bid, and Lithium 300 mg q am, and 600 mg q hs,and also to obtain ordered lithium blood level, cbc, and comprehensive metabolic profile.       Return to Clinic: 1 month

## 2020-11-09 ENCOUNTER — LAB VISIT (OUTPATIENT)
Dept: LAB | Facility: HOSPITAL | Age: 50
End: 2020-11-09
Attending: PSYCHIATRY & NEUROLOGY
Payer: MEDICARE

## 2020-11-09 DIAGNOSIS — F31.9 BIPOLAR 1 DISORDER: ICD-10-CM

## 2020-11-09 LAB
ALBUMIN SERPL BCP-MCNC: 3.7 G/DL (ref 3.5–5.2)
ALP SERPL-CCNC: 63 U/L (ref 55–135)
ALT SERPL W/O P-5'-P-CCNC: 13 U/L (ref 10–44)
ANION GAP SERPL CALC-SCNC: 8 MMOL/L (ref 8–16)
AST SERPL-CCNC: 10 U/L (ref 10–40)
BASOPHILS # BLD AUTO: 0.07 K/UL (ref 0–0.2)
BASOPHILS NFR BLD: 1 % (ref 0–1.9)
BILIRUB SERPL-MCNC: 0.2 MG/DL (ref 0.1–1)
BUN SERPL-MCNC: 5 MG/DL (ref 6–20)
CALCIUM SERPL-MCNC: 8.8 MG/DL (ref 8.7–10.5)
CHLORIDE SERPL-SCNC: 112 MMOL/L (ref 95–110)
CO2 SERPL-SCNC: 21 MMOL/L (ref 23–29)
CREAT SERPL-MCNC: 0.8 MG/DL (ref 0.5–1.4)
DIFFERENTIAL METHOD: ABNORMAL
EOSINOPHIL # BLD AUTO: 0.2 K/UL (ref 0–0.5)
EOSINOPHIL NFR BLD: 3.5 % (ref 0–8)
ERYTHROCYTE [DISTWIDTH] IN BLOOD BY AUTOMATED COUNT: 13.2 % (ref 11.5–14.5)
EST. GFR  (AFRICAN AMERICAN): >60 ML/MIN/1.73 M^2
EST. GFR  (NON AFRICAN AMERICAN): >60 ML/MIN/1.73 M^2
GLUCOSE SERPL-MCNC: 97 MG/DL (ref 70–110)
HCT VFR BLD AUTO: 37 % (ref 37–48.5)
HGB BLD-MCNC: 12.6 G/DL (ref 12–16)
IMM GRANULOCYTES # BLD AUTO: 0.02 K/UL (ref 0–0.04)
IMM GRANULOCYTES NFR BLD AUTO: 0.3 % (ref 0–0.5)
LITHIUM SERPL-SCNC: 0.9 MMOL/L (ref 0.6–1.2)
LYMPHOCYTES # BLD AUTO: 2 K/UL (ref 1–4.8)
LYMPHOCYTES NFR BLD: 29.5 % (ref 18–48)
MCH RBC QN AUTO: 31.7 PG (ref 27–31)
MCHC RBC AUTO-ENTMCNC: 34.1 G/DL (ref 32–36)
MCV RBC AUTO: 93 FL (ref 82–98)
MONOCYTES # BLD AUTO: 0.5 K/UL (ref 0.3–1)
MONOCYTES NFR BLD: 8 % (ref 4–15)
NEUTROPHILS # BLD AUTO: 3.9 K/UL (ref 1.8–7.7)
NEUTROPHILS NFR BLD: 57.7 % (ref 38–73)
NRBC BLD-RTO: 0 /100 WBC
PLATELET # BLD AUTO: 315 K/UL (ref 150–350)
PMV BLD AUTO: 9.1 FL (ref 9.2–12.9)
POTASSIUM SERPL-SCNC: 3.5 MMOL/L (ref 3.5–5.1)
PROT SERPL-MCNC: 6.5 G/DL (ref 6–8.4)
RBC # BLD AUTO: 3.98 M/UL (ref 4–5.4)
SODIUM SERPL-SCNC: 141 MMOL/L (ref 136–145)
WBC # BLD AUTO: 6.78 K/UL (ref 3.9–12.7)

## 2020-11-09 PROCEDURE — 36415 COLL VENOUS BLD VENIPUNCTURE: CPT | Mod: HCNC

## 2020-11-09 PROCEDURE — 80053 COMPREHEN METABOLIC PANEL: CPT | Mod: HCNC

## 2020-11-09 PROCEDURE — 85025 COMPLETE CBC W/AUTO DIFF WBC: CPT | Mod: HCNC

## 2020-11-09 PROCEDURE — 80178 ASSAY OF LITHIUM: CPT | Mod: HCNC

## 2020-11-10 NOTE — PROGRESS NOTES
11/10/2020: I called to report the Lithium level of 0.9 from her recent blood level. I indicated that her tremor could be due to the lithium level which is up from 0.6 taken earlier in the year. She reports no signs of lithium toxicity at this time and does not want to change her dose or take Inderal for the side effect. We agreed to check the level again around the time of the next visit. I did review the side effects of lithium again with her and she denies other side effects.

## 2020-11-17 ENCOUNTER — PATIENT MESSAGE (OUTPATIENT)
Dept: PSYCHIATRY | Facility: CLINIC | Age: 50
End: 2020-11-17

## 2020-11-17 ENCOUNTER — OFFICE VISIT (OUTPATIENT)
Dept: PSYCHIATRY | Facility: CLINIC | Age: 50
End: 2020-11-17
Payer: MEDICARE

## 2020-11-17 DIAGNOSIS — F31.9 BIPOLAR 1 DISORDER: Primary | ICD-10-CM

## 2020-11-17 PROCEDURE — 99214 PR OFFICE/OUTPT VISIT, EST, LEVL IV, 30-39 MIN: ICD-10-PCS | Mod: HCNC,95,, | Performed by: PSYCHIATRY & NEUROLOGY

## 2020-11-17 PROCEDURE — 99214 OFFICE O/P EST MOD 30 MIN: CPT | Mod: HCNC,95,, | Performed by: PSYCHIATRY & NEUROLOGY

## 2020-11-17 PROCEDURE — 90833 PSYTX W PT W E/M 30 MIN: CPT | Mod: HCNC,95,, | Performed by: PSYCHIATRY & NEUROLOGY

## 2020-11-17 PROCEDURE — 90833 PR PSYCHOTHERAPY W/PATIENT W/E&M, 30 MIN (ADD ON): ICD-10-PCS | Mod: HCNC,95,, | Performed by: PSYCHIATRY & NEUROLOGY

## 2020-11-23 ENCOUNTER — OFFICE VISIT (OUTPATIENT)
Dept: FAMILY MEDICINE | Facility: CLINIC | Age: 50
End: 2020-11-23
Payer: MEDICARE

## 2020-11-23 VITALS
WEIGHT: 253.5 LBS | TEMPERATURE: 98 F | HEART RATE: 86 BPM | HEIGHT: 67 IN | OXYGEN SATURATION: 96 % | DIASTOLIC BLOOD PRESSURE: 80 MMHG | BODY MASS INDEX: 39.79 KG/M2 | SYSTOLIC BLOOD PRESSURE: 102 MMHG

## 2020-11-23 DIAGNOSIS — R11.2 NON-INTRACTABLE VOMITING WITH NAUSEA, UNSPECIFIED VOMITING TYPE: ICD-10-CM

## 2020-11-23 DIAGNOSIS — M79.10 MYALGIA: Primary | ICD-10-CM

## 2020-11-23 DIAGNOSIS — M79.10 MUSCLE PAIN: ICD-10-CM

## 2020-11-23 DIAGNOSIS — R51.9 HEAD ACHE: ICD-10-CM

## 2020-11-23 DIAGNOSIS — R19.7 DIARRHEA: ICD-10-CM

## 2020-11-23 DIAGNOSIS — Z03.818 ENCOUNTER FOR OBSERVATION FOR SUSPECTED EXPOSURE TO OTHER BIOLOGICAL AGENTS RULED OUT: ICD-10-CM

## 2020-11-23 DIAGNOSIS — R30.0 DYSURIA: ICD-10-CM

## 2020-11-23 DIAGNOSIS — R11.0 NAUSEA: ICD-10-CM

## 2020-11-23 LAB
BACTERIA #/AREA URNS HPF: ABNORMAL /HPF
BILIRUB UR QL STRIP: NEGATIVE
CAOX CRY URNS QL MICRO: ABNORMAL
CLARITY UR: ABNORMAL
COLOR UR: ABNORMAL
GLUCOSE UR QL STRIP: NEGATIVE
GRAN CASTS #/AREA URNS LPF: >25 /LPF
HGB UR QL STRIP: NEGATIVE
HYALINE CASTS #/AREA URNS LPF: 0 /LPF
INFLUENZA A, MOLECULAR: NEGATIVE
INFLUENZA B, MOLECULAR: NEGATIVE
KETONES UR QL STRIP: NEGATIVE
LEUKOCYTE ESTERASE UR QL STRIP: NEGATIVE
MICROSCOPIC COMMENT: ABNORMAL
NITRITE UR QL STRIP: NEGATIVE
PH UR STRIP: 5 [PH] (ref 5–8)
PROT UR QL STRIP: ABNORMAL
RBC #/AREA URNS HPF: 0 /HPF (ref 0–4)
SP GR UR STRIP: 1.02 (ref 1–1.03)
SPECIMEN SOURCE: NORMAL
SQUAMOUS #/AREA URNS HPF: 3 /HPF
URN SPEC COLLECT METH UR: ABNORMAL
UROBILINOGEN UR STRIP-ACNC: NEGATIVE EU/DL
WBC #/AREA URNS HPF: 0 /HPF (ref 0–5)

## 2020-11-23 PROCEDURE — 1126F PR PAIN SEVERITY QUANTIFIED, NO PAIN PRESENT: ICD-10-PCS | Mod: HCNC,S$GLB,, | Performed by: NURSE PRACTITIONER

## 2020-11-23 PROCEDURE — 1126F AMNT PAIN NOTED NONE PRSNT: CPT | Mod: HCNC,S$GLB,, | Performed by: NURSE PRACTITIONER

## 2020-11-23 PROCEDURE — 99214 OFFICE O/P EST MOD 30 MIN: CPT | Mod: HCNC,S$GLB,, | Performed by: NURSE PRACTITIONER

## 2020-11-23 PROCEDURE — 3008F PR BODY MASS INDEX (BMI) DOCUMENTED: ICD-10-PCS | Mod: HCNC,CPTII,S$GLB, | Performed by: NURSE PRACTITIONER

## 2020-11-23 PROCEDURE — 99214 PR OFFICE/OUTPT VISIT, EST, LEVL IV, 30-39 MIN: ICD-10-PCS | Mod: HCNC,S$GLB,, | Performed by: NURSE PRACTITIONER

## 2020-11-23 PROCEDURE — 3008F BODY MASS INDEX DOCD: CPT | Mod: HCNC,CPTII,S$GLB, | Performed by: NURSE PRACTITIONER

## 2020-11-23 PROCEDURE — 99999 PR PBB SHADOW E&M-EST. PATIENT-LVL V: CPT | Mod: PBBFAC,HCNC,, | Performed by: NURSE PRACTITIONER

## 2020-11-23 PROCEDURE — 99999 PR PBB SHADOW E&M-EST. PATIENT-LVL V: ICD-10-PCS | Mod: PBBFAC,HCNC,, | Performed by: NURSE PRACTITIONER

## 2020-11-23 PROCEDURE — 87502 INFLUENZA DNA AMP PROBE: CPT | Mod: HCNC

## 2020-11-23 PROCEDURE — U0003 INFECTIOUS AGENT DETECTION BY NUCLEIC ACID (DNA OR RNA); SEVERE ACUTE RESPIRATORY SYNDROME CORONAVIRUS 2 (SARS-COV-2) (CORONAVIRUS DISEASE [COVID-19]), AMPLIFIED PROBE TECHNIQUE, MAKING USE OF HIGH THROUGHPUT TECHNOLOGIES AS DESCRIBED BY CMS-2020-01-R: HCPCS | Mod: HCNC

## 2020-11-23 PROCEDURE — 81000 URINALYSIS NONAUTO W/SCOPE: CPT | Mod: HCNC

## 2020-11-23 RX ORDER — ONDANSETRON 8 MG/1
8 TABLET, ORALLY DISINTEGRATING ORAL EVERY 6 HOURS PRN
Qty: 30 TABLET | Refills: 0 | Status: SHIPPED | OUTPATIENT
Start: 2020-11-23 | End: 2022-03-07

## 2020-11-23 NOTE — PROGRESS NOTES
"Answers for HPI/ROS submitted by the patient on 2020   Back pain  Chronicity: chronic  Onset: more than 1 month ago  Frequency: constantly  Progression since onset: waxing and waning  Pain location: lumbar spine, sacro-iliac, costovertebral angle  Pain quality: aching, burning, shooting, stabbing  Radiates to: right foot, right knee, right thigh  Pain - numeric: 10/10  Pain is: the same all the time  Aggravated by: bending, position, lying down, sitting, standing, twisting  Stiffness is present: in the morning, at night, all day  abdominal pain: No  bladder incontinence: Yes  chest pain: No  dysuria: Yes  headaches: No  leg pain: Yes  paresis: No  paresthesias: Yes  perianal numbness: No  tingling: Yes  weight loss: No  genital pain: No  Risk factors: lack of exercise, obesity, poor posture, sedentary lifestyle  Pain severity: severe  Treatments tried: bed rest, home exercises, ice, walking  Improvement on treatment: mild    Routine Office Visit    Patient Name: Zaira Dowell    : 1970  MRN: [id number]    Chief Complaint:  Myalgia, nauea, vomiting,    Subjective:  Zaira is a 49 y.o. female who presents today for:    1. Last thursday started vomiting, no vomiting Friday, felt better but still exhausted and not herself.  Saturday no vomiting but very nauseated and fatigued.  Saturday evening was very constipated, but had BM Saturday night after taking colace.  Now having watery diarrhea. 8 episodes of diarrhea in last two days.  Was able to eat yesterday. Threw up again last night.  Gets very nauseated when eating.  Reports having body aches "all over" as well as an intermittent HA.  She states it feels as if she has been hit by a truck.  Has "sinus symptoms" of runny nose and nasal congestion intermittently.  Taste and smell unchanged  Did get flu shot.  Breathing ok; no shortness of breath or wheezing. No cough.  Reports she is craving salt, specifically tortilla chips. Able to keep down tortilla " chips.  No known sick contacts.  She has not tried other medications  No new or strange foods in the last week.    She also does admit in the last 2 weeks dealing with some dysuria and hematuria, but she states the symptoms have resolved.    No other acute complaints.    Past Medical History  Past Medical History:   Diagnosis Date    Anxiety     Arthritis     Asthma     BMI 50.0-59.9, adult     Depression     Fibromyalgia     GERD (gastroesophageal reflux disease)     Obesity     SHARON (obstructive sleep apnea)        Past Surgical History  Past Surgical History:   Procedure Laterality Date    CARPAL TUNNEL RELEASE      CHOLECYSTECTOMY      ESOPHAGOGASTRODUODENOSCOPY N/A 5/21/2019    Procedure: EGD (ESOPHAGOGASTRODUODENOSCOPY);  Surgeon: Michelle Mc MD;  Location: [facility];  Service: Endoscopy;  Laterality: N/A;    HYSTERECTOMY      partial     KNEE SURGERY      LAPAROSCOPIC SLEEVE GASTRECTOMY N/A 10/18/2019    Procedure: GASTRECTOMY, SLEEVE, LAPAROSCOPIC, with intraop EGD;  Surgeon: Ean Kohli MD;  Location: [facility] OR [address] [place] [address];  Service: General;  Laterality: N/A;    pinched nerve      SHOULDER SURGERY      TONSILLECTOMY      WRIST SURGERY      had ganlin cyst       Family History  Family History   Problem Relation Age of Onset    Diabetes Mother     [place]'s disease Mother     Hypertension Mother     Heart disease Father     Stroke Father     Mental illness Father     Hypertension Father     Diabetes Father     Depression Father     Heart disease Maternal Grandmother     Depression Maternal Grandmother     COPD Maternal Grandfather     Heart disease Maternal Grandfather     Stroke Maternal Grandfather     Kidney disease Paternal Grandmother     Heart disease Paternal Grandmother     Heart disease Paternal Grandfather     No Known Problems Brother     Hypertension Son        Social History  Social History     Socioeconomic History    Marital status:       Spouse name: Not on file    Number of children: Not on file    Years of education: Not on file    Highest education level: Not on file   Occupational History    Not on file   Social Needs    Financial resource strain: Not hard at all    Food insecurity     Worry: Never true     Inability: Never true    Transportation needs     Medical: No     Non-medical: No   Tobacco Use    Smoking status: Current Some Day Smoker     Types: Vaping with nicotine, Cigarettes    Smokeless tobacco: Never Used   Substance and Sexual Activity    Alcohol use: No     Frequency: Monthly or less     Drinks per session: 1 or 2     Binge frequency: Never    Drug use: No    Sexual activity: Yes     Partners: Male   Lifestyle    Physical activity     Days per week: 0 days     Minutes per session: 30 min    Stress: Rather much   Relationships    Social connections     Talks on phone: More than three times a week     Gets together: Patient refused     Attends Zoroastrian service: Not on file     Active member of club or organization: No     Attends meetings of clubs or organizations: Never     Relationship status:    Other Topics Concern    Not on file   Social History Narrative    Not on file       Current Medications  Current Outpatient Medications on File Prior to Visit   Medication Sig Dispense Refill    albuterol (PROVENTIL/VENTOLIN HFA) 90 mcg/actuation inhaler Inhale 2 puffs into the lungs every 6 (six) hours as needed for Wheezing. Rescue 18 g 2    b complex vitamins tablet Take 1 tablet by mouth once daily.      buPROPion (WELLBUTRIN SR) 100 MG TBSR 12 hr tablet Take 1 tablet (100 mg total) by mouth once daily. 30 tablet 5    calcium citrate (CALCITRATE) 200 mg (950 mg) tablet Take 1 tablet by mouth 2 (two) times daily.      ferrous fumarate/vit Bcomp,C (SUPER B COMPLEX ORAL) Take 1 capsule by mouth once daily.      furosemide (LASIX) 40 MG tablet Take 1 tablet (40 mg total) by mouth once daily. 30 tablet 2     gabapentin (NEURONTIN) 800 MG tablet Take 1 tablet (800 mg total) by mouth once daily. 90 tablet 2    lithium (LITHOTAB) 300 mg tablet Take 1 tablet (300 mg total) by mouth 3 (three) times daily. 270 tablet 1    LORazepam (ATIVAN) 1 MG tablet Take 1 tablet (1 mg total) by mouth as needed for Anxiety. 30 tablet 2    meclizine (ANTIVERT) 25 mg tablet Take 1 tablet (25 mg total) by mouth 3 (three) times daily as needed for Dizziness or Nausea. 30 tablet 0    multivitamin capsule Take 1 capsule by mouth 2 (two) times daily.       omeprazole (PRILOSEC) 40 MG capsule Take 1 capsule (40 mg total) by mouth 2 (two) times daily before meals. 180 capsule 0    potassium chloride (KLOR-CON) 10 MEQ TbSR Take 10 mEq by mouth 2 (two) times daily.      risperiDONE (RISPERDAL) 0.5 MG Tab Take 1 tablet (0.5 mg total) by mouth 2 (two) times daily. 180 tablet 1    sertraline (ZOLOFT) 100 MG tablet Take 2 tablets (200 mg total) by mouth once daily. 180 tablet 2    tolterodine (DETROL LA) 4 MG 24 hr capsule Take 1 capsule (4 mg total) by mouth once daily. 30 capsule 11    topiramate (TOPAMAX) 200 MG Tab Take 1 tablet (200 mg total) by mouth once daily. 90 tablet 0    [DISCONTINUED] ondansetron (ZOFRAN-ODT) 8 MG TbDL Dissolve 1 tablet (8 mg total) by mouth every 6 (six) hours as needed. 30 tablet 0    allopurinoL (ZYLOPRIM) 100 MG tablet Take 1 tablet (100 mg total) by mouth once daily. (Patient not taking: Reported on 11/23/2020) 90 tablet 1    cyanocobalamin 500 MCG tablet Take 500 mcg by mouth once daily.      fluticasone propionate (FLONASE) 50 mcg/actuation nasal spray 1 spray (50 mcg total) by Each Nostril route once daily. (Patient not taking: Reported on 11/23/2020) 16 g 3    polyethylene glycol (GLYCOLAX) 17 gram/dose powder Mix 1 capful (17 g) with fluids and drink by mouth once daily. (Patient not taking: Reported on 11/23/2020) 510 g 3     No current facility-administered medications on file prior to visit.   "      Allergies   Review of patient's allergies indicates:   Allergen Reactions    Hydrocodone-acetaminophen Nausea And Vomiting       Review of Systems (Pertinent positives)  Review of Systems   Constitutional: Positive for malaise/fatigue. Negative for chills, diaphoresis, fever and weight loss.   HENT: Positive for congestion. Negative for ear discharge, ear pain, hearing loss, nosebleeds, sinus pain, sore throat and tinnitus.         +Runny nose   Eyes: Negative.    Respiratory: Negative for cough, hemoptysis, sputum production, shortness of breath, wheezing and stridor.    Cardiovascular: Negative for chest pain, palpitations, orthopnea, claudication, leg swelling and PND.   Gastrointestinal: Positive for constipation, diarrhea, nausea and vomiting. Negative for abdominal pain, blood in stool, heartburn and melena.   Genitourinary: Positive for dysuria (resolved) and hematuria (resolved). Negative for flank pain, frequency and urgency.   Musculoskeletal: Positive for back pain and myalgias. Negative for falls, joint pain and neck pain.   Skin: Negative.    Neurological: Positive for tingling and headaches. Negative for tremors, sensory change, speech change, focal weakness, seizures, loss of consciousness and weakness.   Endo/Heme/Allergies: Negative.    Psychiatric/Behavioral: Negative.        /80 (BP Location: Left arm, Patient Position: Sitting, BP Method: Medium (Manual))   Pulse 86   Temp 98.1 °F (36.7 °C) (Oral)   Ht 5' 7" (1.702 m)   Wt 115 kg (253 lb 8.5 oz)   SpO2 96%   BMI 39.71 kg/m²     Physical Exam  Vitals signs reviewed.   Constitutional:       General: She is not in acute distress.     Appearance: She is ill-appearing. She is not toxic-appearing or diaphoretic.      Comments: Ill-appearing but in no acute distress   HENT:      Head: Normocephalic and atraumatic.      Right Ear: Tympanic membrane, ear canal and external ear normal.      Left Ear: Tympanic membrane, ear canal and " external ear normal.      Nose: Mucosal edema and congestion present. No rhinorrhea.      Right Turbinates: Swollen.      Left Turbinates: Swollen.      Right Sinus: No maxillary sinus tenderness or frontal sinus tenderness.      Left Sinus: No maxillary sinus tenderness or frontal sinus tenderness.      Mouth/Throat:      Lips: Pink.      Mouth: Mucous membranes are moist.      Pharynx: No pharyngeal swelling, oropharyngeal exudate, posterior oropharyngeal erythema or uvula swelling.      Tonsils: No tonsillar exudate or tonsillar abscesses. 0 on the right. 0 on the left.   Eyes:      Extraocular Movements: Extraocular movements intact.      Conjunctiva/sclera: Conjunctivae normal.      Pupils: Pupils are equal, round, and reactive to light.   Cardiovascular:      Rate and Rhythm: Normal rate and regular rhythm.      Pulses: Normal pulses.      Heart sounds: Normal heart sounds.   Pulmonary:      Effort: Pulmonary effort is normal. No respiratory distress.      Breath sounds: Normal breath sounds. No stridor. No wheezing, rhonchi or rales.   Chest:      Chest wall: No tenderness.   Abdominal:      General: Bowel sounds are normal.      Palpations: Abdomen is soft.      Tenderness: There is generalized abdominal tenderness. There is no right CVA tenderness, left CVA tenderness, guarding or rebound.      Comments: Generalized abdominal tenderness noted no rigidity or guarding no significant CVA tenderness   Skin:     General: Skin is warm and dry.      Capillary Refill: Capillary refill takes less than 2 seconds.   Neurological:      General: No focal deficit present.      Mental Status: She is alert and oriented to person, place, and time.   Psychiatric:         Mood and Affect: Mood normal.         Behavior: Behavior normal.          Assessment/Plan:  Zaira Dowell is a 49 y.o. female who presents today for :    Zaira was seen today for nausea and diarrhea.    Diagnoses and all orders for this  visit:    Myalgia  -     CBC Auto Differential; Future  -     Comprehensive Metabolic Panel; Future  -     Influenza A & B by Molecular    Non-intractable vomiting with nausea, unspecified vomiting type  -     ondansetron (ZOFRAN-ODT) 8 MG TbDL; Dissolve 1 tablet (8 mg total) by mouth every 6 (six) hours as needed.  -     CBC Auto Differential; Future  -     Comprehensive Metabolic Panel; Future  -     Influenza A & B by Molecular    Dysuria  -     URINALYSIS  -     CBC Auto Differential; Future  -     Comprehensive Metabolic Panel; Future    Diarrhea  -     COVID-19 Routine Screening    Muscle pain  -     COVID-19 Routine Screening    Head ache  -     COVID-19 Routine Screening    Nausea  -     COVID-19 Routine Screening    Encounter for observation for suspected exposure to other biological agents ruled out  -     COVID-19 Routine Screening     Discussed potential etiologies of symptoms with patient.  Wide differential diagnosis.  Will swab for flu and COVID.  Patient may also be dealing with gastroenteritis.  Recommended patient to stay hydrated and will treat nausea with Zofran.  She reports having some dysuria and hematuria in the last 2 weeks but these symptoms have resolved.  No CVA tenderness.  Considered pyelonephritis as a cause of the symptoms.  Given her abdominal tenderness I did recommend a CT scan to evaluate, but patient would like to avoid this at this time.  Will check urinalysis and basic lab work if hematuria and/or other signs of a UTI present on urinalysis may need to get imaging.  Recommended patient to rest and quarantine until we get some of her results back.  Other home care measures discussed.  She should go to the emergency room for any acutely worsening symptoms.  Patient verbalized understanding.        This office note has been dictated.  This dictation has been generated using M-Modal Fluency Direct dictation; some phonetic errors may occur.   My collaborating physician is Dr. Collado  [place].

## 2020-11-24 ENCOUNTER — LAB VISIT (OUTPATIENT)
Dept: LAB | Facility: HOSPITAL | Age: 50
End: 2020-11-24
Attending: NURSE PRACTITIONER
Payer: MEDICARE

## 2020-11-24 DIAGNOSIS — R11.2 NON-INTRACTABLE VOMITING WITH NAUSEA, UNSPECIFIED VOMITING TYPE: ICD-10-CM

## 2020-11-24 DIAGNOSIS — M79.10 MYALGIA: ICD-10-CM

## 2020-11-24 DIAGNOSIS — R30.0 DYSURIA: ICD-10-CM

## 2020-11-24 LAB
ALBUMIN SERPL BCP-MCNC: 3.7 G/DL (ref 3.5–5.2)
ALP SERPL-CCNC: 65 U/L (ref 55–135)
ALT SERPL W/O P-5'-P-CCNC: 13 U/L (ref 10–44)
ANION GAP SERPL CALC-SCNC: 8 MMOL/L (ref 8–16)
AST SERPL-CCNC: 9 U/L (ref 10–40)
BASOPHILS # BLD AUTO: 0.09 K/UL (ref 0–0.2)
BASOPHILS NFR BLD: 1.1 % (ref 0–1.9)
BILIRUB SERPL-MCNC: 0.2 MG/DL (ref 0.1–1)
BUN SERPL-MCNC: 12 MG/DL (ref 6–20)
CALCIUM SERPL-MCNC: 9.5 MG/DL (ref 8.7–10.5)
CHLORIDE SERPL-SCNC: 110 MMOL/L (ref 95–110)
CO2 SERPL-SCNC: 25 MMOL/L (ref 23–29)
CREAT SERPL-MCNC: 1 MG/DL (ref 0.5–1.4)
DIFFERENTIAL METHOD: ABNORMAL
EOSINOPHIL # BLD AUTO: 0.3 K/UL (ref 0–0.5)
EOSINOPHIL NFR BLD: 3.1 % (ref 0–8)
ERYTHROCYTE [DISTWIDTH] IN BLOOD BY AUTOMATED COUNT: 12.8 % (ref 11.5–14.5)
EST. GFR  (AFRICAN AMERICAN): >60 ML/MIN/1.73 M^2
EST. GFR  (NON AFRICAN AMERICAN): >60 ML/MIN/1.73 M^2
GLUCOSE SERPL-MCNC: 93 MG/DL (ref 70–110)
HCT VFR BLD AUTO: 42.9 % (ref 37–48.5)
HGB BLD-MCNC: 13.8 G/DL (ref 12–16)
IMM GRANULOCYTES # BLD AUTO: 0.04 K/UL (ref 0–0.04)
IMM GRANULOCYTES NFR BLD AUTO: 0.5 % (ref 0–0.5)
LYMPHOCYTES # BLD AUTO: 2.7 K/UL (ref 1–4.8)
LYMPHOCYTES NFR BLD: 31.6 % (ref 18–48)
MCH RBC QN AUTO: 31.2 PG (ref 27–31)
MCHC RBC AUTO-ENTMCNC: 32.2 G/DL (ref 32–36)
MCV RBC AUTO: 97 FL (ref 82–98)
MONOCYTES # BLD AUTO: 0.6 K/UL (ref 0.3–1)
MONOCYTES NFR BLD: 7.5 % (ref 4–15)
NEUTROPHILS # BLD AUTO: 4.8 K/UL (ref 1.8–7.7)
NEUTROPHILS NFR BLD: 56.2 % (ref 38–73)
NRBC BLD-RTO: 0 /100 WBC
PLATELET # BLD AUTO: 329 K/UL (ref 150–350)
PMV BLD AUTO: 9.3 FL (ref 9.2–12.9)
POTASSIUM SERPL-SCNC: 4.5 MMOL/L (ref 3.5–5.1)
PROT SERPL-MCNC: 6.8 G/DL (ref 6–8.4)
RBC # BLD AUTO: 4.43 M/UL (ref 4–5.4)
SARS-COV-2 RNA RESP QL NAA+PROBE: NOT DETECTED
SODIUM SERPL-SCNC: 143 MMOL/L (ref 136–145)
WBC # BLD AUTO: 8.49 K/UL (ref 3.9–12.7)

## 2020-11-24 PROCEDURE — 36415 COLL VENOUS BLD VENIPUNCTURE: CPT | Mod: HCNC,PN

## 2020-11-24 PROCEDURE — 80053 COMPREHEN METABOLIC PANEL: CPT | Mod: HCNC

## 2020-11-24 PROCEDURE — 85025 COMPLETE CBC W/AUTO DIFF WBC: CPT | Mod: HCNC

## 2020-11-25 ENCOUNTER — PATIENT MESSAGE (OUTPATIENT)
Dept: FAMILY MEDICINE | Facility: CLINIC | Age: 50
End: 2020-11-25

## 2020-11-25 DIAGNOSIS — Z98.890 POST-OPERATIVE STATE: ICD-10-CM

## 2020-11-25 DIAGNOSIS — K21.9 GASTROESOPHAGEAL REFLUX DISEASE: ICD-10-CM

## 2020-11-25 DIAGNOSIS — G43.809 OTHER MIGRAINE WITHOUT STATUS MIGRAINOSUS, NOT INTRACTABLE: ICD-10-CM

## 2020-11-25 DIAGNOSIS — M79.7 FIBROMYALGIA: ICD-10-CM

## 2020-11-25 RX ORDER — OMEPRAZOLE 40 MG/1
40 CAPSULE, DELAYED RELEASE ORAL
Qty: 180 CAPSULE | Refills: 0 | Status: SHIPPED | OUTPATIENT
Start: 2020-11-25 | End: 2020-12-01 | Stop reason: SDUPTHER

## 2020-11-25 RX ORDER — TOPIRAMATE 200 MG/1
200 TABLET ORAL DAILY
Qty: 90 TABLET | Refills: 0 | Status: SHIPPED | OUTPATIENT
Start: 2020-11-25 | End: 2020-12-01 | Stop reason: SDUPTHER

## 2020-11-25 RX ORDER — GABAPENTIN 800 MG/1
800 TABLET ORAL DAILY
Qty: 90 TABLET | Refills: 2 | Status: SHIPPED | OUTPATIENT
Start: 2020-11-25 | End: 2020-12-01 | Stop reason: SDUPTHER

## 2020-12-01 DIAGNOSIS — Z98.890 POST-OPERATIVE STATE: ICD-10-CM

## 2020-12-01 DIAGNOSIS — K21.9 GASTROESOPHAGEAL REFLUX DISEASE: ICD-10-CM

## 2020-12-01 DIAGNOSIS — G43.809 OTHER MIGRAINE WITHOUT STATUS MIGRAINOSUS, NOT INTRACTABLE: ICD-10-CM

## 2020-12-01 DIAGNOSIS — M79.7 FIBROMYALGIA: ICD-10-CM

## 2020-12-01 RX ORDER — OMEPRAZOLE 40 MG/1
40 CAPSULE, DELAYED RELEASE ORAL
Qty: 180 CAPSULE | Refills: 0 | Status: SHIPPED | OUTPATIENT
Start: 2020-12-01 | End: 2020-12-01

## 2020-12-01 RX ORDER — GABAPENTIN 800 MG/1
800 TABLET ORAL DAILY
Qty: 90 TABLET | Refills: 2 | Status: SHIPPED | OUTPATIENT
Start: 2020-12-01 | End: 2021-10-13 | Stop reason: SDUPTHER

## 2020-12-01 RX ORDER — TOPIRAMATE 200 MG/1
200 TABLET ORAL DAILY
Qty: 90 TABLET | Refills: 0 | Status: SHIPPED | OUTPATIENT
Start: 2020-12-01 | End: 2021-02-15 | Stop reason: SDUPTHER

## 2020-12-09 ENCOUNTER — PATIENT OUTREACH (OUTPATIENT)
Dept: ADMINISTRATIVE | Facility: OTHER | Age: 50
End: 2020-12-09

## 2020-12-09 DIAGNOSIS — Z12.31 BREAST CANCER SCREENING BY MAMMOGRAM: Primary | ICD-10-CM

## 2020-12-09 NOTE — PROGRESS NOTES
Health Maintenance Due   Topic Date Due    HIV Screening  11[birth year]24/1986    Pneumococcal Vaccine (Medium Risk) (1 of 1 - PPSV23) 11/24/1990    Mammogram  01/17/2021     Updates were requested from care everywhere.  Chart was reviewed for overdue Proactive [facility] Encounters (FORTUNATO) topics (CRS, Breast Cancer Screening, Eye exam)  Health Maintenance has been updated.  LIN[place] immunization registry triggered.  Immunizations were reconciled.\  Mammo w/CAD ordered task appt sent to [facility]

## 2020-12-10 ENCOUNTER — HOSPITAL ENCOUNTER (OUTPATIENT)
Dept: RADIOLOGY | Facility: HOSPITAL | Age: 50
Discharge: HOME | End: 2020-12-10
Attending: FAMILY MEDICINE
Payer: MEDICARE

## 2020-12-10 ENCOUNTER — PATIENT MESSAGE (OUTPATIENT)
Dept: FAMILY MEDICINE | Facility: CLINIC | Age: 50
End: 2020-12-10

## 2020-12-10 ENCOUNTER — HOSPITAL ENCOUNTER (OUTPATIENT)
Dept: RADIOLOGY | Facility: HOSPITAL | Age: 50
Discharge: HOME | End: 2020-12-10
Attending: NURSE PRACTITIONER
Payer: MEDICARE

## 2020-12-10 ENCOUNTER — OFFICE VISIT (OUTPATIENT)
Dept: FAMILY MEDICINE | Facility: CLINIC | Age: 50
End: 2020-12-10
Payer: MEDICARE

## 2020-12-10 VITALS
DIASTOLIC BLOOD PRESSURE: 66 MMHG | SYSTOLIC BLOOD PRESSURE: 114 MMHG | WEIGHT: 260.38 LBS | HEIGHT: 67 IN | OXYGEN SATURATION: 97 % | HEART RATE: 81 BPM | BODY MASS INDEX: 40.87 KG/M2

## 2020-12-10 VITALS — WEIGHT: 260 LBS | HEIGHT: 67 IN | BODY MASS INDEX: 40.81 KG/M2

## 2020-12-10 DIAGNOSIS — S40.862A INSECT BITE OF LEFT UPPER EXTREMITY, INITIAL ENCOUNTER: Primary | ICD-10-CM

## 2020-12-10 DIAGNOSIS — M79.671 RIGHT FOOT PAIN: ICD-10-CM

## 2020-12-10 DIAGNOSIS — S99.921A INJURY OF RIGHT FOOT, INITIAL ENCOUNTER: ICD-10-CM

## 2020-12-10 DIAGNOSIS — Z12.31 BREAST CANCER SCREENING BY MAMMOGRAM: ICD-10-CM
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
## 2020-12-10 PROCEDURE — 99999 PR PBB SHADOW E&M-EST. PATIENT-LVL V: CPT | Mod: PBBFAC,HCNC,, | Performed by: NURSE PRACTITIONER

## 2020-12-10 PROCEDURE — 77063 BREAST TOMOSYNTHESIS BI: CPT | Mod: 26,HCNC,, | Performed by: RADIOLOGY

## 2020-12-10 PROCEDURE — 77067 SCR MAMMO BI INCL CAD: CPT | Mod: TC,HCNC

## 2020-12-10 PROCEDURE — 77063 MAMMO DIGITAL SCREENING BILAT WITH TOMO: ICD-10-PCS | Mod: 26,HCNC,, | Performed by: RADIOLOGY

## 2020-12-10 PROCEDURE — 77067 MAMMO DIGITAL SCREENING BILAT WITH TOMO: ICD-10-PCS | Mod: 26,HCNC,, | Performed by: RADIOLOGY

## 2020-12-10 PROCEDURE — 99214 PR OFFICE/OUTPT VISIT, EST, LEVL IV, 30-39 MIN: ICD-10-PCS | Mod: HCNC,S$GLB,, | Performed by: NURSE PRACTITIONER

## 2020-12-10 PROCEDURE — 99214 OFFICE O/P EST MOD 30 MIN: CPT | Mod: HCNC,S$GLB,, | Performed by: NURSE PRACTITIONER

## 2020-12-10 PROCEDURE — 3008F BODY MASS INDEX DOCD: CPT | Mod: HCNC,CPTII,S$GLB, | Performed by: NURSE PRACTITIONER

## 2020-12-10 PROCEDURE — 77067 SCR MAMMO BI INCL CAD: CPT | Mod: 26,HCNC,, | Performed by: RADIOLOGY

## 2020-12-10 PROCEDURE — 1125F AMNT PAIN NOTED PAIN PRSNT: CPT | Mod: HCNC,S$GLB,, | Performed by: NURSE PRACTITIONER

## 2020-12-10 PROCEDURE — 1125F PR PAIN SEVERITY QUANTIFIED, PAIN PRESENT: ICD-10-PCS | Mod: HCNC,S$GLB,, | Performed by: NURSE PRACTITIONER

## 2020-12-10 PROCEDURE — 3008F PR BODY MASS INDEX (BMI) DOCUMENTED: ICD-10-PCS | Mod: HCNC,CPTII,S$GLB, | Performed by: NURSE PRACTITIONER

## 2020-12-10 PROCEDURE — 99999 PR PBB SHADOW E&M-EST. PATIENT-LVL V: ICD-10-PCS | Mod: PBBFAC,HCNC,, | Performed by: NURSE PRACTITIONER

## 2020-12-10 RX ORDER — TRIAMCINOLONE ACETONIDE 1 MG/G
OINTMENT TOPICAL 2 TIMES DAILY
Qty: 15 G | Refills: 0 | Status: SHIPPED | OUTPATIENT
Start: 2020-12-10 | End: 2021-11-10

## 2020-12-10 NOTE — PATIENT INSTRUCTIONS

## 2020-12-10 NOTE — PROGRESS NOTES
Routine Office Visit    Patient Name: Zaira Dowell    : 1970  MRN: [id number]    Chief Complaint:  Right foot pain, insect bite    Subjective:  Zaira is a 50 y.o. female who presents today for:    1.  Right foot pain - early yesterday morning she was standing on a step and lost her balance and scraped down and injured the toes of her right foot as well as the top of the right foot.  She endorses throbbing pain to the 1st 3 toes the right foot as well as the dorsal and ventral aspects of the foot.  She denies any numbness or tingling.  She states that it is very hard to walk on the foot now because of the pain.  She has used ice and Tylenol for the symptoms with only modest benefit at best.  Denies any bleeding or hematomas.  She did not fall or hit her head or other parts of her body. No cardiac or respiratory symptoms today.    2.  Insect bite - she reports being bit by an insect approximately 1 week ago on the anterior part of her left arm.  The bite is occasionally tender but mostly itchy.  She denies any discharge to the area.  Denies any swelling of the arm, fevers, or chills.  She has been using Benadryl spray for the itching which has helped somewhat.  She did not see exactly what insect bit her.    Past Medical History  Past Medical History:   Diagnosis Date    Anxiety     Arthritis     Asthma     BMI 50.0-59.9, adult     Depression     Fibromyalgia     GERD (gastroesophageal reflux disease)     Obesity     SHARON (obstructive sleep apnea)        Past Surgical History  Past Surgical History:   Procedure Laterality Date    CARPAL TUNNEL RELEASE      CHOLECYSTECTOMY      ESOPHAGOGASTRODUODENOSCOPY N/A 2019    Procedure: EGD (ESOPHAGOGASTRODUODENOSCOPY);  Surgeon: Michelle Mc MD;  [facility];  Service: Endoscopy;  Laterality: N/A;    HYSTERECTOMY      partial     KNEE SURGERY      LAPAROSCOPIC SLEEVE GASTRECTOMY N/A 10/18/2019    Procedure: GASTRECTOMY, SLEEVE,  LAPAROSCOPIC, with intraop EGD;  Surgeon: Ean Kohli MD;  Location: [facility] OR [address] [place] [address];  Service: General;  Laterality: N/A;    pinched nerve      SHOULDER SURGERY      TONSILLECTOMY      WRIST SURGERY      had ganlin cyst       Family History  Family History   Problem Relation Age of Onset    Diabetes Mother     Jose Juan's disease Mother     Hypertension Mother     Heart disease Father     Stroke Father     Mental illness Father     Hypertension Father     Diabetes Father     Depression Father     Heart disease Maternal Grandmother     Depression Maternal Grandmother     COPD Maternal Grandfather     Heart disease Maternal Grandfather     Stroke Maternal Grandfather     Kidney disease Paternal Grandmother     Heart disease Paternal Grandmother     Heart disease Paternal Grandfather     No Known Problems Brother     Hypertension Son        Social History  Social History     Socioeconomic History    Marital status:      Spouse name: Not on file    Number of children: Not on file    Years of education: Not on file    Highest education level: Not on file   Occupational History    Not on file   Social Needs    Financial resource strain: Not hard at all    Food insecurity     Worry: Never true     Inability: Never true    Transportation needs     Medical: No     Non-medical: No   Tobacco Use    Smoking status: Current Some Day Smoker     Types: Vaping with nicotine, Cigarettes    Smokeless tobacco: Never Used   Substance and Sexual Activity    Alcohol use: No     Frequency: Monthly or less     Drinks per session: 1 or 2     Binge frequency: Never    Drug use: No    Sexual activity: Yes     Partners: Male   Lifestyle    Physical activity     Days per week: 0 days     Minutes per session: 30 min    Stress: Rather much   Relationships    Social connections     Talks on phone: More than three times a week     Gets together: Patient refused     Attends Church service:  Not on file     Active member of club or organization: No     Attends meetings of clubs or organizations: Never     Relationship status:    Other Topics Concern    Not on file   Social History Narrative    Not on file       Current Medications  Current Outpatient Medications on File Prior to Visit   Medication Sig Dispense Refill    albuterol (PROVENTIL/VENTOLIN HFA) 90 mcg/actuation inhaler Inhale 2 puffs into the lungs every 6 (six) hours as needed for Wheezing. Rescue 18 g 2    b complex vitamins tablet Take 1 tablet by mouth once daily.      buPROPion (WELLBUTRIN SR) 100 MG TBSR 12 hr tablet Take 1 tablet (100 mg total) by mouth once daily. 30 tablet 5    calcium citrate (CALCITRATE) 200 mg (950 mg) tablet Take 1 tablet by mouth 2 (two) times daily.      cyanocobalamin 500 MCG tablet Take 500 mcg by mouth once daily.      ferrous fumarate/vit Bcomp,C (SUPER B COMPLEX ORAL) Take 1 capsule by mouth once daily.      fluticasone propionate (FLONASE) 50 mcg/actuation nasal spray 1 spray (50 mcg total) by Each Nostril route once daily. 16 g 3    furosemide (LASIX) 40 MG tablet Take 1 tablet (40 mg total) by mouth once daily. 30 tablet 2    gabapentin (NEURONTIN) 800 MG tablet Take 1 tablet (800 mg total) by mouth once daily. 90 tablet 2    lithium (LITHOTAB) 300 mg tablet Take 1 tablet (300 mg total) by mouth 3 (three) times daily. 270 tablet 1    LORazepam (ATIVAN) 1 MG tablet Take 1 tablet (1 mg total) by mouth as needed for Anxiety. 30 tablet 2    meclizine (ANTIVERT) 25 mg tablet Take 1 tablet (25 mg total) by mouth 3 (three) times daily as needed for Dizziness or Nausea. 30 tablet 0    multivitamin capsule Take 1 capsule by mouth 2 (two) times daily.       omeprazole (PRILOSEC) 40 MG capsule TAKE 1 CAPSULE BY MOUTH TWICE DAILY BEFORE MEAL(S) 180 capsule 0    ondansetron (ZOFRAN-ODT) 8 MG TbDL Dissolve 1 tablet (8 mg total) by mouth every 6 (six) hours as needed. 30 tablet 0     "polyethylene glycol (GLYCOLAX) 17 gram/dose powder Mix 1 capful (17 g) with fluids and drink by mouth once daily. 510 g 3    potassium chloride (KLOR-CON) 10 MEQ TbSR Take 10 mEq by mouth 2 (two) times daily.      risperiDONE (RISPERDAL) 0.5 MG Tab Take 1 tablet (0.5 mg total) by mouth 2 (two) times daily. 180 tablet 1    sertraline (ZOLOFT) 100 MG tablet Take 2 tablets (200 mg total) by mouth once daily. 180 tablet 2    tolterodine (DETROL LA) 4 MG 24 hr capsule Take 1 capsule (4 mg total) by mouth once daily. 30 capsule 11    topiramate (TOPAMAX) 200 MG Tab Take 1 tablet (200 mg total) by mouth once daily. 90 tablet 0    allopurinoL (ZYLOPRIM) 100 MG tablet Take 1 tablet (100 mg total) by mouth once daily. (Patient not taking: Reported on 11/23/2020) 90 tablet 1     No current facility-administered medications on file prior to visit.        Allergies   Review of patient's allergies indicates:   Allergen Reactions    Hydrocodone-acetaminophen Nausea And Vomiting       Review of Systems (Pertinent positives)  Review of Systems   Constitutional: Negative for chills and fever.   HENT: Negative for hearing loss.    Eyes: Negative for discharge.   Respiratory: Negative for wheezing.    Cardiovascular: Negative for chest pain and palpitations.   Gastrointestinal: Positive for constipation and diarrhea. Negative for blood in stool and vomiting.   Genitourinary: Negative for dysuria and hematuria.   Musculoskeletal: Positive for joint pain and neck pain. Negative for back pain, falls and myalgias.   Skin: Positive for itching.        +insect bite left arm   Neurological: Positive for weakness and headaches.   Endo/Heme/Allergies: Negative for polydipsia.       /66 (BP Location: Left arm, Patient Position: Sitting, BP Method: Large (Automatic))   Pulse 81   Ht 5' 7" (1.702 m)   Wt 118.1 kg (260 lb 5.8 oz)   SpO2 97%   BMI 40.78 kg/m²     Physical Exam  Vitals signs reviewed.   Constitutional:       " General: She is not in acute distress.     Appearance: Normal appearance. She is not ill-appearing, toxic-appearing or diaphoretic.   Cardiovascular:      Rate and Rhythm: Normal rate and regular rhythm.      Pulses: Normal pulses.      Heart sounds: Normal heart sounds. No murmur. No gallop.    Pulmonary:      Effort: Pulmonary effort is normal. No respiratory distress.      Breath sounds: Normal breath sounds. No stridor. No wheezing, rhonchi or rales.   Chest:      Chest wall: No tenderness.   Musculoskeletal:      Right ankle: Normal. Achilles tendon normal.      Right foot: Normal range of motion and normal capillary refill. Tenderness and bony tenderness present. No swelling, crepitus, deformity or laceration.        Feet:       Comments: There is TTP noted of the circled portions of the right foot on the diagram as well as TTP of all joints of the 1st 3 toes on the right foot; no obvious bony deformity no crepitus no erythema or ecchymosis noted; toenails intact no hematomas noted   Skin:     General: Skin is warm and dry.      Capillary Refill: Capillary refill takes less than 2 seconds.          Neurological:      Mental Status: She is alert.          Assessment/Plan:  Zaira Dowell is a 50 y.o. female who presents today for :    Zaira was seen today for foot injury, foot swelling and bite.    Diagnoses and all orders for this visit:    Insect bite of left upper extremity, initial encounter  -     triamcinolone acetonide 0.1% (KENALOG) 0.1 % ointment; Apply topically 2 (two) times daily. for 7 days    Right foot pain  -     X-Ray Foot Complete Right; Future    Injury of right foot, initial encounter  -     X-Ray Foot Complete Right; Future    Will check x-ray of foot today.  If any fracture she will need orthopedic referral.  Recommended rest, ice, compression, and elevation to help with the symptoms.  Continue Tylenol arthritis.  Kenalog cream for insect bite.  No need for antibiotics today.  She can  continue Benadryl spray for the itching or Benadryl cream for p.o. Benadryl as needed.  All questions answered.  Patient verbalized understanding of instructions.        This office note has been dictated.  This dictation has been generated using M-Modal Fluency Direct dictation; some phonetic errors may occur.   My collaborating physician is Dr. Tobias Lundy.

## 2020-12-14 NOTE — PROGRESS NOTES
Subjective:      Patient ID: Zaira Dowell is a 50 y.o. female.    Chief Complaint: Low-back Pain and Mid-back Pain    Ms dowell is a 49 yo female here for follow up of spine pain.  She was last seen by me on 8/14/20 and she was having leg pain.  We did bilateral trochanteric bursa injections.  She feels like the hip pain is better right now.  She still cannot sleep in the bed.  She feels like hips hurt when she tries.  She is sleeping in the recliner.  She is still hurting the outside of the legs.  She is not having pain currently.  She is able to walk.  She only went to 4 visits, because that was what was scheduled.  She did not know she needed to schedule more.  She has not been doing the HEP.  She did not have very many exercises and she felt like they hurt so she did not do them.  She is not sure the injection helped, but hips are better.  She always had low back and upper back pain with standing and walking.  She feels like the back pain is an achy pain.  Pain is 5/10 in back not outer hip, worst 10/10 in back with standing  Pain in hip is 0/10 now and has not had pain in a couple of weeks, but can be a 10/10 lying on side, she has not tried in the past couple of weeks    EMG 5/2020  Chronic deinervation of left C7    X-ray lumbar 7/2020  No fracture.  No marrow replacement process.  There is multilevel degenerative disc disease, noting disc height loss and endplate changes.  Mild levocurvature noted.  Multiple surgical clips in the right upper quadrant suggestive of prior cholecystectomy.  SI joints unremarkable.     Impression:     No acute findings.    MRI cervical 6/1/20  There is mild straightening of the normal cervical lordosis.  Cervical spine alignment otherwise appears within normal limits.  Cervical vertebral body heights are appropriately maintained.  There is no evidence of diffuse bone marrow placement process.     The cervicomeduallary junction is normal.  The cervical cord is normal in  contour, caliber, and signal. Limited views of the soft tissues demonstrate an apparent 6-mm T2 hyperintense right thyroid lobe nodule.  The bilateral vertebral artery T2 flow voids are present.     C2-3:  No disc herniation, spinal canal narrowing, or neuroforaminal narrowing.     C3-4:  No disc herniation, spinal canal narrowing, or neuroforaminal narrowing.     C4-5:  There is a mild broad-based posterior disc osteophyte complex.  No significant spinal canal stenosis or neural foraminal narrowing.     C5-6:  There is a mild broad-based posterior disc osteophyte complex.  No significant spinal canal stenosis or neural foraminal narrowing.     C6-7:  There is a broad-based posterior disc osteophyte complex with prominent central component resulting in slight effacement of the anterior thecal sac.  Spinal canal remains patent.  There is mild bilateral uncovertebral spurring without significant neural foraminal narrowing.     C7-T1:  No disc herniation, spinal canal narrowing, or neuroforaminal narrowing.     Impression:     Mild degenerative changes of the cervical spine without evidence of significant spinal canal stenosis or neural foraminal narrowing.  Please see above for level by level details.       Past Medical History:  No date: Anxiety  No date: Arthritis  No date: Asthma  No date: BMI 50.0-59.9, adult  No date: Depression  No date: Fibromyalgia  No date: GERD (gastroesophageal reflux disease)  No date: Obesity  No date: SHARON (obstructive sleep apnea)    Past Surgical History:  No date: CARPAL TUNNEL RELEASE  No date: CHOLECYSTECTOMY  5/21/2019: ESOPHAGOGASTRODUODENOSCOPY; N/A      Comment:  Procedure: EGD (ESOPHAGOGASTRODUODENOSCOPY);  Surgeon:                Michelle Mc MD;  Location: [facility];  Service:                Endoscopy;  Laterality: N/A;  No date: HYSTERECTOMY      Comment:  partial   No date: KNEE SURGERY  10/18/2019: LAPAROSCOPIC SLEEVE GASTRECTOMY; N/A      Comment:  Procedure:  GASTRECTOMY, SLEEVE, LAPAROSCOPIC, with                intraop EGD;  Surgeon: Ean Kohli MD;                 Location: [facility] OR [address] [place] [address];  Service: General;                 Laterality: N/A;  No date: pinched nerve  No date: SHOULDER SURGERY  No date: TONSILLECTOMY  No date: WRIST SURGERY      Comment:  had ganlin cyst    Review of patient's family history indicates:  Problem: Diabetes      Relation: Mother          Age of Onset: (Not Specified)  Problem: [place]'s disease      Relation: Mother          Age of Onset: (Not Specified)  Problem: Hypertension      Relation: Mother          Age of Onset: (Not Specified)  Problem: Heart disease      Relation: Father          Age of Onset: (Not Specified)  Problem: Stroke      Relation: Father          Age of Onset: (Not Specified)  Problem: Mental illness      Relation: Father          Age of Onset: (Not Specified)  Problem: Hypertension      Relation: Father          Age of Onset: (Not Specified)  Problem: Diabetes      Relation: Father          Age of Onset: (Not Specified)  Problem: Depression      Relation: Father          Age of Onset: (Not Specified)  Problem: Heart disease      Relation: Maternal Grandmother          Age of Onset: (Not Specified)  Problem: Depression      Relation: Maternal Grandmother          Age of Onset: (Not Specified)  Problem: COPD      Relation: Maternal Grandfather          Age of Onset: (Not Specified)  Problem: Heart disease      Relation: Maternal Grandfather          Age of Onset: (Not Specified)  Problem: Stroke      Relation: Maternal Grandfather          Age of Onset: (Not Specified)  Problem: Kidney disease      Relation: Paternal Grandmother          Age of Onset: (Not Specified)  Problem: Heart disease      Relation: Paternal Grandmother          Age of Onset: (Not Specified)  Problem: Heart disease      Relation: Paternal Grandfather          Age of Onset: (Not Specified)  Problem: No Known Problems      Relation:  Brother          Age of Onset: (Not Specified)  Problem: Hypertension      Relation: Son          Age of Onset: (Not Specified)      Social History    Socioeconomic History      Marital status:       Spouse name: Not on file      Number of children: Not on file      Years of education: Not on file      Highest education level: Not on file    Occupational History      Not on file    Social Needs      Financial resource strain: Not very hard      Food insecurity        Worry: Never true        Inability: Never true      Transportation needs        Medical: No        Non-medical: No    Tobacco Use      Smoking status: Current Some Day Smoker        Types: Vaping with nicotine, Cigarettes      Smokeless tobacco: Never Used    Substance and Sexual Activity      Alcohol use: No        Frequency: Monthly or less        Drinks per session: 1 or 2        Binge frequency: Never      Drug use: No      Sexual activity: Yes        Partners: Male    Lifestyle      Physical activity        Days per week: 1 day        Minutes per session: 30 min      Stress: Rather much    Relationships      Social connections        Talks on phone: More than three times a week        Gets together: Patient refused        Attends Tenri[employer] service: Not on file        Active member of club or organization: No        Attends meetings of clubs or organizations: Never        Relationship status:     Other Topics      Concerns:        Not on file    Social History Narrative      Not on file      Current Outpatient Medications:  albuterol (PROVENTIL/VENTOLIN HFA) 90 mcg/actuation inhaler, Inhale 2 puffs into the lungs every 6 (six) hours as needed for Wheezing. Rescue, Disp: 18 g, Rfl: 2  allopurinoL (ZYLOPRIM) 100 MG tablet, Take 1 tablet (100 mg total) by mouth once daily., Disp: 90 tablet, Rfl: 1  b complex vitamins tablet, Take 1 tablet by mouth once daily., Disp: , Rfl:    calcium citrate (CALCITRATE) 200 mg (950 mg) tablet, Take 1  tablet by mouth 2 (two) times daily., Disp: , Rfl:   cyanocobalamin 500 MCG tablet, Take 500 mcg by mouth once daily., Disp: , Rfl:   ferrous fumarate/vit Bcomp,C (SUPER B COMPLEX ORAL), Take 1 capsule by mouth once daily., Disp: , Rfl:   fluticasone propionate (FLONASE) 50 mcg/actuation nasal spray, 1 spray (50 mcg total) by Each Nostril route once daily., Disp: 16 g, Rfl: 3  furosemide (LASIX) 40 MG tablet, Take 1 tablet (40 mg total) by mouth once daily., Disp: 30 tablet, Rfl: 2  gabapentin (NEURONTIN) 800 MG tablet, Take 1 tablet (800 mg total) by mouth once daily., Disp: 90 tablet, Rfl: 2  lithium (LITHOTAB) 300 mg tablet, Take 1 tablet (300 mg total) by mouth 3 (three) times daily., Disp: 270 tablet, Rfl: 1  LORazepam (ATIVAN) 1 MG tablet, Take 1 tablet (1 mg total) by mouth as needed for Anxiety., Disp: 30 tablet, Rfl: 2  meclizine (ANTIVERT) 25 mg tablet, Take 1 tablet (25 mg total) by mouth 3 (three) times daily as needed for Dizziness or Nausea., Disp: 30 tablet, Rfl: 0  multivitamin capsule, Take 1 capsule by mouth 2 (two) times daily. , Disp: , Rfl:   omeprazole (PRILOSEC) 40 MG capsule, Take 1 capsule (40 mg total) by mouth 2 (two) times daily before meals., Disp: 180 capsule, Rfl: 0  ondansetron (ZOFRAN-ODT) 8 MG TbDL, Dissolve 1 tablet (8 mg total) by mouth every 6 (six) hours as needed., Disp: 30 tablet, Rfl: 0  polyethylene glycol (GLYCOLAX) 17 gram/dose powder, Mix 1 capful (17 g) with fluids and drink by mouth once daily., Disp: 510 g, Rfl: 3  potassium chloride (KLOR-CON) 10 MEQ TbSR, Take 10 mEq by mouth 2 (two) times daily., Disp: , Rfl:   risperiDONE (RISPERDAL) 0.5 MG Tab, Take 1 tablet (0.5 mg total) by mouth 2 (two) times daily., Disp: 180 tablet, Rfl: 1  tolterodine (DETROL LA) 4 MG 24 hr capsule, Take 1 capsule (4 mg total) by mouth once daily., Disp: 30 capsule, Rfl: 11  topiramate (TOPAMAX) 200 MG Tab, Take 1 tablet (200 mg total) by mouth once daily., Disp: 90 tablet, Rfl:  1  sertraline (ZOLOFT) 100 MG tablet, Take 2 tablets (200 mg total) by mouth once daily., Disp: 180 tablet, Rfl: 2    No current facility-administered medications for this visit.       Review of patient's allergies indicates:   -- Hydrocodone-acetaminophen -- Nausea And Vomiting            Review of Systems   Constitution: Negative for weight gain and weight loss.   Cardiovascular: Negative for chest pain.   Respiratory: Negative for shortness of breath.    Musculoskeletal: Positive for back pain and neck pain. Negative for joint pain and joint swelling.   Gastrointestinal: Negative for abdominal pain, bowel incontinence, nausea and vomiting.   Genitourinary: Negative for bladder incontinence.   Neurological: Positive for numbness (hands).         Objective:        General: Zaira is well-developed, well-nourished, appears stated age, in no acute distress, alert and oriented to time, place and person.     General    Vitals reviewed.  Constitutional: She is oriented to person, place, and time. She appears well-developed and well-nourished.   HENT:   Head: Normocephalic and atraumatic.   Pulmonary/Chest: Effort normal.   Neurological: She is alert and oriented to person, place, and time.   Psychiatric: She has a normal mood and affect. Her behavior is normal. Judgment and thought content normal.     General Musculoskeletal Exam   Gait: normal (slow)     Right Ankle/Foot Exam     Tests   Heel Walk: able to perform  Tiptoe Walk: able to perform    Left Ankle/Foot Exam     Tests   Heel Walk: able to perform  Tiptoe Walk: able to perform      Right Hip Exam     Tenderness  Also right side trochanteric tenderness.  Left Hip Exam     Tenderness  Also left side trochanteric tenderness.      Back (L-Spine & T-Spine) / Neck (C-Spine) Exam     Tenderness   The patient is tender to palpation of the right side trochanteric and left side trochanteric. Right paramedian tenderness of the Sacrum and Lower L-Spine. Left paramedian  tenderness of the Lower L-Spine and Sacrum.     Back (L-Spine & T-Spine) Range of Motion   Extension: 20   Flexion: 80   Lateral bend right: 10   Lateral bend left: 10     Spinal Sensation   Right Side Sensation  C-Spine Level: normal   L-Spine Level: normal  S-Spine Level: normal  Left Side Sensation  C-Spine Level: normal  L-Spine Level: normal  S-Spine Level: normal    Back (L-Spine & T-Spine) Tests   Right Side Tests  Straight leg raise:      Sitting SLR: > 70 degrees      Left Side Tests  Straight leg raise:     Sitting SLR: > 70 degrees          Other She has no scoliosis .  Spinal Kyphosis:  Absent      Muscle Strength   Right Upper Extremity   Biceps: 5/5   Deltoid:  5/5  Triceps:  5/5  Wrist extension: 5/5   Finger Flexors:  5/5  Left Upper Extremity  Biceps: 5/5   Deltoid:  5/5  Triceps:  5/5  Wrist extension: 5/5   Finger Flexors:  5/5  Right Lower Extremity   Hip Flexion: 5/5   Quadriceps:  5/5   Anterior tibial:  5/5   EHL:  5/5  Left Lower Extremity   Hip Flexion: 5/5   Quadriceps:  5/5   Anterior tibial:  5/5   EHL:  5/5    Reflexes     Left Side  Biceps:  2+  Triceps:  2+  Brachioradialis:  2+  Achilles:  2+  Left Hood's Sign:  Absent  Babinski Sign:  absent  Quadriceps:  2+    Right Side   Biceps:  2+  Triceps:  2+  Brachioradialis:  2+  Achilles:  2+  Right Hood's Sign:  absent  Babinski Sign:  absent  Quadriceps:  2+    Vascular Exam     Right Pulses        Carotid:                  2+    Left Pulses        Carotid:                  2+              Assessment:       1. Trochanteric bursitis of both hips    2. Chronic bilateral low back pain without sciatica    3. Muscle spasm           Plan:       Orders Placed This Encounter    Ambulatory referral/consult to Physical/Occupational Therapy     1. We discussed back pain, her back is bothering her more than her hips.  She has chronic back pain with standing.  The hips are  and hard to lie on sides but she has not had any hip  2. We  discussed posture sitting and the importance of trying to sit better. She has been loosing weight and is down 80 lbs. She is continuing to work on it  3. We discussed the benefits of therapy and exercise and continuing to move.  We discussed trying PT again.  She does not like PT but willing to try.  We discussed traditional PT now to work on hips, but then maybe healthy back later  4. Greater trochanteric bursa injections helped done 8/14/20  5. She cannot have nsaids due to gastric sleeve, we discussed a trial of medrol dose sushil, she does not feel she needs it  6. Baclofen made her too sleepy  7. PT for back and core strengthening, ITB stretches glute and quad strengthening and HEP and myofascial release. Najma Cadet  8. RTC 3 months      Follow-up: Follow up in about 3 months (around 3/15/2021). If there are any questions prior to this, the patient was instructed to contact the office.

## 2020-12-15 ENCOUNTER — OFFICE VISIT (OUTPATIENT)
Dept: SPINE | Facility: CLINIC | Age: 50
End: 2020-12-15
Attending: PHYSICAL MEDICINE & REHABILITATION
Payer: MEDICARE

## 2020-12-15 VITALS
HEART RATE: 72 BPM | BODY MASS INDEX: 40.8 KG/M2 | WEIGHT: 259.94 LBS | HEIGHT: 67 IN | DIASTOLIC BLOOD PRESSURE: 66 MMHG | SYSTOLIC BLOOD PRESSURE: 121 MMHG

## 2020-12-15 DIAGNOSIS — M70.61 TROCHANTERIC BURSITIS OF BOTH HIPS: Primary | ICD-10-CM

## 2020-12-15 DIAGNOSIS — M70.62 TROCHANTERIC BURSITIS OF BOTH HIPS: Primary | ICD-10-CM

## 2020-12-15 DIAGNOSIS — M62.838 MUSCLE SPASM: ICD-10-CM

## 2020-12-15 DIAGNOSIS — M54.50 CHRONIC BILATERAL LOW BACK PAIN WITHOUT SCIATICA: ICD-10-CM

## 2020-12-15 DIAGNOSIS — G89.29 CHRONIC BILATERAL LOW BACK PAIN WITHOUT SCIATICA: ICD-10-CM

## 2020-12-15 PROCEDURE — 1125F PR PAIN SEVERITY QUANTIFIED, PAIN PRESENT: ICD-10-PCS | Mod: HCNC,S$GLB,, | Performed by: PHYSICAL MEDICINE & REHABILITATION

## 2020-12-15 PROCEDURE — 3008F BODY MASS INDEX DOCD: CPT | Mod: HCNC,CPTII,S$GLB, | Performed by: PHYSICAL MEDICINE & REHABILITATION

## 2020-12-15 PROCEDURE — 99214 PR OFFICE/OUTPT VISIT, EST, LEVL IV, 30-39 MIN: ICD-10-PCS | Mod: HCNC,S$GLB,, | Performed by: PHYSICAL MEDICINE & REHABILITATION

## 2020-12-15 PROCEDURE — 99999 PR PBB SHADOW E&M-EST. PATIENT-LVL V: ICD-10-PCS | Mod: PBBFAC,HCNC,, | Performed by: PHYSICAL MEDICINE & REHABILITATION

## 2020-12-15 PROCEDURE — 1125F AMNT PAIN NOTED PAIN PRSNT: CPT | Mod: HCNC,S$GLB,, | Performed by: PHYSICAL MEDICINE & REHABILITATION

## 2020-12-15 PROCEDURE — 99214 OFFICE O/P EST MOD 30 MIN: CPT | Mod: HCNC,S$GLB,, | Performed by: PHYSICAL MEDICINE & REHABILITATION

## 2020-12-15 PROCEDURE — 99999 PR PBB SHADOW E&M-EST. PATIENT-LVL V: CPT | Mod: PBBFAC,HCNC,, | Performed by: PHYSICAL MEDICINE & REHABILITATION

## 2020-12-15 PROCEDURE — 3008F PR BODY MASS INDEX (BMI) DOCUMENTED: ICD-10-PCS | Mod: HCNC,CPTII,S$GLB, | Performed by: PHYSICAL MEDICINE & REHABILITATION

## 2020-12-17 ENCOUNTER — OFFICE VISIT (OUTPATIENT)
Dept: FAMILY MEDICINE | Facility: CLINIC | Age: 50
End: 2020-12-17
Payer: MEDICARE

## 2020-12-17 VITALS
RESPIRATION RATE: 17 BRPM | SYSTOLIC BLOOD PRESSURE: 124 MMHG | TEMPERATURE: 98 F | OXYGEN SATURATION: 97 % | DIASTOLIC BLOOD PRESSURE: 70 MMHG | WEIGHT: 261 LBS | HEART RATE: 95 BPM | BODY MASS INDEX: 40.88 KG/M2

## 2020-12-17 DIAGNOSIS — E66.01 MORBID OBESITY WITH BMI OF 40.0-44.9, ADULT: ICD-10-CM

## 2020-12-17 DIAGNOSIS — M79.671 RIGHT FOOT PAIN: Primary | ICD-10-CM

## 2020-12-17 PROCEDURE — 96372 THER/PROPH/DIAG INJ SC/IM: CPT | Mod: HCNC,S$GLB,, | Performed by: FAMILY MEDICINE

## 2020-12-17 PROCEDURE — 99999 PR PBB SHADOW E&M-EST. PATIENT-LVL IV: ICD-10-PCS | Mod: PBBFAC,HCNC,, | Performed by: FAMILY MEDICINE

## 2020-12-17 PROCEDURE — 3008F PR BODY MASS INDEX (BMI) DOCUMENTED: ICD-10-PCS | Mod: HCNC,CPTII,S$GLB, | Performed by: FAMILY MEDICINE

## 2020-12-17 PROCEDURE — 99999 PR PBB SHADOW E&M-EST. PATIENT-LVL IV: CPT | Mod: PBBFAC,HCNC,, | Performed by: FAMILY MEDICINE

## 2020-12-17 PROCEDURE — 3008F BODY MASS INDEX DOCD: CPT | Mod: HCNC,CPTII,S$GLB, | Performed by: FAMILY MEDICINE

## 2020-12-17 PROCEDURE — 99214 PR OFFICE/OUTPT VISIT, EST, LEVL IV, 30-39 MIN: ICD-10-PCS | Mod: 25,HCNC,S$GLB, | Performed by: FAMILY MEDICINE

## 2020-12-17 PROCEDURE — 99214 OFFICE O/P EST MOD 30 MIN: CPT | Mod: 25,HCNC,S$GLB, | Performed by: FAMILY MEDICINE

## 2020-12-17 PROCEDURE — 96372 PR INJECTION,THERAP/PROPH/DIAG2ST, IM OR SUBCUT: ICD-10-PCS | Mod: HCNC,S$GLB,, | Performed by: FAMILY MEDICINE

## 2020-12-17 RX ORDER — PHENTERMINE HYDROCHLORIDE 37.5 MG/1
37.5 TABLET ORAL
Qty: 30 TABLET | Refills: 0 | Status: SHIPPED | OUTPATIENT
Start: 2020-12-17 | End: 2021-01-16

## 2020-12-17 RX ORDER — KETOROLAC TROMETHAMINE 30 MG/ML
30 INJECTION, SOLUTION INTRAMUSCULAR; INTRAVENOUS
Status: COMPLETED | OUTPATIENT
Start: 2020-12-17 | End: 2020-12-17

## 2020-12-17 RX ADMIN — KETOROLAC TROMETHAMINE 30 MG: 30 INJECTION, SOLUTION INTRAMUSCULAR; INTRAVENOUS at 08:12

## 2020-12-17 NOTE — PROGRESS NOTES
Routine Office Visit    Patient Name: Zaira Dowell    : 1970  MRN: [id number]    Subjective:  Zaira is a 50 y.o. female who presents today for:    1. Right foot pain  Patient presenting today for continued right foot pain after stumbling over a week ago.  The foot continues to swell and be painful.  She cannot take NSAIDS orally due to gastric bypass last year.  Tylenol has not controlled th pain completely.  xrays done at last visit did not show any fractures    2.  Obesity  Patient requesting refill of adipex.  She has been taking off and on the past several months.  She is trying to continue to lose weight.  She does not always stick with her diet, but is trying to do better.  No adverse reaction to the medication.  She denies any dizziness, chest pain, weakness, or shortness of breath.    3.  Paperwork  Patient requesting paperwork for her disability to be filled out.  She has been on disability for her fibromyalgia, bipolar disorder, chronic back pain, and chronic hip pain.  She is followed by psychiatry for her bipolar disorder.  She has seen rheumatology for fibromyalgia and autoimmune panel was negative.      Past Medical History  Past Medical History:   Diagnosis Date    Anxiety     Arthritis     Asthma     BMI 50.0-59.9, adult     Depression     Fibromyalgia     GERD (gastroesophageal reflux disease)     Obesity     SHARON (obstructive sleep apnea)        Past Surgical History  Past Surgical History:   Procedure Laterality Date    CARPAL TUNNEL RELEASE      CHOLECYSTECTOMY      ESOPHAGOGASTRODUODENOSCOPY N/A 2019    Procedure: EGD (ESOPHAGOGASTRODUODENOSCOPY);  Surgeon: Michelle Mc MD;  [facility];  Service: Endoscopy;  Laterality: N/A;    HYSTERECTOMY      partial     KNEE SURGERY      LAPAROSCOPIC SLEEVE GASTRECTOMY N/A 10/18/2019    Procedure: GASTRECTOMY, SLEEVE, LAPAROSCOPIC, with intraop EGD;  Surgeon: Ean Kohli MD;  Location: [facility] OR [address] [place] [address];   Service: General;  Laterality: N/A;    pinched nerve      SHOULDER SURGERY      TONSILLECTOMY      WRIST SURGERY      had ganlin cyst       Family History  Family History   Problem Relation Age of Onset    Diabetes Mother     Stuart's disease Mother     Hypertension Mother     Heart disease Father     Stroke Father     Mental illness Father     Hypertension Father     Diabetes Father     Depression Father     Heart disease Maternal Grandmother     Depression Maternal Grandmother     COPD Maternal Grandfather     Heart disease Maternal Grandfather     Stroke Maternal Grandfather     Kidney disease Paternal Grandmother     Heart disease Paternal Grandmother     Heart disease Paternal Grandfather     No Known Problems Brother     Hypertension Son        Social History  Social History     Socioeconomic History    Marital status:      Spouse name: Not on file    Number of children: Not on file    Years of education: Not on file    Highest education level: Not on file   Occupational History    Not on file   Social Needs    Financial resource strain: Not hard at all    Food insecurity     Worry: Never true     Inability: Never true    Transportation needs     Medical: No     Non-medical: No   Tobacco Use    Smoking status: Current Some Day Smoker     Types: Vaping with nicotine, Cigarettes    Smokeless tobacco: Never Used   Substance and Sexual Activity    Alcohol use: No     Frequency: Monthly or less     Drinks per session: 1 or 2     Binge frequency: Never    Drug use: No    Sexual activity: Yes     Partners: Male   Lifestyle    Physical activity     Days per week: 0 days     Minutes per session: 30 min    Stress: Rather much   Relationships    Social connections     Talks on phone: More than three times a week     Gets together: Patient refused     Attends Jehovah's witness service: Not on file     Active member of club or organization: No     Attends meetings of clubs or  organizations: Never     Relationship status:    Other Topics Concern    Not on file   Social History Narrative    Not on file       Current Medications  Current Outpatient Medications on File Prior to Visit   Medication Sig Dispense Refill    albuterol (PROVENTIL/VENTOLIN HFA) 90 mcg/actuation inhaler Inhale 2 puffs into the lungs every 6 (six) hours as needed for Wheezing. Rescue 18 g 2    b complex vitamins tablet Take 1 tablet by mouth once daily.      buPROPion (WELLBUTRIN SR) 100 MG TBSR 12 hr tablet Take 1 tablet (100 mg total) by mouth once daily. 30 tablet 5    calcium citrate (CALCITRATE) 200 mg (950 mg) tablet Take 1 tablet by mouth 2 (two) times daily.      cyanocobalamin 500 MCG tablet Take 500 mcg by mouth once daily.      ferrous fumarate/vit Bcomp,C (SUPER B COMPLEX ORAL) Take 1 capsule by mouth once daily.      fluticasone propionate (FLONASE) 50 mcg/actuation nasal spray 1 spray (50 mcg total) by Each Nostril route once daily. 16 g 3    furosemide (LASIX) 40 MG tablet Take 1 tablet (40 mg total) by mouth once daily. 30 tablet 2    gabapentin (NEURONTIN) 800 MG tablet Take 1 tablet (800 mg total) by mouth once daily. 90 tablet 2    lithium (LITHOTAB) 300 mg tablet Take 1 tablet (300 mg total) by mouth 3 (three) times daily. 270 tablet 1    LORazepam (ATIVAN) 1 MG tablet Take 1 tablet (1 mg total) by mouth as needed for Anxiety. 30 tablet 2    meclizine (ANTIVERT) 25 mg tablet Take 1 tablet (25 mg total) by mouth 3 (three) times daily as needed for Dizziness or Nausea. 30 tablet 0    multivitamin capsule Take 1 capsule by mouth 2 (two) times daily.       omeprazole (PRILOSEC) 40 MG capsule TAKE 1 CAPSULE BY MOUTH TWICE DAILY BEFORE MEAL(S) 180 capsule 0    ondansetron (ZOFRAN-ODT) 8 MG TbDL Dissolve 1 tablet (8 mg total) by mouth every 6 (six) hours as needed. 30 tablet 0    polyethylene glycol (GLYCOLAX) 17 gram/dose powder Mix 1 capful (17 g) with fluids and drink by mouth  once daily. 510 g 3    potassium chloride (KLOR-CON) 10 MEQ TbSR Take 10 mEq by mouth 2 (two) times daily.      risperiDONE (RISPERDAL) 0.5 MG Tab Take 1 tablet (0.5 mg total) by mouth 2 (two) times daily. 180 tablet 1    sertraline (ZOLOFT) 100 MG tablet Take 2 tablets (200 mg total) by mouth once daily. 180 tablet 2    tolterodine (DETROL LA) 4 MG 24 hr capsule Take 1 capsule (4 mg total) by mouth once daily. 30 capsule 11    topiramate (TOPAMAX) 200 MG Tab Take 1 tablet (200 mg total) by mouth once daily. 90 tablet 0    triamcinolone acetonide 0.1% (KENALOG) 0.1 % ointment Apply topically 2 (two) times daily. for 7 days 15 g 0     No current facility-administered medications on file prior to visit.        Allergies   Review of patient's allergies indicates:   Allergen Reactions    Hydrocodone-acetaminophen Nausea And Vomiting       Review of Systems (Pertinent positives)  Review of Systems   Constitutional: Negative.    HENT: Negative for hearing loss.    Eyes: Negative for discharge.   Respiratory: Negative for wheezing.    Cardiovascular: Negative for chest pain and palpitations.   Gastrointestinal: Negative for blood in stool, constipation, diarrhea and vomiting.   Genitourinary: Negative for dysuria and hematuria.   Musculoskeletal: Positive for neck pain.   Skin: Negative.    Neurological: Positive for weakness and headaches.   Endo/Heme/Allergies: Negative for polydipsia.         /70 (BP Location: Right arm, Patient Position: Sitting, BP Method: Large (Manual))   Pulse 95   Temp 97.8 °F (36.6 °C) (Oral)   Resp 17   Wt 118.4 kg (261 lb 0.4 oz)   SpO2 97%   BMI 40.88 kg/m²     GENERAL APPEARANCE: in no apparent distress and well developed and well nourished  HEENT: PERRL, EOMI, Sclera clear, anicteric, Oropharynx clear, no lesions, Neck supple with midline trachea  NECK: normal, supple, no adenopathy, thyroid normal in size  RESPIRATORY: appears well, vitals normal, no respiratory distress,  acyanotic, normal RR, chest clear, no wheezing, crepitations, rhonchi, normal symmetric air entry  HEART: regular rate and rhythm, S1, S2 normal, no murmur, click, rub or gallop.    ABDOMEN: abdomen is soft without tenderness, no masses, no hernias, no organomegaly, no rebound, no guarding. Suprapubic tenderness absent. No CVA tenderness.  NEUROLOGIC: normal without focal findings, CN II-XII are intact.   Extremities: warm/well perfused.  No abnormal hair patterns.  Swelling noted to right foot without erythema; mild tenderness on palpation  SKIN: no rashes, no wounds, no other lesions  PSYCH: Alert, oriented x 3, thought content appropriate, speech normal, pleasant and cooperative, good eye contact, well groomed    Assessment/Plan:  Zaira Dowell is a 50 y.o. female who presents today for :    Diagnoses and all orders for this visit:    Right foot pain  -     ketorolac injection 30 mg    Morbid obesity with BMI of 40.0-44.9, adult  -     phentermine (ADIPEX-P) 37.5 mg tablet; Take 1 tablet (37.5 mg total) by mouth before breakfast.      1.  toradol injection for pain  2.  adipex for weight loss  3.  Will fill out papers  4.  Follow up with psychiatry as scheduled  5.  Patient to be notified once paperwork is filled out  6.  Follow up 3 months or sooner if needed      Cyril Saucedo MD

## 2020-12-17 NOTE — PROGRESS NOTES
Pt name and  verified. Allergies reviewed. Administered Ketorolac 30 mg to pt in [facility] . Pt tolerated well.

## 2020-12-18 PROBLEM — M54.2 NECK PAIN: Status: RESOLVED | Noted: 2020-06-29 | Resolved: 2020-12-18

## 2020-12-18 PROBLEM — R29.3 POOR POSTURE: Status: RESOLVED | Noted: 2020-06-29 | Resolved: 2020-12-18

## 2020-12-18 PROBLEM — M62.81 MUSCLE WEAKNESS OF UPPER EXTREMITY: Status: RESOLVED | Noted: 2020-06-29 | Resolved: 2020-12-18

## 2020-12-30 ENCOUNTER — PES CALL (OUTPATIENT)
Dept: ADMINISTRATIVE | Facility: CLINIC | Age: 50
End: 2020-12-30

## 2020-12-31 ENCOUNTER — PATIENT MESSAGE (OUTPATIENT)
Dept: FAMILY MEDICINE | Facility: CLINIC | Age: 50
End: 2020-12-31

## 2021-01-05 ENCOUNTER — OFFICE VISIT (OUTPATIENT)
Dept: PSYCHIATRY | Facility: CLINIC | Age: 51
End: 2021-01-05
Payer: MEDICARE

## 2021-01-05 DIAGNOSIS — F31.9 BIPOLAR 1 DISORDER: Primary | ICD-10-CM

## 2021-01-05 PROCEDURE — 99214 OFFICE O/P EST MOD 30 MIN: CPT | Mod: HCNC,95,, | Performed by: PSYCHIATRY & NEUROLOGY

## 2021-01-05 PROCEDURE — 90833 PSYTX W PT W E/M 30 MIN: CPT | Mod: HCNC,95,, | Performed by: PSYCHIATRY & NEUROLOGY

## 2021-01-05 PROCEDURE — 99214 PR OFFICE/OUTPT VISIT, EST, LEVL IV, 30-39 MIN: ICD-10-PCS | Mod: HCNC,95,, | Performed by: PSYCHIATRY & NEUROLOGY

## 2021-01-05 PROCEDURE — 90833 PR PSYCHOTHERAPY W/PATIENT W/E&M, 30 MIN (ADD ON): ICD-10-PCS | Mod: HCNC,95,, | Performed by: PSYCHIATRY & NEUROLOGY

## 2021-01-08 ENCOUNTER — OFFICE VISIT (OUTPATIENT)
Dept: FAMILY MEDICINE | Facility: CLINIC | Age: 51
End: 2021-01-08
Payer: MEDICARE

## 2021-01-08 VITALS
DIASTOLIC BLOOD PRESSURE: 82 MMHG | HEIGHT: 67 IN | TEMPERATURE: 98 F | HEART RATE: 68 BPM | RESPIRATION RATE: 20 BRPM | WEIGHT: 260.13 LBS | OXYGEN SATURATION: 99 % | SYSTOLIC BLOOD PRESSURE: 120 MMHG | BODY MASS INDEX: 40.83 KG/M2

## 2021-01-08 VITALS
DIASTOLIC BLOOD PRESSURE: 62 MMHG | SYSTOLIC BLOOD PRESSURE: 122 MMHG | HEIGHT: 67 IN | BODY MASS INDEX: 40.9 KG/M2 | HEART RATE: 90 BPM | OXYGEN SATURATION: 98 % | WEIGHT: 260.56 LBS

## 2021-01-08 DIAGNOSIS — Z23 IMMUNIZATION DUE: ICD-10-CM

## 2021-01-08 DIAGNOSIS — M79.7 FIBROMYALGIA: ICD-10-CM

## 2021-01-08 DIAGNOSIS — E66.01 MORBID OBESITY: ICD-10-CM

## 2021-01-08 DIAGNOSIS — F31.9 BIPOLAR 1 DISORDER: ICD-10-CM

## 2021-01-08 DIAGNOSIS — F31.9 BIPOLAR 1 DISORDER: Primary | ICD-10-CM

## 2021-01-08 DIAGNOSIS — I27.20 PULMONARY HYPERTENSION: ICD-10-CM

## 2021-01-08 DIAGNOSIS — M54.50 LOW BACK PAIN AT MULTIPLE SITES: ICD-10-CM

## 2021-01-08 DIAGNOSIS — M25.552 HIP PAIN, BILATERAL: ICD-10-CM

## 2021-01-08 DIAGNOSIS — J01.90 ACUTE BACTERIAL SINUSITIS: Primary | ICD-10-CM

## 2021-01-08 DIAGNOSIS — M25.551 HIP PAIN, BILATERAL: ICD-10-CM

## 2021-01-08 DIAGNOSIS — B96.89 ACUTE BACTERIAL SINUSITIS: Primary | ICD-10-CM

## 2021-01-08 PROCEDURE — 99499 RISK ADDL DX/OHS AUDIT: ICD-10-PCS | Mod: HCNC,S$GLB,, | Performed by: FAMILY MEDICINE

## 2021-01-08 PROCEDURE — 3008F BODY MASS INDEX DOCD: CPT | Mod: HCNC,CPTII,S$GLB, | Performed by: FAMILY MEDICINE

## 2021-01-08 PROCEDURE — 99999 PR PBB SHADOW E&M-EST. PATIENT-LVL IV: ICD-10-PCS | Mod: PBBFAC,HCNC,, | Performed by: FAMILY MEDICINE

## 2021-01-08 PROCEDURE — 99214 OFFICE O/P EST MOD 30 MIN: CPT | Mod: HCNC,25,S$GLB, | Performed by: FAMILY MEDICINE

## 2021-01-08 PROCEDURE — 90732 PNEUMOCOCCAL POLYSACCHARIDE VACCINE 23-VALENT =>2YO SQ IM: ICD-10-PCS | Mod: HCNC,S$GLB,, | Performed by: FAMILY MEDICINE

## 2021-01-08 PROCEDURE — 99499 UNLISTED E&M SERVICE: CPT | Mod: HCNC,S$GLB,, | Performed by: FAMILY MEDICINE

## 2021-01-08 PROCEDURE — 99214 PR OFFICE/OUTPT VISIT, EST, LEVL IV, 30-39 MIN: ICD-10-PCS | Mod: HCNC,25,S$GLB, | Performed by: FAMILY MEDICINE

## 2021-01-08 PROCEDURE — 3008F PR BODY MASS INDEX (BMI) DOCUMENTED: ICD-10-PCS | Mod: HCNC,CPTII,S$GLB, | Performed by: FAMILY MEDICINE

## 2021-01-08 PROCEDURE — 1125F AMNT PAIN NOTED PAIN PRSNT: CPT | Mod: HCNC,S$GLB,, | Performed by: FAMILY MEDICINE

## 2021-01-08 PROCEDURE — 99999 PR PBB SHADOW E&M-EST. PATIENT-LVL V: CPT | Mod: PBBFAC,HCNC,, | Performed by: FAMILY MEDICINE

## 2021-01-08 PROCEDURE — 90732 PPSV23 VACC 2 YRS+ SUBQ/IM: CPT | Mod: HCNC,S$GLB,, | Performed by: FAMILY MEDICINE

## 2021-01-08 PROCEDURE — 99999 PR PBB SHADOW E&M-EST. PATIENT-LVL V: ICD-10-PCS | Mod: PBBFAC,HCNC,, | Performed by: FAMILY MEDICINE

## 2021-01-08 PROCEDURE — 1125F PR PAIN SEVERITY QUANTIFIED, PAIN PRESENT: ICD-10-PCS | Mod: HCNC,S$GLB,, | Performed by: FAMILY MEDICINE

## 2021-01-08 PROCEDURE — G0009 PNEUMOCOCCAL POLYSACCHARIDE VACCINE 23-VALENT =>2YO SQ IM: ICD-10-PCS | Mod: HCNC,S$GLB,, | Performed by: FAMILY MEDICINE

## 2021-01-08 PROCEDURE — 99499 NO LOS: ICD-10-PCS | Mod: HCNC,S$GLB,, | Performed by: FAMILY MEDICINE

## 2021-01-08 PROCEDURE — 99999 PR PBB SHADOW E&M-EST. PATIENT-LVL IV: CPT | Mod: PBBFAC,HCNC,, | Performed by: FAMILY MEDICINE

## 2021-01-08 PROCEDURE — G0009 ADMIN PNEUMOCOCCAL VACCINE: HCPCS | Mod: HCNC,S$GLB,, | Performed by: FAMILY MEDICINE

## 2021-01-08 RX ORDER — DOXYCYCLINE 100 MG/1
100 CAPSULE ORAL EVERY 12 HOURS
Qty: 20 CAPSULE | Refills: 0 | Status: SHIPPED | OUTPATIENT
Start: 2021-01-08 | End: 2021-01-18

## 2021-01-08 RX ORDER — METHYLPREDNISOLONE 4 MG/1
TABLET ORAL
Qty: 1 PACKAGE | Refills: 0 | Status: SHIPPED | OUTPATIENT
Start: 2021-01-08 | End: 2021-03-04

## 2021-01-13 ENCOUNTER — PATIENT MESSAGE (OUTPATIENT)
Dept: FAMILY MEDICINE | Facility: CLINIC | Age: 51
End: 2021-01-13

## 2021-01-14 ENCOUNTER — LAB VISIT (OUTPATIENT)
Dept: FAMILY MEDICINE | Facility: CLINIC | Age: 51
End: 2021-01-14
Payer: MEDICARE

## 2021-01-14 ENCOUNTER — OFFICE VISIT (OUTPATIENT)
Dept: FAMILY MEDICINE | Facility: CLINIC | Age: 51
End: 2021-01-14
Payer: MEDICARE

## 2021-01-14 DIAGNOSIS — Z20.822 SUSPECTED COVID-19 VIRUS INFECTION: ICD-10-CM

## 2021-01-14 DIAGNOSIS — Z20.822 SUSPECTED COVID-19 VIRUS INFECTION: Primary | ICD-10-CM

## 2021-01-14 PROCEDURE — 99213 OFFICE O/P EST LOW 20 MIN: CPT | Mod: 95,,, | Performed by: FAMILY MEDICINE

## 2021-01-14 PROCEDURE — U0003 INFECTIOUS AGENT DETECTION BY NUCLEIC ACID (DNA OR RNA); SEVERE ACUTE RESPIRATORY SYNDROME CORONAVIRUS 2 (SARS-COV-2) (CORONAVIRUS DISEASE [COVID-19]), AMPLIFIED PROBE TECHNIQUE, MAKING USE OF HIGH THROUGHPUT TECHNOLOGIES AS DESCRIBED BY CMS-2020-01-R: HCPCS

## 2021-01-14 PROCEDURE — 99213 PR OFFICE/OUTPT VISIT, EST, LEVL III, 20-29 MIN: ICD-10-PCS | Mod: 95,,, | Performed by: FAMILY MEDICINE

## 2021-01-15 LAB — SARS-COV-2 RNA RESP QL NAA+PROBE: NOT DETECTED

## 2021-01-26 PROBLEM — M25.552 HIP PAIN, BILATERAL: Status: RESOLVED | Noted: 2020-09-03 | Resolved: 2021-01-26

## 2021-01-26 PROBLEM — M54.50 LOW BACK PAIN AT MULTIPLE SITES: Status: RESOLVED | Noted: 2020-09-03 | Resolved: 2021-01-26

## 2021-01-26 PROBLEM — R26.89 ANTALGIC GAIT: Status: RESOLVED | Noted: 2020-09-03 | Resolved: 2021-01-26

## 2021-01-26 PROBLEM — M25.551 HIP PAIN, BILATERAL: Status: RESOLVED | Noted: 2020-09-03 | Resolved: 2021-01-26

## 2021-02-01 ENCOUNTER — HOSPITAL ENCOUNTER (OUTPATIENT)
Dept: RADIOLOGY | Facility: HOSPITAL | Age: 51
Discharge: HOME | End: 2021-02-01
Attending: NURSE PRACTITIONER
Payer: MEDICARE

## 2021-02-01 ENCOUNTER — PATIENT MESSAGE (OUTPATIENT)
Dept: FAMILY MEDICINE | Facility: CLINIC | Age: 51
End: 2021-02-01

## 2021-02-01 ENCOUNTER — OFFICE VISIT (OUTPATIENT)
Dept: FAMILY MEDICINE | Facility: CLINIC | Age: 51
End: 2021-02-01
Payer: MEDICARE

## 2021-02-01 VITALS
DIASTOLIC BLOOD PRESSURE: 76 MMHG | HEART RATE: 74 BPM | HEIGHT: 67 IN | SYSTOLIC BLOOD PRESSURE: 122 MMHG | TEMPERATURE: 98 F | WEIGHT: 263.44 LBS | BODY MASS INDEX: 41.35 KG/M2 | RESPIRATION RATE: 20 BRPM | OXYGEN SATURATION: 97 %

## 2021-02-01 DIAGNOSIS — R30.0 DYSURIA: Primary | ICD-10-CM

## 2021-02-01 DIAGNOSIS — R31.29 MICROHEMATURIA: ICD-10-CM

## 2021-02-01 DIAGNOSIS — R31.9 HEMATURIA OF UNKNOWN CAUSE: ICD-10-CM

## 2021-02-01 LAB
B-HCG UR QL: NEGATIVE
BACTERIA #/AREA URNS HPF: ABNORMAL /HPF
BILIRUB SERPL-MCNC: NEGATIVE MG/DL
BILIRUB UR QL STRIP: NEGATIVE
BLOOD URINE, POC: 250
CLARITY UR: ABNORMAL
CLARITY, POC UA: CLEAR
COLOR UR: YELLOW
COLOR, POC UA: YELLOW
CTP QC/QA: YES
GLUCOSE UR QL STRIP: NEGATIVE
GLUCOSE UR QL STRIP: NEGATIVE
GRAN CASTS #/AREA URNS LPF: 1 /LPF
HGB UR QL STRIP: ABNORMAL
HYALINE CASTS #/AREA URNS LPF: 0 /LPF
KETONES UR QL STRIP: NEGATIVE
KETONES UR QL STRIP: NEGATIVE
LEUKOCYTE ESTERASE UR QL STRIP: NEGATIVE
LEUKOCYTE ESTERASE URINE, POC: NEGATIVE
MICROSCOPIC COMMENT: ABNORMAL
NITRITE UR QL STRIP: NEGATIVE
NITRITE, POC UA: NEGATIVE
PH UR STRIP: 6 [PH] (ref 5–8)
PH, POC UA: 5
PROT UR QL STRIP: ABNORMAL
PROTEIN, POC: ABNORMAL
RBC #/AREA URNS HPF: 15 /HPF (ref 0–4)
SP GR UR STRIP: 1.03 (ref 1–1.03)
SPECIFIC GRAVITY, POC UA: 1.02
SQUAMOUS #/AREA URNS HPF: 1 /HPF
UNIDENT CRYS URNS QL MICRO: ABNORMAL
URN SPEC COLLECT METH UR: ABNORMAL
UROBILINOGEN UR STRIP-ACNC: NEGATIVE EU/DL
UROBILINOGEN, POC UA: NORMAL
WBC #/AREA URNS HPF: 6 /HPF (ref 0–5)

## 2021-02-01 PROCEDURE — 1125F AMNT PAIN NOTED PAIN PRSNT: CPT | Mod: S$GLB,,, | Performed by: NURSE PRACTITIONER

## 2021-02-01 PROCEDURE — 74178 CT ABD&PLV WO CNTR FLWD CNTR: CPT | Mod: TC

## 2021-02-01 PROCEDURE — 81000 URINALYSIS NONAUTO W/SCOPE: CPT

## 2021-02-01 PROCEDURE — 99999 PR PBB SHADOW E&M-EST. PATIENT-LVL V: CPT | Mod: PBBFAC,,, | Performed by: NURSE PRACTITIONER

## 2021-02-01 PROCEDURE — 81002 URINALYSIS NONAUTO W/O SCOPE: CPT | Mod: S$GLB,,, | Performed by: NURSE PRACTITIONER

## 2021-02-01 PROCEDURE — 74178 CT ABD&PLV WO CNTR FLWD CNTR: CPT | Mod: 26,,, | Performed by: RADIOLOGY

## 2021-02-01 PROCEDURE — 3008F PR BODY MASS INDEX (BMI) DOCUMENTED: ICD-10-PCS | Mod: CPTII,S$GLB,, | Performed by: NURSE PRACTITIONER

## 2021-02-01 PROCEDURE — 87086 URINE CULTURE/COLONY COUNT: CPT

## 2021-02-01 PROCEDURE — 1125F PR PAIN SEVERITY QUANTIFIED, PAIN PRESENT: ICD-10-PCS | Mod: S$GLB,,, | Performed by: NURSE PRACTITIONER

## 2021-02-01 PROCEDURE — 99214 PR OFFICE/OUTPT VISIT, EST, LEVL IV, 30-39 MIN: ICD-10-PCS | Mod: 25,S$GLB,, | Performed by: NURSE PRACTITIONER

## 2021-02-01 PROCEDURE — 74178 CT UROGRAM ABD PELVIS W WO: ICD-10-PCS | Mod: 26,,, | Performed by: RADIOLOGY

## 2021-02-01 PROCEDURE — 3008F BODY MASS INDEX DOCD: CPT | Mod: CPTII,S$GLB,, | Performed by: NURSE PRACTITIONER

## 2021-02-01 PROCEDURE — 99214 OFFICE O/P EST MOD 30 MIN: CPT | Mod: 25,S$GLB,, | Performed by: NURSE PRACTITIONER

## 2021-02-01 PROCEDURE — 81002 POCT URINE DIPSTICK WITHOUT MICROSCOPE: ICD-10-PCS | Mod: S$GLB,,, | Performed by: NURSE PRACTITIONER

## 2021-02-01 PROCEDURE — 99999 PR PBB SHADOW E&M-EST. PATIENT-LVL V: ICD-10-PCS | Mod: PBBFAC,,, | Performed by: NURSE PRACTITIONER

## 2021-02-01 PROCEDURE — 25500020 PHARM REV CODE 255: Performed by: NURSE PRACTITIONER

## 2021-02-01 RX ADMIN — IOHEXOL 125 ML: 350 INJECTION, SOLUTION INTRAVENOUS at 11:02

## 2021-02-03 ENCOUNTER — PATIENT MESSAGE (OUTPATIENT)
Dept: FAMILY MEDICINE | Facility: CLINIC | Age: 51
End: 2021-02-03

## 2021-02-03 DIAGNOSIS — R31.9 HEMATURIA, UNSPECIFIED TYPE: Primary | ICD-10-CM

## 2021-02-03 DIAGNOSIS — E27.8 ADRENAL INCIDENTALOMA: Primary | ICD-10-CM

## 2021-02-03 LAB — BACTERIA UR CULT: NORMAL

## 2021-02-05 ENCOUNTER — TELEPHONE (OUTPATIENT)
Dept: ADMINISTRATIVE | Facility: HOSPITAL | Age: 51
End: 2021-02-05

## 2021-02-15 DIAGNOSIS — G43.809 OTHER MIGRAINE WITHOUT STATUS MIGRAINOSUS, NOT INTRACTABLE: ICD-10-CM

## 2021-02-15 DIAGNOSIS — Z98.890 POST-OPERATIVE STATE: ICD-10-CM

## 2021-02-15 DIAGNOSIS — K21.9 GASTROESOPHAGEAL REFLUX DISEASE: ICD-10-CM

## 2021-02-15 RX ORDER — TOPIRAMATE 200 MG/1
200 TABLET ORAL DAILY
Qty: 90 TABLET | Refills: 0 | Status: SHIPPED | OUTPATIENT
Start: 2021-02-15 | End: 2021-05-21 | Stop reason: SDUPTHER

## 2021-02-15 RX ORDER — OMEPRAZOLE 40 MG/1
40 CAPSULE, DELAYED RELEASE ORAL
Qty: 180 CAPSULE | Refills: 0 | Status: SHIPPED | OUTPATIENT
Start: 2021-02-15 | End: 2021-05-21 | Stop reason: SDUPTHER

## 2021-02-17 ENCOUNTER — OFFICE VISIT (OUTPATIENT)
Dept: PSYCHIATRY | Facility: CLINIC | Age: 51
End: 2021-02-17
Payer: MEDICARE

## 2021-02-17 ENCOUNTER — OFFICE VISIT (OUTPATIENT)
Dept: UROLOGY | Facility: CLINIC | Age: 51
End: 2021-02-17
Payer: MEDICARE

## 2021-02-17 VITALS
WEIGHT: 263 LBS | SYSTOLIC BLOOD PRESSURE: 101 MMHG | HEIGHT: 67 IN | DIASTOLIC BLOOD PRESSURE: 70 MMHG | BODY MASS INDEX: 41.28 KG/M2

## 2021-02-17 DIAGNOSIS — F31.9 BIPOLAR 1 DISORDER: Primary | ICD-10-CM

## 2021-02-17 DIAGNOSIS — R31.29 MICROSCOPIC HEMATURIA: Primary | ICD-10-CM

## 2021-02-17 PROCEDURE — 99999 PR PBB SHADOW E&M-EST. PATIENT-LVL V: CPT | Mod: PBBFAC,,, | Performed by: NURSE PRACTITIONER

## 2021-02-17 PROCEDURE — 90833 PSYTX W PT W E/M 30 MIN: CPT | Mod: 95,,, | Performed by: PSYCHIATRY & NEUROLOGY

## 2021-02-17 PROCEDURE — 99999 PR PBB SHADOW E&M-EST. PATIENT-LVL V: ICD-10-PCS | Mod: PBBFAC,,, | Performed by: NURSE PRACTITIONER

## 2021-02-17 PROCEDURE — 99213 PR OFFICE/OUTPT VISIT, EST, LEVL III, 20-29 MIN: ICD-10-PCS | Mod: 25,S$GLB,, | Performed by: NURSE PRACTITIONER

## 2021-02-17 PROCEDURE — 1126F AMNT PAIN NOTED NONE PRSNT: CPT | Mod: S$GLB,,, | Performed by: NURSE PRACTITIONER

## 2021-02-17 PROCEDURE — 3008F PR BODY MASS INDEX (BMI) DOCUMENTED: ICD-10-PCS | Mod: CPTII,S$GLB,, | Performed by: NURSE PRACTITIONER

## 2021-02-17 PROCEDURE — 90833 PR PSYCHOTHERAPY W/PATIENT W/E&M, 30 MIN (ADD ON): ICD-10-PCS | Mod: 95,,, | Performed by: PSYCHIATRY & NEUROLOGY

## 2021-02-17 PROCEDURE — 3008F BODY MASS INDEX DOCD: CPT | Mod: CPTII,S$GLB,, | Performed by: NURSE PRACTITIONER

## 2021-02-17 PROCEDURE — 1126F PR PAIN SEVERITY QUANTIFIED, NO PAIN PRESENT: ICD-10-PCS | Mod: S$GLB,,, | Performed by: NURSE PRACTITIONER

## 2021-02-17 PROCEDURE — 81001 PR  URINALYSIS, AUTO, W/SCOPE: ICD-10-PCS | Mod: S$GLB,,, | Performed by: NURSE PRACTITIONER

## 2021-02-17 PROCEDURE — 99214 OFFICE O/P EST MOD 30 MIN: CPT | Mod: 95,,, | Performed by: PSYCHIATRY & NEUROLOGY

## 2021-02-17 PROCEDURE — 81001 URINALYSIS AUTO W/SCOPE: CPT | Mod: S$GLB,,, | Performed by: NURSE PRACTITIONER

## 2021-02-17 PROCEDURE — 99214 PR OFFICE/OUTPT VISIT, EST, LEVL IV, 30-39 MIN: ICD-10-PCS | Mod: 95,,, | Performed by: PSYCHIATRY & NEUROLOGY

## 2021-02-17 PROCEDURE — 99213 OFFICE O/P EST LOW 20 MIN: CPT | Mod: 25,S$GLB,, | Performed by: NURSE PRACTITIONER

## 2021-02-17 RX ORDER — LORAZEPAM 0.5 MG/1
TABLET ORAL
Qty: 30 TABLET | Refills: 0 | Status: SHIPPED | OUTPATIENT
Start: 2021-02-17 | End: 2021-03-23 | Stop reason: SDUPTHER

## 2021-02-23 ENCOUNTER — PATIENT MESSAGE (OUTPATIENT)
Dept: RHEUMATOLOGY | Facility: CLINIC | Age: 51
End: 2021-02-23

## 2021-03-04 ENCOUNTER — OFFICE VISIT (OUTPATIENT)
Dept: ENDOCRINOLOGY | Facility: CLINIC | Age: 51
End: 2021-03-04
Payer: MEDICARE

## 2021-03-04 VITALS
DIASTOLIC BLOOD PRESSURE: 73 MMHG | BODY MASS INDEX: 40.47 KG/M2 | HEART RATE: 63 BPM | WEIGHT: 258.38 LBS | SYSTOLIC BLOOD PRESSURE: 111 MMHG | TEMPERATURE: 98 F

## 2021-03-04 DIAGNOSIS — R79.9 ABNORMAL FINDING OF BLOOD CHEMISTRY, UNSPECIFIED: ICD-10-CM

## 2021-03-04 DIAGNOSIS — E27.8 ADRENAL INCIDENTALOMA: Primary | ICD-10-CM

## 2021-03-04 PROCEDURE — 99499 UNLISTED E&M SERVICE: CPT | Mod: HCNC,S$GLB,, | Performed by: HOSPITALIST

## 2021-03-04 PROCEDURE — 99999 PR PBB SHADOW E&M-EST. PATIENT-LVL V: ICD-10-PCS | Mod: PBBFAC,,, | Performed by: HOSPITALIST

## 2021-03-04 PROCEDURE — 3008F PR BODY MASS INDEX (BMI) DOCUMENTED: ICD-10-PCS | Mod: CPTII,S$GLB,, | Performed by: HOSPITALIST

## 2021-03-04 PROCEDURE — 99999 PR PBB SHADOW E&M-EST. PATIENT-LVL V: CPT | Mod: PBBFAC,,, | Performed by: HOSPITALIST

## 2021-03-04 PROCEDURE — 1126F PR PAIN SEVERITY QUANTIFIED, NO PAIN PRESENT: ICD-10-PCS | Mod: S$GLB,,, | Performed by: HOSPITALIST

## 2021-03-04 PROCEDURE — 99204 PR OFFICE/OUTPT VISIT, NEW, LEVL IV, 45-59 MIN: ICD-10-PCS | Mod: S$GLB,,, | Performed by: HOSPITALIST

## 2021-03-04 PROCEDURE — 1126F AMNT PAIN NOTED NONE PRSNT: CPT | Mod: S$GLB,,, | Performed by: HOSPITALIST

## 2021-03-04 PROCEDURE — 99499 RISK ADDL DX/OHS AUDIT: ICD-10-PCS | Mod: HCNC,S$GLB,, | Performed by: HOSPITALIST

## 2021-03-04 PROCEDURE — 99204 OFFICE O/P NEW MOD 45 MIN: CPT | Mod: S$GLB,,, | Performed by: HOSPITALIST

## 2021-03-04 PROCEDURE — 3008F BODY MASS INDEX DOCD: CPT | Mod: CPTII,S$GLB,, | Performed by: HOSPITALIST

## 2021-03-04 RX ORDER — DEXAMETHASONE 1 MG/1
1 TABLET ORAL NIGHTLY
Qty: 1 TABLET | Refills: 0 | Status: SHIPPED | OUTPATIENT
Start: 2021-03-04 | End: 2021-03-05

## 2021-03-09 ENCOUNTER — LAB VISIT (OUTPATIENT)
Dept: LAB | Facility: HOSPITAL | Age: 51
End: 2021-03-09
Attending: HOSPITALIST
Payer: MEDICARE

## 2021-03-09 DIAGNOSIS — R79.9 ABNORMAL FINDING OF BLOOD CHEMISTRY, UNSPECIFIED: ICD-10-CM

## 2021-03-09 DIAGNOSIS — E27.8 ADRENAL INCIDENTALOMA: ICD-10-CM

## 2021-03-09 LAB
ALBUMIN SERPL BCP-MCNC: 3.7 G/DL (ref 3.5–5.2)
ALP SERPL-CCNC: 55 U/L (ref 55–135)
ALT SERPL W/O P-5'-P-CCNC: 12 U/L (ref 10–44)
ANION GAP SERPL CALC-SCNC: 3 MMOL/L (ref 8–16)
AST SERPL-CCNC: 9 U/L (ref 10–40)
BILIRUB SERPL-MCNC: 0.5 MG/DL (ref 0.1–1)
BUN SERPL-MCNC: 9 MG/DL (ref 6–20)
CALCIUM SERPL-MCNC: 9.1 MG/DL (ref 8.7–10.5)
CHLORIDE SERPL-SCNC: 111 MMOL/L (ref 95–110)
CO2 SERPL-SCNC: 25 MMOL/L (ref 23–29)
CORTIS SERPL-MCNC: <1 UG/DL (ref 4.3–22.4)
CREAT SERPL-MCNC: 0.7 MG/DL (ref 0.5–1.4)
DHEA-S SERPL-MCNC: 59.1 UG/DL (ref 56.2–282.9)
EST. GFR  (AFRICAN AMERICAN): >60 ML/MIN/1.73 M^2
EST. GFR  (NON AFRICAN AMERICAN): >60 ML/MIN/1.73 M^2
ESTIMATED AVG GLUCOSE: 91 MG/DL (ref 68–131)
GLUCOSE SERPL-MCNC: 92 MG/DL (ref 70–110)
HBA1C MFR BLD: 4.8 % (ref 4–5.6)
POTASSIUM SERPL-SCNC: 4.2 MMOL/L (ref 3.5–5.1)
PROT SERPL-MCNC: 6.9 G/DL (ref 6–8.4)
SODIUM SERPL-SCNC: 139 MMOL/L (ref 136–145)

## 2021-03-09 PROCEDURE — 80053 COMPREHEN METABOLIC PANEL: CPT | Performed by: HOSPITALIST

## 2021-03-09 PROCEDURE — 84244 ASSAY OF RENIN: CPT | Performed by: HOSPITALIST

## 2021-03-09 PROCEDURE — 82627 DEHYDROEPIANDROSTERONE: CPT | Performed by: HOSPITALIST

## 2021-03-09 PROCEDURE — 82088 ASSAY OF ALDOSTERONE: CPT | Performed by: HOSPITALIST

## 2021-03-09 PROCEDURE — 36415 COLL VENOUS BLD VENIPUNCTURE: CPT | Performed by: HOSPITALIST

## 2021-03-09 PROCEDURE — 83036 HEMOGLOBIN GLYCOSYLATED A1C: CPT | Performed by: HOSPITALIST

## 2021-03-09 PROCEDURE — 83835 ASSAY OF METANEPHRINES: CPT | Performed by: HOSPITALIST

## 2021-03-09 PROCEDURE — 82533 TOTAL CORTISOL: CPT | Performed by: HOSPITALIST

## 2021-03-09 PROCEDURE — 82024 ASSAY OF ACTH: CPT | Performed by: HOSPITALIST

## 2021-03-11 LAB — ALDOST SERPL-MCNC: 11.1 NG/DL

## 2021-03-12 LAB
ACTH PLAS-MCNC: 11 PG/ML (ref 0–46)
RENIN PLAS-CCNC: 3.3 NG/ML/H

## 2021-03-15 LAB
METANEPH FREE SERPL-MCNC: <25 PG/ML
METANEPHS SERPL-MCNC: 61 PG/ML
NORMETANEPH FREE SERPL-MCNC: 61 PG/ML

## 2021-03-25 ENCOUNTER — TELEPHONE (OUTPATIENT)
Dept: ADMINISTRATIVE | Facility: CLINIC | Age: 51
End: 2021-03-25

## 2021-04-05 ENCOUNTER — PROCEDURE VISIT (OUTPATIENT)
Dept: UROLOGY | Facility: CLINIC | Age: 51
End: 2021-04-05
Payer: MEDICARE

## 2021-04-05 VITALS — WEIGHT: 260.13 LBS | HEIGHT: 67 IN | BODY MASS INDEX: 40.83 KG/M2

## 2021-04-05 DIAGNOSIS — R31.29 MICROSCOPIC HEMATURIA: Primary | ICD-10-CM

## 2021-04-05 LAB
BILIRUB SERPL-MCNC: NORMAL MG/DL
BLOOD URINE, POC: NORMAL
COLOR, POC UA: YELLOW
GLUCOSE UR QL STRIP: NORMAL
KETONES UR QL STRIP: NORMAL
LEUKOCYTE ESTERASE URINE, POC: NORMAL
NITRITE, POC UA: NORMAL
PH, POC UA: 5
PROTEIN, POC: NORMAL
SPECIFIC GRAVITY, POC UA: 1000
UROBILINOGEN, POC UA: NORMAL

## 2021-04-05 PROCEDURE — 52000 CYSTOSCOPY: ICD-10-PCS | Mod: S$GLB,,, | Performed by: UROLOGY

## 2021-04-05 PROCEDURE — 81001 POCT URINALYSIS, DIPSTICK OR TABLET REAGENT, AUTOMATED, WITH MICROSCOP: ICD-10-PCS | Mod: S$GLB,,, | Performed by: UROLOGY

## 2021-04-05 PROCEDURE — 81001 URINALYSIS AUTO W/SCOPE: CPT | Mod: S$GLB,,, | Performed by: UROLOGY

## 2021-04-05 PROCEDURE — 52000 CYSTOURETHROSCOPY: CPT | Mod: S$GLB,,, | Performed by: UROLOGY

## 2021-04-08 ENCOUNTER — OFFICE VISIT (OUTPATIENT)
Dept: FAMILY MEDICINE | Facility: CLINIC | Age: 51
End: 2021-04-08
Payer: MEDICARE

## 2021-04-08 VITALS
BODY MASS INDEX: 41.07 KG/M2 | RESPIRATION RATE: 19 BRPM | HEART RATE: 64 BPM | TEMPERATURE: 98 F | WEIGHT: 261.69 LBS | OXYGEN SATURATION: 96 % | HEIGHT: 67 IN | DIASTOLIC BLOOD PRESSURE: 70 MMHG | SYSTOLIC BLOOD PRESSURE: 104 MMHG

## 2021-04-08 DIAGNOSIS — Z72.0 VAPES NICOTINE CONTAINING SUBSTANCE: ICD-10-CM

## 2021-04-08 DIAGNOSIS — F31.9 BIPOLAR 1 DISORDER: ICD-10-CM

## 2021-04-08 DIAGNOSIS — M79.671 CHRONIC FOOT PAIN, RIGHT: ICD-10-CM

## 2021-04-08 DIAGNOSIS — E66.01 MORBID OBESITY WITH BMI OF 40.0-44.9, ADULT: ICD-10-CM

## 2021-04-08 DIAGNOSIS — Z00.00 ENCOUNTER FOR PREVENTIVE HEALTH EXAMINATION: Primary | ICD-10-CM

## 2021-04-08 DIAGNOSIS — G89.29 CHRONIC FOOT PAIN, RIGHT: ICD-10-CM

## 2021-04-08 PROCEDURE — 99999 PR PBB SHADOW E&M-EST. PATIENT-LVL V: ICD-10-PCS | Mod: PBBFAC,,, | Performed by: NURSE PRACTITIONER

## 2021-04-08 PROCEDURE — G0439 PR MEDICARE ANNUAL WELLNESS SUBSEQUENT VISIT: ICD-10-PCS | Mod: S$GLB,,, | Performed by: NURSE PRACTITIONER

## 2021-04-08 PROCEDURE — 1126F AMNT PAIN NOTED NONE PRSNT: CPT | Mod: S$GLB,,, | Performed by: NURSE PRACTITIONER

## 2021-04-08 PROCEDURE — 99499 RISK ADDL DX/OHS AUDIT: ICD-10-PCS | Mod: HCNC,S$GLB,, | Performed by: NURSE PRACTITIONER

## 2021-04-08 PROCEDURE — 3008F BODY MASS INDEX DOCD: CPT | Mod: CPTII,S$GLB,, | Performed by: NURSE PRACTITIONER

## 2021-04-08 PROCEDURE — 1126F PR PAIN SEVERITY QUANTIFIED, NO PAIN PRESENT: ICD-10-PCS | Mod: S$GLB,,, | Performed by: NURSE PRACTITIONER

## 2021-04-08 PROCEDURE — 99499 UNLISTED E&M SERVICE: CPT | Mod: HCNC,S$GLB,, | Performed by: NURSE PRACTITIONER

## 2021-04-08 PROCEDURE — 99999 PR PBB SHADOW E&M-EST. PATIENT-LVL V: CPT | Mod: PBBFAC,,, | Performed by: NURSE PRACTITIONER

## 2021-04-08 PROCEDURE — G0439 PPPS, SUBSEQ VISIT: HCPCS | Mod: S$GLB,,, | Performed by: NURSE PRACTITIONER

## 2021-04-08 PROCEDURE — 3008F PR BODY MASS INDEX (BMI) DOCUMENTED: ICD-10-PCS | Mod: CPTII,S$GLB,, | Performed by: NURSE PRACTITIONER

## 2021-04-13 ENCOUNTER — OFFICE VISIT (OUTPATIENT)
Dept: PSYCHIATRY | Facility: CLINIC | Age: 51
End: 2021-04-13
Payer: MEDICARE

## 2021-04-13 DIAGNOSIS — F31.9 BIPOLAR 1 DISORDER: Primary | ICD-10-CM

## 2021-04-13 PROCEDURE — 90833 PSYTX W PT W E/M 30 MIN: CPT | Mod: 95,,, | Performed by: PSYCHIATRY & NEUROLOGY

## 2021-04-13 PROCEDURE — 90833 PR PSYCHOTHERAPY W/PATIENT W/E&M, 30 MIN (ADD ON): ICD-10-PCS | Mod: 95,,, | Performed by: PSYCHIATRY & NEUROLOGY

## 2021-04-13 PROCEDURE — 99214 PR OFFICE/OUTPT VISIT, EST, LEVL IV, 30-39 MIN: ICD-10-PCS | Mod: 95,,, | Performed by: PSYCHIATRY & NEUROLOGY

## 2021-04-13 PROCEDURE — 99214 OFFICE O/P EST MOD 30 MIN: CPT | Mod: 95,,, | Performed by: PSYCHIATRY & NEUROLOGY

## 2021-04-13 RX ORDER — SERTRALINE HYDROCHLORIDE 100 MG/1
200 TABLET, FILM COATED ORAL DAILY
Qty: 180 TABLET | Refills: 2 | Status: SHIPPED | OUTPATIENT
Start: 2021-04-13 | End: 2022-03-16 | Stop reason: SDUPTHER

## 2021-04-13 RX ORDER — LORAZEPAM 0.5 MG/1
TABLET ORAL
Qty: 45 TABLET | Refills: 0 | Status: SHIPPED | OUTPATIENT
Start: 2021-04-13 | End: 2021-05-14 | Stop reason: SDUPTHER

## 2021-04-13 RX ORDER — BUPROPION HYDROCHLORIDE 100 MG/1
200 TABLET, EXTENDED RELEASE ORAL DAILY
Qty: 60 TABLET | Refills: 5 | Status: SHIPPED | OUTPATIENT
Start: 2021-04-13 | End: 2021-06-16 | Stop reason: SDUPTHER

## 2021-04-13 RX ORDER — SERTRALINE HYDROCHLORIDE 100 MG/1
200 TABLET, FILM COATED ORAL DAILY
Qty: 180 TABLET | Refills: 2 | Status: SHIPPED | OUTPATIENT
Start: 2021-04-13 | End: 2021-04-13 | Stop reason: SDUPTHER

## 2021-04-13 RX ORDER — LITHIUM CARBONATE 300 MG
300 TABLET ORAL 3 TIMES DAILY
Qty: 270 TABLET | Refills: 1 | Status: SHIPPED | OUTPATIENT
Start: 2021-04-13 | End: 2021-06-16 | Stop reason: SDUPTHER

## 2021-04-13 RX ORDER — RISPERIDONE 0.5 MG/1
0.5 TABLET ORAL 2 TIMES DAILY
Qty: 180 TABLET | Refills: 1 | Status: SHIPPED | OUTPATIENT
Start: 2021-04-13 | End: 2021-11-03 | Stop reason: SDUPTHER

## 2021-04-15 ENCOUNTER — HOSPITAL ENCOUNTER (OUTPATIENT)
Dept: RADIOLOGY | Facility: HOSPITAL | Age: 51
Discharge: HOME | End: 2021-04-15
Attending: PODIATRIST
Payer: MEDICARE

## 2021-04-15 ENCOUNTER — OFFICE VISIT (OUTPATIENT)
Dept: PODIATRY | Facility: CLINIC | Age: 51
End: 2021-04-15
Payer: MEDICARE

## 2021-04-15 VITALS
BODY MASS INDEX: 41.07 KG/M2 | SYSTOLIC BLOOD PRESSURE: 110 MMHG | HEART RATE: 58 BPM | DIASTOLIC BLOOD PRESSURE: 76 MMHG | HEIGHT: 67 IN | WEIGHT: 261.69 LBS

## 2021-04-15 DIAGNOSIS — G89.29 CHRONIC FOOT PAIN, RIGHT: ICD-10-CM

## 2021-04-15 DIAGNOSIS — M79.674 PAIN OF TOE OF RIGHT FOOT: Primary | ICD-10-CM

## 2021-04-15 DIAGNOSIS — S99.921A TOE INJURY, RIGHT, INITIAL ENCOUNTER: ICD-10-CM

## 2021-04-15 DIAGNOSIS — M79.671 CHRONIC FOOT PAIN, RIGHT: ICD-10-CM

## 2021-04-15 PROCEDURE — 73630 XR FOOT COMPLETE 3 VIEW RIGHT: ICD-10-PCS | Mod: 26,RT,, | Performed by: RADIOLOGY

## 2021-04-15 PROCEDURE — 73630 X-RAY EXAM OF FOOT: CPT | Mod: TC,FY,PO,RT

## 2021-04-15 PROCEDURE — 99214 OFFICE O/P EST MOD 30 MIN: CPT | Mod: S$GLB,,, | Performed by: PODIATRIST

## 2021-04-15 PROCEDURE — 99999 PR PBB SHADOW E&M-EST. PATIENT-LVL V: ICD-10-PCS | Mod: PBBFAC,,, | Performed by: PODIATRIST

## 2021-04-15 PROCEDURE — 73630 X-RAY EXAM OF FOOT: CPT | Mod: 26,RT,, | Performed by: RADIOLOGY

## 2021-04-15 PROCEDURE — 3008F BODY MASS INDEX DOCD: CPT | Mod: CPTII,S$GLB,, | Performed by: PODIATRIST

## 2021-04-15 PROCEDURE — 99999 PR PBB SHADOW E&M-EST. PATIENT-LVL V: CPT | Mod: PBBFAC,,, | Performed by: PODIATRIST

## 2021-04-15 PROCEDURE — 99214 PR OFFICE/OUTPT VISIT, EST, LEVL IV, 30-39 MIN: ICD-10-PCS | Mod: S$GLB,,, | Performed by: PODIATRIST

## 2021-04-15 PROCEDURE — 1125F AMNT PAIN NOTED PAIN PRSNT: CPT | Mod: S$GLB,,, | Performed by: PODIATRIST

## 2021-04-15 PROCEDURE — 3008F PR BODY MASS INDEX (BMI) DOCUMENTED: ICD-10-PCS | Mod: CPTII,S$GLB,, | Performed by: PODIATRIST

## 2021-04-15 PROCEDURE — 1125F PR PAIN SEVERITY QUANTIFIED, PAIN PRESENT: ICD-10-PCS | Mod: S$GLB,,, | Performed by: PODIATRIST

## 2021-04-28 ENCOUNTER — PATIENT MESSAGE (OUTPATIENT)
Dept: PODIATRY | Facility: CLINIC | Age: 51
End: 2021-04-28

## 2021-04-29 ENCOUNTER — TELEPHONE (OUTPATIENT)
Dept: PODIATRY | Facility: CLINIC | Age: 51
End: 2021-04-29

## 2021-05-14 RX ORDER — LORAZEPAM 0.5 MG/1
TABLET ORAL
Qty: 45 TABLET | Refills: 0 | Status: SHIPPED | OUTPATIENT
Start: 2021-05-14 | End: 2021-06-11 | Stop reason: DRUGHIGH

## 2021-05-25 ENCOUNTER — PATIENT MESSAGE (OUTPATIENT)
Dept: BARIATRICS | Facility: CLINIC | Age: 51
End: 2021-05-25

## 2021-06-10 ENCOUNTER — OFFICE VISIT (OUTPATIENT)
Dept: BARIATRICS | Facility: CLINIC | Age: 51
End: 2021-06-10
Payer: MEDICARE

## 2021-06-10 ENCOUNTER — LAB VISIT (OUTPATIENT)
Dept: LAB | Facility: HOSPITAL | Age: 51
End: 2021-06-10
Payer: MEDICARE

## 2021-06-10 VITALS
SYSTOLIC BLOOD PRESSURE: 103 MMHG | OXYGEN SATURATION: 97 % | DIASTOLIC BLOOD PRESSURE: 69 MMHG | HEART RATE: 59 BPM | WEIGHT: 255.06 LBS | HEIGHT: 67 IN | BODY MASS INDEX: 40.03 KG/M2

## 2021-06-10 DIAGNOSIS — K21.9 GASTROESOPHAGEAL REFLUX DISEASE, UNSPECIFIED WHETHER ESOPHAGITIS PRESENT: ICD-10-CM

## 2021-06-10 DIAGNOSIS — G93.2 PSEUDOTUMOR CEREBRI: ICD-10-CM

## 2021-06-10 DIAGNOSIS — Z98.84 S/P BARIATRIC SURGERY: ICD-10-CM

## 2021-06-10 DIAGNOSIS — M79.7 FIBROMYALGIA: ICD-10-CM

## 2021-06-10 DIAGNOSIS — R79.0 ABNORMAL LEVEL OF BLOOD MINERAL: ICD-10-CM

## 2021-06-10 DIAGNOSIS — I27.20 PULMONARY HYPERTENSION: ICD-10-CM

## 2021-06-10 DIAGNOSIS — F41.9 ANXIETY: ICD-10-CM

## 2021-06-10 DIAGNOSIS — G47.33 OSA (OBSTRUCTIVE SLEEP APNEA): ICD-10-CM

## 2021-06-10 DIAGNOSIS — F32.A DEPRESSION, UNSPECIFIED DEPRESSION TYPE: ICD-10-CM

## 2021-06-10 DIAGNOSIS — D52.0 DIETARY FOLATE DEFICIENCY ANEMIA: ICD-10-CM

## 2021-06-10 DIAGNOSIS — R63.4 WEIGHT LOSS: Primary | ICD-10-CM

## 2021-06-10 LAB
25(OH)D3+25(OH)D2 SERPL-MCNC: 37 NG/ML (ref 30–96)
ALBUMIN SERPL BCP-MCNC: 3.5 G/DL (ref 3.5–5.2)
ALP SERPL-CCNC: 62 U/L (ref 55–135)
ALT SERPL W/O P-5'-P-CCNC: 12 U/L (ref 10–44)
ANION GAP SERPL CALC-SCNC: 8 MMOL/L (ref 8–16)
AST SERPL-CCNC: 9 U/L (ref 10–40)
BASOPHILS # BLD AUTO: 0.07 K/UL (ref 0–0.2)
BASOPHILS NFR BLD: 1 % (ref 0–1.9)
BILIRUB SERPL-MCNC: 0.4 MG/DL (ref 0.1–1)
BUN SERPL-MCNC: 11 MG/DL (ref 6–20)
CALCIUM SERPL-MCNC: 9.3 MG/DL (ref 8.7–10.5)
CHLORIDE SERPL-SCNC: 113 MMOL/L (ref 95–110)
CHOLEST SERPL-MCNC: 162 MG/DL (ref 120–199)
CHOLEST/HDLC SERPL: 2.7 {RATIO} (ref 2–5)
CO2 SERPL-SCNC: 19 MMOL/L (ref 23–29)
CREAT SERPL-MCNC: 0.8 MG/DL (ref 0.5–1.4)
DIFFERENTIAL METHOD: ABNORMAL
EOSINOPHIL # BLD AUTO: 0.2 K/UL (ref 0–0.5)
EOSINOPHIL NFR BLD: 2.9 % (ref 0–8)
ERYTHROCYTE [DISTWIDTH] IN BLOOD BY AUTOMATED COUNT: 12.2 % (ref 11.5–14.5)
EST. GFR  (AFRICAN AMERICAN): >60 ML/MIN/1.73 M^2
EST. GFR  (NON AFRICAN AMERICAN): >60 ML/MIN/1.73 M^2
GLUCOSE SERPL-MCNC: 88 MG/DL (ref 70–110)
HCT VFR BLD AUTO: 41.3 % (ref 37–48.5)
HDLC SERPL-MCNC: 60 MG/DL (ref 40–75)
HDLC SERPL: 37 % (ref 20–50)
HGB BLD-MCNC: 13.6 G/DL (ref 12–16)
IMM GRANULOCYTES # BLD AUTO: 0.02 K/UL (ref 0–0.04)
IMM GRANULOCYTES NFR BLD AUTO: 0.3 % (ref 0–0.5)
IRON SERPL-MCNC: 63 UG/DL (ref 30–160)
LDLC SERPL CALC-MCNC: 83.2 MG/DL (ref 63–159)
LYMPHOCYTES # BLD AUTO: 2.2 K/UL (ref 1–4.8)
LYMPHOCYTES NFR BLD: 32.1 % (ref 18–48)
MCH RBC QN AUTO: 31.4 PG (ref 27–31)
MCHC RBC AUTO-ENTMCNC: 32.9 G/DL (ref 32–36)
MCV RBC AUTO: 95 FL (ref 82–98)
MONOCYTES # BLD AUTO: 0.5 K/UL (ref 0.3–1)
MONOCYTES NFR BLD: 7.2 % (ref 4–15)
NEUTROPHILS # BLD AUTO: 3.8 K/UL (ref 1.8–7.7)
NEUTROPHILS NFR BLD: 56.5 % (ref 38–73)
NONHDLC SERPL-MCNC: 102 MG/DL
NRBC BLD-RTO: 0 /100 WBC
PLATELET # BLD AUTO: 279 K/UL (ref 150–450)
PMV BLD AUTO: 9.2 FL (ref 9.2–12.9)
POTASSIUM SERPL-SCNC: 4 MMOL/L (ref 3.5–5.1)
PROT SERPL-MCNC: 7 G/DL (ref 6–8.4)
RBC # BLD AUTO: 4.33 M/UL (ref 4–5.4)
SATURATED IRON: 16 % (ref 20–50)
SODIUM SERPL-SCNC: 140 MMOL/L (ref 136–145)
TOTAL IRON BINDING CAPACITY: 391 UG/DL (ref 250–450)
TRANSFERRIN SERPL-MCNC: 264 MG/DL (ref 200–375)
TRIGL SERPL-MCNC: 94 MG/DL (ref 30–150)
VIT B12 SERPL-MCNC: 1707 PG/ML (ref 210–950)
WBC # BLD AUTO: 6.79 K/UL (ref 3.9–12.7)

## 2021-06-10 PROCEDURE — 80053 COMPREHEN METABOLIC PANEL: CPT | Performed by: PHYSICIAN ASSISTANT

## 2021-06-10 PROCEDURE — 82306 VITAMIN D 25 HYDROXY: CPT | Performed by: PHYSICIAN ASSISTANT

## 2021-06-10 PROCEDURE — 84425 ASSAY OF VITAMIN B-1: CPT | Performed by: PHYSICIAN ASSISTANT

## 2021-06-10 PROCEDURE — 99213 PR OFFICE/OUTPT VISIT, EST, LEVL III, 20-29 MIN: ICD-10-PCS | Mod: S$GLB,,, | Performed by: PHYSICIAN ASSISTANT

## 2021-06-10 PROCEDURE — 1125F AMNT PAIN NOTED PAIN PRSNT: CPT | Mod: S$GLB,,, | Performed by: PHYSICIAN ASSISTANT

## 2021-06-10 PROCEDURE — 99213 OFFICE O/P EST LOW 20 MIN: CPT | Mod: S$GLB,,, | Performed by: PHYSICIAN ASSISTANT

## 2021-06-10 PROCEDURE — 1125F PR PAIN SEVERITY QUANTIFIED, PAIN PRESENT: ICD-10-PCS | Mod: S$GLB,,, | Performed by: PHYSICIAN ASSISTANT

## 2021-06-10 PROCEDURE — 3008F BODY MASS INDEX DOCD: CPT | Mod: CPTII,S$GLB,, | Performed by: PHYSICIAN ASSISTANT

## 2021-06-10 PROCEDURE — 85025 COMPLETE CBC W/AUTO DIFF WBC: CPT | Performed by: PHYSICIAN ASSISTANT

## 2021-06-10 PROCEDURE — 83540 ASSAY OF IRON: CPT | Performed by: PHYSICIAN ASSISTANT

## 2021-06-10 PROCEDURE — 80061 LIPID PANEL: CPT | Performed by: PHYSICIAN ASSISTANT

## 2021-06-10 PROCEDURE — 36415 COLL VENOUS BLD VENIPUNCTURE: CPT | Performed by: PHYSICIAN ASSISTANT

## 2021-06-10 PROCEDURE — 82607 VITAMIN B-12: CPT | Performed by: PHYSICIAN ASSISTANT

## 2021-06-10 PROCEDURE — 99999 PR PBB SHADOW E&M-EST. PATIENT-LVL V: CPT | Mod: PBBFAC,,, | Performed by: PHYSICIAN ASSISTANT

## 2021-06-10 PROCEDURE — 3008F PR BODY MASS INDEX (BMI) DOCUMENTED: ICD-10-PCS | Mod: CPTII,S$GLB,, | Performed by: PHYSICIAN ASSISTANT

## 2021-06-10 PROCEDURE — 99999 PR PBB SHADOW E&M-EST. PATIENT-LVL V: ICD-10-PCS | Mod: PBBFAC,,, | Performed by: PHYSICIAN ASSISTANT

## 2021-06-11 RX ORDER — LORAZEPAM 0.5 MG/1
TABLET ORAL
Qty: 45 TABLET | Refills: 0 | Status: SHIPPED | OUTPATIENT
Start: 2021-06-11 | End: 2021-07-08 | Stop reason: DRUGHIGH

## 2021-06-16 ENCOUNTER — OFFICE VISIT (OUTPATIENT)
Dept: PSYCHIATRY | Facility: CLINIC | Age: 51
End: 2021-06-16
Payer: MEDICARE

## 2021-06-16 DIAGNOSIS — F31.9 BIPOLAR 1 DISORDER: Primary | ICD-10-CM

## 2021-06-16 DIAGNOSIS — F41.9 ANXIETY: ICD-10-CM

## 2021-06-16 LAB — VIT B1 BLD-MCNC: 124 UG/L (ref 38–122)

## 2021-06-16 PROCEDURE — 90833 PR PSYCHOTHERAPY W/PATIENT W/E&M, 30 MIN (ADD ON): ICD-10-PCS | Mod: 95,,, | Performed by: PSYCHIATRY & NEUROLOGY

## 2021-06-16 PROCEDURE — 99214 OFFICE O/P EST MOD 30 MIN: CPT | Mod: 95,,, | Performed by: PSYCHIATRY & NEUROLOGY

## 2021-06-16 PROCEDURE — 99499 UNLISTED E&M SERVICE: CPT | Mod: HCNC,95,, | Performed by: PSYCHIATRY & NEUROLOGY

## 2021-06-16 PROCEDURE — 99214 PR OFFICE/OUTPT VISIT, EST, LEVL IV, 30-39 MIN: ICD-10-PCS | Mod: 95,,, | Performed by: PSYCHIATRY & NEUROLOGY

## 2021-06-16 PROCEDURE — 90833 PSYTX W PT W E/M 30 MIN: CPT | Mod: 95,,, | Performed by: PSYCHIATRY & NEUROLOGY

## 2021-06-16 PROCEDURE — 99499 RISK ADDL DX/OHS AUDIT: ICD-10-PCS | Mod: HCNC,95,, | Performed by: PSYCHIATRY & NEUROLOGY

## 2021-06-16 RX ORDER — LITHIUM CARBONATE 300 MG/1
300 CAPSULE ORAL
Qty: 270 CAPSULE | Refills: 3 | Status: SHIPPED | OUTPATIENT
Start: 2021-06-16 | End: 2022-03-16 | Stop reason: SDUPTHER

## 2021-06-16 RX ORDER — LITHIUM CARBONATE 300 MG
300 TABLET ORAL 3 TIMES DAILY
Qty: 270 TABLET | Refills: 1 | Status: SHIPPED | OUTPATIENT
Start: 2021-06-16 | End: 2021-06-16 | Stop reason: SDUPTHER

## 2021-06-16 RX ORDER — BUPROPION HYDROCHLORIDE 100 MG/1
TABLET, EXTENDED RELEASE ORAL
Qty: 270 TABLET | Refills: 3 | Status: SHIPPED | OUTPATIENT
Start: 2021-06-16 | End: 2021-06-16 | Stop reason: SDUPTHER

## 2021-06-16 RX ORDER — BUPROPION HYDROCHLORIDE 100 MG/1
TABLET, EXTENDED RELEASE ORAL
Qty: 270 TABLET | Refills: 3 | Status: SHIPPED | OUTPATIENT
Start: 2021-06-16 | End: 2022-03-16 | Stop reason: SDUPTHER

## 2021-07-01 ENCOUNTER — OFFICE VISIT (OUTPATIENT)
Dept: PODIATRY | Facility: CLINIC | Age: 51
End: 2021-07-01
Payer: MEDICARE

## 2021-07-01 VITALS — WEIGHT: 255.06 LBS | BODY MASS INDEX: 40.03 KG/M2 | HEIGHT: 67 IN

## 2021-07-01 DIAGNOSIS — M79.671 RIGHT FOOT PAIN: ICD-10-CM

## 2021-07-01 DIAGNOSIS — D36.10 NEUROMA: Primary | ICD-10-CM

## 2021-07-01 PROCEDURE — 3008F BODY MASS INDEX DOCD: CPT | Mod: CPTII,S$GLB,, | Performed by: PODIATRIST

## 2021-07-01 PROCEDURE — 99214 PR OFFICE/OUTPT VISIT, EST, LEVL IV, 30-39 MIN: ICD-10-PCS | Mod: S$GLB,,, | Performed by: PODIATRIST

## 2021-07-01 PROCEDURE — 1125F AMNT PAIN NOTED PAIN PRSNT: CPT | Mod: S$GLB,,, | Performed by: PODIATRIST

## 2021-07-01 PROCEDURE — 3008F PR BODY MASS INDEX (BMI) DOCUMENTED: ICD-10-PCS | Mod: CPTII,S$GLB,, | Performed by: PODIATRIST

## 2021-07-01 PROCEDURE — 99999 PR PBB SHADOW E&M-EST. PATIENT-LVL V: CPT | Mod: PBBFAC,,, | Performed by: PODIATRIST

## 2021-07-01 PROCEDURE — 99999 PR PBB SHADOW E&M-EST. PATIENT-LVL V: ICD-10-PCS | Mod: PBBFAC,,, | Performed by: PODIATRIST

## 2021-07-01 PROCEDURE — 99214 OFFICE O/P EST MOD 30 MIN: CPT | Mod: S$GLB,,, | Performed by: PODIATRIST

## 2021-07-01 PROCEDURE — 1125F PR PAIN SEVERITY QUANTIFIED, PAIN PRESENT: ICD-10-PCS | Mod: S$GLB,,, | Performed by: PODIATRIST

## 2021-07-01 RX ORDER — METHYLPREDNISOLONE 4 MG/1
TABLET ORAL
Qty: 1 PACKAGE | Refills: 0 | Status: SHIPPED | OUTPATIENT
Start: 2021-07-01 | End: 2021-07-22

## 2021-07-03 NOTE — PROGRESS NOTES
"Outpatient Psychiatry Follow-Up Visit (MD/NP)    11/17/2020 The patient location is: home  The chief complaint leading to consultation is: bipolar disorder    Visit type: audiovisual    Face to Face time with patient: 27"(10"for meds and 17" for supportive counseling(35) minutes of total time spent on the encounter, which includes face to face time and non-face to face time preparing to see the patient (eg, review of tests), Obtaining and/or reviewing separately obtained history, Documenting clinical information in the electronic or other health record, Independently interpreting results (not separately reported) and communicating results to the patient/family/caregiver, or Care coordination (not separately reported).         Each patient to whom he or she provides medical services by telemedicine is:  (1) informed of the relationship between the physician and patient and the respective role of any other health care provider with respect to management of the patient; and (2) notified that he or she may decline to receive medical services by telemedicine and may withdraw from such care at any time.    Notes: See below    Clinical Status of Patient:  Outpatient (Ambulatory)    Chief Complaint:  Zaira Dowell is a 49 y.o. female who presents today for follow-up of mood disorder.  Met with patient and no relatives present.      Interval History and Content of Current Session:  Interim Events/Subjective Report/Content of Current Session: I reported her recent blood level of the lithium, 0.9. Patient is upset re a recent anniversary party given for her parents. Her brother, at the party, was critical of her son, and this is upsetting. Patient is in good self control despite the difficulty at her party. Patient has no current signs or symptoms of lida or psychosis. Her brother was somewhat impolite to her son. Her thoughts are organized at this time.    Psychotherapy:  · Target symptoms: mood disorder  · Why chosen " therapy is appropriate versus another modality: relevant to diagnosis  · Outcome monitoring methods: self-report  · Therapeutic intervention type: supportive psychotherapy  · Topics discussed/themes: incident at recent family party, relationships difficulties  · The patient's response to the intervention is accepting. The patient's progress toward treatment goals is fair.   · Duration of intervention: 27 minutes.    Review of Systems   · PSYCHIATRIC: Pertinant items are noted in the narrative.    Past Medical, Family and Social History: The patient's past medical, family and social history have been reviewed and updated as appropriate within the electronic medical record - see encounter notes.    Compliance: yes    Side effects: None, possible tremor    Risk Parameters:  Patient reports no suicidal ideation  Patient reports no homicidal ideation  Patient reports no self-injurious behavior  Patient reports no violent behavior    Exam (detailed: at least 9 elements; comprehensive: all 15 elements)   Constitutional  Vitals:  Most recent vital signs, dated greater than 90 days prior to this appointment, were not reviewed.   There were no vitals filed for this visit.  Virtual visit for patient this date.   General:  unremarkable, age appropriate, casually dressed, neatly groomed     Musculoskeletal  Muscle Strength/Tone:  still has tremor   Gait & Station:  non-ataxic     Psychiatric  Speech:  no latency; no press, spontaneous   Mood & Affect:  irritable  congruent and appropriate   Thought Process:  normal and logical   Associations:  intact   Thought Content:  normal, no suicidality, no homicidality, delusions, or paranoia   Insight:  has awareness of illness   Judgement: behavior is adequate to circumstances   Orientation:  grossly intact   Memory: intact for content of interview   Language: grossly intact   Attention Span & Concentration:  able to focus   Fund of Knowledge:  intact and appropriate to age and level of  education     Assessment and Diagnosis   Status/Progress: Based on the examination today, the patient's problem(s) is/are adequately but not ideally controlled.  New problems have been presented today.   recent conflict with her brother are complicating management of the primary condition.  The working differential for this patient includes bipolar disorder.     General Impression:  Patient is still upset re an incident in her family. We discussed managing this in a coping and productive manner for herself.        ICD-10-CM ICD-9-CM   1. Bipolar 1 disorder  F31.9 296.7       Intervention/Counseling/Treatment Plan   Medication Management: The risks and benefits of medication were discussed with the patient. Patient agrees to call me in one week and also to arrange another virtual visit as soon as possible and continue Wellbutrin SR 100mg q am, Zoloft 200 mg daily, Risperdal 0.5 mg bid, and Lithium 300 mg q am and 600 mg q pm.    Return to Clinic: 1 month     Implemented All Universal Safety Interventions:  Tyler to call system. Call bell, personal items and telephone within reach. Instruct patient to call for assistance. Room bathroom lighting operational. Non-slip footwear when patient is off stretcher. Physically safe environment: no spills, clutter or unnecessary equipment. Stretcher in lowest position, wheels locked, appropriate side rails in place.

## 2021-07-08 RX ORDER — LORAZEPAM 0.5 MG/1
TABLET ORAL
Qty: 60 TABLET | Refills: 0 | Status: SHIPPED | OUTPATIENT
Start: 2021-07-08 | End: 2021-08-09 | Stop reason: SDUPTHER

## 2021-07-08 RX ORDER — LORAZEPAM 0.5 MG/1
TABLET ORAL
Qty: 60 TABLET | Refills: 0 | Status: SHIPPED | OUTPATIENT
Start: 2021-07-08 | End: 2021-07-08 | Stop reason: DRUGHIGH

## 2021-07-30 ENCOUNTER — OFFICE VISIT (OUTPATIENT)
Dept: FAMILY MEDICINE | Facility: CLINIC | Age: 51
End: 2021-07-30
Payer: MEDICARE

## 2021-07-30 VITALS
BODY MASS INDEX: 40.17 KG/M2 | RESPIRATION RATE: 16 BRPM | OXYGEN SATURATION: 95 % | HEART RATE: 91 BPM | SYSTOLIC BLOOD PRESSURE: 106 MMHG | DIASTOLIC BLOOD PRESSURE: 66 MMHG | HEIGHT: 67 IN | TEMPERATURE: 99 F | WEIGHT: 255.94 LBS

## 2021-07-30 DIAGNOSIS — B96.89 ACUTE BACTERIAL BRONCHITIS: Primary | ICD-10-CM

## 2021-07-30 DIAGNOSIS — R51.9 HEAD ACHE: ICD-10-CM

## 2021-07-30 DIAGNOSIS — J20.8 ACUTE BACTERIAL BRONCHITIS: Primary | ICD-10-CM

## 2021-07-30 DIAGNOSIS — R05.9 COUGH: ICD-10-CM

## 2021-07-30 DIAGNOSIS — M79.10 MUSCLE PAIN: ICD-10-CM

## 2021-07-30 DIAGNOSIS — R43.9 PROBLEMS WITH SMELL AND TASTE: ICD-10-CM

## 2021-07-30 LAB
SARS-COV-2 RNA RESP QL NAA+PROBE: NOT DETECTED
SARS-COV-2- CYCLE NUMBER: -1

## 2021-07-30 PROCEDURE — 1159F PR MEDICATION LIST DOCUMENTED IN MEDICAL RECORD: ICD-10-PCS | Mod: CPTII,S$GLB,, | Performed by: PHYSICIAN ASSISTANT

## 2021-07-30 PROCEDURE — 3044F PR MOST RECENT HEMOGLOBIN A1C LEVEL <7.0%: ICD-10-PCS | Mod: CPTII,S$GLB,, | Performed by: PHYSICIAN ASSISTANT

## 2021-07-30 PROCEDURE — 1160F PR REVIEW ALL MEDS BY PRESCRIBER/CLIN PHARMACIST DOCUMENTED: ICD-10-PCS | Mod: CPTII,S$GLB,, | Performed by: PHYSICIAN ASSISTANT

## 2021-07-30 PROCEDURE — 3044F HG A1C LEVEL LT 7.0%: CPT | Mod: CPTII,S$GLB,, | Performed by: PHYSICIAN ASSISTANT

## 2021-07-30 PROCEDURE — U0005 INFEC AGEN DETEC AMPLI PROBE: HCPCS | Performed by: PHYSICIAN ASSISTANT

## 2021-07-30 PROCEDURE — 3008F PR BODY MASS INDEX (BMI) DOCUMENTED: ICD-10-PCS | Mod: CPTII,S$GLB,, | Performed by: PHYSICIAN ASSISTANT

## 2021-07-30 PROCEDURE — U0003 INFECTIOUS AGENT DETECTION BY NUCLEIC ACID (DNA OR RNA); SEVERE ACUTE RESPIRATORY SYNDROME CORONAVIRUS 2 (SARS-COV-2) (CORONAVIRUS DISEASE [COVID-19]), AMPLIFIED PROBE TECHNIQUE, MAKING USE OF HIGH THROUGHPUT TECHNOLOGIES AS DESCRIBED BY CMS-2020-01-R: HCPCS | Performed by: PHYSICIAN ASSISTANT

## 2021-07-30 PROCEDURE — 1160F RVW MEDS BY RX/DR IN RCRD: CPT | Mod: CPTII,S$GLB,, | Performed by: PHYSICIAN ASSISTANT

## 2021-07-30 PROCEDURE — 99214 PR OFFICE/OUTPT VISIT, EST, LEVL IV, 30-39 MIN: ICD-10-PCS | Mod: S$GLB,,, | Performed by: PHYSICIAN ASSISTANT

## 2021-07-30 PROCEDURE — 3078F DIAST BP <80 MM HG: CPT | Mod: CPTII,S$GLB,, | Performed by: PHYSICIAN ASSISTANT

## 2021-07-30 PROCEDURE — 3074F SYST BP LT 130 MM HG: CPT | Mod: CPTII,S$GLB,, | Performed by: PHYSICIAN ASSISTANT

## 2021-07-30 PROCEDURE — 99999 PR PBB SHADOW E&M-EST. PATIENT-LVL V: CPT | Mod: PBBFAC,,, | Performed by: PHYSICIAN ASSISTANT

## 2021-07-30 PROCEDURE — 99999 PR PBB SHADOW E&M-EST. PATIENT-LVL V: ICD-10-PCS | Mod: PBBFAC,,, | Performed by: PHYSICIAN ASSISTANT

## 2021-07-30 PROCEDURE — 99214 OFFICE O/P EST MOD 30 MIN: CPT | Mod: S$GLB,,, | Performed by: PHYSICIAN ASSISTANT

## 2021-07-30 PROCEDURE — 1125F PR PAIN SEVERITY QUANTIFIED, PAIN PRESENT: ICD-10-PCS | Mod: CPTII,S$GLB,, | Performed by: PHYSICIAN ASSISTANT

## 2021-07-30 PROCEDURE — 3074F PR MOST RECENT SYSTOLIC BLOOD PRESSURE < 130 MM HG: ICD-10-PCS | Mod: CPTII,S$GLB,, | Performed by: PHYSICIAN ASSISTANT

## 2021-07-30 PROCEDURE — 3008F BODY MASS INDEX DOCD: CPT | Mod: CPTII,S$GLB,, | Performed by: PHYSICIAN ASSISTANT

## 2021-07-30 PROCEDURE — 1159F MED LIST DOCD IN RCRD: CPT | Mod: CPTII,S$GLB,, | Performed by: PHYSICIAN ASSISTANT

## 2021-07-30 PROCEDURE — 1125F AMNT PAIN NOTED PAIN PRSNT: CPT | Mod: CPTII,S$GLB,, | Performed by: PHYSICIAN ASSISTANT

## 2021-07-30 PROCEDURE — 3078F PR MOST RECENT DIASTOLIC BLOOD PRESSURE < 80 MM HG: ICD-10-PCS | Mod: CPTII,S$GLB,, | Performed by: PHYSICIAN ASSISTANT

## 2021-07-30 RX ORDER — MONTELUKAST SODIUM 10 MG/1
TABLET ORAL DAILY
COMMUNITY
Start: 2021-07-16 | End: 2021-11-10

## 2021-07-30 RX ORDER — BENZONATATE 100 MG/1
CAPSULE ORAL
Qty: 30 CAPSULE | Refills: 0 | Status: SHIPPED | OUTPATIENT
Start: 2021-07-30 | End: 2021-10-13 | Stop reason: SDUPTHER

## 2021-07-30 RX ORDER — LEVOFLOXACIN 500 MG/1
500 TABLET, FILM COATED ORAL DAILY
Qty: 5 TABLET | Refills: 0 | Status: SHIPPED | OUTPATIENT
Start: 2021-07-30 | End: 2021-10-13

## 2021-08-02 ENCOUNTER — PATIENT MESSAGE (OUTPATIENT)
Dept: FAMILY MEDICINE | Facility: CLINIC | Age: 51
End: 2021-08-02

## 2021-08-09 ENCOUNTER — TELEPHONE (OUTPATIENT)
Dept: UROLOGY | Facility: CLINIC | Age: 51
End: 2021-08-09

## 2021-08-09 DIAGNOSIS — N39.41 URGENCY INCONTINENCE: ICD-10-CM

## 2021-08-09 RX ORDER — LORAZEPAM 0.5 MG/1
TABLET ORAL
Qty: 60 TABLET | Refills: 0 | Status: SHIPPED | OUTPATIENT
Start: 2021-08-09 | End: 2021-09-10 | Stop reason: SDUPTHER

## 2021-08-09 RX ORDER — TOLTERODINE 4 MG/1
4 CAPSULE, EXTENDED RELEASE ORAL DAILY
Qty: 30 CAPSULE | Refills: 11 | Status: SHIPPED | OUTPATIENT
Start: 2021-08-09 | End: 2021-08-09 | Stop reason: SDUPTHER

## 2021-08-09 RX ORDER — TOLTERODINE 4 MG/1
4 CAPSULE, EXTENDED RELEASE ORAL DAILY
Qty: 30 CAPSULE | Refills: 11 | Status: SHIPPED | OUTPATIENT
Start: 2021-08-09 | End: 2022-08-19

## 2021-09-22 ENCOUNTER — OFFICE VISIT (OUTPATIENT)
Dept: PSYCHIATRY | Facility: CLINIC | Age: 51
End: 2021-09-22
Payer: MEDICARE

## 2021-09-22 DIAGNOSIS — F31.9 BIPOLAR 1 DISORDER: Primary | ICD-10-CM

## 2021-09-22 PROCEDURE — 3044F PR MOST RECENT HEMOGLOBIN A1C LEVEL <7.0%: ICD-10-PCS | Mod: CPTII,95,, | Performed by: SOCIAL WORKER

## 2021-09-22 PROCEDURE — 1159F PR MEDICATION LIST DOCUMENTED IN MEDICAL RECORD: ICD-10-PCS | Mod: CPTII,95,, | Performed by: SOCIAL WORKER

## 2021-09-22 PROCEDURE — 90791 PR PSYCHIATRIC DIAGNOSTIC EVALUATION: ICD-10-PCS | Mod: 95,,, | Performed by: SOCIAL WORKER

## 2021-09-22 PROCEDURE — 90791 PSYCH DIAGNOSTIC EVALUATION: CPT | Mod: 95,,, | Performed by: SOCIAL WORKER

## 2021-09-22 PROCEDURE — 1159F MED LIST DOCD IN RCRD: CPT | Mod: CPTII,95,, | Performed by: SOCIAL WORKER

## 2021-09-22 PROCEDURE — 3044F HG A1C LEVEL LT 7.0%: CPT | Mod: CPTII,95,, | Performed by: SOCIAL WORKER

## 2021-09-27 ENCOUNTER — OFFICE VISIT (OUTPATIENT)
Dept: FAMILY MEDICINE | Facility: CLINIC | Age: 51
End: 2021-09-27
Payer: MEDICARE

## 2021-09-27 VITALS
OXYGEN SATURATION: 97 % | DIASTOLIC BLOOD PRESSURE: 68 MMHG | TEMPERATURE: 98 F | RESPIRATION RATE: 19 BRPM | SYSTOLIC BLOOD PRESSURE: 130 MMHG | WEIGHT: 260.38 LBS | HEART RATE: 64 BPM | BODY MASS INDEX: 40.87 KG/M2 | HEIGHT: 67 IN

## 2021-09-27 DIAGNOSIS — M1A.0710 CHRONIC GOUT OF RIGHT FOOT, UNSPECIFIED CAUSE: Primary | ICD-10-CM

## 2021-09-27 PROCEDURE — 99999 PR PBB SHADOW E&M-EST. PATIENT-LVL V: CPT | Mod: PBBFAC,,, | Performed by: NURSE PRACTITIONER

## 2021-09-27 PROCEDURE — 3044F PR MOST RECENT HEMOGLOBIN A1C LEVEL <7.0%: ICD-10-PCS | Mod: CPTII,S$GLB,, | Performed by: NURSE PRACTITIONER

## 2021-09-27 PROCEDURE — 99214 PR OFFICE/OUTPT VISIT, EST, LEVL IV, 30-39 MIN: ICD-10-PCS | Mod: S$GLB,,, | Performed by: NURSE PRACTITIONER

## 2021-09-27 PROCEDURE — 1160F RVW MEDS BY RX/DR IN RCRD: CPT | Mod: CPTII,S$GLB,, | Performed by: NURSE PRACTITIONER

## 2021-09-27 PROCEDURE — 1159F MED LIST DOCD IN RCRD: CPT | Mod: CPTII,S$GLB,, | Performed by: NURSE PRACTITIONER

## 2021-09-27 PROCEDURE — 3008F BODY MASS INDEX DOCD: CPT | Mod: CPTII,S$GLB,, | Performed by: NURSE PRACTITIONER

## 2021-09-27 PROCEDURE — 1160F PR REVIEW ALL MEDS BY PRESCRIBER/CLIN PHARMACIST DOCUMENTED: ICD-10-PCS | Mod: CPTII,S$GLB,, | Performed by: NURSE PRACTITIONER

## 2021-09-27 PROCEDURE — 3044F HG A1C LEVEL LT 7.0%: CPT | Mod: CPTII,S$GLB,, | Performed by: NURSE PRACTITIONER

## 2021-09-27 PROCEDURE — 3078F DIAST BP <80 MM HG: CPT | Mod: CPTII,S$GLB,, | Performed by: NURSE PRACTITIONER

## 2021-09-27 PROCEDURE — 3075F PR MOST RECENT SYSTOLIC BLOOD PRESS GE 130-139MM HG: ICD-10-PCS | Mod: CPTII,S$GLB,, | Performed by: NURSE PRACTITIONER

## 2021-09-27 PROCEDURE — 3008F PR BODY MASS INDEX (BMI) DOCUMENTED: ICD-10-PCS | Mod: CPTII,S$GLB,, | Performed by: NURSE PRACTITIONER

## 2021-09-27 PROCEDURE — 99214 OFFICE O/P EST MOD 30 MIN: CPT | Mod: S$GLB,,, | Performed by: NURSE PRACTITIONER

## 2021-09-27 PROCEDURE — 3075F SYST BP GE 130 - 139MM HG: CPT | Mod: CPTII,S$GLB,, | Performed by: NURSE PRACTITIONER

## 2021-09-27 PROCEDURE — 1159F PR MEDICATION LIST DOCUMENTED IN MEDICAL RECORD: ICD-10-PCS | Mod: CPTII,S$GLB,, | Performed by: NURSE PRACTITIONER

## 2021-09-27 PROCEDURE — 99999 PR PBB SHADOW E&M-EST. PATIENT-LVL V: ICD-10-PCS | Mod: PBBFAC,,, | Performed by: NURSE PRACTITIONER

## 2021-09-27 PROCEDURE — 3078F PR MOST RECENT DIASTOLIC BLOOD PRESSURE < 80 MM HG: ICD-10-PCS | Mod: CPTII,S$GLB,, | Performed by: NURSE PRACTITIONER

## 2021-09-27 RX ORDER — PREDNISONE 20 MG/1
20 TABLET ORAL DAILY
Qty: 18 TABLET | Refills: 0 | Status: SHIPPED | OUTPATIENT
Start: 2021-09-27 | End: 2021-10-13

## 2021-10-09 RX ORDER — LORAZEPAM 0.5 MG/1
TABLET ORAL
Qty: 60 TABLET | Refills: 2 | Status: SHIPPED | OUTPATIENT
Start: 2021-10-09 | End: 2022-01-10

## 2021-10-13 ENCOUNTER — OFFICE VISIT (OUTPATIENT)
Dept: FAMILY MEDICINE | Facility: CLINIC | Age: 51
End: 2021-10-13
Payer: MEDICARE

## 2021-10-13 VITALS
RESPIRATION RATE: 16 BRPM | HEIGHT: 67 IN | OXYGEN SATURATION: 97 % | BODY MASS INDEX: 41.03 KG/M2 | WEIGHT: 261.44 LBS | HEART RATE: 73 BPM | SYSTOLIC BLOOD PRESSURE: 100 MMHG | DIASTOLIC BLOOD PRESSURE: 70 MMHG

## 2021-10-13 DIAGNOSIS — M79.7 FIBROMYALGIA: ICD-10-CM

## 2021-10-13 DIAGNOSIS — J45.21 MILD INTERMITTENT ASTHMA WITH ACUTE EXACERBATION: Primary | ICD-10-CM

## 2021-10-13 DIAGNOSIS — B96.89 ACUTE BACTERIAL BRONCHITIS: ICD-10-CM

## 2021-10-13 DIAGNOSIS — K21.9 GASTROESOPHAGEAL REFLUX DISEASE: ICD-10-CM

## 2021-10-13 DIAGNOSIS — J20.8 ACUTE BACTERIAL BRONCHITIS: ICD-10-CM

## 2021-10-13 DIAGNOSIS — Z98.890 POST-OPERATIVE STATE: ICD-10-CM

## 2021-10-13 DIAGNOSIS — R60.0 BILATERAL LEG EDEMA: ICD-10-CM

## 2021-10-13 PROCEDURE — 3044F PR MOST RECENT HEMOGLOBIN A1C LEVEL <7.0%: ICD-10-PCS | Mod: HCNC,CPTII,S$GLB, | Performed by: FAMILY MEDICINE

## 2021-10-13 PROCEDURE — 3078F DIAST BP <80 MM HG: CPT | Mod: HCNC,CPTII,S$GLB, | Performed by: FAMILY MEDICINE

## 2021-10-13 PROCEDURE — 99999 PR PBB SHADOW E&M-EST. PATIENT-LVL III: CPT | Mod: PBBFAC,HCNC,, | Performed by: FAMILY MEDICINE

## 2021-10-13 PROCEDURE — 99214 PR OFFICE/OUTPT VISIT, EST, LEVL IV, 30-39 MIN: ICD-10-PCS | Mod: HCNC,S$GLB,, | Performed by: FAMILY MEDICINE

## 2021-10-13 PROCEDURE — 3078F PR MOST RECENT DIASTOLIC BLOOD PRESSURE < 80 MM HG: ICD-10-PCS | Mod: HCNC,CPTII,S$GLB, | Performed by: FAMILY MEDICINE

## 2021-10-13 PROCEDURE — 1159F MED LIST DOCD IN RCRD: CPT | Mod: HCNC,CPTII,S$GLB, | Performed by: FAMILY MEDICINE

## 2021-10-13 PROCEDURE — 1159F PR MEDICATION LIST DOCUMENTED IN MEDICAL RECORD: ICD-10-PCS | Mod: HCNC,CPTII,S$GLB, | Performed by: FAMILY MEDICINE

## 2021-10-13 PROCEDURE — 99214 OFFICE O/P EST MOD 30 MIN: CPT | Mod: HCNC,S$GLB,, | Performed by: FAMILY MEDICINE

## 2021-10-13 PROCEDURE — 3008F BODY MASS INDEX DOCD: CPT | Mod: HCNC,CPTII,S$GLB, | Performed by: FAMILY MEDICINE

## 2021-10-13 PROCEDURE — 3074F PR MOST RECENT SYSTOLIC BLOOD PRESSURE < 130 MM HG: ICD-10-PCS | Mod: HCNC,CPTII,S$GLB, | Performed by: FAMILY MEDICINE

## 2021-10-13 PROCEDURE — 99999 PR PBB SHADOW E&M-EST. PATIENT-LVL III: ICD-10-PCS | Mod: PBBFAC,HCNC,, | Performed by: FAMILY MEDICINE

## 2021-10-13 PROCEDURE — 3008F PR BODY MASS INDEX (BMI) DOCUMENTED: ICD-10-PCS | Mod: HCNC,CPTII,S$GLB, | Performed by: FAMILY MEDICINE

## 2021-10-13 PROCEDURE — 1160F PR REVIEW ALL MEDS BY PRESCRIBER/CLIN PHARMACIST DOCUMENTED: ICD-10-PCS | Mod: HCNC,CPTII,S$GLB, | Performed by: FAMILY MEDICINE

## 2021-10-13 PROCEDURE — 3074F SYST BP LT 130 MM HG: CPT | Mod: HCNC,CPTII,S$GLB, | Performed by: FAMILY MEDICINE

## 2021-10-13 PROCEDURE — 1160F RVW MEDS BY RX/DR IN RCRD: CPT | Mod: HCNC,CPTII,S$GLB, | Performed by: FAMILY MEDICINE

## 2021-10-13 PROCEDURE — 3044F HG A1C LEVEL LT 7.0%: CPT | Mod: HCNC,CPTII,S$GLB, | Performed by: FAMILY MEDICINE

## 2021-10-13 RX ORDER — BENZONATATE 100 MG/1
CAPSULE ORAL
Qty: 30 CAPSULE | Refills: 3 | OUTPATIENT
Start: 2021-10-13 | End: 2021-12-26

## 2021-10-13 RX ORDER — FLUTICASONE PROPIONATE 110 UG/1
1 AEROSOL, METERED RESPIRATORY (INHALATION) 2 TIMES DAILY
Qty: 12 G | Refills: 0 | Status: SHIPPED | OUTPATIENT
Start: 2021-10-13 | End: 2021-11-23

## 2021-10-13 RX ORDER — OMEPRAZOLE 40 MG/1
CAPSULE, DELAYED RELEASE ORAL
Qty: 180 CAPSULE | Refills: 3 | Status: SHIPPED | OUTPATIENT
Start: 2021-10-13

## 2021-10-13 RX ORDER — GABAPENTIN 800 MG/1
800 TABLET ORAL DAILY
Qty: 90 TABLET | Refills: 2 | Status: SHIPPED | OUTPATIENT
Start: 2021-10-13 | End: 2022-03-07

## 2021-10-13 RX ORDER — AZITHROMYCIN 250 MG/1
TABLET, FILM COATED ORAL
Qty: 6 TABLET | Refills: 0 | Status: SHIPPED | OUTPATIENT
Start: 2021-10-13 | End: 2021-11-10

## 2021-10-13 RX ORDER — FUROSEMIDE 40 MG/1
40 TABLET ORAL DAILY
Qty: 30 TABLET | Refills: 2 | Status: SHIPPED | OUTPATIENT
Start: 2021-10-13 | End: 2022-01-10

## 2021-10-18 ENCOUNTER — LAB VISIT (OUTPATIENT)
Dept: LAB | Facility: HOSPITAL | Age: 51
End: 2021-10-18
Attending: NURSE PRACTITIONER
Payer: MEDICARE

## 2021-10-18 DIAGNOSIS — M1A.0710 CHRONIC GOUT OF RIGHT FOOT, UNSPECIFIED CAUSE: ICD-10-CM

## 2021-10-18 LAB
ALBUMIN SERPL BCP-MCNC: 3.5 G/DL (ref 3.5–5.2)
ALP SERPL-CCNC: 63 U/L (ref 55–135)
ALT SERPL W/O P-5'-P-CCNC: 18 U/L (ref 10–44)
ANION GAP SERPL CALC-SCNC: 8 MMOL/L (ref 8–16)
AST SERPL-CCNC: 15 U/L (ref 10–40)
BILIRUB SERPL-MCNC: 0.3 MG/DL (ref 0.1–1)
BUN SERPL-MCNC: 11 MG/DL (ref 6–20)
CALCIUM SERPL-MCNC: 9.9 MG/DL (ref 8.7–10.5)
CHLORIDE SERPL-SCNC: 112 MMOL/L (ref 95–110)
CO2 SERPL-SCNC: 21 MMOL/L (ref 23–29)
CREAT SERPL-MCNC: 0.8 MG/DL (ref 0.5–1.4)
EST. GFR  (AFRICAN AMERICAN): >60 ML/MIN/1.73 M^2
EST. GFR  (NON AFRICAN AMERICAN): >60 ML/MIN/1.73 M^2
GLUCOSE SERPL-MCNC: 79 MG/DL (ref 70–110)
POTASSIUM SERPL-SCNC: 4.8 MMOL/L (ref 3.5–5.1)
PROT SERPL-MCNC: 7.2 G/DL (ref 6–8.4)
SODIUM SERPL-SCNC: 141 MMOL/L (ref 136–145)
URATE SERPL-MCNC: 6.6 MG/DL (ref 2.4–5.7)

## 2021-10-18 PROCEDURE — 84550 ASSAY OF BLOOD/URIC ACID: CPT | Mod: HCNC | Performed by: NURSE PRACTITIONER

## 2021-10-18 PROCEDURE — 80053 COMPREHEN METABOLIC PANEL: CPT | Mod: HCNC | Performed by: NURSE PRACTITIONER

## 2021-10-18 PROCEDURE — 36415 COLL VENOUS BLD VENIPUNCTURE: CPT | Mod: HCNC,PN | Performed by: NURSE PRACTITIONER

## 2021-10-20 ENCOUNTER — PATIENT MESSAGE (OUTPATIENT)
Dept: FAMILY MEDICINE | Facility: CLINIC | Age: 51
End: 2021-10-20
Payer: MEDICARE

## 2021-11-03 ENCOUNTER — IMMUNIZATION (OUTPATIENT)
Dept: OBSTETRICS AND GYNECOLOGY | Facility: CLINIC | Age: 51
End: 2021-11-03
Payer: MEDICARE

## 2021-11-03 DIAGNOSIS — F31.9 BIPOLAR 1 DISORDER: ICD-10-CM

## 2021-11-03 DIAGNOSIS — Z23 NEED FOR VACCINATION: Primary | ICD-10-CM

## 2021-11-03 PROCEDURE — 0004A COVID-19, MRNA, LNP-S, PF, 30 MCG/0.3 ML DOSE VACCINE: CPT | Mod: HCNC,PBBFAC | Performed by: FAMILY MEDICINE

## 2021-11-03 RX ORDER — RISPERIDONE 0.5 MG/1
0.5 TABLET ORAL 2 TIMES DAILY
Qty: 180 TABLET | Refills: 1 | Status: SHIPPED | OUTPATIENT
Start: 2021-11-03 | End: 2022-03-16 | Stop reason: SDUPTHER

## 2021-11-10 ENCOUNTER — OFFICE VISIT (OUTPATIENT)
Dept: FAMILY MEDICINE | Facility: CLINIC | Age: 51
End: 2021-11-10
Payer: MEDICARE

## 2021-11-10 VITALS
OXYGEN SATURATION: 96 % | SYSTOLIC BLOOD PRESSURE: 110 MMHG | HEIGHT: 67 IN | WEIGHT: 257.5 LBS | TEMPERATURE: 98 F | BODY MASS INDEX: 40.42 KG/M2 | DIASTOLIC BLOOD PRESSURE: 70 MMHG | RESPIRATION RATE: 19 BRPM | HEART RATE: 68 BPM

## 2021-11-10 DIAGNOSIS — R30.0 DYSURIA: Primary | ICD-10-CM

## 2021-11-10 DIAGNOSIS — Z23 NEEDS FLU SHOT: ICD-10-CM

## 2021-11-10 DIAGNOSIS — N32.81 OAB (OVERACTIVE BLADDER): ICD-10-CM

## 2021-11-10 LAB
BILIRUB SERPL-MCNC: NEGATIVE MG/DL
BILIRUB UR QL STRIP: NEGATIVE
BLOOD URINE, POC: NEGATIVE
CLARITY UR: CLEAR
CLARITY, POC UA: CLEAR
COLOR UR: YELLOW
COLOR, POC UA: YELLOW
GLUCOSE UR QL STRIP: NEGATIVE
GLUCOSE UR QL STRIP: NORMAL
HGB UR QL STRIP: NEGATIVE
KETONES UR QL STRIP: NEGATIVE
KETONES UR QL STRIP: NEGATIVE
LEUKOCYTE ESTERASE UR QL STRIP: NEGATIVE
LEUKOCYTE ESTERASE URINE, POC: NEGATIVE
NITRITE UR QL STRIP: NEGATIVE
NITRITE, POC UA: NEGATIVE
PH UR STRIP: 6 [PH] (ref 5–8)
PH, POC UA: 5
PROT UR QL STRIP: NEGATIVE
PROTEIN, POC: ABNORMAL
SP GR UR STRIP: 1.01 (ref 1–1.03)
SPECIFIC GRAVITY, POC UA: 1.01
URN SPEC COLLECT METH UR: NORMAL
UROBILINOGEN UR STRIP-ACNC: NEGATIVE EU/DL
UROBILINOGEN, POC UA: NORMAL

## 2021-11-10 PROCEDURE — 99214 OFFICE O/P EST MOD 30 MIN: CPT | Mod: 25,HCNC,S$GLB, | Performed by: NURSE PRACTITIONER

## 2021-11-10 PROCEDURE — 3078F PR MOST RECENT DIASTOLIC BLOOD PRESSURE < 80 MM HG: ICD-10-PCS | Mod: HCNC,CPTII,S$GLB, | Performed by: NURSE PRACTITIONER

## 2021-11-10 PROCEDURE — 3008F BODY MASS INDEX DOCD: CPT | Mod: HCNC,CPTII,S$GLB, | Performed by: NURSE PRACTITIONER

## 2021-11-10 PROCEDURE — 3008F PR BODY MASS INDEX (BMI) DOCUMENTED: ICD-10-PCS | Mod: HCNC,CPTII,S$GLB, | Performed by: NURSE PRACTITIONER

## 2021-11-10 PROCEDURE — 1159F PR MEDICATION LIST DOCUMENTED IN MEDICAL RECORD: ICD-10-PCS | Mod: HCNC,CPTII,S$GLB, | Performed by: NURSE PRACTITIONER

## 2021-11-10 PROCEDURE — 3074F SYST BP LT 130 MM HG: CPT | Mod: HCNC,CPTII,S$GLB, | Performed by: NURSE PRACTITIONER

## 2021-11-10 PROCEDURE — 3078F DIAST BP <80 MM HG: CPT | Mod: HCNC,CPTII,S$GLB, | Performed by: NURSE PRACTITIONER

## 2021-11-10 PROCEDURE — 99214 PR OFFICE/OUTPT VISIT, EST, LEVL IV, 30-39 MIN: ICD-10-PCS | Mod: 25,HCNC,S$GLB, | Performed by: NURSE PRACTITIONER

## 2021-11-10 PROCEDURE — 3044F PR MOST RECENT HEMOGLOBIN A1C LEVEL <7.0%: ICD-10-PCS | Mod: HCNC,CPTII,S$GLB, | Performed by: NURSE PRACTITIONER

## 2021-11-10 PROCEDURE — G0008 ADMIN INFLUENZA VIRUS VAC: HCPCS | Mod: HCNC,S$GLB,, | Performed by: NURSE PRACTITIONER

## 2021-11-10 PROCEDURE — 99999 PR PBB SHADOW E&M-EST. PATIENT-LVL V: ICD-10-PCS | Mod: PBBFAC,HCNC,, | Performed by: NURSE PRACTITIONER

## 2021-11-10 PROCEDURE — G0008 FLU VACCINE (QUAD) GREATER THAN OR EQUAL TO 3YO PRESERVATIVE FREE IM: ICD-10-PCS | Mod: HCNC,S$GLB,, | Performed by: NURSE PRACTITIONER

## 2021-11-10 PROCEDURE — 90686 FLU VACCINE (QUAD) GREATER THAN OR EQUAL TO 3YO PRESERVATIVE FREE IM: ICD-10-PCS | Mod: HCNC,S$GLB,, | Performed by: NURSE PRACTITIONER

## 2021-11-10 PROCEDURE — 81003 URINALYSIS AUTO W/O SCOPE: CPT | Mod: HCNC | Performed by: NURSE PRACTITIONER

## 2021-11-10 PROCEDURE — 81002 POCT URINE DIPSTICK WITHOUT MICROSCOPE: ICD-10-PCS | Mod: HCNC,S$GLB,, | Performed by: NURSE PRACTITIONER

## 2021-11-10 PROCEDURE — 1159F MED LIST DOCD IN RCRD: CPT | Mod: HCNC,CPTII,S$GLB, | Performed by: NURSE PRACTITIONER

## 2021-11-10 PROCEDURE — 81002 URINALYSIS NONAUTO W/O SCOPE: CPT | Mod: HCNC,S$GLB,, | Performed by: NURSE PRACTITIONER

## 2021-11-10 PROCEDURE — 1160F RVW MEDS BY RX/DR IN RCRD: CPT | Mod: HCNC,CPTII,S$GLB, | Performed by: NURSE PRACTITIONER

## 2021-11-10 PROCEDURE — 90686 IIV4 VACC NO PRSV 0.5 ML IM: CPT | Mod: HCNC,S$GLB,, | Performed by: NURSE PRACTITIONER

## 2021-11-10 PROCEDURE — 87086 URINE CULTURE/COLONY COUNT: CPT | Mod: HCNC | Performed by: NURSE PRACTITIONER

## 2021-11-10 PROCEDURE — 3044F HG A1C LEVEL LT 7.0%: CPT | Mod: HCNC,CPTII,S$GLB, | Performed by: NURSE PRACTITIONER

## 2021-11-10 PROCEDURE — 99999 PR PBB SHADOW E&M-EST. PATIENT-LVL V: CPT | Mod: PBBFAC,HCNC,, | Performed by: NURSE PRACTITIONER

## 2021-11-10 PROCEDURE — 3074F PR MOST RECENT SYSTOLIC BLOOD PRESSURE < 130 MM HG: ICD-10-PCS | Mod: HCNC,CPTII,S$GLB, | Performed by: NURSE PRACTITIONER

## 2021-11-10 PROCEDURE — 1160F PR REVIEW ALL MEDS BY PRESCRIBER/CLIN PHARMACIST DOCUMENTED: ICD-10-PCS | Mod: HCNC,CPTII,S$GLB, | Performed by: NURSE PRACTITIONER

## 2021-11-12 ENCOUNTER — PATIENT MESSAGE (OUTPATIENT)
Dept: FAMILY MEDICINE | Facility: CLINIC | Age: 51
End: 2021-11-12
Payer: MEDICARE

## 2021-11-12 LAB — BACTERIA UR CULT: NORMAL

## 2021-11-18 ENCOUNTER — OFFICE VISIT (OUTPATIENT)
Dept: PSYCHIATRY | Facility: CLINIC | Age: 51
End: 2021-11-18
Payer: MEDICARE

## 2021-11-18 DIAGNOSIS — F31.9 BIPOLAR 1 DISORDER: Primary | ICD-10-CM

## 2021-11-18 PROCEDURE — 90833 PR PSYCHOTHERAPY W/PATIENT W/E&M, 30 MIN (ADD ON): ICD-10-PCS | Mod: HCNC,95,, | Performed by: PSYCHIATRY & NEUROLOGY

## 2021-11-18 PROCEDURE — 99499 UNLISTED E&M SERVICE: CPT | Mod: 95,,, | Performed by: PSYCHIATRY & NEUROLOGY

## 2021-11-18 PROCEDURE — 99214 OFFICE O/P EST MOD 30 MIN: CPT | Mod: HCNC,95,, | Performed by: PSYCHIATRY & NEUROLOGY

## 2021-11-18 PROCEDURE — 90833 PSYTX W PT W E/M 30 MIN: CPT | Mod: HCNC,95,, | Performed by: PSYCHIATRY & NEUROLOGY

## 2021-11-18 PROCEDURE — 99214 PR OFFICE/OUTPT VISIT, EST, LEVL IV, 30-39 MIN: ICD-10-PCS | Mod: HCNC,95,, | Performed by: PSYCHIATRY & NEUROLOGY

## 2021-11-18 PROCEDURE — 3044F PR MOST RECENT HEMOGLOBIN A1C LEVEL <7.0%: ICD-10-PCS | Mod: HCNC,CPTII,95, | Performed by: PSYCHIATRY & NEUROLOGY

## 2021-11-18 PROCEDURE — 1160F RVW MEDS BY RX/DR IN RCRD: CPT | Mod: HCNC,CPTII,95, | Performed by: PSYCHIATRY & NEUROLOGY

## 2021-11-18 PROCEDURE — 3044F HG A1C LEVEL LT 7.0%: CPT | Mod: HCNC,CPTII,95, | Performed by: PSYCHIATRY & NEUROLOGY

## 2021-11-18 PROCEDURE — 99499 RISK ADDL DX/OHS AUDIT: ICD-10-PCS | Mod: 95,,, | Performed by: PSYCHIATRY & NEUROLOGY

## 2021-11-18 PROCEDURE — 1160F PR REVIEW ALL MEDS BY PRESCRIBER/CLIN PHARMACIST DOCUMENTED: ICD-10-PCS | Mod: HCNC,CPTII,95, | Performed by: PSYCHIATRY & NEUROLOGY

## 2021-11-18 PROCEDURE — 1159F MED LIST DOCD IN RCRD: CPT | Mod: HCNC,CPTII,95, | Performed by: PSYCHIATRY & NEUROLOGY

## 2021-11-18 PROCEDURE — 1159F PR MEDICATION LIST DOCUMENTED IN MEDICAL RECORD: ICD-10-PCS | Mod: HCNC,CPTII,95, | Performed by: PSYCHIATRY & NEUROLOGY

## 2021-11-22 ENCOUNTER — OFFICE VISIT (OUTPATIENT)
Dept: FAMILY MEDICINE | Facility: CLINIC | Age: 51
End: 2021-11-22
Payer: MEDICARE

## 2021-11-22 VITALS
DIASTOLIC BLOOD PRESSURE: 66 MMHG | BODY MASS INDEX: 40.73 KG/M2 | RESPIRATION RATE: 18 BRPM | SYSTOLIC BLOOD PRESSURE: 104 MMHG | WEIGHT: 259.5 LBS | TEMPERATURE: 98 F | HEART RATE: 72 BPM | OXYGEN SATURATION: 97 % | HEIGHT: 67 IN

## 2021-11-22 DIAGNOSIS — R09.81 NASAL CONGESTION: ICD-10-CM

## 2021-11-22 DIAGNOSIS — J45.909 ASTHMA, UNSPECIFIED ASTHMA SEVERITY, UNSPECIFIED WHETHER COMPLICATED, UNSPECIFIED WHETHER PERSISTENT: Primary | ICD-10-CM

## 2021-11-22 PROCEDURE — 99214 PR OFFICE/OUTPT VISIT, EST, LEVL IV, 30-39 MIN: ICD-10-PCS | Mod: HCNC,S$GLB,, | Performed by: NURSE PRACTITIONER

## 2021-11-22 PROCEDURE — 99999 PR PBB SHADOW E&M-EST. PATIENT-LVL V: ICD-10-PCS | Mod: PBBFAC,HCNC,, | Performed by: NURSE PRACTITIONER

## 2021-11-22 PROCEDURE — 99999 PR PBB SHADOW E&M-EST. PATIENT-LVL V: CPT | Mod: PBBFAC,HCNC,, | Performed by: NURSE PRACTITIONER

## 2021-11-22 PROCEDURE — 99214 OFFICE O/P EST MOD 30 MIN: CPT | Mod: HCNC,S$GLB,, | Performed by: NURSE PRACTITIONER

## 2021-11-22 RX ORDER — FLUTICASONE PROPIONATE 50 MCG
1 SPRAY, SUSPENSION (ML) NASAL DAILY
Qty: 15.8 ML | Refills: 0 | Status: SHIPPED | OUTPATIENT
Start: 2021-11-22 | End: 2022-03-07

## 2021-11-23 ENCOUNTER — OFFICE VISIT (OUTPATIENT)
Dept: PULMONOLOGY | Facility: CLINIC | Age: 51
End: 2021-11-23
Payer: MEDICARE

## 2021-11-23 VITALS
SYSTOLIC BLOOD PRESSURE: 117 MMHG | WEIGHT: 257.81 LBS | OXYGEN SATURATION: 97 % | BODY MASS INDEX: 40.46 KG/M2 | HEART RATE: 75 BPM | HEIGHT: 67 IN | DIASTOLIC BLOOD PRESSURE: 78 MMHG

## 2021-11-23 DIAGNOSIS — R09.81 NASAL CONGESTION: ICD-10-CM

## 2021-11-23 DIAGNOSIS — G47.33 OSA (OBSTRUCTIVE SLEEP APNEA): ICD-10-CM

## 2021-11-23 DIAGNOSIS — J45.909 ASTHMA, UNSPECIFIED ASTHMA SEVERITY, UNSPECIFIED WHETHER COMPLICATED, UNSPECIFIED WHETHER PERSISTENT: ICD-10-CM

## 2021-11-23 DIAGNOSIS — J45.21 MILD INTERMITTENT ASTHMA WITH ACUTE EXACERBATION: ICD-10-CM

## 2021-11-23 DIAGNOSIS — E66.01 MORBID OBESITY WITH BMI OF 40.0-44.9, ADULT: ICD-10-CM

## 2021-11-23 DIAGNOSIS — G47.00 INSOMNIA, UNSPECIFIED TYPE: ICD-10-CM

## 2021-11-23 PROCEDURE — 99999 PR PBB SHADOW E&M-EST. PATIENT-LVL V: ICD-10-PCS | Mod: PBBFAC,HCNC,, | Performed by: INTERNAL MEDICINE

## 2021-11-23 PROCEDURE — 99215 PR OFFICE/OUTPT VISIT, EST, LEVL V, 40-54 MIN: ICD-10-PCS | Mod: HCNC,S$GLB,, | Performed by: INTERNAL MEDICINE

## 2021-11-23 PROCEDURE — 99215 OFFICE O/P EST HI 40 MIN: CPT | Mod: HCNC,S$GLB,, | Performed by: INTERNAL MEDICINE

## 2021-11-23 PROCEDURE — 99499 RISK ADDL DX/OHS AUDIT: ICD-10-PCS | Mod: HCNC,S$GLB,, | Performed by: INTERNAL MEDICINE

## 2021-11-23 PROCEDURE — 99999 PR PBB SHADOW E&M-EST. PATIENT-LVL V: CPT | Mod: PBBFAC,HCNC,, | Performed by: INTERNAL MEDICINE

## 2021-11-23 PROCEDURE — 99499 UNLISTED E&M SERVICE: CPT | Mod: HCNC,S$GLB,, | Performed by: INTERNAL MEDICINE

## 2021-11-23 RX ORDER — PREDNISONE 20 MG/1
40 TABLET ORAL DAILY
Qty: 10 TABLET | Refills: 0 | Status: SHIPPED | OUTPATIENT
Start: 2021-11-23 | End: 2021-11-28

## 2021-11-23 RX ORDER — PROMETHAZINE HYDROCHLORIDE 12.5 MG/1
TABLET ORAL
COMMUNITY
Start: 2021-09-17

## 2021-11-23 RX ORDER — COLCHICINE 0.6 MG/1
TABLET ORAL
COMMUNITY
Start: 2021-09-17 | End: 2022-03-21

## 2021-11-23 RX ORDER — FLUTICASONE PROPIONATE AND SALMETEROL 250; 50 UG/1; UG/1
1 POWDER RESPIRATORY (INHALATION) 2 TIMES DAILY
Qty: 60 EACH | Refills: 3 | Status: SHIPPED | OUTPATIENT
Start: 2021-11-23 | End: 2023-09-22

## 2021-11-29 ENCOUNTER — TELEPHONE (OUTPATIENT)
Dept: PULMONOLOGY | Facility: CLINIC | Age: 51
End: 2021-11-29
Payer: MEDICARE

## 2021-12-03 ENCOUNTER — PATIENT MESSAGE (OUTPATIENT)
Dept: FAMILY MEDICINE | Facility: CLINIC | Age: 51
End: 2021-12-03
Payer: MEDICARE

## 2021-12-03 DIAGNOSIS — Z12.31 ENCOUNTER FOR SCREENING MAMMOGRAM FOR MALIGNANT NEOPLASM OF BREAST: Primary | ICD-10-CM

## 2021-12-06 ENCOUNTER — HOSPITAL ENCOUNTER (OUTPATIENT)
Dept: SLEEP MEDICINE | Facility: HOSPITAL | Age: 51
Discharge: HOME | End: 2021-12-06
Attending: INTERNAL MEDICINE
Payer: MEDICARE

## 2021-12-06 ENCOUNTER — PATIENT MESSAGE (OUTPATIENT)
Dept: FAMILY MEDICINE | Facility: CLINIC | Age: 51
End: 2021-12-06
Payer: MEDICARE

## 2021-12-06 ENCOUNTER — OFFICE VISIT (OUTPATIENT)
Dept: PSYCHIATRY | Facility: CLINIC | Age: 51
End: 2021-12-06
Payer: MEDICARE

## 2021-12-06 DIAGNOSIS — G47.33 OSA (OBSTRUCTIVE SLEEP APNEA): ICD-10-CM

## 2021-12-06 DIAGNOSIS — F31.9 BIPOLAR 1 DISORDER: Primary | ICD-10-CM

## 2021-12-06 PROCEDURE — 95806 SLEEP STUDY UNATT&RESP EFFT: CPT | Mod: 26,52,HCNC, | Performed by: INTERNAL MEDICINE

## 2021-12-06 PROCEDURE — 90834 PSYTX W PT 45 MINUTES: CPT | Mod: HCNC,95,, | Performed by: SOCIAL WORKER

## 2021-12-06 PROCEDURE — 90834 PR PSYCHOTHERAPY W/PATIENT, 45 MIN: ICD-10-PCS | Mod: HCNC,95,, | Performed by: SOCIAL WORKER

## 2021-12-06 PROCEDURE — 95800 SLP STDY UNATTENDED: CPT | Mod: HCNC

## 2021-12-06 PROCEDURE — 95806 PR SLEEP STUDY, UNATTENDED, SIMUL RECORD HR/O2 SAT/RESP FLOW/RESP EFFT: ICD-10-PCS | Mod: 26,52,HCNC, | Performed by: INTERNAL MEDICINE

## 2021-12-09 ENCOUNTER — TELEPHONE (OUTPATIENT)
Dept: PULMONOLOGY | Facility: CLINIC | Age: 51
End: 2021-12-09
Payer: MEDICARE

## 2021-12-09 DIAGNOSIS — G47.33 OSA (OBSTRUCTIVE SLEEP APNEA): Primary | ICD-10-CM

## 2021-12-20 ENCOUNTER — OFFICE VISIT (OUTPATIENT)
Dept: PSYCHIATRY | Facility: CLINIC | Age: 51
End: 2021-12-20
Payer: MEDICARE

## 2021-12-20 DIAGNOSIS — F32.A DEPRESSION, UNSPECIFIED DEPRESSION TYPE: Primary | ICD-10-CM

## 2021-12-20 DIAGNOSIS — F41.9 ANXIETY: ICD-10-CM

## 2021-12-20 PROCEDURE — 90832 PR PSYCHOTHERAPY W/PATIENT, 30 MIN: ICD-10-PCS | Mod: HCNC,95,, | Performed by: SOCIAL WORKER

## 2021-12-20 PROCEDURE — 90832 PSYTX W PT 30 MINUTES: CPT | Mod: HCNC,95,, | Performed by: SOCIAL WORKER

## 2021-12-21 ENCOUNTER — HOSPITAL ENCOUNTER (OUTPATIENT)
Dept: RADIOLOGY | Facility: HOSPITAL | Age: 51
Discharge: HOME | End: 2021-12-21
Attending: FAMILY MEDICINE
Payer: MEDICARE

## 2021-12-21 DIAGNOSIS — Z12.31 ENCOUNTER FOR SCREENING MAMMOGRAM FOR MALIGNANT NEOPLASM OF BREAST: ICD-10-CM

## 2021-12-21 PROCEDURE — 77067 SCR MAMMO BI INCL CAD: CPT | Mod: TC,HCNC

## 2021-12-21 PROCEDURE — 77067 MAMMO DIGITAL SCREENING BILAT WITH TOMO: ICD-10-PCS | Mod: 26,HCNC,, | Performed by: RADIOLOGY

## 2021-12-21 PROCEDURE — 77067 SCR MAMMO BI INCL CAD: CPT | Mod: 26,HCNC,, | Performed by: RADIOLOGY

## 2021-12-21 PROCEDURE — 77063 MAMMO DIGITAL SCREENING BILAT WITH TOMO: ICD-10-PCS | Mod: 26,HCNC,, | Performed by: RADIOLOGY

## 2021-12-21 PROCEDURE — 77063 BREAST TOMOSYNTHESIS BI: CPT | Mod: 26,HCNC,, | Performed by: RADIOLOGY

## 2021-12-26 ENCOUNTER — PATIENT MESSAGE (OUTPATIENT)
Dept: ADMINISTRATIVE | Facility: OTHER | Age: 51
End: 2021-12-26
Payer: MEDICARE

## 2021-12-26 ENCOUNTER — HOSPITAL ENCOUNTER (EMERGENCY)
Facility: HOSPITAL | Age: 51
Discharge: HOME | End: 2021-12-26
Attending: EMERGENCY MEDICINE
Payer: MEDICARE

## 2021-12-26 VITALS
SYSTOLIC BLOOD PRESSURE: 107 MMHG | RESPIRATION RATE: 16 BRPM | OXYGEN SATURATION: 96 % | BODY MASS INDEX: 40.02 KG/M2 | HEART RATE: 93 BPM | DIASTOLIC BLOOD PRESSURE: 58 MMHG | HEIGHT: 67 IN | WEIGHT: 255 LBS | TEMPERATURE: 99 F

## 2021-12-26 DIAGNOSIS — J06.9 VIRAL URI WITH COUGH: Primary | ICD-10-CM

## 2021-12-26 LAB
CTP QC/QA: YES
CTP QC/QA: YES
POC MOLECULAR INFLUENZA A AGN: NEGATIVE
POC MOLECULAR INFLUENZA B AGN: NEGATIVE
SARS-COV-2 RDRP RESP QL NAA+PROBE: NEGATIVE

## 2021-12-26 PROCEDURE — U0002 COVID-19 LAB TEST NON-CDC: HCPCS | Mod: HCNC | Performed by: EMERGENCY MEDICINE

## 2021-12-26 PROCEDURE — 99284 EMERGENCY DEPT VISIT MOD MDM: CPT | Mod: 25,HCNC

## 2021-12-26 PROCEDURE — 87502 INFLUENZA DNA AMP PROBE: CPT | Mod: HCNC

## 2021-12-26 RX ORDER — BENZONATATE 100 MG/1
200 CAPSULE ORAL 3 TIMES DAILY PRN
Qty: 20 CAPSULE | Refills: 0 | Status: SHIPPED | OUTPATIENT
Start: 2021-12-26 | End: 2022-01-05

## 2021-12-26 RX ORDER — LORATADINE 10 MG/1
10 TABLET ORAL DAILY
Qty: 60 TABLET | Refills: 0 | Status: SHIPPED | OUTPATIENT
Start: 2021-12-26 | End: 2022-04-25

## 2021-12-26 NOTE — ED PROVIDER NOTES
Encounter Date: 12/26/2021    SCRIBE #1 NOTE: I, Ani Grant, am scribing for, and in the presence of,  Dana Farrar PA-C. I have scribed the following portions of the note - Other sections scribed: HPI,ROS,PE.       History     Chief Complaint   Patient presents with    Cough     Patient c/o cough, headache, bodyaches, sore throat and nasal congestion that started yesterday.      CC: Cough    HPI: This is a 51 y.o.female patient, with a PMHx of Asthma, presenting to the ED for further evaluation of a productive cough that began yesterday morning. Patient states she saw streaks of blood in her cough. Patient reports associated chills, fever, generalized body aches, sore throat, and rhinorrhea. Patient states she vapes with nicotine. Denies Hx of COPD. No known Covid-19 contacts. Patient denies any shortness of breath, or any other associated symptoms. No prior Tx. Allergic to Hydrocodone.    The history is provided by the patient. No  was used.     Review of patient's allergies indicates:   Allergen Reactions    Hydrocodone-acetaminophen Nausea And Vomiting     Past Medical History:   Diagnosis Date    Anxiety     Arthritis     Asthma     Back pain     Bipolar disorder     BMI 50.0-59.9, adult     Chronic bronchitis     Depression     Fibromyalgia     GERD (gastroesophageal reflux disease)     Insomnia 11/23/2021    Obesity     SHARON (obstructive sleep apnea)     Respiratory failure     Tobacco dependence      Past Surgical History:   Procedure Laterality Date    CARPAL TUNNEL RELEASE      CHOLECYSTECTOMY      ESOPHAGOGASTRODUODENOSCOPY N/A 5/21/2019    Procedure: EGD (ESOPHAGOGASTRODUODENOSCOPY);  Surgeon: Michelle Mc MD;  [facility];  Service: Endoscopy;  Laterality: N/A;    HYSTERECTOMY      partial     KNEE SURGERY      LAPAROSCOPIC SLEEVE GASTRECTOMY N/A 10/18/2019    Procedure: GASTRECTOMY, SLEEVE, LAPAROSCOPIC, with intraop EGD;  Surgeon: Ean  VERÓNICA Kohli MD;  Location: [facility] OR [address] [place] [address];  Service: General;  Laterality: N/A;    pinched nerve      SHOULDER SURGERY      TONSILLECTOMY      WRIST SURGERY      had ganlin cyst     Family History   Problem Relation Age of Onset    Diabetes Mother     Charlton's disease Mother     Hypertension Mother     Heart disease Father     Stroke Father     Mental illness Father     Hypertension Father     Diabetes Father     Depression Father     Heart disease Maternal Grandmother     Depression Maternal Grandmother     COPD Maternal Grandfather     Heart disease Maternal Grandfather     Stroke Maternal Grandfather     Kidney disease Paternal Grandmother     Heart disease Paternal Grandmother     Heart disease Paternal Grandfather     No Known Problems Brother     Hypertension Son      Social History     Tobacco Use    Smoking status: Current Some Day Smoker     Types: Vaping with nicotine    Smokeless tobacco: Never Used    Tobacco comment: vaping started  April 2020    Substance Use Topics    Alcohol use: No    Drug use: No     Review of Systems   Constitutional: Positive for chills and fever.   HENT: Positive for congestion, rhinorrhea and sore throat.    Respiratory: Positive for cough.    Cardiovascular: Negative for chest pain.   Gastrointestinal: Negative for abdominal pain, diarrhea, nausea and vomiting.   Musculoskeletal: Positive for myalgias.   Allergic/Immunologic: Negative for immunocompromised state.   Hematological: Does not bruise/bleed easily.   All other systems reviewed and are negative.      Physical Exam     Initial Vitals [12/26/21 0756]   BP Pulse Resp Temp SpO2   (!) 107/58 93 16 99.2 °F (37.3 °C) 96 %      MAP       --         Physical Exam    Nursing note and vitals reviewed.  Constitutional: She appears well-developed and well-nourished. She is not diaphoretic. No distress.   HENT:   Head: Normocephalic and atraumatic.   Right Ear: Tympanic membrane normal.   Left Ear:  Tympanic membrane normal.   Nose: Rhinorrhea present.   Mouth/Throat: Uvula is midline, oropharynx is clear and moist and mucous membranes are normal.   Eyes: Conjunctivae and EOM are normal.   Neck: Neck supple.   Cardiovascular: Normal rate, regular rhythm and normal heart sounds. Exam reveals no gallop and no friction rub.    No murmur heard.  Pulmonary/Chest: Breath sounds normal. No respiratory distress. She has no wheezes. She has no rhonchi. She has no rales.   Musculoskeletal:         General: Normal range of motion.      Cervical back: Neck supple.     Neurological: She is alert and oriented to person, place, and time.   Skin: Skin is warm and dry. Capillary refill takes less than 2 seconds.   Psychiatric: She has a normal mood and affect.         ED Course   Procedures  Labs Reviewed   SARS-COV-2 RDRP GENE   POCT INFLUENZA A/B MOLECULAR          Imaging Results    None          Medications - No data to display        APC / Resident Notes:   Patient in the ER promptly upon arrival.  She is afebrile, no acute distress.  Physical examination unremarkable.  Mild rhinorrhea on exam.   Heart and lung sounds are normal.  Symptoms secondary to viral etiology.COVID and influenza swabs are negative.    Will prescribed home on cough medication use as directed.  Advised to take Tylenol for body aches and fever.  Patient advised to stay hydrated at home.  Advised to stop smoking as this is likely exacerbating her symptoms.  Given strict return precautions ED.  Stable for discharge and close follow-up this time.    Disclaimer: This note has been generated using voice-recognition software. There may be typographical errors that have been missed during proof-reading.       Scribe Attestation:   Scribe #1: I performed the above scribed service and the documentation accurately describes the services I performed. I attest to the accuracy of the note.                 Clinical Impression:   Final diagnoses:  [J06.9] Viral URI  with cough (Primary)          ED Disposition Condition    Discharge Stable       Scribe attestation: I, Dana Farrar PA-C, personally performed the services described in this documentation. All medical record entries made by the scribe were at my direction and in my presence.  I have reviewed the chart and agree that the record reflects my personal performance and is accurate and complete.  ED Prescriptions     Medication Sig Dispense Start Date End Date Auth. Provider    benzonatate (TESSALON) 100 MG capsule Take 2 capsules (200 mg total) by mouth 3 (three) times daily as needed for Cough. 20 capsule 12/26/2021 1/5/2022 Dana Farrar PA-C    loratadine (CLARITIN) 10 mg tablet Take 1 tablet (10 mg total) by mouth once daily. for 20 days 60 tablet 12/26/2021 1/15/2022 Dana Farrar PA-C        Follow-up Information     Follow up With Specialties Details Why Contact Info    Cyril Saucedo MD Family Medicine, Wound Care   [address] [place] [id number]  [phone]             Dana Farrar PA-C  12/26/21 [id number]

## 2021-12-26 NOTE — ED TRIAGE NOTES
Pt. Complains of a headache, body aches, and coughing up dark brown sputum which started on yesterday. Pt. Rates her pain at a 10/10 on the pain scale.

## 2022-01-10 ENCOUNTER — PATIENT MESSAGE (OUTPATIENT)
Dept: PSYCHIATRY | Facility: CLINIC | Age: 52
End: 2022-01-10
Payer: MEDICARE

## 2022-01-18 ENCOUNTER — OFFICE VISIT (OUTPATIENT)
Dept: PSYCHIATRY | Facility: CLINIC | Age: 52
End: 2022-01-18
Payer: MEDICARE

## 2022-01-18 DIAGNOSIS — F32.A DEPRESSION, UNSPECIFIED DEPRESSION TYPE: Primary | ICD-10-CM

## 2022-01-18 DIAGNOSIS — F41.9 ANXIETY: ICD-10-CM

## 2022-01-18 PROCEDURE — 1159F MED LIST DOCD IN RCRD: CPT | Mod: HCNC,CPTII,95, | Performed by: SOCIAL WORKER

## 2022-01-18 PROCEDURE — 90832 PSYTX W PT 30 MINUTES: CPT | Mod: HCNC,95,, | Performed by: SOCIAL WORKER

## 2022-01-18 PROCEDURE — 1159F PR MEDICATION LIST DOCUMENTED IN MEDICAL RECORD: ICD-10-PCS | Mod: HCNC,CPTII,95, | Performed by: SOCIAL WORKER

## 2022-01-18 PROCEDURE — 90832 PR PSYCHOTHERAPY W/PATIENT, 30 MIN: ICD-10-PCS | Mod: HCNC,95,, | Performed by: SOCIAL WORKER

## 2022-01-18 NOTE — PROGRESS NOTES
Individual Psychotherapy (PhD/LCSW)    1/18/2022    Site:  [facility]         Therapeutic Intervention: Met with patient.  Outpatient - Insight oriented psychotherapy 30 min - CPT code 76804    Chief complaint/reason for encounter: depression, mood swings, anxiety, sleep, appetite, behavior, somatic and interpersonal     Interval history and content of current session: discussed she was not feeling well today, had an upset stomach so we only met for 30 minutes today. Had a trip to go see her parents and tried to talk with them and she felt like they did not understand what she was telling them and it was hard to go and visit them given memories, anxiety, and other triggers, and yet she did, was alone, and had a hard time on this trip. Gave her credit for doing a brave thing likes that, and got back safely, taking care of herself, getting support, getting help and see MD if she does not feel better to encouraged. Family, and health addressed, and some of the issues with her parents also focused on. More work is needed.    Treatment plan:  · Target symptoms: depression, recurrent depression, anxiety , mood swings, mood disorder, adjustment, grief, work stress  · Why chosen therapy is appropriate versus another modality: relevant to diagnosis, patient responds to this modality  · Outcome monitoring methods: self-report, observation  · Therapeutic intervention type: insight oriented psychotherapy, behavior modifying psychotherapy, supportive psychotherapy    Risk parameters:  Patient reports no suicidal ideation  Patient reports no homicidal ideation  Patient reports no self-injurious behavior  Patient reports no violent behavior    Verbal deficits: None    Patient's response to intervention:  The patient's response to intervention is accepting, guarded.    Progress toward goals and other mental status changes:  The patient's progress toward goals is limited.    Diagnosis:     ICD-10-CM ICD-9-CM   1. Depression,  unspecified depression type  F32.A 311   2. Anxiety  F41.9 300.00       Plan:  individual psychotherapy, group psychotherapy, family psychotherapy and consult psychiatrist for medication evaluation    Return to clinic: 2 weeks    Length of Service (minutes): 30   The patient location is: [place], [place].  The chief complaint leading to consultation is: anxiety, does not feel well medically, family, visit with her parents.    Visit type: audiovisual    Face to Face time with patient: 30 minutes   minutes of total time spent on the encounter, which includes face to face time and non-face to face time preparing to see the patient (eg, review of tests), Obtaining and/or reviewing separately obtained history, Documenting clinical information in the electronic or other health record, Independently interpreting results (not separately reported) and communicating results to the patient/family/caregiver, or Care coordination (not separately reported).         Each patient to whom he or she provides medical services by telemedicine is:  (1) informed of the relationship between the physician and patient and the respective role of any other health care provider with respect to management of the patient; and (2) notified that he or she may decline to receive medical services by telemedicine and may withdraw from such care at any time.    Notes: more work is needed.

## 2022-02-11 RX ORDER — LORAZEPAM 0.5 MG/1
TABLET ORAL
Qty: 60 TABLET | Refills: 0 | Status: SHIPPED | OUTPATIENT
Start: 2022-02-11 | End: 2022-03-16 | Stop reason: SDUPTHER

## 2022-03-07 ENCOUNTER — PATIENT MESSAGE (OUTPATIENT)
Dept: BARIATRICS | Facility: CLINIC | Age: 52
End: 2022-03-07
Payer: MEDICARE

## 2022-03-07 ENCOUNTER — OFFICE VISIT (OUTPATIENT)
Dept: FAMILY MEDICINE | Facility: CLINIC | Age: 52
End: 2022-03-07
Payer: MEDICARE

## 2022-03-07 VITALS
HEART RATE: 75 BPM | RESPIRATION RATE: 18 BRPM | BODY MASS INDEX: 40.66 KG/M2 | WEIGHT: 259.06 LBS | OXYGEN SATURATION: 97 % | DIASTOLIC BLOOD PRESSURE: 74 MMHG | SYSTOLIC BLOOD PRESSURE: 118 MMHG | HEIGHT: 67 IN

## 2022-03-07 DIAGNOSIS — J30.9 ALLERGIC RHINITIS, UNSPECIFIED SEASONALITY, UNSPECIFIED TRIGGER: ICD-10-CM

## 2022-03-07 DIAGNOSIS — M79.7 FIBROMYALGIA: Primary | ICD-10-CM

## 2022-03-07 PROCEDURE — 1160F RVW MEDS BY RX/DR IN RCRD: CPT | Mod: CPTII,S$GLB,, | Performed by: NURSE PRACTITIONER

## 2022-03-07 PROCEDURE — 99214 PR OFFICE/OUTPT VISIT, EST, LEVL IV, 30-39 MIN: ICD-10-PCS | Mod: S$GLB,,, | Performed by: NURSE PRACTITIONER

## 2022-03-07 PROCEDURE — 99214 OFFICE O/P EST MOD 30 MIN: CPT | Mod: S$GLB,,, | Performed by: NURSE PRACTITIONER

## 2022-03-07 PROCEDURE — 99499 RISK ADDL DX/OHS AUDIT: ICD-10-PCS | Mod: HCNC,S$GLB,, | Performed by: NURSE PRACTITIONER

## 2022-03-07 PROCEDURE — 1159F PR MEDICATION LIST DOCUMENTED IN MEDICAL RECORD: ICD-10-PCS | Mod: CPTII,S$GLB,, | Performed by: NURSE PRACTITIONER

## 2022-03-07 PROCEDURE — 3008F PR BODY MASS INDEX (BMI) DOCUMENTED: ICD-10-PCS | Mod: CPTII,S$GLB,, | Performed by: NURSE PRACTITIONER

## 2022-03-07 PROCEDURE — 3078F DIAST BP <80 MM HG: CPT | Mod: CPTII,S$GLB,, | Performed by: NURSE PRACTITIONER

## 2022-03-07 PROCEDURE — 3008F BODY MASS INDEX DOCD: CPT | Mod: CPTII,S$GLB,, | Performed by: NURSE PRACTITIONER

## 2022-03-07 PROCEDURE — 99999 PR PBB SHADOW E&M-EST. PATIENT-LVL V: ICD-10-PCS | Mod: PBBFAC,,, | Performed by: NURSE PRACTITIONER

## 2022-03-07 PROCEDURE — 99499 UNLISTED E&M SERVICE: CPT | Mod: HCNC,S$GLB,, | Performed by: NURSE PRACTITIONER

## 2022-03-07 PROCEDURE — 1159F MED LIST DOCD IN RCRD: CPT | Mod: CPTII,S$GLB,, | Performed by: NURSE PRACTITIONER

## 2022-03-07 PROCEDURE — 3074F PR MOST RECENT SYSTOLIC BLOOD PRESSURE < 130 MM HG: ICD-10-PCS | Mod: CPTII,S$GLB,, | Performed by: NURSE PRACTITIONER

## 2022-03-07 PROCEDURE — 99999 PR PBB SHADOW E&M-EST. PATIENT-LVL V: CPT | Mod: PBBFAC,,, | Performed by: NURSE PRACTITIONER

## 2022-03-07 PROCEDURE — 3074F SYST BP LT 130 MM HG: CPT | Mod: CPTII,S$GLB,, | Performed by: NURSE PRACTITIONER

## 2022-03-07 PROCEDURE — 3078F PR MOST RECENT DIASTOLIC BLOOD PRESSURE < 80 MM HG: ICD-10-PCS | Mod: CPTII,S$GLB,, | Performed by: NURSE PRACTITIONER

## 2022-03-07 PROCEDURE — 1160F PR REVIEW ALL MEDS BY PRESCRIBER/CLIN PHARMACIST DOCUMENTED: ICD-10-PCS | Mod: CPTII,S$GLB,, | Performed by: NURSE PRACTITIONER

## 2022-03-07 RX ORDER — FLUTICASONE PROPIONATE 50 MCG
2 SPRAY, SUSPENSION (ML) NASAL DAILY
Qty: 15.8 ML | Refills: 1 | Status: SHIPPED | OUTPATIENT
Start: 2022-03-07 | End: 2022-06-28 | Stop reason: SDUPTHER

## 2022-03-07 RX ORDER — GABAPENTIN 300 MG/1
CAPSULE ORAL
Qty: 120 CAPSULE | Refills: 0 | Status: SHIPPED | OUTPATIENT
Start: 2022-03-07 | End: 2022-05-03

## 2022-03-07 RX ORDER — ONDANSETRON 8 MG/1
8 TABLET, ORALLY DISINTEGRATING ORAL 2 TIMES DAILY PRN
Qty: 30 TABLET | Refills: 1 | Status: SHIPPED | OUTPATIENT
Start: 2022-03-07 | End: 2023-07-06 | Stop reason: SDUPTHER

## 2022-03-07 NOTE — PROGRESS NOTES
Answers for HPI/ROS submitted by the patient on 3/6/2022  activity change: No  unexpected weight change: No  neck pain: Yes  hearing loss: No  rhinorrhea: Yes  trouble swallowing: No  eye discharge: No  visual disturbance: No  chest tightness: No  wheezing: No  chest pain: No  palpitations: No  blood in stool: No  constipation: Yes  vomiting: No  diarrhea: No  polydipsia: No  polyuria: No  difficulty urinating: No  hematuria: No  menstrual problem: No  dysuria: No  joint swelling: Yes  arthralgias: Yes  headaches: Yes  weakness: Yes  confusion: No  dysphoric mood: Yes    Routine Office Visit    Patient Name: Zaira Dowell    : 1970  MRN: [id number]    Chief Complaint:  Otalgia, fibromyalgia    Subjective:  Zaira is a 51 y.o. female who presents today for:    1. Otalgia, fibromyalgia - patient who is known to me reports today for evaluation.  She states 4 days ago she developed right and left ear pain both of which are improved now.  She has an occasional runny nose but denies any other significant ENT symptoms.    She has a history of fibromyalgia and reports her fibromyalgia pain has worsened lately.  She takes the 800 mg gabapentin nightly which does help, however she states that she used to be on 800 mg nightly and 300 mg during the day which use to help for her muscles even more.  She would like to be started back on this increased dose.  She denies any significant drowsiness with the medication.  She would also like to be set up with a rheumatologist to discuss her fibromyalgia.    She is requesting a handicap parking sticker due to her muscle and joint issues.    Past Medical History  Past Medical History:   Diagnosis Date    Anxiety     Arthritis     Asthma     Back pain     Bipolar disorder     BMI 50.0-59.9, adult     Chronic bronchitis     Depression     Fibromyalgia     GERD (gastroesophageal reflux disease)     Insomnia 2021    Obesity     SHARON (obstructive sleep apnea)      Respiratory failure     Tobacco dependence        Past Surgical History  Past Surgical History:   Procedure Laterality Date    CARPAL TUNNEL RELEASE      CHOLECYSTECTOMY      ESOPHAGOGASTRODUODENOSCOPY N/A 5/21/2019    Procedure: EGD (ESOPHAGOGASTRODUODENOSCOPY);  Surgeon: Michelle Mc MD;  Location: [facility];  Service: Endoscopy;  Laterality: N/A;    HYSTERECTOMY      partial     KNEE SURGERY      LAPAROSCOPIC SLEEVE GASTRECTOMY N/A 10/18/2019    Procedure: GASTRECTOMY, SLEEVE, LAPAROSCOPIC, with intraop EGD;  Surgeon: Ean Kohli MD;  Location: [address];  Service: General;  Laterality: N/A;    pinched nerve      SHOULDER SURGERY      TONSILLECTOMY      WRIST SURGERY      had ganlin cyst       Family History  Family History   Problem Relation Age of Onset    Diabetes Mother     [place]'s disease Mother     Hypertension Mother     Heart disease Father     Stroke Father     Mental illness Father     Hypertension Father     Diabetes Father     Depression Father     Heart disease Maternal Grandmother     Depression Maternal Grandmother     COPD Maternal Grandfather     Heart disease Maternal Grandfather     Stroke Maternal Grandfather     Kidney disease Paternal Grandmother     Heart disease Paternal Grandmother     Heart disease Paternal Grandfather     No Known Problems Brother     Hypertension Son        Social History  Social History     Socioeconomic History    Marital status:    Tobacco Use    Smoking status: Current Some Day Smoker     Types: Vaping with nicotine    Smokeless tobacco: Never Used    Tobacco comment: vaping started  April 2020    Substance and Sexual Activity    Alcohol use: No    Drug use: No    Sexual activity: Yes     Partners: Male     Social Determinants of Health     Financial Resource Strain: Low Risk     Difficulty of Paying Living Expenses: Not very hard   Food Insecurity: No Food Insecurity    Worried About Running Out  of Food in the Last Year: Never true    Ran Out of Food in the Last Year: Never true   Transportation Needs: No Transportation Needs    Lack of Transportation (Medical): No    Lack of Transportation (Non-Medical): No   Physical Activity: Insufficiently Active    Days of Exercise per Week: 3 days    Minutes of Exercise per Session: 40 min   Stress: Stress Concern Present    Feeling of Stress : Very much   Social Connections: Unknown    Frequency of Communication with Friends and Family: More than three times a week    Frequency of Social Gatherings with Friends and Family: Never    Active Member of Clubs or Organizations: No    Attends Club or Organization Meetings: Never    Marital Status:    Housing Stability: Low Risk     Unable to Pay for Housing in the Last Year: No    Number of Places Lived in the Last Year: 1    Unstable Housing in the Last Year: No       Current Medications  Current Outpatient Medications on File Prior to Visit   Medication Sig Dispense Refill    albuterol (PROVENTIL/VENTOLIN HFA) 90 mcg/actuation inhaler Inhale 2 puffs into the lungs every 6 (six) hours as needed for Wheezing. Rescue 18 g 2    b complex vitamins tablet Take 1 tablet by mouth once daily.      buPROPion (WELLBUTRIN SR) 100 MG TBSR 12 hr tablet Take 2 tabs q am, and 1 tab daily at noon 270 tablet 3    calcium citrate (CALCITRATE) 200 mg (950 mg) tablet Take 1 tablet by mouth 2 (two) times daily.      cyanocobalamin 500 MCG tablet Take 500 mcg by mouth once daily.      ferrous fumarate/vit Bcomp,C (SUPER B COMPLEX ORAL) Take 1 capsule by mouth once daily.      fluoxetine HCl (FLUOXETINE ORAL) fluoxetine Take No date recorded No form recorded No frequency recorded No route recorded No set duration recorded No set duration amount recorded active No dosage strength recorded No dosage strength units of measure recorded      fluticasone-salmeterol diskus inhaler 250-50 mcg Inhale 1 puff into the lungs 2  (two) times daily. 60 each 3    furosemide (LASIX) 40 MG tablet TAKE 1 TABLET EVERY DAY 90 tablet 1    LORazepam (ATIVAN) 0.5 MG tablet Take total of 2 tabs daily as directed. 60 tablet 0    meclizine (ANTIVERT) 25 mg tablet Take 1 tablet (25 mg total) by mouth 3 (three) times daily as needed for Dizziness or Nausea. 30 tablet 0    multivitamin capsule Take 1 capsule by mouth 2 (two) times daily.       omeprazole (PRILOSEC) 40 MG capsule TAKE 1 CAPSULE TWICE DAILY BEFORE MEALS 180 capsule 3    potassium chloride (KLOR-CON) 10 MEQ TbSR Take 10 mEq by mouth 2 (two) times daily.      promethazine (PHENERGAN) 12.5 MG Tab promethazine Take half tablet (oral) 2 times per day PRN - Nausea . Medication may cause drowsiness. [id number] tablet 2 times per day oral No set duration recorded No set duration amount recorded active 12.5 mg      risperiDONE (RISPERDAL) 0.5 MG Tab Take 1 tablet (0.5 mg total) by mouth 2 (two) times daily. 180 tablet 1    tolterodine (DETROL LA) 4 MG 24 hr capsule Take 1 capsule (4 mg total) by mouth once daily. 30 capsule 11    topiramate (TOPAMAX) 200 MG Tab TAKE 1 TABLET EVERY DAY 90 tablet 1    colchicine (COLCRYS) 0.6 mg tablet colchicine Take 2 tablet (oral) PRN - Pain .Take 2 tablets then take 1 tablet an hour later. May repeat in 3 days prn pain [id number] tablet No frequency recorded oral No set duration recorded No set duration amount recorded active 0.6 mg      lithium (ESKALITH) 300 MG capsule Take 1 capsule (300 mg total) by mouth 3 (three) times daily with meals. 270 capsule 3    loratadine (CLARITIN) 10 mg tablet Take 1 tablet (10 mg total) by mouth once daily. for 20 days 60 tablet 0    LORazepam (ATIVAN) 0.5 MG tablet TAKE 2 TABLETS BY MOUTH ONCE DAILY AS DIRECTED 60 tablet 0    sertraline (ZOLOFT) 100 MG tablet Take 2 tablets (200 mg total) by mouth once daily. 180 tablet 2     No current facility-administered medications on file prior to visit.       Allergies   Review  "of patient's allergies indicates:   Allergen Reactions    Hydrocodone-acetaminophen Nausea And Vomiting       Review of Systems (Pertinent positives)  Review of Systems   Constitutional: Negative.  Negative for chills and fever.   HENT: Positive for ear pain. Negative for congestion, ear discharge, hearing loss, nosebleeds and tinnitus.         +rhinorrhea   Eyes: Negative.  Negative for discharge.   Respiratory: Negative.  Negative for wheezing.    Cardiovascular: Negative.  Negative for chest pain and palpitations.   Gastrointestinal: Positive for constipation and nausea. Negative for abdominal pain, blood in stool, diarrhea, heartburn, melena and vomiting.   Genitourinary: Negative.  Negative for dysuria and hematuria.   Musculoskeletal: Positive for joint pain and neck pain. Negative for falls.   Skin: Negative.    Neurological: Positive for weakness and headaches.   Endo/Heme/Allergies: Negative.  Negative for polydipsia.   Psychiatric/Behavioral: Negative.        /74 (BP Location: Right arm, Patient Position: Sitting, BP Method: Large (Manual))   Pulse 75   Resp 18   Ht 5' 7" (1.702 m)   Wt 117.5 kg (259 lb 0.7 oz)   LMP  (LMP Unknown)   SpO2 97%   BMI 40.57 kg/m²     Physical Exam  Vitals reviewed.   Constitutional:       General: She is not in acute distress.     Appearance: Normal appearance. She is not ill-appearing, toxic-appearing or diaphoretic.   HENT:      Head: Normocephalic and atraumatic.      Right Ear: Tympanic membrane, ear canal and external ear normal.      Left Ear: Tympanic membrane, ear canal and external ear normal.      Nose: Mucosal edema present. No congestion or rhinorrhea.      Right Turbinates: Pale.      Left Turbinates: Pale.      Right Sinus: No maxillary sinus tenderness or frontal sinus tenderness.      Left Sinus: No maxillary sinus tenderness or frontal sinus tenderness.      Mouth/Throat:      Lips: Pink.      Mouth: Mucous membranes are moist.      Pharynx: " Oropharynx is clear. Uvula midline.   Eyes:      Extraocular Movements: Extraocular movements intact.      Conjunctiva/sclera: Conjunctivae normal.   Cardiovascular:      Rate and Rhythm: Normal rate and regular rhythm.      Pulses: Normal pulses.      Heart sounds: Normal heart sounds.   Pulmonary:      Effort: Pulmonary effort is normal.      Breath sounds: Normal breath sounds.   Musculoskeletal:         General: No swelling or tenderness. Normal range of motion.   Skin:     General: Skin is warm and dry.      Capillary Refill: Capillary refill takes less than 2 seconds.   Neurological:      General: No focal deficit present.      Mental Status: She is alert and oriented to person, place, and time.          Assessment/Plan:  Zaira Dowell is a 51 y.o. female who presents today for :    Diagnoses and all orders for this visit:    Fibromyalgia  -     ondansetron (ZOFRAN-ODT) 8 MG TbDL; Take 1 tablet (8 mg total) by mouth 2 (two) times daily as needed (nausea).  -     Ambulatory referral/consult to Rheumatology; Future  -     gabapentin (NEURONTIN) 300 MG capsule; Take one capsule (300 mg) by mouth in the morning; take three capsules (900 mg total) by mouth nightly    Will simplify regimen with 300 mg capsules.  Recommended patient take 300 mg capsule in the morning and 900 mg total (3 capsules) nightly for muscle pain due to fibromyalgia.  Discussed with patient potential side effects of drowsiness/sleepiness while taking this medication.  Will refer to Rheumatology for further evaluation.  Zofran refilled for patient.  Handicap placard filled out.    Allergic rhinitis, unspecified seasonality, unspecified trigger  -     fluticasone propionate (FLONASE) 50 mcg/actuation nasal spray; 2 sprays (100 mcg total) by Each Nostril route once daily.    No signs of bacterial nidus on examination.  Likely due to allergies.  Treat with Flonase.    Patient would like to establish care with a new PCP so will set patient up  with a PCP appointment.        This office note has been dictated.  This dictation has been generated using M-Modal Fluency Direct dictation; some phonetic errors may occur.   My collaborating physician is Dr. Tobias Lundy.

## 2022-03-08 NOTE — PROGRESS NOTES
Patient, Zaira Dowell (MRN #[id number]), presented with a recorded BMI of 40.57 kg/m^2 consistent with the definition of morbid obesity (ICD-10 E66.01). The patient's morbid obesity was monitored, evaluated, addressed and/or treated. This addendum to the medical record is made on 03/08/2022.

## 2022-03-16 RX ORDER — SERTRALINE HYDROCHLORIDE 100 MG/1
200 TABLET, FILM COATED ORAL DAILY
Qty: 180 TABLET | Refills: 2 | Status: SHIPPED | OUTPATIENT
Start: 2022-03-16 | End: 2022-10-06 | Stop reason: SDUPTHER

## 2022-03-16 RX ORDER — LORAZEPAM 0.5 MG/1
TABLET ORAL
Qty: 60 TABLET | Refills: 0 | Status: SHIPPED | OUTPATIENT
Start: 2022-03-16 | End: 2022-04-14 | Stop reason: SDUPTHER

## 2022-03-16 RX ORDER — BUPROPION HYDROCHLORIDE 100 MG/1
TABLET, EXTENDED RELEASE ORAL
Qty: 270 TABLET | Refills: 3 | Status: SHIPPED | OUTPATIENT
Start: 2022-03-16 | End: 2022-10-06 | Stop reason: SDUPTHER

## 2022-03-16 RX ORDER — RISPERIDONE 0.5 MG/1
0.5 TABLET ORAL 2 TIMES DAILY
Qty: 180 TABLET | Refills: 1 | Status: SHIPPED | OUTPATIENT
Start: 2022-03-16 | End: 2022-06-29 | Stop reason: SDUPTHER

## 2022-03-16 RX ORDER — LITHIUM CARBONATE 300 MG/1
300 CAPSULE ORAL
Qty: 270 CAPSULE | Refills: 3 | Status: SHIPPED | OUTPATIENT
Start: 2022-03-16 | End: 2022-10-06 | Stop reason: SDUPTHER

## 2022-03-18 ENCOUNTER — PATIENT MESSAGE (OUTPATIENT)
Dept: ADMINISTRATIVE | Facility: HOSPITAL | Age: 52
End: 2022-03-18
Payer: MEDICARE

## 2022-03-21 ENCOUNTER — OFFICE VISIT (OUTPATIENT)
Dept: FAMILY MEDICINE | Facility: CLINIC | Age: 52
End: 2022-03-21
Payer: MEDICARE

## 2022-03-21 VITALS
SYSTOLIC BLOOD PRESSURE: 122 MMHG | HEART RATE: 69 BPM | DIASTOLIC BLOOD PRESSURE: 84 MMHG | BODY MASS INDEX: 40.85 KG/M2 | RESPIRATION RATE: 17 BRPM | OXYGEN SATURATION: 97 % | WEIGHT: 260.81 LBS

## 2022-03-21 DIAGNOSIS — Z12.11 COLON CANCER SCREENING: ICD-10-CM

## 2022-03-21 DIAGNOSIS — F31.9 BIPOLAR 1 DISORDER: ICD-10-CM

## 2022-03-21 DIAGNOSIS — Z00.00 HEALTHCARE MAINTENANCE: ICD-10-CM

## 2022-03-21 DIAGNOSIS — Z86.39 H/O: OBESITY: ICD-10-CM

## 2022-03-21 DIAGNOSIS — Z13.6 ENCOUNTER FOR LIPID SCREENING FOR CARDIOVASCULAR DISEASE: ICD-10-CM

## 2022-03-21 DIAGNOSIS — M79.7 FIBROMYALGIA: Primary | ICD-10-CM

## 2022-03-21 DIAGNOSIS — E27.8 ADRENAL INCIDENTALOMA: ICD-10-CM

## 2022-03-21 DIAGNOSIS — I27.20 PULMONARY HYPERTENSION: ICD-10-CM

## 2022-03-21 DIAGNOSIS — M25.511 CHRONIC RIGHT SHOULDER PAIN: ICD-10-CM

## 2022-03-21 DIAGNOSIS — E66.01 MORBID OBESITY WITH BMI OF 40.0-44.9, ADULT: ICD-10-CM

## 2022-03-21 DIAGNOSIS — G89.29 CHRONIC RIGHT SHOULDER PAIN: ICD-10-CM

## 2022-03-21 DIAGNOSIS — Z13.220 ENCOUNTER FOR LIPID SCREENING FOR CARDIOVASCULAR DISEASE: ICD-10-CM

## 2022-03-21 DIAGNOSIS — J44.9 CHRONIC OBSTRUCTIVE PULMONARY DISEASE, UNSPECIFIED COPD TYPE: ICD-10-CM

## 2022-03-21 PROCEDURE — 3008F BODY MASS INDEX DOCD: CPT | Mod: CPTII,S$GLB,, | Performed by: INTERNAL MEDICINE

## 2022-03-21 PROCEDURE — 1159F PR MEDICATION LIST DOCUMENTED IN MEDICAL RECORD: ICD-10-PCS | Mod: CPTII,S$GLB,, | Performed by: INTERNAL MEDICINE

## 2022-03-21 PROCEDURE — 99204 PR OFFICE/OUTPT VISIT, NEW, LEVL IV, 45-59 MIN: ICD-10-PCS | Mod: S$GLB,,, | Performed by: INTERNAL MEDICINE

## 2022-03-21 PROCEDURE — 3074F PR MOST RECENT SYSTOLIC BLOOD PRESSURE < 130 MM HG: ICD-10-PCS | Mod: CPTII,S$GLB,, | Performed by: INTERNAL MEDICINE

## 2022-03-21 PROCEDURE — 3079F DIAST BP 80-89 MM HG: CPT | Mod: CPTII,S$GLB,, | Performed by: INTERNAL MEDICINE

## 2022-03-21 PROCEDURE — 3074F SYST BP LT 130 MM HG: CPT | Mod: CPTII,S$GLB,, | Performed by: INTERNAL MEDICINE

## 2022-03-21 PROCEDURE — 99999 PR PBB SHADOW E&M-EST. PATIENT-LVL IV: ICD-10-PCS | Mod: PBBFAC,,, | Performed by: INTERNAL MEDICINE

## 2022-03-21 PROCEDURE — 1160F PR REVIEW ALL MEDS BY PRESCRIBER/CLIN PHARMACIST DOCUMENTED: ICD-10-PCS | Mod: CPTII,S$GLB,, | Performed by: INTERNAL MEDICINE

## 2022-03-21 PROCEDURE — 3008F PR BODY MASS INDEX (BMI) DOCUMENTED: ICD-10-PCS | Mod: CPTII,S$GLB,, | Performed by: INTERNAL MEDICINE

## 2022-03-21 PROCEDURE — 1160F RVW MEDS BY RX/DR IN RCRD: CPT | Mod: CPTII,S$GLB,, | Performed by: INTERNAL MEDICINE

## 2022-03-21 PROCEDURE — 99499 UNLISTED E&M SERVICE: CPT | Mod: HCNC,S$GLB,, | Performed by: INTERNAL MEDICINE

## 2022-03-21 PROCEDURE — 99499 RISK ADDL DX/OHS AUDIT: ICD-10-PCS | Mod: HCNC,S$GLB,, | Performed by: INTERNAL MEDICINE

## 2022-03-21 PROCEDURE — 3079F PR MOST RECENT DIASTOLIC BLOOD PRESSURE 80-89 MM HG: ICD-10-PCS | Mod: CPTII,S$GLB,, | Performed by: INTERNAL MEDICINE

## 2022-03-21 PROCEDURE — 1159F MED LIST DOCD IN RCRD: CPT | Mod: CPTII,S$GLB,, | Performed by: INTERNAL MEDICINE

## 2022-03-21 PROCEDURE — 99999 PR PBB SHADOW E&M-EST. PATIENT-LVL IV: CPT | Mod: PBBFAC,,, | Performed by: INTERNAL MEDICINE

## 2022-03-21 PROCEDURE — 99204 OFFICE O/P NEW MOD 45 MIN: CPT | Mod: S$GLB,,, | Performed by: INTERNAL MEDICINE

## 2022-03-21 RX ORDER — INFLUENZA VIRUS VACCINE 15; 15; 15; 15 UG/.5ML; UG/.5ML; UG/.5ML; UG/.5ML
SUSPENSION INTRAMUSCULAR
Status: ON HOLD | COMMUNITY
Start: 2021-11-10 | End: 2022-04-25 | Stop reason: HOSPADM

## 2022-03-21 NOTE — PROGRESS NOTES
"HISTORY OF PRESENT ILLNESS:  Zaira Dowell is a 51 y.o. female who presents to the clinic today for Establish Care    My first encounter with patient    Fibromyalgia  Referred to rheumatology with appt Dr. Salcedo 5/3/22.  Managed with gabapentin with recent increased dose (300 mg in AM and 900 mg nightly).  Gabapentin though is making her sleepy to the point that she is intermittently taking the morning dose.  Notes symptoms increased since driving trip to son's home in [place].  Different sleeping arrangement and long drive as contributor to pain, especially in hips.  Feels like can't lay down comfortably as a result.  Has attended PT as well in 2020.  Feels like "hip isn't attached".  She is driving and paces herself through activities of cleaning the home.    Depression, Bipolar disorder  Followed by psychiatry.  Managed with lithium, risperidone, sertraline, bupropion, PRN Ativan    Suspicion for SHARON  Advised for in house sleep study.    PAST MEDICAL HISTORY:  Past Medical History:   Diagnosis Date    Anxiety     Arthritis     Asthma     Back pain     Bipolar disorder     BMI 50.0-59.9, adult     Chronic bronchitis     Depression     Fibromyalgia     GERD (gastroesophageal reflux disease)     Insomnia 11/23/2021    Obesity     SHARON (obstructive sleep apnea)     Respiratory failure     Tobacco dependence        PAST SURGICAL HISTORY:  Past Surgical History:   Procedure Laterality Date    CARPAL TUNNEL RELEASE      CHOLECYSTECTOMY      ESOPHAGOGASTRODUODENOSCOPY N/A 5/21/2019    Procedure: EGD (ESOPHAGOGASTRODUODENOSCOPY);  Surgeon: Michelle Mc MD;  Location: [facility];  Service: Endoscopy;  Laterality: N/A;    HYSTERECTOMY      partial     KNEE SURGERY      LAPAROSCOPIC SLEEVE GASTRECTOMY N/A 10/18/2019    Procedure: GASTRECTOMY, SLEEVE, LAPAROSCOPIC, with intraop EGD;  Surgeon: Ean Kohli MD;  Location: [facility] OR [address] [place] [address];  Service: General;  Laterality: N/A;    pinched " nerve      SHOULDER SURGERY      TONSILLECTOMY      WRIST SURGERY      had ganlin cyst       SOCIAL HISTORY:  Social History     Socioeconomic History    Marital status:    Tobacco Use    Smoking status: Current Some Day Smoker     Types: Vaping with nicotine    Smokeless tobacco: Never Used    Tobacco comment: vaping started  April 2020    Substance and Sexual Activity    Alcohol use: No    Drug use: No    Sexual activity: Yes     Partners: Male     Social Determinants of Health     Financial Resource Strain: Low Risk     Difficulty of Paying Living Expenses: Not very hard   Food Insecurity: No Food Insecurity    Worried About Running Out of Food in the Last Year: Never true    Ran Out of Food in the Last Year: Never true   Transportation Needs: No Transportation Needs    Lack of Transportation (Medical): No    Lack of Transportation (Non-Medical): No   Physical Activity: Insufficiently Active    Days of Exercise per Week: 2 days    Minutes of Exercise per Session: 40 min   Stress: Stress Concern Present    Feeling of Stress : Very much   Social Connections: Unknown    Frequency of Communication with Friends and Family: More than three times a week    Frequency of Social Gatherings with Friends and Family: Never    Active Member of Clubs or Organizations: No    Attends Club or Organization Meetings: Never    Marital Status:    Housing Stability: Low Risk     Unable to Pay for Housing in the Last Year: No    Number of Places Lived in the Last Year: 1    Unstable Housing in the Last Year: No       FAMILY HISTORY:  Family History   Problem Relation Age of Onset    Diabetes Mother     [place]'s disease Mother     Hypertension Mother     Heart disease Father     Stroke Father     Mental illness Father     Hypertension Father     Diabetes Father     Depression Father     Heart disease Maternal Grandmother     Depression Maternal Grandmother     COPD Maternal Grandfather      Heart disease Maternal Grandfather     Stroke Maternal Grandfather     Kidney disease Paternal Grandmother     Heart disease Paternal Grandmother     Heart disease Paternal Grandfather     No Known Problems Brother     Hypertension Son        ALLERGIES AND MEDICATIONS: updated and reviewed.  Review of patient's allergies indicates:   Allergen Reactions    Hydrocodone-acetaminophen Nausea And Vomiting     Medication List with Changes/Refills   Current Medications    ALBUTEROL (PROVENTIL/VENTOLIN HFA) 90 MCG/ACTUATION INHALER    Inhale 2 puffs into the lungs every 6 (six) hours as needed for Wheezing. Rescue    B COMPLEX VITAMINS TABLET    Take 1 tablet by mouth once daily.    BUPROPION (WELLBUTRIN SR) 100 MG TBSR 12 HR TABLET    Take 2 tabs q am, and 1 tab daily at noon    CALCIUM CITRATE (CALCITRATE) 200 MG (950 MG) TABLET    Take 1 tablet by mouth 2 (two) times daily.    COLCHICINE (COLCRYS) 0.6 MG TABLET    colchicine Take 2 tablet (oral) PRN - Pain .Take 2 tablets then take 1 tablet an hour later. May repeat in 3 days prn pain [id number] tablet No frequency recorded oral No set duration recorded No set duration amount recorded active 0.6 mg    CYANOCOBALAMIN 500 MCG TABLET    Take 500 mcg by mouth once daily.    FERROUS FUMARATE/VIT BCOMP,C (SUPER B COMPLEX ORAL)    Take 1 capsule by mouth once daily.    FLUOXETINE HCL (FLUOXETINE ORAL)    fluoxetine Take No date recorded No form recorded No frequency recorded No route recorded No set duration recorded No set duration amount recorded active No dosage strength recorded No dosage strength units of measure recorded    FLUTICASONE PROPIONATE (FLONASE) 50 MCG/ACTUATION NASAL SPRAY    2 sprays (100 mcg total) by Each Nostril route once daily.    FLUTICASONE-SALMETEROL DISKUS INHALER 250-50 MCG    Inhale 1 puff into the lungs 2 (two) times daily.    FUROSEMIDE (LASIX) 40 MG TABLET    TAKE 1 TABLET EVERY DAY    GABAPENTIN (NEURONTIN) 300 MG CAPSULE    Take one  capsule (300 mg) by mouth in the morning; take three capsules (900 mg total) by mouth nightly    LITHIUM (ESKALITH) 300 MG CAPSULE    Take 1 capsule (300 mg total) by mouth 3 (three) times daily with meals.    LORATADINE (CLARITIN) 10 MG TABLET    Take 1 tablet (10 mg total) by mouth once daily. for 20 days    LORAZEPAM (ATIVAN) 0.5 MG TABLET    TAKE 2 TABLETS BY MOUTH ONCE DAILY AS DIRECTED    LORAZEPAM (ATIVAN) 0.5 MG TABLET    Take total of 2 tabs daily as directed.    MECLIZINE (ANTIVERT) 25 MG TABLET    Take 1 tablet (25 mg total) by mouth 3 (three) times daily as needed for Dizziness or Nausea.    MULTIVITAMIN CAPSULE    Take 1 capsule by mouth 2 (two) times daily.     OMEPRAZOLE (PRILOSEC) 40 MG CAPSULE    TAKE 1 CAPSULE TWICE DAILY BEFORE MEALS    ONDANSETRON (ZOFRAN-ODT) 8 MG TBDL    Take 1 tablet (8 mg total) by mouth 2 (two) times daily as needed (nausea).    POTASSIUM CHLORIDE (KLOR-CON) 10 MEQ TBSR    Take 10 mEq by mouth 2 (two) times daily.    PROMETHAZINE (PHENERGAN) 12.5 MG TAB    promethazine Take half tablet (oral) 2 times per day PRN - Nausea . Medication may cause drowsiness. [id number] tablet 2 times per day oral No set duration recorded No set duration amount recorded active 12.5 mg    RISPERIDONE (RISPERDAL) 0.5 MG TAB    Take 1 tablet (0.5 mg total) by mouth 2 (two) times daily.    SERTRALINE (ZOLOFT) 100 MG TABLET    Take 2 tablets (200 mg total) by mouth once daily.    TOLTERODINE (DETROL LA) 4 MG 24 HR CAPSULE    Take 1 capsule (4 mg total) by mouth once daily.    TOPIRAMATE (TOPAMAX) 200 MG TAB    TAKE 1 TABLET EVERY DAY          CARE TEAM:  Patient Care Team:  Cyril Saucedo MD as PCP - General (Family Medicine)  Delvin Nagy Jr., MD as Consulting Physician (Psychiatry)  José Hernandez MD as Consulting Physician (Endocrinology)  Brittaney Brandon NP as Nurse Practitioner (Urology)         REVIEW OF SYSTEMS:  Review of Systems   Constitutional: Negative for chills and fever.    HENT: Negative for congestion and postnasal drip.    Eyes: Negative for photophobia and visual disturbance.   Respiratory: Negative for cough and shortness of breath.    Cardiovascular: Negative for chest pain and palpitations.   Gastrointestinal: Negative for nausea and vomiting.   Musculoskeletal: Positive for back pain and myalgias.   Neurological: Negative for light-headedness and headaches.         PHYSICAL EXAM:   Vitals:    03/21/22 1459   BP: 122/84   Pulse: 69   Resp: 17             Body mass index is 40.85 kg/m².     General appearance - alert, well appearing, and in no distress and oriented to person, place, and time  Mental status - normal mood, behavior, speech, dress, motor activity, and thought processes  Eyes - sclera anicteric, left eye normal, right eye normal  Chest - no tachypnea, retractions or cyanosis  Neurological - alert, oriented, normal speech, no focal findings or movement disorder noted, motor and sensory grossly normal bilaterally  Extremities - no pedal edema noted      ASSESSMENT AND PLAN:  Fibromyalgia  -     Ambulatory referral/consult to Physical/Occupational Therapy; Future; Expected date: 03/28/2022  -     Sedimentation rate; Future; Expected date: 03/21/2022  -     C-reactive protein; Future; Expected date: 03/21/2022  - To follow up with rheumatology    Chronic right shoulder pain  -     X-ray Shoulder 2 or More Views Right; Future; Expected date: 03/21/2022    Morbid obesity with BMI of 40.0-44.9, adult  H/O: obesity   -     TSH; Future; Expected date: 03/21/2022    Encounter for lipid screening for cardiovascular disease   -     Lipid Panel; Future; Expected date: 03/21/2022    Pulmonary hypertension       - Noted in 2018,  To have follow up in sleep clinic for in house sleep study.    Colon cancer screening  -     Case Request Endoscopy: COLONOSCOPY    Chronic obstructive pulmonary disease, unspecified COPD type    Adrenal incidentaloma       - Seen by endo 3/2021 and is  due for one year follow up.    Bipolar 1 disorder       - Stable on current medication.    Healthcare maintenance  -     Hemoglobin A1C; Future; Expected date: 03/21/2022  -     Comprehensive Metabolic Panel; Future; Expected date: 03/21/2022  -     Lipid Panel; Future; Expected date: 03/21/2022  -     CBC Auto Differential; Future; Expected date: 03/21/2022  -     TSH; Future; Expected date: 03/21/2022             Follow up 3 months or sooner as needed.

## 2022-03-22 ENCOUNTER — OFFICE VISIT (OUTPATIENT)
Dept: PSYCHIATRY | Facility: CLINIC | Age: 52
End: 2022-03-22
Payer: MEDICARE

## 2022-03-22 DIAGNOSIS — F31.9 BIPOLAR 1 DISORDER: Primary | ICD-10-CM

## 2022-03-22 PROCEDURE — 99213 OFFICE O/P EST LOW 20 MIN: CPT | Mod: 95,,, | Performed by: PSYCHIATRY & NEUROLOGY

## 2022-03-22 PROCEDURE — 90833 PR PSYCHOTHERAPY W/PATIENT W/E&M, 30 MIN (ADD ON): ICD-10-PCS | Mod: 95,,, | Performed by: PSYCHIATRY & NEUROLOGY

## 2022-03-22 PROCEDURE — 90833 PSYTX W PT W E/M 30 MIN: CPT | Mod: 95,,, | Performed by: PSYCHIATRY & NEUROLOGY

## 2022-03-22 PROCEDURE — 1159F MED LIST DOCD IN RCRD: CPT | Mod: CPTII,95,, | Performed by: PSYCHIATRY & NEUROLOGY

## 2022-03-22 PROCEDURE — 1160F PR REVIEW ALL MEDS BY PRESCRIBER/CLIN PHARMACIST DOCUMENTED: ICD-10-PCS | Mod: CPTII,95,, | Performed by: PSYCHIATRY & NEUROLOGY

## 2022-03-22 PROCEDURE — 99213 PR OFFICE/OUTPT VISIT, EST, LEVL III, 20-29 MIN: ICD-10-PCS | Mod: 95,,, | Performed by: PSYCHIATRY & NEUROLOGY

## 2022-03-22 PROCEDURE — 1160F RVW MEDS BY RX/DR IN RCRD: CPT | Mod: CPTII,95,, | Performed by: PSYCHIATRY & NEUROLOGY

## 2022-03-22 PROCEDURE — 1159F PR MEDICATION LIST DOCUMENTED IN MEDICAL RECORD: ICD-10-PCS | Mod: CPTII,95,, | Performed by: PSYCHIATRY & NEUROLOGY

## 2022-03-22 NOTE — PROGRESS NOTES
"Outpatient Psychiatry Follow-Up Visit (MD/NP)    3/22/2022 The patient location is:home  The chief complaint leading to consultation is: bipolar disorder.    Visit type: audiovisual    Face to Face time with patient: 23" (3" on meds and 20" for supportive counseling and update of history and mental status)  26 minutes of total time spent on the encounter, which includes face to face time and non-face to face time preparing to see the patient (eg, review of tests), Obtaining and/or reviewing separately obtained history, Documenting clinical information in the electronic or other health record, Independently interpreting results (not separately reported) and communicating results to the patient/family/caregiver, or Care coordination (not separately reported).         Each patient to whom he or she provides medical services by telemedicine is:  (1) informed of the relationship between the physician and patient and the respective role of any other health care provider with respect to management of the patient; and (2) notified that he or she may decline to receive medical services by telemedicine and may withdraw from such care at any time.    Notes: See below.    Clinical Status of Patient:  Outpatient (Ambulatory)    Chief Complaint:  Zaira Dowell is a 51 y.o. female who presents today for follow-up of mood disorder.  Met with patient and no relatives present for this visit.      Interval History and Content of Current Session:  Interim Events/Subjective Report/Content of Current Session: Patient is dealing with a viral syndrome at this time. Patient, though, enjoyed recent visit to see grandchildren in [place]. Patient is in good self control and has no thoughts of harming herself and no signs of lida or psychosis.Patient discussed dealing with the chronic pain of fibromyalgia, which effects her legs, back,and knees. We discussed my custodial. We discussed getting a lithium level. We also discussed my " FCI. Patient is doing the best she can to manage he physical probems.    Psychotherapy:  · Target symptoms: mood disorder  · Why chosen therapy is appropriate versus another modality: relevant to diagnosis, patient responds to this modality, evidence based practice  · Outcome monitoring methods: self-report  · Therapeutic intervention type: supportive psychotherapy  · Topics discussed/themes: mood related issues, stress related to medical comorbidities  · The patient's response to the intervention is accepting. The patient's progress toward treatment goals is fair.   · Duration of intervention: 23 minutes.    Review of Systems   · PSYCHIATRIC: Pertinant items are noted in the narrative.    Past Medical, Family and Social History: The patient's past medical, family and social history have been reviewed and updated as appropriate within the electronic medical record - see encounter notes.    Compliance: yes    Side effects: NonePatient has no signs of TD.    Risk Parameters:  Patient reports no suicidal ideation  Patient reports no homicidal ideation  Patient reports no self-injurious behavior  Patient reports no violent behavior    Exam (detailed: at least 9 elements; comprehensive: all 15 elements)   Constitutional  Vitals:  Most recent vital signs, dated less than 90 days prior to this appointment, were reviewed.   There were no vitals filed for this visit.Patient reports stable vital signs.     General:  unremarkable, age appropriate, casually dressed, neatly groomed     Musculoskeletal  Muscle Strength/Tone:  patient reports some loss of muscle tone and strength   Gait & Station:  non-ataxic, slow, walks carefully due to knee and back pain     Psychiatric  Speech:  no latency; no press, spontaneous   Mood & Affect:  anxious, sad  congruent and appropriate   Thought Process:  normal and logical   Associations:  intact   Thought Content:  normal, no suicidality, no homicidality, delusions, or paranoia    Insight:  has awareness of illness   Judgement: behavior is adequate to circumstances   Orientation:  grossly intact   Memory: intact for content of interview   Language: grossly intact   Attention Span & Concentration:  able to focus   Fund of Knowledge:  not tested     Assessment and Diagnosis   Status/Progress: Based on the examination today, the patient's problem(s) is/are adequately but not ideally controlled.  New problems have been presented today.   Co-morbidities are complicating management of the primary condition.  The working differential for this patient includes Bipolar Disorder.     General Impression: Patient was uncomfortable today due to a virus, but is motivated to cope as well as possible with her difficulties to include the chronic pain of fibromyalgia which does limit he mobility somewhat. Patient is not an active danger to self or others at this time. Patient does look forward to visiting her grandchildren and is planning another visit in about 2 to 3 weeks to [place] for that purpose for another baseball game.      ICD-10-CM ICD-9-CM   1. Bipolar 1 disorder  F31.9 296.7       Intervention/Counseling/Treatment Plan   · Medication Management: The risks and benefits of medication were discussed with the patient. Patient agrees to continue Zoloft 200mg daily, Risperdal 0.5 mg bid, Lithium 300 mg tid, Ativan 0.25 mg bid and 0.5 mg q pm, and Wellbutrin  mg q am and 100mg daily at noon. Patient agrees to also get lithium level next week.       Return to Clinic: 3 months

## 2022-03-23 ENCOUNTER — TELEPHONE (OUTPATIENT)
Dept: ENDOSCOPY | Facility: HOSPITAL | Age: 52
End: 2022-03-23
Payer: MEDICARE

## 2022-03-23 DIAGNOSIS — Z12.11 COLON CANCER SCREENING: Primary | ICD-10-CM

## 2022-03-23 RX ORDER — POLYETHYLENE GLYCOL 3350, SODIUM SULFATE ANHYDROUS, SODIUM BICARBONATE, SODIUM CHLORIDE, POTASSIUM CHLORIDE 236; 22.74; 6.74; 5.86; 2.97 G/4L; G/4L; G/4L; G/4L; G/4L
4 POWDER, FOR SOLUTION ORAL ONCE
Qty: 4000 ML | Refills: 0 | Status: SHIPPED | OUTPATIENT
Start: 2022-03-23 | End: 2022-03-23

## 2022-03-29 ENCOUNTER — LAB VISIT (OUTPATIENT)
Dept: LAB | Facility: HOSPITAL | Age: 52
End: 2022-03-29
Attending: INTERNAL MEDICINE
Payer: MEDICARE

## 2022-03-29 DIAGNOSIS — Z00.00 HEALTHCARE MAINTENANCE: ICD-10-CM

## 2022-03-29 DIAGNOSIS — Z13.6 ENCOUNTER FOR LIPID SCREENING FOR CARDIOVASCULAR DISEASE: ICD-10-CM

## 2022-03-29 DIAGNOSIS — M79.7 FIBROMYALGIA: ICD-10-CM

## 2022-03-29 DIAGNOSIS — Z13.220 ENCOUNTER FOR LIPID SCREENING FOR CARDIOVASCULAR DISEASE: ICD-10-CM

## 2022-03-29 DIAGNOSIS — Z86.39 H/O: OBESITY: ICD-10-CM

## 2022-03-29 LAB
ALBUMIN SERPL BCP-MCNC: 3.7 G/DL (ref 3.5–5.2)
ALP SERPL-CCNC: 73 U/L (ref 55–135)
ALT SERPL W/O P-5'-P-CCNC: 77 U/L (ref 10–44)
ANION GAP SERPL CALC-SCNC: 4 MMOL/L (ref 8–16)
AST SERPL-CCNC: 18 U/L (ref 10–40)
BASOPHILS # BLD AUTO: 0.06 K/UL (ref 0–0.2)
BASOPHILS NFR BLD: 0.8 % (ref 0–1.9)
BILIRUB SERPL-MCNC: 0.4 MG/DL (ref 0.1–1)
BUN SERPL-MCNC: 13 MG/DL (ref 6–20)
CALCIUM SERPL-MCNC: 9.2 MG/DL (ref 8.7–10.5)
CHLORIDE SERPL-SCNC: 109 MMOL/L (ref 95–110)
CHOLEST SERPL-MCNC: 154 MG/DL (ref 120–199)
CHOLEST/HDLC SERPL: 2.7 {RATIO} (ref 2–5)
CO2 SERPL-SCNC: 25 MMOL/L (ref 23–29)
CREAT SERPL-MCNC: 0.9 MG/DL (ref 0.5–1.4)
CRP SERPL-MCNC: 1.3 MG/L (ref 0–8.2)
DIFFERENTIAL METHOD: ABNORMAL
EOSINOPHIL # BLD AUTO: 0.2 K/UL (ref 0–0.5)
EOSINOPHIL NFR BLD: 2.5 % (ref 0–8)
ERYTHROCYTE [DISTWIDTH] IN BLOOD BY AUTOMATED COUNT: 12.6 % (ref 11.5–14.5)
ERYTHROCYTE [SEDIMENTATION RATE] IN BLOOD BY WESTERGREN METHOD: 11 MM/HR (ref 0–20)
EST. GFR  (AFRICAN AMERICAN): >60 ML/MIN/1.73 M^2
EST. GFR  (NON AFRICAN AMERICAN): >60 ML/MIN/1.73 M^2
GLUCOSE SERPL-MCNC: 94 MG/DL (ref 70–110)
HCT VFR BLD AUTO: 44.3 % (ref 37–48.5)
HDLC SERPL-MCNC: 58 MG/DL (ref 40–75)
HDLC SERPL: 37.7 % (ref 20–50)
HGB BLD-MCNC: 14.1 G/DL (ref 12–16)
IMM GRANULOCYTES # BLD AUTO: 0.03 K/UL (ref 0–0.04)
IMM GRANULOCYTES NFR BLD AUTO: 0.4 % (ref 0–0.5)
LDLC SERPL CALC-MCNC: 78.6 MG/DL (ref 63–159)
LYMPHOCYTES # BLD AUTO: 2.6 K/UL (ref 1–4.8)
LYMPHOCYTES NFR BLD: 32.8 % (ref 18–48)
MCH RBC QN AUTO: 30.5 PG (ref 27–31)
MCHC RBC AUTO-ENTMCNC: 31.8 G/DL (ref 32–36)
MCV RBC AUTO: 96 FL (ref 82–98)
MONOCYTES # BLD AUTO: 0.4 K/UL (ref 0.3–1)
MONOCYTES NFR BLD: 5.6 % (ref 4–15)
NEUTROPHILS # BLD AUTO: 4.6 K/UL (ref 1.8–7.7)
NEUTROPHILS NFR BLD: 57.9 % (ref 38–73)
NONHDLC SERPL-MCNC: 96 MG/DL
NRBC BLD-RTO: 0 /100 WBC
PLATELET # BLD AUTO: 377 K/UL (ref 150–450)
PMV BLD AUTO: 9.1 FL (ref 9.2–12.9)
POTASSIUM SERPL-SCNC: 3.8 MMOL/L (ref 3.5–5.1)
PROT SERPL-MCNC: 7.4 G/DL (ref 6–8.4)
RBC # BLD AUTO: 4.62 M/UL (ref 4–5.4)
SODIUM SERPL-SCNC: 138 MMOL/L (ref 136–145)
TRIGL SERPL-MCNC: 87 MG/DL (ref 30–150)
TSH SERPL DL<=0.005 MIU/L-ACNC: 3.18 UIU/ML (ref 0.4–4)
WBC # BLD AUTO: 7.89 K/UL (ref 3.9–12.7)

## 2022-03-29 PROCEDURE — 84443 ASSAY THYROID STIM HORMONE: CPT | Performed by: INTERNAL MEDICINE

## 2022-03-29 PROCEDURE — 80053 COMPREHEN METABOLIC PANEL: CPT | Performed by: INTERNAL MEDICINE

## 2022-03-29 PROCEDURE — 85652 RBC SED RATE AUTOMATED: CPT | Performed by: INTERNAL MEDICINE

## 2022-03-29 PROCEDURE — 86140 C-REACTIVE PROTEIN: CPT | Performed by: INTERNAL MEDICINE

## 2022-03-29 PROCEDURE — 85025 COMPLETE CBC W/AUTO DIFF WBC: CPT | Mod: GA | Performed by: INTERNAL MEDICINE

## 2022-03-29 PROCEDURE — 80061 LIPID PANEL: CPT | Performed by: INTERNAL MEDICINE

## 2022-03-29 PROCEDURE — 36415 COLL VENOUS BLD VENIPUNCTURE: CPT | Mod: PN | Performed by: INTERNAL MEDICINE

## 2022-03-29 PROCEDURE — 83036 HEMOGLOBIN GLYCOSYLATED A1C: CPT | Performed by: INTERNAL MEDICINE

## 2022-03-30 LAB
ESTIMATED AVG GLUCOSE: 91 MG/DL (ref 68–131)
HBA1C MFR BLD: 4.8 % (ref 4–5.6)

## 2022-04-07 ENCOUNTER — TELEPHONE (OUTPATIENT)
Dept: BARIATRICS | Facility: CLINIC | Age: 52
End: 2022-04-07
Payer: MEDICARE

## 2022-04-07 ENCOUNTER — PATIENT MESSAGE (OUTPATIENT)
Dept: BARIATRICS | Facility: CLINIC | Age: 52
End: 2022-04-07
Payer: MEDICARE

## 2022-04-08 ENCOUNTER — OFFICE VISIT (OUTPATIENT)
Dept: ENDOCRINOLOGY | Facility: CLINIC | Age: 52
End: 2022-04-08
Payer: MEDICARE

## 2022-04-08 VITALS
DIASTOLIC BLOOD PRESSURE: 78 MMHG | WEIGHT: 258.69 LBS | TEMPERATURE: 99 F | SYSTOLIC BLOOD PRESSURE: 123 MMHG | BODY MASS INDEX: 40.52 KG/M2 | HEART RATE: 60 BPM

## 2022-04-08 DIAGNOSIS — E27.8 OTHER SPECIFIED DISORDERS OF ADRENAL GLAND: ICD-10-CM

## 2022-04-08 DIAGNOSIS — E27.8 ADRENAL INCIDENTALOMA: ICD-10-CM

## 2022-04-08 PROCEDURE — 99499 RISK ADDL DX/OHS AUDIT: ICD-10-PCS | Mod: HCNC,S$GLB,, | Performed by: HOSPITALIST

## 2022-04-08 PROCEDURE — 99999 PR PBB SHADOW E&M-EST. PATIENT-LVL V: ICD-10-PCS | Mod: PBBFAC,,, | Performed by: HOSPITALIST

## 2022-04-08 PROCEDURE — 3044F PR MOST RECENT HEMOGLOBIN A1C LEVEL <7.0%: ICD-10-PCS | Mod: CPTII,S$GLB,, | Performed by: HOSPITALIST

## 2022-04-08 PROCEDURE — 3074F PR MOST RECENT SYSTOLIC BLOOD PRESSURE < 130 MM HG: ICD-10-PCS | Mod: CPTII,S$GLB,, | Performed by: HOSPITALIST

## 2022-04-08 PROCEDURE — 3008F PR BODY MASS INDEX (BMI) DOCUMENTED: ICD-10-PCS | Mod: CPTII,S$GLB,, | Performed by: HOSPITALIST

## 2022-04-08 PROCEDURE — 1159F PR MEDICATION LIST DOCUMENTED IN MEDICAL RECORD: ICD-10-PCS | Mod: CPTII,S$GLB,, | Performed by: HOSPITALIST

## 2022-04-08 PROCEDURE — 3078F DIAST BP <80 MM HG: CPT | Mod: CPTII,S$GLB,, | Performed by: HOSPITALIST

## 2022-04-08 PROCEDURE — 99999 PR PBB SHADOW E&M-EST. PATIENT-LVL V: CPT | Mod: PBBFAC,,, | Performed by: HOSPITALIST

## 2022-04-08 PROCEDURE — 99214 OFFICE O/P EST MOD 30 MIN: CPT | Mod: S$GLB,,, | Performed by: HOSPITALIST

## 2022-04-08 PROCEDURE — 3044F HG A1C LEVEL LT 7.0%: CPT | Mod: CPTII,S$GLB,, | Performed by: HOSPITALIST

## 2022-04-08 PROCEDURE — 3074F SYST BP LT 130 MM HG: CPT | Mod: CPTII,S$GLB,, | Performed by: HOSPITALIST

## 2022-04-08 PROCEDURE — 1159F MED LIST DOCD IN RCRD: CPT | Mod: CPTII,S$GLB,, | Performed by: HOSPITALIST

## 2022-04-08 PROCEDURE — 3008F BODY MASS INDEX DOCD: CPT | Mod: CPTII,S$GLB,, | Performed by: HOSPITALIST

## 2022-04-08 PROCEDURE — 99214 PR OFFICE/OUTPT VISIT, EST, LEVL IV, 30-39 MIN: ICD-10-PCS | Mod: S$GLB,,, | Performed by: HOSPITALIST

## 2022-04-08 PROCEDURE — 3078F PR MOST RECENT DIASTOLIC BLOOD PRESSURE < 80 MM HG: ICD-10-PCS | Mod: CPTII,S$GLB,, | Performed by: HOSPITALIST

## 2022-04-08 PROCEDURE — 99499 UNLISTED E&M SERVICE: CPT | Mod: HCNC,S$GLB,, | Performed by: HOSPITALIST

## 2022-04-08 RX ORDER — DEXAMETHASONE 1 MG/1
1 TABLET ORAL NIGHTLY
Qty: 1 TABLET | Refills: 0 | Status: ON HOLD | OUTPATIENT
Start: 2022-04-08 | End: 2022-04-25 | Stop reason: HOSPADM

## 2022-04-08 NOTE — PROGRESS NOTES
Subjective:      Patient ID: Zaira Dowell is a 51 y.o. female presented to Endocrinology clinic on 4/8/2022.    Chief Complaint:  Adrenal Gland      History of Present Illness: Zaira Dowell is a 51 y.o. female with left adrenal incidentaloma  Significant past medical history:  Bipolar, obesity    Interval history:  Patient is here for follow-up of left adrenal incidentaloma.  Doing well.  No other issues noted on recent lab work.  Here for yearly follow-up  Mood stable  reportedly has fibromyalgia seen Rheumatology soon      With regards to left adrenal mass, incidental finding   She was found to have adrenal nodule on imaging performed for evaluation of hematuria    CT abdomen done on 2/1/2021: There is a 2.1 cm well-circumscribed left adrenal nodule that demonstrates pre contrast attenuation values of less than 10 Hounsfield units is consistent with an adenoma.  Right adrenal gland is normal.    Pt reports no abdominal discomfort  History of hypertension, hypertensive emergency   Pt denies history of paroxysmal symptoms such as syncope, pallor or dizziness  No palpitations     History of diabetes or hyperglycemia   No easy bruisability or abnormal new stretch marks   Denies weight gain over time   Denies cancer history   History of obesity    Adrenal lesion imaging characteristics:  Left adrenal nodule mass 2.1 cm well circumscribed, Smooth, heterogenous, no calcifications, houndsfield units <10    Previous workup done 03/2021  Normal DST  Normal metanephrine/normetanephrine  Normal renin/aldosterone ratio        Reviewed past surgical, medical, family, social history and updated as appropriate.    Review of Systems    Objective:   /78 (BP Location: Left arm)   Pulse 60   Temp 98.7 °F (37.1 °C) (Oral)   Wt 117.3 kg (258 lb 11.2 oz)   LMP  (LMP Unknown)   BMI 40.52 kg/m²     Body mass index is 40.52 kg/m².  Vital signs reviewed    Physical Exam  Vitals and nursing note reviewed.    Constitutional:       General: She is not in acute distress.     Appearance: Normal appearance. She is well-developed. She is obese. She is not toxic-appearing.   Neck:      Thyroid: No thyromegaly.   Pulmonary:      Effort: Pulmonary effort is normal. No respiratory distress.   Musculoskeletal:         General: No deformity. Normal range of motion.   Skin:     Findings: No bruising.   Neurological:      Mental Status: She is alert and oriented to person, place, and time.   Psychiatric:         Mood and Affect: Mood normal.         Lab Reviewed:   Lab Results   Component Value Date    HGBA1C 4.8 03/29/2022       Lab Results   Component Value Date    CHOL 154 03/29/2022    HDL 58 03/29/2022    LDLCALC 78.6 03/29/2022    TRIG 87 03/29/2022    CHOLHDL 37.7 03/29/2022       Lab Results   Component Value Date     03/29/2022    K 3.8 03/29/2022     03/29/2022    CO2 25 03/29/2022    GLU 94 03/29/2022    BUN 13 03/29/2022    CREATININE 0.9 03/29/2022    CALCIUM 9.2 03/29/2022    PROT 7.4 03/29/2022    ALBUMIN 3.7 03/29/2022    BILITOT 0.4 03/29/2022    ALKPHOS 73 03/29/2022    AST 18 03/29/2022    ALT 77 (H) 03/29/2022    ANIONGAP 4 (L) 03/29/2022    ESTGFRAFRICA >60 03/29/2022    EGFRNONAA >60 03/29/2022    TSH 3.180 03/29/2022        Lab Results   Component Value Date    JKIXLXAK01VS 37 06/10/2021    RUJNZEBH52TD 44 12/13/2019    AGUKVNLP72QW 42 11/26/2019    CALCIUM 9.2 03/29/2022    CALCIUM 9.9 10/18/2021    CALCIUM 9.3 06/10/2021    PHOS 2.3 (L) 10/19/2019    PHOS 3.3 06/05/2019    ALKPHOS 73 03/29/2022    ALKPHOS 63 10/18/2021    ALKPHOS 62 06/10/2021    TSH 3.180 03/29/2022       Assessment     1. Adrenal incidentaloma  Ambulatory referral/consult to Endocrinology    Cortisol, 8AM    dexAMETHasone (DECADRON) 1 MG Tab    ACTH   2. Other specified disorders of adrenal gland  CT Abdomen With Without Contrast        Plan     No problem-specific Assessment & Plan notes found for this encounter.      Adrenal  incidentaloma  - Adrenal incidentaloma- reviewed incidence, role of adrenal glands in hormonal control discussed with patient  - last CT done 2021:  2.1 cm well-circumscribed left adrenal nodule, pre contrast attenuation values of less than 10 Hounsfield units.  Right adrenal gland is normal.  - here for follow-up yearly  - no new issues since last time seen:  Ds 18 within normal range, normal metanephrine/normetanephrine, normal renin/aldosterone ratio  - guidelines recommends repeat CT scan months, will order for this year.  To monitor for increase in size and evaluate adenoma characteristic  - will do DST for this year.  As other lab work were normal last year      Advised patient to follow up with PCP for routine health maintenance care.   RTC in yearly    José Hernandez M.D  Endocrinology  [facility]  4/8/2022      Disclaimer: This note has been generated using voice-recognition software. There may be typographical errors that have been missed during proof-reading.

## 2022-04-12 ENCOUNTER — LAB VISIT (OUTPATIENT)
Dept: LAB | Facility: HOSPITAL | Age: 52
End: 2022-04-12
Attending: HOSPITALIST
Payer: MEDICARE

## 2022-04-12 ENCOUNTER — PATIENT MESSAGE (OUTPATIENT)
Dept: BARIATRICS | Facility: CLINIC | Age: 52
End: 2022-04-12
Payer: MEDICARE

## 2022-04-12 DIAGNOSIS — E27.8 ADRENAL INCIDENTALOMA: ICD-10-CM

## 2022-04-12 LAB — CORTIS SERPL-MCNC: <1 UG/DL (ref 4.3–22.4)

## 2022-04-12 PROCEDURE — 82533 TOTAL CORTISOL: CPT | Performed by: HOSPITALIST

## 2022-04-12 PROCEDURE — 82024 ASSAY OF ACTH: CPT | Performed by: HOSPITALIST

## 2022-04-12 PROCEDURE — 36415 COLL VENOUS BLD VENIPUNCTURE: CPT | Performed by: HOSPITALIST

## 2022-04-13 ENCOUNTER — PATIENT MESSAGE (OUTPATIENT)
Dept: ENDOCRINOLOGY | Facility: CLINIC | Age: 52
End: 2022-04-13
Payer: MEDICARE

## 2022-04-14 LAB — ACTH PLAS-MCNC: 14 PG/ML (ref 0–46)

## 2022-04-14 RX ORDER — LORAZEPAM 0.5 MG/1
TABLET ORAL
Qty: 60 TABLET | Refills: 0 | Status: SHIPPED | OUTPATIENT
Start: 2022-04-14 | End: 2022-05-16 | Stop reason: SDUPTHER

## 2022-04-15 ENCOUNTER — NURSE TRIAGE (OUTPATIENT)
Dept: ADMINISTRATIVE | Facility: CLINIC | Age: 52
End: 2022-04-15
Payer: MEDICARE

## 2022-04-15 NOTE — TELEPHONE ENCOUNTER
Pt calling regarding ativan rx, states not called in yesterday.     Noted escribed rx sent yesterday to [facility] [place] location-confirmed receipt 4/14 @1120am.     Pt updated, no further assistance needed at this time.     Reason for Disposition   [1] Prescription prescribed recently is not at pharmacy AND [2] triager has access to patient's EMR AND [3] prescription is recorded in the EMR    Protocols used: MEDICATION REFILL AND RENEWAL CALL-A-AH

## 2022-04-18 ENCOUNTER — HOSPITAL ENCOUNTER (OUTPATIENT)
Dept: RADIOLOGY | Facility: HOSPITAL | Age: 52
Discharge: HOME | End: 2022-04-18
Attending: HOSPITALIST
Payer: MEDICARE

## 2022-04-18 DIAGNOSIS — E27.8 OTHER SPECIFIED DISORDERS OF ADRENAL GLAND: ICD-10-CM

## 2022-04-18 PROCEDURE — 74170 CT ABDOMEN W WO CONTRAST: ICD-10-PCS | Mod: 26,,, | Performed by: RADIOLOGY

## 2022-04-18 PROCEDURE — 74170 CT ABD WO CNTRST FLWD CNTRST: CPT | Mod: 26,,, | Performed by: RADIOLOGY

## 2022-04-18 PROCEDURE — 74170 CT ABD WO CNTRST FLWD CNTRST: CPT | Mod: TC

## 2022-04-18 PROCEDURE — 25500020 PHARM REV CODE 255: Performed by: HOSPITALIST

## 2022-04-18 RX ADMIN — IOHEXOL 85 ML: 350 INJECTION, SOLUTION INTRAVENOUS at 07:04

## 2022-04-20 ENCOUNTER — PATIENT MESSAGE (OUTPATIENT)
Dept: FAMILY MEDICINE | Facility: CLINIC | Age: 52
End: 2022-04-20
Payer: MEDICARE

## 2022-04-25 ENCOUNTER — ANESTHESIA (OUTPATIENT)
Dept: ENDOSCOPY | Facility: HOSPITAL | Age: 52
End: 2022-04-25
Payer: MEDICARE

## 2022-04-25 ENCOUNTER — ANESTHESIA EVENT (OUTPATIENT)
Dept: ENDOSCOPY | Facility: HOSPITAL | Age: 52
End: 2022-04-25
Payer: MEDICARE

## 2022-04-25 ENCOUNTER — HOSPITAL ENCOUNTER (OUTPATIENT)
Facility: HOSPITAL | Age: 52
Discharge: HOME | End: 2022-04-25
Attending: INTERNAL MEDICINE | Admitting: INTERNAL MEDICINE
Payer: MEDICARE

## 2022-04-25 VITALS
TEMPERATURE: 98 F | OXYGEN SATURATION: 99 % | DIASTOLIC BLOOD PRESSURE: 68 MMHG | HEART RATE: 61 BPM | SYSTOLIC BLOOD PRESSURE: 111 MMHG | RESPIRATION RATE: 18 BRPM

## 2022-04-25 DIAGNOSIS — Z13.9 SCREENING DUE: ICD-10-CM

## 2022-04-25 PROCEDURE — 37000008 HC ANESTHESIA 1ST 15 MINUTES: Performed by: INTERNAL MEDICINE

## 2022-04-25 PROCEDURE — D9220A PRA ANESTHESIA: Mod: PT,CRNA,, | Performed by: STUDENT IN AN ORGANIZED HEALTH CARE EDUCATION/TRAINING PROGRAM

## 2022-04-25 PROCEDURE — D9220A PRA ANESTHESIA: ICD-10-PCS | Mod: PT,CRNA,, | Performed by: STUDENT IN AN ORGANIZED HEALTH CARE EDUCATION/TRAINING PROGRAM

## 2022-04-25 PROCEDURE — 25000003 PHARM REV CODE 250: Performed by: STUDENT IN AN ORGANIZED HEALTH CARE EDUCATION/TRAINING PROGRAM

## 2022-04-25 PROCEDURE — D9220A PRA ANESTHESIA: ICD-10-PCS | Mod: PT,ANES,, | Performed by: ANESTHESIOLOGY

## 2022-04-25 PROCEDURE — 88305 TISSUE EXAM BY PATHOLOGIST: CPT | Mod: 26,,, | Performed by: PATHOLOGY

## 2022-04-25 PROCEDURE — 88305 TISSUE EXAM BY PATHOLOGIST: ICD-10-PCS | Mod: 26,,, | Performed by: PATHOLOGY

## 2022-04-25 PROCEDURE — 45385 COLONOSCOPY W/LESION REMOVAL: CPT | Mod: PT,,, | Performed by: INTERNAL MEDICINE

## 2022-04-25 PROCEDURE — 45385 PR COLONOSCOPY,REMV LESN,SNARE: ICD-10-PCS | Mod: PT,,, | Performed by: INTERNAL MEDICINE

## 2022-04-25 PROCEDURE — 45385 COLONOSCOPY W/LESION REMOVAL: CPT | Performed by: INTERNAL MEDICINE

## 2022-04-25 PROCEDURE — 37000009 HC ANESTHESIA EA ADD 15 MINS: Performed by: INTERNAL MEDICINE

## 2022-04-25 PROCEDURE — 63600175 PHARM REV CODE 636 W HCPCS: Performed by: STUDENT IN AN ORGANIZED HEALTH CARE EDUCATION/TRAINING PROGRAM

## 2022-04-25 PROCEDURE — D9220A PRA ANESTHESIA: Mod: PT,ANES,, | Performed by: ANESTHESIOLOGY

## 2022-04-25 PROCEDURE — 88305 TISSUE EXAM BY PATHOLOGIST: CPT | Performed by: PATHOLOGY

## 2022-04-25 PROCEDURE — 27201089 HC SNARE, DISP (ANY): Performed by: INTERNAL MEDICINE

## 2022-04-25 RX ORDER — SODIUM CHLORIDE 9 MG/ML
INJECTION, SOLUTION INTRAVENOUS CONTINUOUS PRN
Status: DISCONTINUED | OUTPATIENT
Start: 2022-04-25 | End: 2022-04-25

## 2022-04-25 RX ORDER — LIDOCAINE HYDROCHLORIDE 20 MG/ML
INJECTION INTRAVENOUS
Status: DISCONTINUED | OUTPATIENT
Start: 2022-04-25 | End: 2022-04-25

## 2022-04-25 RX ORDER — PROPOFOL 10 MG/ML
VIAL (ML) INTRAVENOUS
Status: DISCONTINUED | OUTPATIENT
Start: 2022-04-25 | End: 2022-04-25

## 2022-04-25 RX ORDER — LIDOCAINE HYDROCHLORIDE 20 MG/ML
INJECTION, SOLUTION EPIDURAL; INFILTRATION; INTRACAUDAL; PERINEURAL
Status: DISCONTINUED
Start: 2022-04-25 | End: 2022-04-25 | Stop reason: HOSPADM

## 2022-04-25 RX ORDER — PROPOFOL 10 MG/ML
INJECTION, EMULSION INTRAVENOUS
Status: DISCONTINUED
Start: 2022-04-25 | End: 2022-04-25 | Stop reason: HOSPADM

## 2022-04-25 RX ADMIN — SODIUM CHLORIDE: 9 INJECTION, SOLUTION INTRAVENOUS at 08:04

## 2022-04-25 RX ADMIN — PROPOFOL 80 MG: 10 INJECTION, EMULSION INTRAVENOUS at 08:04

## 2022-04-25 RX ADMIN — PROPOFOL 40 MG: 10 INJECTION, EMULSION INTRAVENOUS at 08:04

## 2022-04-25 RX ADMIN — LIDOCAINE HYDROCHLORIDE 100 MG: 20 INJECTION, SOLUTION INTRAVENOUS at 08:04

## 2022-04-25 RX ADMIN — PROPOFOL 40 MG: 10 INJECTION, EMULSION INTRAVENOUS at 09:04

## 2022-04-25 NOTE — PROVATION PATIENT INSTRUCTIONS
Discharge Summary/Instructions after an Endoscopic Procedure  Patient Name: Zaira Dowell  Patient MRN: [id number]  Patient YOB: 1970 Monday, April 25, 2022  Ernesto Auguste MD  Dear patient,  As a result of recent federal legislation (The Federal Cures Act), you may   receive lab or pathology results from your procedure in your [facility]   account before your physician is able to contact you. Your physician or   their representative will relay the results to you with their   recommendations at their soonest availability.  Thank you,  RESTRICTIONS:  During your procedure today, you received medications for sedation.  These   medications may affect your judgment, balance and coordination.  Therefore,   for 24 hours, you have the following restrictions:   - DO NOT drive a car, operate machinery, make legal/financial decisions,   sign important papers or drink alcohol.    ACTIVITY:  Today: no heavy lifting, straining or running due to procedural   sedation/anesthesia.  The following day: return to full activity including work.  DIET:  Eat and drink normally unless instructed otherwise.     TREATMENT FOR COMMON SIDE EFFECTS:  - Mild abdominal pain, nausea, belching, bloating or excessive gas:  rest,   eat lightly and use a heating pad.  - Sore Throat: treat with throat lozenges and/or gargle with warm salt   water.  - Because air was used during the procedure, expelling large amounts of air   from your rectum or belching is normal.  - If a bowel prep was taken, you may not have a bowel movement for 1-3 days.    This is normal.  SYMPTOMS TO WATCH FOR AND REPORT TO YOUR PHYSICIAN:  1. Abdominal pain or bloating, other than gas cramps.  2. Chest pain.  3. Back pain.  4. Signs of infection such as: chills or fever occurring within 24 hours   after the procedure.  5. Rectal bleeding, which would show as bright red, maroon, or black stools.   (A tablespoon of blood from the rectum is not serious, especially  if   hemorrhoids are present.)  6. Vomiting.  7. Weakness or dizziness.  GO DIRECTLY TO THE NEAREST EMERGENCY ROOM IF YOU HAVE ANY OF THE FOLLOWING:      Difficulty breathing              Chills and/or fever over 101 F   Persistent vomiting and/or vomiting blood   Severe abdominal pain   Severe chest pain   Black, tarry stools   Bleeding- more than one tablespoon   Any other symptom or condition that you feel may need urgent attention  Your doctor recommends these additional instructions:  If any biopsies were taken, your doctors clinic will contact you in 1 to 2   weeks with any results.  - Patient has a contact number available for emergencies.  The signs and   symptoms of potential delayed complications were discussed with the   patient.  Return to normal activities tomorrow.  Written discharge   instructions were provided to the patient.   - Discharge patient to home.   - Resume previous diet.   - Continue present medications.   - Await pathology results.   - Repeat colonoscopy in 5 years for surveillance.   For questions, problems or results please call your physician - Ernesto Auguste MD at Work:  [phone].  [facility] can be reached at [phone]     IF A COMPLICATION OR EMERGENCY SITUATION ARISES AND YOU ARE UNABLE TO REACH   YOUR PHYSICIAN - GO DIRECTLY TO THE EMERGENCY ROOM.  Ernesto Auguste MD  4/25/2022 9:11:35 AM  This report has been verified and signed electronically.  Dear patient,  As a result of recent federal legislation (The Federal Cures Act), you may   receive lab or pathology results from your procedure in your [facility]   account before your physician is able to contact you. Your physician or   their representative will relay the results to you with their   recommendations at their soonest availability.  Thank you,  PROVATION

## 2022-04-25 NOTE — TRANSFER OF CARE
Anesthesia Transfer of Care Note    Patient: Zaira Dowell    Procedure(s) Performed: Procedure(s) (LRB):  COLONOSCOPY (N/A)    Patient location: GI    Anesthesia Type: general    Transport from OR: Transported from OR on room air with adequate spontaneous ventilation    Post pain: adequate analgesia    Post assessment: no apparent anesthetic complications and tolerated procedure well    Post vital signs: stable    Level of consciousness: awake    Nausea/Vomiting: no nausea/vomiting    Complications: none    Transfer of care protocol was followed      Last vitals:   Visit Vitals  /78   Pulse 69   Temp 36.5 °C (97.7 °F)   Resp 18   LMP  (LMP Unknown)   SpO2 99%   Breastfeeding No

## 2022-04-25 NOTE — H&P
Short Stay Endoscopy History and Physical    PCP - Cyril Saucedo MD    Procedure - Colonoscopy  Sedation: GA  ASA - per anesthesia  Mallampati - per anesthesia  History of Anesthesia problems - no  Family history Anesthesia problems -  no     HPI:  This is a 51 y.o. female here for evaluation of : Screening for CRC    Reflux - no  Dysphagia - no  Abdominal pain - no  Diarrhea - no    ROS:  Constitutional: No fevers, chills, No weight loss  ENT: No allergies  CV: No chest pain  Pulm: No cough, No shortness of breath  Ophtho: No vision changes  GI: see HPI  Medical History:  has a past medical history of Anxiety, Arthritis, Asthma, Back pain, Bipolar disorder, BMI 50.0-59.9, adult, Chronic bronchitis, Chronic obstructive pulmonary disease, unspecified COPD type (3/21/2022), Depression, Fibromyalgia, GERD (gastroesophageal reflux disease), Insomnia (11/23/2021), Obesity, SHARON (obstructive sleep apnea), Respiratory failure, and Tobacco dependence.    Surgical History:  has a past surgical history that includes Cholecystectomy; pinched nerve; Shoulder surgery; Knee surgery; Wrist surgery; Carpal tunnel release; Tonsillectomy; Esophagogastroduodenoscopy (N/A, 5/21/2019); Hysterectomy; and Laparoscopic sleeve gastrectomy (N/A, 10/18/2019).    Family History: family history includes Page's disease in her mother; COPD in her maternal grandfather; Depression in her father and maternal grandmother; Diabetes in her father and mother; Heart disease in her father, maternal grandfather, maternal grandmother, paternal grandfather, and paternal grandmother; Hypertension in her father, mother, and son; Kidney disease in her paternal grandmother; Mental illness in her father; No Known Problems in her brother; Rectal cancer in her paternal aunt; Stroke in her father and maternal grandfather.. Otherwise no colon cancer, inflammatory bowel disease, or GI malignancies.    Social History:  reports that she has been smoking vaping with  nicotine. She has a 15.00 pack-year smoking history. She has never used smokeless tobacco. She reports that she does not drink alcohol and does not use drugs.    Review of patient's allergies indicates:   Allergen Reactions    Hydrocodone-acetaminophen Nausea And Vomiting       Medications:   Medications Prior to Admission   Medication Sig Dispense Refill Last Dose    albuterol (PROVENTIL/VENTOLIN HFA) 90 mcg/actuation inhaler Inhale 2 puffs into the lungs every 6 (six) hours as needed for Wheezing. Rescue 18 g 2     b complex vitamins tablet Take 1 tablet by mouth once daily.       buPROPion (WELLBUTRIN SR) 100 MG TBSR 12 hr tablet Take 2 tabs q am, and 1 tab daily at noon 270 tablet 3     calcium citrate (CALCITRATE) 200 mg (950 mg) tablet Take 1 tablet by mouth 2 (two) times daily.       cyanocobalamin 500 MCG tablet Take 500 mcg by mouth once daily.       dexAMETHasone (DECADRON) 1 MG Tab Take 1 tablet (1 mg total) by mouth every evening. Take at 11 PM the night before labs to be drawn. 1 tablet 0     ferrous fumarate/vit Bcomp,C (SUPER B COMPLEX ORAL) Take 1 capsule by mouth once daily.       FLUARIX QUAD [id number], PF, 60 mcg (15 mcg x 4)/0.5 mL Syrg        fluticasone propionate (FLONASE) 50 mcg/actuation nasal spray 2 sprays (100 mcg total) by Each Nostril route once daily. 15.8 mL 1     fluticasone-salmeterol diskus inhaler 250-50 mcg Inhale 1 puff into the lungs 2 (two) times daily. 60 each 3     furosemide (LASIX) 40 MG tablet TAKE 1 TABLET EVERY DAY 90 tablet 1     gabapentin (NEURONTIN) 300 MG capsule Take one capsule (300 mg) by mouth in the morning; take three capsules (900 mg total) by mouth nightly 120 capsule 0     lithium (ESKALITH) 300 MG capsule Take 1 capsule (300 mg total) by mouth 3 (three) times daily with meals. 270 capsule 3     loratadine (CLARITIN) 10 mg tablet Take 1 tablet (10 mg total) by mouth once daily. for 20 days 60 tablet 0     LORazepam (ATIVAN) 0.5 MG tablet  Take total of 2 tabs daily as directed. 60 tablet 0     meclizine (ANTIVERT) 25 mg tablet Take 1 tablet (25 mg total) by mouth 3 (three) times daily as needed for Dizziness or Nausea. 30 tablet 0     multivitamin capsule Take 1 capsule by mouth 2 (two) times daily.        omeprazole (PRILOSEC) 40 MG capsule TAKE 1 CAPSULE TWICE DAILY BEFORE MEALS 180 capsule 3     ondansetron (ZOFRAN-ODT) 8 MG TbDL Take 1 tablet (8 mg total) by mouth 2 (two) times daily as needed (nausea). 30 tablet 1     potassium chloride (KLOR-CON) 10 MEQ TbSR Take 10 mEq by mouth 2 (two) times daily.       promethazine (PHENERGAN) 12.5 MG Tab promethazine Take half tablet (oral) 2 times per day PRN - Nausea . Medication may cause drowsiness. [id number] tablet 2 times per day oral No set duration recorded No set duration amount recorded active 12.5 mg       risperiDONE (RISPERDAL) 0.5 MG Tab Take 1 tablet (0.5 mg total) by mouth 2 (two) times daily. 180 tablet 1     sertraline (ZOLOFT) 100 MG tablet Take 2 tablets (200 mg total) by mouth once daily. 180 tablet 2     tolterodine (DETROL LA) 4 MG 24 hr capsule Take 1 capsule (4 mg total) by mouth once daily. 30 capsule 11     topiramate (TOPAMAX) 200 MG Tab TAKE 1 TABLET EVERY DAY 90 tablet 1        Objective Findings:    Vital Signs: Per nursing notes.    Physical Exam:  General Appearance: Well appearing in no acute distress  Head:   Normocephalic, without obvious abnormality  Eyes:    No scleral icterus  Airway: Open  Neck: No restriction in mobility  Lungs: CTA bilaterally in anterior and posterior fields, no wheezes, no crackles.  Heart:  Regular rate and rhythm, S1, S2 normal, no murmurs heard  Abdomen: Soft, non tender, non distended      Labs:  Lab Results   Component Value Date    WBC 7.89 03/29/2022    HGB 14.1 03/29/2022    HCT 44.3 03/29/2022     03/29/2022    CHOL 154 03/29/2022    TRIG 87 03/29/2022    HDL 58 03/29/2022    ALT 77 (H) 03/29/2022    AST 18 03/29/2022      03/29/2022    K 3.8 03/29/2022     03/29/2022    CREATININE 0.9 03/29/2022    BUN 13 03/29/2022    CO2 25 03/29/2022    TSH 3.180 03/29/2022    INR 0.9 09/25/2019    HGBA1C 4.8 03/29/2022         I have explained the risks and benefits of endoscopy procedures to the patient including but not limited to bleeding, perforation, infection, and death.    Thank you so much for allowing me to participate in the care of Zaira Auguste MD

## 2022-04-26 ENCOUNTER — CLINICAL SUPPORT (OUTPATIENT)
Dept: REHABILITATION | Facility: HOSPITAL | Age: 52
End: 2022-04-26
Attending: INTERNAL MEDICINE
Payer: MEDICARE

## 2022-04-26 ENCOUNTER — PATIENT MESSAGE (OUTPATIENT)
Dept: GASTROENTEROLOGY | Facility: CLINIC | Age: 52
End: 2022-04-26
Payer: MEDICARE

## 2022-04-26 ENCOUNTER — TELEPHONE (OUTPATIENT)
Dept: GASTROENTEROLOGY | Facility: CLINIC | Age: 52
End: 2022-04-26
Payer: MEDICARE

## 2022-04-26 DIAGNOSIS — M62.81 WEAKNESS OF TRUNK MUSCULATURE: ICD-10-CM

## 2022-04-26 DIAGNOSIS — M79.7 FIBROMYALGIA: ICD-10-CM

## 2022-04-26 DIAGNOSIS — M53.86 DECREASED RANGE OF MOTION OF LUMBAR SPINE: ICD-10-CM

## 2022-04-26 LAB
FINAL PATHOLOGIC DIAGNOSIS: NORMAL
GROSS: NORMAL
Lab: NORMAL

## 2022-04-26 PROCEDURE — 97161 PT EVAL LOW COMPLEX 20 MIN: CPT | Mod: PN

## 2022-04-26 PROCEDURE — 97110 THERAPEUTIC EXERCISES: CPT | Mod: PN

## 2022-04-26 NOTE — TELEPHONE ENCOUNTER
----- Message from Ernesto Auguste MD sent at 4/26/2022 11:18 AM CDT -----  Please notify patient, the colon polyp was benign.

## 2022-04-26 NOTE — TELEPHONE ENCOUNTER
Spoke with patient , results given as written by Dr. Auguste  Pt verbalizes understadning and appreciates the call.  Thanks MA

## 2022-04-27 PROBLEM — M62.81 WEAKNESS OF TRUNK MUSCULATURE: Status: ACTIVE | Noted: 2022-04-27

## 2022-04-27 PROBLEM — M53.86 DECREASED RANGE OF MOTION OF LUMBAR SPINE: Status: ACTIVE | Noted: 2022-04-27
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
## 2022-04-29 NOTE — ANESTHESIA PREPROCEDURE EVALUATION
04/29/2022  Zaira Dowell is a 51 y.o., female.      Pre-op Assessment          Review of Systems  Anesthesia Hx:  No problems with previous Anesthesia    Social:  Smoker    Cardiovascular:   Denies Pacemaker.  Denies CABG/stent.     Pulmonary:   COPD Asthma Sleep Apnea    Renal/:  Renal/ Normal     Hepatic/GI:   Bowel Prep. Denies PUD. GERD Denies Liver Disease. Denies Hepatitis.    Neurological:   Denies CVA. Denies Seizures.    Endocrine:   Denies Diabetes.  Morbid Obesity / BMI > 40  Psych:   Psychiatric History          Physical Exam  General: Well nourished, Cooperative, Alert and Oriented    Airway:  Mallampati: III   Mouth Opening: Normal  TM Distance: Normal  Tongue: Normal  Neck ROM: Normal ROM        Anesthesia Plan  Type of Anesthesia, risks & benefits discussed:    Anesthesia Type: Gen Natural Airway  Intra-op Monitoring Plan: Standard ASA Monitors  Induction:  IV  Informed Consent: Informed consent signed with the Patient and all parties understand the risks and agree with anesthesia plan.  All questions answered.   ASA Score: 3  Day of Surgery Review of History & Physical: H&P Update referred to the surgeon/provider.    Ready For Surgery From Anesthesia Perspective.     .

## 2022-04-29 NOTE — ANESTHESIA POSTPROCEDURE EVALUATION
Anesthesia Post Evaluation    Patient: Zaira Dowell    Procedure(s) Performed: Procedure(s) (LRB):  COLONOSCOPY (N/A)    Final Anesthesia Type: general      Patient location during evaluation: GI PACU  Patient participation: Yes- Able to Participate  Level of consciousness: awake and alert  Post-procedure vital signs: reviewed and stable  Pain management: adequate  Airway patency: patent    PONV status at discharge: No PONV  Anesthetic complications: no      Cardiovascular status: blood pressure returned to baseline and hemodynamically stable  Respiratory status: unassisted and spontaneous ventilation  Hydration status: euvolemic  Follow-up not needed.          Vitals Value Taken Time   /68 04/25/22 0950   Temp 36.5 °C (97.7 °F) 04/25/22 0907   Pulse 61 04/25/22 0950   Resp 18 04/25/22 0950   SpO2 99 % 04/25/22 0950         Event Time   Out of Recovery 09:55:00         Pain/Tima Score: No data recorded

## 2022-05-03 ENCOUNTER — OFFICE VISIT (OUTPATIENT)
Dept: RHEUMATOLOGY | Facility: CLINIC | Age: 52
End: 2022-05-03
Payer: MEDICARE

## 2022-05-03 ENCOUNTER — PATIENT MESSAGE (OUTPATIENT)
Dept: RHEUMATOLOGY | Facility: CLINIC | Age: 52
End: 2022-05-03

## 2022-05-03 VITALS
HEART RATE: 74 BPM | RESPIRATION RATE: 20 BRPM | HEIGHT: 67 IN | WEIGHT: 259.5 LBS | OXYGEN SATURATION: 97 % | SYSTOLIC BLOOD PRESSURE: 113 MMHG | DIASTOLIC BLOOD PRESSURE: 77 MMHG | BODY MASS INDEX: 40.73 KG/M2

## 2022-05-03 DIAGNOSIS — M79.7 FIBROMYALGIA: Primary | ICD-10-CM

## 2022-05-03 DIAGNOSIS — Z71.89 COUNSELING AND COORDINATION OF CARE: ICD-10-CM

## 2022-05-03 DIAGNOSIS — M15.9 PRIMARY OSTEOARTHRITIS INVOLVING MULTIPLE JOINTS: ICD-10-CM

## 2022-05-03 DIAGNOSIS — E66.01 CLASS 3 SEVERE OBESITY WITH BODY MASS INDEX (BMI) OF 40.0 TO 44.9 IN ADULT, UNSPECIFIED OBESITY TYPE, UNSPECIFIED WHETHER SERIOUS COMORBIDITY PRESENT: ICD-10-CM

## 2022-05-03 PROCEDURE — 3008F PR BODY MASS INDEX (BMI) DOCUMENTED: ICD-10-PCS | Mod: CPTII,S$GLB,, | Performed by: INTERNAL MEDICINE

## 2022-05-03 PROCEDURE — 1159F PR MEDICATION LIST DOCUMENTED IN MEDICAL RECORD: ICD-10-PCS | Mod: CPTII,S$GLB,, | Performed by: INTERNAL MEDICINE

## 2022-05-03 PROCEDURE — 99999 PR PBB SHADOW E&M-EST. PATIENT-LVL IV: CPT | Mod: PBBFAC,,, | Performed by: INTERNAL MEDICINE

## 2022-05-03 PROCEDURE — 99999 PR PBB SHADOW E&M-EST. PATIENT-LVL IV: ICD-10-PCS | Mod: PBBFAC,,, | Performed by: INTERNAL MEDICINE

## 2022-05-03 PROCEDURE — 3078F DIAST BP <80 MM HG: CPT | Mod: CPTII,S$GLB,, | Performed by: INTERNAL MEDICINE

## 2022-05-03 PROCEDURE — 3078F PR MOST RECENT DIASTOLIC BLOOD PRESSURE < 80 MM HG: ICD-10-PCS | Mod: CPTII,S$GLB,, | Performed by: INTERNAL MEDICINE

## 2022-05-03 PROCEDURE — 99215 OFFICE O/P EST HI 40 MIN: CPT | Mod: GC,S$GLB,, | Performed by: INTERNAL MEDICINE

## 2022-05-03 PROCEDURE — 3074F SYST BP LT 130 MM HG: CPT | Mod: CPTII,S$GLB,, | Performed by: INTERNAL MEDICINE

## 2022-05-03 PROCEDURE — 1159F MED LIST DOCD IN RCRD: CPT | Mod: CPTII,S$GLB,, | Performed by: INTERNAL MEDICINE

## 2022-05-03 PROCEDURE — 99215 PR OFFICE/OUTPT VISIT, EST, LEVL V, 40-54 MIN: ICD-10-PCS | Mod: GC,S$GLB,, | Performed by: INTERNAL MEDICINE

## 2022-05-03 PROCEDURE — 3008F BODY MASS INDEX DOCD: CPT | Mod: CPTII,S$GLB,, | Performed by: INTERNAL MEDICINE

## 2022-05-03 PROCEDURE — 3074F PR MOST RECENT SYSTOLIC BLOOD PRESSURE < 130 MM HG: ICD-10-PCS | Mod: CPTII,S$GLB,, | Performed by: INTERNAL MEDICINE

## 2022-05-03 PROCEDURE — 3044F HG A1C LEVEL LT 7.0%: CPT | Mod: CPTII,S$GLB,, | Performed by: INTERNAL MEDICINE

## 2022-05-03 PROCEDURE — 3044F PR MOST RECENT HEMOGLOBIN A1C LEVEL <7.0%: ICD-10-PCS | Mod: CPTII,S$GLB,, | Performed by: INTERNAL MEDICINE

## 2022-05-03 RX ORDER — PREGABALIN 50 MG/1
50 CAPSULE ORAL 2 TIMES DAILY
Qty: 60 CAPSULE | Refills: 6 | Status: SHIPPED | OUTPATIENT
Start: 2022-05-03 | End: 2022-10-31 | Stop reason: SDUPTHER

## 2022-05-03 RX ORDER — CYCLOBENZAPRINE HCL 5 MG
5 TABLET ORAL 3 TIMES DAILY PRN
Qty: 60 TABLET | Refills: 3 | Status: SHIPPED | OUTPATIENT
Start: 2022-05-03 | End: 2022-10-31 | Stop reason: SDUPTHER
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
## 2022-05-03 NOTE — PROGRESS NOTES
Patient seen with medical student    52 yo F with Fibromyalgia and OA  - active  - switch Gabapentin to Lyrica   - add Flexeril, SE discussed  - Tylenol PRN   - sleep hygiene and stress management discussed  - wt loss  - avoid NSAIDs due to Hx of gastric sleeve  - reassurance and exercise

## 2022-05-05 ENCOUNTER — PATIENT OUTREACH (OUTPATIENT)
Dept: ADMINISTRATIVE | Facility: HOSPITAL | Age: 52
End: 2022-05-05
Payer: MEDICARE

## 2022-05-05 ENCOUNTER — PATIENT MESSAGE (OUTPATIENT)
Dept: BARIATRICS | Facility: CLINIC | Age: 52
End: 2022-05-05
Payer: MEDICARE

## 2022-05-05 RX ORDER — DEXAMETHASONE 0.5 MG/1
TABLET ORAL
COMMUNITY
Start: 2022-04-08 | End: 2022-06-28

## 2022-05-05 RX ORDER — POLYETHYLENE GLYCOL-3350 AND ELECTROLYTES 236; 6.74; 5.86; 2.97; 22.74 G/274.31G; G/274.31G; G/274.31G; G/274.31G; G/274.31G
POWDER, FOR SOLUTION ORAL
COMMUNITY
Start: 2022-03-23 | End: 2023-01-10

## 2022-05-05 NOTE — PROGRESS NOTES
[facility] Colorectal Screening gap report - colonoscopy completed 04/25/22.

## 2022-05-10 ENCOUNTER — PATIENT MESSAGE (OUTPATIENT)
Dept: BARIATRICS | Facility: CLINIC | Age: 52
End: 2022-05-10
Payer: MEDICARE

## 2022-05-11 ENCOUNTER — LAB VISIT (OUTPATIENT)
Dept: LAB | Facility: HOSPITAL | Age: 52
End: 2022-05-11
Payer: MEDICARE

## 2022-05-11 ENCOUNTER — OFFICE VISIT (OUTPATIENT)
Dept: BARIATRICS | Facility: CLINIC | Age: 52
End: 2022-05-11
Payer: MEDICARE

## 2022-05-11 VITALS
HEIGHT: 67 IN | SYSTOLIC BLOOD PRESSURE: 112 MMHG | HEART RATE: 65 BPM | DIASTOLIC BLOOD PRESSURE: 76 MMHG | OXYGEN SATURATION: 99 % | BODY MASS INDEX: 40.24 KG/M2 | WEIGHT: 256.38 LBS

## 2022-05-11 DIAGNOSIS — Z98.84 S/P BARIATRIC SURGERY: Primary | ICD-10-CM

## 2022-05-11 DIAGNOSIS — K21.9 GASTROESOPHAGEAL REFLUX DISEASE, UNSPECIFIED WHETHER ESOPHAGITIS PRESENT: ICD-10-CM

## 2022-05-11 DIAGNOSIS — D53.9 NUTRITIONAL ANEMIA, UNSPECIFIED: ICD-10-CM

## 2022-05-11 DIAGNOSIS — Z98.84 S/P BARIATRIC SURGERY: ICD-10-CM

## 2022-05-11 LAB
25(OH)D3+25(OH)D2 SERPL-MCNC: 39 NG/ML (ref 30–96)
ALBUMIN SERPL BCP-MCNC: 3.7 G/DL (ref 3.5–5.2)
ALP SERPL-CCNC: 54 U/L (ref 55–135)
ALT SERPL W/O P-5'-P-CCNC: 18 U/L (ref 10–44)
ANION GAP SERPL CALC-SCNC: 9 MMOL/L (ref 8–16)
AST SERPL-CCNC: 15 U/L (ref 10–40)
BASOPHILS # BLD AUTO: 0.05 K/UL (ref 0–0.2)
BASOPHILS NFR BLD: 0.7 % (ref 0–1.9)
BILIRUB SERPL-MCNC: 0.4 MG/DL (ref 0.1–1)
BUN SERPL-MCNC: 16 MG/DL (ref 6–20)
CALCIUM SERPL-MCNC: 9.2 MG/DL (ref 8.7–10.5)
CHLORIDE SERPL-SCNC: 113 MMOL/L (ref 95–110)
CO2 SERPL-SCNC: 18 MMOL/L (ref 23–29)
CREAT SERPL-MCNC: 0.8 MG/DL (ref 0.5–1.4)
DIFFERENTIAL METHOD: NORMAL
EOSINOPHIL # BLD AUTO: 0.2 K/UL (ref 0–0.5)
EOSINOPHIL NFR BLD: 2.6 % (ref 0–8)
ERYTHROCYTE [DISTWIDTH] IN BLOOD BY AUTOMATED COUNT: 12.1 % (ref 11.5–14.5)
EST. GFR  (AFRICAN AMERICAN): >60 ML/MIN/1.73 M^2
EST. GFR  (NON AFRICAN AMERICAN): >60 ML/MIN/1.73 M^2
GLUCOSE SERPL-MCNC: 80 MG/DL (ref 70–110)
HCT VFR BLD AUTO: 40.3 % (ref 37–48.5)
HGB BLD-MCNC: 13.4 G/DL (ref 12–16)
IMM GRANULOCYTES # BLD AUTO: 0.02 K/UL (ref 0–0.04)
IMM GRANULOCYTES NFR BLD AUTO: 0.3 % (ref 0–0.5)
IRON SERPL-MCNC: 75 UG/DL (ref 30–160)
LYMPHOCYTES # BLD AUTO: 2.4 K/UL (ref 1–4.8)
LYMPHOCYTES NFR BLD: 35.5 % (ref 18–48)
MCH RBC QN AUTO: 30.7 PG (ref 27–31)
MCHC RBC AUTO-ENTMCNC: 33.3 G/DL (ref 32–36)
MCV RBC AUTO: 92 FL (ref 82–98)
MONOCYTES # BLD AUTO: 0.4 K/UL (ref 0.3–1)
MONOCYTES NFR BLD: 5.8 % (ref 4–15)
NEUTROPHILS # BLD AUTO: 3.8 K/UL (ref 1.8–7.7)
NEUTROPHILS NFR BLD: 55.1 % (ref 38–73)
NRBC BLD-RTO: 0 /100 WBC
PLATELET # BLD AUTO: 270 K/UL (ref 150–450)
PMV BLD AUTO: 10.2 FL (ref 9.2–12.9)
POTASSIUM SERPL-SCNC: 3.8 MMOL/L (ref 3.5–5.1)
PROT SERPL-MCNC: 6.9 G/DL (ref 6–8.4)
RBC # BLD AUTO: 4.36 M/UL (ref 4–5.4)
SATURATED IRON: 19 % (ref 20–50)
SODIUM SERPL-SCNC: 140 MMOL/L (ref 136–145)
TOTAL IRON BINDING CAPACITY: 403 UG/DL (ref 250–450)
TRANSFERRIN SERPL-MCNC: 272 MG/DL (ref 200–375)
VIT B12 SERPL-MCNC: 1166 PG/ML (ref 210–950)
WBC # BLD AUTO: 6.85 K/UL (ref 3.9–12.7)

## 2022-05-11 PROCEDURE — 36415 COLL VENOUS BLD VENIPUNCTURE: CPT | Performed by: NURSE PRACTITIONER

## 2022-05-11 PROCEDURE — 80053 COMPREHEN METABOLIC PANEL: CPT | Performed by: NURSE PRACTITIONER

## 2022-05-11 PROCEDURE — 84466 ASSAY OF TRANSFERRIN: CPT | Performed by: NURSE PRACTITIONER

## 2022-05-11 PROCEDURE — 85025 COMPLETE CBC W/AUTO DIFF WBC: CPT | Performed by: NURSE PRACTITIONER

## 2022-05-11 PROCEDURE — 1159F MED LIST DOCD IN RCRD: CPT | Mod: CPTII,S$GLB,, | Performed by: NURSE PRACTITIONER

## 2022-05-11 PROCEDURE — 3074F PR MOST RECENT SYSTOLIC BLOOD PRESSURE < 130 MM HG: ICD-10-PCS | Mod: CPTII,S$GLB,, | Performed by: NURSE PRACTITIONER

## 2022-05-11 PROCEDURE — 3074F SYST BP LT 130 MM HG: CPT | Mod: CPTII,S$GLB,, | Performed by: NURSE PRACTITIONER

## 2022-05-11 PROCEDURE — 1160F RVW MEDS BY RX/DR IN RCRD: CPT | Mod: CPTII,S$GLB,, | Performed by: NURSE PRACTITIONER

## 2022-05-11 PROCEDURE — 99214 OFFICE O/P EST MOD 30 MIN: CPT | Mod: S$GLB,,, | Performed by: NURSE PRACTITIONER

## 2022-05-11 PROCEDURE — 3044F HG A1C LEVEL LT 7.0%: CPT | Mod: CPTII,S$GLB,, | Performed by: NURSE PRACTITIONER

## 2022-05-11 PROCEDURE — 99214 PR OFFICE/OUTPT VISIT, EST, LEVL IV, 30-39 MIN: ICD-10-PCS | Mod: S$GLB,,, | Performed by: NURSE PRACTITIONER

## 2022-05-11 PROCEDURE — 1159F PR MEDICATION LIST DOCUMENTED IN MEDICAL RECORD: ICD-10-PCS | Mod: CPTII,S$GLB,, | Performed by: NURSE PRACTITIONER

## 2022-05-11 PROCEDURE — 99999 PR PBB SHADOW E&M-EST. PATIENT-LVL V: CPT | Mod: PBBFAC,,, | Performed by: NURSE PRACTITIONER

## 2022-05-11 PROCEDURE — 1160F PR REVIEW ALL MEDS BY PRESCRIBER/CLIN PHARMACIST DOCUMENTED: ICD-10-PCS | Mod: CPTII,S$GLB,, | Performed by: NURSE PRACTITIONER

## 2022-05-11 PROCEDURE — 84425 ASSAY OF VITAMIN B-1: CPT | Performed by: NURSE PRACTITIONER

## 2022-05-11 PROCEDURE — 3008F BODY MASS INDEX DOCD: CPT | Mod: CPTII,S$GLB,, | Performed by: NURSE PRACTITIONER

## 2022-05-11 PROCEDURE — 3078F PR MOST RECENT DIASTOLIC BLOOD PRESSURE < 80 MM HG: ICD-10-PCS | Mod: CPTII,S$GLB,, | Performed by: NURSE PRACTITIONER

## 2022-05-11 PROCEDURE — 99999 PR PBB SHADOW E&M-EST. PATIENT-LVL V: ICD-10-PCS | Mod: PBBFAC,,, | Performed by: NURSE PRACTITIONER

## 2022-05-11 PROCEDURE — 82306 VITAMIN D 25 HYDROXY: CPT | Performed by: NURSE PRACTITIONER

## 2022-05-11 PROCEDURE — 3044F PR MOST RECENT HEMOGLOBIN A1C LEVEL <7.0%: ICD-10-PCS | Mod: CPTII,S$GLB,, | Performed by: NURSE PRACTITIONER

## 2022-05-11 PROCEDURE — 3008F PR BODY MASS INDEX (BMI) DOCUMENTED: ICD-10-PCS | Mod: CPTII,S$GLB,, | Performed by: NURSE PRACTITIONER

## 2022-05-11 PROCEDURE — 82607 VITAMIN B-12: CPT | Performed by: NURSE PRACTITIONER

## 2022-05-11 PROCEDURE — 3078F DIAST BP <80 MM HG: CPT | Mod: CPTII,S$GLB,, | Performed by: NURSE PRACTITIONER

## 2022-05-11 NOTE — PROGRESS NOTES
BARIATRIC POST-OPERATIVE FOLLOW UP:    HPI:  51 y.o.-year-old female presents for 2 year follow up.    Denies: nausea, vomiting, abdominal pain, changes in bowel movement pattern, fever, chills, dysphagia, chest pain, and shortness of breath.      ROS:    Review of Systems   Constitutional: Negative for activity change and fatigue.   Respiratory: Negative for cough and shortness of breath.    Cardiovascular: Negative for chest pain, palpitations and leg swelling.   Gastrointestinal: Negative for abdominal pain, nausea and vomiting.   Endocrine: Negative for polydipsia, polyphagia and polyuria.   Genitourinary: Negative for dysuria.   Musculoskeletal: Negative for gait problem.   Skin: Negative for rash.   Allergic/Immunologic: Negative for immunocompromised state.   Neurological: Negative for dizziness, syncope and weakness.   Hematological: Does not bruise/bleed easily.   Psychiatric/Behavioral: Negative for behavioral problems.       EXERCISE:  Increased walking nightly     VITAMINS:  Tolerating     MEDICATIONS/ALLERGIES:  Have been reviewed.    DIET:  Bariatric Regular diet  Protein~2 shakes daily, 1 meal daily   Fluids~ 64+ ozs     PHYSICAL EXAM:  GENERAL: 51 y.o.-year-old female in NAD, A&O x3  VITAL SIGNS:  BP:    CHEST: Clear to auscultation, regular effort.  HEART: Regular rate and rhythm. No murmurs, clicks, or gallops.  ABDOMEN: Bowel sounds present in all four quadrants. Soft, non-tender, non-distended. Well-healing surgical scars are clean, dry, and intact without signs of infection or bleeding.  EXTREMITIES: No clubbing, cyanosis, or edema.      ASSESSMENT:  - Morbid obesity s/p sleeve gastrectomy on 10/2019.  - Co-morbidities: depression, GERD, COPD and SHARON  - Weight loss 81 Lbs  43%EWL  - Exercise regimen walking   - Diet back on track     PLAN:  - Emphasized the importance of regular exercise and adherence to bariatric diet to achieve maximum weight loss.  - Encouraged patient to begin OR continue  regular exercise.  - Follow-up with dietician to reinforce diet.  - Continue daily vitamins and medications.  - RTC in 12 months or sooner if needed.  - Call the office for any issues.  - Check labs today.  - Bariatric Medicine consult

## 2022-05-11 NOTE — PATIENT INSTRUCTIONS
Meal Ideas for Regular Bariatric Diet  *Recipes and products available at [email or web address].com      Breakfast: (15-20g protein)    - Egg white omelet: 2 egg whites or ½ cup Egg Beaters. (Optional proteins: cheese, shrimp, black beans, chicken, sliced turkey) (Optional veggies: tomatoes, salsa, spinach, mushrooms, onions, green peppers, or small slice avocado)     - Egg and sausage: 1 egg or ¼ cup Egg Beaters (any variety), with 1 tanisha or 2 links of Turkey sausage or Veggie breakfast sausage ([employer] or [employer])    - Crust-less breakfast quiche: To make a glass pie dish, mix 4oz part skim Ricotta, 1 cup skim milk, and 2 eggs as your base. Add protein: shredded cheese, sliced lean ham or turkey, turkey chin/sausage. Add veggies: tomato, onion, green onion, mushroom, green pepper, spinach, etc.    - Yogurt parfait: Mix 1 - 6oz container Dannon Light N Fit vanilla yogurt, with ¼ cup crushed unsalted nuts    - Cottage cheese and fruit: ½ cup part-skim cottage cheese or ricotta cheese topped with fresh fruit or sugar free preserves     - Yanet Graham's Vanilla Egg custard* (add 2 Tbsp instant coffee granules to make Cappuccino Custard*)    - Hi-Protein café latte (skim milk, decaf coffee, 1 scoop protein powder). Optional to add Sugar free syrup or extract flavoring.    - Breakfast Lox: spread fat free cream cheese on slices of smoked salmon. Serve over scrambled or egg over easy (sauteed with nonstick cookspray) OR on a cucumber slice    - Eggwhich: Scramble or cook 1 large egg over easy using nonstick cookspray. Place between 2 slices of Portuguese chin and low fat cheese.     Lunch: (20-30g protein)    - ½ cup Black bean soup (Homemade or Progresso), with ¼ cup shredded low-fat cheese. Top with chopped tomato or fresh salsa.     - Lean deli turkey breast and low-fat sliced cheese, mustard or light camacho to moisten, rolled up together, or wrapped in a Griffin lettuce leaf    - Chicken salad made from dinner  leftovers, moisten with low-fat salad dressing or light camacho. Also try leftover salmon, shrimp, tuna or boiled eggs. Serve ½ cup over dark green salad    - Fat-free canned refried beans, topped with ¼ cup shredded low-fat cheese. Top with chopped tomato or fresh salsa.     - Greek salad: Top mixed greens with 1-2oz grilled chicken, tomatoes, red onions, 2-3 kalamata olives, and sprinkle lightly with feta cheese. Spritz with Balsamic vinegar to taste.     - Crust-less lunch quiche: To make a glass pie dish, mix 4oz part skim Ricotta, 1 cup skim milk, and 2 eggs as your base. Add protein: shredded cheese, sliced lean ham or turkey, shrimp, chicken. Add veggies: tomato, onion, green onion, mushroom, green pepper, spinach, artichoke, broccoli, etc.    - Pizza bake: spread a  minh kuldip mushroom with tomato sauce, low-fat shredded mozzarella and turkey pepperoni or [place] chin. Add any veggies. Roast for 10-15 minutes, until cheese melted.     - Cucumber crab bites: Spread ¼ cup crab dip (lump crabmeat + light cream cheese and green onions) over sliced cucumber.     - Chicken with light spinach and artichoke dip*: Puree in : 6oz cooked and drained spinach, 2 cloves garlic, 1 can cannelloni beans, ½ cup chopped green onions, 1 can drained artichoke hearts (not marinated in oil), lemon juice and basil. Mix in 2oz chopped up chicken.    Supper: (20-30g protein)    - Serve grilled fish over dark green salad tossed with low-fat dressing, served with grilled asparagus granado     - Rotisserie chicken salad: served with sliced strawberries, walnuts, fat-free feta cheese crumbles and 1 tbsp Ayalas Own Light Raspberry [place] Vinaigrette    - Shrimp cocktail: Dip cold boiled shrimp in homemade low-sugar cocktail sauce (1/2 cup Erwin One Carb ketchup, 2 tbsp horseradish, 1/4 tsp hot sauce, 1 tsp Worcestershire sauce, 1 tbsp freshly-squeezed lemon juice). Serve with dark green salad, walnuts, and crumbled blue  cheese drizzled with olive oil and Balsamic vinegar    - Tuna Melt: Spread tuna salad onto 2 thick slices of tomato. Top with low-fat cheese and broil until cheese is melted. May also be made with chicken salad of shrimp salad. [place] with different types of cheeses.    - Chicken or beef fajitas (no tortilla, rice, beans, chips). Top meat and veggies w/ fresh salsa, fat free sour cream.     - Homemade low-fat Chili using extra lean ground beef or ground turkey. Top with shredded cheese and salsa as desired. May add dollop fat-free sour cream if desired    - Chicken parmesan: Top chicken breast w/ low sugar marinara sauce, mozzarella cheese and bake until chicken reaches 165*.  Serve w/ spaghetti SQUASH or Kazakh cut green beans    - Dinner Omelet with shrimp or chicken and onion, green peppers and chives.    - No noodle lasagna: Use sliced zucchini or eggplant in place of noodles.  Layer with part skim ricotta cheese and low sugar meat sauce (use very lean ground beef or ground turkey).    - Mexican chicken bake: Bake chunks of chicken breast or thigh with taco seasoning, Pace brand enchilada sauce, green onions and low-fat cheese. Serve with ¼ cup black beans or fat free refried beans topped with chopped tomatoes or salsa.    - Brendan frozen meatballs, simmered in Classico Marinara sauce. Different flavors of salsa or spaghetti sauce create different dishes! Sprinkle with parmesan cheese. Serve with grilled or steamed veggies, or a dark green salad.    - Simmer boneless skinless chicken thigh chunks in Classico Marinara sauce or roasted salsa until tender with chopped onion, bell pepper, garlic, mushrooms, spinach, etc.     - Hamburger or veggie burger, without the bun, dressed the way you like. Served with grilled or steamed veggies.    - Eggplant parmesan: Bake slices of eggplant at 350 degrees for 15 minutes. Layer tomato sauce, sliced eggplant and low-fat mozzarella cheese in a baking dish and cover with  foil. Bake 30-40 more minutes or until bubbly. Uncover and bake at 400 degrees for about 15 more minutes, or until top is slightly crisp.    - Fish tacos: grilled/baked white fish, wrapped in Griffin lettuce leaf, topped with salsa, shredded low-fat cheese, and light coleslaw.    - Chicken genna: Sprinkle chicken w/ 1 tsp of hidden [place] ranch dip mix. Then grill chicken and top with black beans, salsa and 1 tsp fat free sour cream.     - Cauliflower pizza crust: Use cauliflower as crust (see recipe on pinterest, no flour!). Top w/ low fat cheese, turkey pepperoni and veggies and bake again    - chicken or turkey crust pizza: use ground chicken or turkey instead of cauliflower, spread in [place] and bake at 350 for about 20-30 minutes(may want to add garlic, black pepper, oregano and other herbs to ground meat mixture).  Remove and top w/ low fat cheese, turkey pepperoni and veggies and bake again for another 10 minutes or until cheese is browned.     Snacks: (100-200 calories; >5g protein)    - 1 low-fat cheese stick with 8 cherry tomatoes or 1 serving fresh fruit  - 4 thin slices fat-free turkey breast and 1 slice low-fat cheese  - 4 thin slices fat-free honey ham with wedge of melon  - 6-8 edamame pods (equivalent to about 1/4 cup edamame without pods).   - 1/4 cup unsalted nuts with ½ cup fruit  - 6-oz container Dannon Light n Fit vanilla yogurt, topped with 1oz unsalted nuts         - apple, celery or baby carrots spread with 2 Tbsp PB2  - apple slices with 1 oz slice low-fat cheese  - Apple slices dipped in 2 Tbsp of PB2  - celery, cucumber, bell pepper or baby carrots dipped in ¼ cup hummus bean spread or light spinach and artichoke dip (*recipe in lunch section)  - celery, cucumber, baby carrots dipped in high protein greek yogurt (Mix 16 oz plain greek yogurt + 1 packet of hidden valley ranch dip mix)  - Clarence Links Beef Steak - 14g protein! (similar to beef jerky)  - 2 wedges [place] Cow - Light Herb  & Garlic Cheese with sliced cucumber or green bell pepper  - 1/2 cup low-fat cottage cheese with ¼ cup fruit or ¼ cup salsa  - RTD Protein drinks: Atkins, Low Carb Slim Fast, EAS light, Muscle Milk Light, etc.  - Homemade Protein drinks: GNC Soy95, Isopure, Nectar, UNJURY, Whey Gourmet, etc. Mix 1 scoop powder with 8oz skim/1% milk or light soymilk.  - Protein bars: Atkins, EAS, Pure Protein, Think Thin, Detour, etc. Must have 0-4 grams sugar - Read the label.    Takeout Options: No more than twice/week  Deli - Salads (no pasta or rice), meats, cheeses. Roasted chicken. Lox (salmon)    Mexican - Platters which don't include tortillas, chips, or rice. Go easy on the beans. Example: Fajitas without the tortillas. Ask the  not to bring chips to the table if they are too tempting.    Greek - Meat or fish and vegetable, but no bread or rice. Including hummus, baba ganoush, etc, is OK. Most sit-down Greek restaurants can provide you with cucumber slices for dipping instead of emanuel bread.    Fast Food (Avoid as much as possible) - Salads (no croutons and limit salad dressing to 2 tbsp), grilled chicken sandwich without the bun and ask for no camacho. Lorenes low fat chili or Taco Bell pintos and cheese.    BBQ - The meats are fine if you ask for sauces on the side, but most of the traditional side dishes are loaded with carbs. [place] slaw, baked beans and BBQ sauce are typically made with sugar.    Chinese - Nothing deep-fried, no rice or noodles. Many Chinese sauces have starch and sugar in them, so you'll have to use your judgement. If you find that these sauces trigger cravings, or cause Dumping, you can ask for the sauce to be made without sugar or just use soy sauce.

## 2022-05-12 ENCOUNTER — CLINICAL SUPPORT (OUTPATIENT)
Dept: REHABILITATION | Facility: HOSPITAL | Age: 52
End: 2022-05-12
Attending: INTERNAL MEDICINE
Payer: MEDICARE

## 2022-05-12 ENCOUNTER — TELEPHONE (OUTPATIENT)
Dept: BARIATRICS | Facility: CLINIC | Age: 52
End: 2022-05-12
Payer: MEDICARE

## 2022-05-12 DIAGNOSIS — M53.86 DECREASED RANGE OF MOTION OF LUMBAR SPINE: Primary | ICD-10-CM

## 2022-05-12 DIAGNOSIS — M62.81 WEAKNESS OF TRUNK MUSCULATURE: ICD-10-CM

## 2022-05-12 PROCEDURE — 97110 THERAPEUTIC EXERCISES: CPT | Mod: PN

## 2022-05-12 NOTE — TELEPHONE ENCOUNTER
Reached out to pt in regard to scheduling F/U appt with RD. No answer, left VM for pt to call back for assistance with scheduling.

## 2022-05-12 NOTE — PROGRESS NOTES
Physical Therapy Daily Treatment Note     Name: Zaira Dowell  Clinic Number: [id number]    Therapy Diagnosis:   Encounter Diagnoses   Name Primary?    Decreased range of motion of lumbar spine Yes    Weakness of trunk musculature      Physician: Shay Dai MD    Visit Date: 5/12/2022    Physician Orders: PT Eval and Treat   Medical Diagnosis from Referral:   M79.7 (ICD-10-CM) - Fibromyalgia     Evaluation Date: 4/26/2022  Plan of Care Expiration: 7/26/22     Authorization Period Expiration: 4/26/23  Visit # / Visits authorized: 1/ 20         Time In: 1645  Time Out: 1730    Total Billable Time: 45 minutes    Precautions: Standard, Fibromyalgia    Subjective     Pt reports: no significant changes since initial visit.   She was compliant with home exercise program.  Response to previous treatment: fair  Functional change: none    Pain:  Current 7/10  Location: lumbar, left hip     Objective     Zaira received therapeutic exercises to develop strength, endurance, ROM, flexibility, posture and core stabilization for 35 minutes including:     -Nu step x 7 min  -modified prayer with t-ball x 20  -LTR x 20  -PPT x 20  -TrA recruitment 2 x 10  -hip fall out x 20  -sidelying clams 2 x 10  -ball squeeze 2 x 10  -supine resisted hip abd RTB 3 x 10     Zaira received the following manual therapy techniques:  were applied to the: left hip for 10 minutes, including:  -long arc distraction, inferior glides in hooklying, grade 2, STM to left IT band     Education provided:   - HEP compliance    Written Home Exercises Provided: Patient instructed to cont prior HEP.  Exercises were reviewed and Zaira was able to demonstrate them prior to the end of the session.  Zaira demonstrated good  understanding of the education provided.     See EMR under Patient Instructions for exercises provided 5/12/2022.    Assessment     No sign of antalgia or abnormalities with gait today. No c/o increased discomfort with prescribed  activities.   Requires mod cueing with pelvic mobility and abdominal response.  Good response to initiation of stabilization activities.  Zaira is progressing well towards her goals.     Pt prognosis is Good.     Pt will continue to benefit from skilled outpatient physical therapy to address the deficits listed in the problem list box on initial evaluation, provide pt/family education and to maximize pt's level of independence in the home and community environment.     Pt's spiritual, cultural and educational needs considered and pt agreeable to plan of care and goals.     Anticipated barriers to physical therapy: none    Short Term Goals (4 Weeks):     1.Pt to increase strength by a 1/2 grade of muscles test to allow for improvement in functional activities such as performing chores.  2.Pt to improve range of motion by 25% to allow for improved functional mobility to allow for improvement in IADLs.   3.Pt to report compliance with HEP and demonstrate proper exercise technique to PT to show competence with self management of condition.  4.Decrease pain by 25% during functional activities.    Long Term Goals (12 Weeks):     1. Increase ROM to allow improved joint biomechanics during functional activities.   2.Increase trunk and lower extremity strength to within normal limits during functional activities.   3. Independent with home exercise program.   4. Full return to functional activities with manageable complaints.  5. Patient to demonstrate improved posture and body mechanics.  6. Decrease pain by 75% during functional activities.     Plan       Recommended Treatment Plan: 2-3 times per week for 12 weeks with treatments to consist of:  Neuromuscular and postural re-education,  training, therapeutic exercise, therapeutic activities,balance training, gait training, manual therapy, soft tissue mobilization, ROM exercises, Cardiovascular,  Postural stabilization, manual traction, spinal mobilization,  moist heat, cryotherapy, electrical stimulation, ultrasound, home exercise education and planning.    Continue with established  PT Plan of Care towards patient goals.     Bert Wang, PT

## 2022-05-16 RX ORDER — LORAZEPAM 0.5 MG/1
TABLET ORAL
Qty: 60 TABLET | Refills: 3 | Status: SHIPPED | OUTPATIENT
Start: 2022-05-16 | End: 2022-06-29 | Stop reason: SDUPTHER

## 2022-05-17 LAB — VIT B1 BLD-MCNC: 119 UG/L (ref 38–122)

## 2022-05-18 ENCOUNTER — TELEPHONE (OUTPATIENT)
Dept: BARIATRICS | Facility: CLINIC | Age: 52
End: 2022-05-18
Payer: MEDICARE

## 2022-05-18 NOTE — TELEPHONE ENCOUNTER
----- Message from Renee Bravo sent at 5/18/2022  1:19 PM CDT -----  Contact: @[phone]  Pt requesting a call back to schedule an appt with the dietician.  Please call to discuss further.

## 2022-05-18 NOTE — TELEPHONE ENCOUNTER
Phoned patient and scheduled a 2 year virtual follow up with dietician RUBINA Santana for 5/30/2022 at 0830 AM.

## 2022-05-30 ENCOUNTER — CLINICAL SUPPORT (OUTPATIENT)
Dept: BARIATRICS | Facility: CLINIC | Age: 52
End: 2022-05-30
Payer: MEDICARE

## 2022-05-30 DIAGNOSIS — E66.01 MORBID OBESITY WITH BMI OF 40.0-44.9, ADULT: ICD-10-CM

## 2022-05-30 DIAGNOSIS — G47.33 OSA (OBSTRUCTIVE SLEEP APNEA): ICD-10-CM

## 2022-05-30 DIAGNOSIS — Z98.84 S/P BARIATRIC SURGERY: ICD-10-CM

## 2022-05-30 DIAGNOSIS — Z71.3 DIETARY COUNSELING: ICD-10-CM

## 2022-05-30 PROCEDURE — 97803 MED NUTRITION INDIV SUBSEQ: CPT | Mod: 95,GZ,, | Performed by: DIETITIAN, REGISTERED

## 2022-05-30 PROCEDURE — 99499 UNLISTED E&M SERVICE: CPT | Mod: 95,,, | Performed by: DIETITIAN, REGISTERED

## 2022-05-30 PROCEDURE — 99499 NO LOS: ICD-10-PCS | Mod: 95,,, | Performed by: DIETITIAN, REGISTERED

## 2022-05-30 PROCEDURE — 97803 PR MED NUTR THER, SUBSQ, INDIV, EA 15 MIN: ICD-10-PCS | Mod: 95,GZ,, | Performed by: DIETITIAN, REGISTERED

## 2022-05-30 NOTE — PROGRESS NOTES
The patient location is: home ([place])  The chief complaint leading to consultation is: s/p bariatric sx    Visit type: audiovisual    Face to Face time with patient: 15 min  15 minutes of total time spent on the encounter, which includes face to face time and non-face to face time preparing to see the patient (eg, review of tests), Obtaining and/or reviewing separately obtained history, Documenting clinical information in the electronic or other health record, Independently interpreting results (not separately reported) and communicating results to the patient/family/caregiver, or Care coordination (not separately reported).         Each patient to whom he or she provides medical services by telemedicine is:  (1) informed of the relationship between the physician and patient and the respective role of any other health care provider with respect to management of the patient; and (2) notified that he or she may decline to receive medical services by telemedicine and may withdraw from such care at any time.        NUTRITION NOTE    Referring Physician: Dr. Kohli  Reason for MNT Referral: Follow-up 2 years s/p Gastric Sleeve    Denies nausea, vomiting, constipation and diarrhea.  Reports doing well. Pt states her weight was stalled for a while so last week she did 3 days on a liquid diet reboot. She states this did help her weight bump down. She plans to repeat a 3-day liquid diet reboot once out of every 2 weeks. Admits her weakness is eating a few bites of starchy sides with dinner meals and also having a late night snack of chips or snack mix. She recently started walking in the evenings with her .    Past Medical History:   Diagnosis Date    Anxiety     Arthritis     Asthma     Back pain     Bipolar disorder     BMI 50.0-59.9, adult     Chronic bronchitis     Chronic obstructive pulmonary disease, unspecified COPD type 3/21/2022    Depression     Fibromyalgia     GERD (gastroesophageal reflux  disease)     Insomnia 11/23/2021    Obesity     SHARON (obstructive sleep apnea)     Respiratory failure     Tobacco dependence        CLINICAL DATA:  51 y.o. female. 5 ft. 7 in.    Current Weight: 247 lbs  BMI: 40.1  Total Weight Loss: 90 lbs  Excess Weight Loss: 47%    Pt states her personal goal weight is 175 lbs    LABS:  Reviewed.    CURRENT DIET:  Bariatric Diet.  Diet Recall: 80 grams of protein/day; 48 oz of fluids/day    B: Premier shake  Sn: none OR banana  L: Premier shake  D: 3-4oz meat/fish, 2 veg (broccoli, green beans, squash, corn/peas), few bites rice/potato  Sn 10pm-12am: handful of chips or snack mix    Diet includes:  Meal Pattern: 1 meal(s) + 1-2 snack(s) + 2 protein supplement(s)  Adequate protein supplement intake.  Adequate dairy intake.  Adequate vegetable intake. Tolerates raw vegetables and lettuce.  Adequate fruit intake.  Starchy CHO: small amounts, almost daily    EXERCISE:  Walking in the evenings with her     Restrictions to Exercise: None    VITAMINS / MINERALS:  Multivitamins  B-Complex  Calcium Citrate + Vitamin D  Vitamin B12    ASSESSMENT:  Doing well overall.  Weight loss.  Adequate calorie intake.  Adequate protein intake.  Adequate fluid intake.  Following diet appropriately.  Exercising.  Adequate vitamins & minerals.    BARIATRIC DIET DISCUSSION:  Instructed and provided written materials on bariatric diet plan.  Reinforced post-op nutrition guidelines.    PLAN / RECOMMENDATIONS:  Continue excellent diet plan.  Maintain protein intake.  Maintain fluid intake.  Continue exercise.  Continue appropriate vitamins & minerals.    Return to clinic in 1 year.    SESSION TIME: 15 minutes

## 2022-05-30 NOTE — PATIENT INSTRUCTIONS
Menu Plan: 800-1000 Calories;  grams of Protein    DAY 1     Breakfast  ½ cup 2% cottage cheese  ¼ cup fruit (no sugar added)    Snack  2% mozzarella string cheese  10 grapes    Lunch  2oz Lean hamburger or turkey antonio  1 slice low-fat cheese  ¼ cup green beans    Snack  200 calorie low-carb protein drink (4 grams sugar or less)    Dinner  2oz chicken thigh  ¼ cup cooked spinach     Snack  Atkins bar (15g protein)      DAY 2    Breakfast  1 egg with 1oz shredded cheddar cheese and 2T salsa    Snack  200 calorie low-carb protein drink (4 grams sugar or less)    Lunch  Lettuce Wraps: 2oz sliced turkey, 1 slice low-fat Swiss cheese, tomato, and mustard wrapped in a Griffin lettuce leaf    Snack  ½ cup low-fat cottage cheese  Pear cup (no sugar added)    Dinner  2oz baked fish  ½ cup cooked broccoli    Snack  Sugar-free pudding cup      DAY 3    Breakfast   ½ cup low-fat ricotta cheese w/ Splenda to flash  ½ scoop Vanilla protein powder   ¼ cup fresh fruit    Snack  2% string cheese  6 unsalted almonds    Lunch  Tuna/Chicken Salad: 2oz canned tuna/chicken, 1 egg white, and 1 tsp light camacho  Pineapple cup (no sugar added)    Snack  200 calorie low-carb protein drink (4 grams sugar or less)    Dinner  ½ baked pork chop   ¼ cup beans      DAY 4    Breakfast  200 calorie low-carb protein drink (4 grams sugar or less)    Snack  Boiled egg    Lunch  ½ cup grilled shrimp  Salad w/ 2 tbsp crumbled fat-free feta  1 tbsp light vinaigrette    Snack  200 calorie low-carb protein drink (4 grams sugar or less)    Dinner  ¾ cup red beans    Snack  Mini Babybell light      DAY 5    Breakfast  [place] pie: 3oz Greek yogurt, 1 tbsp Splenda, ½ individual pack Crystal Light lemonade. Top with ¼ cup chopped walnuts     Snack  3-4 lean ham or turkey slices, ¼ - ½ cup fruit    Lunch  Fiesta Chicken: 2oz canned chicken, 1oz shredded cheddar cheese, ¼ cup black beans  Top with 2 tbsp salsa and a small dollop light sour  cream    Snack  200 calorie low-carb protein drink (4 grams sugar or less)    Dinner  Omelette: ¼ cup Egg Beaters, 4 large (1oz) shrimp, 1oz shredded low-fat cheese. Add bell pepper, onion, mushrooms, green onions, or salsa, optional.      DAY 6    Breakfast  1 tanisha or 2 links turkey sausage  ½ cup fruit    Snack  200 calorie low-carb protein drink (4 grams sugar or less)    Lunch  Grilled tilapia  Salad of baby spinach leaves with light dressing    Snack  200 calorie low-carb protein drink (4 grams sugar or less)    Dinner  Chicken thigh simmered in 98% fat free cream of mushroom soup  ½ cup cooked green beans      Meal Ideas for Regular Bariatric Diet  *Recipes and products available at [email or web address]      Breakfast: (15-20g protein)    - Egg white omelet: 2 egg whites or ½ cup Egg Beaters. (Optional proteins: cheese, shrimp, black beans, chicken, sliced turkey) (Optional veggies: tomatoes, salsa, spinach, mushrooms, onions, green peppers, or small slice avocado)     - Egg and sausage: 1 egg or ¼ cup Egg Beaters (any variety), with 1 tanisha or 2 links of Turkey sausage or Veggie breakfast sausage (Flipora or "[employer]    - Crust-less breakfast quiche: To make a glass pie dish, mix 4oz part skim Ricotta, 1 cup skim milk, and 2 eggs as your base. Add protein: shredded cheese, sliced lean ham or turkey, turkey chin/sausage. Add veggies: tomato, onion, green onion, mushroom, green pepper, spinach, etc.    - Yogurt parfait: Mix 1 - 6oz container Dannon Light N Fit vanilla yogurt, with ¼ cup crushed unsalted nuts    - Cottage cheese and fruit: ½ cup part-skim cottage cheese or ricotta cheese topped with fresh fruit or sugar free preserves     - Yanet Graham's Vanilla Egg custard* (add 2 Tbsp instant coffee granules to make Cappuccino Custard*)    - Hi-Protein café latte (skim milk, decaf coffee, 1 scoop protein powder). Optional to add Sugar free syrup or extract flavoring.    - Breakfast Lox: spread fat  free cream cheese on slices of smoked salmon. Serve over scrambled or egg over easy (sauteed with nonstick cookspray) OR on a cucumber slice    - Eggwhich: Scramble or cook 1 large egg over easy using nonstick cookspray. Place between 2 slices of Cymraes chin and low fat cheese.     Lunch: (20-30g protein)    - ½ cup Black bean soup (Homemade or Progresso), with ¼ cup shredded low-fat cheese. Top with chopped tomato or fresh salsa.     - Lean deli turkey breast and low-fat sliced cheese, mustard or light camacho to moisten, rolled up together, or wrapped in a Griffin lettuce leaf    - Chicken salad made from dinner leftovers, moisten with low-fat salad dressing or light camacho. Also try leftover salmon, shrimp, tuna or boiled eggs. Serve ½ cup over dark green salad    - Fat-free canned refried beans, topped with ¼ cup shredded low-fat cheese. Top with chopped tomato or fresh salsa.     - Greek salad: Top mixed greens with 1-2oz grilled chicken, tomatoes, red onions, 2-3 kalamata olives, and sprinkle lightly with feta cheese. Spritz with Balsamic vinegar to taste.     - Crust-less lunch quiche: To make a glass pie dish, mix 4oz part skim Ricotta, 1 cup skim milk, and 2 eggs as your base. Add protein: shredded cheese, sliced lean ham or turkey, shrimp, chicken. Add veggies: tomato, onion, green onion, mushroom, green pepper, spinach, artichoke, broccoli, etc.    - Pizza bake: spread a  minh kuldip mushroom with tomato sauce, low-fat shredded mozzarella and turkey pepperoni or [place] chin. Add any veggies. Roast for 10-15 minutes, until cheese melted.     - Cucumber crab bites: Spread ¼ cup crab dip (lump crabmeat + light cream cheese and green onions) over sliced cucumber.     - Chicken with light spinach and artichoke dip*: Puree in : 6oz cooked and drained spinach, 2 cloves garlic, 1 can cannelloni beans, ½ cup chopped green onions, 1 can drained artichoke hearts (not marinated in oil), lemon juice and  basil. Mix in 2oz chopped up chicken.    Supper: (20-30g protein)    - Serve grilled fish over dark green salad tossed with low-fat dressing, served with grilled asparagus granado     - Rotisserie chicken salad: served with sliced strawberries, walnuts, fat-free feta cheese crumbles and 1 tbsp Ayalas Own Light Raspberry [place] Vinaigrette    - Shrimp cocktail: Dip cold boiled shrimp in homemade low-sugar cocktail sauce (1/2 cup Erwin One Carb ketchup, 2 tbsp horseradish, 1/4 tsp hot sauce, 1 tsp Worcestershire sauce, 1 tbsp freshly-squeezed lemon juice). Serve with dark green salad, walnuts, and crumbled blue cheese drizzled with olive oil and Balsamic vinegar    - Tuna Melt: Spread tuna salad onto 2 thick slices of tomato. Top with low-fat cheese and broil until cheese is melted. May also be made with chicken salad of shrimp salad. [place]-Bacavi with different types of cheeses.    - Chicken or beef fajitas (no tortilla, rice, beans, chips). Top meat and veggies w/ fresh salsa, fat free sour cream.     - Homemade low-fat Chili using extra lean ground beef or ground turkey. Top with shredded cheese and salsa as desired. May add dollop fat-free sour cream if desired    - Chicken parmesan: Top chicken breast w/ low sugar marinara sauce, mozzarella cheese and bake until chicken reaches 165*.  Serve w/ spaghetti SQUASH or Turkmen cut green beans    - Dinner Omelet with shrimp or chicken and onion, green peppers and chives.    - No noodle lasagna: Use sliced zucchini or eggplant in place of noodles.  Layer with part skim ricotta cheese and low sugar meat sauce (use very lean ground beef or ground turkey).    - Mexican chicken bake: Bake chunks of chicken breast or thigh with taco seasoning, Pace brand enchilada sauce, green onions and low-fat cheese. Serve with ¼ cup black beans or fat free refried beans topped with chopped tomatoes or salsa.    - Brendan frozen meatballs, simmered in Classico Marimichaela sauce. Different  flavors of salsa or spaghetti sauce create different dishes! Sprinkle with parmesan cheese. Serve with grilled or steamed veggies, or a dark green salad.    - Simmer boneless skinless chicken thigh chunks in Classico Marinara sauce or roasted salsa until tender with chopped onion, bell pepper, garlic, mushrooms, spinach, etc.     - Hamburger or veggie burger, without the bun, dressed the way you like. Served with grilled or steamed veggies.    - Eggplant parmesan: Bake slices of eggplant at 350 degrees for 15 minutes. Layer tomato sauce, sliced eggplant and low-fat mozzarella cheese in a baking dish and cover with foil. Bake 30-40 more minutes or until bubbly. Uncover and bake at 400 degrees for about 15 more minutes, or until top is slightly crisp.    - Fish tacos: grilled/baked white fish, wrapped in Griffin lettuce leaf, topped with salsa, shredded low-fat cheese, and light coleslaw.    - Chicken genna: Sprinkle chicken w/ 1 tsp of hidden valley ranch dip mix. Then grill chicken and top with black beans, salsa and 1 tsp fat free sour cream.     - Cauliflower pizza crust: Use cauliflower as crust ([facility], no flour!). Top w/ low fat cheese, turkey pepperoni and veggies and bake again    - chicken or turkey crust pizza: use ground chicken or turkey instead of cauliflower, spread in [place] and bake at 350 for about 20-30 minutes(may want to add garlic, black pepper, oregano and other herbs to ground meat mixture).  Remove and top w/ low fat cheese, turkey pepperoni and veggies and bake again for another 10 minutes or until cheese is browned.     Snacks: (100-200 calories; >5g protein)    - 1 low-fat cheese stick with 8 cherry tomatoes or 1 serving fresh fruit  - 4 thin slices fat-free turkey breast and 1 slice low-fat cheese  - 4 thin slices fat-free honey ham with wedge of melon  - 6-8 edamame pods (equivalent to about 1/4 cup edamame without pods).   - 1/4 cup unsalted nuts with ½ cup fruit  -  6-oz container Dannon Light n Fit vanilla yogurt, topped with 1oz unsalted nuts         - apple, celery or baby carrots spread with 2 Tbsp PB2  - apple slices with 1 oz slice low-fat cheese  - Apple slices dipped in 2 Tbsp of PB2  - celery, cucumber, bell pepper or baby carrots dipped in ¼ cup hummus bean spread or light spinach and artichoke dip (*recipe in lunch section)  - celery, cucumber, baby carrots dipped in high protein greek yogurt (Mix 16 oz plain greek yogurt + 1 packet of hidden valley ranch dip mix)  - Clarence Links Beef Steak - 14g protein! (similar to beef jerky)  - 2 wedges Laughing Cow - Light Herb & Garlic Cheese with sliced cucumber or green bell pepper  - 1/2 cup low-fat cottage cheese with ¼ cup fruit or ¼ cup salsa  - RTD Protein drinks: Atkins, Low Carb Slim Fast, EAS light, Muscle Milk Light, etc.  - Homemade Protein drinks: [facility] Soy95, Isopure, Nectar, UNJURY, Whey Gourmet, etc. Mix 1 scoop powder with 8oz skim/1% milk or light soymilk.  - Protein bars: Atkins, EAS, Pure Protein, Think Thin, Detour, etc. Must have 0-4 grams sugar - Read the label.    Takeout Options: No more than twice/week  Deli - Salads (no pasta or rice), meats, cheeses. Roasted chicken. Lox (salmon)    Mexican - Platters which don't include tortillas, chips, or rice. Go easy on the beans. Example: Fajitas without the tortillas. Ask the  not to bring chips to the table if they are too tempting.    Greek - Meat or fish and vegetable, but no bread or rice. Including hummus, baba ganoush, etc, is OK. Most sit-down Greek restaurants can provide you with cucumber slices for dipping instead of emanuel bread.    Fast Food (Avoid as much as possible) - Salads (no croutons and limit salad dressing to 2 tbsp), grilled chicken sandwich without the bun and ask for no camacho. Lorenes low fat chili or Taco Bell pintos and cheese.    BBQ - The meats are fine if you ask for sauces on the side, but most of the traditional side dishes are  loaded with carbs. Galileo slaw, baked beans and BBQ sauce are typically made with sugar.    Chinese - Nothing deep-fried, no rice or noodles. Many Chinese sauces have starch and sugar in them, so you'll have to use your judgement. If you find that these sauces trigger cravings, or cause Dumping, you can ask for the sauce to be made without sugar or just use soy sauce.      Snack ideas    Savory  Avocado with chili powder, garlic powder and black pepper  String cheese or cheese cubes/slices  Tuna, chicken or egg salad lettuce wraps  Ham/turkey and cheese roll-up  Turkey jerky  Hard boiled egg  Steamed edamame  Olives, pickles (watch sodium)  Baked zucchini chips with salsa  Cottage cheese with tomatoes & dill  Salad with light dressing & veggies  Nuts and Seeds: Pistachios, almonds, cashews, pecans, sunflower seeds, peanuts, walnuts, pumpkin seeds, hazelnuts, brazil nuts (salt free)    Sweet  Plain yogurt: Triple Zero Oikos, Fage or Powerful with fruit, nuts, seeds, cinnamon  Sugar free jello  Sugar free popsicles  Homemade protein shake  Atkins bars/shakes  Premier protein shakes  Power crunch bar  Emerald cocoa dusted almonds (1/4 cup)    Sweet and Savory  Grilled pineapple and ham skewers  Cottage Cheese with fruit, nuts, seeds, cinnamon  Homemade smoothie with spinach, avocado, frozen fruit & unsweetened vanilla almond milk          Peanut Butter/[place] Butter  Witwith apples, ½ banana, celery, carrots, strawberries        Hummus/Guacamole/Light Cream Cheese/Greek yogurt + Ranch powder mix        cucumber, carrots, celery, bell pepper, tomato, cauliflower, broccoli, snap peas, green        beans, hard boiled egg, turkey pepperoni slices, salami slices, ham, grilled chicken                           Tips when Cravings Hit:  Drink water or sugar free Crystal Light when a craving begins.  Avoid eating blind: pre-portion foods instead of leaving them in their original package.  Eat without distractions: phone, tv,  laptop etc.    Eat slowly, chew foods well (30 times).  Find snacks high in protein to keep you full longer.

## 2022-05-31 ENCOUNTER — CLINICAL SUPPORT (OUTPATIENT)
Dept: REHABILITATION | Facility: HOSPITAL | Age: 52
End: 2022-05-31
Attending: INTERNAL MEDICINE
Payer: MEDICARE

## 2022-05-31 DIAGNOSIS — M62.81 WEAKNESS OF TRUNK MUSCULATURE: ICD-10-CM

## 2022-05-31 DIAGNOSIS — M53.86 DECREASED RANGE OF MOTION OF LUMBAR SPINE: Primary | ICD-10-CM

## 2022-05-31 PROCEDURE — 97110 THERAPEUTIC EXERCISES: CPT | Mod: PN

## 2022-05-31 PROCEDURE — 97140 MANUAL THERAPY 1/> REGIONS: CPT | Mod: PN

## 2022-05-31 NOTE — PROGRESS NOTES
Physical Therapy Daily Progress Note     Name: Zaira Dowell  Clinic Number: [id number]    Therapy Diagnosis:   Encounter Diagnoses   Name Primary?    Decreased range of motion of lumbar spine Yes    Weakness of trunk musculature      Physician: Shay Dai MD    Visit Date: 5/31/2022    Physician Orders: PT Eval and Treat   Medical Diagnosis from Referral:   M79.7 (ICD-10-CM) - Fibromyalgia     Evaluation Date: 4/26/2022  Plan of Care Expiration: 7/26/22  Progress Note Due: 07/01/2022  Authorization Period Expiration: 4/26/23  Visit # / Visits authorized: 2/ 20         Time In: 1645  Time Out: 1730    Total Billable Time: 45 minutes    Precautions: Standard, Fibromyalgia    Subjective     Pt reports: that her father had a stroke, which is why she missed her appointments. States that her lower back and Left hip are still hurting  She was compliant with home exercise program.  Response to previous treatment: fair  Functional change: none    Pain:  Current 6/10  Location: lumbar, left hip     Objective   Observation: Displays antalgic gait pattern      Lumbar Range of Motion:    Degrees Pain   Flexion 50   painful        Extension 10   Painful        Left Side Bending 10 Painful        Right Side Bending 10 Painful          Lower Extremity Strength  Right LE  Left LE    Knee extension: 5/5 Knee extension: 5/5   Knee flexion: 4/5 Knee flexion: 4/5   Hip flexion: 3/5 Hip flexion: 3/5   Hip extension:  4/5 Hip extension: 4/5   Hip abduction: 4/5 Hip abduction: 4/5   Hip adduction: 4/5 Hip adduction 4/5   Ankle dorsiflexion: 5/5 Ankle dorsiflexion: 5/5   Ankle plantarflexion: 5/5 Ankle plantarflexion: 5/5         Special Tests:  -Scour: R = (-)    L = (-)  -Quadrant: R = (-)          L = (-)  -SLR:  R = (-)   L = (-)  -SIJ Compression: (-)  -SIJ Distraction: (-)  -Leg Length Discrepancy: (+) Left posterior innominate    Joint Mobility: NT    Palpation: TTP about lumbar spine Paraspinals, QL, and  glutes    Sensation: intact       Objective         Zaira received therapeutic exercises to develop strength, endurance, ROM, flexibility, posture and core stabilization for 35 minutes including:     Progress Note Measurements  -Nu step x 6 min  -LTR x 20  -PPT x 20  -hip fall out x 20  -sidelying clams 2 x 10  +sassy clams 2x10  -ball squeeze 2 x 10  +SLR 2x10  +Seated Pelvic tilt 2x10    -modified prayer with t-ball x 20  -TrA recruitment 2 x 10  -supine resisted hip abd RTB 3 x 10     Zaira received the following manual therapy techniques:  were applied to the: left hip for 10 minutes, including:  STM Left glutes, QL    NP  -long arc distraction, inferior glides in hooklying, grade 2, STM to left IT band     Education provided:   - HEP compliance    Written Home Exercises Provided: Patient instructed to cont prior HEP.  Exercises were reviewed and Zaira was able to demonstrate them prior to the end of the session.  Zaira demonstrated good  understanding of the education provided.     See EMR under Patient Instructions for exercises provided 5/31/2022.    Assessment     Pt tolerated tx well. Pt has only come to therapy 1x since her initial evaluation. As a result, pt's progress has been limited. Pt continues to have limitations of ROM, strength, gait, pain, and functional mobility which impact the patient's ability to perform functional activities such as bending, lifting, squatting, performing transfers, and performing ADLs. Patient was progressed with her exercises to improve her lumbar stability. During attempt with bridges, pt reported high pain levels. As a result, bridges were removed from her exercises. Therapist provided verbal/tactile cues to maintain proper form. Pt reported no adverse effects to exercises. Pt would benefit from continued skilled physical therapy in order to reach pt's goals.   Zaira is progressing well towards her goals.     Pt prognosis is Good.     Pt will continue to  benefit from skilled outpatient physical therapy to address the deficits listed in the problem list box on initial evaluation, provide pt/family education and to maximize pt's level of independence in the home and community environment.     Pt's spiritual, cultural and educational needs considered and pt agreeable to plan of care and goals.     Anticipated barriers to physical therapy: none    Short Term Goals (4 Weeks):     1.Pt to increase strength by a 1/2 grade of muscles test to allow for improvement in functional activities such as performing chores. Ongoing  2.Pt to improve range of motion by 25% to allow for improved functional mobility to allow for improvement in IADLs.  Ongoing  3.Pt to report compliance with HEP and demonstrate proper exercise technique to PT to show competence with self management of condition. Ongoing  4.Decrease pain by 25% during functional activities. Ongoing    Long Term Goals (12 Weeks):     1. Increase ROM to allow improved joint biomechanics during functional activities.   2.Increase trunk and lower extremity strength to within normal limits during functional activities.   3. Independent with home exercise program.   4. Full return to functional activities with manageable complaints.  5. Patient to demonstrate improved posture and body mechanics.  6. Decrease pain by 75% during functional activities.     Plan       Recommended Treatment Plan: 2-3 times per week for 12 weeks with treatments to consist of:  Neuromuscular and postural re-education,  training, therapeutic exercise, therapeutic activities,balance training, gait training, manual therapy, soft tissue mobilization, ROM exercises, Cardiovascular,  Postural stabilization, manual traction, spinal mobilization, moist heat, cryotherapy, electrical stimulation, ultrasound, home exercise education and planning.    Continue with established  PT Plan of Care towards patient goals.     Delvin Bess, PT

## 2022-06-10 ENCOUNTER — PATIENT MESSAGE (OUTPATIENT)
Dept: BARIATRICS | Facility: CLINIC | Age: 52
End: 2022-06-10
Payer: MEDICARE

## 2022-06-14 ENCOUNTER — PATIENT MESSAGE (OUTPATIENT)
Dept: BARIATRICS | Facility: CLINIC | Age: 52
End: 2022-06-14
Payer: MEDICARE

## 2022-06-16 ENCOUNTER — TELEPHONE (OUTPATIENT)
Dept: BARIATRICS | Facility: CLINIC | Age: 52
End: 2022-06-16
Payer: MEDICARE

## 2022-06-28 ENCOUNTER — OFFICE VISIT (OUTPATIENT)
Dept: FAMILY MEDICINE | Facility: CLINIC | Age: 52
End: 2022-06-28
Payer: MEDICARE

## 2022-06-28 VITALS
WEIGHT: 248 LBS | BODY MASS INDEX: 38.85 KG/M2 | DIASTOLIC BLOOD PRESSURE: 77 MMHG | SYSTOLIC BLOOD PRESSURE: 116 MMHG | OXYGEN SATURATION: 97 % | HEART RATE: 60 BPM | RESPIRATION RATE: 17 BRPM

## 2022-06-28 DIAGNOSIS — E66.01 CLASS 2 SEVERE OBESITY DUE TO EXCESS CALORIES WITH SERIOUS COMORBIDITY AND BODY MASS INDEX (BMI) OF 36.0 TO 36.9 IN ADULT: ICD-10-CM

## 2022-06-28 DIAGNOSIS — M54.50 CHRONIC LOW BACK PAIN WITHOUT SCIATICA, UNSPECIFIED BACK PAIN LATERALITY: Primary | ICD-10-CM

## 2022-06-28 DIAGNOSIS — G89.29 CHRONIC LOW BACK PAIN WITHOUT SCIATICA, UNSPECIFIED BACK PAIN LATERALITY: Primary | ICD-10-CM

## 2022-06-28 DIAGNOSIS — J30.9 ALLERGIC RHINITIS, UNSPECIFIED SEASONALITY, UNSPECIFIED TRIGGER: ICD-10-CM

## 2022-06-28 DIAGNOSIS — M51.36 DDD (DEGENERATIVE DISC DISEASE), LUMBAR: ICD-10-CM

## 2022-06-28 DIAGNOSIS — Z23 NEED FOR PNEUMOCOCCAL VACCINATION: ICD-10-CM

## 2022-06-28 DIAGNOSIS — Z00.00 HEALTHCARE MAINTENANCE: ICD-10-CM

## 2022-06-28 DIAGNOSIS — Z86.39 HX OF OBESITY: ICD-10-CM

## 2022-06-28 PROCEDURE — 1159F MED LIST DOCD IN RCRD: CPT | Mod: CPTII,S$GLB,, | Performed by: INTERNAL MEDICINE

## 2022-06-28 PROCEDURE — 3074F PR MOST RECENT SYSTOLIC BLOOD PRESSURE < 130 MM HG: ICD-10-PCS | Mod: CPTII,S$GLB,, | Performed by: INTERNAL MEDICINE

## 2022-06-28 PROCEDURE — 90670 PCV13 VACCINE IM: CPT | Mod: S$GLB,,, | Performed by: INTERNAL MEDICINE

## 2022-06-28 PROCEDURE — 1160F RVW MEDS BY RX/DR IN RCRD: CPT | Mod: CPTII,S$GLB,, | Performed by: INTERNAL MEDICINE

## 2022-06-28 PROCEDURE — G0009 ADMIN PNEUMOCOCCAL VACCINE: HCPCS | Mod: S$GLB,,, | Performed by: INTERNAL MEDICINE

## 2022-06-28 PROCEDURE — 3078F DIAST BP <80 MM HG: CPT | Mod: CPTII,S$GLB,, | Performed by: INTERNAL MEDICINE

## 2022-06-28 PROCEDURE — 3074F SYST BP LT 130 MM HG: CPT | Mod: CPTII,S$GLB,, | Performed by: INTERNAL MEDICINE

## 2022-06-28 PROCEDURE — 3008F BODY MASS INDEX DOCD: CPT | Mod: CPTII,S$GLB,, | Performed by: INTERNAL MEDICINE

## 2022-06-28 PROCEDURE — 90670 PNEUMOCOCCAL CONJUGATE VACCINE 13-VALENT LESS THAN 5YO & GREATER THAN: ICD-10-PCS | Mod: S$GLB,,, | Performed by: INTERNAL MEDICINE

## 2022-06-28 PROCEDURE — 99999 PR PBB SHADOW E&M-EST. PATIENT-LVL V: ICD-10-PCS | Mod: PBBFAC,,, | Performed by: INTERNAL MEDICINE

## 2022-06-28 PROCEDURE — 99999 PR PBB SHADOW E&M-EST. PATIENT-LVL V: CPT | Mod: PBBFAC,,, | Performed by: INTERNAL MEDICINE

## 2022-06-28 PROCEDURE — 1159F PR MEDICATION LIST DOCUMENTED IN MEDICAL RECORD: ICD-10-PCS | Mod: CPTII,S$GLB,, | Performed by: INTERNAL MEDICINE

## 2022-06-28 PROCEDURE — G0009 PNEUMOCOCCAL CONJUGATE VACCINE 13-VALENT LESS THAN 5YO & GREATER THAN: ICD-10-PCS | Mod: S$GLB,,, | Performed by: INTERNAL MEDICINE

## 2022-06-28 PROCEDURE — 3078F PR MOST RECENT DIASTOLIC BLOOD PRESSURE < 80 MM HG: ICD-10-PCS | Mod: CPTII,S$GLB,, | Performed by: INTERNAL MEDICINE

## 2022-06-28 PROCEDURE — 3044F PR MOST RECENT HEMOGLOBIN A1C LEVEL <7.0%: ICD-10-PCS | Mod: CPTII,S$GLB,, | Performed by: INTERNAL MEDICINE

## 2022-06-28 PROCEDURE — 99214 OFFICE O/P EST MOD 30 MIN: CPT | Mod: S$GLB,,, | Performed by: INTERNAL MEDICINE

## 2022-06-28 PROCEDURE — 1160F PR REVIEW ALL MEDS BY PRESCRIBER/CLIN PHARMACIST DOCUMENTED: ICD-10-PCS | Mod: CPTII,S$GLB,, | Performed by: INTERNAL MEDICINE

## 2022-06-28 PROCEDURE — 99214 PR OFFICE/OUTPT VISIT, EST, LEVL IV, 30-39 MIN: ICD-10-PCS | Mod: S$GLB,,, | Performed by: INTERNAL MEDICINE

## 2022-06-28 PROCEDURE — 3008F PR BODY MASS INDEX (BMI) DOCUMENTED: ICD-10-PCS | Mod: CPTII,S$GLB,, | Performed by: INTERNAL MEDICINE

## 2022-06-28 PROCEDURE — 3044F HG A1C LEVEL LT 7.0%: CPT | Mod: CPTII,S$GLB,, | Performed by: INTERNAL MEDICINE

## 2022-06-28 RX ORDER — FLUTICASONE PROPIONATE 50 MCG
2 SPRAY, SUSPENSION (ML) NASAL DAILY
Qty: 15.8 ML | Refills: 1 | Status: SHIPPED | OUTPATIENT
Start: 2022-06-28 | End: 2022-11-30 | Stop reason: SDUPTHER

## 2022-06-28 RX ORDER — PNEUMOCOCCAL 20-VALENT CONJUGATE VACCINE 2.2; 2.2; 2.2; 2.2; 2.2; 2.2; 2.2; 2.2; 2.2; 2.2; 2.2; 2.2; 2.2; 2.2; 2.2; 2.2; 4.4; 2.2; 2.2; 2.2 UG/.5ML; UG/.5ML; UG/.5ML; UG/.5ML; UG/.5ML; UG/.5ML; UG/.5ML; UG/.5ML; UG/.5ML; UG/.5ML; UG/.5ML; UG/.5ML; UG/.5ML; UG/.5ML; UG/.5ML; UG/.5ML; UG/.5ML; UG/.5ML; UG/.5ML; UG/.5ML
0.5 INJECTION, SUSPENSION INTRAMUSCULAR ONCE
Qty: 0.5 ML | Refills: 0 | Status: CANCELLED | OUTPATIENT
Start: 2022-06-28 | End: 2022-06-28

## 2022-06-28 RX ORDER — CETIRIZINE HYDROCHLORIDE 10 MG/1
10 TABLET ORAL DAILY
Qty: 30 TABLET | Refills: 1 | Status: SHIPPED | OUTPATIENT
Start: 2022-06-28 | End: 2023-01-10

## 2022-06-28 NOTE — PROGRESS NOTES
Patient given Prevnar 13 vaccine to right deltoid, no complaints or reactions noted. Vis given 8/6/21 to patient.

## 2022-06-28 NOTE — PROGRESS NOTES
"HISTORY OF PRESENT ILLNESS:  Zaira Dowell is a 51 y.o. female who presents to the clinic today for Follow-up    Last seen by me 3/2022.    Fibromyalgia  Followed by Dr. Salcedo.  Prescribed Flexeril and Lyrica recently in May.  On gabapentin.  Was referred to PT.    Notes increase lower back pain, no sciatica.    Depression, Bipolar disorder  Followed by psychiatry.  Managed with lithium, risperidone, sertraline, bupropion, PRN Ativan.    Recent CT with note of "Limited plaquing aortic bifurcation, iliac arteries."    The 10-year ASCVD risk score (Cristina DC Jr., et al., 2013) is: 1.9%    Values used to calculate the score:      Age: 51 years      Sex: Female      Is Non- : No      Diabetic: No      Tobacco smoker: Yes      Systolic Blood Pressure: 116 mmHg      Is BP treated: No      HDL Cholesterol: 58 mg/dL      Total Cholesterol: 154 mg/dL      Obesity  S/p sleeve gastrectomy 10/2019.  Weight down to 248 lb from 260 lb at last visit.    Left Adrenal incidentaloma  Seen by Dr. Hernandez 4/2022.    PAST MEDICAL HISTORY:  Past Medical History:   Diagnosis Date    Anxiety     Arthritis     Asthma     Back pain     Bipolar disorder     BMI 50.0-59.9, adult     Chronic bronchitis     Chronic obstructive pulmonary disease, unspecified COPD type 3/21/2022    Depression     Fibromyalgia     GERD (gastroesophageal reflux disease)     Insomnia 11/23/2021    Obesity     SHARON (obstructive sleep apnea)     Respiratory failure     Tobacco dependence        PAST SURGICAL HISTORY:  Past Surgical History:   Procedure Laterality Date    CARPAL TUNNEL RELEASE      CHOLECYSTECTOMY      COLONOSCOPY N/A 4/25/2022    Procedure: COLONOSCOPY;  Surgeon: Ernesto Auguste MD;  Location: [facility];  Service: Endoscopy;  Laterality: N/A;  fully vaccinated-GT    ESOPHAGOGASTRODUODENOSCOPY N/A 5/21/2019    Procedure: EGD (ESOPHAGOGASTRODUODENOSCOPY);  Surgeon: Michelle Mc MD;  Location: [facility];  " Service: Endoscopy;  Laterality: N/A;    HYSTERECTOMY      partial     KNEE SURGERY      LAPAROSCOPIC SLEEVE GASTRECTOMY N/A 10/18/2019    Procedure: GASTRECTOMY, SLEEVE, LAPAROSCOPIC, with intraop EGD;  Surgeon: Ean Kohli MD;  Location: [facility] OR [address] [place] [address];  Service: General;  Laterality: N/A;    pinched nerve      SHOULDER SURGERY      TONSILLECTOMY      WRIST SURGERY      had ganlin cyst       SOCIAL HISTORY:  Social History     Socioeconomic History    Marital status:    Tobacco Use    Smoking status: Current Some Day Smoker     Packs/day: 1.00     Years: 15.00     Pack years: 15.00     Types: Vaping with nicotine    Smokeless tobacco: Never Used    Tobacco comment: vaping started April 2020; QUIT cigarettes Sept 2019   Substance and Sexual Activity    Alcohol use: No    Drug use: No    Sexual activity: Yes     Partners: Male     Social Determinants of Health     Financial Resource Strain: Low Risk     Difficulty of Paying Living Expenses: Not very hard   Food Insecurity: No Food Insecurity    Worried About Running Out of Food in the Last Year: Never true    Ran Out of Food in the Last Year: Never true   Transportation Needs: No Transportation Needs    Lack of Transportation (Medical): No    Lack of Transportation (Non-Medical): No   Physical Activity: Sufficiently Active    Days of Exercise per Week: 3 days    Minutes of Exercise per Session: 60 min   Stress: Stress Concern Present    Feeling of Stress : Very much   Social Connections: Unknown    Frequency of Communication with Friends and Family: More than three times a week    Frequency of Social Gatherings with Friends and Family: Never    Active Member of Clubs or Organizations: No    Attends Club or Organization Meetings: Never    Marital Status:    Housing Stability: Low Risk     Unable to Pay for Housing in the Last Year: No    Number of Places Lived in the Last Year: 1    Unstable Housing in the Last  Year: No       FAMILY HISTORY:  Family History   Problem Relation Age of Onset    Diabetes Mother     Jose Juan's disease Mother     Hypertension Mother     Heart disease Father     Stroke Father     Mental illness Father         ptsd    Hypertension Father     Diabetes Father     Depression Father     No Known Problems Brother     Heart disease Maternal Grandmother     Depression Maternal Grandmother     COPD Maternal Grandfather     Heart disease Maternal Grandfather     Stroke Maternal Grandfather     Kidney disease Paternal Grandmother     Heart disease Paternal Grandmother     Heart disease Paternal Grandfather     Hypertension Son     Rectal cancer Paternal Aunt        ALLERGIES AND MEDICATIONS: updated and reviewed.  Review of patient's allergies indicates:   Allergen Reactions    Hydrocodone-acetaminophen Nausea And Vomiting     Medication List with Changes/Refills   Current Medications    ALBUTEROL (PROVENTIL/VENTOLIN HFA) 90 MCG/ACTUATION INHALER    Inhale 2 puffs into the lungs every 6 (six) hours as needed for Wheezing. Rescue    B COMPLEX VITAMINS TABLET    Take 1 tablet by mouth once daily.    BUPROPION (WELLBUTRIN SR) 100 MG TBSR 12 HR TABLET    Take 2 tabs q am, and 1 tab daily at noon    CALCIUM CITRATE (CALCITRATE) 200 MG (950 MG) TABLET    Take 1 tablet by mouth 2 (two) times daily.    CYANOCOBALAMIN 500 MCG TABLET    Take 500 mcg by mouth once daily.    CYCLOBENZAPRINE (FLEXERIL) 5 MG TABLET    Take 1 tablet (5 mg total) by mouth 3 (three) times daily as needed for Muscle spasms.    DEXAMETHASONE (DECADRON) 0.5 MG TABLET    TAKE 2 TABLETS (1MG TOTAL) BY MOUTH EVERY EVENING TAKE AT 11PM THE NIGHT BEFORE LABS TO BE DRAWN    FERROUS FUMARATE/VIT BCOMP,C (SUPER B COMPLEX ORAL)    Take 1 capsule by mouth once daily.    FLUTICASONE PROPIONATE (FLONASE) 50 MCG/ACTUATION NASAL SPRAY    2 sprays (100 mcg total) by Each Nostril route once daily.    FLUTICASONE-SALMETEROL DISKUS INHALER  250-50 MCG    Inhale 1 puff into the lungs 2 (two) times daily.    FUROSEMIDE (LASIX) 40 MG TABLET    TAKE 1 TABLET EVERY DAY    GAVILYTE-G 236-22.74-6.74 -5.86 GRAM SUSPENSION    TAKE 4000 MLS BY MOUTH FOR 1 DOSE    LITHIUM (ESKALITH) 300 MG CAPSULE    Take 1 capsule (300 mg total) by mouth 3 (three) times daily with meals.    LORAZEPAM (ATIVAN) 0.5 MG TABLET    Take 2 tabs daily as directed.    MECLIZINE (ANTIVERT) 25 MG TABLET    Take 1 tablet (25 mg total) by mouth 3 (three) times daily as needed for Dizziness or Nausea.    MULTIVITAMIN CAPSULE    Take 1 capsule by mouth 2 (two) times daily.     OMEPRAZOLE (PRILOSEC) 40 MG CAPSULE    TAKE 1 CAPSULE TWICE DAILY BEFORE MEALS    ONDANSETRON (ZOFRAN-ODT) 8 MG TBDL    Take 1 tablet (8 mg total) by mouth 2 (two) times daily as needed (nausea).    POTASSIUM CHLORIDE (KLOR-CON) 10 MEQ TBSR    Take 10 mEq by mouth 2 (two) times daily.    PREGABALIN (LYRICA) 50 MG CAPSULE    Take 1 capsule (50 mg total) by mouth 2 (two) times daily.    PROMETHAZINE (PHENERGAN) 12.5 MG TAB    promethazine Take half tablet (oral) 2 times per day PRN - Nausea . Medication may cause drowsiness. [id number] tablet 2 times per day oral No set duration recorded No set duration amount recorded active 12.5 mg    RISPERIDONE (RISPERDAL) 0.5 MG TAB    Take 1 tablet (0.5 mg total) by mouth 2 (two) times daily.    SERTRALINE (ZOLOFT) 100 MG TABLET    Take 2 tablets (200 mg total) by mouth once daily.    TOLTERODINE (DETROL LA) 4 MG 24 HR CAPSULE    Take 1 capsule (4 mg total) by mouth once daily.    TOPIRAMATE (TOPAMAX) 200 MG TAB    TAKE 1 TABLET EVERY DAY          CARE TEAM:  Patient Care Team:  Cyril Saucedo MD as PCP - General (Family Medicine)  Delvin Nagy Jr., MD as Consulting Physician (Psychiatry)  José Hernandez MD as Consulting Physician (Endocrinology)  Brittaney Brandon NP as Nurse Practitioner (Urology)  Svetlana Nelson LPN as Care Coordinator         REVIEW OF  SYSTEMS:  Review of Systems   Constitutional: Negative for chills and fever.   HENT: Negative for congestion and postnasal drip.    Eyes: Negative for photophobia and visual disturbance.   Respiratory: Negative for cough and shortness of breath.    Cardiovascular: Negative for chest pain and palpitations.   Gastrointestinal: Negative for abdominal pain, nausea and vomiting.   Genitourinary: Negative for dysuria and frequency.   Musculoskeletal: Negative for arthralgias and back pain.   Neurological: Negative for light-headedness and headaches.   Psychiatric/Behavioral: Negative for dysphoric mood. The patient is not nervous/anxious.          PHYSICAL EXAM:   Vitals:    06/28/22 1049   BP: 116/77   Pulse: 60   Resp: 17             Body mass index is 38.85 kg/m².     General appearance - alert, well appearing, and in no distress and oriented to person, place, and time  Mental status - normal mood, behavior, speech, dress, motor activity, and thought processes  Eyes - sclera anicteric, left eye normal, right eye normal  Chest - clear to auscultation, no wheezes, rales or rhonchi, symmetric air entry  Heart - normal rate, regular rhythm, normal S1, S2, no murmurs, rubs, clicks or gallops  Neurological - alert, oriented, normal speech, no focal findings or movement disorder noted  Extremities - peripheral pulses normal, no pedal edema, no clubbing or cyanosis      ASSESSMENT AND PLAN:  Chronic low back pain without sciatica, unspecified back pain laterality  -     Ambulatory referral/consult to Pain Clinic; Future; Expected date: 07/05/2022    DDD (degenerative disc disease), lumbar  -     Ambulatory referral/consult to Pain Clinic; Future; Expected date: 07/05/2022    Need for pneumococcal vaccination  -     (In Office Administered) Pneumococcal Conjugate Vaccine (13 Valent) (IM)    Healthcare maintenance  -     Hemoglobin A1C; Future; Expected date: 03/01/2023  -     Comprehensive Metabolic Panel; Future; Expected  date: 03/01/2023  -     Lipid Panel; Future; Expected date: 03/01/2023  -     CBC Auto Differential; Future; Expected date: 03/01/2023  -     TSH; Future; Expected date: 03/01/2023    Class 2 severe obesity due to excess calories with serious comorbidity and body mass index (BMI) of 36.0 to 36.9 in adult   -     Lipid Panel; Future; Expected date: 03/01/2023    Hx of obesity   -     TSH; Future; Expected date: 03/01/2023    Allergic rhinitis, unspecified seasonality, unspecified trigger  -     fluticasone propionate (FLONASE) 50 mcg/actuation nasal spray; 2 sprays (100 mcg total) by Each Nostril route once daily.  Dispense: 15.8 mL; Refill: 1  -     cetirizine (ZYRTEC) 10 MG tablet; Take 1 tablet (10 mg total) by mouth once daily.  Dispense: 30 tablet; Refill: 1       Discussed findings of recent CT scan.  ASCVD score is not elevated.  Discussed given risk/benefit and current lipid results, will hold off on initiating statin medication.  Advised for maximization of efforts for weight management and healthy eating/exercise.      Follow up 6 months or sooner as needed.

## 2022-06-29 ENCOUNTER — OFFICE VISIT (OUTPATIENT)
Dept: PSYCHIATRY | Facility: CLINIC | Age: 52
End: 2022-06-29
Payer: MEDICARE

## 2022-06-29 DIAGNOSIS — F31.9 BIPOLAR 1 DISORDER: Primary | ICD-10-CM

## 2022-06-29 PROCEDURE — 1159F PR MEDICATION LIST DOCUMENTED IN MEDICAL RECORD: ICD-10-PCS | Mod: CPTII,95,, | Performed by: PSYCHIATRY & NEUROLOGY

## 2022-06-29 PROCEDURE — 3044F HG A1C LEVEL LT 7.0%: CPT | Mod: CPTII,95,, | Performed by: PSYCHIATRY & NEUROLOGY

## 2022-06-29 PROCEDURE — 90833 PSYTX W PT W E/M 30 MIN: CPT | Mod: 95,,, | Performed by: PSYCHIATRY & NEUROLOGY

## 2022-06-29 PROCEDURE — 1160F RVW MEDS BY RX/DR IN RCRD: CPT | Mod: CPTII,95,, | Performed by: PSYCHIATRY & NEUROLOGY

## 2022-06-29 PROCEDURE — 1159F MED LIST DOCD IN RCRD: CPT | Mod: CPTII,95,, | Performed by: PSYCHIATRY & NEUROLOGY

## 2022-06-29 PROCEDURE — 90833 PR PSYCHOTHERAPY W/PATIENT W/E&M, 30 MIN (ADD ON): ICD-10-PCS | Mod: 95,,, | Performed by: PSYCHIATRY & NEUROLOGY

## 2022-06-29 PROCEDURE — 99214 PR OFFICE/OUTPT VISIT, EST, LEVL IV, 30-39 MIN: ICD-10-PCS | Mod: 95,,, | Performed by: PSYCHIATRY & NEUROLOGY

## 2022-06-29 PROCEDURE — 1160F PR REVIEW ALL MEDS BY PRESCRIBER/CLIN PHARMACIST DOCUMENTED: ICD-10-PCS | Mod: CPTII,95,, | Performed by: PSYCHIATRY & NEUROLOGY

## 2022-06-29 PROCEDURE — 99214 OFFICE O/P EST MOD 30 MIN: CPT | Mod: 95,,, | Performed by: PSYCHIATRY & NEUROLOGY

## 2022-06-29 PROCEDURE — 3044F PR MOST RECENT HEMOGLOBIN A1C LEVEL <7.0%: ICD-10-PCS | Mod: CPTII,95,, | Performed by: PSYCHIATRY & NEUROLOGY

## 2022-06-29 RX ORDER — LORAZEPAM 0.5 MG/1
TABLET ORAL
Qty: 60 TABLET | Refills: 3 | Status: SHIPPED | OUTPATIENT
Start: 2022-06-29 | End: 2022-09-15 | Stop reason: SDUPTHER

## 2022-06-29 RX ORDER — RISPERIDONE 0.5 MG/1
0.5 TABLET ORAL 2 TIMES DAILY
Qty: 180 TABLET | Refills: 1 | Status: SHIPPED | OUTPATIENT
Start: 2022-06-29 | End: 2022-10-06 | Stop reason: SDUPTHER
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
## 2022-07-05 ENCOUNTER — PATIENT MESSAGE (OUTPATIENT)
Dept: BARIATRICS | Facility: CLINIC | Age: 52
End: 2022-07-05
Payer: COMMERCIAL

## 2022-07-13 ENCOUNTER — PES CALL (OUTPATIENT)
Dept: ADMINISTRATIVE | Facility: CLINIC | Age: 52
End: 2022-07-13
Payer: COMMERCIAL

## 2022-07-28 ENCOUNTER — IMMUNIZATION (OUTPATIENT)
Dept: OBSTETRICS AND GYNECOLOGY | Facility: CLINIC | Age: 52
End: 2022-07-28
Payer: COMMERCIAL

## 2022-07-28 DIAGNOSIS — Z23 NEED FOR VACCINATION: Primary | ICD-10-CM

## 2022-07-28 PROCEDURE — 0054A COVID-19, MRNA, LNP-S, PF, 30 MCG/0.3 ML DOSE VACCINE (PFIZER): CPT | Mod: PBBFAC | Performed by: FAMILY MEDICINE

## 2022-08-02 ENCOUNTER — PATIENT MESSAGE (OUTPATIENT)
Dept: BARIATRICS | Facility: CLINIC | Age: 52
End: 2022-08-02
Payer: MEDICARE

## 2022-08-04 ENCOUNTER — PATIENT MESSAGE (OUTPATIENT)
Dept: BARIATRICS | Facility: CLINIC | Age: 52
End: 2022-08-04
Payer: MEDICARE

## 2022-08-28 ENCOUNTER — HOSPITAL ENCOUNTER (EMERGENCY)
Facility: HOSPITAL | Age: 52
Discharge: HOME | End: 2022-08-28
Attending: EMERGENCY MEDICINE
Payer: MEDICARE

## 2022-08-28 VITALS
OXYGEN SATURATION: 95 % | SYSTOLIC BLOOD PRESSURE: 103 MMHG | WEIGHT: 250 LBS | HEIGHT: 67 IN | BODY MASS INDEX: 39.24 KG/M2 | RESPIRATION RATE: 18 BRPM | DIASTOLIC BLOOD PRESSURE: 74 MMHG | HEART RATE: 77 BPM | TEMPERATURE: 98 F
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
## 2022-08-28 DIAGNOSIS — S40.021A CONTUSION OF RIGHT UPPER EXTREMITY, INITIAL ENCOUNTER: ICD-10-CM

## 2022-08-28 PROCEDURE — 25000003 PHARM REV CODE 250: Performed by: PHYSICIAN ASSISTANT

## 2022-08-28 PROCEDURE — 99283 EMERGENCY DEPT VISIT LOW MDM: CPT

## 2022-08-28 RX ORDER — METHOCARBAMOL 500 MG/1
1000 TABLET, FILM COATED ORAL 3 TIMES DAILY PRN
Qty: 20 TABLET | Refills: 0 | Status: SHIPPED | OUTPATIENT
Start: 2022-08-28 | End: 2022-08-28 | Stop reason: CLARIF

## 2022-08-28 RX ORDER — HYDROCODONE BITARTRATE AND ACETAMINOPHEN 5; 325 MG/1; MG/1
1 TABLET ORAL
Status: COMPLETED | OUTPATIENT
Start: 2022-08-28 | End: 2022-08-28

## 2022-08-28 RX ORDER — ONDANSETRON 4 MG/1
4 TABLET, ORALLY DISINTEGRATING ORAL
Status: COMPLETED | OUTPATIENT
Start: 2022-08-28 | End: 2022-08-28

## 2022-08-28 RX ADMIN — HYDROCODONE BITARTRATE AND ACETAMINOPHEN 1 TABLET: 5; 325 TABLET ORAL at 02:08

## 2022-08-28 RX ADMIN — ONDANSETRON 4 MG: 4 TABLET, ORALLY DISINTEGRATING ORAL at 02:08

## 2022-08-28 NOTE — ED TRIAGE NOTES
Zaira Dowell is a 51 y.o female to ED c/o right elbow pain, R sided neck pain, and R knee pain s/p fall at 0000. Pt was walking up the stairs and fell landing on R side.  Denies hitting head or LOC.  No meds pta. 9/10 constant pain reported.

## 2022-08-28 NOTE — DISCHARGE INSTRUCTIONS
Continue with ice, elevation to help with swelling and discomfort.  Tylenol or ibuprofen as needed for pain. Follow-up with PCP for any persistent symptoms.      Return to this ED if joints become red and warm, if unable to tolerate pain, if you begin with arm weakness, if you experience worsening neck pain despite treatment, if you develop severe headache, if any other problems occur.

## 2022-08-28 NOTE — ED PROVIDER NOTES
Encounter Date: 8/28/2022       History     Chief Complaint   Patient presents with    Elbow Injury     Presents with complaint of falling on stairs and sustained injury to right elbow with pain     50yo F smoker with chief complaint R shoulder pain, R elbow pain after mechanical fall pta.    Pt walking up stairs inside home, tripped, stumbled forward, falling on to her right elbow and right side.  No head injury or LOC.  Admits to mild right-sided neck discomfort and stiffness.  Admits to pain to the right shoulder, muscle pain is to the medial epicondyle region of right elbow.  Retains full range of motion of shoulder and elbow with some discomfort during range of motion.  She denies headache.  She denies any weakness.  No radiculopathy or paresthesia.    Past medical history:  Obesity  S/p sleeve gastrectomy  Anxiety  Depression  Arthritis  Asthma  Chronic back pain   Chronic neck pain   Bipolar disorder   COPD - chronic bronchitis  Fibromyalgia   GERD  Insomnia   SHARON     Review of patient's allergies indicates:   Allergen Reactions    Hydrocodone-acetaminophen Nausea And Vomiting     Past Medical History:   Diagnosis Date    Anxiety     Arthritis     Asthma     Back pain     Bipolar disorder     BMI 50.0-59.9, adult     Chronic bronchitis     Chronic obstructive pulmonary disease, unspecified COPD type 3/21/2022    Depression     Fibromyalgia     GERD (gastroesophageal reflux disease)     Insomnia 11/23/2021    Obesity     SHARON (obstructive sleep apnea)     Respiratory failure     Tobacco dependence      Past Surgical History:   Procedure Laterality Date    CARPAL TUNNEL RELEASE      CHOLECYSTECTOMY      COLONOSCOPY N/A 4/25/2022    Procedure: COLONOSCOPY;  Surgeon: Ernesto Auguste MD;  Location: [facility];  Service: Endoscopy;  Laterality: N/A;  fully vaccinated-GT    ESOPHAGOGASTRODUODENOSCOPY N/A 5/21/2019    Procedure: EGD (ESOPHAGOGASTRODUODENOSCOPY);  Surgeon: Michelle Mc MD;  Location: [facility];   Service: Endoscopy;  Laterality: N/A;    HYSTERECTOMY      partial     KNEE SURGERY      LAPAROSCOPIC SLEEVE GASTRECTOMY N/A 10/18/2019    Procedure: GASTRECTOMY, SLEEVE, LAPAROSCOPIC, with intraop EGD;  Surgeon: Ean Kohli MD;  Location: [facility] OR [address] [place] [address];  Service: General;  Laterality: N/A;    pinched nerve      SHOULDER SURGERY      TONSILLECTOMY      WRIST SURGERY      had ganlin cyst     Family History   Problem Relation Age of Onset    Diabetes Mother     Hampton's disease Mother     Hypertension Mother     Heart disease Father     Stroke Father     Mental illness Father         ptsd    Hypertension Father     Diabetes Father     Depression Father     No Known Problems Brother     Heart disease Maternal Grandmother     Depression Maternal Grandmother     COPD Maternal Grandfather     Heart disease Maternal Grandfather     Stroke Maternal Grandfather     Kidney disease Paternal Grandmother     Heart disease Paternal Grandmother     Heart disease Paternal Grandfather     Hypertension Son     Rectal cancer Paternal Aunt      Social History     Tobacco Use    Smoking status: Some Days     Packs/day: 1.00     Years: 15.00     Pack years: 15.00     Types: Vaping with nicotine, Cigarettes    Smokeless tobacco: Never    Tobacco comments:     vaping started April 2020; QUIT cigarettes Sept 2019   Substance Use Topics    Alcohol use: No    Drug use: No     Review of Systems   Musculoskeletal:  Positive for arthralgias and neck pain. Negative for joint swelling.   Skin:  Negative for color change and wound.   Neurological:  Negative for syncope, weakness, numbness and headaches.   All other systems reviewed and are negative.    Physical Exam     Initial Vitals [08/28/22 0150]   BP Pulse Resp Temp SpO2   103/74 77 19 98 °F (36.7 °C) 95 %      MAP       --         Physical Exam    Nursing note and vitals reviewed.  Constitutional: She appears well-developed and well-nourished. She is not diaphoretic. No  distress.   HENT:   Head: Normocephalic and atraumatic.   Neck: Neck supple.   Cardiovascular:  Intact distal pulses.           1+ radial bilaterally   Musculoskeletal:      Cervical back: Neck supple.      Comments: TTP coracoid region R shoulder. Full, active ROM R shoulder with some pain during horizontal internal, external rotation.     Mild ttp R sided cervical paraspinal musculature, R proximal trapezius.     TTP R elbow medial epicondyle region. Full, active ROM R elbow without discomfort or difficulty.      Neurological: She is alert and oriented to person, place, and time. GCS score is 15. GCS eye subscore is 4. GCS verbal subscore is 5. GCS motor subscore is 6.   Skin: Skin is warm.   Psychiatric: Thought content normal.   anxious       ED Course   Procedures  Labs Reviewed - No data to display       Imaging Results              X-Ray Elbow Complete Right (Final result)  Result time 08/28/22 04:42:57      Final result by Charley Duncan MD (08/28/22 04:42:57)                   Impression:      No definite radiographic evidence of acute osseous injury.  Further evaluation as warranted clinically.      Electronically signed by: Charley Duncan MD  Date:    08/28/2022  Time:    04:42               Narrative:    EXAMINATION:  XR ELBOW COMPLETE 3 VIEW RIGHT    CLINICAL HISTORY:  . Unspecified fall, initial encounter    TECHNIQUE:  AP, lateral, and oblique views of the right elbow were performed.    COMPARISON:  10/16/2017    FINDINGS:  Visualized osseous and articular structures appear grossly intact.  Elbow alignment appears within normal limits.No significant joint effusion is appreciated. No retained radiopaque foreign body.                                       X-Ray Shoulder Trauma Right (Final result)  Result time 08/28/22 04:18:05      Final result by Charley Duncan MD (08/28/22 04:18:05)                   Impression:      No radiographic evidence of acute osseous injury or dislocation.      Electronically  signed by: Charley Duncan MD  Date:    08/28/2022  Time:    04:18               Narrative:    EXAMINATION:  XR SHOULDER TRAUMA 3 VIEW RIGHT    CLINICAL HISTORY:  Unspecified fall, initial encounter    TECHNIQUE:  Three or four views of the right shoulder were performed.    COMPARISON:  03/29/2022    FINDINGS:  Visualized osseous and articular structures appear intact.  The humeral head appears appropriately seated within the glenoid.  The acromioclavicular joint appears maintained with degenerative change.                                       Medications   HYDROcodone-acetaminophen 5-325 mg per tablet 1 tablet (1 tablet Oral Given 8/28/22 0234)   ondansetron disintegrating tablet 4 mg (4 mg Oral Given 8/28/22 0234)     Medical Decision Making:   Differential Diagnosis:   Arthritis, contusion, sprain/strain, fracture  Clinical Tests:   Radiological Study: Ordered and Reviewed  ED Management:  No acute fracture on imaging.  Suspect contusion secondary to fall.  No evidence of compartment syndrome.  Neurovascular intact.  Good range of motion joints.  No evidence of effusion.  Continue supportive treatment, follow-up as an outpatient return precautions given.                    Clinical Impression:   Final diagnoses:  [W19.XXXA] Fall (Primary)  [S40.021A] Contusion of right upper extremity, initial encounter        ED Disposition Condition    Discharge Stable          ED Prescriptions       Medication Sig Dispense Start Date End Date Auth. Provider    methocarbamoL (ROBAXIN) 500 MG Tab  (Status: Discontinued) Take 2 tablets (1,000 mg total) by mouth 3 (three) times daily as needed (Stiffness/soreness). 20 tablet 8/28/2022 8/28/2022 Mateo Granger PA-C          Follow-up Information       Follow up With Specialties Details Why Contact Info    Cyril Saucedo MD Family Medicine, Wound Care Schedule an appointment as soon as possible for a visit  For reevaluation, If symptoms persist [address]  [place]  [address]  [phone]               Mateo Granger PA-C  08/28/22 1944

## 2022-09-07 ENCOUNTER — PATIENT MESSAGE (OUTPATIENT)
Dept: BARIATRICS | Facility: CLINIC | Age: 52
End: 2022-09-07
Payer: MEDICARE

## 2022-09-07 ENCOUNTER — TELEPHONE (OUTPATIENT)
Dept: RHEUMATOLOGY | Facility: CLINIC | Age: 52
End: 2022-09-07
Payer: MEDICARE

## 2022-09-07 NOTE — TELEPHONE ENCOUNTER
----- Message from Cristian Frederick sent at 9/7/2022  1:37 PM CDT -----  Regarding: Appointment Reschedule  Contact: [phone]  Request Appointment Reschedule    Who Called: Zaira Hall Type: 8 week f/u  Call Back Number:[phone]  Additional Information: The patient called to reschedule the appointment she missed on 07/08/2022.

## 2022-09-07 NOTE — TELEPHONE ENCOUNTER
----- Message from Mary Adrian sent at 9/7/2022  2:12 PM CDT -----  Type:  Patient Returning Call    Who Called: self    Who Left Message for Patient: Sary    Does the patient know what this is regarding?:yes    Would the patient rather a call back or a response via My [facility]? call    Best Call Back Number:.[phone] (home)

## 2022-09-14 ENCOUNTER — TELEPHONE (OUTPATIENT)
Dept: PSYCHIATRY | Facility: CLINIC | Age: 52
End: 2022-09-14
Payer: MEDICARE

## 2022-09-15 ENCOUNTER — TELEPHONE (OUTPATIENT)
Dept: PSYCHIATRY | Facility: CLINIC | Age: 52
End: 2022-09-15
Payer: MEDICARE

## 2022-09-15 RX ORDER — LORAZEPAM 0.5 MG/1
TABLET ORAL
Qty: 60 TABLET | Refills: 0 | Status: SHIPPED | OUTPATIENT
Start: 2022-09-15 | End: 2022-10-06 | Stop reason: SDUPTHER

## 2022-09-15 NOTE — TELEPHONE ENCOUNTER
----- Message from Ariana Cho sent at 9/15/2022 12:58 PM CDT -----  Regarding: Refill  Pt is requesting a refill of LORazepam (ATIVAN) 0.5 MG tablet from [facility] [address] [place] [address] [place], Phone: [phone], Fax: [phone].  Last time seen on 6/29/22 and scheduled to be seen on 10/6/22.  Pt formerly saw Dr. Nagy.  Dr. Echols is out of the office today and tomorrow and unable to authorize the refill.    She can be reached at [phone].    Thank you.

## 2022-10-06 ENCOUNTER — PATIENT MESSAGE (OUTPATIENT)
Dept: BARIATRICS | Facility: CLINIC | Age: 52
End: 2022-10-06
Payer: MEDICARE

## 2022-10-06 ENCOUNTER — OFFICE VISIT (OUTPATIENT)
Dept: PSYCHIATRY | Facility: CLINIC | Age: 52
End: 2022-10-06
Payer: MEDICARE

## 2022-10-06 DIAGNOSIS — F41.9 ANXIETY DISORDER, UNSPECIFIED TYPE: ICD-10-CM

## 2022-10-06 DIAGNOSIS — G47.00 INSOMNIA, UNSPECIFIED TYPE: ICD-10-CM

## 2022-10-06 DIAGNOSIS — F31.9 BIPOLAR 1 DISORDER: Primary | ICD-10-CM

## 2022-10-06 PROCEDURE — 99214 PR OFFICE/OUTPT VISIT, EST, LEVL IV, 30-39 MIN: ICD-10-PCS | Mod: 95,S$GLB,, | Performed by: NURSE PRACTITIONER

## 2022-10-06 PROCEDURE — 99999 PR PBB SHADOW E&M-EST. PATIENT-LVL II: CPT | Mod: PBBFAC,,, | Performed by: NURSE PRACTITIONER

## 2022-10-06 PROCEDURE — 99214 OFFICE O/P EST MOD 30 MIN: CPT | Mod: 95,S$GLB,, | Performed by: NURSE PRACTITIONER

## 2022-10-06 PROCEDURE — 99999 PR PBB SHADOW E&M-EST. PATIENT-LVL II: ICD-10-PCS | Mod: PBBFAC,,, | Performed by: NURSE PRACTITIONER

## 2022-10-06 PROCEDURE — 3044F HG A1C LEVEL LT 7.0%: CPT | Mod: CPTII,95,S$GLB, | Performed by: NURSE PRACTITIONER

## 2022-10-06 PROCEDURE — 3044F PR MOST RECENT HEMOGLOBIN A1C LEVEL <7.0%: ICD-10-PCS | Mod: CPTII,95,S$GLB, | Performed by: NURSE PRACTITIONER

## 2022-10-06 RX ORDER — BUPROPION HYDROCHLORIDE 100 MG/1
TABLET, EXTENDED RELEASE ORAL
Qty: 270 TABLET | Refills: 1 | Status: SHIPPED | OUTPATIENT
Start: 2022-10-06 | End: 2023-03-21 | Stop reason: SDUPTHER

## 2022-10-06 RX ORDER — RISPERIDONE 0.5 MG/1
0.5 TABLET ORAL 2 TIMES DAILY
Qty: 180 TABLET | Refills: 1 | Status: SHIPPED | OUTPATIENT
Start: 2022-10-06 | End: 2023-03-21 | Stop reason: SDUPTHER

## 2022-10-06 RX ORDER — SERTRALINE HYDROCHLORIDE 100 MG/1
200 TABLET, FILM COATED ORAL DAILY
Qty: 180 TABLET | Refills: 1 | Status: SHIPPED | OUTPATIENT
Start: 2022-10-06 | End: 2023-03-21 | Stop reason: SDUPTHER

## 2022-10-06 RX ORDER — LITHIUM CARBONATE 300 MG/1
300 CAPSULE ORAL
Qty: 270 CAPSULE | Refills: 1 | Status: SHIPPED | OUTPATIENT
Start: 2022-10-06 | End: 2023-03-21 | Stop reason: SDUPTHER

## 2022-10-06 RX ORDER — LORAZEPAM 0.5 MG/1
TABLET ORAL
Qty: 60 TABLET | Refills: 1 | Status: SHIPPED | OUTPATIENT
Start: 2022-10-17 | End: 2022-12-19 | Stop reason: SDUPTHER
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
## 2022-10-31 ENCOUNTER — OFFICE VISIT (OUTPATIENT)
Dept: RHEUMATOLOGY | Facility: CLINIC | Age: 52
End: 2022-10-31
Payer: MEDICARE

## 2022-10-31 DIAGNOSIS — Z71.89 COUNSELING AND COORDINATION OF CARE: ICD-10-CM

## 2022-10-31 DIAGNOSIS — M15.9 PRIMARY OSTEOARTHRITIS INVOLVING MULTIPLE JOINTS: ICD-10-CM

## 2022-10-31 DIAGNOSIS — M79.7 FIBROMYALGIA: Primary | ICD-10-CM

## 2022-10-31 DIAGNOSIS — E66.9 OBESITY, UNSPECIFIED CLASSIFICATION, UNSPECIFIED OBESITY TYPE, UNSPECIFIED WHETHER SERIOUS COMORBIDITY PRESENT: ICD-10-CM

## 2022-10-31 PROCEDURE — 3044F PR MOST RECENT HEMOGLOBIN A1C LEVEL <7.0%: ICD-10-PCS | Mod: CPTII,95,, | Performed by: INTERNAL MEDICINE

## 2022-10-31 PROCEDURE — 3044F HG A1C LEVEL LT 7.0%: CPT | Mod: CPTII,95,, | Performed by: INTERNAL MEDICINE

## 2022-10-31 PROCEDURE — 99214 PR OFFICE/OUTPT VISIT, EST, LEVL IV, 30-39 MIN: ICD-10-PCS | Mod: 95,,, | Performed by: INTERNAL MEDICINE

## 2022-10-31 PROCEDURE — 99214 OFFICE O/P EST MOD 30 MIN: CPT | Mod: 95,,, | Performed by: INTERNAL MEDICINE

## 2022-10-31 RX ORDER — PREGABALIN 100 MG/1
100 CAPSULE ORAL 2 TIMES DAILY
Qty: 60 CAPSULE | Refills: 6 | Status: SHIPPED | OUTPATIENT
Start: 2022-10-31 | End: 2022-11-16 | Stop reason: SDUPTHER

## 2022-10-31 RX ORDER — CYCLOBENZAPRINE HCL 5 MG
5 TABLET ORAL 3 TIMES DAILY PRN
Qty: 60 TABLET | Refills: 3 | Status: SHIPPED | OUTPATIENT
Start: 2022-10-31 | End: 2023-04-11 | Stop reason: SDUPTHER
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
## 2022-11-02 ENCOUNTER — CLINICAL SUPPORT (OUTPATIENT)
Dept: FAMILY MEDICINE | Facility: CLINIC | Age: 52
End: 2022-11-02
Payer: MEDICARE

## 2022-11-02 DIAGNOSIS — Z23 NEED FOR VACCINATION: Primary | ICD-10-CM

## 2022-11-02 PROCEDURE — G0008 ADMIN INFLUENZA VIRUS VAC: HCPCS | Mod: S$GLB,,, | Performed by: FAMILY MEDICINE

## 2022-11-02 PROCEDURE — G0008 FLU VACCINE (QUAD) GREATER THAN OR EQUAL TO 3YO PRESERVATIVE FREE IM: ICD-10-PCS | Mod: S$GLB,,, | Performed by: FAMILY MEDICINE

## 2022-11-02 PROCEDURE — 90686 FLU VACCINE (QUAD) GREATER THAN OR EQUAL TO 3YO PRESERVATIVE FREE IM: ICD-10-PCS | Mod: S$GLB,,, | Performed by: FAMILY MEDICINE

## 2022-11-02 PROCEDURE — 99499 NO LOS: ICD-10-PCS | Mod: S$GLB,,, | Performed by: FAMILY MEDICINE

## 2022-11-02 PROCEDURE — 90686 IIV4 VACC NO PRSV 0.5 ML IM: CPT | Mod: S$GLB,,, | Performed by: FAMILY MEDICINE

## 2022-11-02 PROCEDURE — 99499 UNLISTED E&M SERVICE: CPT | Mod: S$GLB,,, | Performed by: FAMILY MEDICINE

## 2022-11-04 ENCOUNTER — LAB VISIT (OUTPATIENT)
Dept: LAB | Facility: HOSPITAL | Age: 52
End: 2022-11-04
Attending: NURSE PRACTITIONER
Payer: MEDICARE

## 2022-11-04 ENCOUNTER — PATIENT MESSAGE (OUTPATIENT)
Dept: BARIATRICS | Facility: CLINIC | Age: 52
End: 2022-11-04
Payer: MEDICARE

## 2022-11-04 DIAGNOSIS — Z00.00 HEALTHCARE MAINTENANCE: ICD-10-CM

## 2022-11-04 DIAGNOSIS — Z86.39 HX OF OBESITY: ICD-10-CM

## 2022-11-04 DIAGNOSIS — E66.01 CLASS 2 SEVERE OBESITY DUE TO EXCESS CALORIES WITH SERIOUS COMORBIDITY AND BODY MASS INDEX (BMI) OF 36.0 TO 36.9 IN ADULT: ICD-10-CM

## 2022-11-04 LAB
ALBUMIN SERPL BCP-MCNC: 3.5 G/DL (ref 3.5–5.2)
ALP SERPL-CCNC: 63 U/L (ref 55–135)
ALT SERPL W/O P-5'-P-CCNC: 18 U/L (ref 10–44)
ANION GAP SERPL CALC-SCNC: 4 MMOL/L (ref 8–16)
AST SERPL-CCNC: 13 U/L (ref 10–40)
BASOPHILS # BLD AUTO: 0.08 K/UL (ref 0–0.2)
BASOPHILS NFR BLD: 1.1 % (ref 0–1.9)
BILIRUB SERPL-MCNC: 0.4 MG/DL (ref 0.1–1)
BUN SERPL-MCNC: 11 MG/DL (ref 6–20)
CALCIUM SERPL-MCNC: 9.3 MG/DL (ref 8.7–10.5)
CHLORIDE SERPL-SCNC: 111 MMOL/L (ref 95–110)
CHOLEST SERPL-MCNC: 183 MG/DL (ref 120–199)
CHOLEST/HDLC SERPL: 2.4 {RATIO} (ref 2–5)
CO2 SERPL-SCNC: 24 MMOL/L (ref 23–29)
CREAT SERPL-MCNC: 0.8 MG/DL (ref 0.5–1.4)
DIFFERENTIAL METHOD: ABNORMAL
EOSINOPHIL # BLD AUTO: 0.2 K/UL (ref 0–0.5)
EOSINOPHIL NFR BLD: 2.7 % (ref 0–8)
ERYTHROCYTE [DISTWIDTH] IN BLOOD BY AUTOMATED COUNT: 11.9 % (ref 11.5–14.5)
EST. GFR  (NO RACE VARIABLE): >60 ML/MIN/1.73 M^2
ESTIMATED AVG GLUCOSE: 91 MG/DL (ref 68–131)
GLUCOSE SERPL-MCNC: 87 MG/DL (ref 70–110)
HBA1C MFR BLD: 4.8 % (ref 4–5.6)
HCT VFR BLD AUTO: 40.3 % (ref 37–48.5)
HDLC SERPL-MCNC: 75 MG/DL (ref 40–75)
HDLC SERPL: 41 % (ref 20–50)
HGB BLD-MCNC: 13.3 G/DL (ref 12–16)
IMM GRANULOCYTES # BLD AUTO: 0.03 K/UL (ref 0–0.04)
IMM GRANULOCYTES NFR BLD AUTO: 0.4 % (ref 0–0.5)
LDLC SERPL CALC-MCNC: 92.6 MG/DL (ref 63–159)
LYMPHOCYTES # BLD AUTO: 2.2 K/UL (ref 1–4.8)
LYMPHOCYTES NFR BLD: 31.1 % (ref 18–48)
MCH RBC QN AUTO: 31.7 PG (ref 27–31)
MCHC RBC AUTO-ENTMCNC: 33 G/DL (ref 32–36)
MCV RBC AUTO: 96 FL (ref 82–98)
MONOCYTES # BLD AUTO: 0.5 K/UL (ref 0.3–1)
MONOCYTES NFR BLD: 7.7 % (ref 4–15)
NEUTROPHILS # BLD AUTO: 4 K/UL (ref 1.8–7.7)
NEUTROPHILS NFR BLD: 57 % (ref 38–73)
NONHDLC SERPL-MCNC: 108 MG/DL
NRBC BLD-RTO: 0 /100 WBC
PLATELET # BLD AUTO: 265 K/UL (ref 150–450)
PMV BLD AUTO: 9 FL (ref 9.2–12.9)
POTASSIUM SERPL-SCNC: 4.1 MMOL/L (ref 3.5–5.1)
PROT SERPL-MCNC: 6.8 G/DL (ref 6–8.4)
RBC # BLD AUTO: 4.2 M/UL (ref 4–5.4)
SODIUM SERPL-SCNC: 139 MMOL/L (ref 136–145)
TRIGL SERPL-MCNC: 77 MG/DL (ref 30–150)
TSH SERPL DL<=0.005 MIU/L-ACNC: 3.49 UIU/ML (ref 0.4–4)
WBC # BLD AUTO: 7 K/UL (ref 3.9–12.7)

## 2022-11-04 PROCEDURE — 83036 HEMOGLOBIN GLYCOSYLATED A1C: CPT | Performed by: INTERNAL MEDICINE

## 2022-11-04 PROCEDURE — 80053 COMPREHEN METABOLIC PANEL: CPT | Performed by: INTERNAL MEDICINE

## 2022-11-04 PROCEDURE — 80061 LIPID PANEL: CPT | Performed by: INTERNAL MEDICINE

## 2022-11-04 PROCEDURE — 85025 COMPLETE CBC W/AUTO DIFF WBC: CPT | Mod: GA | Performed by: INTERNAL MEDICINE

## 2022-11-04 PROCEDURE — 84443 ASSAY THYROID STIM HORMONE: CPT | Performed by: INTERNAL MEDICINE

## 2022-11-09 ENCOUNTER — LAB VISIT (OUTPATIENT)
Dept: LAB | Facility: HOSPITAL | Age: 52
End: 2022-11-09
Attending: NURSE PRACTITIONER
Payer: MEDICARE

## 2022-11-09 DIAGNOSIS — F31.9 BIPOLAR 1 DISORDER: ICD-10-CM

## 2022-11-09 LAB — LITHIUM SERPL-SCNC: 0.5 MMOL/L (ref 0.6–1.2)

## 2022-11-09 PROCEDURE — 36415 COLL VENOUS BLD VENIPUNCTURE: CPT | Performed by: NURSE PRACTITIONER

## 2022-11-09 PROCEDURE — 80178 ASSAY OF LITHIUM: CPT | Performed by: NURSE PRACTITIONER

## 2022-11-12 ENCOUNTER — PATIENT MESSAGE (OUTPATIENT)
Dept: FAMILY MEDICINE | Facility: CLINIC | Age: 52
End: 2022-11-12
Payer: MEDICARE

## 2022-11-14 NOTE — TELEPHONE ENCOUNTER
Please advise, in response to your message on her labs:  Your results show:  A1C is normal.  You don't have diabetes.  Thyroid function is normal.  Kidney and liver tests are abnormal.  Cholesterol is normal.  Blood counts are normal.

## 2022-11-16 DIAGNOSIS — M79.7 FIBROMYALGIA: ICD-10-CM

## 2022-11-16 RX ORDER — PREGABALIN 100 MG/1
100 CAPSULE ORAL 2 TIMES DAILY
Qty: 60 CAPSULE | Refills: 5 | Status: SHIPPED | OUTPATIENT
Start: 2022-11-16 | End: 2023-04-11 | Stop reason: SDUPTHER

## 2022-11-16 NOTE — TELEPHONE ENCOUNTER
----- Message from Elena Tinajero sent at 11/16/2022 11:46 AM CST -----  Needs advice from nurse:      Who Called:pt  Regarding:there is a problem with the prescription for Lyrica, needs new prescription with only 5 refills added due to laws/patient is out and needs this done today  Would the patient rather a call back or VIA [facility]?  Best Call Back number:[phone]  Additional Info:[facility] [place], [place] - [facility] (Ph: [phone])

## 2022-11-30 ENCOUNTER — OFFICE VISIT (OUTPATIENT)
Dept: FAMILY MEDICINE | Facility: CLINIC | Age: 52
End: 2022-11-30
Payer: MEDICARE

## 2022-11-30 ENCOUNTER — PATIENT MESSAGE (OUTPATIENT)
Dept: FAMILY MEDICINE | Facility: CLINIC | Age: 52
End: 2022-11-30

## 2022-11-30 VITALS
TEMPERATURE: 99 F | HEART RATE: 67 BPM | HEIGHT: 67 IN | SYSTOLIC BLOOD PRESSURE: 110 MMHG | WEIGHT: 265.44 LBS | OXYGEN SATURATION: 97 % | BODY MASS INDEX: 41.66 KG/M2 | RESPIRATION RATE: 18 BRPM | DIASTOLIC BLOOD PRESSURE: 68 MMHG

## 2022-11-30 DIAGNOSIS — J06.9 URI WITH COUGH AND CONGESTION: Primary | ICD-10-CM

## 2022-11-30 LAB
CTP QC/QA: YES
POC MOLECULAR INFLUENZA A AGN: NEGATIVE
POC MOLECULAR INFLUENZA B AGN: NEGATIVE

## 2022-11-30 PROCEDURE — 99999 PR PBB SHADOW E&M-EST. PATIENT-LVL V: ICD-10-PCS | Mod: PBBFAC,,, | Performed by: NURSE PRACTITIONER

## 2022-11-30 PROCEDURE — 1159F PR MEDICATION LIST DOCUMENTED IN MEDICAL RECORD: ICD-10-PCS | Mod: CPTII,S$GLB,, | Performed by: NURSE PRACTITIONER

## 2022-11-30 PROCEDURE — 1159F MED LIST DOCD IN RCRD: CPT | Mod: CPTII,S$GLB,, | Performed by: NURSE PRACTITIONER

## 2022-11-30 PROCEDURE — 3008F BODY MASS INDEX DOCD: CPT | Mod: CPTII,S$GLB,, | Performed by: NURSE PRACTITIONER

## 2022-11-30 PROCEDURE — 3044F PR MOST RECENT HEMOGLOBIN A1C LEVEL <7.0%: ICD-10-PCS | Mod: CPTII,S$GLB,, | Performed by: NURSE PRACTITIONER

## 2022-11-30 PROCEDURE — 1160F PR REVIEW ALL MEDS BY PRESCRIBER/CLIN PHARMACIST DOCUMENTED: ICD-10-PCS | Mod: CPTII,S$GLB,, | Performed by: NURSE PRACTITIONER

## 2022-11-30 PROCEDURE — 99214 PR OFFICE/OUTPT VISIT, EST, LEVL IV, 30-39 MIN: ICD-10-PCS | Mod: S$GLB,,, | Performed by: NURSE PRACTITIONER

## 2022-11-30 PROCEDURE — 99214 OFFICE O/P EST MOD 30 MIN: CPT | Mod: S$GLB,,, | Performed by: NURSE PRACTITIONER

## 2022-11-30 PROCEDURE — 99999 PR PBB SHADOW E&M-EST. PATIENT-LVL V: CPT | Mod: PBBFAC,,, | Performed by: NURSE PRACTITIONER

## 2022-11-30 PROCEDURE — 3078F PR MOST RECENT DIASTOLIC BLOOD PRESSURE < 80 MM HG: ICD-10-PCS | Mod: CPTII,S$GLB,, | Performed by: NURSE PRACTITIONER

## 2022-11-30 PROCEDURE — 3074F PR MOST RECENT SYSTOLIC BLOOD PRESSURE < 130 MM HG: ICD-10-PCS | Mod: CPTII,S$GLB,, | Performed by: NURSE PRACTITIONER

## 2022-11-30 PROCEDURE — 3078F DIAST BP <80 MM HG: CPT | Mod: CPTII,S$GLB,, | Performed by: NURSE PRACTITIONER

## 2022-11-30 PROCEDURE — 1160F RVW MEDS BY RX/DR IN RCRD: CPT | Mod: CPTII,S$GLB,, | Performed by: NURSE PRACTITIONER

## 2022-11-30 PROCEDURE — 3044F HG A1C LEVEL LT 7.0%: CPT | Mod: CPTII,S$GLB,, | Performed by: NURSE PRACTITIONER

## 2022-11-30 PROCEDURE — 87502 INFLUENZA DNA AMP PROBE: CPT | Mod: QW,S$GLB,, | Performed by: NURSE PRACTITIONER

## 2022-11-30 PROCEDURE — 3074F SYST BP LT 130 MM HG: CPT | Mod: CPTII,S$GLB,, | Performed by: NURSE PRACTITIONER

## 2022-11-30 PROCEDURE — 87502 POCT INFLUENZA A/B MOLECULAR: ICD-10-PCS | Mod: QW,S$GLB,, | Performed by: NURSE PRACTITIONER

## 2022-11-30 PROCEDURE — 3008F PR BODY MASS INDEX (BMI) DOCUMENTED: ICD-10-PCS | Mod: CPTII,S$GLB,, | Performed by: NURSE PRACTITIONER

## 2022-11-30 RX ORDER — FLUTICASONE PROPIONATE 50 MCG
2 SPRAY, SUSPENSION (ML) NASAL DAILY
Qty: 15.8 ML | Refills: 1 | OUTPATIENT
Start: 2022-11-30 | End: 2023-04-02

## 2022-11-30 RX ORDER — BENZONATATE 200 MG/1
200 CAPSULE ORAL 3 TIMES DAILY PRN
Qty: 30 CAPSULE | Refills: 1 | Status: SHIPPED | OUTPATIENT
Start: 2022-11-30 | End: 2022-12-10

## 2022-11-30 NOTE — PROGRESS NOTES
Routine Office Visit    Patient Name: Zaira Dowell    : 1970  MRN: [id number]    Chief Complaint:  Sinus issues    Subjective:  Zaira is a 52 y.o. female who presents today for:    1. Sinus issues - patient who is known to me reports today with 3 days of sinus issues.  She has been having nasal congestion, postnasal drip, runny nose, and a dry cough for the last 3 days.  She denies any fevers but does have intermittent chills and worsening of her usual myalgias.  She denies any recent sick contacts.  No shortness of breath or wheezing reported.  She has been taking combination sinus medication over-the-counter without significant benefit.  She has gotten her flu shot this year.  She has not done a COVID test at home.    Past Medical History  Past Medical History:   Diagnosis Date    Anxiety     Arthritis     Asthma     Back pain     Bipolar disorder     BMI 50.0-59.9, adult     Chronic bronchitis     Chronic obstructive pulmonary disease, unspecified COPD type 3/21/2022    Depression     Fibromyalgia     GERD (gastroesophageal reflux disease)     Insomnia 2021    Obesity     SHARON (obstructive sleep apnea)     Respiratory failure     Tobacco dependence        Past Surgical History  Past Surgical History:   Procedure Laterality Date    CARPAL TUNNEL RELEASE      CHOLECYSTECTOMY      COLONOSCOPY N/A 2022    Procedure: COLONOSCOPY;  Surgeon: Ernesto Auguste MD;  Location: [facility];  Service: Endoscopy;  Laterality: N/A;  fully vaccinated-GT    ESOPHAGOGASTRODUODENOSCOPY N/A 2019    Procedure: EGD (ESOPHAGOGASTRODUODENOSCOPY);  Surgeon: Michelle Mc MD;  Location: [facility];  Service: Endoscopy;  Laterality: N/A;    HYSTERECTOMY      partial     KNEE SURGERY      LAPAROSCOPIC SLEEVE GASTRECTOMY N/A 10/18/2019    Procedure: GASTRECTOMY, SLEEVE, LAPAROSCOPIC, with intraop EGD;  Surgeon: Ean Kohli MD;  Location: [address];  Service: General;  Laterality: N/A;    pinched  nerve      SHOULDER SURGERY      TONSILLECTOMY      WRIST SURGERY      had ganlin cyst       Family History  Family History   Problem Relation Age of Onset    Diabetes Mother     Miller's disease Mother     Hypertension Mother     Heart disease Father     Stroke Father     Mental illness Father         ptsd    Hypertension Father     Diabetes Father     Depression Father     No Known Problems Brother     Heart disease Maternal Grandmother     Depression Maternal Grandmother     COPD Maternal Grandfather     Heart disease Maternal Grandfather     Stroke Maternal Grandfather     Kidney disease Paternal Grandmother     Heart disease Paternal Grandmother     Heart disease Paternal Grandfather     Hypertension Son     Rectal cancer Paternal Aunt        Social History  Social History     Socioeconomic History    Marital status:    Tobacco Use    Smoking status: Some Days     Packs/day: 1.00     Years: 15.00     Pack years: 15.00     Types: Vaping with nicotine, Cigarettes    Smokeless tobacco: Never    Tobacco comments:     vaping started April 2020; QUIT cigarettes Sept 2019   Substance and Sexual Activity    Alcohol use: No    Drug use: No    Sexual activity: Yes     Partners: Male     Social Determinants of Health     Financial Resource Strain: Low Risk     Difficulty of Paying Living Expenses: Not very hard   Food Insecurity: No Food Insecurity    Worried About Running Out of Food in the Last Year: Never true    Ran Out of Food in the Last Year: Never true   Transportation Needs: No Transportation Needs    Lack of Transportation (Medical): No    Lack of Transportation (Non-Medical): No   Physical Activity: Inactive    Days of Exercise per Week: 0 days    Minutes of Exercise per Session: 0 min   Stress: Stress Concern Present    Feeling of Stress : Very much   Social Connections: Unknown    Frequency of Communication with Friends and Family: More than three times a week    Frequency of Social Gatherings with  Friends and Family: Once a week    Active Member of Clubs or Organizations: No    Attends Club or Organization Meetings: Never    Marital Status:    Housing Stability: Low Risk     Unable to Pay for Housing in the Last Year: No    Number of Places Lived in the Last Year: 1    Unstable Housing in the Last Year: No       Current Medications  Current Outpatient Medications on File Prior to Visit   Medication Sig Dispense Refill    albuterol (PROVENTIL/VENTOLIN HFA) 90 mcg/actuation inhaler Inhale 2 puffs into the lungs every 6 (six) hours as needed for Wheezing. Rescue 18 g 2    b complex vitamins tablet Take 1 tablet by mouth once daily.      buPROPion (WELLBUTRIN SR) 100 MG TBSR 12 hr tablet Take 2 tabs q am, and 1 tab daily at noon 270 tablet 1    calcium citrate (CALCITRATE) 200 mg (950 mg) tablet Take 1 tablet by mouth 2 (two) times daily.      cyanocobalamin 500 MCG tablet Take 500 mcg by mouth once daily.      cyclobenzaprine (FLEXERIL) 5 MG tablet Take 1 tablet (5 mg total) by mouth 3 (three) times daily as needed for Muscle spasms. 60 tablet 3    ferrous fumarate/vit Bcomp,C (SUPER B COMPLEX ORAL) Take 1 capsule by mouth once daily.      furosemide (LASIX) 40 MG tablet TAKE 1 TABLET EVERY DAY 90 tablet 1    lithium (ESKALITH) 300 MG capsule Take 1 capsule (300 mg total) by mouth 3 (three) times daily with meals. 270 capsule 1    LORazepam (ATIVAN) 0.5 MG tablet Take 2 tabs daily as directed. 60 tablet 1    meclizine (ANTIVERT) 25 mg tablet Take 1 tablet (25 mg total) by mouth 3 (three) times daily as needed for Dizziness or Nausea. 30 tablet 0    multivitamin capsule Take 1 capsule by mouth 2 (two) times daily.       omeprazole (PRILOSEC) 40 MG capsule TAKE 1 CAPSULE TWICE DAILY BEFORE MEALS 180 capsule 3    ondansetron (ZOFRAN-ODT) 8 MG TbDL Take 1 tablet (8 mg total) by mouth 2 (two) times daily as needed (nausea). 30 tablet 1    potassium chloride (KLOR-CON) 10 MEQ TbSR Take 10 mEq by mouth 2 (two)  times daily.      pregabalin (LYRICA) 100 MG capsule Take 1 capsule (100 mg total) by mouth 2 (two) times daily. 60 capsule 5    promethazine (PHENERGAN) 12.5 MG Tab promethazine Take half tablet (oral) 2 times per day PRN - Nausea . Medication may cause drowsiness. 20210917 tablet 2 times per day oral No set duration recorded No set duration amount recorded active 12.5 mg      risperiDONE (RISPERDAL) 0.5 MG Tab Take 1 tablet (0.5 mg total) by mouth 2 (two) times daily. 180 tablet 1    sertraline (ZOLOFT) 100 MG tablet Take 2 tablets (200 mg total) by mouth once daily. 180 tablet 1    tolterodine (DETROL LA) 4 MG 24 hr capsule TAKE ONE CAPSULE BY MOUTH EVERY DAY 30 capsule 11    topiramate (TOPAMAX) 200 MG Tab TAKE 1 TABLET EVERY DAY 90 tablet 1    cetirizine (ZYRTEC) 10 MG tablet Take 1 tablet (10 mg total) by mouth once daily. 30 tablet 1    fluticasone-salmeterol diskus inhaler 250-50 mcg Inhale 1 puff into the lungs 2 (two) times daily. 60 each 3    GAVILYTE-G 236-22.74-6.74 -5.86 gram suspension TAKE 4000 MLS BY MOUTH FOR 1 DOSE      [DISCONTINUED] fluticasone propionate (FLONASE) 50 mcg/actuation nasal spray 2 sprays (100 mcg total) by Each Nostril route once daily. 15.8 mL 1    [DISCONTINUED] loratadine (CLARITIN) 10 mg tablet Take 1 tablet (10 mg total) by mouth once daily. for 20 days 60 tablet 0     No current facility-administered medications on file prior to visit.       Allergies   Review of patient's allergies indicates:   Allergen Reactions    Hydrocodone-acetaminophen Nausea And Vomiting       Review of Systems (Pertinent positives)  Review of Systems   Constitutional:  Positive for chills and malaise/fatigue. Negative for fever and weight loss.   HENT:  Positive for congestion. Negative for hearing loss and sinus pain.         + postnasal drip  +rhinorrhea   Eyes: Negative.  Negative for discharge.   Respiratory:  Positive for cough. Negative for hemoptysis, sputum production, shortness of breath,  "wheezing and stridor.    Cardiovascular: Negative.  Negative for chest pain and palpitations.   Gastrointestinal:  Positive for constipation. Negative for blood in stool, diarrhea and vomiting.   Genitourinary:  Negative for dysuria and hematuria.   Musculoskeletal:  Positive for joint pain, myalgias and neck pain.   Skin: Negative.    Neurological:  Positive for weakness and headaches.   Endo/Heme/Allergies:  Negative for polydipsia.   Psychiatric/Behavioral: Negative.       /68 (BP Location: Left arm, Patient Position: Sitting, BP Method: Large (Manual))   Pulse 67   Temp 98.7 °F (37.1 °C) (Oral)   Resp 18   Ht 5' 7" (1.702 m)   Wt 120.4 kg (265 lb 6.9 oz)   LMP  (LMP Unknown)   SpO2 97%   BMI 41.57 kg/m²     Physical Exam  Vitals reviewed.   Constitutional:       General: She is not in acute distress.     Appearance: Normal appearance. She is not ill-appearing, toxic-appearing or diaphoretic.   HENT:      Head: Normocephalic and atraumatic.      Right Ear: Tympanic membrane, ear canal and external ear normal.      Left Ear: Tympanic membrane, ear canal and external ear normal.      Nose: Mucosal edema, congestion and rhinorrhea present. Rhinorrhea is clear.      Right Turbinates: Enlarged.      Left Turbinates: Enlarged.      Mouth/Throat:      Lips: Pink.      Pharynx: Oropharynx is clear.      Tonsils: No tonsillar exudate or tonsillar abscesses.   Cardiovascular:      Rate and Rhythm: Normal rate and regular rhythm.      Pulses: Normal pulses.      Heart sounds: Normal heart sounds.   Pulmonary:      Effort: Pulmonary effort is normal. No respiratory distress.      Breath sounds: Normal breath sounds. No wheezing.   Lymphadenopathy:      Cervical: Cervical adenopathy present.      Right cervical: Superficial cervical adenopathy present.      Left cervical: Superficial cervical adenopathy present.   Skin:     General: Skin is warm and dry.      Capillary Refill: Capillary refill takes less than 2 " seconds.   Neurological:      Mental Status: She is alert and oriented to person, place, and time.   Psychiatric:         Mood and Affect: Mood normal.         Behavior: Behavior normal.        Assessment/Plan:  Zaira Dowell is a 52 y.o. female who presents today for :    Diagnoses and all orders for this visit:    URI with cough and congestion  -     POCT Influenza A/B Molecular  -     fluticasone propionate (FLONASE) 50 mcg/actuation nasal spray; 2 sprays (100 mcg total) by Each Nostril route once daily.  -     benzonatate (TESSALON) 200 MG capsule; Take 1 capsule (200 mg total) by mouth 3 (three) times daily as needed for Cough.    Point of care influenza test negative.  Discussed with patient that we could check a COVID test today however this may take 1-2 days to come back.  She has COVID test at home.  I recommended her to test herself for COVID when she gets home and message me if positive.  No bacterial nidus on examination.  Restart Flonase.  P.r.n. Tessalon for cough.  Home measures discussed.  My chart message if symptoms worsen or fail to improve.        This office note has been dictated.  This dictation has been generated using M-Modal Fluency Direct dictation; some phonetic errors may occur.   My collaborating physician is Dr. Tobias Lundy.

## 2022-12-07 ENCOUNTER — PATIENT MESSAGE (OUTPATIENT)
Dept: BARIATRICS | Facility: CLINIC | Age: 52
End: 2022-12-07
Payer: MEDICARE

## 2022-12-15 ENCOUNTER — OFFICE VISIT (OUTPATIENT)
Dept: FAMILY MEDICINE | Facility: CLINIC | Age: 52
End: 2022-12-15
Payer: MEDICARE

## 2022-12-15 ENCOUNTER — TELEPHONE (OUTPATIENT)
Dept: FAMILY MEDICINE | Facility: CLINIC | Age: 52
End: 2022-12-15
Payer: MEDICARE

## 2022-12-15 DIAGNOSIS — J01.90 ACUTE BACTERIAL SINUSITIS: Primary | ICD-10-CM

## 2022-12-15 DIAGNOSIS — B96.89 ACUTE BACTERIAL SINUSITIS: Primary | ICD-10-CM

## 2022-12-15 PROCEDURE — 1159F PR MEDICATION LIST DOCUMENTED IN MEDICAL RECORD: ICD-10-PCS | Mod: CPTII,95,, | Performed by: NURSE PRACTITIONER

## 2022-12-15 PROCEDURE — 99213 PR OFFICE/OUTPT VISIT, EST, LEVL III, 20-29 MIN: ICD-10-PCS | Mod: 95,,, | Performed by: NURSE PRACTITIONER

## 2022-12-15 PROCEDURE — 3044F PR MOST RECENT HEMOGLOBIN A1C LEVEL <7.0%: ICD-10-PCS | Mod: CPTII,95,, | Performed by: NURSE PRACTITIONER

## 2022-12-15 PROCEDURE — 99213 OFFICE O/P EST LOW 20 MIN: CPT | Mod: 95,,, | Performed by: NURSE PRACTITIONER

## 2022-12-15 PROCEDURE — 1160F RVW MEDS BY RX/DR IN RCRD: CPT | Mod: CPTII,95,, | Performed by: NURSE PRACTITIONER

## 2022-12-15 PROCEDURE — 3044F HG A1C LEVEL LT 7.0%: CPT | Mod: CPTII,95,, | Performed by: NURSE PRACTITIONER

## 2022-12-15 PROCEDURE — 1160F PR REVIEW ALL MEDS BY PRESCRIBER/CLIN PHARMACIST DOCUMENTED: ICD-10-PCS | Mod: CPTII,95,, | Performed by: NURSE PRACTITIONER

## 2022-12-15 PROCEDURE — 1159F MED LIST DOCD IN RCRD: CPT | Mod: CPTII,95,, | Performed by: NURSE PRACTITIONER

## 2022-12-15 RX ORDER — AMOXICILLIN AND CLAVULANATE POTASSIUM 875; 125 MG/1; MG/1
1 TABLET, FILM COATED ORAL EVERY 12 HOURS
Qty: 20 TABLET | Refills: 0 | Status: SHIPPED | OUTPATIENT
Start: 2022-12-15 | End: 2023-01-10

## 2022-12-15 NOTE — PROGRESS NOTES
The patient location is: [place], [place]  The chief complaint leading to consultation is: Sinus problem    Visit type: audiovisual    Face to Face time with patient: 6 minutes  20 minutes of total time spent on the encounter, which includes face to face time and non-face to face time preparing to see the patient (eg, review of tests), Obtaining and/or reviewing separately obtained history, Documenting clinical information in the electronic or other health record, Independently interpreting results (not separately reported) and communicating results to the patient/family/caregiver, or Care coordination (not separately reported).         Each patient to whom he or she provides medical services by telemedicine is:  (1) informed of the relationship between the physician and patient and the respective role of any other health care provider with respect to management of the patient; and (2) notified that he or she may decline to receive medical services by telemedicine and may withdraw from such care at any time.    Notes:     Patient Name: Zaira Dowell    : 1970  MRN: [id number]    Chief Complaint:  Sinus problem    Subjective:  Zaira is a 52 y.o. female who presents today for:    1. Sinus problem - Patient who is known to me reports today for evaluation.  Was seen 2 weeks ago for sinus issues.  Despite use of nasal sprays her sinus issues have worsened.  She endorses significant sinus pain and congestion of the maxillary and frontal sinuses.  She reports her coughing has been stable.  No fevers, chills, chest pain, wheezing, or shortness of breath.  Because of the sinus issues she has been feeling intermittently dizzy and lightheaded.    Past Medical History  Past Medical History:   Diagnosis Date    Anxiety     Arthritis     Asthma     Back pain     Bipolar disorder     BMI 50.0-59.9, adult     Chronic bronchitis     Chronic obstructive pulmonary disease, unspecified COPD type 3/21/2022    Depression      Fibromyalgia     GERD (gastroesophageal reflux disease)     Insomnia 11/23/2021    Obesity     SHARON (obstructive sleep apnea)     Respiratory failure     Tobacco dependence        Past Surgical History  Past Surgical History:   Procedure Laterality Date    CARPAL TUNNEL RELEASE      CHOLECYSTECTOMY      COLONOSCOPY N/A 4/25/2022    Procedure: COLONOSCOPY;  Surgeon: Ernesto Auguste MD;  Location: [facility] ENDO;  Service: Endoscopy;  Laterality: N/A;  fully vaccinated-GT    ESOPHAGOGASTRODUODENOSCOPY N/A 5/21/2019    Procedure: EGD (ESOPHAGOGASTRODUODENOSCOPY);  Surgeon: Michelle Mc MD;  Location: [facility] ENDO;  Service: Endoscopy;  Laterality: N/A;    HYSTERECTOMY      partial     KNEE SURGERY      LAPAROSCOPIC SLEEVE GASTRECTOMY N/A 10/18/2019    Procedure: GASTRECTOMY, SLEEVE, LAPAROSCOPIC, with intraop EGD;  Surgeon: Ean Kohli MD;  Location: [facility] OR [address] [place] [address];  Service: General;  Laterality: N/A;    pinched nerve      SHOULDER SURGERY      TONSILLECTOMY      WRIST SURGERY      had ganlin cyst       Family History  Family History   Problem Relation Age of Onset    Diabetes Mother     Snyder's disease Mother     Hypertension Mother     Heart disease Father     Stroke Father     Mental illness Father         ptsd    Hypertension Father     Diabetes Father     Depression Father     No Known Problems Brother     Heart disease Maternal Grandmother     Depression Maternal Grandmother     COPD Maternal Grandfather     Heart disease Maternal Grandfather     Stroke Maternal Grandfather     Kidney disease Paternal Grandmother     Heart disease Paternal Grandmother     Heart disease Paternal Grandfather     Hypertension Son     Rectal cancer Paternal Aunt        Social History  Social History     Socioeconomic History    Marital status:    Tobacco Use    Smoking status: Some Days     Packs/day: 1.00     Years: 15.00     Pack years: 15.00     Types: Vaping with nicotine, Cigarettes    Smokeless tobacco: Never     Tobacco comments:     vaping started April 2020; QUIT cigarettes Sept 2019   Substance and Sexual Activity    Alcohol use: No    Drug use: No    Sexual activity: Yes     Partners: Male     Social Determinants of Health     Financial Resource Strain: Low Risk     Difficulty of Paying Living Expenses: Not very hard   Food Insecurity: No Food Insecurity    Worried About Running Out of Food in the Last Year: Never true    Ran Out of Food in the Last Year: Never true   Transportation Needs: No Transportation Needs    Lack of Transportation (Medical): No    Lack of Transportation (Non-Medical): No   Physical Activity: Insufficiently Active    Days of Exercise per Week: 2 days    Minutes of Exercise per Session: 30 min   Stress: Stress Concern Present    Feeling of Stress : Very much   Social Connections: Unknown    Frequency of Communication with Friends and Family: More than three times a week    Frequency of Social Gatherings with Friends and Family: Once a week    Active Member of Clubs or Organizations: Yes    Attends Club or Organization Meetings: More than 4 times per year    Marital Status:    Housing Stability: Low Risk     Unable to Pay for Housing in the Last Year: No    Number of Places Lived in the Last Year: 1    Unstable Housing in the Last Year: No       Current Medications  Current Outpatient Medications on File Prior to Visit   Medication Sig Dispense Refill    albuterol (PROVENTIL/VENTOLIN HFA) 90 mcg/actuation inhaler Inhale 2 puffs into the lungs every 6 (six) hours as needed for Wheezing. Rescue 18 g 2    b complex vitamins tablet Take 1 tablet by mouth once daily.      buPROPion (WELLBUTRIN SR) 100 MG TBSR 12 hr tablet Take 2 tabs q am, and 1 tab daily at noon 270 tablet 1    calcium citrate (CALCITRATE) 200 mg (950 mg) tablet Take 1 tablet by mouth 2 (two) times daily.      cetirizine (ZYRTEC) 10 MG tablet Take 1 tablet (10 mg total) by mouth once daily. 30 tablet 1    cyanocobalamin 500  MCG tablet Take 500 mcg by mouth once daily.      cyclobenzaprine (FLEXERIL) 5 MG tablet Take 1 tablet (5 mg total) by mouth 3 (three) times daily as needed for Muscle spasms. 60 tablet 3    ferrous fumarate/vit Bcomp,C (SUPER B COMPLEX ORAL) Take 1 capsule by mouth once daily.      fluticasone propionate (FLONASE) 50 mcg/actuation nasal spray 2 sprays (100 mcg total) by Each Nostril route once daily. 15.8 mL 1    fluticasone-salmeterol diskus inhaler 250-50 mcg Inhale 1 puff into the lungs 2 (two) times daily. 60 each 3    furosemide (LASIX) 40 MG tablet TAKE 1 TABLET EVERY DAY 90 tablet 1    GAVILYTE-G 236-22.74-6.74 -5.86 gram suspension TAKE 4000 MLS BY MOUTH FOR 1 DOSE      lithium (ESKALITH) 300 MG capsule Take 1 capsule (300 mg total) by mouth 3 (three) times daily with meals. 270 capsule 1    LORazepam (ATIVAN) 0.5 MG tablet Take 2 tabs daily as directed. 60 tablet 1    meclizine (ANTIVERT) 25 mg tablet Take 1 tablet (25 mg total) by mouth 3 (three) times daily as needed for Dizziness or Nausea. 30 tablet 0    multivitamin capsule Take 1 capsule by mouth 2 (two) times daily.       omeprazole (PRILOSEC) 40 MG capsule TAKE 1 CAPSULE TWICE DAILY BEFORE MEALS 180 capsule 3    ondansetron (ZOFRAN-ODT) 8 MG TbDL Take 1 tablet (8 mg total) by mouth 2 (two) times daily as needed (nausea). 30 tablet 1    potassium chloride (KLOR-CON) 10 MEQ TbSR Take 10 mEq by mouth 2 (two) times daily.      pregabalin (LYRICA) 100 MG capsule Take 1 capsule (100 mg total) by mouth 2 (two) times daily. 60 capsule 5    promethazine (PHENERGAN) 12.5 MG Tab promethazine Take half tablet (oral) 2 times per day PRN - Nausea . Medication may cause drowsiness. [id number] tablet 2 times per day oral No set duration recorded No set duration amount recorded active 12.5 mg      risperiDONE (RISPERDAL) 0.5 MG Tab Take 1 tablet (0.5 mg total) by mouth 2 (two) times daily. 180 tablet 1    sertraline (ZOLOFT) 100 MG tablet Take 2 tablets (200 mg  total) by mouth once daily. 180 tablet 1    tolterodine (DETROL LA) 4 MG 24 hr capsule TAKE ONE CAPSULE BY MOUTH EVERY DAY 30 capsule 11    topiramate (TOPAMAX) 200 MG Tab TAKE 1 TABLET EVERY DAY 90 tablet 1    [DISCONTINUED] loratadine (CLARITIN) 10 mg tablet Take 1 tablet (10 mg total) by mouth once daily. for 20 days 60 tablet 0     No current facility-administered medications on file prior to visit.       Allergies   Review of patient's allergies indicates:   Allergen Reactions    Hydrocodone-acetaminophen Nausea And Vomiting       Review of Systems (Pertinent positives)  Review of Systems   Constitutional:  Negative for chills and fever.   HENT:  Positive for congestion and sinus pain. Negative for hearing loss.    Eyes: Negative.    Respiratory:  Positive for cough and sputum production. Negative for wheezing.    Cardiovascular: Negative.  Negative for chest pain and palpitations.   Gastrointestinal:  Positive for diarrhea. Negative for blood in stool, constipation and vomiting.   Genitourinary: Negative.  Negative for dysuria and hematuria.   Musculoskeletal:  Positive for neck pain.   Neurological:  Positive for dizziness and headaches. Negative for sensory change, speech change and weakness.   Endo/Heme/Allergies:  Negative for polydipsia.   Psychiatric/Behavioral: Negative.       LMP  (LMP Unknown)     Physical Exam  Vitals reviewed.   Constitutional:       General: She is not in acute distress.     Appearance: Normal appearance. She is not ill-appearing, toxic-appearing or diaphoretic.   HENT:      Head: Normocephalic and atraumatic.   Eyes:      Conjunctiva/sclera: Conjunctivae normal.   Pulmonary:      Effort: Pulmonary effort is normal. No respiratory distress.      Breath sounds: No wheezing.   Musculoskeletal:      Cervical back: Normal range of motion and neck supple.   Skin:     General: Skin is dry.      Findings: No bruising, erythema, lesion or rash.   Neurological:      Mental Status: She is  alert and oriented to person, place, and time.   Psychiatric:         Mood and Affect: Mood normal.         Behavior: Behavior normal.        Assessment/Plan:  Zaira Dowell is a 52 y.o. female who presents today for :    Diagnoses and all orders for this visit:    Acute bacterial sinusitis  -     amoxicillin-clavulanate 875-125mg (AUGMENTIN) 875-125 mg per tablet; Take 1 tablet by mouth every 12 (twelve) hours.    Because of length and severity of symptoms, will start Augmentin today.  Continue nasal sprays.  Follow-up if worsening.        This office note has been dictated.  This dictation has been generated using M-Modal Fluency Direct dictation; some phonetic errors may occur.   My collaborating physician is Dr. Tobias Lundy.

## 2022-12-17 ENCOUNTER — PATIENT MESSAGE (OUTPATIENT)
Dept: FAMILY MEDICINE | Facility: CLINIC | Age: 52
End: 2022-12-17
Payer: MEDICARE

## 2022-12-17 DIAGNOSIS — Z12.31 ENCOUNTER FOR SCREENING MAMMOGRAM FOR BREAST CANCER: Primary | ICD-10-CM

## 2023-01-03 ENCOUNTER — PATIENT MESSAGE (OUTPATIENT)
Dept: FAMILY MEDICINE | Facility: CLINIC | Age: 53
End: 2023-01-03

## 2023-01-03 ENCOUNTER — PATIENT MESSAGE (OUTPATIENT)
Dept: FAMILY MEDICINE | Facility: CLINIC | Age: 53
End: 2023-01-03
Payer: MEDICARE

## 2023-01-09 ENCOUNTER — PATIENT MESSAGE (OUTPATIENT)
Dept: BARIATRICS | Facility: CLINIC | Age: 53
End: 2023-01-09
Payer: MEDICARE

## 2023-01-10 ENCOUNTER — OFFICE VISIT (OUTPATIENT)
Dept: FAMILY MEDICINE | Facility: CLINIC | Age: 53
End: 2023-01-10
Payer: MEDICARE

## 2023-01-10 VITALS
BODY MASS INDEX: 41.77 KG/M2 | TEMPERATURE: 98 F | HEIGHT: 67 IN | OXYGEN SATURATION: 98 % | WEIGHT: 266.13 LBS | HEART RATE: 81 BPM | RESPIRATION RATE: 16 BRPM | SYSTOLIC BLOOD PRESSURE: 112 MMHG | DIASTOLIC BLOOD PRESSURE: 68 MMHG

## 2023-01-10 DIAGNOSIS — B96.89 BACTERIAL CONJUNCTIVITIS OF BOTH EYES: ICD-10-CM

## 2023-01-10 DIAGNOSIS — U07.1 ACUTE BRONCHITIS DUE TO COVID-19 VIRUS: Primary | ICD-10-CM

## 2023-01-10 DIAGNOSIS — J20.8 ACUTE BRONCHITIS DUE TO COVID-19 VIRUS: Primary | ICD-10-CM

## 2023-01-10 DIAGNOSIS — H10.9 BACTERIAL CONJUNCTIVITIS OF BOTH EYES: ICD-10-CM

## 2023-01-10 PROCEDURE — 3074F SYST BP LT 130 MM HG: CPT | Mod: HCNC,CPTII,S$GLB, | Performed by: NURSE PRACTITIONER

## 2023-01-10 PROCEDURE — 99499 RISK ADDL DX/OHS AUDIT: ICD-10-PCS | Mod: HCNC,S$GLB,, | Performed by: NURSE PRACTITIONER

## 2023-01-10 PROCEDURE — 1159F MED LIST DOCD IN RCRD: CPT | Mod: HCNC,CPTII,S$GLB, | Performed by: NURSE PRACTITIONER

## 2023-01-10 PROCEDURE — 1159F PR MEDICATION LIST DOCUMENTED IN MEDICAL RECORD: ICD-10-PCS | Mod: HCNC,CPTII,S$GLB, | Performed by: NURSE PRACTITIONER

## 2023-01-10 PROCEDURE — 99999 PR PBB SHADOW E&M-EST. PATIENT-LVL V: ICD-10-PCS | Mod: PBBFAC,HCNC,, | Performed by: NURSE PRACTITIONER

## 2023-01-10 PROCEDURE — 99499 UNLISTED E&M SERVICE: CPT | Mod: HCNC,S$GLB,, | Performed by: NURSE PRACTITIONER

## 2023-01-10 PROCEDURE — 3078F DIAST BP <80 MM HG: CPT | Mod: HCNC,CPTII,S$GLB, | Performed by: NURSE PRACTITIONER

## 2023-01-10 PROCEDURE — 99214 OFFICE O/P EST MOD 30 MIN: CPT | Mod: HCNC,S$GLB,, | Performed by: NURSE PRACTITIONER

## 2023-01-10 PROCEDURE — 3074F PR MOST RECENT SYSTOLIC BLOOD PRESSURE < 130 MM HG: ICD-10-PCS | Mod: HCNC,CPTII,S$GLB, | Performed by: NURSE PRACTITIONER

## 2023-01-10 PROCEDURE — 3078F PR MOST RECENT DIASTOLIC BLOOD PRESSURE < 80 MM HG: ICD-10-PCS | Mod: HCNC,CPTII,S$GLB, | Performed by: NURSE PRACTITIONER

## 2023-01-10 PROCEDURE — 99214 PR OFFICE/OUTPT VISIT, EST, LEVL IV, 30-39 MIN: ICD-10-PCS | Mod: HCNC,S$GLB,, | Performed by: NURSE PRACTITIONER

## 2023-01-10 PROCEDURE — 1160F PR REVIEW ALL MEDS BY PRESCRIBER/CLIN PHARMACIST DOCUMENTED: ICD-10-PCS | Mod: HCNC,CPTII,S$GLB, | Performed by: NURSE PRACTITIONER

## 2023-01-10 PROCEDURE — 3008F PR BODY MASS INDEX (BMI) DOCUMENTED: ICD-10-PCS | Mod: HCNC,CPTII,S$GLB, | Performed by: NURSE PRACTITIONER

## 2023-01-10 PROCEDURE — 1160F RVW MEDS BY RX/DR IN RCRD: CPT | Mod: HCNC,CPTII,S$GLB, | Performed by: NURSE PRACTITIONER

## 2023-01-10 PROCEDURE — 3008F BODY MASS INDEX DOCD: CPT | Mod: HCNC,CPTII,S$GLB, | Performed by: NURSE PRACTITIONER

## 2023-01-10 PROCEDURE — 99999 PR PBB SHADOW E&M-EST. PATIENT-LVL V: CPT | Mod: PBBFAC,HCNC,, | Performed by: NURSE PRACTITIONER

## 2023-01-10 RX ORDER — DOXYCYCLINE HYCLATE 100 MG
100 TABLET ORAL 2 TIMES DAILY
Qty: 14 TABLET | Refills: 0 | OUTPATIENT
Start: 2023-01-10 | End: 2023-04-02

## 2023-01-10 RX ORDER — POLYMYXIN B SULFATE AND TRIMETHOPRIM 1; 10000 MG/ML; [USP'U]/ML
1 SOLUTION OPHTHALMIC EVERY 6 HOURS
Qty: 10 ML | Refills: 0 | Status: SHIPPED | OUTPATIENT
Start: 2023-01-10 | End: 2023-01-17

## 2023-01-10 RX ORDER — BENZONATATE 200 MG/1
200 CAPSULE ORAL 3 TIMES DAILY PRN
Qty: 30 CAPSULE | Refills: 0 | Status: SHIPPED | OUTPATIENT
Start: 2023-01-10 | End: 2023-01-10

## 2023-01-10 RX ORDER — ALBUTEROL SULFATE 90 UG/1
2 AEROSOL, METERED RESPIRATORY (INHALATION) EVERY 4 HOURS PRN
Qty: 18 G | Refills: 2 | Status: SHIPPED | OUTPATIENT
Start: 2023-01-10

## 2023-01-10 NOTE — PROGRESS NOTES
Routine Office Visit    Patient Name: Zaira Dowell    : 1970  MRN: [id number]    Chief Complaint:  Cough, nasal congestion, eye redness    Subjective:  Zaira is a 52 y.o. female who presents today for:    1. Cough, nasal congestion, eye redness - patient who is known to me with a history of asthma, obesity reports today for evaluation.  Ten days ago she developed nasal congestion and a cough and on the  she tested positive for COVID.  She has been taking Tessalon Perles and other over-the-counter medications which has helped with the cough but she states the cough still lingers.  She is still having lingering nasal congestion.  She has not had fevers in the last few days; she has been checking her temperature at home.  Because of the significant cough she has been having some mild shortness of breath and chest soreness when coughing.  No cardiac chest pain.    In the last few days she has noticed some bilateral eye redness.  She has watery greenish discharge in the morning.  The symptoms started on the left eye and then moved to the right as well.  She denies any acute vision changes but feels like she has a film over her eyes.    Past Medical History  Past Medical History:   Diagnosis Date    Anxiety     Arthritis     Asthma     Back pain     Bipolar disorder     BMI 50.0-59.9, adult     Chronic bronchitis     Chronic obstructive pulmonary disease, unspecified COPD type 3/21/2022    Depression     Fibromyalgia     GERD (gastroesophageal reflux disease)     Insomnia 2021    Obesity     SHARON (obstructive sleep apnea)     Respiratory failure     Tobacco dependence        Past Surgical History  Past Surgical History:   Procedure Laterality Date    CARPAL TUNNEL RELEASE      CHOLECYSTECTOMY      COLONOSCOPY N/A 2022    Procedure: COLONOSCOPY;  Surgeon: Ernesto Auguste MD;  Location: [facility];  Service: Endoscopy;  Laterality: N/A;  fully vaccinated-GT     ESOPHAGOGASTRODUODENOSCOPY N/A 5/21/2019    Procedure: EGD (ESOPHAGOGASTRODUODENOSCOPY);  Surgeon: Michelle Mc MD;  Location: [facility];  Service: Endoscopy;  Laterality: N/A;    HYSTERECTOMY      partial     KNEE SURGERY      LAPAROSCOPIC SLEEVE GASTRECTOMY N/A 10/18/2019    Procedure: GASTRECTOMY, SLEEVE, LAPAROSCOPIC, with intraop EGD;  Surgeon: Ean Kohli MD;  Location: [address];  Service: General;  Laterality: N/A;    pinched nerve      SHOULDER SURGERY      TONSILLECTOMY      WRIST SURGERY      had ganlin cyst       Family History  Family History   Problem Relation Age of Onset    Diabetes Mother     Kendall's disease Mother     Hypertension Mother     Heart disease Father     Stroke Father     Mental illness Father         ptsd    Hypertension Father     Diabetes Father     Depression Father     No Known Problems Brother     Heart disease Maternal Grandmother     Depression Maternal Grandmother     COPD Maternal Grandfather     Heart disease Maternal Grandfather     Stroke Maternal Grandfather     Kidney disease Paternal Grandmother     Heart disease Paternal Grandmother     Heart disease Paternal Grandfather     Hypertension Son     Rectal cancer Paternal Aunt        Social History  Social History     Socioeconomic History    Marital status:    Tobacco Use    Smoking status: Some Days     Packs/day: 1.00     Years: 15.00     Pack years: 15.00     Types: Vaping with nicotine, Cigarettes    Smokeless tobacco: Never    Tobacco comments:     vaping started April 2020; QUIT cigarettes Sept 2019   Substance and Sexual Activity    Alcohol use: No    Drug use: No    Sexual activity: Yes     Partners: Male     Social Determinants of Health     Financial Resource Strain: Low Risk     Difficulty of Paying Living Expenses: Not very hard   Food Insecurity: No Food Insecurity    Worried About Running Out of Food in the Last Year: Never true    Ran Out of Food in the Last Year: Never true    Transportation Needs: No Transportation Needs    Lack of Transportation (Medical): No    Lack of Transportation (Non-Medical): No   Physical Activity: Insufficiently Active    Days of Exercise per Week: 2 days    Minutes of Exercise per Session: 40 min   Stress: Stress Concern Present    Feeling of Stress : Very much   Social Connections: Unknown    Frequency of Communication with Friends and Family: More than three times a week    Frequency of Social Gatherings with Friends and Family: Once a week    Active Member of Clubs or Organizations: Yes    Attends Club or Organization Meetings: More than 4 times per year    Marital Status:    Housing Stability: Low Risk     Unable to Pay for Housing in the Last Year: No    Number of Places Lived in the Last Year: 1    Unstable Housing in the Last Year: No       Current Medications  Current Outpatient Medications on File Prior to Visit   Medication Sig Dispense Refill    b complex vitamins tablet Take 1 tablet by mouth once daily.      buPROPion (WELLBUTRIN SR) 100 MG TBSR 12 hr tablet Take 2 tabs q am, and 1 tab daily at noon 270 tablet 1    calcium citrate (CALCITRATE) 200 mg (950 mg) tablet Take 1 tablet by mouth 2 (two) times daily.      cyanocobalamin 500 MCG tablet Take 500 mcg by mouth once daily.      cyclobenzaprine (FLEXERIL) 5 MG tablet Take 1 tablet (5 mg total) by mouth 3 (three) times daily as needed for Muscle spasms. 60 tablet 3    ferrous fumarate/vit Bcomp,C (SUPER B COMPLEX ORAL) Take 1 capsule by mouth once daily.      furosemide (LASIX) 40 MG tablet TAKE 1 TABLET EVERY DAY 90 tablet 1    LORazepam (ATIVAN) 0.5 MG tablet Take 2 tabs daily as directed. 60 tablet 0    meclizine (ANTIVERT) 25 mg tablet Take 1 tablet (25 mg total) by mouth 3 (three) times daily as needed for Dizziness or Nausea. 30 tablet 0    multivitamin capsule Take 1 capsule by mouth 2 (two) times daily.       omeprazole (PRILOSEC) 40 MG capsule TAKE 1 CAPSULE TWICE DAILY BEFORE  MEALS 180 capsule 3    ondansetron (ZOFRAN-ODT) 8 MG TbDL Take 1 tablet (8 mg total) by mouth 2 (two) times daily as needed (nausea). 30 tablet 1    potassium chloride (KLOR-CON) 10 MEQ TbSR Take 10 mEq by mouth 2 (two) times daily.      pregabalin (LYRICA) 100 MG capsule Take 1 capsule (100 mg total) by mouth 2 (two) times daily. 60 capsule 5    promethazine (PHENERGAN) 12.5 MG Tab promethazine Take half tablet (oral) 2 times per day PRN - Nausea . Medication may cause drowsiness. 20210917 tablet 2 times per day oral No set duration recorded No set duration amount recorded active 12.5 mg      risperiDONE (RISPERDAL) 0.5 MG Tab Take 1 tablet (0.5 mg total) by mouth 2 (two) times daily. 180 tablet 1    tolterodine (DETROL LA) 4 MG 24 hr capsule TAKE ONE CAPSULE BY MOUTH EVERY DAY 30 capsule 11    topiramate (TOPAMAX) 200 MG Tab TAKE 1 TABLET EVERY DAY 90 tablet 1    [DISCONTINUED] albuterol (PROVENTIL/VENTOLIN HFA) 90 mcg/actuation inhaler Inhale 2 puffs into the lungs every 6 (six) hours as needed for Wheezing. Rescue 18 g 2    fluticasone propionate (FLONASE) 50 mcg/actuation nasal spray 2 sprays (100 mcg total) by Each Nostril route once daily. (Patient not taking: Reported on 1/10/2023) 15.8 mL 1    fluticasone-salmeterol diskus inhaler 250-50 mcg Inhale 1 puff into the lungs 2 (two) times daily. 60 each 3    lithium (ESKALITH) 300 MG capsule Take 1 capsule (300 mg total) by mouth 3 (three) times daily with meals. 270 capsule 1    sertraline (ZOLOFT) 100 MG tablet Take 2 tablets (200 mg total) by mouth once daily. 180 tablet 1    [DISCONTINUED] amoxicillin-clavulanate 875-125mg (AUGMENTIN) 875-125 mg per tablet Take 1 tablet by mouth every 12 (twelve) hours. (Patient not taking: Reported on 1/10/2023) 20 tablet 0    [DISCONTINUED] cetirizine (ZYRTEC) 10 MG tablet Take 1 tablet (10 mg total) by mouth once daily. 30 tablet 1    [DISCONTINUED] GAVILYTE-G 236-22.74-6.74 -5.86 gram suspension TAKE 4000 MLS BY MOUTH  "FOR 1 DOSE      [DISCONTINUED] loratadine (CLARITIN) 10 mg tablet Take 1 tablet (10 mg total) by mouth once daily. for 20 days 60 tablet 0     No current facility-administered medications on file prior to visit.       Allergies   Review of patient's allergies indicates:   Allergen Reactions    Hydrocodone-acetaminophen Nausea And Vomiting       Review of Systems (Pertinent positives)  Review of Systems   Constitutional:  Positive for chills and malaise/fatigue. Negative for fever and weight loss.   HENT:  Positive for congestion, ear pain and sore throat. Negative for hearing loss and tinnitus.    Respiratory:  Positive for cough, sputum production, shortness of breath and wheezing. Negative for hemoptysis.    Cardiovascular:  Negative for chest pain (chest soreness from coughing), palpitations and orthopnea.   Gastrointestinal:  Negative for heartburn.   Genitourinary: Negative.    Musculoskeletal:  Positive for myalgias.   Skin:  Negative for rash.   Neurological:  Positive for headaches.   Endo/Heme/Allergies:  Negative for environmental allergies.   Psychiatric/Behavioral: Negative.       /68   Pulse 81   Temp 98 °F (36.7 °C) (Oral)   Resp 16   Ht 5' 7" (1.702 m)   Wt 120.7 kg (266 lb 1.5 oz)   LMP  (LMP Unknown)   SpO2 98%   BMI 41.68 kg/m²     Physical Exam  Vitals reviewed.   Constitutional:       General: She is not in acute distress.     Appearance: Normal appearance. She is not ill-appearing, toxic-appearing or diaphoretic.   HENT:      Head: Normocephalic and atraumatic.      Nose: Congestion present.      Mouth/Throat:      Mouth: Mucous membranes are moist.      Pharynx: Oropharynx is clear. No oropharyngeal exudate.   Eyes:      General: Lids are normal.         Right eye: No discharge or hordeolum.         Left eye: No discharge or hordeolum.      Extraocular Movements: Extraocular movements intact.      Conjunctiva/sclera:      Right eye: Right conjunctiva is injected.      Left eye: " Left conjunctiva is injected.   Cardiovascular:      Rate and Rhythm: Normal rate and regular rhythm.      Pulses: Normal pulses.      Heart sounds: Normal heart sounds.   Pulmonary:      Effort: Pulmonary effort is normal.      Breath sounds: Normal breath sounds. No wheezing or rales.   Abdominal:      General: Bowel sounds are normal.      Palpations: Abdomen is soft.   Musculoskeletal:         General: No swelling or tenderness. Normal range of motion.   Skin:     General: Skin is warm and dry.      Capillary Refill: Capillary refill takes less than 2 seconds.   Neurological:      Mental Status: She is alert and oriented to person, place, and time.   Psychiatric:         Mood and Affect: Mood normal.         Behavior: Behavior normal.        Assessment/Plan:  Zaira Dowell is a 52 y.o. female who presents today for :    Zaira was seen today for cough.    Diagnoses and all orders for this visit:    Acute bronchitis due to COVID-19 virus  -     albuterol (PROVENTIL/VENTOLIN HFA) 90 mcg/actuation inhaler; Inhale 2 puffs into the lungs every 4 (four) hours as needed for Wheezing or Shortness of Breath. Rescue  -     X-Ray Chest PA And Lateral; Future  -     doxycycline (VIBRA-TABS) 100 MG tablet; Take 1 tablet (100 mg total) by mouth 2 (two) times daily.  -     benzonatate (TESSALON) 200 MG capsule; Take 1 capsule (200 mg total) by mouth 3 (three) times daily as needed for Cough.    Given persistence of cough with lingering nasal congestion will treat with doxycycline today.  P.r.n. albuterol for shortness of breath.  Heart tones normal with regular rhythm.  Check chest x-ray given shortness of breath.  Continue p.r.n. Tessalon for cough.  Discussed with patient that cough due to COVID-19 can linger for some time.  Patient okay to end isolation but I did recommend patient to wear mask around others while she is still having symptoms.    Bacterial conjunctivitis of both eyes  -     polymyxin B sulf-trimethoprim  (POLYTRIM) 10,000 unit- 1 mg/mL Drop; Place 1 drop into both eyes every 6 (six) hours. for 7 days    Potentially bacterial given symptoms.  Patient does not wear contacts.  Treat with Polytrim.    Follow-up as needed.        This office note has been dictated.  This dictation has been generated using M-Modal Fluency Direct dictation; some phonetic errors may occur.   My collaborating physician is Dr. Tobias Lundy.

## 2023-01-19 RX ORDER — LORAZEPAM 0.5 MG/1
TABLET ORAL
Qty: 60 TABLET | Refills: 0 | Status: SHIPPED | OUTPATIENT
Start: 2023-01-19 | End: 2023-02-20 | Stop reason: SDUPTHER

## 2023-01-27 ENCOUNTER — HOSPITAL ENCOUNTER (OUTPATIENT)
Dept: RADIOLOGY | Facility: HOSPITAL | Age: 53
Discharge: HOME | End: 2023-01-27
Attending: INTERNAL MEDICINE
Payer: MEDICARE

## 2023-01-27 DIAGNOSIS — Z12.31 ENCOUNTER FOR SCREENING MAMMOGRAM FOR BREAST CANCER: ICD-10-CM

## 2023-01-27 PROCEDURE — 77067 SCR MAMMO BI INCL CAD: CPT | Mod: 26,HCNC,, | Performed by: RADIOLOGY

## 2023-01-27 PROCEDURE — 77067 MAMMO DIGITAL SCREENING BILAT WITH TOMO: ICD-10-PCS | Mod: 26,HCNC,, | Performed by: RADIOLOGY

## 2023-01-27 PROCEDURE — 77063 MAMMO DIGITAL SCREENING BILAT WITH TOMO: ICD-10-PCS | Mod: 26,HCNC,, | Performed by: RADIOLOGY

## 2023-01-27 PROCEDURE — 77063 BREAST TOMOSYNTHESIS BI: CPT | Mod: 26,HCNC,, | Performed by: RADIOLOGY

## 2023-01-27 PROCEDURE — 77067 SCR MAMMO BI INCL CAD: CPT | Mod: TC,HCNC

## 2023-02-07 DIAGNOSIS — Z00.00 ENCOUNTER FOR MEDICARE ANNUAL WELLNESS EXAM: ICD-10-CM

## 2023-02-09 ENCOUNTER — PATIENT MESSAGE (OUTPATIENT)
Dept: BARIATRICS | Facility: CLINIC | Age: 53
End: 2023-02-09
Payer: MEDICARE

## 2023-02-14 ENCOUNTER — PATIENT MESSAGE (OUTPATIENT)
Dept: BARIATRICS | Facility: CLINIC | Age: 53
End: 2023-02-14
Payer: MEDICARE

## 2023-02-20 RX ORDER — LORAZEPAM 0.5 MG/1
TABLET ORAL
Qty: 60 TABLET | Refills: 0 | Status: SHIPPED | OUTPATIENT
Start: 2023-02-20 | End: 2023-03-20 | Stop reason: SDUPTHER

## 2023-02-22 ENCOUNTER — PATIENT MESSAGE (OUTPATIENT)
Dept: BARIATRICS | Facility: CLINIC | Age: 53
End: 2023-02-22
Payer: MEDICARE

## 2023-02-27 DIAGNOSIS — G43.809 OTHER MIGRAINE WITHOUT STATUS MIGRAINOSUS, NOT INTRACTABLE: ICD-10-CM

## 2023-02-27 DIAGNOSIS — R60.0 BILATERAL LEG EDEMA: ICD-10-CM

## 2023-02-27 RX ORDER — TOPIRAMATE 200 MG/1
200 TABLET ORAL DAILY
Qty: 90 TABLET | Refills: 1 | Status: CANCELLED | OUTPATIENT
Start: 2023-02-27

## 2023-02-27 RX ORDER — FUROSEMIDE 40 MG/1
40 TABLET ORAL DAILY
Qty: 90 TABLET | Refills: 1 | Status: CANCELLED | OUTPATIENT
Start: 2023-02-27

## 2023-02-28 NOTE — TELEPHONE ENCOUNTER
No new care gaps identified.  [facility] Embedded Care Gaps. Reference number: [id number]. 2/27/2023   6:35:38 PM CST

## 2023-02-28 NOTE — TELEPHONE ENCOUNTER
med refill denied ths pt is no longer a pt with  informed the pt that she need to calls her insurance company/pharmacy and informed them of the change of PCP

## 2023-03-06 ENCOUNTER — OFFICE VISIT (OUTPATIENT)
Dept: FAMILY MEDICINE | Facility: CLINIC | Age: 53
End: 2023-03-06
Payer: MEDICARE

## 2023-03-06 VITALS
HEIGHT: 67 IN | OXYGEN SATURATION: 96 % | HEART RATE: 67 BPM | TEMPERATURE: 98 F | SYSTOLIC BLOOD PRESSURE: 106 MMHG | BODY MASS INDEX: 43.01 KG/M2 | DIASTOLIC BLOOD PRESSURE: 64 MMHG | WEIGHT: 274.06 LBS

## 2023-03-06 DIAGNOSIS — G89.29 CHRONIC PAIN OF RIGHT KNEE: ICD-10-CM

## 2023-03-06 DIAGNOSIS — J44.9 CHRONIC OBSTRUCTIVE PULMONARY DISEASE, UNSPECIFIED COPD TYPE: ICD-10-CM

## 2023-03-06 DIAGNOSIS — Z86.39 H/O: OBESITY: ICD-10-CM

## 2023-03-06 DIAGNOSIS — M79.7 FIBROMYALGIA: ICD-10-CM

## 2023-03-06 DIAGNOSIS — E66.01 MORBID OBESITY WITH BMI OF 40.0-44.9, ADULT: ICD-10-CM

## 2023-03-06 DIAGNOSIS — I27.20 PULMONARY HYPERTENSION: ICD-10-CM

## 2023-03-06 DIAGNOSIS — M25.561 CHRONIC PAIN OF RIGHT KNEE: ICD-10-CM

## 2023-03-06 DIAGNOSIS — G43.809 OTHER MIGRAINE WITHOUT STATUS MIGRAINOSUS, NOT INTRACTABLE: ICD-10-CM

## 2023-03-06 DIAGNOSIS — Z13.6 ENCOUNTER FOR LIPID SCREENING FOR CARDIOVASCULAR DISEASE: ICD-10-CM

## 2023-03-06 DIAGNOSIS — M25.551 PAIN OF BOTH HIP JOINTS: Primary | ICD-10-CM

## 2023-03-06 DIAGNOSIS — Z13.220 ENCOUNTER FOR LIPID SCREENING FOR CARDIOVASCULAR DISEASE: ICD-10-CM

## 2023-03-06 DIAGNOSIS — E27.8 OTHER SPECIFIED DISORDERS OF ADRENAL GLAND: ICD-10-CM

## 2023-03-06 DIAGNOSIS — M47.26 OSTEOARTHRITIS OF SPINE WITH RADICULOPATHY, LUMBAR REGION: ICD-10-CM

## 2023-03-06 DIAGNOSIS — R79.9 ABNORMAL FINDING OF BLOOD CHEMISTRY, UNSPECIFIED: ICD-10-CM

## 2023-03-06 DIAGNOSIS — R60.0 BILATERAL LEG EDEMA: ICD-10-CM

## 2023-03-06 DIAGNOSIS — M25.552 PAIN OF BOTH HIP JOINTS: Primary | ICD-10-CM

## 2023-03-06 DIAGNOSIS — F31.9 BIPOLAR 1 DISORDER: ICD-10-CM

## 2023-03-06 DIAGNOSIS — Z00.00 HEALTHCARE MAINTENANCE: ICD-10-CM

## 2023-03-06 PROCEDURE — 1160F PR REVIEW ALL MEDS BY PRESCRIBER/CLIN PHARMACIST DOCUMENTED: ICD-10-PCS | Mod: HCNC,CPTII,S$GLB, | Performed by: INTERNAL MEDICINE

## 2023-03-06 PROCEDURE — 99999 PR PBB SHADOW E&M-EST. PATIENT-LVL V: CPT | Mod: PBBFAC,HCNC,, | Performed by: INTERNAL MEDICINE

## 2023-03-06 PROCEDURE — 99214 PR OFFICE/OUTPT VISIT, EST, LEVL IV, 30-39 MIN: ICD-10-PCS | Mod: HCNC,S$GLB,, | Performed by: INTERNAL MEDICINE

## 2023-03-06 PROCEDURE — 99999 PR PBB SHADOW E&M-EST. PATIENT-LVL V: ICD-10-PCS | Mod: PBBFAC,HCNC,, | Performed by: INTERNAL MEDICINE

## 2023-03-06 PROCEDURE — 3008F PR BODY MASS INDEX (BMI) DOCUMENTED: ICD-10-PCS | Mod: HCNC,CPTII,S$GLB, | Performed by: INTERNAL MEDICINE

## 2023-03-06 PROCEDURE — 99214 OFFICE O/P EST MOD 30 MIN: CPT | Mod: HCNC,S$GLB,, | Performed by: INTERNAL MEDICINE

## 2023-03-06 PROCEDURE — 3074F SYST BP LT 130 MM HG: CPT | Mod: HCNC,CPTII,S$GLB, | Performed by: INTERNAL MEDICINE

## 2023-03-06 PROCEDURE — 1160F RVW MEDS BY RX/DR IN RCRD: CPT | Mod: HCNC,CPTII,S$GLB, | Performed by: INTERNAL MEDICINE

## 2023-03-06 PROCEDURE — 3074F PR MOST RECENT SYSTOLIC BLOOD PRESSURE < 130 MM HG: ICD-10-PCS | Mod: HCNC,CPTII,S$GLB, | Performed by: INTERNAL MEDICINE

## 2023-03-06 PROCEDURE — 3008F BODY MASS INDEX DOCD: CPT | Mod: HCNC,CPTII,S$GLB, | Performed by: INTERNAL MEDICINE

## 2023-03-06 PROCEDURE — 3078F DIAST BP <80 MM HG: CPT | Mod: HCNC,CPTII,S$GLB, | Performed by: INTERNAL MEDICINE

## 2023-03-06 PROCEDURE — 1159F MED LIST DOCD IN RCRD: CPT | Mod: HCNC,CPTII,S$GLB, | Performed by: INTERNAL MEDICINE

## 2023-03-06 PROCEDURE — 3078F PR MOST RECENT DIASTOLIC BLOOD PRESSURE < 80 MM HG: ICD-10-PCS | Mod: HCNC,CPTII,S$GLB, | Performed by: INTERNAL MEDICINE

## 2023-03-06 PROCEDURE — 1159F PR MEDICATION LIST DOCUMENTED IN MEDICAL RECORD: ICD-10-PCS | Mod: HCNC,CPTII,S$GLB, | Performed by: INTERNAL MEDICINE

## 2023-03-06 RX ORDER — TOPIRAMATE 200 MG/1
200 TABLET ORAL DAILY
Qty: 90 TABLET | Refills: 1 | Status: SHIPPED | OUTPATIENT
Start: 2023-03-06 | End: 2023-07-25 | Stop reason: SDUPTHER

## 2023-03-06 RX ORDER — FUROSEMIDE 40 MG/1
40 TABLET ORAL DAILY
Qty: 90 TABLET | Refills: 1 | OUTPATIENT
Start: 2023-03-06 | End: 2023-04-02

## 2023-03-06 NOTE — PROGRESS NOTES
HISTORY OF PRESENT ILLNESS:  Zaira Dowell is a 52 y.o. female who presents to the clinic today for Follow-up (yearly)    Last seen by me 6/2022.    Fibromyalgia  Followed by Dr. Salcedo.  Prescribed Flexeril and Lyrica.  Notes increased back pain and both hips recently.  To the point she is not able to sleep in bed from pain  Tried PT in the past in 2020 but felt it did not help.  Takes Tylenol as needed.  Noted that she has had weight gain.    Depression, Bipolar disorder  Followed by psychiatry.  Managed with lithium, risperidone, sertraline, bupropion, PRN Ativan.    Obesity  S/p sleeve gastrectomy 10/2019.  Weight is increased to 274 lb from 266 lb.  Lowest weight post procedure was 248 lb.  Not compliant with bariatric diet and not exercising.  Feels being depressed makes it harder.  Declined nutrition referral.    Left Adrenal incidentaloma  Seen by Dr. Hernandez 4/2022 and due to follow up next month.      PAST MEDICAL HISTORY:  Past Medical History:   Diagnosis Date    Anxiety     Arthritis     Asthma     Back pain     Bipolar disorder     BMI 50.0-59.9, adult     Chronic bronchitis     Chronic obstructive pulmonary disease, unspecified COPD type 3/21/2022    Depression     Fibromyalgia     GERD (gastroesophageal reflux disease)     Insomnia 11/23/2021    Obesity     SHARON (obstructive sleep apnea)     Respiratory failure     Tobacco dependence        PAST SURGICAL HISTORY:  Past Surgical History:   Procedure Laterality Date    CARPAL TUNNEL RELEASE      CHOLECYSTECTOMY      COLONOSCOPY N/A 4/25/2022    Procedure: COLONOSCOPY;  Surgeon: Ernesto Auguste MD;  Location: [facility];  Service: Endoscopy;  Laterality: N/A;  fully vaccinated-GT    ESOPHAGOGASTRODUODENOSCOPY N/A 5/21/2019    Procedure: EGD (ESOPHAGOGASTRODUODENOSCOPY);  Surgeon: Michelle Mc MD;  Location: [facility];  Service: Endoscopy;  Laterality: N/A;    HYSTERECTOMY      partial     KNEE SURGERY      LAPAROSCOPIC SLEEVE GASTRECTOMY N/A  10/18/2019    Procedure: GASTRECTOMY, SLEEVE, LAPAROSCOPIC, with intraop EGD;  Surgeon: Ean Kohli MD;  Location: [facility] OR [address] [place] [address];  Service: General;  Laterality: N/A;    pinched nerve      SHOULDER SURGERY      TONSILLECTOMY      WRIST SURGERY      had ganlin cyst       SOCIAL HISTORY:  Social History     Socioeconomic History    Marital status:    Tobacco Use    Smoking status: Some Days     Packs/day: 1.00     Years: 15.00     Pack years: 15.00     Types: Vaping with nicotine, Cigarettes    Smokeless tobacco: Never    Tobacco comments:     vaping started April 2020; QUIT cigarettes Sept 2019   Substance and Sexual Activity    Alcohol use: No    Drug use: No    Sexual activity: Yes     Partners: Male     Social Determinants of Health     Financial Resource Strain: Low Risk     Difficulty of Paying Living Expenses: Not hard at all   Food Insecurity: No Food Insecurity    Worried About Running Out of Food in the Last Year: Never true    Ran Out of Food in the Last Year: Never true   Transportation Needs: No Transportation Needs    Lack of Transportation (Medical): No    Lack of Transportation (Non-Medical): No   Physical Activity: Insufficiently Active    Days of Exercise per Week: 2 days    Minutes of Exercise per Session: 30 min   Stress: Stress Concern Present    Feeling of Stress : Very much   Social Connections: Unknown    Frequency of Communication with Friends and Family: More than three times a week    Frequency of Social Gatherings with Friends and Family: Once a week    Active Member of Clubs or Organizations: No    Attends Club or Organization Meetings: Never    Marital Status:    Housing Stability: Low Risk     Unable to Pay for Housing in the Last Year: No    Number of Places Lived in the Last Year: 1    Unstable Housing in the Last Year: No       FAMILY HISTORY:  Family History   Problem Relation Age of Onset    Diabetes Mother     Jose Juan's disease Mother     Hypertension  Mother     Heart disease Father     Stroke Father     Mental illness Father         ptsd    Hypertension Father     Diabetes Father     Depression Father     No Known Problems Brother     Heart disease Maternal Grandmother     Depression Maternal Grandmother     COPD Maternal Grandfather     Heart disease Maternal Grandfather     Stroke Maternal Grandfather     Kidney disease Paternal Grandmother     Heart disease Paternal Grandmother     Heart disease Paternal Grandfather     Hypertension Son     Rectal cancer Paternal Aunt        ALLERGIES AND MEDICATIONS: updated and reviewed.  Review of patient's allergies indicates:   Allergen Reactions    Hydrocodone-acetaminophen Nausea And Vomiting     Medication List with Changes/Refills   Current Medications    ALBUTEROL (PROVENTIL/VENTOLIN HFA) 90 MCG/ACTUATION INHALER    Inhale 2 puffs into the lungs every 4 (four) hours as needed for Wheezing or Shortness of Breath. Rescue    B COMPLEX VITAMINS TABLET    Take 1 tablet by mouth once daily.    BENZONATATE (TESSALON) 100 MG CAPSULE    TAKE 1 TO 2 CAPSULES BY MOUTH THREE TIMES DAILY AS NEEDED FOR COUGH    BUPROPION (WELLBUTRIN SR) 100 MG TBSR 12 HR TABLET    Take 2 tabs q am, and 1 tab daily at noon    CALCIUM CITRATE (CALCITRATE) 200 MG (950 MG) TABLET    Take 1 tablet by mouth 2 (two) times daily.    CYANOCOBALAMIN 500 MCG TABLET    Take 500 mcg by mouth once daily.    CYCLOBENZAPRINE (FLEXERIL) 5 MG TABLET    Take 1 tablet (5 mg total) by mouth 3 (three) times daily as needed for Muscle spasms.    DOXYCYCLINE (VIBRA-TABS) 100 MG TABLET    Take 1 tablet (100 mg total) by mouth 2 (two) times daily.    FERROUS FUMARATE/VIT BCOMP,C (SUPER B COMPLEX ORAL)    Take 1 capsule by mouth once daily.    FLUTICASONE PROPIONATE (FLONASE) 50 MCG/ACTUATION NASAL SPRAY    2 sprays (100 mcg total) by Each Nostril route once daily.    FLUTICASONE-SALMETEROL DISKUS INHALER 250-50 MCG    Inhale 1 puff into the lungs 2 (two) times daily.     LITHIUM (ESKALITH) 300 MG CAPSULE    Take 1 capsule (300 mg total) by mouth 3 (three) times daily with meals.    LORAZEPAM (ATIVAN) 0.5 MG TABLET    Take 2 tabs daily as directed.    MECLIZINE (ANTIVERT) 25 MG TABLET    Take 1 tablet (25 mg total) by mouth 3 (three) times daily as needed for Dizziness or Nausea.    MULTIVITAMIN CAPSULE    Take 1 capsule by mouth 2 (two) times daily.     OMEPRAZOLE (PRILOSEC) 40 MG CAPSULE    TAKE 1 CAPSULE TWICE DAILY BEFORE MEALS    ONDANSETRON (ZOFRAN-ODT) 8 MG TBDL    Take 1 tablet (8 mg total) by mouth 2 (two) times daily as needed (nausea).    POTASSIUM CHLORIDE (KLOR-CON) 10 MEQ TBSR    Take 10 mEq by mouth 2 (two) times daily.    PREGABALIN (LYRICA) 100 MG CAPSULE    Take 1 capsule (100 mg total) by mouth 2 (two) times daily.    PROMETHAZINE (PHENERGAN) 12.5 MG TAB    promethazine Take half tablet (oral) 2 times per day PRN - Nausea . Medication may cause drowsiness. [id number] tablet 2 times per day oral No set duration recorded No set duration amount recorded active 12.5 mg    RISPERIDONE (RISPERDAL) 0.5 MG TAB    Take 1 tablet (0.5 mg total) by mouth 2 (two) times daily.    SERTRALINE (ZOLOFT) 100 MG TABLET    Take 2 tablets (200 mg total) by mouth once daily.    TOLTERODINE (DETROL LA) 4 MG 24 HR CAPSULE    TAKE ONE CAPSULE BY MOUTH EVERY DAY   Changed and/or Refilled Medications    Modified Medication Previous Medication    FUROSEMIDE (LASIX) 40 MG TABLET furosemide (LASIX) 40 MG tablet       Take 1 tablet (40 mg total) by mouth once daily.    TAKE 1 TABLET EVERY DAY    TOPIRAMATE (TOPAMAX) 200 MG TAB topiramate (TOPAMAX) 200 MG Tab       Take 1 tablet (200 mg total) by mouth once daily.    TAKE 1 TABLET EVERY DAY          CARE TEAM:  Patient Care Team:  Shay Dai MD as PCP - General (Internal Medicine)  Delvin Nagy Jr., MD (Inactive) as Consulting Physician (Psychiatry)  oJsé Hernandez MD as Consulting Physician (Endocrinology)  Brittaney Brandon NP  (Inactive) as Nurse Practitioner (Urology)  Svetlana Nelson LPN as Care Coordinator     REVIEW OF SYSTEMS:  Review of Systems   Constitutional:  Negative for chills and fever.   HENT:  Negative for congestion and postnasal drip.    Eyes:  Negative for photophobia and visual disturbance.   Respiratory:  Negative for cough and shortness of breath.    Cardiovascular:  Negative for chest pain and palpitations.   Gastrointestinal:  Negative for nausea and vomiting.   Genitourinary:  Negative for dysuria and frequency.   Musculoskeletal:  Negative for arthralgias and back pain.   Neurological:  Negative for light-headedness and headaches.   Psychiatric/Behavioral:  Negative for dysphoric mood. The patient is not nervous/anxious.      PHYSICAL EXAM:   Vitals:    03/06/23 0906   BP: 106/64   Pulse: 67   Temp: 98 °F (36.7 °C)             Body mass index is 42.92 kg/m².     General appearance - alert, well appearing, and in no distress, oriented to person, place, and time, and morbidly obese  Mental status - normal mood, behavior, speech, dress, motor activity, and thought processes  Eyes - sclera anicteric, left eye normal, right eye normal  Chest - clear to auscultation, no wheezes, rales or rhonchi, symmetric air entry  Heart - normal rate, regular rhythm, normal S1, S2, no murmurs, rubs, clicks or gallops  Neurological - alert, oriented, normal speech, no focal findings or movement disorder noted  Extremities - peripheral pulses normal, no pedal edema, no clubbing or cyanosis      ASSESSMENT AND PLAN:  Pain of both hip joints  Osteoarthritis of spine with radiculopathy, lumbar region  Chronic pain of right knee  Morbid obesity with BMI of 40.0-44.9, adult  Body mass index (BMI) 40.0-44.9, adult  H/O: obesity  -     TSH; Future; Expected date: 10/06/2023  -     Ambulatory referral/consult to Pain Clinic; Future; Expected date: 03/13/2023  -     Ambulatory referral/consult to Orthopedics; Future; Expected date:  03/13/2023  - Counseled for weight management  -     Vitamin D; Future; Expected date: 10/06/2023  - Unable to take NSAID due to h/o gastric bypass.    Fibromyalgia  -     CBC Auto Differential; Future; Expected date: 10/06/2023  - Continue current medications.  Offered PT referral but patient declined.    Encounter for lipid screening for cardiovascular disease  -     Lipid Panel; Future; Expected date: 10/06/2023    Bilateral leg edema  -     furosemide (LASIX) 40 MG tablet; Take 1 tablet (40 mg total) by mouth once daily.  Dispense: 90 tablet; Refill: 1    Other migraine without status migrainosus, not intractable  -     topiramate (TOPAMAX) 200 MG Tab; Take 1 tablet (200 mg total) by mouth once daily.  Dispense: 90 tablet; Refill: 1    Healthcare maintenance  -     Hemoglobin A1C; Future; Expected date: 10/06/2023  -     Comprehensive Metabolic Panel; Future; Expected date: 10/06/2023  -     Lipid Panel; Future; Expected date: 10/06/2023  -     CBC Auto Differential; Future; Expected date: 10/06/2023  -     TSH; Future; Expected date: 10/06/2023  -     Vitamin D; Future; Expected date: 10/06/2023    Abnormal finding of blood chemistry, unspecified  -     Hemoglobin A1C; Future; Expected date: 10/06/2023    Other specified disorders of adrenal gland        - Annual follow up with endo is scheduled.    Bipolar 1 disorder        - Stable on current medication.    Pulmonary hypertension  Chronic obstructive pulmonary disease, unspecified COPD type        - Stable and without symptoms            Follow up 6 months or sooner as needed.

## 2023-03-08 ENCOUNTER — PATIENT MESSAGE (OUTPATIENT)
Dept: BARIATRICS | Facility: CLINIC | Age: 53
End: 2023-03-08
Payer: MEDICARE

## 2023-03-14 ENCOUNTER — PATIENT MESSAGE (OUTPATIENT)
Dept: BARIATRICS | Facility: CLINIC | Age: 53
End: 2023-03-14
Payer: MEDICARE

## 2023-03-14 DIAGNOSIS — M25.551 PAIN OF BOTH HIP JOINTS: Primary | ICD-10-CM

## 2023-03-14 DIAGNOSIS — M25.552 PAIN OF BOTH HIP JOINTS: Primary | ICD-10-CM

## 2023-03-20 ENCOUNTER — APPOINTMENT (OUTPATIENT)
Dept: RADIOLOGY | Facility: HOSPITAL | Age: 53
End: 2023-03-20
Attending: ORTHOPAEDIC SURGERY
Payer: MEDICARE

## 2023-03-20 ENCOUNTER — OFFICE VISIT (OUTPATIENT)
Dept: ORTHOPEDICS | Facility: CLINIC | Age: 53
End: 2023-03-20
Attending: ORTHOPAEDIC SURGERY
Payer: MEDICARE

## 2023-03-20 VITALS — HEIGHT: 67 IN | BODY MASS INDEX: 43 KG/M2 | WEIGHT: 274 LBS

## 2023-03-20 DIAGNOSIS — M25.551 PAIN OF BOTH HIP JOINTS: ICD-10-CM

## 2023-03-20 DIAGNOSIS — M25.552 PAIN OF BOTH HIP JOINTS: ICD-10-CM

## 2023-03-20 DIAGNOSIS — M76.30 ILIOTIBIAL BAND SYNDROME, UNSPECIFIED LATERALITY: ICD-10-CM

## 2023-03-20 DIAGNOSIS — M25.561 CHRONIC PAIN OF RIGHT KNEE: ICD-10-CM

## 2023-03-20 DIAGNOSIS — M70.62 TROCHANTERIC BURSITIS OF BOTH HIPS: Primary | ICD-10-CM

## 2023-03-20 DIAGNOSIS — M70.61 TROCHANTERIC BURSITIS OF BOTH HIPS: Primary | ICD-10-CM

## 2023-03-20 DIAGNOSIS — M54.50 CHRONIC BILATERAL LOW BACK PAIN, UNSPECIFIED WHETHER SCIATICA PRESENT: ICD-10-CM

## 2023-03-20 DIAGNOSIS — G89.29 CHRONIC BILATERAL LOW BACK PAIN, UNSPECIFIED WHETHER SCIATICA PRESENT: ICD-10-CM

## 2023-03-20 DIAGNOSIS — G89.29 CHRONIC PAIN OF RIGHT KNEE: ICD-10-CM

## 2023-03-20 PROCEDURE — 99204 PR OFFICE/OUTPT VISIT, NEW, LEVL IV, 45-59 MIN: ICD-10-PCS | Mod: HCNC,25,S$GLB, | Performed by: ORTHOPAEDIC SURGERY

## 2023-03-20 PROCEDURE — 3008F BODY MASS INDEX DOCD: CPT | Mod: HCNC,CPTII,S$GLB, | Performed by: ORTHOPAEDIC SURGERY

## 2023-03-20 PROCEDURE — 73521 XR HIPS BILATERAL 2 VIEW INCL AP PELVIS: ICD-10-PCS | Mod: 26,HCNC,, | Performed by: RADIOLOGY

## 2023-03-20 PROCEDURE — 3008F PR BODY MASS INDEX (BMI) DOCUMENTED: ICD-10-PCS | Mod: HCNC,CPTII,S$GLB, | Performed by: ORTHOPAEDIC SURGERY

## 2023-03-20 PROCEDURE — 99204 OFFICE O/P NEW MOD 45 MIN: CPT | Mod: HCNC,25,S$GLB, | Performed by: ORTHOPAEDIC SURGERY

## 2023-03-20 PROCEDURE — 1159F PR MEDICATION LIST DOCUMENTED IN MEDICAL RECORD: ICD-10-PCS | Mod: HCNC,CPTII,S$GLB, | Performed by: ORTHOPAEDIC SURGERY

## 2023-03-20 PROCEDURE — 73521 X-RAY EXAM HIPS BI 2 VIEWS: CPT | Mod: TC,HCNC,FY,PN

## 2023-03-20 PROCEDURE — 99999 PR PBB SHADOW E&M-EST. PATIENT-LVL IV: CPT | Mod: PBBFAC,HCNC,, | Performed by: ORTHOPAEDIC SURGERY

## 2023-03-20 PROCEDURE — 73521 X-RAY EXAM HIPS BI 2 VIEWS: CPT | Mod: 26,HCNC,, | Performed by: RADIOLOGY

## 2023-03-20 PROCEDURE — 20610 LARGE JOINT ASPIRATION/INJECTION: BILATERAL GREATER TROCHANTERIC BURSA: ICD-10-PCS | Mod: 50,HCNC,S$GLB, | Performed by: ORTHOPAEDIC SURGERY

## 2023-03-20 PROCEDURE — 1159F MED LIST DOCD IN RCRD: CPT | Mod: HCNC,CPTII,S$GLB, | Performed by: ORTHOPAEDIC SURGERY

## 2023-03-20 PROCEDURE — 20610 DRAIN/INJ JOINT/BURSA W/O US: CPT | Mod: 50,HCNC,S$GLB, | Performed by: ORTHOPAEDIC SURGERY

## 2023-03-20 PROCEDURE — 99999 PR PBB SHADOW E&M-EST. PATIENT-LVL IV: ICD-10-PCS | Mod: PBBFAC,HCNC,, | Performed by: ORTHOPAEDIC SURGERY

## 2023-03-20 RX ORDER — TRIAMCINOLONE ACETONIDE 40 MG/ML
40 INJECTION, SUSPENSION INTRA-ARTICULAR; INTRAMUSCULAR
Status: DISCONTINUED | OUTPATIENT
Start: 2023-03-20 | End: 2023-03-20 | Stop reason: HOSPADM

## 2023-03-20 RX ADMIN — TRIAMCINOLONE ACETONIDE 40 MG: 40 INJECTION, SUSPENSION INTRA-ARTICULAR; INTRAMUSCULAR at 09:03

## 2023-03-20 NOTE — PROCEDURES
Large Joint Aspiration/Injection: bilateral greater trochanteric bursa    Date/Time: 3/20/2023 9:00 AM  Performed by: Avery Smith MD  Authorized by: Avery Smith MD     Consent Done?:  Yes (Verbal)  Indications:  Pain  Site marked: the procedure site was marked    Timeout: prior to procedure the correct patient, procedure, and site was verified    Prep: patient was prepped and draped in usual sterile fashion      Local anesthesia used?: Yes    Anesthesia:  Local infiltration  Local anesthetic:  Lidocaine 1% without epinephrine and topical anesthetic    Details:  Needle Size:  22 G  Location:  Hip  Laterality:  Bilateral  Site:  Bilateral greater trochanteric bursa  Medications (Right):  40 mg triamcinolone acetonide 40 mg/mL  Medications (Left):  40 mg triamcinolone acetonide 40 mg/mL  Patient tolerance:  Patient tolerated the procedure well with no immediate complications

## 2023-03-20 NOTE — PROGRESS NOTES
NEW PATIENT ORTHOPAEDIC: HIP    PRIMARY CARE PHYSICIAN: Shay Dai MD   REFERRING PROVIDER: Shay Dai MD  [id number] [place] [address]     ASSESSMENT & PLAN:    Impression:  Bilateral Hip Trochanteric Bursitis  Bilateral Hip IT band syndrome  Fibromyalgia  Lumbar Radiculopathy    Follow Up Plan: PRN    Injection:     Zaira Dowell has physical exam evidence of trochanteric bursitis and wishes to pursue an injection. We discussed the risk, benefits, and expectations regarding injection. We discussed alternative modalities which included physical therapy, topical and prescription pain medications, ambulatory devices. They would like to proceed with injection today. See procedure note for billing purposes. If fails injection modality will consider alternative treatments or referrals.     Non operative care:    Zaira Dowell has physical exam evidence of above and wishes to pursue an non-operative care. I am recommending the following: injection    Patient comes in with a long history of having bilateral lateral based hip pain.  She is been evaluated for this previously.  She is undergone injection based treatments as well as physical therapy targeted trochanteric bursitis.  She has not had significant relief with either these treatment modalities.  She is seen by Dr. Salcedo for fibromyalgia and is on multiple medications regarding this.  She is never been seen for her lumbar spine but does report some back based symptoms and this may be playing into her refractory bursitis.  Treatment options for her today included oral medications, injections, more physical therapy.  She would like to do the injections today and see what level of relief she gets with this.  She would also like a referral to pain management for evaluation of her lumbar spine and consideration of Healthy Back program that may help with her refractory bursitis.  The only other treatment thought I had for her was evaluation  for alternative injections like PRP but she is hesitant on this modality at this time.    The patient has been ordered:  Office Hip Injection    CONSULTS:   Pain Management    ACTIVE PROBLEM LIST  Patient Active Problem List   Diagnosis    Fibromyalgia    Pulmonary hypertension    Anxiety    Depression    Bipolar 1 disorder    Chronic idiopathic gout involving toe of left foot without tophus    History of tobacco abuse    SHARON (obstructive sleep apnea)    Trochanteric bursitis of both hips    Trochanteric bursitis    Pain of both hip joints    GERD (gastroesophageal reflux disease)    Pseudotumor cerebri    Mild intermittent asthma with acute exacerbation    Ulnar neuropathy of left upper extremity    Adrenal incidentaloma    Nasal congestion    Insomnia    Chronic obstructive pulmonary disease, unspecified COPD type    Decreased range of motion of lumbar spine    Weakness of trunk musculature           SUBJECTIVE    CHIEF COMPLAINT: Hip Pain    HPI:   Zaira Dowell is a 52 y.o. female here for evaluation and management of bilateral hip pain. There is not a specific incident that brought about this pain. she has had progressive problems with the hip(s) starting 2 years ago and is progressing which is now interfering with activities which include: walking, functional household ADL's, enjoying hobbies, rising from a sitting position, standing for prolonged periods of time, and getting in and out of a car    Currently the pain in the joint is moderate with activity.  The pain is constant and is located in buttocks, lateral hip, and thigh.  The pain is described as aching, severe, sharp, shooting , and throbbing. Relieving factors include rest, prescription medication, and repositioning. no associated Catching, Clicking, and Popping.     They do not report leg length inequality.     Has been sleeping in recliner for last year secondary to this pain.     Zaira Dowell has no additional complaints.     PROGRESSIVE  SYMPTOMS:  Pain impacting sleep  Pain worsened by weight bearing  Pain effecting living situation    FUNCTIONAL STATUS:   Climb a flight of stairs or walk up a hill     PREVIOUS TREATMENTS:  Medical: Steroid Injections  Physical Therapy: Formal Physical Therapy for 6 weeks  Previous Orthopaedic Surgery: None on lower extremities     REVIEW OF SYSTEMS:  PAIN ASSESSMENT:  See HPI.  MUSCULOSKELETAL: See HPI.  OTHER 10 point review of systems is negative except as stated in HPI above    PAST MEDICAL HISTORY   has a past medical history of Anxiety, Arthritis, Asthma, Back pain, Bipolar disorder, BMI 50.0-59.9, adult, Chronic bronchitis, Chronic obstructive pulmonary disease, unspecified COPD type (3/21/2022), Depression, Fibromyalgia, GERD (gastroesophageal reflux disease), Insomnia (11/23/2021), Obesity, SHARON (obstructive sleep apnea), Respiratory failure, and Tobacco dependence.     PAST SURGICAL HISTORY   has a past surgical history that includes Cholecystectomy; pinched nerve; Shoulder surgery; Knee surgery; Wrist surgery; Carpal tunnel release; Tonsillectomy; Esophagogastroduodenoscopy (N/A, 5/21/2019); Hysterectomy; Laparoscopic sleeve gastrectomy (N/A, 10/18/2019); and Colonoscopy (N/A, 4/25/2022).     FAMILY HISTORY  family history includes Maverick's disease in her mother; COPD in her maternal grandfather; Depression in her father and maternal grandmother; Diabetes in her father and mother; Heart disease in her father, maternal grandfather, maternal grandmother, paternal grandfather, and paternal grandmother; Hypertension in her father, mother, and son; Kidney disease in her paternal grandmother; Mental illness in her father; No Known Problems in her brother; Rectal cancer in her paternal aunt; Stroke in her father and maternal grandfather.     SOCIAL HISTORY   reports that she has been smoking vaping with nicotine and cigarettes. She has a 15.00 pack-year smoking history. She has never used smokeless tobacco. She  reports that she does not drink alcohol and does not use drugs.     ALLERGIES   Review of patient's allergies indicates:   Allergen Reactions    Hydrocodone-acetaminophen Nausea And Vomiting        MEDICATIONS   Current Outpatient Medications on File Prior to Visit   Medication Sig Dispense Refill    albuterol (PROVENTIL/VENTOLIN HFA) 90 mcg/actuation inhaler Inhale 2 puffs into the lungs every 4 (four) hours as needed for Wheezing or Shortness of Breath. Rescue 18 g 2    b complex vitamins tablet Take 1 tablet by mouth once daily.      benzonatate (TESSALON) 100 MG capsule TAKE 1 TO 2 CAPSULES BY MOUTH THREE TIMES DAILY AS NEEDED FOR COUGH 60 capsule 0    buPROPion (WELLBUTRIN SR) 100 MG TBSR 12 hr tablet Take 2 tabs q am, and 1 tab daily at noon 270 tablet 1    calcium citrate (CALCITRATE) 200 mg (950 mg) tablet Take 1 tablet by mouth 2 (two) times daily.      cyanocobalamin 500 MCG tablet Take 500 mcg by mouth once daily.      cyclobenzaprine (FLEXERIL) 5 MG tablet Take 1 tablet (5 mg total) by mouth 3 (three) times daily as needed for Muscle spasms. 60 tablet 3    doxycycline (VIBRA-TABS) 100 MG tablet Take 1 tablet (100 mg total) by mouth 2 (two) times daily. 14 tablet 0    ferrous fumarate/vit Bcomp,C (SUPER B COMPLEX ORAL) Take 1 capsule by mouth once daily.      fluticasone propionate (FLONASE) 50 mcg/actuation nasal spray 2 sprays (100 mcg total) by Each Nostril route once daily. 15.8 mL 1    furosemide (LASIX) 40 MG tablet Take 1 tablet (40 mg total) by mouth once daily. 90 tablet 1    LORazepam (ATIVAN) 0.5 MG tablet Take 2 tabs daily as directed. 60 tablet 0    meclizine (ANTIVERT) 25 mg tablet Take 1 tablet (25 mg total) by mouth 3 (three) times daily as needed for Dizziness or Nausea. 30 tablet 0    multivitamin capsule Take 1 capsule by mouth 2 (two) times daily.       omeprazole (PRILOSEC) 40 MG capsule TAKE 1 CAPSULE TWICE DAILY BEFORE MEALS 180 capsule 3    ondansetron (ZOFRAN-ODT) 8 MG TbDL Take 1  "tablet (8 mg total) by mouth 2 (two) times daily as needed (nausea). 30 tablet 1    potassium chloride (KLOR-CON) 10 MEQ TbSR Take 10 mEq by mouth 2 (two) times daily.      pregabalin (LYRICA) 100 MG capsule Take 1 capsule (100 mg total) by mouth 2 (two) times daily. 60 capsule 5    promethazine (PHENERGAN) 12.5 MG Tab promethazine Take half tablet (oral) 2 times per day PRN - Nausea . Medication may cause drowsiness. [id number] tablet 2 times per day oral No set duration recorded No set duration amount recorded active 12.5 mg      risperiDONE (RISPERDAL) 0.5 MG Tab Take 1 tablet (0.5 mg total) by mouth 2 (two) times daily. 180 tablet 1    tolterodine (DETROL LA) 4 MG 24 hr capsule TAKE ONE CAPSULE BY MOUTH EVERY DAY 30 capsule 11    topiramate (TOPAMAX) 200 MG Tab Take 1 tablet (200 mg total) by mouth once daily. 90 tablet 1    fluticasone-salmeterol diskus inhaler 250-50 mcg Inhale 1 puff into the lungs 2 (two) times daily. 60 each 3    lithium (ESKALITH) 300 MG capsule Take 1 capsule (300 mg total) by mouth 3 (three) times daily with meals. 270 capsule 1    sertraline (ZOLOFT) 100 MG tablet Take 2 tablets (200 mg total) by mouth once daily. 180 tablet 1    [DISCONTINUED] loratadine (CLARITIN) 10 mg tablet Take 1 tablet (10 mg total) by mouth once daily. for 20 days 60 tablet 0     No current facility-administered medications on file prior to visit.          PHYSICAL EXAM   height is 5' 7" (1.702 m) and weight is 124.3 kg (274 lb).   Body mass index is 42.91 kg/m².      All other systems deferred.  GENERAL:  No acute distress  HABITUS: Morbid Obese  GAIT: Antalgic  SKIN: Normal  and No erythema, warmth, fluctuance     HIP EXAM:    bilateral:   ROM:   Flexion: 120 degrees    Internal Rotation: 30 degrees    External Rotation: 30 degrees    Abduction: 45 degrees    Adduction: 20 degrees  No apparent leg length discrepancy    Palpation: yes tenderness over Greater trochanter, SI joint, IT band, and Gluteal " insertions   Pain is not reproduced with IR or ER of the hip  Strength: 5/5 hip flexion, abduction, knee flexion and extension   Straight leg raise: Negative   Neurovascular Status: Sensation intact to light touch in Sural, saphenous, SPN, DPN, Tibial nerve distribution  2+ pulse DP/PT, normal capillary refill, foot has normal warmth    DATA:  Diagnostic tests reviewed for today's visit:     Hip radiographs appears normal. No evidence of fracture or significant degenerative changes. There are traction osteophytes on right trochanter

## 2023-03-21 ENCOUNTER — OFFICE VISIT (OUTPATIENT)
Dept: PSYCHIATRY | Facility: CLINIC | Age: 53
End: 2023-03-21
Payer: MEDICARE

## 2023-03-21 VITALS
WEIGHT: 269.81 LBS | DIASTOLIC BLOOD PRESSURE: 73 MMHG | BODY MASS INDEX: 42.26 KG/M2 | HEART RATE: 59 BPM | SYSTOLIC BLOOD PRESSURE: 121 MMHG

## 2023-03-21 DIAGNOSIS — G47.00 INSOMNIA, UNSPECIFIED TYPE: ICD-10-CM

## 2023-03-21 DIAGNOSIS — F31.9 BIPOLAR 1 DISORDER: Primary | ICD-10-CM

## 2023-03-21 DIAGNOSIS — F41.9 ANXIETY DISORDER, UNSPECIFIED TYPE: ICD-10-CM

## 2023-03-21 PROCEDURE — 3008F BODY MASS INDEX DOCD: CPT | Mod: HCNC,CPTII,S$GLB, | Performed by: NURSE PRACTITIONER

## 2023-03-21 PROCEDURE — 3074F SYST BP LT 130 MM HG: CPT | Mod: HCNC,CPTII,S$GLB, | Performed by: NURSE PRACTITIONER

## 2023-03-21 PROCEDURE — 99999 PR PBB SHADOW E&M-EST. PATIENT-LVL II: ICD-10-PCS | Mod: PBBFAC,HCNC,, | Performed by: NURSE PRACTITIONER

## 2023-03-21 PROCEDURE — 3074F PR MOST RECENT SYSTOLIC BLOOD PRESSURE < 130 MM HG: ICD-10-PCS | Mod: HCNC,CPTII,S$GLB, | Performed by: NURSE PRACTITIONER

## 2023-03-21 PROCEDURE — 99214 OFFICE O/P EST MOD 30 MIN: CPT | Mod: HCNC,S$GLB,, | Performed by: NURSE PRACTITIONER

## 2023-03-21 PROCEDURE — 3078F PR MOST RECENT DIASTOLIC BLOOD PRESSURE < 80 MM HG: ICD-10-PCS | Mod: HCNC,CPTII,S$GLB, | Performed by: NURSE PRACTITIONER

## 2023-03-21 PROCEDURE — 99214 PR OFFICE/OUTPT VISIT, EST, LEVL IV, 30-39 MIN: ICD-10-PCS | Mod: HCNC,S$GLB,, | Performed by: NURSE PRACTITIONER

## 2023-03-21 PROCEDURE — 3078F DIAST BP <80 MM HG: CPT | Mod: HCNC,CPTII,S$GLB, | Performed by: NURSE PRACTITIONER

## 2023-03-21 PROCEDURE — 3008F PR BODY MASS INDEX (BMI) DOCUMENTED: ICD-10-PCS | Mod: HCNC,CPTII,S$GLB, | Performed by: NURSE PRACTITIONER

## 2023-03-21 PROCEDURE — 99999 PR PBB SHADOW E&M-EST. PATIENT-LVL II: CPT | Mod: PBBFAC,HCNC,, | Performed by: NURSE PRACTITIONER

## 2023-03-21 RX ORDER — BUPROPION HYDROCHLORIDE 100 MG/1
TABLET, EXTENDED RELEASE ORAL
Qty: 270 TABLET | Refills: 1 | Status: SHIPPED | OUTPATIENT
Start: 2023-03-21 | End: 2023-07-24 | Stop reason: SDUPTHER

## 2023-03-21 RX ORDER — LITHIUM CARBONATE 300 MG/1
300 CAPSULE ORAL
Qty: 270 CAPSULE | Refills: 1 | Status: SHIPPED | OUTPATIENT
Start: 2023-03-21 | End: 2023-07-24 | Stop reason: SDUPTHER

## 2023-03-21 RX ORDER — SERTRALINE HYDROCHLORIDE 100 MG/1
200 TABLET, FILM COATED ORAL DAILY
Qty: 180 TABLET | Refills: 1 | Status: SHIPPED | OUTPATIENT
Start: 2023-03-21 | End: 2023-07-20

## 2023-03-21 RX ORDER — LORAZEPAM 0.5 MG/1
TABLET ORAL
Qty: 60 TABLET | Refills: 2 | Status: SHIPPED | OUTPATIENT
Start: 2023-03-21 | End: 2023-04-26

## 2023-03-21 RX ORDER — RISPERIDONE 0.5 MG/1
0.5 TABLET ORAL 2 TIMES DAILY
Qty: 180 TABLET | Refills: 1 | Status: SHIPPED | OUTPATIENT
Start: 2023-03-21 | End: 2023-04-28

## 2023-03-21 NOTE — PROGRESS NOTES
"Outpatient Psychiatry Follow-Up Visit (MD/NP)    3/21/2023   Clinical Status of Patient:  Outpatient (Ambulatory)    Chief Complaint:  Zaira Dowell is a 52 y.o. female who presents today for follow-up of mood disorder.  Met with patient and no relatives present for interview .      Interval History and Content of Current Session:  Interim Events/Subjective Report/Content of Current Session: She arrived on time for her in person follow up visit. She states her mood is "stable" and her affect is euthymic. She states her father had a stroke and now is having mini strokes, and goes to visit him in [place]. She states she still has family problems, not speaking with her brother for the past 2 yeas, and is not as close as she used to be to her parents. Patient stated she still enjoys going to see her grandchildren's baseball games in [place]. Patient is in good self control. Patient is able to stay positive however in the face of the family stresses. Patent has no thoughts of harming herself and no signs of lida, hypomania, or psychosis. Provided patient with supportive therapy. She is not in therapy and we discussed starting therapy.     We discussed the importance to continual monitoring her labs and lithium level. I reviewed and reported results of patient's blood studies to patient including her lithium level. Support was offered to patient re her concerns and stressors. Patient is also still dealing with her discomfort related to fibromyalgia. She denied SI/HI/AVH. She reported managed depression and anxiety symptoms with her current medication regimen. She reported getting 3-4 hours of sleep per night, gets up and goes back to sleep, and some times she sleeps at day time. She stated this is her sleep pattern for many years.     Contracted for safety and non suicide plan (contact family, suicide hotline, call 911 or go the nearest emergency room)    Psychiatric Review Of Systems - Is patient experiencing or " having changes in:  sleep: yes, varies   appetite: no  weight: no  energy/anergy: no  interest/pleasure/anhedonia: no  somatic symptoms: no  anxiety/panic: yes but managed  guilty/hopelessness: no  concentration: no  S.I.B.s/risky behavior: no  Irritability: no  Racing thoughts: yes and it affects her sleep due to worry  Impulsive behaviors: no  Paranoia:no  AVH:no  Suicidal thoughts/plan/intent: no      Psychotherapy:  Target symptoms: anxiety , mood disorder  Why chosen therapy is appropriate versus another modality: relevant to diagnosis, patient responds to this modality, evidence based practice  Outcome monitoring methods: self-report  Therapeutic intervention type: supportive psychotherapy  Topics discussed/themes:  mood concerns, relationships difficulties, illness/death of a loved one  The patient's response to the intervention is accepting. The patient's progress toward treatment goals is good.   Duration of intervention: 16 minutes.    Review of Systems   PSYCHIATRIC: Pertinant items are noted in the narrative.    Past Medical, Family and Social History: The patient's past medical, family and social history have been reviewed and updated as appropriate within the electronic medical record - see encounter notes.    Compliance: yes    Side effects: None Patient also reported a lithium tremor. Patient has no signs of TD at this time.    Risk Parameters:  Patient reports no suicidal ideation  Patient reports no homicidal ideation  Patient reports no self-injurious behavior  Patient reports no violent behavior    Exam (detailed: at least 9 elements; comprehensive: all 15 elements)   Constitutional  Vitals:  Most recent vital signs, dated less than 90 days prior to this appointment, were reviewed.   Vitals:    03/21/23 0937   BP: 121/73   Pulse: (!) 59   Weight: 122.4 kg (269 lb 13.5 oz)    Patient reports stable vital signs     General:  unremarkable, age appropriate, casually dressed, neatly groomed  "    Musculoskeletal  Muscle Strength/Tone:  Patient reports stable vital signs   Gait & Station:  non-ataxic, patient walks slowly due to her fibromyalgia and pain     Psychiatric  Speech:  no latency; no press, spontaneous   Mood & Affect:  "stable"  congruent and appropriate   Thought Process:  normal and logical   Associations:  intact   Thought Content:  normal, no suicidality, no homicidality, delusions, or paranoia   Insight:  has awareness of illness   Judgement: behavior is adequate to circumstances   Orientation:  grossly intact   Memory: intact for content of interview   Language: grossly intact, able to name, able to repeat   Attention Span & Concentration:  able to focus   Fund of Knowledge:  intact and appropriate to age and level of education     Assessment and Diagnosis   Status/Progress: Based on the examination today, the patient's problem(s) is/are adequately but not ideally controlled.  New problems have not been presented today.    no new obstacles presented.  are not complicating management of the primary condition.  The working differential for this patient includes Bipolar Disorder.     General Impression: Patient is doing as well as possible in coping with her family stresses and her own medical concerns as well. Patient does have things to look forward to in her life and enjoys her grandchildren and also has a supportive marriage. Patient remains motivated to do as well as possible and is pleased with her progress as well. Patient is not a danger to self or others at this time. Patient has a stable mood at this time overall.      ICD-10-CM ICD-9-CM   1. Bipolar 1 disorder  F31.9 296.7   2. Anxiety disorder, unspecified type  F41.9 300.00   3. Insomnia, unspecified type  G47.00 780.52           Intervention/Counseling/Treatment Plan   Medication Management: The risks and benefits of medication were discussed with the patient. Patient agrees to continue   Zoloft 200 mg daily for anxiety and " "depressed mood  Risperdal 0.5 mg BID for bipolar disorder  lithium 300mg tid (takes one tab po qam and 2 tabs po qhs). She stated she has been taking it since 2013-14 and finds it very helpful. She stated, " If I missed one of medicine I get agitated or irritable. They are all work really well together".   AIMs = 0 and new AIMs due on 9/23  Ativan 0.25 mg bid and 0.5 mg in pm for anxiety   Wellbutrin  mg q am and 100 mg daily at noon.  Reviewed patient's labs and all labs within the normal range from 11/9/22. Reviewed new lithium level and Lithium level was 0.5 on 11/9/2022. Ordered EKG  -Discussed informed consent, diagnosis, risks and benefits of proposed treatment above vs alternative treatments vs no treatment, and potential side effects of these treatments. The patient expresses understanding of the above and displays the capacity to agree with this treatment given said understanding. Patient also agrees that, currently, the benefits outweigh the risks and would like to pursue treatment at this time. Answered all questions and discussed follow up. Encouraged patient to contact us with any questions or concerns.   Contracted for safety and non suicide plan (contact family, suicide hotline, call 911 or go the nearest emergency room)  -Encouraged Patient to keep future appointments.  -Take medications as prescribed and abstain from substance abuse.  -Patient to present to Emergency department for thoughts to harm herself or others        Return to Clinic: 2 months or earlier as needed      MICHELE Phan-BC          "

## 2023-04-02 ENCOUNTER — HOSPITAL ENCOUNTER (EMERGENCY)
Facility: HOSPITAL | Age: 53
Discharge: HOME | End: 2023-04-02
Attending: EMERGENCY MEDICINE
Payer: MEDICARE

## 2023-04-02 VITALS
WEIGHT: 265 LBS | SYSTOLIC BLOOD PRESSURE: 109 MMHG | BODY MASS INDEX: 41.59 KG/M2 | HEART RATE: 65 BPM | RESPIRATION RATE: 22 BRPM | HEIGHT: 67 IN | OXYGEN SATURATION: 99 % | DIASTOLIC BLOOD PRESSURE: 66 MMHG | TEMPERATURE: 99 F

## 2023-04-02 DIAGNOSIS — H92.03 ACUTE EAR PAIN, BILATERAL: ICD-10-CM

## 2023-04-02 DIAGNOSIS — R42 DIZZINESS: Primary | ICD-10-CM

## 2023-04-02 LAB
ALBUMIN SERPL BCP-MCNC: 3.5 G/DL (ref 3.5–5.2)
ALP SERPL-CCNC: 68 U/L (ref 55–135)
ALT SERPL W/O P-5'-P-CCNC: 18 U/L (ref 10–44)
ANION GAP SERPL CALC-SCNC: 9 MMOL/L (ref 8–16)
AST SERPL-CCNC: 14 U/L (ref 10–40)
BASOPHILS # BLD AUTO: 0.09 K/UL (ref 0–0.2)
BASOPHILS NFR BLD: 0.9 % (ref 0–1.9)
BILIRUB SERPL-MCNC: 0.3 MG/DL (ref 0.1–1)
BUN SERPL-MCNC: 16 MG/DL (ref 6–20)
CALCIUM SERPL-MCNC: 9.4 MG/DL (ref 8.7–10.5)
CHLORIDE SERPL-SCNC: 109 MMOL/L (ref 95–110)
CO2 SERPL-SCNC: 21 MMOL/L (ref 23–29)
CREAT SERPL-MCNC: 0.8 MG/DL (ref 0.5–1.4)
DIFFERENTIAL METHOD: ABNORMAL
EOSINOPHIL # BLD AUTO: 0.2 K/UL (ref 0–0.5)
EOSINOPHIL NFR BLD: 1.7 % (ref 0–8)
ERYTHROCYTE [DISTWIDTH] IN BLOOD BY AUTOMATED COUNT: 13.1 % (ref 11.5–14.5)
EST. GFR  (NO RACE VARIABLE): >60 ML/MIN/1.73 M^2
GLUCOSE SERPL-MCNC: 88 MG/DL (ref 70–110)
HCT VFR BLD AUTO: 43 % (ref 37–48.5)
HGB BLD-MCNC: 14.1 G/DL (ref 12–16)
IMM GRANULOCYTES # BLD AUTO: 0.06 K/UL (ref 0–0.04)
IMM GRANULOCYTES NFR BLD AUTO: 0.6 % (ref 0–0.5)
LYMPHOCYTES # BLD AUTO: 2.6 K/UL (ref 1–4.8)
LYMPHOCYTES NFR BLD: 26.4 % (ref 18–48)
MCH RBC QN AUTO: 30.4 PG (ref 27–31)
MCHC RBC AUTO-ENTMCNC: 32.8 G/DL (ref 32–36)
MCV RBC AUTO: 93 FL (ref 82–98)
MONOCYTES # BLD AUTO: 0.7 K/UL (ref 0.3–1)
MONOCYTES NFR BLD: 7.1 % (ref 4–15)
NEUTROPHILS # BLD AUTO: 6.3 K/UL (ref 1.8–7.7)
NEUTROPHILS NFR BLD: 63.3 % (ref 38–73)
NRBC BLD-RTO: 0 /100 WBC
PLATELET # BLD AUTO: 323 K/UL (ref 150–450)
PMV BLD AUTO: 8.9 FL (ref 9.2–12.9)
POCT GLUCOSE: 83 MG/DL (ref 70–110)
POTASSIUM SERPL-SCNC: 4.2 MMOL/L (ref 3.5–5.1)
PROT SERPL-MCNC: 7.1 G/DL (ref 6–8.4)
RBC # BLD AUTO: 4.64 M/UL (ref 4–5.4)
SODIUM SERPL-SCNC: 139 MMOL/L (ref 136–145)
WBC # BLD AUTO: 9.92 K/UL (ref 3.9–12.7)

## 2023-04-02 PROCEDURE — 96360 HYDRATION IV INFUSION INIT: CPT | Mod: HCNC

## 2023-04-02 PROCEDURE — 85025 COMPLETE CBC W/AUTO DIFF WBC: CPT | Mod: HCNC | Performed by: EMERGENCY MEDICINE

## 2023-04-02 PROCEDURE — 82962 GLUCOSE BLOOD TEST: CPT | Mod: HCNC

## 2023-04-02 PROCEDURE — 99284 EMERGENCY DEPT VISIT MOD MDM: CPT | Mod: 25,HCNC

## 2023-04-02 PROCEDURE — 80053 COMPREHEN METABOLIC PANEL: CPT | Mod: HCNC | Performed by: EMERGENCY MEDICINE

## 2023-04-02 PROCEDURE — 25000003 PHARM REV CODE 250: Mod: HCNC | Performed by: EMERGENCY MEDICINE

## 2023-04-02 RX ORDER — CETIRIZINE HYDROCHLORIDE 10 MG/1
10 TABLET ORAL DAILY
Qty: 30 TABLET | Refills: 0 | Status: SHIPPED | OUTPATIENT
Start: 2023-04-02 | End: 2023-10-23

## 2023-04-02 RX ORDER — OXYMETAZOLINE HCL 0.05 %
1 SPRAY, NON-AEROSOL (ML) NASAL 2 TIMES DAILY
Qty: 15 ML | Refills: 0 | Status: SHIPPED | OUTPATIENT
Start: 2023-04-02 | End: 2023-04-05

## 2023-04-02 RX ORDER — MECLIZINE HYDROCHLORIDE 25 MG/1
25 TABLET ORAL EVERY 6 HOURS PRN
Qty: 20 TABLET | Refills: 0 | Status: SHIPPED | OUTPATIENT
Start: 2023-04-02 | End: 2023-10-11 | Stop reason: SDUPTHER

## 2023-04-02 RX ADMIN — SODIUM CHLORIDE 1000 ML: 9 INJECTION, SOLUTION INTRAVENOUS at 11:04

## 2023-04-02 NOTE — ED TRIAGE NOTES
"Pt reports bilateral ear infections.  She was on doxycycline and finished course.  She was told by urgent care doc to come to ED "for electrolyte work up".  "

## 2023-04-02 NOTE — DISCHARGE INSTRUCTIONS
Lots of liquids.  Usual medicines.  Please return immediately if you get worse or if new problems develop.  Afrin nasal spray for 3 days.  Zyrtec as directed.  Please follow-up with the primary care doctor this week.  Lots of liquids.

## 2023-04-02 NOTE — ED PROVIDER NOTES
Encounter Date: 4/2/2023    SCRIBE #1 NOTE: I, Soha Borrero, am scribing for, and in the presence of,  Sj Abel MD. I have scribed the following portions of the note - Other sections scribed: HPI and ROS.     History     Chief Complaint   Patient presents with    Otalgia     The patient reports that she was recently dx with a bilateral ear infections. She started having n/v, headaches, and dizziness on 3/30/2023. She states that she was seen today at an  and they referred her to the ED to get her electrolytes checked. Denies numbness, tingling, unilateral weakness, vision changes, difficulty speaking.     Dizziness    Headache     Zaira Dowell is a 52 y.o. female, with a PMHx of COPD, and others, who presents to the ED with recent diagnosis of ear infection bilaterally and URI . Patient reports she went to Urgent care on 3/30/23 and had associated symptoms lightheadedness, dizziness, near syncope frontal headache, emesis, cough, and feeling not steady on her feet. Per patient she visited urgent care again this morning and that's when she was told she had the bilateral ear infection still with her previous symptoms that have worsened. While at urgent care she was instructed to come to ED so her electrolyte levels could be checked. Patient presents good ambulation. No other exacerbating or alleviating factors. Patient denies, shortness of breath, chest pain, fever, chills, abdominal pain, nausea, diarrhea, dysuria, congestion, sore throat, arm or leg trouble, eye pain, rash, or other associated symptoms.       The history is provided by the patient.   Review of patient's allergies indicates:   Allergen Reactions    Hydrocodone-acetaminophen Nausea And Vomiting     Past Medical History:   Diagnosis Date    Anxiety     Arthritis     Asthma     Back pain     Bipolar disorder     BMI 50.0-59.9, adult     Chronic bronchitis     Chronic obstructive pulmonary disease, unspecified COPD type 3/21/2022     Depression     Fibromyalgia     GERD (gastroesophageal reflux disease)     Insomnia 11/23/2021    Obesity     SHARON (obstructive sleep apnea)     Respiratory failure     Tobacco dependence      Past Surgical History:   Procedure Laterality Date    CARPAL TUNNEL RELEASE      CHOLECYSTECTOMY      COLONOSCOPY N/A 4/25/2022    Procedure: COLONOSCOPY;  Surgeon: Ernesto Auguste MD;  Location: [facility] ENDO;  Service: Endoscopy;  Laterality: N/A;  fully vaccinated-GT    ESOPHAGOGASTRODUODENOSCOPY N/A 5/21/2019    Procedure: EGD (ESOPHAGOGASTRODUODENOSCOPY);  Surgeon: Michelle Mc MD;  Location: [facility] ENDO;  Service: Endoscopy;  Laterality: N/A;    HYSTERECTOMY      partial     KNEE SURGERY      LAPAROSCOPIC SLEEVE GASTRECTOMY N/A 10/18/2019    Procedure: GASTRECTOMY, SLEEVE, LAPAROSCOPIC, with intraop EGD;  Surgeon: Ean Kohli MD;  Location: [facility] OR [address] [place] [address];  Service: General;  Laterality: N/A;    pinched nerve      SHOULDER SURGERY      TONSILLECTOMY      WRIST SURGERY      had ganlin cyst     Family History   Problem Relation Age of Onset    Diabetes Mother     Gaston's disease Mother     Hypertension Mother     Heart disease Father     Stroke Father     Mental illness Father         ptsd    Hypertension Father     Diabetes Father     Depression Father     No Known Problems Brother     Heart disease Maternal Grandmother     Depression Maternal Grandmother     COPD Maternal Grandfather     Heart disease Maternal Grandfather     Stroke Maternal Grandfather     Kidney disease Paternal Grandmother     Heart disease Paternal Grandmother     Heart disease Paternal Grandfather     Hypertension Son     Rectal cancer Paternal Aunt      Social History     Tobacco Use    Smoking status: Some Days     Packs/day: 1.00     Years: 15.00     Pack years: 15.00     Types: Vaping with nicotine, Cigarettes    Smokeless tobacco: Never    Tobacco comments:     vaping started April 2020; QUIT cigarettes Sept 2019   Substance Use  Topics    Alcohol use: No    Drug use: No     Review of Systems   Constitutional:  Negative for chills and fever.   HENT:  Positive for ear pain (bilaterally). Negative for rhinorrhea and sore throat.    Eyes:  Negative for pain.   Respiratory:  Positive for cough. Negative for shortness of breath.    Cardiovascular:  Negative for chest pain.   Gastrointestinal:  Positive for vomiting. Negative for abdominal pain, constipation, diarrhea and nausea.   Genitourinary:  Negative for dysuria.   Skin:  Negative for rash.   Neurological:  Positive for dizziness and headaches (frontal). Negative for numbness.     Physical Exam     Initial Vitals [04/02/23 1049]   BP Pulse Resp Temp SpO2   122/77 77 16 98.8 °F (37.1 °C) 98 %      MAP       --         Physical Exam  The patient was examined specifically for the following:   General:No significant distress, Good color, Warm and dry. Head and neck:Scalp atraumatic, Neck supple. Neurological:Appropriate conversation, Gross motor deficits. Eyes:Conjugate gaze, Clear corneas. ENT: No epistaxis. Cardiac: Regular rate and rhythm, Grossly normal heart tones. Pulmonary: Wheezing, Rales. Gastrointestinal: Abdominal tenderness, Abdominal distention. Musculoskeletal: Extremity deformity, Apparent pain with range of motion of the joints. Skin: Rash.   The findings on examination were normal except for the following:  Patient has an elevated BMI.  There is no ataxia.  Finger-to-nose and heel-to-shin are normal.  Mental status examination, cranial nerves, motor and sensory examination are otherwise unremarkable.  ED Course   Procedures  Labs Reviewed   COMPREHENSIVE METABOLIC PANEL - Abnormal; Notable for the following components:       Result Value    CO2 21 (*)     All other components within normal limits   CBC W/ AUTO DIFFERENTIAL - Abnormal; Notable for the following components:    MPV 8.9 (*)     Immature Granulocytes 0.6 (*)     Immature Grans (Abs) 0.06 (*)     All other components  within normal limits   POCT GLUCOSE          Imaging Results              X-Ray Chest AP Portable (Final result)  Result time 04/02/23 11:41:27      Final result by Michael Montemayor MD (04/02/23 11:41:27)                   Impression:      As above.      Electronically signed by: Michael Montemayor MD  Date:    04/02/2023  Time:    11:41               Narrative:    EXAMINATION:  XR CHEST AP PORTABLE    CLINICAL HISTORY:  chest pain;    TECHNIQUE:  Single frontal view of the chest was performed.    COMPARISON:  01/10/2023    FINDINGS:  Right basilar discoid atelectasis, new from prior study.  No consolidation, pleural effusion or pneumothorax.  Cardiomediastinal silhouette is unremarkable.                                       Medications   sodium chloride 0.9% bolus 1,000 mL 1,000 mL (0 mLs Intravenous Stopped 4/2/23 1213)     Medical Decision Making:   History:   Old Medical Records: I decided to obtain old medical records.  Clinical Tests:   Lab Tests: Ordered and Reviewed  Radiological Study: Ordered and Reviewed     Given the above, this patient presents emergency room complaining of being dizzy.  The dizziness is a sensation of feeling like passing out.  The patient has a blood pressure 107/64.  Her heart rate is 59.  The patient has mild bilateral ear pain.  There is some distention of the tympanic membranes.  Patient reports a cough.  I did not see her cough during the physical exam.  Chest x-ray reveals some right basilar atelectasis.  I doubt pneumonia.  I carefully considered posterior circulation stroke.  The patient has no ataxia.  Finger-to-nose and heel-to-shin are normal.  The patient has no other focal neurologic deficits.  The patient was treated with 2 L of crystalloid in the emergency room.  She did not make urine.  I am concerned that she may be little volume short.  She denies painful urination I doubt urinary tract infection.  Patient is maintained on Lasix.  She reports a history of congestive heart  failure but she has a normal ejection fraction and she has grade 1 diastolic dysfunction.  She is not been taking her Lasix.  The patient is on no medicines for her blood pressure.  She had nausea vomiting on Thursday or Friday but no vomiting yesterday.  No vomiting today.  Chest x-ray fails to reveal pulmonary edema pneumonia.  There is a little atelectasis in the right lung base.     Scribe Attestation:   Scribe #1: I performed the above scribed service and the documentation accurately describes the services I performed. I attest to the accuracy of the note.                   Clinical Impression:   Final diagnoses:  [R42] Dizziness (Primary)  [H92.03] Acute ear pain, bilateral        ED Disposition Condition    Discharge Stable          ED Prescriptions       Medication Sig Dispense Start Date End Date Auth. Provider    oxymetazoline (AFRIN) 0.05 % nasal spray 1 spray by Nasal route 2 (two) times daily. for 3 days 15 mL 4/2/2023 4/5/2023 Sj Abel MD    cetirizine (ZYRTEC) 10 MG tablet Take 1 tablet (10 mg total) by mouth once daily. 30 tablet 4/2/2023 4/1/2024 Sj Abel MD    meclizine (ANTIVERT) 25 mg tablet Take 1 tablet (25 mg total) by mouth every 6 (six) hours as needed for Dizziness. 20 tablet 4/2/2023 -- Sj Abel MD          Follow-up Information       Follow up With Specialties Details Why Contact Info    Shay Dai MD Internal Medicine In 3 days  [address] [facility] [id number]  [phone]          I personally performed the services described in this documentation.  All medical record  entries made by the scribe are at my direction and in my presence.  Signed, Dr. Colten Abel MD  04/02/23 2057

## 2023-04-05 ENCOUNTER — PATIENT MESSAGE (OUTPATIENT)
Dept: BARIATRICS | Facility: CLINIC | Age: 53
End: 2023-04-05
Payer: MEDICARE

## 2023-04-11 ENCOUNTER — OFFICE VISIT (OUTPATIENT)
Dept: RHEUMATOLOGY | Facility: CLINIC | Age: 53
End: 2023-04-11
Payer: MEDICARE

## 2023-04-11 ENCOUNTER — PATIENT MESSAGE (OUTPATIENT)
Dept: BARIATRICS | Facility: CLINIC | Age: 53
End: 2023-04-11
Payer: MEDICARE

## 2023-04-11 VITALS
WEIGHT: 273.38 LBS | HEART RATE: 73 BPM | DIASTOLIC BLOOD PRESSURE: 80 MMHG | HEIGHT: 67 IN | OXYGEN SATURATION: 97 % | BODY MASS INDEX: 42.91 KG/M2 | SYSTOLIC BLOOD PRESSURE: 116 MMHG

## 2023-04-11 DIAGNOSIS — E66.01 MORBID OBESITY: ICD-10-CM

## 2023-04-11 DIAGNOSIS — H65.191 ACUTE EFFUSION OF RIGHT EAR: Primary | ICD-10-CM

## 2023-04-11 DIAGNOSIS — M79.7 FIBROMYALGIA: ICD-10-CM

## 2023-04-11 DIAGNOSIS — Z71.89 COUNSELING AND COORDINATION OF CARE: ICD-10-CM

## 2023-04-11 DIAGNOSIS — M15.9 PRIMARY OSTEOARTHRITIS INVOLVING MULTIPLE JOINTS: ICD-10-CM

## 2023-04-11 PROCEDURE — 3079F PR MOST RECENT DIASTOLIC BLOOD PRESSURE 80-89 MM HG: ICD-10-PCS | Mod: HCNC,CPTII,S$GLB, | Performed by: INTERNAL MEDICINE

## 2023-04-11 PROCEDURE — 1159F PR MEDICATION LIST DOCUMENTED IN MEDICAL RECORD: ICD-10-PCS | Mod: HCNC,CPTII,S$GLB, | Performed by: INTERNAL MEDICINE

## 2023-04-11 PROCEDURE — 3008F PR BODY MASS INDEX (BMI) DOCUMENTED: ICD-10-PCS | Mod: HCNC,CPTII,S$GLB, | Performed by: INTERNAL MEDICINE

## 2023-04-11 PROCEDURE — 1159F MED LIST DOCD IN RCRD: CPT | Mod: HCNC,CPTII,S$GLB, | Performed by: INTERNAL MEDICINE

## 2023-04-11 PROCEDURE — 99999 PR PBB SHADOW E&M-EST. PATIENT-LVL IV: ICD-10-PCS | Mod: PBBFAC,HCNC,, | Performed by: INTERNAL MEDICINE

## 2023-04-11 PROCEDURE — 3074F PR MOST RECENT SYSTOLIC BLOOD PRESSURE < 130 MM HG: ICD-10-PCS | Mod: HCNC,CPTII,S$GLB, | Performed by: INTERNAL MEDICINE

## 2023-04-11 PROCEDURE — 99214 OFFICE O/P EST MOD 30 MIN: CPT | Mod: HCNC,S$GLB,, | Performed by: INTERNAL MEDICINE

## 2023-04-11 PROCEDURE — 3008F BODY MASS INDEX DOCD: CPT | Mod: HCNC,CPTII,S$GLB, | Performed by: INTERNAL MEDICINE

## 2023-04-11 PROCEDURE — 3074F SYST BP LT 130 MM HG: CPT | Mod: HCNC,CPTII,S$GLB, | Performed by: INTERNAL MEDICINE

## 2023-04-11 PROCEDURE — 3079F DIAST BP 80-89 MM HG: CPT | Mod: HCNC,CPTII,S$GLB, | Performed by: INTERNAL MEDICINE

## 2023-04-11 PROCEDURE — 99999 PR PBB SHADOW E&M-EST. PATIENT-LVL IV: CPT | Mod: PBBFAC,HCNC,, | Performed by: INTERNAL MEDICINE

## 2023-04-11 PROCEDURE — 99214 PR OFFICE/OUTPT VISIT, EST, LEVL IV, 30-39 MIN: ICD-10-PCS | Mod: HCNC,S$GLB,, | Performed by: INTERNAL MEDICINE

## 2023-04-11 RX ORDER — OFLOXACIN 3 MG/ML
2 SOLUTION AURICULAR (OTIC) 2 TIMES DAILY
Qty: 5 ML | Refills: 0 | Status: SHIPPED | OUTPATIENT
Start: 2023-04-11 | End: 2023-04-18

## 2023-04-11 RX ORDER — PREGABALIN 100 MG/1
100 CAPSULE ORAL 2 TIMES DAILY
Qty: 60 CAPSULE | Refills: 5 | Status: SHIPPED | OUTPATIENT
Start: 2023-04-11 | End: 2023-10-11 | Stop reason: SDUPTHER

## 2023-04-11 RX ORDER — CYCLOBENZAPRINE HCL 5 MG
5 TABLET ORAL 3 TIMES DAILY PRN
Qty: 60 TABLET | Refills: 3 | Status: SHIPPED | OUTPATIENT
Start: 2023-04-11 | End: 2023-10-11 | Stop reason: SDUPTHER

## 2023-04-11 RX ORDER — IPRATROPIUM BROMIDE 42 UG/1
SPRAY, METERED NASAL
COMMUNITY
Start: 2023-03-25 | End: 2023-10-23
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
## 2023-04-14 ENCOUNTER — PATIENT MESSAGE (OUTPATIENT)
Dept: BARIATRICS | Facility: CLINIC | Age: 53
End: 2023-04-14
Payer: MEDICARE

## 2023-04-17 ENCOUNTER — OFFICE VISIT (OUTPATIENT)
Dept: PAIN MEDICINE | Facility: CLINIC | Age: 53
End: 2023-04-17
Payer: MEDICARE

## 2023-04-17 VITALS
RESPIRATION RATE: 18 BRPM | WEIGHT: 270.88 LBS | OXYGEN SATURATION: 99 % | SYSTOLIC BLOOD PRESSURE: 131 MMHG | BODY MASS INDEX: 42.43 KG/M2 | HEART RATE: 64 BPM | DIASTOLIC BLOOD PRESSURE: 74 MMHG

## 2023-04-17 DIAGNOSIS — M47.816 LUMBAR SPONDYLOSIS: ICD-10-CM

## 2023-04-17 DIAGNOSIS — M54.16 LUMBAR RADICULOPATHY, CHRONIC: ICD-10-CM

## 2023-04-17 DIAGNOSIS — M47.26 OSTEOARTHRITIS OF SPINE WITH RADICULOPATHY, LUMBAR REGION: ICD-10-CM

## 2023-04-17 DIAGNOSIS — M51.36 DDD (DEGENERATIVE DISC DISEASE), LUMBAR: Primary | ICD-10-CM

## 2023-04-17 DIAGNOSIS — M54.16 LUMBAR RADICULOPATHY: ICD-10-CM

## 2023-04-17 PROBLEM — M51.369 DDD (DEGENERATIVE DISC DISEASE), LUMBAR: Status: ACTIVE | Noted: 2023-04-17

## 2023-04-17 PROCEDURE — 3078F DIAST BP <80 MM HG: CPT | Mod: HCNC,CPTII,S$GLB, | Performed by: PAIN MEDICINE

## 2023-04-17 PROCEDURE — 99204 OFFICE O/P NEW MOD 45 MIN: CPT | Mod: HCNC,S$GLB,, | Performed by: PAIN MEDICINE

## 2023-04-17 PROCEDURE — 1160F RVW MEDS BY RX/DR IN RCRD: CPT | Mod: HCNC,CPTII,S$GLB, | Performed by: PAIN MEDICINE

## 2023-04-17 PROCEDURE — 3075F SYST BP GE 130 - 139MM HG: CPT | Mod: HCNC,CPTII,S$GLB, | Performed by: PAIN MEDICINE

## 2023-04-17 PROCEDURE — 3008F PR BODY MASS INDEX (BMI) DOCUMENTED: ICD-10-PCS | Mod: HCNC,CPTII,S$GLB, | Performed by: PAIN MEDICINE

## 2023-04-17 PROCEDURE — 1159F PR MEDICATION LIST DOCUMENTED IN MEDICAL RECORD: ICD-10-PCS | Mod: HCNC,CPTII,S$GLB, | Performed by: PAIN MEDICINE

## 2023-04-17 PROCEDURE — 99999 PR PBB SHADOW E&M-EST. PATIENT-LVL V: ICD-10-PCS | Mod: PBBFAC,HCNC,, | Performed by: PAIN MEDICINE

## 2023-04-17 PROCEDURE — 3008F BODY MASS INDEX DOCD: CPT | Mod: HCNC,CPTII,S$GLB, | Performed by: PAIN MEDICINE

## 2023-04-17 PROCEDURE — 1159F MED LIST DOCD IN RCRD: CPT | Mod: HCNC,CPTII,S$GLB, | Performed by: PAIN MEDICINE

## 2023-04-17 PROCEDURE — 1160F PR REVIEW ALL MEDS BY PRESCRIBER/CLIN PHARMACIST DOCUMENTED: ICD-10-PCS | Mod: HCNC,CPTII,S$GLB, | Performed by: PAIN MEDICINE

## 2023-04-17 PROCEDURE — 3075F PR MOST RECENT SYSTOLIC BLOOD PRESS GE 130-139MM HG: ICD-10-PCS | Mod: HCNC,CPTII,S$GLB, | Performed by: PAIN MEDICINE

## 2023-04-17 PROCEDURE — 99999 PR PBB SHADOW E&M-EST. PATIENT-LVL V: CPT | Mod: PBBFAC,HCNC,, | Performed by: PAIN MEDICINE

## 2023-04-17 PROCEDURE — 99204 PR OFFICE/OUTPT VISIT, NEW, LEVL IV, 45-59 MIN: ICD-10-PCS | Mod: HCNC,S$GLB,, | Performed by: PAIN MEDICINE

## 2023-04-17 PROCEDURE — 3078F PR MOST RECENT DIASTOLIC BLOOD PRESSURE < 80 MM HG: ICD-10-PCS | Mod: HCNC,CPTII,S$GLB, | Performed by: PAIN MEDICINE

## 2023-04-17 NOTE — PROGRESS NOTES
Subjective:     Patient ID: Zaira Dowell is a 52 y.o. female    Chief Complaint: Knee Pain (Right sided sharp stabbing pain)      Referred by: Shay Dai MD      HPI:    Initial Encounter (4/17/23):  Zaira Dowell is a 52 y.o. female who presents today with chronic right-sided low back pain.  This pain has been present for at least 1 year.  No specific inciting events or injuries noted.  Pain is located the midline lower lumbar spine and will radiate to the right lumbar paraspinal region.  Pain does not radiate distally to this.  She denies any associated numbness, tingling, weakness, bowel bladder dysfunction.  Pain is constant and worsened with activity.  Of note, patient does have a diagnosis of fibromyalgia.  Does have fairly widespread soft tissue pain though her low back and right-sided paraspinal pain seems to be distinctly different than her typical fibromyalgia pain.  She reports previous physical therapy that was not effective.  Does perform regular home exercises.  This pain is described in detail below.    Physical Therapy:  Yes.  Performs regular home exercise program.    Non-pharmacologic Treatment:  Rest helps         TENS?  No    Pain Medications:         Currently taking:  Flexeril, Lyrica    Has tried in the past:  NSAIDs, Tylenol    Has not tried:  Opioids, TCAs, SNRIs, topical creams    Blood thinners:  None    Interventional Therapies:  None    Relevant Surgeries:  None    Affecting sleep?  Yes    Affecting daily activities? yes    Depressive symptoms? no          SI/HI? No    Work status: Disabled    Pain Scores:    Best:       6/10  Worst:     10/10  Usually:   6/10  Today:    9/10    Pain Disability Index  Family/Home Responsibilities:: 7  Recreation:: 9  Social Activity:: 6  Occupation:: 8  Sexual Behavior:: 7  Self Care:: 5  Life-Support Activities:: 7  Pain Disability Index (PDI): 49    Review of Systems   Constitutional:  Negative for activity change, appetite change,  chills, fatigue, fever and unexpected weight change.   HENT:  Negative for hearing loss.    Eyes:  Negative for visual disturbance.   Respiratory:  Negative for chest tightness and shortness of breath.    Cardiovascular:  Negative for chest pain.   Gastrointestinal:  Negative for abdominal pain, constipation, diarrhea, nausea and vomiting.   Genitourinary:  Negative for difficulty urinating.   Musculoskeletal:  Positive for back pain, gait problem and myalgias. Negative for neck pain.   Skin:  Negative for rash.   Neurological:  Negative for dizziness, weakness, light-headedness, numbness and headaches.   Psychiatric/Behavioral:  Positive for sleep disturbance. Negative for hallucinations and suicidal ideas. The patient is not nervous/anxious.      Past Medical History:   Diagnosis Date    Anxiety     Arthritis     Asthma     Back pain     Bipolar disorder     BMI 50.0-59.9, adult     Chronic bronchitis     Chronic obstructive pulmonary disease, unspecified COPD type 3/21/2022    Depression     Fibromyalgia     GERD (gastroesophageal reflux disease)     Insomnia 11/23/2021    Obesity     SHARON (obstructive sleep apnea)     Respiratory failure     Tobacco dependence        Past Surgical History:   Procedure Laterality Date    CARPAL TUNNEL RELEASE      CHOLECYSTECTOMY      COLONOSCOPY N/A 4/25/2022    Procedure: COLONOSCOPY;  Surgeon: Ernesto Auguste MD;  Location: [facility];  Service: Endoscopy;  Laterality: N/A;  fully vaccinated-GT    ESOPHAGOGASTRODUODENOSCOPY N/A 5/21/2019    Procedure: EGD (ESOPHAGOGASTRODUODENOSCOPY);  Surgeon: Michelle Mc MD;  Location: [facility];  Service: Endoscopy;  Laterality: N/A;    HYSTERECTOMY      partial     KNEE SURGERY      LAPAROSCOPIC SLEEVE GASTRECTOMY N/A 10/18/2019    Procedure: GASTRECTOMY, SLEEVE, LAPAROSCOPIC, with intraop EGD;  Surgeon: Ean Kohli MD;  Location: [address];  Service: General;  Laterality: N/A;    pinched nerve      SHOULDER SURGERY       TONSILLECTOMY      WRIST SURGERY      had ganlin cyst       Social History     Socioeconomic History    Marital status:    Tobacco Use    Smoking status: Some Days     Packs/day: 1.00     Years: 15.00     Pack years: 15.00     Types: Vaping with nicotine, Cigarettes    Smokeless tobacco: Never    Tobacco comments:     vaping started April 2020; QUIT cigarettes Sept 2019   Substance and Sexual Activity    Alcohol use: No    Drug use: No    Sexual activity: Yes     Partners: Male     Social Determinants of Health     Financial Resource Strain: Low Risk     Difficulty of Paying Living Expenses: Not hard at all   Food Insecurity: No Food Insecurity    Worried About Running Out of Food in the Last Year: Never true    Ran Out of Food in the Last Year: Never true   Transportation Needs: No Transportation Needs    Lack of Transportation (Medical): No    Lack of Transportation (Non-Medical): No   Physical Activity: Insufficiently Active    Days of Exercise per Week: 2 days    Minutes of Exercise per Session: 30 min   Stress: Stress Concern Present    Feeling of Stress : Very much   Social Connections: Unknown    Frequency of Communication with Friends and Family: More than three times a week    Frequency of Social Gatherings with Friends and Family: Once a week    Active Member of Clubs or Organizations: No    Attends Club or Organization Meetings: Never    Marital Status:    Housing Stability: Low Risk     Unable to Pay for Housing in the Last Year: No    Number of Places Lived in the Last Year: 1    Unstable Housing in the Last Year: No       Review of patient's allergies indicates:   Allergen Reactions    Hydrocodone-acetaminophen Nausea And Vomiting       Current Outpatient Medications on File Prior to Visit   Medication Sig Dispense Refill    albuterol (PROVENTIL/VENTOLIN HFA) 90 mcg/actuation inhaler Inhale 2 puffs into the lungs every 4 (four) hours as needed for Wheezing or Shortness of Breath.  Rescue 18 g 2    b complex vitamins tablet Take 1 tablet by mouth once daily.      buPROPion (WELLBUTRIN SR) 100 MG TBSR 12 hr tablet Take 2 tabs q am, and 1 tab daily at noon 270 tablet 1    calcium citrate (CALCITRATE) 200 mg (950 mg) tablet Take 1 tablet by mouth 2 (two) times daily.      cetirizine (ZYRTEC) 10 MG tablet Take 1 tablet (10 mg total) by mouth once daily. 30 tablet 0    cyanocobalamin 500 MCG tablet Take 500 mcg by mouth once daily.      cyclobenzaprine (FLEXERIL) 5 MG tablet Take 1 tablet (5 mg total) by mouth 3 (three) times daily as needed for Muscle spasms. 60 tablet 3    ferrous fumarate/vit Bcomp,C (SUPER B COMPLEX ORAL) Take 1 capsule by mouth once daily.      ipratropium (ATROVENT) 42 mcg (0.06 %) nasal spray by Each Nostril route.      lithium (ESKALITH) 300 MG capsule Take 1 capsule (300 mg total) by mouth 3 (three) times daily with meals. 270 capsule 1    LORazepam (ATIVAN) 0.5 MG tablet Take 2 tabs daily as directed. 60 tablet 2    meclizine (ANTIVERT) 25 mg tablet Take 1 tablet (25 mg total) by mouth every 6 (six) hours as needed for Dizziness. 20 tablet 0    multivitamin capsule Take 1 capsule by mouth 2 (two) times daily.       ofloxacin (FLOXIN) 0.3 % otic solution Place 2 drops into the right ear 2 (two) times daily. for 7 days 5 mL 0    omeprazole (PRILOSEC) 40 MG capsule TAKE 1 CAPSULE TWICE DAILY BEFORE MEALS 180 capsule 3    ondansetron (ZOFRAN-ODT) 8 MG TbDL Take 1 tablet (8 mg total) by mouth 2 (two) times daily as needed (nausea). 30 tablet 1    potassium chloride (KLOR-CON) 10 MEQ TbSR Take 10 mEq by mouth 2 (two) times daily.      pregabalin (LYRICA) 100 MG capsule Take 1 capsule (100 mg total) by mouth 2 (two) times daily. 60 capsule 5    promethazine (PHENERGAN) 12.5 MG Tab promethazine Take half tablet (oral) 2 times per day PRN - Nausea . Medication may cause drowsiness. [id number] tablet 2 times per day oral No set duration recorded No set duration amount recorded active  12.5 mg      risperiDONE (RISPERDAL) 0.5 MG Tab Take 1 tablet (0.5 mg total) by mouth 2 (two) times daily. 180 tablet 1    sertraline (ZOLOFT) 100 MG tablet Take 2 tablets (200 mg total) by mouth once daily. 180 tablet 1    tolterodine (DETROL LA) 4 MG 24 hr capsule TAKE ONE CAPSULE BY MOUTH EVERY DAY 30 capsule 11    topiramate (TOPAMAX) 200 MG Tab Take 1 tablet (200 mg total) by mouth once daily. 90 tablet 1    fluticasone-salmeterol diskus inhaler 250-50 mcg Inhale 1 puff into the lungs 2 (two) times daily. 60 each 3    [DISCONTINUED] loratadine (CLARITIN) 10 mg tablet Take 1 tablet (10 mg total) by mouth once daily. for 20 days 60 tablet 0     No current facility-administered medications on file prior to visit.       Objective:      /74 (BP Location: Left arm, Patient Position: Sitting, BP Method: Medium (Automatic))   Pulse 64   Resp 18   Wt 122.9 kg (270 lb 14.4 oz)   LMP  (LMP Unknown)   SpO2 99%   BMI 42.43 kg/m²     Exam:  GEN:  Well developed, well nourished.  No acute distress.  Normal pain behavior.  HEENT:  No trauma.  Mucous membranes moist.  Nares patent bilaterally.  PSYCH: Normal affect. Thought content appropriate.  CHEST:  Breathing symmetric.  No audible wheezing.  ABD: Soft, non-distended.  SKIN:  Warm, pink, dry.  No rash on exposed areas.    EXT:  No cyanosis, clubbing, or edema.  No color change or changes in nail or hair growth.  NEURO/MUSCULOSKELETAL:  Fully alert, oriented, and appropriate. Speech normal hay. No cranial nerve deficits.   Gait:  Normal.  No trendelenburg sign bilaterally.   Motor Strength:  5/5 motor strength throughout lower extremities.   Sensory:  No sensory deficit in the lower extremities.   Reflexes:  1+ and symmetric throughout.  Downgoing Babinski's bilaterally.  No clonus or spasticity.  L-Spine:  Limited ROM with pain on extension more than flexion.  Negative pain with axial/facet loading bilaterally.  Negative SLR bilaterally.    Diffusely TTP  over lumbar paraspinals, bilateral SI joints, hips, piriformis muscles, and GTB.        Imaging:      Narrative & Impression    EXAMINATION:  XR LUMBAR SPINE AP AND LATERAL     CLINICAL HISTORY:  Back pain or radiculopathy, > 6 wks;Dorsalgia, unspecified     TECHNIQUE:  AP, lateral and spot images were performed of the lumbar spine.     COMPARISON:  None     FINDINGS:  No fracture.  No marrow replacement process.  There is multilevel degenerative disc disease, noting disc height loss and endplate changes.  Mild levocurvature noted.  Multiple surgical clips in the right upper quadrant suggestive of prior cholecystectomy.  SI joints unremarkable.     Impression:     No acute findings.        Electronically signed by: Madhu Lyles MD  Date:                                            07/18/2020  Time:                                           15:30       Assessment:       Encounter Diagnoses   Name Primary?    Osteoarthritis of spine with radiculopathy, lumbar region     DDD (degenerative disc disease), lumbar Yes    Lumbar spondylosis     Lumbar radiculopathy     Lumbar radiculopathy, chronic          Plan:       Zaira was seen today for knee pain.    Diagnoses and all orders for this visit:    DDD (degenerative disc disease), lumbar  -     MRI Lumbar Spine Without Contrast; Future  -     MRI Lumbar Spine Without Contrast    Osteoarthritis of spine with radiculopathy, lumbar region  -     Ambulatory referral/consult to Pain Clinic    Lumbar spondylosis  -     MRI Lumbar Spine Without Contrast; Future  -     MRI Lumbar Spine Without Contrast    Lumbar radiculopathy  -     MRI Lumbar Spine Without Contrast; Future  -     MRI Lumbar Spine Without Contrast    Lumbar radiculopathy, chronic  -     MRI Lumbar Spine Without Contrast; Future  -     MRI Lumbar Spine Without Contrast        Zaira Dowell is a 52 y.o. female with chronic right-sided low back pain.  Exact etiology unclear.  May be related to intervertebral  disc disorder.  Lower suspicion for facet mediated pain though can not be completely ruled out.  Not having any overt neurologic deficits..    Pertinent imaging studies reviewed by me. Imaging results were discussed with patient.  Lumbar MRI   Return to clinic after MRI to review results.  May consider interventional procedures if appropriate.          This note was created by combination of typed  and M-Modal dictation. Transcription and phonetic errors may be present.  If there are any questions, please contact me.

## 2023-04-17 NOTE — PROGRESS NOTES
Once auth is received from [facility], order for external MRI will be faxed to [facility].

## 2023-04-18 ENCOUNTER — TELEPHONE (OUTPATIENT)
Dept: PAIN MEDICINE | Facility: CLINIC | Age: 53
End: 2023-04-18
Payer: MEDICARE

## 2023-04-18 NOTE — TELEPHONE ENCOUNTER
MRI order successfully faxed at 8:50a.  [facility] msg sent to pt informing her that she should be receiving a call from that facility. She is to let us know once it is scheduled so we can set up a follow-up appointment.

## 2023-04-19 ENCOUNTER — OFFICE VISIT (OUTPATIENT)
Dept: PRIMARY CARE CLINIC | Facility: CLINIC | Age: 53
End: 2023-04-19
Payer: MEDICARE

## 2023-04-19 VITALS
RESPIRATION RATE: 16 BRPM | DIASTOLIC BLOOD PRESSURE: 76 MMHG | SYSTOLIC BLOOD PRESSURE: 122 MMHG | TEMPERATURE: 97 F | OXYGEN SATURATION: 100 % | HEART RATE: 61 BPM | BODY MASS INDEX: 42.86 KG/M2 | HEIGHT: 67 IN | WEIGHT: 273.06 LBS

## 2023-04-19 DIAGNOSIS — J45.21 MILD INTERMITTENT ASTHMA WITH ACUTE EXACERBATION: ICD-10-CM

## 2023-04-19 DIAGNOSIS — Z00.00 ENCOUNTER FOR MEDICARE ANNUAL WELLNESS EXAM: ICD-10-CM

## 2023-04-19 DIAGNOSIS — Z00.00 ENCOUNTER FOR PREVENTIVE HEALTH EXAMINATION: ICD-10-CM

## 2023-04-19 DIAGNOSIS — J44.9 CHRONIC OBSTRUCTIVE PULMONARY DISEASE, UNSPECIFIED COPD TYPE: ICD-10-CM

## 2023-04-19 DIAGNOSIS — M79.7 FIBROMYALGIA: ICD-10-CM

## 2023-04-19 DIAGNOSIS — G93.2 IIH (IDIOPATHIC INTRACRANIAL HYPERTENSION): ICD-10-CM

## 2023-04-19 DIAGNOSIS — F41.9 ANXIETY: ICD-10-CM

## 2023-04-19 DIAGNOSIS — F31.9 BIPOLAR 1 DISORDER: Primary | ICD-10-CM

## 2023-04-19 DIAGNOSIS — Z87.891 HISTORY OF TOBACCO ABUSE: ICD-10-CM

## 2023-04-19 PROBLEM — M1A.0720 CHRONIC IDIOPATHIC GOUT INVOLVING TOE OF LEFT FOOT WITHOUT TOPHUS: Status: RESOLVED | Noted: 2018-02-19 | Resolved: 2023-04-19

## 2023-04-19 PROCEDURE — 3078F DIAST BP <80 MM HG: CPT | Mod: HCNC,CPTII,S$GLB, | Performed by: NURSE PRACTITIONER

## 2023-04-19 PROCEDURE — G0439 PPPS, SUBSEQ VISIT: HCPCS | Mod: HCNC,S$GLB,, | Performed by: NURSE PRACTITIONER

## 2023-04-19 PROCEDURE — 3008F BODY MASS INDEX DOCD: CPT | Mod: HCNC,CPTII,S$GLB, | Performed by: NURSE PRACTITIONER

## 2023-04-19 PROCEDURE — 99999 PR PBB SHADOW E&M-EST. PATIENT-LVL V: CPT | Mod: PBBFAC,HCNC,, | Performed by: NURSE PRACTITIONER

## 2023-04-19 PROCEDURE — 1159F MED LIST DOCD IN RCRD: CPT | Mod: HCNC,CPTII,S$GLB, | Performed by: NURSE PRACTITIONER

## 2023-04-19 PROCEDURE — 1159F PR MEDICATION LIST DOCUMENTED IN MEDICAL RECORD: ICD-10-PCS | Mod: HCNC,CPTII,S$GLB, | Performed by: NURSE PRACTITIONER

## 2023-04-19 PROCEDURE — 3078F PR MOST RECENT DIASTOLIC BLOOD PRESSURE < 80 MM HG: ICD-10-PCS | Mod: HCNC,CPTII,S$GLB, | Performed by: NURSE PRACTITIONER

## 2023-04-19 PROCEDURE — 3074F SYST BP LT 130 MM HG: CPT | Mod: HCNC,CPTII,S$GLB, | Performed by: NURSE PRACTITIONER

## 2023-04-19 PROCEDURE — G0439 PR MEDICARE ANNUAL WELLNESS SUBSEQUENT VISIT: ICD-10-PCS | Mod: HCNC,S$GLB,, | Performed by: NURSE PRACTITIONER

## 2023-04-19 PROCEDURE — 3074F PR MOST RECENT SYSTOLIC BLOOD PRESSURE < 130 MM HG: ICD-10-PCS | Mod: HCNC,CPTII,S$GLB, | Performed by: NURSE PRACTITIONER

## 2023-04-19 PROCEDURE — 99999 PR PBB SHADOW E&M-EST. PATIENT-LVL V: ICD-10-PCS | Mod: PBBFAC,HCNC,, | Performed by: NURSE PRACTITIONER

## 2023-04-19 PROCEDURE — 3008F PR BODY MASS INDEX (BMI) DOCUMENTED: ICD-10-PCS | Mod: HCNC,CPTII,S$GLB, | Performed by: NURSE PRACTITIONER

## 2023-04-19 NOTE — PATIENT INSTRUCTIONS
Counseling and Referral of Other Preventative  (Italic type indicates deductible and co-insurance are waived)    Patient Name: Zaira Dowell  Today's Date: 4/19/2023    Health Maintenance       Date Due Completion Date    TETANUS VACCINE Never done ---    COVID-19 Vaccine (4 - Booster for Elisa series) 09/22/2022 7/28/2022    HIV Screening 04/08/2027 (Originally 11/24/1985) ---    Mammogram 01/27/2025 1/27/2023    Hemoglobin A1c (Diabetic Prevention Screening) 11/04/2025 11/4/2022    Colorectal Cancer Screening 04/25/2027 4/25/2022    Lipid Panel 11/04/2027 11/4/2022    Pneumococcal Vaccines (Age 0-64) (3 - PPSV23 if available, else PCV20) 11/24/2036 6/28/2022        No orders of the defined types were placed in this encounter.    The following information is provided to all patients.  This information is to help you find resources for any of the problems found today that may be affecting your health:                Living healthy guide: www.[facility].[place].gov      Understanding Diabetes: www.diabetes.org      Eating healthy: www.cdc.gov/healthyweight      CDC home safety checklist: www.cdc.gov/steadi/patient.html      Agency on Aging: www.goea.[place].gov      Alcoholics anonymous (AA): www.aa.org      Physical Activity: www.aneesh.nih.gov/tb8qwye      Tobacco use: www.quitwithusla.org

## 2023-04-19 NOTE — PROGRESS NOTES
"  Zaira Dowell presented for a  Medicare AWV and comprehensive Health Risk Assessment today. The following components were reviewed and updated:    Medical history  Family History  Social history  Allergies and Current Medications  Health Risk Assessment  Health Maintenance  Care Team         ** See Completed Assessments for Annual Wellness Visit within the encounter summary.**         The following assessments were completed:  Living Situation  CAGE  Depression Screening  Timed Get Up and Go  Whisper Test  Cognitive Function Screening  Nutrition Screening  ADL Screening  PAQ Screening  Opiate risk: none. Patient not currently on opiate therapy.               Vitals:    04/19/23 1401   BP: 122/76   BP Location: Right arm   Patient Position: Sitting   BP Method: Medium (Manual)   Pulse: 61   Resp: 16   Temp: 97.3 °F (36.3 °C)   TempSrc: Temporal   SpO2: 100%   Weight: 123.9 kg (273 lb 0.6 oz)   Height: 5' 7" (1.702 m)     Body mass index is 42.76 kg/m².  Physical Exam  Constitutional:       Appearance: She is well-developed.   HENT:      Head: Normocephalic.      Mouth/Throat:      Pharynx: Uvula midline.   Eyes:      Conjunctiva/sclera: Conjunctivae normal.   Cardiovascular:      Rate and Rhythm: Normal rate and regular rhythm.      Pulses: Normal pulses.           Radial pulses are 2+ on the right side and 2+ on the left side.      Heart sounds: Normal heart sounds. No murmur heard.  Pulmonary:      Effort: Pulmonary effort is normal.      Breath sounds: Normal breath sounds. No wheezing.   Lymphadenopathy:      Cervical: No cervical adenopathy.   Skin:     General: Skin is warm and dry.      Findings: No rash.   Neurological:      Mental Status: She is alert and oriented to person, place, and time.             Diagnoses and health risks identified today and associated recommendations/orders:    1. Encounter for Medicare annual wellness exam  Health maintenance updated.  Follow up with PCP as planned.  - " Ambulatory Referral/Consult to Enhanced Annual Wellness Visit (eAWV)    2. Bipolar 1 disorder  As managed per Psychiatry.  Follow-up with Dr. Ward for management.    3. Anxiety  As managed per Psychiatry.    4. Chronic obstructive pulmonary disease, unspecified COPD type  Mild intermittent asthma as managed by Dr. Polanco    5. Fibromyalgia  As managed per Rheumatology Dr. Salcedo    6. History of tobacco abuse  Continues to vape.  No interest in cessation.    7. Mild intermittent asthma with acute exacerbation  As managed per pulmonology continue Advair and nebulizers as needed.    8. IIH (idiopathic intracranial hypertension)  Resolved as of this time.  Follow-up with Neuro-Ophthalmology as planned.    9. Encounter for preventive health examination  As managed per PCP.  Health maintenance updated.    10. BMI 40.0-44.9, adult  Encourage weight loss dietary modification.  Continue follow-up with bariatric medicine.      Provided Zaira with a 5-10 year written screening schedule and personal prevention plan. Recommendations were developed using the USPSTF age appropriate recommendations. Education, counseling, and referrals were provided as needed. After Visit Summary printed and given to patient which includes a list of additional screenings\tests needed.    Follow up in about 1 year (around 4/19/2024) for yearly Enhanced Annual Wellness Exam, as needed with PCP.    Emilia Lauren NP  I offered to discuss advanced care planning, including how to pick a person who would make decisions for you if you were unable to make them for yourself, called a health care power of , and what kind of decisions you might make such as use of life sustaining treatments such as ventilators and tube feeding when faced with a life limiting illness recorded on a living will that they will need to know. (How you want to be cared for as you near the end of your natural life)     X Patient is interested in learning more about how to  make advanced directives.  I provided them paperwork and offered to discuss this with them.

## 2023-04-28 DIAGNOSIS — F31.9 BIPOLAR 1 DISORDER: ICD-10-CM

## 2023-04-28 RX ORDER — RISPERIDONE 0.5 MG/1
0.5 TABLET ORAL 2 TIMES DAILY
Qty: 60 TABLET | Refills: 1 | Status: SHIPPED | OUTPATIENT
Start: 2023-04-28 | End: 2023-07-24 | Stop reason: SDUPTHER

## 2023-05-01 ENCOUNTER — PATIENT MESSAGE (OUTPATIENT)
Dept: PAIN MEDICINE | Facility: CLINIC | Age: 53
End: 2023-05-01
Payer: MEDICARE

## 2023-05-03 ENCOUNTER — PATIENT MESSAGE (OUTPATIENT)
Dept: BARIATRICS | Facility: CLINIC | Age: 53
End: 2023-05-03
Payer: MEDICARE

## 2023-05-09 ENCOUNTER — PATIENT MESSAGE (OUTPATIENT)
Dept: BARIATRICS | Facility: CLINIC | Age: 53
End: 2023-05-09
Payer: MEDICARE

## 2023-05-11 ENCOUNTER — OFFICE VISIT (OUTPATIENT)
Dept: FAMILY MEDICINE | Facility: CLINIC | Age: 53
End: 2023-05-11
Payer: MEDICARE

## 2023-05-11 DIAGNOSIS — M10.471 ACUTE GOUT DUE TO OTHER SECONDARY CAUSE INVOLVING TOE OF RIGHT FOOT: Primary | ICD-10-CM

## 2023-05-11 PROCEDURE — 1160F RVW MEDS BY RX/DR IN RCRD: CPT | Mod: CPTII,S$GLB,, | Performed by: NURSE PRACTITIONER

## 2023-05-11 PROCEDURE — 99999 PR PBB SHADOW E&M-EST. PATIENT-LVL V: ICD-10-PCS | Mod: PBBFAC,,, | Performed by: NURSE PRACTITIONER

## 2023-05-11 PROCEDURE — 1160F PR REVIEW ALL MEDS BY PRESCRIBER/CLIN PHARMACIST DOCUMENTED: ICD-10-PCS | Mod: CPTII,S$GLB,, | Performed by: NURSE PRACTITIONER

## 2023-05-11 PROCEDURE — 3074F PR MOST RECENT SYSTOLIC BLOOD PRESSURE < 130 MM HG: ICD-10-PCS | Mod: CPTII,S$GLB,, | Performed by: NURSE PRACTITIONER

## 2023-05-11 PROCEDURE — 3078F PR MOST RECENT DIASTOLIC BLOOD PRESSURE < 80 MM HG: ICD-10-PCS | Mod: CPTII,S$GLB,, | Performed by: NURSE PRACTITIONER

## 2023-05-11 PROCEDURE — 99214 OFFICE O/P EST MOD 30 MIN: CPT | Mod: S$GLB,,, | Performed by: NURSE PRACTITIONER

## 2023-05-11 PROCEDURE — 99999 PR PBB SHADOW E&M-EST. PATIENT-LVL V: CPT | Mod: PBBFAC,,, | Performed by: NURSE PRACTITIONER

## 2023-05-11 PROCEDURE — 1159F PR MEDICATION LIST DOCUMENTED IN MEDICAL RECORD: ICD-10-PCS | Mod: CPTII,S$GLB,, | Performed by: NURSE PRACTITIONER

## 2023-05-11 PROCEDURE — 3008F PR BODY MASS INDEX (BMI) DOCUMENTED: ICD-10-PCS | Mod: CPTII,S$GLB,, | Performed by: NURSE PRACTITIONER

## 2023-05-11 PROCEDURE — 3074F SYST BP LT 130 MM HG: CPT | Mod: CPTII,S$GLB,, | Performed by: NURSE PRACTITIONER

## 2023-05-11 PROCEDURE — 3008F BODY MASS INDEX DOCD: CPT | Mod: CPTII,S$GLB,, | Performed by: NURSE PRACTITIONER

## 2023-05-11 PROCEDURE — 1159F MED LIST DOCD IN RCRD: CPT | Mod: CPTII,S$GLB,, | Performed by: NURSE PRACTITIONER

## 2023-05-11 PROCEDURE — 99214 PR OFFICE/OUTPT VISIT, EST, LEVL IV, 30-39 MIN: ICD-10-PCS | Mod: S$GLB,,, | Performed by: NURSE PRACTITIONER

## 2023-05-11 PROCEDURE — 3078F DIAST BP <80 MM HG: CPT | Mod: CPTII,S$GLB,, | Performed by: NURSE PRACTITIONER

## 2023-05-11 RX ORDER — COLCHICINE 0.6 MG/1
0.6 TABLET ORAL DAILY PRN
Qty: 30 TABLET | Refills: 0 | Status: SHIPPED | OUTPATIENT
Start: 2023-05-11 | End: 2024-05-10

## 2023-05-11 RX ORDER — PREDNISONE 20 MG/1
TABLET ORAL
Qty: 12 TABLET | Refills: 0 | Status: SHIPPED | OUTPATIENT
Start: 2023-05-11 | End: 2023-06-27

## 2023-05-11 RX ORDER — ALLOPURINOL 100 MG/1
100 TABLET ORAL DAILY
Qty: 60 TABLET | Refills: 1 | Status: SHIPPED | OUTPATIENT
Start: 2023-05-11 | End: 2023-11-30 | Stop reason: SDUPTHER

## 2023-05-11 NOTE — PROGRESS NOTES
Routine Office Visit    Patient Name: Zaira Dowell    : 1970  MRN: [id number]    Chief Complaint:  Gout right foot    Subjective:  Zaira is a 52 y.o. female who presents today for:    1. Gout right foot - patient who is known to me with the below documented medical history reports today for evaluation.  In the last day she has had severe pain and redness of the 1st MTP joint of the right foot consistent with previous gout flares.  She has not noticed any pattern to her gout flares but reports that she is had about 5 gout flares in the past calendar year.  She does not eat a lot of processed foods or seafood.  Pain is 10/10 in severity.  She has not taken any medications for this.  She reports that she can not take NSAIDs due to previous gastric surgery.  No fevers or chills.  She did have a flare on her left foot about 2 weeks ago which self-resolved.  She used to be on allopurinol but has not taken this in some time.    Past Medical History  Past Medical History:   Diagnosis Date    Anxiety     Arthritis     Asthma     Back pain     Bipolar I     BMI 50.0-59.9, adult     Chronic obstructive pulmonary disease, unspecified COPD type 2022    Depression     Fibromyalgia     GERD (gastroesophageal reflux disease)     HFpEF     Insomnia 2021    Obesity     SHARON (obstructive sleep apnea)     Tobacco dependence     Urinary incontinence        Past Surgical History  Past Surgical History:   Procedure Laterality Date    CARPAL TUNNEL RELEASE      CHOLECYSTECTOMY      COLONOSCOPY N/A 2022    Procedure: COLONOSCOPY;  Surgeon: Ernesto Auguste MD;  Location: [facility];  Service: Endoscopy;  Laterality: N/A;  fully vaccinated-GT    ESOPHAGOGASTRODUODENOSCOPY N/A 2019    Procedure: EGD (ESOPHAGOGASTRODUODENOSCOPY);  Surgeon: Michelle Mc MD;  Location: [facility];  Service: Endoscopy;  Laterality: N/A;    HYSTERECTOMY      partial     KNEE SURGERY      LAPAROSCOPIC SLEEVE GASTRECTOMY N/A  10/18/2019    Procedure: GASTRECTOMY, SLEEVE, LAPAROSCOPIC, with intraop EGD;  Surgeon: Ean Kohli MD;  [facility] [facility] [address] [place] [address];  Service: General;  Laterality: N/A;    pinched nerve      SHOULDER SURGERY      TONSILLECTOMY      WRIST SURGERY      had ganlin cyst       Family History  Family History   Problem Relation Age of Onset    Diabetes Mother     Prowers's disease Mother     Hypertension Mother     Heart disease Father     Stroke Father     Mental illness Father         ptsd    Hypertension Father     Diabetes Father     Depression Father     No Known Problems Brother     Heart disease Maternal Grandmother     Depression Maternal Grandmother     COPD Maternal Grandfather     Heart disease Maternal Grandfather     Stroke Maternal Grandfather     Kidney disease Paternal Grandmother     Heart disease Paternal Grandmother     Heart disease Paternal Grandfather     Hypertension Son     Rectal cancer Paternal Aunt        Social History  Social History     Socioeconomic History    Marital status:    Tobacco Use    Smoking status: Some Days     Types: Vaping with nicotine    Smokeless tobacco: Never    Tobacco comments:     vaping started April 2020; QUIT cigarettes Sept 2019   Substance and Sexual Activity    Alcohol use: No    Drug use: No    Sexual activity: Yes     Partners: Male     Social Determinants of Health     Financial Resource Strain: Low Risk     Difficulty of Paying Living Expenses: Not hard at all   Food Insecurity: No Food Insecurity    Worried About Running Out of Food in the Last Year: Never true    Ran Out of Food in the Last Year: Never true   Transportation Needs: No Transportation Needs    Lack of Transportation (Medical): No    Lack of Transportation (Non-Medical): No   Physical Activity: Inactive    Days of Exercise per Week: 0 days    Minutes of Exercise per Session: 0 min   Stress: Stress Concern Present    Feeling of Stress : Very much   Social Connections: Unknown     Frequency of Communication with Friends and Family: More than three times a week    Frequency of Social Gatherings with Friends and Family: Once a week    Active Member of Clubs or Organizations: Yes    Attends Club or Organization Meetings: 1 to 4 times per year    Marital Status:    Housing Stability: Low Risk     Unable to Pay for Housing in the Last Year: No    Number of Places Lived in the Last Year: 1    Unstable Housing in the Last Year: No       Current Medications  Current Outpatient Medications on File Prior to Visit   Medication Sig Dispense Refill    albuterol (PROVENTIL/VENTOLIN HFA) 90 mcg/actuation inhaler Inhale 2 puffs into the lungs every 4 (four) hours as needed for Wheezing or Shortness of Breath. Rescue 18 g 2    b complex vitamins tablet Take 1 tablet by mouth once daily.      buPROPion (WELLBUTRIN SR) 100 MG TBSR 12 hr tablet Take 2 tabs q am, and 1 tab daily at noon 270 tablet 1    calcium citrate (CALCITRATE) 200 mg (950 mg) tablet Take 1 tablet by mouth 2 (two) times daily.      cetirizine (ZYRTEC) 10 MG tablet Take 1 tablet (10 mg total) by mouth once daily. 30 tablet 0    cyanocobalamin 500 MCG tablet Take 500 mcg by mouth once daily.      cyclobenzaprine (FLEXERIL) 5 MG tablet Take 1 tablet (5 mg total) by mouth 3 (three) times daily as needed for Muscle spasms. 60 tablet 3    ferrous fumarate/vit Bcomp,C (SUPER B COMPLEX ORAL) Take 1 capsule by mouth once daily.      ipratropium (ATROVENT) 42 mcg (0.06 %) nasal spray by Each Nostril route.      lithium (ESKALITH) 300 MG capsule Take 1 capsule (300 mg total) by mouth 3 (three) times daily with meals. 270 capsule 1    LORazepam (ATIVAN) 0.5 MG tablet TAKE 2 TABLETS BY MOUTH ONCE DAILY AS DIRECTED 60 tablet 0    meclizine (ANTIVERT) 25 mg tablet Take 1 tablet (25 mg total) by mouth every 6 (six) hours as needed for Dizziness. 20 tablet 0    multivitamin capsule Take 1 capsule by mouth 2 (two) times daily.       omeprazole (PRILOSEC)  40 MG capsule TAKE 1 CAPSULE TWICE DAILY BEFORE MEALS 180 capsule 3    ondansetron (ZOFRAN-ODT) 8 MG TbDL Take 1 tablet (8 mg total) by mouth 2 (two) times daily as needed (nausea). 30 tablet 1    potassium chloride (KLOR-CON) 10 MEQ TbSR Take 10 mEq by mouth 2 (two) times daily.      pregabalin (LYRICA) 100 MG capsule Take 1 capsule (100 mg total) by mouth 2 (two) times daily. 60 capsule 5    promethazine (PHENERGAN) 12.5 MG Tab promethazine Take half tablet (oral) 2 times per day PRN - Nausea . Medication may cause drowsiness. 20210917 tablet 2 times per day oral No set duration recorded No set duration amount recorded active 12.5 mg      risperiDONE (RISPERDAL) 0.5 MG Tab TAKE 1 TABLET (0.5 MG TOTAL) BY MOUTH 2 (TWO) TIMES DAILY. 60 tablet 1    sertraline (ZOLOFT) 100 MG tablet Take 2 tablets (200 mg total) by mouth once daily. 180 tablet 1    tolterodine (DETROL LA) 4 MG 24 hr capsule TAKE ONE CAPSULE BY MOUTH EVERY DAY 30 capsule 11    topiramate (TOPAMAX) 200 MG Tab Take 1 tablet (200 mg total) by mouth once daily. 90 tablet 1    fluticasone-salmeterol diskus inhaler 250-50 mcg Inhale 1 puff into the lungs 2 (two) times daily. 60 each 3    [DISCONTINUED] loratadine (CLARITIN) 10 mg tablet Take 1 tablet (10 mg total) by mouth once daily. for 20 days 60 tablet 0     No current facility-administered medications on file prior to visit.       Allergies   Review of patient's allergies indicates:   Allergen Reactions    Hydrocodone-acetaminophen Nausea And Vomiting       Review of Systems (Pertinent positives)  Review of Systems   Constitutional:  Negative for chills and fever.   HENT: Negative.  Negative for hearing loss.    Eyes: Negative.  Negative for discharge.   Respiratory: Negative.  Negative for wheezing.    Cardiovascular:  Negative for chest pain and palpitations.   Gastrointestinal: Negative.  Negative for blood in stool, constipation, diarrhea and vomiting.   Genitourinary: Negative.  Negative for  "dysuria and hematuria.   Musculoskeletal:  Positive for joint pain. Negative for back pain, myalgias and neck pain.   Skin: Negative.    Neurological:  Positive for weakness. Negative for tingling and headaches.   Endo/Heme/Allergies: Negative.  Negative for polydipsia.     /72 (BP Location: Left arm, Patient Position: Sitting, BP Method: X-Large (Manual))   Pulse 72   Temp 98 °F (36.7 °C) (Oral)   Resp 16   Ht 5' 7" (1.702 m)   Wt 123.4 kg (272 lb 0.8 oz)   LMP  (LMP Unknown)   SpO2 95%   BMI 42.61 kg/m²     Physical Exam  Vitals reviewed.   Constitutional:       General: She is not in acute distress.     Appearance: Normal appearance. She is not ill-appearing, toxic-appearing or diaphoretic.   HENT:      Head: Normocephalic and atraumatic.   Cardiovascular:      Pulses: Normal pulses.   Pulmonary:      Effort: Pulmonary effort is normal. No respiratory distress.      Breath sounds: No wheezing.   Musculoskeletal:      Right ankle: Normal.      Left ankle: Normal.      Right foot: Decreased range of motion (great toe MTP joint). Swelling (great toe MTP joint) and tenderness (great toe MTP joint) present. No deformity.   Skin:     General: Skin is warm and dry.      Capillary Refill: Capillary refill takes less than 2 seconds.   Neurological:      Mental Status: She is alert and oriented to person, place, and time.   Psychiatric:         Mood and Affect: Mood normal.         Behavior: Behavior normal.        Assessment/Plan:  Zaira Dowell is a 52 y.o. female who presents today for :    Zaira was seen today for gout.    Diagnoses and all orders for this visit:    Acute gout due to other secondary cause involving toe of right foot  -     colchicine (COLCRYS) 0.6 mg tablet; Take 1 tablet (0.6 mg total) by mouth daily as needed (gout flare).  -     predniSONE (DELTASONE) 20 MG tablet; Take 2 tablets once a day for 3 days, then take one tablet once a day for 3 days, then take 1/2 tablet once a day " for 3 days  -     allopurinoL (ZYLOPRIM) 100 MG tablet; Take 1 tablet (100 mg total) by mouth once daily.  -     URIC ACID; Future  -     BASIC METABOLIC PANEL; Future    Classic podagra.  Will give steroid tapering dose to start today.  Patient can use colchicine in the future if any future flares.  Start allopurinol today.  Check labs in 4 weeks.  Gout friendly diet discussed.  Follow-up as needed.        This office note has been dictated.  This dictation has been generated using M-Modal Fluency Direct dictation; some phonetic errors may occur.   My collaborating physician is Dr. Tobias Lundy.

## 2023-05-12 VITALS
HEART RATE: 72 BPM | BODY MASS INDEX: 42.7 KG/M2 | OXYGEN SATURATION: 95 % | DIASTOLIC BLOOD PRESSURE: 72 MMHG | HEIGHT: 67 IN | TEMPERATURE: 98 F | SYSTOLIC BLOOD PRESSURE: 116 MMHG | RESPIRATION RATE: 16 BRPM | WEIGHT: 272.06 LBS

## 2023-06-07 ENCOUNTER — PATIENT MESSAGE (OUTPATIENT)
Dept: BARIATRICS | Facility: CLINIC | Age: 53
End: 2023-06-07
Payer: MEDICARE

## 2023-06-13 ENCOUNTER — PATIENT MESSAGE (OUTPATIENT)
Dept: BARIATRICS | Facility: CLINIC | Age: 53
End: 2023-06-13
Payer: MEDICARE

## 2023-06-27 ENCOUNTER — OFFICE VISIT (OUTPATIENT)
Dept: FAMILY MEDICINE | Facility: CLINIC | Age: 53
End: 2023-06-27
Payer: MEDICARE

## 2023-06-27 ENCOUNTER — LAB VISIT (OUTPATIENT)
Dept: LAB | Facility: HOSPITAL | Age: 53
End: 2023-06-27
Payer: MEDICARE

## 2023-06-27 VITALS
TEMPERATURE: 99 F | OXYGEN SATURATION: 96 % | HEIGHT: 67 IN | BODY MASS INDEX: 42.98 KG/M2 | SYSTOLIC BLOOD PRESSURE: 112 MMHG | HEART RATE: 68 BPM | WEIGHT: 273.81 LBS | DIASTOLIC BLOOD PRESSURE: 72 MMHG

## 2023-06-27 DIAGNOSIS — M1A.9XX0 CHRONIC GOUT WITHOUT TOPHUS, UNSPECIFIED CAUSE, UNSPECIFIED SITE: ICD-10-CM

## 2023-06-27 DIAGNOSIS — M25.511 RIGHT SHOULDER PAIN, UNSPECIFIED CHRONICITY: Primary | ICD-10-CM

## 2023-06-27 LAB
ANION GAP SERPL CALC-SCNC: 5 MMOL/L (ref 8–16)
BUN SERPL-MCNC: 14 MG/DL (ref 6–20)
CALCIUM SERPL-MCNC: 9.7 MG/DL (ref 8.7–10.5)
CHLORIDE SERPL-SCNC: 114 MMOL/L (ref 95–110)
CO2 SERPL-SCNC: 23 MMOL/L (ref 23–29)
CREAT SERPL-MCNC: 0.8 MG/DL (ref 0.5–1.4)
EST. GFR  (NO RACE VARIABLE): >60 ML/MIN/1.73 M^2
GLUCOSE SERPL-MCNC: 88 MG/DL (ref 70–110)
POTASSIUM SERPL-SCNC: 4.6 MMOL/L (ref 3.5–5.1)
SODIUM SERPL-SCNC: 142 MMOL/L (ref 136–145)
URATE SERPL-MCNC: 6.1 MG/DL (ref 2.4–5.7)

## 2023-06-27 PROCEDURE — 84550 ASSAY OF BLOOD/URIC ACID: CPT | Mod: HCNC | Performed by: NURSE PRACTITIONER

## 2023-06-27 PROCEDURE — 99214 OFFICE O/P EST MOD 30 MIN: CPT | Mod: HCNC,S$GLB,, | Performed by: NURSE PRACTITIONER

## 2023-06-27 PROCEDURE — 3078F DIAST BP <80 MM HG: CPT | Mod: HCNC,CPTII,S$GLB, | Performed by: NURSE PRACTITIONER

## 2023-06-27 PROCEDURE — 80048 BASIC METABOLIC PNL TOTAL CA: CPT | Mod: HCNC | Performed by: NURSE PRACTITIONER

## 2023-06-27 PROCEDURE — 1159F PR MEDICATION LIST DOCUMENTED IN MEDICAL RECORD: ICD-10-PCS | Mod: HCNC,CPTII,S$GLB, | Performed by: NURSE PRACTITIONER

## 2023-06-27 PROCEDURE — 36415 COLL VENOUS BLD VENIPUNCTURE: CPT | Mod: HCNC,PN | Performed by: NURSE PRACTITIONER

## 2023-06-27 PROCEDURE — 1159F MED LIST DOCD IN RCRD: CPT | Mod: HCNC,CPTII,S$GLB, | Performed by: NURSE PRACTITIONER

## 2023-06-27 PROCEDURE — 3078F PR MOST RECENT DIASTOLIC BLOOD PRESSURE < 80 MM HG: ICD-10-PCS | Mod: HCNC,CPTII,S$GLB, | Performed by: NURSE PRACTITIONER

## 2023-06-27 PROCEDURE — 3074F SYST BP LT 130 MM HG: CPT | Mod: HCNC,CPTII,S$GLB, | Performed by: NURSE PRACTITIONER

## 2023-06-27 PROCEDURE — 99999 PR PBB SHADOW E&M-EST. PATIENT-LVL V: CPT | Mod: PBBFAC,HCNC,, | Performed by: NURSE PRACTITIONER

## 2023-06-27 PROCEDURE — 1160F PR REVIEW ALL MEDS BY PRESCRIBER/CLIN PHARMACIST DOCUMENTED: ICD-10-PCS | Mod: HCNC,CPTII,S$GLB, | Performed by: NURSE PRACTITIONER

## 2023-06-27 PROCEDURE — 99214 PR OFFICE/OUTPT VISIT, EST, LEVL IV, 30-39 MIN: ICD-10-PCS | Mod: HCNC,S$GLB,, | Performed by: NURSE PRACTITIONER

## 2023-06-27 PROCEDURE — 3008F BODY MASS INDEX DOCD: CPT | Mod: HCNC,CPTII,S$GLB, | Performed by: NURSE PRACTITIONER

## 2023-06-27 PROCEDURE — 3008F PR BODY MASS INDEX (BMI) DOCUMENTED: ICD-10-PCS | Mod: HCNC,CPTII,S$GLB, | Performed by: NURSE PRACTITIONER

## 2023-06-27 PROCEDURE — 3074F PR MOST RECENT SYSTOLIC BLOOD PRESSURE < 130 MM HG: ICD-10-PCS | Mod: HCNC,CPTII,S$GLB, | Performed by: NURSE PRACTITIONER

## 2023-06-27 PROCEDURE — 99999 PR PBB SHADOW E&M-EST. PATIENT-LVL V: ICD-10-PCS | Mod: PBBFAC,HCNC,, | Performed by: NURSE PRACTITIONER

## 2023-06-27 PROCEDURE — 1160F RVW MEDS BY RX/DR IN RCRD: CPT | Mod: HCNC,CPTII,S$GLB, | Performed by: NURSE PRACTITIONER

## 2023-06-27 RX ORDER — DICLOFENAC SODIUM 10 MG/G
2 GEL TOPICAL 3 TIMES DAILY PRN
Qty: 100 G | Refills: 0 | Status: SHIPPED | OUTPATIENT
Start: 2023-06-27 | End: 2023-07-31 | Stop reason: SDUPTHER

## 2023-06-27 NOTE — PROGRESS NOTES
Answers submitted by the patient for this visit:  Review of Systems Questionnaire (Submitted on 2023)  activity change: No  unexpected weight change: No  neck pain: Yes  hearing loss: No  rhinorrhea: No  trouble swallowing: No  eye discharge: No  visual disturbance: No  chest tightness: No  wheezing: No  chest pain: No  palpitations: No  blood in stool: No  constipation: No  vomiting: No  diarrhea: No  polydipsia: No  polyuria: No  difficulty urinating: No  hematuria: No  menstrual problem: No  dysuria: No  joint swelling: No  arthralgias: Yes  headaches: Yes  weakness: Yes  confusion: No  dysphoric mood: No    Routine Office Visit    Patient Name: Zaira Dowell    : 1970  MRN: [id number]    Chief Complaint:  Shoulder pain    Subjective:  Zaira is a 52 y.o. female who presents today for:    1. Shoulder pain - patient who is known to me with the below documented medical history reports today for evaluation.  For the last month without any specific inciting event or injury she has been having some right anterior pulling and throbbing shoulder pain mostly with movement.  She notes because the pain she could not lift her right arm all the way or  things like a milk jug or pitcher of tea. she has been taking extra-strength Tylenol for this without significant benefit.  The pain does not radiate down the arm.  No reported numbness or tingling.  She states that this pain feels similar to when she had a torn rotator cuff of her left shoulder which was repaired in  and .  She can not take NSAIDs due to previous GI surgery.  In regards to her gout, she states that she has not had another flare since our last visit.  She is not taking the allopurinol nor colchicine.  She would like to avoid taking the allopurinol if necessary.  She states that she did miss her lab appointment to get her uric acid checked.    Past Medical History  Past Medical History:   Diagnosis Date    Anxiety     Arthritis      Asthma     Back pain     Bipolar I     BMI 50.0-59.9, adult     Chronic obstructive pulmonary disease, unspecified COPD type 03/21/2022    Depression     Fibromyalgia     GERD (gastroesophageal reflux disease)     HFpEF     Insomnia 11/23/2021    Obesity     SHARON (obstructive sleep apnea)     Tobacco dependence     Urinary incontinence        Past Surgical History  Past Surgical History:   Procedure Laterality Date    CARPAL TUNNEL RELEASE      CHOLECYSTECTOMY      COLONOSCOPY N/A 4/25/2022    Procedure: COLONOSCOPY;  Surgeon: Ernesto Auguste MD;  Location: [facility] ENDO;  Service: Endoscopy;  Laterality: N/A;  fully vaccinated-GT    ESOPHAGOGASTRODUODENOSCOPY N/A 5/21/2019    Procedure: EGD (ESOPHAGOGASTRODUODENOSCOPY);  Surgeon: Michelle Mc MD;  Location: [facility] ENDO;  Service: Endoscopy;  Laterality: N/A;    HYSTERECTOMY      partial     KNEE SURGERY      LAPAROSCOPIC SLEEVE GASTRECTOMY N/A 10/18/2019    Procedure: GASTRECTOMY, SLEEVE, LAPAROSCOPIC, with intraop EGD;  Surgeon: Ean Kohli MD;  Location: [facility] OR [address] [place] [address];  Service: General;  Laterality: N/A;    pinched nerve      SHOULDER SURGERY      TONSILLECTOMY      WRIST SURGERY      had ganlin cyst       Family History  Family History   Problem Relation Age of Onset    Diabetes Mother     Manatee's disease Mother     Hypertension Mother     Heart disease Father     Stroke Father     Mental illness Father         ptsd    Hypertension Father     Diabetes Father     Depression Father     No Known Problems Brother     Heart disease Maternal Grandmother     Depression Maternal Grandmother     COPD Maternal Grandfather     Heart disease Maternal Grandfather     Stroke Maternal Grandfather     Kidney disease Paternal Grandmother     Heart disease Paternal Grandmother     Heart disease Paternal Grandfather     Hypertension Son     Rectal cancer Paternal Aunt        Social History  Social History     Socioeconomic History    Marital status:     Tobacco Use    Smoking status: Some Days     Types: Vaping with nicotine    Smokeless tobacco: Never    Tobacco comments:     vaping started April 2020; QUIT cigarettes Sept 2019   Substance and Sexual Activity    Alcohol use: No    Drug use: No    Sexual activity: Yes     Partners: Male     Social Determinants of Health     Financial Resource Strain: Low Risk     Difficulty of Paying Living Expenses: Not very hard   Food Insecurity: No Food Insecurity    Worried About Running Out of Food in the Last Year: Never true    Ran Out of Food in the Last Year: Never true   Transportation Needs: No Transportation Needs    Lack of Transportation (Medical): No    Lack of Transportation (Non-Medical): No   Physical Activity: Inactive    Days of Exercise per Week: 0 days    Minutes of Exercise per Session: 10 min   Stress: Stress Concern Present    Feeling of Stress : Very much   Social Connections: Unknown    Frequency of Communication with Friends and Family: More than three times a week    Frequency of Social Gatherings with Friends and Family: Once a week    Active Member of Clubs or Organizations: Yes    Attends Club or Organization Meetings: More than 4 times per year    Marital Status:    Housing Stability: Low Risk     Unable to Pay for Housing in the Last Year: No    Number of Places Lived in the Last Year: 1    Unstable Housing in the Last Year: No       Current Medications  Current Outpatient Medications on File Prior to Visit   Medication Sig Dispense Refill    albuterol (PROVENTIL/VENTOLIN HFA) 90 mcg/actuation inhaler Inhale 2 puffs into the lungs every 4 (four) hours as needed for Wheezing or Shortness of Breath. Rescue 18 g 2    b complex vitamins tablet Take 1 tablet by mouth once daily.      buPROPion (WELLBUTRIN SR) 100 MG TBSR 12 hr tablet Take 2 tabs q am, and 1 tab daily at noon 270 tablet 1    calcium citrate (CALCITRATE) 200 mg (950 mg) tablet Take 1 tablet by mouth 2 (two) times daily.       cetirizine (ZYRTEC) 10 MG tablet Take 1 tablet (10 mg total) by mouth once daily. 30 tablet 0    colchicine (COLCRYS) 0.6 mg tablet Take 1 tablet (0.6 mg total) by mouth daily as needed (gout flare). 30 tablet 0    cyclobenzaprine (FLEXERIL) 5 MG tablet Take 1 tablet (5 mg total) by mouth 3 (three) times daily as needed for Muscle spasms. 60 tablet 3    ferrous fumarate/vit Bcomp,C (SUPER B COMPLEX ORAL) Take 1 capsule by mouth once daily.      ipratropium (ATROVENT) 42 mcg (0.06 %) nasal spray by Each Nostril route.      LORazepam (ATIVAN) 0.5 MG tablet TAKE 2 TABLETS BY MOUTH ONCE DAILY AS DIRECTED 60 tablet 0    meclizine (ANTIVERT) 25 mg tablet Take 1 tablet (25 mg total) by mouth every 6 (six) hours as needed for Dizziness. 20 tablet 0    multivitamin capsule Take 1 capsule by mouth 2 (two) times daily.       omeprazole (PRILOSEC) 40 MG capsule TAKE 1 CAPSULE TWICE DAILY BEFORE MEALS 180 capsule 3    ondansetron (ZOFRAN-ODT) 8 MG TbDL Take 1 tablet (8 mg total) by mouth 2 (two) times daily as needed (nausea). 30 tablet 1    potassium chloride (KLOR-CON) 10 MEQ TbSR Take 10 mEq by mouth 2 (two) times daily.      pregabalin (LYRICA) 100 MG capsule Take 1 capsule (100 mg total) by mouth 2 (two) times daily. 60 capsule 5    promethazine (PHENERGAN) 12.5 MG Tab promethazine Take half tablet (oral) 2 times per day PRN - Nausea . Medication may cause drowsiness. 20210917 tablet 2 times per day oral No set duration recorded No set duration amount recorded active 12.5 mg      risperiDONE (RISPERDAL) 0.5 MG Tab TAKE 1 TABLET (0.5 MG TOTAL) BY MOUTH 2 (TWO) TIMES DAILY. 60 tablet 1    tolterodine (DETROL LA) 4 MG 24 hr capsule TAKE ONE CAPSULE BY MOUTH EVERY DAY 30 capsule 11    topiramate (TOPAMAX) 200 MG Tab Take 1 tablet (200 mg total) by mouth once daily. 90 tablet 1    allopurinoL (ZYLOPRIM) 100 MG tablet Take 1 tablet (100 mg total) by mouth once daily. (Patient not taking: Reported on 6/27/2023) 60 tablet 1     "cyanocobalamin 500 MCG tablet Take 500 mcg by mouth once daily.      fluticasone-salmeterol diskus inhaler 250-50 mcg Inhale 1 puff into the lungs 2 (two) times daily. 60 each 3    lithium (ESKALITH) 300 MG capsule Take 1 capsule (300 mg total) by mouth 3 (three) times daily with meals. 270 capsule 1    sertraline (ZOLOFT) 100 MG tablet Take 2 tablets (200 mg total) by mouth once daily. 180 tablet 1    [DISCONTINUED] loratadine (CLARITIN) 10 mg tablet Take 1 tablet (10 mg total) by mouth once daily. for 20 days 60 tablet 0    [DISCONTINUED] predniSONE (DELTASONE) 20 MG tablet Take 2 tablets once a day for 3 days, then take one tablet once a day for 3 days, then take 1/2 tablet once a day for 3 days 12 tablet 0     No current facility-administered medications on file prior to visit.       Allergies   Review of patient's allergies indicates:   Allergen Reactions    Hydrocodone-acetaminophen Nausea And Vomiting       Review of Systems (Pertinent positives)  Review of Systems   HENT:  Negative for hearing loss.    Eyes:  Negative for discharge.   Respiratory:  Negative for wheezing.    Cardiovascular:  Negative for chest pain and palpitations.   Gastrointestinal:  Negative for blood in stool, constipation, diarrhea and vomiting.   Genitourinary:  Negative for dysuria and hematuria.   Musculoskeletal:  Positive for joint pain and neck pain.   Neurological: Negative.  Negative for weakness and headaches.   Endo/Heme/Allergies:  Negative for polydipsia.     /72 (BP Location: Right arm, Patient Position: Sitting, BP Method: X-Large (Manual))   Pulse 68   Temp 98.6 °F (37 °C) (Oral)   Ht 5' 7" (1.702 m)   Wt 124.2 kg (273 lb 13 oz)   LMP  (LMP Unknown)   SpO2 96%   BMI 42.88 kg/m²     Physical Exam  Constitutional:       General: She is not in acute distress.     Appearance: Normal appearance. She is not ill-appearing, toxic-appearing or diaphoretic.   HENT:      Head: Normocephalic and atraumatic. "   Cardiovascular:      Pulses: Normal pulses.   Pulmonary:      Effort: Pulmonary effort is normal. No respiratory distress.      Breath sounds: No wheezing.   Musculoskeletal:      Right shoulder: Tenderness (anterior near AC joint and biceps insertyion site) present. No swelling, deformity, effusion or bony tenderness. Decreased range of motion (w/ abduction). Decreased strength.      Comments: Right shoulder - pain exhibited with empty can testing and resisted external rotation   Skin:     General: Skin is warm and dry.   Neurological:      Mental Status: She is alert and oriented to person, place, and time.   Psychiatric:         Mood and Affect: Mood normal.         Behavior: Behavior normal.        Assessment/Plan:  Zaira Dowell is a 52 y.o. female who presents today for :    Zaira was seen today for shoulder pain.    Diagnoses and all orders for this visit:    Right shoulder pain, unspecified chronicity  -     Ambulatory referral/consult to Orthopedics; Future  -     diclofenac sodium (VOLTAREN) 1 % Gel; Apply 2 g topically 3 (three) times daily as needed (shoulder pain).    Avoid p.o. NSAIDs.  Add Voltaren gel for symptoms.  Discussed workup including physical therapy, imaging, and/or orthopedic referral.  Patient would like to see an orthopedic specialist for this.  Referral placed.  Will defer further imaging to them.    Chronic gout without tophus, unspecified cause, unspecified site  -     BASIC METABOLIC PANEL; Future  -     URIC ACID; Future    Patient is agreeable to checking her labs today.  Will restart allopurinol if needed in the future or if significant elevation in uric acid level.    All questions answered.  Follow-up as needed.        This office note has been dictated.  This dictation has been generated using M-Modal Fluency Direct dictation; some phonetic errors may occur.   My collaborating physician is Dr. Tobias Lundy.

## 2023-06-28 ENCOUNTER — OFFICE VISIT (OUTPATIENT)
Dept: ORTHOPEDICS | Facility: CLINIC | Age: 53
End: 2023-06-28
Payer: MEDICARE

## 2023-06-28 DIAGNOSIS — M25.511 RIGHT SHOULDER PAIN, UNSPECIFIED CHRONICITY: ICD-10-CM

## 2023-06-28 DIAGNOSIS — M75.01 ADHESIVE CAPSULITIS OF RIGHT SHOULDER: Primary | ICD-10-CM

## 2023-06-28 PROCEDURE — 20610 LARGE JOINT ASPIRATION/INJECTION: R GLENOHUMERAL: ICD-10-PCS | Mod: HCNC,RT,S$GLB, | Performed by: ORTHOPAEDIC SURGERY

## 2023-06-28 PROCEDURE — 99214 OFFICE O/P EST MOD 30 MIN: CPT | Mod: 25,HCNC,S$GLB, | Performed by: ORTHOPAEDIC SURGERY

## 2023-06-28 PROCEDURE — 1160F PR REVIEW ALL MEDS BY PRESCRIBER/CLIN PHARMACIST DOCUMENTED: ICD-10-PCS | Mod: HCNC,CPTII,S$GLB, | Performed by: ORTHOPAEDIC SURGERY

## 2023-06-28 PROCEDURE — 1159F MED LIST DOCD IN RCRD: CPT | Mod: HCNC,CPTII,S$GLB, | Performed by: ORTHOPAEDIC SURGERY

## 2023-06-28 PROCEDURE — 1159F PR MEDICATION LIST DOCUMENTED IN MEDICAL RECORD: ICD-10-PCS | Mod: HCNC,CPTII,S$GLB, | Performed by: ORTHOPAEDIC SURGERY

## 2023-06-28 PROCEDURE — 1160F RVW MEDS BY RX/DR IN RCRD: CPT | Mod: HCNC,CPTII,S$GLB, | Performed by: ORTHOPAEDIC SURGERY

## 2023-06-28 PROCEDURE — 99999 PR PBB SHADOW E&M-EST. PATIENT-LVL IV: ICD-10-PCS | Mod: PBBFAC,HCNC,, | Performed by: ORTHOPAEDIC SURGERY

## 2023-06-28 PROCEDURE — 20610 DRAIN/INJ JOINT/BURSA W/O US: CPT | Mod: HCNC,RT,S$GLB, | Performed by: ORTHOPAEDIC SURGERY

## 2023-06-28 PROCEDURE — 99999 PR PBB SHADOW E&M-EST. PATIENT-LVL IV: CPT | Mod: PBBFAC,HCNC,, | Performed by: ORTHOPAEDIC SURGERY

## 2023-06-28 PROCEDURE — 99214 PR OFFICE/OUTPT VISIT, EST, LEVL IV, 30-39 MIN: ICD-10-PCS | Mod: 25,HCNC,S$GLB, | Performed by: ORTHOPAEDIC SURGERY

## 2023-06-28 RX ORDER — TRIAMCINOLONE ACETONIDE 40 MG/ML
80 INJECTION, SUSPENSION INTRA-ARTICULAR; INTRAMUSCULAR
Status: DISCONTINUED | OUTPATIENT
Start: 2023-06-28 | End: 2023-06-28 | Stop reason: HOSPADM

## 2023-06-28 RX ADMIN — TRIAMCINOLONE ACETONIDE 80 MG: 40 INJECTION, SUSPENSION INTRA-ARTICULAR; INTRAMUSCULAR at 09:06

## 2023-06-28 NOTE — PROGRESS NOTES
Assessment: 52 y.o. female with right adhesive capsulitis    I explained my diagnostic impression and the reasoning behind it in detail, using layman's terms.      Plan:   - Injection of the right  glenohumeral  joint performed, please see procedure note for more details.  Prior to the injection risks and benefits of corticosteroid injection were discussed with the patient including pain, infection, bleeding, skin color changes, swelling, steroid flare. We discussed that over time injections can result in chondral damage, acceleration of arthritis formation, damage to tendons and damage to joints.  The patient consented for the procedure.  Post-injection instructions were given to the patient in writing.  - fundamental shoulder program instructed today, patient re-demonstrated understanding  - Tylenol for pain  - ortho info adhesive capsulitis given to the patient today in clinic  - Return to clinic in 12 weeks. Return sooner if symptoms worsen or fail to improve.    All questions were answered in detail. The patient is in full agreement with the treatment plan and will proceed accordingly.    Chief Complaint   Patient presents with    Right Shoulder - Pain       Initial visit (6/28/23): Zaira Dowell is a 52 y.o. female who presents today complaining of right shoulder pain  Duration of symptoms:  about 1 month  Trauma or new activity: no recent  Pain is constant  Aggravating factors: any motion   Relieving factors: rest   Night pain is present and is disruptive to sleep  Radicular symptoms: no numbness, paresthesias   Prior treatment:  tylenol  with temporary improvement in pain.     Pain does interfere with sleep and activities of daily living .    This patient was seen in consultation at the request of Zhao Nevarez    This is the extent of the patient's complaints at this time.     Hand dominance: Right     Occupation: Disabled - fibromyalgia    Review of patient's allergies indicates:   Allergen Reactions     Hydrocodone-acetaminophen Nausea And Vomiting         Physical Exam:   Vitals:    06/28/23 1016   PainSc: 10-Worst pain ever   PainLoc: Shoulder       General: Patient is alert, awake and oriented to time, place and person. Mood and affect are appropriate.  Patient does not appear to be in any distress, denies any constitutional symptoms and appears stated age.   HEENT: Pupils are equal and round, sclera are not injected. External examination of ears and nose reveals no abnormalities. Cranial nerves II-X are grossly intact  Skin: no rashes, abrasions or open wounds on the affected extremity   Resp: No respiratory distress or audible wheezing   CV: 2+  pulses, all extremities warm and well perfused   Right Shoulder    Shoulder Range of Motion    Right     Left   (Active/Passive)       Forward Elevation     90/120             165/165  External rotation (abduction)   70/70               External rotation (arm at side)  20/20             45/45   Internal rotation in abduction   10/10                          Internal rotation behind the back  hip                  Range of motion is painful     Acromioclavicular joint is not tender  Crossbody test: negative    Neer's not tested, patient did not tolerate  Hawkin's positive    Antonio's positive -painful  Drop arm negative  Belly press negative      Cuff Strength     Right     Left   Supraspinatus        5/5    5/5  Infraspinatus     5/5    5/5  Subscapularis     5/5    5/5    Deltoid testing            5/5    5/5    Speeds negative  Yergasons negative    Elbow examination demonstrates no tenderness to palpation and has normal range of motion.     ltsi C5-T1  + epl, io, fds, fdp   2+ RP      Imaging: 3 views of the right shoulder:  positive for degenerative changes of the AC joint. The humeral head is well centered on the AP and axillary views.  Mild sclerosis of greater tuberosity. There is not significant degenerative change of the glenohumeral joint or posterior  subluxation of the humeral head. No acute changes or fracture.      I personally reviewed and interpreted the patient's imaging obtained today in clinic     A note notifying Zhao Nevarez of my findings was sent via the electronic medical record     This note was created by combination of typed  and M-Modal dictation. Transcription and phonetic errors may be present.  If there are any questions, please contact me.      Current Outpatient Medications:     albuterol (PROVENTIL/VENTOLIN HFA) 90 mcg/actuation inhaler, Inhale 2 puffs into the lungs every 4 (four) hours as needed for Wheezing or Shortness of Breath. Rescue, Disp: 18 g, Rfl: 2    b complex vitamins tablet, Take 1 tablet by mouth once daily., Disp: , Rfl:     buPROPion (WELLBUTRIN SR) 100 MG TBSR 12 hr tablet, Take 2 tabs q am, and 1 tab daily at noon, Disp: 270 tablet, Rfl: 1    calcium citrate (CALCITRATE) 200 mg (950 mg) tablet, Take 1 tablet by mouth 2 (two) times daily., Disp: , Rfl:     cetirizine (ZYRTEC) 10 MG tablet, Take 1 tablet (10 mg total) by mouth once daily., Disp: 30 tablet, Rfl: 0    colchicine (COLCRYS) 0.6 mg tablet, Take 1 tablet (0.6 mg total) by mouth daily as needed (gout flare)., Disp: 30 tablet, Rfl: 0    cyanocobalamin 500 MCG tablet, Take 500 mcg by mouth once daily., Disp: , Rfl:     cyclobenzaprine (FLEXERIL) 5 MG tablet, Take 1 tablet (5 mg total) by mouth 3 (three) times daily as needed for Muscle spasms., Disp: 60 tablet, Rfl: 3    diclofenac sodium (VOLTAREN) 1 % Gel, Apply 2 g topically 3 (three) times daily as needed (shoulder pain)., Disp: 100 g, Rfl: 0    ferrous fumarate/vit Bcomp,C (SUPER B COMPLEX ORAL), Take 1 capsule by mouth once daily., Disp: , Rfl:     ipratropium (ATROVENT) 42 mcg (0.06 %) nasal spray, by Each Nostril route., Disp: , Rfl:     LORazepam (ATIVAN) 0.5 MG tablet, TAKE 2 TABLETS BY MOUTH ONCE DAILY AS DIRECTED, Disp: 60 tablet, Rfl: 0    meclizine (ANTIVERT) 25 mg tablet, Take 1 tablet  (25 mg total) by mouth every 6 (six) hours as needed for Dizziness., Disp: 20 tablet, Rfl: 0    multivitamin capsule, Take 1 capsule by mouth 2 (two) times daily. , Disp: , Rfl:     omeprazole (PRILOSEC) 40 MG capsule, TAKE 1 CAPSULE TWICE DAILY BEFORE MEALS, Disp: 180 capsule, Rfl: 3    ondansetron (ZOFRAN-ODT) 8 MG TbDL, Take 1 tablet (8 mg total) by mouth 2 (two) times daily as needed (nausea)., Disp: 30 tablet, Rfl: 1    potassium chloride (KLOR-CON) 10 MEQ TbSR, Take 10 mEq by mouth 2 (two) times daily., Disp: , Rfl:     pregabalin (LYRICA) 100 MG capsule, Take 1 capsule (100 mg total) by mouth 2 (two) times daily., Disp: 60 capsule, Rfl: 5    promethazine (PHENERGAN) 12.5 MG Tab, promethazine Take half tablet (oral) 2 times per day PRN - Nausea . Medication may cause drowsiness. [id number] tablet 2 times per day oral No set duration recorded No set duration amount recorded active 12.5 mg, Disp: , Rfl:     risperiDONE (RISPERDAL) 0.5 MG Tab, TAKE 1 TABLET (0.5 MG TOTAL) BY MOUTH 2 (TWO) TIMES DAILY., Disp: 60 tablet, Rfl: 1    tolterodine (DETROL LA) 4 MG 24 hr capsule, TAKE ONE CAPSULE BY MOUTH EVERY DAY, Disp: 30 capsule, Rfl: 11    topiramate (TOPAMAX) 200 MG Tab, Take 1 tablet (200 mg total) by mouth once daily., Disp: 90 tablet, Rfl: 1    allopurinoL (ZYLOPRIM) 100 MG tablet, Take 1 tablet (100 mg total) by mouth once daily. (Patient not taking: Reported on 6/27/2023), Disp: 60 tablet, Rfl: 1    fluticasone-salmeterol diskus inhaler 250-50 mcg, Inhale 1 puff into the lungs 2 (two) times daily., Disp: 60 each, Rfl: 3    lithium (ESKALITH) 300 MG capsule, Take 1 capsule (300 mg total) by mouth 3 (three) times daily with meals., Disp: 270 capsule, Rfl: 1    sertraline (ZOLOFT) 100 MG tablet, Take 2 tablets (200 mg total) by mouth once daily., Disp: 180 tablet, Rfl: 1    Past Medical History:   Diagnosis Date    Anxiety     Arthritis     Asthma     Back pain     Bipolar I     BMI 50.0-59.9, adult     Chronic  obstructive pulmonary disease, unspecified COPD type 03/21/2022    Depression     Fibromyalgia     GERD (gastroesophageal reflux disease)     HFpEF     Insomnia 11/23/2021    Obesity     SHARON (obstructive sleep apnea)     Tobacco dependence     Urinary incontinence        Active Problem List with Overview Notes    Diagnosis Date Noted    DDD (degenerative disc disease), lumbar 04/17/2023    Lumbar spondylosis 04/17/2023    Decreased range of motion of lumbar spine 04/27/2022    Weakness of trunk musculature 04/27/2022    Nasal congestion 11/23/2021     -currently on flonase        Insomnia 11/23/2021     maintenance is the issue.  Will address in greater detail after hsat.        Adrenal incidentaloma 03/04/2021    Ulnar neuropathy of left upper extremity 04/14/2020     Symptoms seem to be mainly from all nerve compression, likely at the elbow on the left.      Mild intermittent asthma with acute exacerbation 07/03/2019     Lots nasal congestion on exam.  +intermittent wheezing by history.  Possibly trigger by nasal congestion.        IIH (idiopathic intracranial hypertension) 05/23/2019    GERD (gastroesophageal reflux disease) 05/21/2019    Trochanteric bursitis 01/21/2019    Pain of both hip joints 01/21/2019    Trochanteric bursitis of both hips 01/10/2019    History of tobacco abuse 07/17/2018    BMI 40.0-44.9, adult 07/17/2018     S/p bariatric surgery 2019.  Lost 80 lbs.        SHARON (obstructive sleep apnea) 07/17/2018     ahi unknown.  Original cpap order by Dr. Sanderson at [facility].  S/p bariatric surgery 10/2019.  80 lbs weigh loss.  Recommend requalification study.  Patient agreed to hsat.        Bipolar 1 disorder 02/19/2018    Fibromyalgia 12/14/2017    Pulmonary hypertension 12/14/2017    Anxiety 12/14/2017    Depression 12/14/2017       Past Surgical History:   Procedure Laterality Date    CARPAL TUNNEL RELEASE      CHOLECYSTECTOMY      COLONOSCOPY N/A 4/25/2022    Procedure: COLONOSCOPY;  Surgeon:  Ernesto Auguste MD;  Location: [facility] ENDO;  Service: Endoscopy;  Laterality: N/A;  fully vaccinated-GT    ESOPHAGOGASTRODUODENOSCOPY N/A 5/21/2019    Procedure: EGD (ESOPHAGOGASTRODUODENOSCOPY);  Surgeon: Michelle Mc MD;  Location: [facility] ENDO;  Service: Endoscopy;  Laterality: N/A;    HYSTERECTOMY      partial     KNEE SURGERY      LAPAROSCOPIC SLEEVE GASTRECTOMY N/A 10/18/2019    Procedure: GASTRECTOMY, SLEEVE, LAPAROSCOPIC, with intraop EGD;  Surgeon: Ean Kohli MD;  Location: [address];  Service: General;  Laterality: N/A;    pinched nerve      SHOULDER SURGERY      TONSILLECTOMY      WRIST SURGERY      had ganlin cyst       Social History     Socioeconomic History    Marital status:    Tobacco Use    Smoking status: Some Days     Types: Vaping with nicotine    Smokeless tobacco: Never    Tobacco comments:     vaping started April 2020; QUIT cigarettes Sept 2019   Substance and Sexual Activity    Alcohol use: No    Drug use: No    Sexual activity: Yes     Partners: Male     Social Determinants of Health     Financial Resource Strain: Low Risk     Difficulty of Paying Living Expenses: Not very hard   Food Insecurity: No Food Insecurity    Worried About Running Out of Food in the Last Year: Never true    Ran Out of Food in the Last Year: Never true   Transportation Needs: No Transportation Needs    Lack of Transportation (Medical): No    Lack of Transportation (Non-Medical): No   Physical Activity: Inactive    Days of Exercise per Week: 0 days    Minutes of Exercise per Session: 10 min   Stress: Stress Concern Present    Feeling of Stress : Very much   Social Connections: Unknown    Frequency of Communication with Friends and Family: More than three times a week    Frequency of Social Gatherings with Friends and Family: Once a week    Active Member of Clubs or Organizations: Yes    Attends Club or Organization Meetings: More than 4 times per year    Marital Status:    Housing  Stability: Low Risk     Unable to Pay for Housing in the Last Year: No    Number of Places Lived in the Last Year: 1    Unstable Housing in the Last Year: No

## 2023-06-28 NOTE — PROCEDURES
Large Joint Aspiration/Injection: R glenohumeral    Date/Time: 6/28/2023 9:30 AM  Performed by: Kylie Thorpe MD  Authorized by: Kylie Thorpe MD     Consent Done?:  Yes (Verbal)  Indications:  Arthritis  Timeout: prior to procedure the correct patient, procedure, and site was verified    Prep: patient was prepped and draped in usual sterile fashion      Local anesthesia used?: Yes    Local anesthetic:  Topical anesthetic    Details:  Needle Size:  22 G  Approach:  Posterior  Location:  Shoulder  Site:  R glenohumeral  Medications:  80 mg triamcinolone acetonide 40 mg/mL  Patient tolerance:  Patient tolerated the procedure well with no immediate complications

## 2023-07-05 RX ORDER — LORAZEPAM 0.5 MG/1
TABLET ORAL
Qty: 60 TABLET | Refills: 0 | Status: SHIPPED | OUTPATIENT
Start: 2023-07-05 | End: 2023-07-20

## 2023-07-06 ENCOUNTER — OFFICE VISIT (OUTPATIENT)
Dept: RHEUMATOLOGY | Facility: CLINIC | Age: 53
End: 2023-07-06
Payer: MEDICARE

## 2023-07-06 VITALS
WEIGHT: 272.5 LBS | SYSTOLIC BLOOD PRESSURE: 124 MMHG | HEIGHT: 67 IN | OXYGEN SATURATION: 96 % | DIASTOLIC BLOOD PRESSURE: 81 MMHG | BODY MASS INDEX: 42.77 KG/M2 | HEART RATE: 66 BPM

## 2023-07-06 DIAGNOSIS — E66.01 MORBID OBESITY: ICD-10-CM

## 2023-07-06 DIAGNOSIS — M79.7 FIBROMYALGIA: Primary | ICD-10-CM

## 2023-07-06 DIAGNOSIS — M15.9 PRIMARY OSTEOARTHRITIS INVOLVING MULTIPLE JOINTS: ICD-10-CM

## 2023-07-06 DIAGNOSIS — Z71.89 COUNSELING AND COORDINATION OF CARE: ICD-10-CM

## 2023-07-06 PROCEDURE — 1159F MED LIST DOCD IN RCRD: CPT | Mod: HCNC,CPTII,S$GLB, | Performed by: INTERNAL MEDICINE

## 2023-07-06 PROCEDURE — 1159F PR MEDICATION LIST DOCUMENTED IN MEDICAL RECORD: ICD-10-PCS | Mod: HCNC,CPTII,S$GLB, | Performed by: INTERNAL MEDICINE

## 2023-07-06 PROCEDURE — 3008F BODY MASS INDEX DOCD: CPT | Mod: HCNC,CPTII,S$GLB, | Performed by: INTERNAL MEDICINE

## 2023-07-06 PROCEDURE — 3008F PR BODY MASS INDEX (BMI) DOCUMENTED: ICD-10-PCS | Mod: HCNC,CPTII,S$GLB, | Performed by: INTERNAL MEDICINE

## 2023-07-06 PROCEDURE — 3074F SYST BP LT 130 MM HG: CPT | Mod: HCNC,CPTII,S$GLB, | Performed by: INTERNAL MEDICINE

## 2023-07-06 PROCEDURE — 3074F PR MOST RECENT SYSTOLIC BLOOD PRESSURE < 130 MM HG: ICD-10-PCS | Mod: HCNC,CPTII,S$GLB, | Performed by: INTERNAL MEDICINE

## 2023-07-06 PROCEDURE — 99999 PR PBB SHADOW E&M-EST. PATIENT-LVL IV: CPT | Mod: PBBFAC,HCNC,, | Performed by: INTERNAL MEDICINE

## 2023-07-06 PROCEDURE — 99999 PR PBB SHADOW E&M-EST. PATIENT-LVL IV: ICD-10-PCS | Mod: PBBFAC,HCNC,, | Performed by: INTERNAL MEDICINE

## 2023-07-06 PROCEDURE — 99214 OFFICE O/P EST MOD 30 MIN: CPT | Mod: HCNC,S$GLB,, | Performed by: INTERNAL MEDICINE

## 2023-07-06 PROCEDURE — 99214 PR OFFICE/OUTPT VISIT, EST, LEVL IV, 30-39 MIN: ICD-10-PCS | Mod: HCNC,S$GLB,, | Performed by: INTERNAL MEDICINE

## 2023-07-06 PROCEDURE — 3079F DIAST BP 80-89 MM HG: CPT | Mod: HCNC,CPTII,S$GLB, | Performed by: INTERNAL MEDICINE

## 2023-07-06 PROCEDURE — 3079F PR MOST RECENT DIASTOLIC BLOOD PRESSURE 80-89 MM HG: ICD-10-PCS | Mod: HCNC,CPTII,S$GLB, | Performed by: INTERNAL MEDICINE

## 2023-07-06 RX ORDER — ONDANSETRON 8 MG/1
8 TABLET, ORALLY DISINTEGRATING ORAL 2 TIMES DAILY PRN
Qty: 30 TABLET | Refills: 1 | Status: SHIPPED | OUTPATIENT
Start: 2023-07-06 | End: 2023-11-24 | Stop reason: SDUPTHER

## 2023-07-06 RX ORDER — FUROSEMIDE 40 MG/1
40 TABLET ORAL
COMMUNITY
Start: 2023-05-13 | End: 2023-09-21
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
## 2023-07-11 ENCOUNTER — OFFICE VISIT (OUTPATIENT)
Dept: PAIN MEDICINE | Facility: CLINIC | Age: 53
End: 2023-07-11
Payer: MEDICARE

## 2023-07-11 VITALS
OXYGEN SATURATION: 98 % | SYSTOLIC BLOOD PRESSURE: 151 MMHG | RESPIRATION RATE: 20 BRPM | DIASTOLIC BLOOD PRESSURE: 83 MMHG | HEART RATE: 75 BPM

## 2023-07-11 DIAGNOSIS — M51.36 DDD (DEGENERATIVE DISC DISEASE), LUMBAR: ICD-10-CM

## 2023-07-11 DIAGNOSIS — M47.816 LUMBAR SPONDYLOSIS: Primary | ICD-10-CM

## 2023-07-11 PROCEDURE — 3079F DIAST BP 80-89 MM HG: CPT | Mod: CPTII,S$GLB,, | Performed by: PAIN MEDICINE

## 2023-07-11 PROCEDURE — 3077F SYST BP >= 140 MM HG: CPT | Mod: CPTII,S$GLB,, | Performed by: PAIN MEDICINE

## 2023-07-11 PROCEDURE — 99999 PR PBB SHADOW E&M-EST. PATIENT-LVL III: CPT | Mod: PBBFAC,HCNC,, | Performed by: PAIN MEDICINE

## 2023-07-11 PROCEDURE — 3079F PR MOST RECENT DIASTOLIC BLOOD PRESSURE 80-89 MM HG: ICD-10-PCS | Mod: CPTII,S$GLB,, | Performed by: PAIN MEDICINE

## 2023-07-11 PROCEDURE — 3077F PR MOST RECENT SYSTOLIC BLOOD PRESSURE >= 140 MM HG: ICD-10-PCS | Mod: CPTII,S$GLB,, | Performed by: PAIN MEDICINE

## 2023-07-11 PROCEDURE — 1160F RVW MEDS BY RX/DR IN RCRD: CPT | Mod: CPTII,S$GLB,, | Performed by: PAIN MEDICINE

## 2023-07-11 PROCEDURE — 99214 OFFICE O/P EST MOD 30 MIN: CPT | Mod: S$GLB,,, | Performed by: PAIN MEDICINE

## 2023-07-11 PROCEDURE — 99999 PR PBB SHADOW E&M-EST. PATIENT-LVL III: ICD-10-PCS | Mod: PBBFAC,HCNC,, | Performed by: PAIN MEDICINE

## 2023-07-11 PROCEDURE — 1159F MED LIST DOCD IN RCRD: CPT | Mod: CPTII,S$GLB,, | Performed by: PAIN MEDICINE

## 2023-07-11 PROCEDURE — 1159F PR MEDICATION LIST DOCUMENTED IN MEDICAL RECORD: ICD-10-PCS | Mod: CPTII,S$GLB,, | Performed by: PAIN MEDICINE

## 2023-07-11 PROCEDURE — 1160F PR REVIEW ALL MEDS BY PRESCRIBER/CLIN PHARMACIST DOCUMENTED: ICD-10-PCS | Mod: CPTII,S$GLB,, | Performed by: PAIN MEDICINE

## 2023-07-11 PROCEDURE — 99214 PR OFFICE/OUTPT VISIT, EST, LEVL IV, 30-39 MIN: ICD-10-PCS | Mod: S$GLB,,, | Performed by: PAIN MEDICINE

## 2023-07-11 NOTE — H&P (VIEW-ONLY)
Subjective:     Patient ID: Zaira Dowell is a 52 y.o. female    Chief Complaint: Low-back Pain (Midline Bilateral achy pain)      Referred by: No ref. provider found      HPI:    Interval History (7/11/23):  She returns today for follow up and MRI review.  She reports that her back pain is unchanged in quality location since last encounter.  Denies any new or worsening symptoms.  States that pain is still primarily on the right side.  Does have some left-sided pain but this is minimal.  Pain will radiate to the paraspinal regions primarily on the right side, but denies any pain radiating into the lower extremities.  She denies any associated numbness, tingling, weakness, bowel bladder dysfunction.  e      Initial Encounter (4/17/23):  Zaira Dowell is a 52 y.o. female who presents today with chronic right-sided low back pain.  This pain has been present for at least 1 year.  No specific inciting events or injuries noted.  Pain is located the midline lower lumbar spine and will radiate to the right lumbar paraspinal region.  Pain does not radiate distally to this.  She denies any associated numbness, tingling, weakness, bowel bladder dysfunction.  Pain is constant and worsened with activity.  Of note, patient does have a diagnosis of fibromyalgia.  Does have fairly widespread soft tissue pain though her low back and right-sided paraspinal pain seems to be distinctly different than her typical fibromyalgia pain.  She reports previous physical therapy that was not effective.  Does perform regular home exercises.  This pain is described in detail below.    Physical Therapy:  Yes.  Performs regular home exercise program.    Non-pharmacologic Treatment:  Rest helps         TENS?  No    Pain Medications:         Currently taking:  Flexeril, Lyrica    Has tried in the past:  NSAIDs, Tylenol    Has not tried:  Opioids, TCAs, SNRIs, topical creams    Blood thinners:  None    Interventional Therapies:   None    Relevant Surgeries:  None    Affecting sleep?  Yes    Affecting daily activities? yes    Depressive symptoms? no          SI/HI? No    Work status: Disabled    Pain Scores:    Best:       5/10  Worst:     10/10  Usually:   6/10  Today:    5/10    Pain Disability Index  Family/Home Responsibilities:: 7  Recreation:: 8  Social Activity:: 8  Occupation:: 0 (Disable)  Sexual Behavior:: 7  Self Care:: 2  Life-Support Activities:: 8  Pain Disability Index (PDI): 40    Review of Systems   Constitutional:  Negative for activity change, appetite change, chills, fatigue, fever and unexpected weight change.   HENT:  Negative for hearing loss.    Eyes:  Negative for visual disturbance.   Respiratory:  Negative for chest tightness and shortness of breath.    Cardiovascular:  Negative for chest pain.   Gastrointestinal:  Negative for abdominal pain, constipation, diarrhea, nausea and vomiting.   Genitourinary:  Negative for difficulty urinating.   Musculoskeletal:  Positive for back pain, gait problem and myalgias. Negative for neck pain.   Skin:  Negative for rash.   Neurological:  Negative for dizziness, weakness, light-headedness, numbness and headaches.   Psychiatric/Behavioral:  Positive for sleep disturbance. Negative for hallucinations and suicidal ideas. The patient is not nervous/anxious.      Past Medical History:   Diagnosis Date    Anxiety     Arthritis     Asthma     Back pain     Bipolar I     BMI 50.0-59.9, adult     Chronic obstructive pulmonary disease, unspecified COPD type 03/21/2022    Depression     Fibromyalgia     GERD (gastroesophageal reflux disease)     HFpEF     Insomnia 11/23/2021    Obesity     SHARON (obstructive sleep apnea)     Tobacco dependence     Urinary incontinence        Past Surgical History:   Procedure Laterality Date    CARPAL TUNNEL RELEASE      CHOLECYSTECTOMY      COLONOSCOPY N/A 4/25/2022    Procedure: COLONOSCOPY;  Surgeon: Ernesto Auguste MD;  Location: [facility];  Service:  Endoscopy;  Laterality: N/A;  fully vaccinated-GT    ESOPHAGOGASTRODUODENOSCOPY N/A 5/21/2019    Procedure: EGD (ESOPHAGOGASTRODUODENOSCOPY);  Surgeon: Michelle Mc MD;  Location: [facility];  Service: Endoscopy;  Laterality: N/A;    HYSTERECTOMY      partial     KNEE SURGERY      LAPAROSCOPIC SLEEVE GASTRECTOMY N/A 10/18/2019    Procedure: GASTRECTOMY, SLEEVE, LAPAROSCOPIC, with intraop EGD;  Surgeon: Ean Kohli MD;  Location: [address];  Service: General;  Laterality: N/A;    pinched nerve      SHOULDER SURGERY      TONSILLECTOMY      WRIST SURGERY      had ganlin cyst       Social History     Socioeconomic History    Marital status:    Tobacco Use    Smoking status: Some Days     Types: Vaping with nicotine    Smokeless tobacco: Never    Tobacco comments:     vaping started April 2020; QUIT cigarettes Sept 2019   Substance and Sexual Activity    Alcohol use: No    Drug use: No    Sexual activity: Yes     Partners: Male     Social Determinants of Health     Financial Resource Strain: Low Risk     Difficulty of Paying Living Expenses: Not hard at all   Food Insecurity: No Food Insecurity    Worried About Running Out of Food in the Last Year: Never true    Ran Out of Food in the Last Year: Never true   Transportation Needs: No Transportation Needs    Lack of Transportation (Medical): No    Lack of Transportation (Non-Medical): No   Physical Activity: Insufficiently Active    Days of Exercise per Week: 2 days    Minutes of Exercise per Session: 30 min   Stress: Stress Concern Present    Feeling of Stress : Very much   Social Connections: Unknown    Frequency of Communication with Friends and Family: More than three times a week    Frequency of Social Gatherings with Friends and Family: Once a week    Active Member of Clubs or Organizations: Yes    Attends Club or Organization Meetings: More than 4 times per year    Marital Status:    Housing Stability: Low Risk     Unable to Pay for  Housing in the Last Year: No    Number of Places Lived in the Last Year: 1    Unstable Housing in the Last Year: No       Review of patient's allergies indicates:   Allergen Reactions    Hydrocodone-acetaminophen Nausea And Vomiting       Current Outpatient Medications on File Prior to Visit   Medication Sig Dispense Refill    albuterol (PROVENTIL/VENTOLIN HFA) 90 mcg/actuation inhaler Inhale 2 puffs into the lungs every 4 (four) hours as needed for Wheezing or Shortness of Breath. Rescue 18 g 2    allopurinoL (ZYLOPRIM) 100 MG tablet Take 1 tablet (100 mg total) by mouth once daily. 60 tablet 1    b complex vitamins tablet Take 1 tablet by mouth once daily.      buPROPion (WELLBUTRIN SR) 100 MG TBSR 12 hr tablet Take 2 tabs q am, and 1 tab daily at noon 270 tablet 1    calcium citrate (CALCITRATE) 200 mg (950 mg) tablet Take 1 tablet by mouth 2 (two) times daily.      cetirizine (ZYRTEC) 10 MG tablet Take 1 tablet (10 mg total) by mouth once daily. 30 tablet 0    colchicine (COLCRYS) 0.6 mg tablet Take 1 tablet (0.6 mg total) by mouth daily as needed (gout flare). 30 tablet 0    cyanocobalamin 500 MCG tablet Take 500 mcg by mouth once daily.      cyclobenzaprine (FLEXERIL) 5 MG tablet Take 1 tablet (5 mg total) by mouth 3 (three) times daily as needed for Muscle spasms. 60 tablet 3    diclofenac sodium (VOLTAREN) 1 % Gel Apply 2 g topically 3 (three) times daily as needed (shoulder pain). 100 g 0    ferrous fumarate/vit Bcomp,C (SUPER B COMPLEX ORAL) Take 1 capsule by mouth once daily.      furosemide (LASIX) 40 MG tablet Take 40 mg by mouth.      ipratropium (ATROVENT) 42 mcg (0.06 %) nasal spray by Each Nostril route.      LORazepam (ATIVAN) 0.5 MG tablet TAKE 2 TABLETS BY MOUTH ONCE DAILY AS DIRECTED 60 tablet 0    meclizine (ANTIVERT) 25 mg tablet Take 1 tablet (25 mg total) by mouth every 6 (six) hours as needed for Dizziness. 20 tablet 0    multivitamin capsule Take 1 capsule by mouth 2 (two) times daily.        omeprazole (PRILOSEC) 40 MG capsule TAKE 1 CAPSULE TWICE DAILY BEFORE MEALS 180 capsule 3    ondansetron (ZOFRAN-ODT) 8 MG TbDL Take 1 tablet (8 mg total) by mouth 2 (two) times daily as needed (nausea). 30 tablet 1    potassium chloride (KLOR-CON) 10 MEQ TbSR Take 10 mEq by mouth 2 (two) times daily.      pregabalin (LYRICA) 100 MG capsule Take 1 capsule (100 mg total) by mouth 2 (two) times daily. 60 capsule 5    promethazine (PHENERGAN) 12.5 MG Tab promethazine Take half tablet (oral) 2 times per day PRN - Nausea . Medication may cause drowsiness. 20210917 tablet 2 times per day oral No set duration recorded No set duration amount recorded active 12.5 mg      risperiDONE (RISPERDAL) 0.5 MG Tab TAKE 1 TABLET (0.5 MG TOTAL) BY MOUTH 2 (TWO) TIMES DAILY. 60 tablet 1    tolterodine (DETROL LA) 4 MG 24 hr capsule TAKE ONE CAPSULE BY MOUTH EVERY DAY 30 capsule 11    topiramate (TOPAMAX) 200 MG Tab Take 1 tablet (200 mg total) by mouth once daily. 90 tablet 1    fluticasone-salmeterol diskus inhaler 250-50 mcg Inhale 1 puff into the lungs 2 (two) times daily. (Patient not taking: Reported on 7/6/2023) 60 each 3    lithium (ESKALITH) 300 MG capsule Take 1 capsule (300 mg total) by mouth 3 (three) times daily with meals. 270 capsule 1    sertraline (ZOLOFT) 100 MG tablet Take 2 tablets (200 mg total) by mouth once daily. 180 tablet 1    [DISCONTINUED] loratadine (CLARITIN) 10 mg tablet Take 1 tablet (10 mg total) by mouth once daily. for 20 days 60 tablet 0     No current facility-administered medications on file prior to visit.       Objective:      BP (!) 151/83 (BP Location: Right arm, Patient Position: Sitting, BP Method: Medium (Automatic))   Pulse 75   Resp 20   LMP  (LMP Unknown)   SpO2 98%     Exam:  GEN:  Well developed, well nourished.  No acute distress.   HEENT:  No trauma.  Mucous membranes moist.  Nares patent bilaterally.  PSYCH: Normal affect. Thought content appropriate.  CHEST:  Breathing  symmetric.  No audible wheezing.  ABD: Soft, non-distended.  SKIN:  Warm, pink, dry.  No rash on exposed areas.    EXT:  No cyanosis, clubbing, or edema.  No color change or changes in nail or hair growth.  NEURO/MUSCULOSKELETAL:  Fully alert, oriented, and appropriate. Speech normal hay. No cranial nerve deficits.   Gait:  Normal.  No focal motor deficits.         Imaging:    External lumbar MRI from 5/12/23 reviewed today.  Multilevel lumbar degenerative disc disease and facet degeneration.  Does have some degree of foraminal narrowing worse on the left.  No significant central stenosis.      Narrative & Impression    EXAMINATION:  XR LUMBAR SPINE AP AND LATERAL     CLINICAL HISTORY:  Back pain or radiculopathy, > 6 wks;Dorsalgia, unspecified     TECHNIQUE:  AP, lateral and spot images were performed of the lumbar spine.     COMPARISON:  None     FINDINGS:  No fracture.  No marrow replacement process.  There is multilevel degenerative disc disease, noting disc height loss and endplate changes.  Mild levocurvature noted.  Multiple surgical clips in the right upper quadrant suggestive of prior cholecystectomy.  SI joints unremarkable.     Impression:     No acute findings.        Electronically signed by: Madhu Lyles MD  Date:                                            07/18/2020  Time:                                           15:30       Assessment:       Encounter Diagnoses   Name Primary?    Lumbar spondylosis Yes    DDD (degenerative disc disease), lumbar        Plan:       Zaira was seen today for low-back pain.    Diagnoses and all orders for this visit:    Lumbar spondylosis  -     Case Request Endoscopy: Bilateral L3, L4, L5 Medial Branch Blocks #1    DDD (degenerative disc disease), lumbar      Zaira FRIDA Dowell is a 52 y.o. female with chronic bilateral; primarily right-sided low back pain.  At this time, seems to be most likely related to lumbar facet mediated pain.  Does have significant facet  degeneration bilaterally in the lower lumbar spine.  Does have some degenerative disc disease as well though no specific nerve root compression that would be consistent with her symptoms.    Pertinent imaging studies reviewed by me. Imaging results were discussed with patient.  Schedule for bilateral L3, L4, L5 medial branch blocks x2.  If diagnostic can proceed with radiofrequency ablations.  Return to clinic after procedure to discuss results.          This note was created by combination of typed  and M-Modal dictation. Transcription and phonetic errors may be present.  If there are any questions, please contact me.

## 2023-07-11 NOTE — PROGRESS NOTES
Ms. Meneses will be scheduled for bilat L3-5 MBB #1 on 7/19/23, checking in at 12:30p.  Reviewed the pre-procedure instructions listed below with the patient- copy also provided.  Instructed patient to notify clinic if she begins feeling ill, runs a fever, is prescribed antibiotics, and/or has any outpatient procedures (colonoscopy, endoscopy, OBGYN, dental work, etc.), and/or any vaccinations or booster shots within the two weeks leading up to this procedure.  Instructed patient to report to [facility], 2nd Floor Endoscopy Registration Desk.  All questions answered- patient verbalized understanding.     Day of Procedure  Ensure you have obtained an arrival time from the Pain Management Staff  You will be contacted the week before your scheduled date to review these instructions and receive your arrival time.  Please check any voicemails received.  If you arrive past your scheduled procedure time, you may be asked to reschedule your procedure.    Ensure you have a  with you.  If you arrive without a responsible adult to stay with you and drive you home, you may be asked to reschedule your procedure.     Take all of your prescribed medications that you routinely take for blood pressure, heart medications, thyroid, cholesterol, etc.  You will be informed if blood thinners should be held.    This is NOT a fasting procedure, you may eat the day of your procedure.    Wear comfortable clothing (loose fitting pants).  You may wear glasses, dentures, contact lenses, and/or hearing aids.     Notify the nurse during the intake process if you are allergic to any medications, if you are diabetic, or if you are not feeling well      Diabetic Patients  Please check your blood sugar prior to coming in for your procedure.  If the value is greater than 200, contact the clinic [phone] as the procedure may need to be rescheduled.  The procedure may not be performed if your blood sugar is above 200 even  after checking into the hospital.      IF you are taking blood thinners, please make sure our staff is aware so that we can obtain clearance and have you hold the medication accordingly.

## 2023-07-11 NOTE — H&P (VIEW-ONLY)
Subjective:     Patient ID: Zaira Dowell is a 52 y.o. female    Chief Complaint: Low-back Pain (Midline Bilateral achy pain)      Referred by: No ref. provider found      HPI:    Interval History (7/11/23):  She returns today for follow up and MRI review.  She reports that her back pain is unchanged in quality location since last encounter.  Denies any new or worsening symptoms.  States that pain is still primarily on the right side.  Does have some left-sided pain but this is minimal.  Pain will radiate to the paraspinal regions primarily on the right side, but denies any pain radiating into the lower extremities.  She denies any associated numbness, tingling, weakness, bowel bladder dysfunction.  e      Initial Encounter (4/17/23):  Zaira Dowell is a 52 y.o. female who presents today with chronic right-sided low back pain.  This pain has been present for at least 1 year.  No specific inciting events or injuries noted.  Pain is located the midline lower lumbar spine and will radiate to the right lumbar paraspinal region.  Pain does not radiate distally to this.  She denies any associated numbness, tingling, weakness, bowel bladder dysfunction.  Pain is constant and worsened with activity.  Of note, patient does have a diagnosis of fibromyalgia.  Does have fairly widespread soft tissue pain though her low back and right-sided paraspinal pain seems to be distinctly different than her typical fibromyalgia pain.  She reports previous physical therapy that was not effective.  Does perform regular home exercises.  This pain is described in detail below.    Physical Therapy:  Yes.  Performs regular home exercise program.    Non-pharmacologic Treatment:  Rest helps         TENS?  No    Pain Medications:         Currently taking:  Flexeril, Lyrica    Has tried in the past:  NSAIDs, Tylenol    Has not tried:  Opioids, TCAs, SNRIs, topical creams    Blood thinners:  None    Interventional Therapies:   None    Relevant Surgeries:  None    Affecting sleep?  Yes    Affecting daily activities? yes    Depressive symptoms? no          SI/HI? No    Work status: Disabled    Pain Scores:    Best:       5/10  Worst:     10/10  Usually:   6/10  Today:    5/10    Pain Disability Index  Family/Home Responsibilities:: 7  Recreation:: 8  Social Activity:: 8  Occupation:: 0 (Disable)  Sexual Behavior:: 7  Self Care:: 2  Life-Support Activities:: 8  Pain Disability Index (PDI): 40    Review of Systems   Constitutional:  Negative for activity change, appetite change, chills, fatigue, fever and unexpected weight change.   HENT:  Negative for hearing loss.    Eyes:  Negative for visual disturbance.   Respiratory:  Negative for chest tightness and shortness of breath.    Cardiovascular:  Negative for chest pain.   Gastrointestinal:  Negative for abdominal pain, constipation, diarrhea, nausea and vomiting.   Genitourinary:  Negative for difficulty urinating.   Musculoskeletal:  Positive for back pain, gait problem and myalgias. Negative for neck pain.   Skin:  Negative for rash.   Neurological:  Negative for dizziness, weakness, light-headedness, numbness and headaches.   Psychiatric/Behavioral:  Positive for sleep disturbance. Negative for hallucinations and suicidal ideas. The patient is not nervous/anxious.      Past Medical History:   Diagnosis Date    Anxiety     Arthritis     Asthma     Back pain     Bipolar I     BMI 50.0-59.9, adult     Chronic obstructive pulmonary disease, unspecified COPD type 03/21/2022    Depression     Fibromyalgia     GERD (gastroesophageal reflux disease)     HFpEF     Insomnia 11/23/2021    Obesity     SHARON (obstructive sleep apnea)     Tobacco dependence     Urinary incontinence        Past Surgical History:   Procedure Laterality Date    CARPAL TUNNEL RELEASE      CHOLECYSTECTOMY      COLONOSCOPY N/A 4/25/2022    Procedure: COLONOSCOPY;  Surgeon: Ernesto Auguste MD;  Location: [facility];  Service:  Endoscopy;  Laterality: N/A;  fully vaccinated-GT    ESOPHAGOGASTRODUODENOSCOPY N/A 5/21/2019    Procedure: EGD (ESOPHAGOGASTRODUODENOSCOPY);  Surgeon: Michelle Mc MD;  Location: [facility];  Service: Endoscopy;  Laterality: N/A;    HYSTERECTOMY      partial     KNEE SURGERY      LAPAROSCOPIC SLEEVE GASTRECTOMY N/A 10/18/2019    Procedure: GASTRECTOMY, SLEEVE, LAPAROSCOPIC, with intraop EGD;  Surgeon: Ean Kohli MD;  Location: [address];  Service: General;  Laterality: N/A;    pinched nerve      SHOULDER SURGERY      TONSILLECTOMY      WRIST SURGERY      had ganlin cyst       Social History     Socioeconomic History    Marital status:    Tobacco Use    Smoking status: Some Days     Types: Vaping with nicotine    Smokeless tobacco: Never    Tobacco comments:     vaping started April 2020; QUIT cigarettes Sept 2019   Substance and Sexual Activity    Alcohol use: No    Drug use: No    Sexual activity: Yes     Partners: Male     Social Determinants of Health     Financial Resource Strain: Low Risk     Difficulty of Paying Living Expenses: Not hard at all   Food Insecurity: No Food Insecurity    Worried About Running Out of Food in the Last Year: Never true    Ran Out of Food in the Last Year: Never true   Transportation Needs: No Transportation Needs    Lack of Transportation (Medical): No    Lack of Transportation (Non-Medical): No   Physical Activity: Insufficiently Active    Days of Exercise per Week: 2 days    Minutes of Exercise per Session: 30 min   Stress: Stress Concern Present    Feeling of Stress : Very much   Social Connections: Unknown    Frequency of Communication with Friends and Family: More than three times a week    Frequency of Social Gatherings with Friends and Family: Once a week    Active Member of Clubs or Organizations: Yes    Attends Club or Organization Meetings: More than 4 times per year    Marital Status:    Housing Stability: Low Risk     Unable to Pay for  Housing in the Last Year: No    Number of Places Lived in the Last Year: 1    Unstable Housing in the Last Year: No       Review of patient's allergies indicates:   Allergen Reactions    Hydrocodone-acetaminophen Nausea And Vomiting       Current Outpatient Medications on File Prior to Visit   Medication Sig Dispense Refill    albuterol (PROVENTIL/VENTOLIN HFA) 90 mcg/actuation inhaler Inhale 2 puffs into the lungs every 4 (four) hours as needed for Wheezing or Shortness of Breath. Rescue 18 g 2    allopurinoL (ZYLOPRIM) 100 MG tablet Take 1 tablet (100 mg total) by mouth once daily. 60 tablet 1    b complex vitamins tablet Take 1 tablet by mouth once daily.      buPROPion (WELLBUTRIN SR) 100 MG TBSR 12 hr tablet Take 2 tabs q am, and 1 tab daily at noon 270 tablet 1    calcium citrate (CALCITRATE) 200 mg (950 mg) tablet Take 1 tablet by mouth 2 (two) times daily.      cetirizine (ZYRTEC) 10 MG tablet Take 1 tablet (10 mg total) by mouth once daily. 30 tablet 0    colchicine (COLCRYS) 0.6 mg tablet Take 1 tablet (0.6 mg total) by mouth daily as needed (gout flare). 30 tablet 0    cyanocobalamin 500 MCG tablet Take 500 mcg by mouth once daily.      cyclobenzaprine (FLEXERIL) 5 MG tablet Take 1 tablet (5 mg total) by mouth 3 (three) times daily as needed for Muscle spasms. 60 tablet 3    diclofenac sodium (VOLTAREN) 1 % Gel Apply 2 g topically 3 (three) times daily as needed (shoulder pain). 100 g 0    ferrous fumarate/vit Bcomp,C (SUPER B COMPLEX ORAL) Take 1 capsule by mouth once daily.      furosemide (LASIX) 40 MG tablet Take 40 mg by mouth.      ipratropium (ATROVENT) 42 mcg (0.06 %) nasal spray by Each Nostril route.      LORazepam (ATIVAN) 0.5 MG tablet TAKE 2 TABLETS BY MOUTH ONCE DAILY AS DIRECTED 60 tablet 0    meclizine (ANTIVERT) 25 mg tablet Take 1 tablet (25 mg total) by mouth every 6 (six) hours as needed for Dizziness. 20 tablet 0    multivitamin capsule Take 1 capsule by mouth 2 (two) times daily.        omeprazole (PRILOSEC) 40 MG capsule TAKE 1 CAPSULE TWICE DAILY BEFORE MEALS 180 capsule 3    ondansetron (ZOFRAN-ODT) 8 MG TbDL Take 1 tablet (8 mg total) by mouth 2 (two) times daily as needed (nausea). 30 tablet 1    potassium chloride (KLOR-CON) 10 MEQ TbSR Take 10 mEq by mouth 2 (two) times daily.      pregabalin (LYRICA) 100 MG capsule Take 1 capsule (100 mg total) by mouth 2 (two) times daily. 60 capsule 5    promethazine (PHENERGAN) 12.5 MG Tab promethazine Take half tablet (oral) 2 times per day PRN - Nausea . Medication may cause drowsiness. 20210917 tablet 2 times per day oral No set duration recorded No set duration amount recorded active 12.5 mg      risperiDONE (RISPERDAL) 0.5 MG Tab TAKE 1 TABLET (0.5 MG TOTAL) BY MOUTH 2 (TWO) TIMES DAILY. 60 tablet 1    tolterodine (DETROL LA) 4 MG 24 hr capsule TAKE ONE CAPSULE BY MOUTH EVERY DAY 30 capsule 11    topiramate (TOPAMAX) 200 MG Tab Take 1 tablet (200 mg total) by mouth once daily. 90 tablet 1    fluticasone-salmeterol diskus inhaler 250-50 mcg Inhale 1 puff into the lungs 2 (two) times daily. (Patient not taking: Reported on 7/6/2023) 60 each 3    lithium (ESKALITH) 300 MG capsule Take 1 capsule (300 mg total) by mouth 3 (three) times daily with meals. 270 capsule 1    sertraline (ZOLOFT) 100 MG tablet Take 2 tablets (200 mg total) by mouth once daily. 180 tablet 1    [DISCONTINUED] loratadine (CLARITIN) 10 mg tablet Take 1 tablet (10 mg total) by mouth once daily. for 20 days 60 tablet 0     No current facility-administered medications on file prior to visit.       Objective:      BP (!) 151/83 (BP Location: Right arm, Patient Position: Sitting, BP Method: Medium (Automatic))   Pulse 75   Resp 20   LMP  (LMP Unknown)   SpO2 98%     Exam:  GEN:  Well developed, well nourished.  No acute distress.   HEENT:  No trauma.  Mucous membranes moist.  Nares patent bilaterally.  PSYCH: Normal affect. Thought content appropriate.  CHEST:  Breathing  symmetric.  No audible wheezing.  ABD: Soft, non-distended.  SKIN:  Warm, pink, dry.  No rash on exposed areas.    EXT:  No cyanosis, clubbing, or edema.  No color change or changes in nail or hair growth.  NEURO/MUSCULOSKELETAL:  Fully alert, oriented, and appropriate. Speech normal hay. No cranial nerve deficits.   Gait:  Normal.  No focal motor deficits.         Imaging:    External lumbar MRI from 5/12/23 reviewed today.  Multilevel lumbar degenerative disc disease and facet degeneration.  Does have some degree of foraminal narrowing worse on the left.  No significant central stenosis.      Narrative & Impression    EXAMINATION:  XR LUMBAR SPINE AP AND LATERAL     CLINICAL HISTORY:  Back pain or radiculopathy, > 6 wks;Dorsalgia, unspecified     TECHNIQUE:  AP, lateral and spot images were performed of the lumbar spine.     COMPARISON:  None     FINDINGS:  No fracture.  No marrow replacement process.  There is multilevel degenerative disc disease, noting disc height loss and endplate changes.  Mild levocurvature noted.  Multiple surgical clips in the right upper quadrant suggestive of prior cholecystectomy.  SI joints unremarkable.     Impression:     No acute findings.        Electronically signed by: Madhu Lyles MD  Date:                                            07/18/2020  Time:                                           15:30       Assessment:       Encounter Diagnoses   Name Primary?    Lumbar spondylosis Yes    DDD (degenerative disc disease), lumbar        Plan:       Zaira was seen today for low-back pain.    Diagnoses and all orders for this visit:    Lumbar spondylosis  -     Case Request Endoscopy: Bilateral L3, L4, L5 Medial Branch Blocks #1    DDD (degenerative disc disease), lumbar      Zaira FRIDA Dowell is a 52 y.o. female with chronic bilateral; primarily right-sided low back pain.  At this time, seems to be most likely related to lumbar facet mediated pain.  Does have significant facet  degeneration bilaterally in the lower lumbar spine.  Does have some degenerative disc disease as well though no specific nerve root compression that would be consistent with her symptoms.    Pertinent imaging studies reviewed by me. Imaging results were discussed with patient.  Schedule for bilateral L3, L4, L5 medial branch blocks x2.  If diagnostic can proceed with radiofrequency ablations.  Return to clinic after procedure to discuss results.          This note was created by combination of typed  and M-Modal dictation. Transcription and phonetic errors may be present.  If there are any questions, please contact me.

## 2023-07-11 NOTE — PROGRESS NOTES
Subjective:     Patient ID: Zaira Dowell is a 52 y.o. female    Chief Complaint: Low-back Pain (Midline Bilateral achy pain)      Referred by: No ref. provider found      HPI:    Interval History (7/11/23):  She returns today for follow up and MRI review.  She reports that her back pain is unchanged in quality location since last encounter.  Denies any new or worsening symptoms.  States that pain is still primarily on the right side.  Does have some left-sided pain but this is minimal.  Pain will radiate to the paraspinal regions primarily on the right side, but denies any pain radiating into the lower extremities.  She denies any associated numbness, tingling, weakness, bowel bladder dysfunction.  e      Initial Encounter (4/17/23):  Zaira Dowell is a 52 y.o. female who presents today with chronic right-sided low back pain.  This pain has been present for at least 1 year.  No specific inciting events or injuries noted.  Pain is located the midline lower lumbar spine and will radiate to the right lumbar paraspinal region.  Pain does not radiate distally to this.  She denies any associated numbness, tingling, weakness, bowel bladder dysfunction.  Pain is constant and worsened with activity.  Of note, patient does have a diagnosis of fibromyalgia.  Does have fairly widespread soft tissue pain though her low back and right-sided paraspinal pain seems to be distinctly different than her typical fibromyalgia pain.  She reports previous physical therapy that was not effective.  Does perform regular home exercises.  This pain is described in detail below.    Physical Therapy:  Yes.  Performs regular home exercise program.    Non-pharmacologic Treatment:  Rest helps         TENS?  No    Pain Medications:         Currently taking:  Flexeril, Lyrica    Has tried in the past:  NSAIDs, Tylenol    Has not tried:  Opioids, TCAs, SNRIs, topical creams    Blood thinners:  None    Interventional Therapies:   None    Relevant Surgeries:  None    Affecting sleep?  Yes    Affecting daily activities? yes    Depressive symptoms? no          SI/HI? No    Work status: Disabled    Pain Scores:    Best:       5/10  Worst:     10/10  Usually:   6/10  Today:    5/10    Pain Disability Index  Family/Home Responsibilities:: 7  Recreation:: 8  Social Activity:: 8  Occupation:: 0 (Disable)  Sexual Behavior:: 7  Self Care:: 2  Life-Support Activities:: 8  Pain Disability Index (PDI): 40    Review of Systems   Constitutional:  Negative for activity change, appetite change, chills, fatigue, fever and unexpected weight change.   HENT:  Negative for hearing loss.    Eyes:  Negative for visual disturbance.   Respiratory:  Negative for chest tightness and shortness of breath.    Cardiovascular:  Negative for chest pain.   Gastrointestinal:  Negative for abdominal pain, constipation, diarrhea, nausea and vomiting.   Genitourinary:  Negative for difficulty urinating.   Musculoskeletal:  Positive for back pain, gait problem and myalgias. Negative for neck pain.   Skin:  Negative for rash.   Neurological:  Negative for dizziness, weakness, light-headedness, numbness and headaches.   Psychiatric/Behavioral:  Positive for sleep disturbance. Negative for hallucinations and suicidal ideas. The patient is not nervous/anxious.      Past Medical History:   Diagnosis Date    Anxiety     Arthritis     Asthma     Back pain     Bipolar I     BMI 50.0-59.9, adult     Chronic obstructive pulmonary disease, unspecified COPD type 03/21/2022    Depression     Fibromyalgia     GERD (gastroesophageal reflux disease)     HFpEF     Insomnia 11/23/2021    Obesity     SHARON (obstructive sleep apnea)     Tobacco dependence     Urinary incontinence        Past Surgical History:   Procedure Laterality Date    CARPAL TUNNEL RELEASE      CHOLECYSTECTOMY      COLONOSCOPY N/A 4/25/2022    Procedure: COLONOSCOPY;  Surgeon: Ernesto Auugste MD;  Location: [facility];  Service:  Endoscopy;  Laterality: N/A;  fully vaccinated-GT    ESOPHAGOGASTRODUODENOSCOPY N/A 5/21/2019    Procedure: EGD (ESOPHAGOGASTRODUODENOSCOPY);  Surgeon: Michelle Mc MD;  Location: [facility];  Service: Endoscopy;  Laterality: N/A;    HYSTERECTOMY      partial     KNEE SURGERY      LAPAROSCOPIC SLEEVE GASTRECTOMY N/A 10/18/2019    Procedure: GASTRECTOMY, SLEEVE, LAPAROSCOPIC, with intraop EGD;  Surgeon: Ean Kohli MD;  Location: [address];  Service: General;  Laterality: N/A;    pinched nerve      SHOULDER SURGERY      TONSILLECTOMY      WRIST SURGERY      had ganlin cyst       Social History     Socioeconomic History    Marital status:    Tobacco Use    Smoking status: Some Days     Types: Vaping with nicotine    Smokeless tobacco: Never    Tobacco comments:     vaping started April 2020; QUIT cigarettes Sept 2019   Substance and Sexual Activity    Alcohol use: No    Drug use: No    Sexual activity: Yes     Partners: Male     Social Determinants of Health     Financial Resource Strain: Low Risk     Difficulty of Paying Living Expenses: Not hard at all   Food Insecurity: No Food Insecurity    Worried About Running Out of Food in the Last Year: Never true    Ran Out of Food in the Last Year: Never true   Transportation Needs: No Transportation Needs    Lack of Transportation (Medical): No    Lack of Transportation (Non-Medical): No   Physical Activity: Insufficiently Active    Days of Exercise per Week: 2 days    Minutes of Exercise per Session: 30 min   Stress: Stress Concern Present    Feeling of Stress : Very much   Social Connections: Unknown    Frequency of Communication with Friends and Family: More than three times a week    Frequency of Social Gatherings with Friends and Family: Once a week    Active Member of Clubs or Organizations: Yes    Attends Club or Organization Meetings: More than 4 times per year    Marital Status:    Housing Stability: Low Risk     Unable to Pay for  Housing in the Last Year: No    Number of Places Lived in the Last Year: 1    Unstable Housing in the Last Year: No       Review of patient's allergies indicates:   Allergen Reactions    Hydrocodone-acetaminophen Nausea And Vomiting       Current Outpatient Medications on File Prior to Visit   Medication Sig Dispense Refill    albuterol (PROVENTIL/VENTOLIN HFA) 90 mcg/actuation inhaler Inhale 2 puffs into the lungs every 4 (four) hours as needed for Wheezing or Shortness of Breath. Rescue 18 g 2    allopurinoL (ZYLOPRIM) 100 MG tablet Take 1 tablet (100 mg total) by mouth once daily. 60 tablet 1    b complex vitamins tablet Take 1 tablet by mouth once daily.      buPROPion (WELLBUTRIN SR) 100 MG TBSR 12 hr tablet Take 2 tabs q am, and 1 tab daily at noon 270 tablet 1    calcium citrate (CALCITRATE) 200 mg (950 mg) tablet Take 1 tablet by mouth 2 (two) times daily.      cetirizine (ZYRTEC) 10 MG tablet Take 1 tablet (10 mg total) by mouth once daily. 30 tablet 0    colchicine (COLCRYS) 0.6 mg tablet Take 1 tablet (0.6 mg total) by mouth daily as needed (gout flare). 30 tablet 0    cyanocobalamin 500 MCG tablet Take 500 mcg by mouth once daily.      cyclobenzaprine (FLEXERIL) 5 MG tablet Take 1 tablet (5 mg total) by mouth 3 (three) times daily as needed for Muscle spasms. 60 tablet 3    diclofenac sodium (VOLTAREN) 1 % Gel Apply 2 g topically 3 (three) times daily as needed (shoulder pain). 100 g 0    ferrous fumarate/vit Bcomp,C (SUPER B COMPLEX ORAL) Take 1 capsule by mouth once daily.      furosemide (LASIX) 40 MG tablet Take 40 mg by mouth.      ipratropium (ATROVENT) 42 mcg (0.06 %) nasal spray by Each Nostril route.      LORazepam (ATIVAN) 0.5 MG tablet TAKE 2 TABLETS BY MOUTH ONCE DAILY AS DIRECTED 60 tablet 0    meclizine (ANTIVERT) 25 mg tablet Take 1 tablet (25 mg total) by mouth every 6 (six) hours as needed for Dizziness. 20 tablet 0    multivitamin capsule Take 1 capsule by mouth 2 (two) times daily.        omeprazole (PRILOSEC) 40 MG capsule TAKE 1 CAPSULE TWICE DAILY BEFORE MEALS 180 capsule 3    ondansetron (ZOFRAN-ODT) 8 MG TbDL Take 1 tablet (8 mg total) by mouth 2 (two) times daily as needed (nausea). 30 tablet 1    potassium chloride (KLOR-CON) 10 MEQ TbSR Take 10 mEq by mouth 2 (two) times daily.      pregabalin (LYRICA) 100 MG capsule Take 1 capsule (100 mg total) by mouth 2 (two) times daily. 60 capsule 5    promethazine (PHENERGAN) 12.5 MG Tab promethazine Take half tablet (oral) 2 times per day PRN - Nausea . Medication may cause drowsiness. 20210917 tablet 2 times per day oral No set duration recorded No set duration amount recorded active 12.5 mg      risperiDONE (RISPERDAL) 0.5 MG Tab TAKE 1 TABLET (0.5 MG TOTAL) BY MOUTH 2 (TWO) TIMES DAILY. 60 tablet 1    tolterodine (DETROL LA) 4 MG 24 hr capsule TAKE ONE CAPSULE BY MOUTH EVERY DAY 30 capsule 11    topiramate (TOPAMAX) 200 MG Tab Take 1 tablet (200 mg total) by mouth once daily. 90 tablet 1    fluticasone-salmeterol diskus inhaler 250-50 mcg Inhale 1 puff into the lungs 2 (two) times daily. (Patient not taking: Reported on 7/6/2023) 60 each 3    lithium (ESKALITH) 300 MG capsule Take 1 capsule (300 mg total) by mouth 3 (three) times daily with meals. 270 capsule 1    sertraline (ZOLOFT) 100 MG tablet Take 2 tablets (200 mg total) by mouth once daily. 180 tablet 1    [DISCONTINUED] loratadine (CLARITIN) 10 mg tablet Take 1 tablet (10 mg total) by mouth once daily. for 20 days 60 tablet 0     No current facility-administered medications on file prior to visit.       Objective:      BP (!) 151/83 (BP Location: Right arm, Patient Position: Sitting, BP Method: Medium (Automatic))   Pulse 75   Resp 20   LMP  (LMP Unknown)   SpO2 98%     Exam:  GEN:  Well developed, well nourished.  No acute distress.   HEENT:  No trauma.  Mucous membranes moist.  Nares patent bilaterally.  PSYCH: Normal affect. Thought content appropriate.  CHEST:  Breathing  symmetric.  No audible wheezing.  ABD: Soft, non-distended.  SKIN:  Warm, pink, dry.  No rash on exposed areas.    EXT:  No cyanosis, clubbing, or edema.  No color change or changes in nail or hair growth.  NEURO/MUSCULOSKELETAL:  Fully alert, oriented, and appropriate. Speech normal hay. No cranial nerve deficits.   Gait:  Normal.  No focal motor deficits.         Imaging:    External lumbar MRI from 5/12/23 reviewed today.  Multilevel lumbar degenerative disc disease and facet degeneration.  Does have some degree of foraminal narrowing worse on the left.  No significant central stenosis.      Narrative & Impression    EXAMINATION:  XR LUMBAR SPINE AP AND LATERAL     CLINICAL HISTORY:  Back pain or radiculopathy, > 6 wks;Dorsalgia, unspecified     TECHNIQUE:  AP, lateral and spot images were performed of the lumbar spine.     COMPARISON:  None     FINDINGS:  No fracture.  No marrow replacement process.  There is multilevel degenerative disc disease, noting disc height loss and endplate changes.  Mild levocurvature noted.  Multiple surgical clips in the right upper quadrant suggestive of prior cholecystectomy.  SI joints unremarkable.     Impression:     No acute findings.        Electronically signed by: Madhu Lyles MD  Date:                                            07/18/2020  Time:                                           15:30       Assessment:       Encounter Diagnoses   Name Primary?    Lumbar spondylosis Yes    DDD (degenerative disc disease), lumbar        Plan:       Zaira was seen today for low-back pain.    Diagnoses and all orders for this visit:    Lumbar spondylosis  -     Case Request Endoscopy: Bilateral L3, L4, L5 Medial Branch Blocks #1    DDD (degenerative disc disease), lumbar      Zaira FRIDA Dowell is a 52 y.o. female with chronic bilateral; primarily right-sided low back pain.  At this time, seems to be most likely related to lumbar facet mediated pain.  Does have significant facet  degeneration bilaterally in the lower lumbar spine.  Does have some degenerative disc disease as well though no specific nerve root compression that would be consistent with her symptoms.    Pertinent imaging studies reviewed by me. Imaging results were discussed with patient.  Schedule for bilateral L3, L4, L5 medial branch blocks x2.  If diagnostic can proceed with radiofrequency ablations.  Return to clinic after procedure to discuss results.          This note was created by combination of typed  and M-Modal dictation. Transcription and phonetic errors may be present.  If there are any questions, please contact me.

## 2023-07-19 ENCOUNTER — HOSPITAL ENCOUNTER (OUTPATIENT)
Facility: HOSPITAL | Age: 53
Discharge: HOME | End: 2023-07-19
Attending: PAIN MEDICINE | Admitting: PAIN MEDICINE
Payer: MEDICARE

## 2023-07-19 VITALS
DIASTOLIC BLOOD PRESSURE: 73 MMHG | HEART RATE: 69 BPM | SYSTOLIC BLOOD PRESSURE: 129 MMHG | OXYGEN SATURATION: 99 % | RESPIRATION RATE: 18 BRPM

## 2023-07-19 DIAGNOSIS — M47.816 LUMBAR SPONDYLOSIS: Primary | ICD-10-CM

## 2023-07-19 PROCEDURE — 64493 INJ PARAVERT F JNT L/S 1 LEV: CPT | Mod: 50 | Performed by: PAIN MEDICINE

## 2023-07-19 PROCEDURE — 25000003 PHARM REV CODE 250: Performed by: PAIN MEDICINE

## 2023-07-19 PROCEDURE — 64493 INJ PARAVERT F JNT L/S 1 LEV: CPT | Mod: 50,,, | Performed by: PAIN MEDICINE

## 2023-07-19 PROCEDURE — 64494 INJ PARAVERT F JNT L/S 2 LEV: CPT | Mod: 50,,, | Performed by: PAIN MEDICINE

## 2023-07-19 PROCEDURE — 64493 PR INJ DX/THER AGNT PARAVERT FACET JOINT,IMG GUIDE,LUMBAR/SAC,1ST LVL: ICD-10-PCS | Mod: 50,,, | Performed by: PAIN MEDICINE

## 2023-07-19 PROCEDURE — 64494 PR INJ DX/THER AGNT PARAVERT FACET JOINT,IMG GUIDE,LUMBAR/SAC, 2ND LEVEL: ICD-10-PCS | Mod: 50,,, | Performed by: PAIN MEDICINE

## 2023-07-19 PROCEDURE — 64494 INJ PARAVERT F JNT L/S 2 LEV: CPT | Mod: 50 | Performed by: PAIN MEDICINE

## 2023-07-19 PROCEDURE — 63600175 PHARM REV CODE 636 W HCPCS: Performed by: PAIN MEDICINE

## 2023-07-19 RX ORDER — LIDOCAINE HYDROCHLORIDE 10 MG/ML
10 INJECTION INFILTRATION; PERINEURAL ONCE
Status: COMPLETED | OUTPATIENT
Start: 2023-07-19 | End: 2023-07-19

## 2023-07-19 RX ORDER — LIDOCAINE HYDROCHLORIDE 10 MG/ML
INJECTION INFILTRATION; PERINEURAL
Status: DISCONTINUED
Start: 2023-07-19 | End: 2023-07-19 | Stop reason: HOSPADM

## 2023-07-19 RX ORDER — LIDOCAINE HYDROCHLORIDE 20 MG/ML
10 INJECTION, SOLUTION INFILTRATION; PERINEURAL ONCE
Status: DISCONTINUED | OUTPATIENT
Start: 2023-07-19 | End: 2023-07-19 | Stop reason: ALTCHOICE

## 2023-07-19 RX ORDER — BUPIVACAINE HYDROCHLORIDE 2.5 MG/ML
INJECTION, SOLUTION EPIDURAL; INFILTRATION; INTRACAUDAL
Status: DISCONTINUED
Start: 2023-07-19 | End: 2023-07-19 | Stop reason: HOSPADM

## 2023-07-19 RX ORDER — BUPIVACAINE HYDROCHLORIDE 2.5 MG/ML
10 INJECTION, SOLUTION EPIDURAL; INFILTRATION; INTRACAUDAL ONCE
Status: COMPLETED | OUTPATIENT
Start: 2023-07-19 | End: 2023-07-19

## 2023-07-19 NOTE — OP NOTE
LUMBAR Medial Branch Block Under Fluoroscopy  Time-out taken to identify patient and procedure side prior to starting the procedure.   I attest that I have reviewed the patient's home medications prior to the procedure and no contraindication have been identified. I  re-evaluated the patient after the patient was positioned for the procedure in the procedure room immediately before the procedural time-out. The vital signs are current and represent the current state of the patient which has not significantly changed since the preprocedure assessment.            Date of Service: 07/19/2023    PCP: Shay Dai MD    Referring Physician:                                                           PROCEDURE:  Bilateral  L3, L4 and L5 medial branch block    REASON FOR PROCEDURE: Lumbar spondylosis [M47.816]    PHYSICIAN: Lito Jasso MD  ASSISTANTS: None    MEDICATIONS INJECTED: Bupivicaine 0.25% 1.5ml at each level      LOCAL ANESTHETIC USED: Xylocaine 1% 3ml each site.    SEDATION MEDICATIONS: None    ESTIMATED BLOOD LOSS:  None.    COMPLICATIONS:  None.    TECHNIQUE: Laying in a prone position, the patient was prepped and draped in the usual sterile fashion using ChloraPrep and fenestrated drape.  The level was determined under fluoroscopic guidance.  Local anesthetic was given by going down to the hub of the 27-gauge 1.25in needle and raising a wheal.  A 25-gauge 4.75 inch needle was introduced to the anatomic site of the medial branch at the lateral mass utilizing live fluoroscopy. Medication was then injected slowly at each level as stated above. The patient tolerated the procedure well.       The patient was monitored after the procedure.  Patient was given post procedure and discharge instructions to follow at home.  We will see the patient back in two weeks or the patient may call to inform of status. The patient was discharged in a stable condition

## 2023-07-19 NOTE — DISCHARGE SUMMARY
[facility] - Endoscopy  Discharge Note  Short Stay    Procedure(s) (LRB):  Bilateral L3, L4, L5 Medial Branch Blocks #1 (Bilateral)      OUTCOME: Patient tolerated treatment/procedure well without complication and is now ready for discharge.    DISPOSITION: Home or Self Care    FINAL DIAGNOSIS:  <principal problem not specified>    FOLLOWUP: In clinic    DISCHARGE INSTRUCTIONS:  No discharge procedures on file.       Discharge Diagnosis:Lumbar spondylosis [M47.816]  Condition on Discharge: Stable.  Diet on Discharge: Same as before.  Activity: as per instruction sheet.  Discharge to: Home with a responsible adult.  Follow up: as per Discharge instructions

## 2023-07-19 NOTE — INTERVAL H&P NOTE
The patient has been examined and the H&P has been reviewed:    I concur with the findings and no changes have occurred since H&P was written.    Procedure risks, benefits and alternative options discussed and understood by patient/family.          There are no hospital problems to display for this patient.     Anesthesia Volume In Cc: 6

## 2023-07-19 NOTE — DISCHARGE INSTRUCTIONS

## 2023-07-20 ENCOUNTER — PATIENT MESSAGE (OUTPATIENT)
Dept: PSYCHIATRY | Facility: CLINIC | Age: 53
End: 2023-07-20
Payer: MEDICARE

## 2023-07-20 ENCOUNTER — TELEPHONE (OUTPATIENT)
Dept: PAIN MEDICINE | Facility: CLINIC | Age: 53
End: 2023-07-20
Payer: MEDICARE

## 2023-07-20 DIAGNOSIS — M47.816 LUMBAR SPONDYLOSIS: Primary | ICD-10-CM

## 2023-07-20 RX ORDER — SERTRALINE HYDROCHLORIDE 100 MG/1
TABLET, FILM COATED ORAL
Qty: 180 TABLET | Refills: 0 | Status: SHIPPED | OUTPATIENT
Start: 2023-07-20 | End: 2023-07-24 | Stop reason: SDUPTHER

## 2023-07-20 RX ORDER — LORAZEPAM 0.5 MG/1
TABLET ORAL
Qty: 60 TABLET | Refills: 0 | Status: SHIPPED | OUTPATIENT
Start: 2023-08-02 | End: 2023-07-24 | Stop reason: SDUPTHER

## 2023-07-20 NOTE — TELEPHONE ENCOUNTER
Mrs. Meneses reports ~90% reduction in pain and improvement in ADLs following the bilat L3-5 MBB #1 performed yesterday.  Her pain has since returned to baseline.  She would like to proceed with the 2nd MBB on 8/2/23- provider updated.

## 2023-07-24 ENCOUNTER — OFFICE VISIT (OUTPATIENT)
Dept: PSYCHIATRY | Facility: CLINIC | Age: 53
End: 2023-07-24
Payer: MEDICARE

## 2023-07-24 DIAGNOSIS — F31.9 BIPOLAR 1 DISORDER: Primary | ICD-10-CM

## 2023-07-24 DIAGNOSIS — F41.9 ANXIETY DISORDER, UNSPECIFIED TYPE: ICD-10-CM

## 2023-07-24 DIAGNOSIS — G47.00 INSOMNIA, UNSPECIFIED TYPE: ICD-10-CM

## 2023-07-24 PROCEDURE — 99214 OFFICE O/P EST MOD 30 MIN: CPT | Mod: HCNC,95,, | Performed by: NURSE PRACTITIONER

## 2023-07-24 PROCEDURE — 99214 PR OFFICE/OUTPT VISIT, EST, LEVL IV, 30-39 MIN: ICD-10-PCS | Mod: HCNC,95,, | Performed by: NURSE PRACTITIONER

## 2023-07-24 RX ORDER — BUPROPION HYDROCHLORIDE 100 MG/1
TABLET, EXTENDED RELEASE ORAL
Qty: 270 TABLET | Refills: 1 | Status: SHIPPED | OUTPATIENT
Start: 2023-07-24 | End: 2024-01-18 | Stop reason: SDUPTHER

## 2023-07-24 RX ORDER — RISPERIDONE 0.5 MG/1
0.5 TABLET ORAL 2 TIMES DAILY
Qty: 60 TABLET | Refills: 2 | Status: SHIPPED | OUTPATIENT
Start: 2023-07-24 | End: 2023-10-09

## 2023-07-24 RX ORDER — SERTRALINE HYDROCHLORIDE 100 MG/1
200 TABLET, FILM COATED ORAL DAILY
Qty: 180 TABLET | Refills: 1 | Status: SHIPPED | OUTPATIENT
Start: 2023-07-24 | End: 2024-01-18 | Stop reason: SDUPTHER

## 2023-07-24 RX ORDER — LORAZEPAM 0.5 MG/1
TABLET ORAL
Qty: 60 TABLET | Refills: 2 | Status: SHIPPED | OUTPATIENT
Start: 2023-08-02 | End: 2024-01-18 | Stop reason: SDUPTHER

## 2023-07-24 RX ORDER — LITHIUM CARBONATE 300 MG/1
300 CAPSULE ORAL
Qty: 270 CAPSULE | Refills: 1 | Status: SHIPPED | OUTPATIENT
Start: 2023-07-24 | End: 2024-01-18 | Stop reason: SDUPTHER

## 2023-07-24 NOTE — PROGRESS NOTES
"  Outpatient Psychiatry Follow-Up Visit (MD/NP)    7/24/2023   Clinical Status of Patient:  Outpatient (Ambulatory)    The patient location is: [place], [place]    Visit type: audio only due to patient's problem in logging on to video visit from her phone    Face to Face time with patient: 20 minutes  30 minutes of total time spent on the encounter, which includes face to face time and non-face to face time preparing to see the patient (eg, review of tests), Obtaining and/or reviewing separately obtained history, Documenting clinical information in the electronic or other health record, Independently interpreting results (not separately reported) and communicating results to the patient/family/caregiver, or Care coordination (not separately reported).         Each patient to whom he or she provides medical services by telemedicine is:  (1) informed of the relationship between the physician and patient and the respective role of any other health care provider with respect to management of the patient; and (2) notified that he or she may decline to receive medical services by telemedicine and may withdraw from such care at any time.    Notes:       Chief Complaint:  Zaira Dowell is a 52 y.o. female who presents today for follow-up of mood disorder.  Met with patient and no relatives present for interview .      Interval History and Content of Current Session:  Interim Events/Subjective Report/Content of Current Session: She states her mood is "okay". She states her father is still having mini strokes, and goes to visit him in [place]. She states she still has family problems, not speaking with her brother for the past 2 yeas, and is not as close as she used to be to her parents. Patient stated she still enjoys going to see her grandchildren's baseball games in [place] but there are some problems with her son and his wife. Patient is in good self control. Patient is able to stay positive however in the face of " the family stresses. Patent has no thoughts of harming herself and no signs of lida, hypomania, or psychosis. Provided patient with supportive therapy. She is not in therapy and we discussed starting therapy.     We discussed the importance to continual monitoring her labs and lithium level. I reviewed and reported results of patient's blood studies to patient including her lithium level. Support was offered to patient re her concerns and stressors. Patient is also still dealing with her discomfort related to fibromyalgia. She denied SI/HI/AVH. She reported managed depression and anxiety symptoms with her current medication regimen. She reported no change in her sleep, gets up and goes back to sleep at night, and some times she sleeps at day time. She stated this is her sleep pattern for many years.     Contracted for safety and non suicide plan (contact family, suicide hotline, call 911 or go the nearest emergency room)    Psychiatric Review Of Systems - Is patient experiencing or having changes in:  sleep: yes, varies   appetite: no  weight: no  energy/anergy: no  interest/pleasure/anhedonia: no  somatic symptoms: no  anxiety/panic: yes but managed  guilty/hopelessness: no  concentration: no  S.I.B.s/risky behavior: no  Irritability: no  Racing thoughts: yes due to worry  Impulsive behaviors: no  Paranoia:no  AVH:no  Suicidal thoughts/plan/intent: no  SE: no  ETOH abuse: no  Drugs abuse: no      Psychotherapy:  Target symptoms: anxiety , mood disorder  Why chosen therapy is appropriate versus another modality: relevant to diagnosis, patient responds to this modality, evidence based practice  Outcome monitoring methods: self-report  Therapeutic intervention type: supportive psychotherapy  Topics discussed/themes:  mood concerns, relationships difficulties, illness/death of a loved one  The patient's response to the intervention is accepting. The patient's progress toward treatment goals is good.   Duration of  "intervention: 16 minutes.    Review of Systems   PSYCHIATRIC: Pertinant items are noted in the narrative.    Past Medical, Family and Social History: The patient's past medical, family and social history have been reviewed and updated as appropriate within the electronic medical record - see encounter notes.    Compliance: yes    Side effects: None Patient also reported a lithium tremor. Patient has no signs of TD at this time.    Risk Parameters:  Patient reports no suicidal ideation  Patient reports no homicidal ideation  Patient reports no self-injurious behavior  Patient reports no violent behavior    Exam (detailed: at least 9 elements; comprehensive: all 15 elements)   Constitutional  Vitals:  Most recent vital signs, dated less than 90 days prior to this appointment, were reviewed.   There were no vitals filed for this visit.   Patient reports stable vital signs     General:  Not examined     Musculoskeletal  Muscle Strength/Tone:  not examined   Gait & Station:  Not examined     Psychiatric  Speech:  no latency; no press, spontaneous   Mood & Affect:  "okay"     Thought Process:  normal and logical   Associations:  intact   Thought Content:  normal, no suicidality, no homicidality, delusions, or paranoia   Insight:  has awareness of illness   Judgement: behavior is adequate to circumstances   Orientation:  grossly intact   Memory: intact for content of interview   Language: grossly intact, able to name, able to repeat   Attention Span & Concentration:  able to focus   Fund of Knowledge:  intact and appropriate to age and level of education     Assessment and Diagnosis   Status/Progress: Based on the examination today, the patient's problem(s) is/are adequately but not ideally controlled.  New problems have not been presented today.    no new obstacles presented.  are not complicating management of the primary condition.  The working differential for this patient includes Bipolar Disorder.     General " "Impression: Patient is doing as well as possible in coping with her family stresses and her own medical concerns as well. Patient does have things to look forward to in her life and enjoys her grandchildren and also has a supportive marriage. Patient remains motivated to do as well as possible and is pleased with her progress as well. Patient is not a danger to self or others at this time. Patient has a stable mood at this time overall.      ICD-10-CM ICD-9-CM   1. Insomnia, unspecified type  G47.00 780.52   2. Bipolar 1 disorder  F31.9 296.7   3. Anxiety disorder, unspecified type  F41.9 300.00             Intervention/Counseling/Treatment Plan   Medication Management: The risks and benefits of medication were discussed with the patient. Patient agrees to continue   Zoloft 200 mg daily for anxiety and depressed mood  Risperdal 0.5 mg BID for bipolar disorder  lithium 300mg  (takes one tab po qam and 2 tabs po qhs). She stated she has been taking it since 2013-14 and finds it very helpful. She stated, " If I missed one of medicine I get agitated or irritable. They are all work really well together".   AIMs = 0 and new AIMs due on 12/23  Ativan 0.25 mg am and 0.25 mg po qnoon and 0.5 mg in pm for anxiety   Wellbutrin  mg q am and 100 mg daily at noon.  Reviewed patient's recent labs and all labs within the normal range from 11/9/22. Reviewed new lithium level and Lithium level was 0.5 on 11/9/2022. Ordered EKG and lithium level.   -Discussed informed consent, diagnosis, risks and benefits of proposed treatment above vs alternative treatments vs no treatment, and potential side effects of these treatments. The patient expresses understanding of the above and displays the capacity to agree with this treatment given said understanding. Patient also agrees that, currently, the benefits outweigh the risks and would like to pursue treatment at this time. Answered all questions and discussed follow up. Encouraged " patient to contact us with any questions or concerns.   Contracted for safety and non suicide plan (contact family, suicide hotline, call 911 or go the nearest emergency room)  -Encouraged Patient to keep future appointments.  -Take medications as prescribed and abstain from substance abuse.  -Patient to present to Emergency department for thoughts to harm herself or others      Return to Clinic: 2 months or earlier as needed      MICHELE Phan-BC

## 2023-07-25 ENCOUNTER — TELEPHONE (OUTPATIENT)
Dept: PAIN MEDICINE | Facility: CLINIC | Age: 53
End: 2023-07-25
Payer: MEDICARE

## 2023-07-25 DIAGNOSIS — G43.809 OTHER MIGRAINE WITHOUT STATUS MIGRAINOSUS, NOT INTRACTABLE: ICD-10-CM

## 2023-07-25 RX ORDER — TOPIRAMATE 200 MG/1
200 TABLET ORAL DAILY
Qty: 90 TABLET | Refills: 1 | Status: SHIPPED | OUTPATIENT
Start: 2023-07-25 | End: 2023-12-21 | Stop reason: SDUPTHER

## 2023-07-25 NOTE — TELEPHONE ENCOUNTER
Reviewed pre-procedure instructions with Mrs. Meneses, as well as provided arrival time of 10:00a for 8/2/23.  All questions answered- verbalized understanding.

## 2023-07-31 ENCOUNTER — OFFICE VISIT (OUTPATIENT)
Dept: FAMILY MEDICINE | Facility: CLINIC | Age: 53
End: 2023-07-31
Payer: MEDICARE

## 2023-07-31 VITALS
TEMPERATURE: 98 F | BODY MASS INDEX: 43.63 KG/M2 | DIASTOLIC BLOOD PRESSURE: 64 MMHG | HEIGHT: 67 IN | OXYGEN SATURATION: 98 % | HEART RATE: 76 BPM | WEIGHT: 278 LBS | SYSTOLIC BLOOD PRESSURE: 110 MMHG

## 2023-07-31 DIAGNOSIS — S59.901A INJURY OF RIGHT ELBOW, INITIAL ENCOUNTER: Primary | ICD-10-CM

## 2023-07-31 PROCEDURE — 99213 PR OFFICE/OUTPT VISIT, EST, LEVL III, 20-29 MIN: ICD-10-PCS | Mod: HCNC,S$GLB,, | Performed by: NURSE PRACTITIONER

## 2023-07-31 PROCEDURE — 99999 PR PBB SHADOW E&M-EST. PATIENT-LVL III: ICD-10-PCS | Mod: PBBFAC,HCNC,, | Performed by: NURSE PRACTITIONER

## 2023-07-31 PROCEDURE — 3008F BODY MASS INDEX DOCD: CPT | Mod: HCNC,CPTII,S$GLB, | Performed by: NURSE PRACTITIONER

## 2023-07-31 PROCEDURE — 1159F MED LIST DOCD IN RCRD: CPT | Mod: HCNC,CPTII,S$GLB, | Performed by: NURSE PRACTITIONER

## 2023-07-31 PROCEDURE — 3078F DIAST BP <80 MM HG: CPT | Mod: HCNC,CPTII,S$GLB, | Performed by: NURSE PRACTITIONER

## 2023-07-31 PROCEDURE — 3008F PR BODY MASS INDEX (BMI) DOCUMENTED: ICD-10-PCS | Mod: HCNC,CPTII,S$GLB, | Performed by: NURSE PRACTITIONER

## 2023-07-31 PROCEDURE — 1160F PR REVIEW ALL MEDS BY PRESCRIBER/CLIN PHARMACIST DOCUMENTED: ICD-10-PCS | Mod: HCNC,CPTII,S$GLB, | Performed by: NURSE PRACTITIONER

## 2023-07-31 PROCEDURE — 3074F SYST BP LT 130 MM HG: CPT | Mod: HCNC,CPTII,S$GLB, | Performed by: NURSE PRACTITIONER

## 2023-07-31 PROCEDURE — 3074F PR MOST RECENT SYSTOLIC BLOOD PRESSURE < 130 MM HG: ICD-10-PCS | Mod: HCNC,CPTII,S$GLB, | Performed by: NURSE PRACTITIONER

## 2023-07-31 PROCEDURE — 1160F RVW MEDS BY RX/DR IN RCRD: CPT | Mod: HCNC,CPTII,S$GLB, | Performed by: NURSE PRACTITIONER

## 2023-07-31 PROCEDURE — 3078F PR MOST RECENT DIASTOLIC BLOOD PRESSURE < 80 MM HG: ICD-10-PCS | Mod: HCNC,CPTII,S$GLB, | Performed by: NURSE PRACTITIONER

## 2023-07-31 PROCEDURE — 99213 OFFICE O/P EST LOW 20 MIN: CPT | Mod: HCNC,S$GLB,, | Performed by: NURSE PRACTITIONER

## 2023-07-31 PROCEDURE — 1159F PR MEDICATION LIST DOCUMENTED IN MEDICAL RECORD: ICD-10-PCS | Mod: HCNC,CPTII,S$GLB, | Performed by: NURSE PRACTITIONER

## 2023-07-31 PROCEDURE — 99999 PR PBB SHADOW E&M-EST. PATIENT-LVL III: CPT | Mod: PBBFAC,HCNC,, | Performed by: NURSE PRACTITIONER

## 2023-07-31 RX ORDER — DICLOFENAC SODIUM 10 MG/G
2 GEL TOPICAL 3 TIMES DAILY PRN
Qty: 100 G | Refills: 0 | Status: SHIPPED | OUTPATIENT
Start: 2023-07-31 | End: 2023-10-23

## 2023-07-31 NOTE — PROGRESS NOTES
Routine Office Visit    Patient Name: Zaira Dowell    : 1970  MRN: [id number]    Chief Complaint:  Elbow injury    Subjective:  Zaira is a 52 y.o. female who presents today for:    1. Elbow injury - patient who is known to me with the below documented medical history reports today for evaluation.  Three days ago she states the back door of her car closed on her right elbow.  Since then she has been having significant pain and tenderness of the medial aspect of the right elbow especially with motion.  She denies any decreased range of motion of the elbow.  No reported numbness or tingling.  She is dealing with the frozen shoulder on the right shoulder as well.  She tried Tylenol for the symptoms without significant benefit.    Past Medical History  Past Medical History:   Diagnosis Date    Anxiety     Arthritis     Asthma     Back pain     Bipolar I     BMI 50.0-59.9, adult     Chronic obstructive pulmonary disease, unspecified COPD type 2022    Depression     Fibromyalgia     GERD (gastroesophageal reflux disease)     HFpEF     Insomnia 2021    Obesity     SHARON (obstructive sleep apnea)     Tobacco dependence     Urinary incontinence        Past Surgical History  Past Surgical History:   Procedure Laterality Date    CARPAL TUNNEL RELEASE      CHOLECYSTECTOMY      COLONOSCOPY N/A 2022    Procedure: COLONOSCOPY;  Surgeon: Ernesto Auguste MD;  Location: [facility];  Service: Endoscopy;  Laterality: N/A;  fully vaccinated-GT    EPIDURAL STEROID INJECTION Bilateral 2023    Procedure: Bilateral L3, L4, L5 Medial Branch Blocks #1;  Surgeon: Lito Jasso Jr., MD;  Location: [facility];  Service: Pain Management;  Laterality: Bilateral;  @1230  No ATC or DM    ESOPHAGOGASTRODUODENOSCOPY N/A 2019    Procedure: EGD (ESOPHAGOGASTRODUODENOSCOPY);  Surgeon: Michelle Mc MD;  Location: [facility];  Service: Endoscopy;  Laterality: N/A;    HYSTERECTOMY      partial     KNEE SURGERY       LAPAROSCOPIC SLEEVE GASTRECTOMY N/A 10/18/2019    Procedure: GASTRECTOMY, SLEEVE, LAPAROSCOPIC, with intraop EGD;  Surgeon: Ean Kohli MD;  Location: [facility] OR [address] [place] [address];  Service: General;  Laterality: N/A;    pinched nerve      SHOULDER SURGERY      TONSILLECTOMY      WRIST SURGERY      had ganlin cyst       Family History  Family History   Problem Relation Age of Onset    Diabetes Mother     Jose Juan's disease Mother     Hypertension Mother     Heart disease Father     Stroke Father     Mental illness Father         ptsd    Hypertension Father     Diabetes Father     Depression Father     No Known Problems Brother     Heart disease Maternal Grandmother     Depression Maternal Grandmother     COPD Maternal Grandfather     Heart disease Maternal Grandfather     Stroke Maternal Grandfather     Kidney disease Paternal Grandmother     Heart disease Paternal Grandmother     Heart disease Paternal Grandfather     Hypertension Son     Rectal cancer Paternal Aunt        Social History  Social History     Socioeconomic History    Marital status:    Tobacco Use    Smoking status: Some Days     Current packs/day: 0.00     Types: Vaping with nicotine    Smokeless tobacco: Never    Tobacco comments:     vaping started April 2020; QUIT cigarettes Sept 2019   Substance and Sexual Activity    Alcohol use: No    Drug use: No    Sexual activity: Yes     Partners: Male     Social Determinants of Health     Financial Resource Strain: Low Risk  (7/31/2023)    Overall Financial Resource Strain (CARDIA)     Difficulty of Paying Living Expenses: Not hard at all   Food Insecurity: No Food Insecurity (7/31/2023)    Hunger Vital Sign     Worried About Running Out of Food in the Last Year: Never true     Ran Out of Food in the Last Year: Never true   Transportation Needs: No Transportation Needs (7/31/2023)    PRAPARE - Transportation     Lack of Transportation (Medical): No     Lack of Transportation (Non-Medical): No    Physical Activity: Insufficiently Active (7/31/2023)    Exercise Vital Sign     Days of Exercise per Week: 2 days     Minutes of Exercise per Session: 30 min   Stress: Stress Concern Present (7/31/2023)    [employer] - Occupational Stress Questionnaire     Feeling of Stress : Rather much   Social Connections: Unknown (7/31/2023)    Social Connection and Isolation Panel [NHANES]     Frequency of Communication with Friends and Family: More than three times a week     Frequency of Social Gatherings with Friends and Family: Once a week     Active Member of Clubs or Organizations: Yes     Attends Club or Organization Meetings: More than 4 times per year     Marital Status:    Housing Stability: Low Risk  (7/31/2023)    Housing Stability Vital Sign     Unable to Pay for Housing in the Last Year: No     Number of Places Lived in the Last Year: 1     Unstable Housing in the Last Year: No       Current Medications  Current Outpatient Medications on File Prior to Visit   Medication Sig Dispense Refill    albuterol (PROVENTIL/VENTOLIN HFA) 90 mcg/actuation inhaler Inhale 2 puffs into the lungs every 4 (four) hours as needed for Wheezing or Shortness of Breath. Rescue 18 g 2    allopurinoL (ZYLOPRIM) 100 MG tablet Take 1 tablet (100 mg total) by mouth once daily. 60 tablet 1    b complex vitamins tablet Take 1 tablet by mouth once daily.      buPROPion (WELLBUTRIN SR) 100 MG TBSR 12 hr tablet Take 2 tabs q am, and 1 tab daily at noon 270 tablet 1    calcium citrate (CALCITRATE) 200 mg (950 mg) tablet Take 1 tablet by mouth 2 (two) times daily.      cetirizine (ZYRTEC) 10 MG tablet Take 1 tablet (10 mg total) by mouth once daily. 30 tablet 0    colchicine (COLCRYS) 0.6 mg tablet Take 1 tablet (0.6 mg total) by mouth daily as needed (gout flare). 30 tablet 0    cyanocobalamin 500 MCG tablet Take 500 mcg by mouth once daily.      cyclobenzaprine (FLEXERIL) 5 MG tablet Take 1 tablet (5 mg  total) by mouth 3 (three) times daily as needed for Muscle spasms. 60 tablet 3    ferrous fumarate/vit Bcomp,C (SUPER B COMPLEX ORAL) Take 1 capsule by mouth once daily.      furosemide (LASIX) 40 MG tablet Take 40 mg by mouth.      ipratropium (ATROVENT) 42 mcg (0.06 %) nasal spray by Each Nostril route.      lithium (ESKALITH) 300 MG capsule Take 1 capsule (300 mg total) by mouth 3 (three) times daily with meals. 270 capsule 1    [START ON 8/2/2023] LORazepam (ATIVAN) 0.5 MG tablet TAKE 2 TABLETS EVERY DAY AS DIRECTED 60 tablet 2    meclizine (ANTIVERT) 25 mg tablet Take 1 tablet (25 mg total) by mouth every 6 (six) hours as needed for Dizziness. 20 tablet 0    multivitamin capsule Take 1 capsule by mouth 2 (two) times daily.       omeprazole (PRILOSEC) 40 MG capsule TAKE 1 CAPSULE TWICE DAILY BEFORE MEALS 180 capsule 3    ondansetron (ZOFRAN-ODT) 8 MG TbDL Take 1 tablet (8 mg total) by mouth 2 (two) times daily as needed (nausea). 30 tablet 1    potassium chloride (KLOR-CON) 10 MEQ TbSR Take 10 mEq by mouth 2 (two) times daily.      pregabalin (LYRICA) 100 MG capsule Take 1 capsule (100 mg total) by mouth 2 (two) times daily. 60 capsule 5    promethazine (PHENERGAN) 12.5 MG Tab promethazine Take half tablet (oral) 2 times per day PRN - Nausea . Medication may cause drowsiness. [id number] tablet 2 times per day oral No set duration recorded No set duration amount recorded active 12.5 mg      risperiDONE (RISPERDAL) 0.5 MG Tab Take 1 tablet (0.5 mg total) by mouth 2 (two) times daily. 60 tablet 2    sertraline (ZOLOFT) 100 MG tablet Take 2 tablets (200 mg total) by mouth once daily. 180 tablet 1    tolterodine (DETROL LA) 4 MG 24 hr capsule TAKE ONE CAPSULE BY MOUTH EVERY DAY 30 capsule 11    topiramate (TOPAMAX) 200 MG Tab Take 1 tablet (200 mg total) by mouth once daily. 90 tablet 1    [DISCONTINUED] diclofenac sodium (VOLTAREN) 1 % Gel Apply 2 g topically 3 (three) times daily as needed (shoulder pain). 100 g 0     "fluticasone-salmeterol diskus inhaler 250-50 mcg Inhale 1 puff into the lungs 2 (two) times daily. (Patient not taking: Reported on 7/6/2023) 60 each 3    [DISCONTINUED] loratadine (CLARITIN) 10 mg tablet Take 1 tablet (10 mg total) by mouth once daily. for 20 days 60 tablet 0     No current facility-administered medications on file prior to visit.       Allergies   Review of patient's allergies indicates:   Allergen Reactions    Hydrocodone-acetaminophen Nausea And Vomiting       Review of Systems (Pertinent positives)  Review of Systems   Constitutional: Negative.    HENT: Negative.     Eyes: Negative.    Respiratory: Negative.     Cardiovascular: Negative.    Gastrointestinal: Negative.    Genitourinary: Negative.    Musculoskeletal:  Positive for joint pain. Negative for back pain, falls, myalgias and neck pain.   Skin: Negative.    Neurological: Negative.    Endo/Heme/Allergies: Negative.    Psychiatric/Behavioral: Negative.         /64 (BP Location: Left arm, Patient Position: Sitting, BP Method: X-Large (Manual))   Pulse 76   Temp 97.7 °F (36.5 °C) (Oral)   Ht 5' 7" (1.702 m)   Wt 126.1 kg (278 lb)   LMP  (LMP Unknown)   SpO2 98%   BMI 43.54 kg/m²     Physical Exam  Vitals reviewed.   Constitutional:       General: She is not in acute distress.     Appearance: Normal appearance. She is not ill-appearing, toxic-appearing or diaphoretic.   Cardiovascular:      Pulses: Normal pulses.   Pulmonary:      Effort: Pulmonary effort is normal. No respiratory distress.      Breath sounds: No wheezing.   Musculoskeletal:      Right elbow: No lacerations. Tenderness present in medial epicondyle. No radial head or olecranon process tenderness.      Comments: Bruising noted right medial epicondyle   Skin:     General: Skin is dry.   Neurological:      Mental Status: She is alert and oriented to person, place, and time.   Psychiatric:         Mood and Affect: Mood normal.         Behavior: Behavior normal.      "     Assessment/Plan:  Zaira Dowell is a 52 y.o. female who presents today for :    Zaira was seen today for elbow pain.    Diagnoses and all orders for this visit:    Injury of right elbow, initial encounter  -     X-Ray Elbow Complete Right; Future  -     diclofenac sodium (VOLTAREN) 1 % Gel; Apply 2 g topically 3 (three) times daily as needed (shoulder pain).    Check x-ray today.  Patient can not take p.o. NSAIDs due to history of gastric procedure.  Treat with Voltaren gel.  Recommended ice and compression.  Follow-up as needed.        This office note has been dictated.  This dictation has been generated using [employer] Direct dictation; some phonetic errors may occur.   My collaborating physician is Dr. Tobias Lundy.     Answers submitted by the patient for this visit:  Review of Systems Questionnaire (Submitted on 7/31/2023)  activity change: No  unexpected weight change: No  rhinorrhea: No  trouble swallowing: No  visual disturbance: No  chest tightness: No  polyuria: No  difficulty urinating: No  menstrual problem: No  joint swelling: Yes  arthralgias: Yes  confusion: No  dysphoric mood: Yes

## 2023-07-31 NOTE — H&P (VIEW-ONLY)
Routine Office Visit    Patient Name: Zaira Dowell    : 1970  MRN: [id number]    Chief Complaint:  Elbow injury    Subjective:  Zaira is a 52 y.o. female who presents today for:    1. Elbow injury - patient who is known to me with the below documented medical history reports today for evaluation.  Three days ago she states the back door of her car closed on her right elbow.  Since then she has been having significant pain and tenderness of the medial aspect of the right elbow especially with motion.  She denies any decreased range of motion of the elbow.  No reported numbness or tingling.  She is dealing with the frozen shoulder on the right shoulder as well.  She tried Tylenol for the symptoms without significant benefit.    Past Medical History  Past Medical History:   Diagnosis Date    Anxiety     Arthritis     Asthma     Back pain     Bipolar I     BMI 50.0-59.9, adult     Chronic obstructive pulmonary disease, unspecified COPD type 2022    Depression     Fibromyalgia     GERD (gastroesophageal reflux disease)     HFpEF     Insomnia 2021    Obesity     SHARON (obstructive sleep apnea)     Tobacco dependence     Urinary incontinence        Past Surgical History  Past Surgical History:   Procedure Laterality Date    CARPAL TUNNEL RELEASE      CHOLECYSTECTOMY      COLONOSCOPY N/A 2022    Procedure: COLONOSCOPY;  Surgeon: Ernesto Auguste MD;  Location: [facility];  Service: Endoscopy;  Laterality: N/A;  fully vaccinated-GT    EPIDURAL STEROID INJECTION Bilateral 2023    Procedure: Bilateral L3, L4, L5 Medial Branch Blocks #1;  Surgeon: Lito Jasso Jr., MD;  Location: [facility];  Service: Pain Management;  Laterality: Bilateral;  @1230  No ATC or DM    ESOPHAGOGASTRODUODENOSCOPY N/A 2019    Procedure: EGD (ESOPHAGOGASTRODUODENOSCOPY);  Surgeon: Michelle Mc MD;  Location: [facility];  Service: Endoscopy;  Laterality: N/A;    HYSTERECTOMY      partial     KNEE SURGERY       LAPAROSCOPIC SLEEVE GASTRECTOMY N/A 10/18/2019    Procedure: GASTRECTOMY, SLEEVE, LAPAROSCOPIC, with intraop EGD;  Surgeon: Ean Kohli MD;  Location: [facility] OR [address] [place] [address];  Service: General;  Laterality: N/A;    pinched nerve      SHOULDER SURGERY      TONSILLECTOMY      WRIST SURGERY      had ganlin cyst       Family History  Family History   Problem Relation Age of Onset    Diabetes Mother     Jose Juan's disease Mother     Hypertension Mother     Heart disease Father     Stroke Father     Mental illness Father         ptsd    Hypertension Father     Diabetes Father     Depression Father     No Known Problems Brother     Heart disease Maternal Grandmother     Depression Maternal Grandmother     COPD Maternal Grandfather     Heart disease Maternal Grandfather     Stroke Maternal Grandfather     Kidney disease Paternal Grandmother     Heart disease Paternal Grandmother     Heart disease Paternal Grandfather     Hypertension Son     Rectal cancer Paternal Aunt        Social History  Social History     Socioeconomic History    Marital status:    Tobacco Use    Smoking status: Some Days     Current packs/day: 0.00     Types: Vaping with nicotine    Smokeless tobacco: Never    Tobacco comments:     vaping started April 2020; QUIT cigarettes Sept 2019   Substance and Sexual Activity    Alcohol use: No    Drug use: No    Sexual activity: Yes     Partners: Male     Social Determinants of Health     Financial Resource Strain: Low Risk  (7/31/2023)    Overall Financial Resource Strain (CARDIA)     Difficulty of Paying Living Expenses: Not hard at all   Food Insecurity: No Food Insecurity (7/31/2023)    Hunger Vital Sign     Worried About Running Out of Food in the Last Year: Never true     Ran Out of Food in the Last Year: Never true   Transportation Needs: No Transportation Needs (7/31/2023)    PRAPARE - Transportation     Lack of Transportation (Medical): No     Lack of Transportation (Non-Medical): No    Physical Activity: Insufficiently Active (7/31/2023)    Exercise Vital Sign     Days of Exercise per Week: 2 days     Minutes of Exercise per Session: 30 min   Stress: Stress Concern Present (7/31/2023)    [employer] - Occupational Stress Questionnaire     Feeling of Stress : Rather much   Social Connections: Unknown (7/31/2023)    Social Connection and Isolation Panel [NHANES]     Frequency of Communication with Friends and Family: More than three times a week     Frequency of Social Gatherings with Friends and Family: Once a week     Active Member of Clubs or Organizations: Yes     Attends Club or Organization Meetings: More than 4 times per year     Marital Status:    Housing Stability: Low Risk  (7/31/2023)    Housing Stability Vital Sign     Unable to Pay for Housing in the Last Year: No     Number of Places Lived in the Last Year: 1     Unstable Housing in the Last Year: No       Current Medications  Current Outpatient Medications on File Prior to Visit   Medication Sig Dispense Refill    albuterol (PROVENTIL/VENTOLIN HFA) 90 mcg/actuation inhaler Inhale 2 puffs into the lungs every 4 (four) hours as needed for Wheezing or Shortness of Breath. Rescue 18 g 2    allopurinoL (ZYLOPRIM) 100 MG tablet Take 1 tablet (100 mg total) by mouth once daily. 60 tablet 1    b complex vitamins tablet Take 1 tablet by mouth once daily.      buPROPion (WELLBUTRIN SR) 100 MG TBSR 12 hr tablet Take 2 tabs q am, and 1 tab daily at noon 270 tablet 1    calcium citrate (CALCITRATE) 200 mg (950 mg) tablet Take 1 tablet by mouth 2 (two) times daily.      cetirizine (ZYRTEC) 10 MG tablet Take 1 tablet (10 mg total) by mouth once daily. 30 tablet 0    colchicine (COLCRYS) 0.6 mg tablet Take 1 tablet (0.6 mg total) by mouth daily as needed (gout flare). 30 tablet 0    cyanocobalamin 500 MCG tablet Take 500 mcg by mouth once daily.      cyclobenzaprine (FLEXERIL) 5 MG tablet Take 1 tablet (5 mg  total) by mouth 3 (three) times daily as needed for Muscle spasms. 60 tablet 3    ferrous fumarate/vit Bcomp,C (SUPER B COMPLEX ORAL) Take 1 capsule by mouth once daily.      furosemide (LASIX) 40 MG tablet Take 40 mg by mouth.      ipratropium (ATROVENT) 42 mcg (0.06 %) nasal spray by Each Nostril route.      lithium (ESKALITH) 300 MG capsule Take 1 capsule (300 mg total) by mouth 3 (three) times daily with meals. 270 capsule 1    [START ON 8/2/2023] LORazepam (ATIVAN) 0.5 MG tablet TAKE 2 TABLETS EVERY DAY AS DIRECTED 60 tablet 2    meclizine (ANTIVERT) 25 mg tablet Take 1 tablet (25 mg total) by mouth every 6 (six) hours as needed for Dizziness. 20 tablet 0    multivitamin capsule Take 1 capsule by mouth 2 (two) times daily.       omeprazole (PRILOSEC) 40 MG capsule TAKE 1 CAPSULE TWICE DAILY BEFORE MEALS 180 capsule 3    ondansetron (ZOFRAN-ODT) 8 MG TbDL Take 1 tablet (8 mg total) by mouth 2 (two) times daily as needed (nausea). 30 tablet 1    potassium chloride (KLOR-CON) 10 MEQ TbSR Take 10 mEq by mouth 2 (two) times daily.      pregabalin (LYRICA) 100 MG capsule Take 1 capsule (100 mg total) by mouth 2 (two) times daily. 60 capsule 5    promethazine (PHENERGAN) 12.5 MG Tab promethazine Take half tablet (oral) 2 times per day PRN - Nausea . Medication may cause drowsiness. [id number] tablet 2 times per day oral No set duration recorded No set duration amount recorded active 12.5 mg      risperiDONE (RISPERDAL) 0.5 MG Tab Take 1 tablet (0.5 mg total) by mouth 2 (two) times daily. 60 tablet 2    sertraline (ZOLOFT) 100 MG tablet Take 2 tablets (200 mg total) by mouth once daily. 180 tablet 1    tolterodine (DETROL LA) 4 MG 24 hr capsule TAKE ONE CAPSULE BY MOUTH EVERY DAY 30 capsule 11    topiramate (TOPAMAX) 200 MG Tab Take 1 tablet (200 mg total) by mouth once daily. 90 tablet 1    [DISCONTINUED] diclofenac sodium (VOLTAREN) 1 % Gel Apply 2 g topically 3 (three) times daily as needed (shoulder pain). 100 g 0     "fluticasone-salmeterol diskus inhaler 250-50 mcg Inhale 1 puff into the lungs 2 (two) times daily. (Patient not taking: Reported on 7/6/2023) 60 each 3    [DISCONTINUED] loratadine (CLARITIN) 10 mg tablet Take 1 tablet (10 mg total) by mouth once daily. for 20 days 60 tablet 0     No current facility-administered medications on file prior to visit.       Allergies   Review of patient's allergies indicates:   Allergen Reactions    Hydrocodone-acetaminophen Nausea And Vomiting       Review of Systems (Pertinent positives)  Review of Systems   Constitutional: Negative.    HENT: Negative.     Eyes: Negative.    Respiratory: Negative.     Cardiovascular: Negative.    Gastrointestinal: Negative.    Genitourinary: Negative.    Musculoskeletal:  Positive for joint pain. Negative for back pain, falls, myalgias and neck pain.   Skin: Negative.    Neurological: Negative.    Endo/Heme/Allergies: Negative.    Psychiatric/Behavioral: Negative.         /64 (BP Location: Left arm, Patient Position: Sitting, BP Method: X-Large (Manual))   Pulse 76   Temp 97.7 °F (36.5 °C) (Oral)   Ht 5' 7" (1.702 m)   Wt 126.1 kg (278 lb)   LMP  (LMP Unknown)   SpO2 98%   BMI 43.54 kg/m²     Physical Exam  Vitals reviewed.   Constitutional:       General: She is not in acute distress.     Appearance: Normal appearance. She is not ill-appearing, toxic-appearing or diaphoretic.   Cardiovascular:      Pulses: Normal pulses.   Pulmonary:      Effort: Pulmonary effort is normal. No respiratory distress.      Breath sounds: No wheezing.   Musculoskeletal:      Right elbow: No lacerations. Tenderness present in medial epicondyle. No radial head or olecranon process tenderness.      Comments: Bruising noted right medial epicondyle   Skin:     General: Skin is dry.   Neurological:      Mental Status: She is alert and oriented to person, place, and time.   Psychiatric:         Mood and Affect: Mood normal.         Behavior: Behavior normal.      "     Assessment/Plan:  Zaira Dowell is a 52 y.o. female who presents today for :    Zaira was seen today for elbow pain.    Diagnoses and all orders for this visit:    Injury of right elbow, initial encounter  -     X-Ray Elbow Complete Right; Future  -     diclofenac sodium (VOLTAREN) 1 % Gel; Apply 2 g topically 3 (three) times daily as needed (shoulder pain).    Check x-ray today.  Patient can not take p.o. NSAIDs due to history of gastric procedure.  Treat with Voltaren gel.  Recommended ice and compression.  Follow-up as needed.        This office note has been dictated.  This dictation has been generated using [employer] Direct dictation; some phonetic errors may occur.   My collaborating physician is Dr. Tobias Lundy.     Answers submitted by the patient for this visit:  Review of Systems Questionnaire (Submitted on 7/31/2023)  activity change: No  unexpected weight change: No  rhinorrhea: No  trouble swallowing: No  visual disturbance: No  chest tightness: No  polyuria: No  difficulty urinating: No  menstrual problem: No  joint swelling: Yes  arthralgias: Yes  confusion: No  dysphoric mood: Yes

## 2023-07-31 NOTE — H&P (VIEW-ONLY)
Routine Office Visit    Patient Name: Zaira Dowell    : 1970  MRN: [id number]    Chief Complaint:  Elbow injury    Subjective:  Zaira is a 52 y.o. female who presents today for:    1. Elbow injury - patient who is known to me with the below documented medical history reports today for evaluation.  Three days ago she states the back door of her car closed on her right elbow.  Since then she has been having significant pain and tenderness of the medial aspect of the right elbow especially with motion.  She denies any decreased range of motion of the elbow.  No reported numbness or tingling.  She is dealing with the frozen shoulder on the right shoulder as well.  She tried Tylenol for the symptoms without significant benefit.    Past Medical History  Past Medical History:   Diagnosis Date    Anxiety     Arthritis     Asthma     Back pain     Bipolar I     BMI 50.0-59.9, adult     Chronic obstructive pulmonary disease, unspecified COPD type 2022    Depression     Fibromyalgia     GERD (gastroesophageal reflux disease)     HFpEF     Insomnia 2021    Obesity     SHARON (obstructive sleep apnea)     Tobacco dependence     Urinary incontinence        Past Surgical History  Past Surgical History:   Procedure Laterality Date    CARPAL TUNNEL RELEASE      CHOLECYSTECTOMY      COLONOSCOPY N/A 2022    Procedure: COLONOSCOPY;  Surgeon: Ernesto Auguste MD;  Location: [facility];  Service: Endoscopy;  Laterality: N/A;  fully vaccinated-GT    EPIDURAL STEROID INJECTION Bilateral 2023    Procedure: Bilateral L3, L4, L5 Medial Branch Blocks #1;  Surgeon: Lito Jasso Jr., MD;  Location: [facility];  Service: Pain Management;  Laterality: Bilateral;  @1230  No ATC or DM    ESOPHAGOGASTRODUODENOSCOPY N/A 2019    Procedure: EGD (ESOPHAGOGASTRODUODENOSCOPY);  Surgeon: Michelle Mc MD;  Location: [facility];  Service: Endoscopy;  Laterality: N/A;    HYSTERECTOMY      partial     KNEE SURGERY       LAPAROSCOPIC SLEEVE GASTRECTOMY N/A 10/18/2019    Procedure: GASTRECTOMY, SLEEVE, LAPAROSCOPIC, with intraop EGD;  Surgeon: Ean Kohli MD;  Location: [facility] OR [address] [place] [address];  Service: General;  Laterality: N/A;    pinched nerve      SHOULDER SURGERY      TONSILLECTOMY      WRIST SURGERY      had ganlin cyst       Family History  Family History   Problem Relation Age of Onset    Diabetes Mother     Jose Juan's disease Mother     Hypertension Mother     Heart disease Father     Stroke Father     Mental illness Father         ptsd    Hypertension Father     Diabetes Father     Depression Father     No Known Problems Brother     Heart disease Maternal Grandmother     Depression Maternal Grandmother     COPD Maternal Grandfather     Heart disease Maternal Grandfather     Stroke Maternal Grandfather     Kidney disease Paternal Grandmother     Heart disease Paternal Grandmother     Heart disease Paternal Grandfather     Hypertension Son     Rectal cancer Paternal Aunt        Social History  Social History     Socioeconomic History    Marital status:    Tobacco Use    Smoking status: Some Days     Current packs/day: 0.00     Types: Vaping with nicotine    Smokeless tobacco: Never    Tobacco comments:     vaping started April 2020; QUIT cigarettes Sept 2019   Substance and Sexual Activity    Alcohol use: No    Drug use: No    Sexual activity: Yes     Partners: Male     Social Determinants of Health     Financial Resource Strain: Low Risk  (7/31/2023)    Overall Financial Resource Strain (CARDIA)     Difficulty of Paying Living Expenses: Not hard at all   Food Insecurity: No Food Insecurity (7/31/2023)    Hunger Vital Sign     Worried About Running Out of Food in the Last Year: Never true     Ran Out of Food in the Last Year: Never true   Transportation Needs: No Transportation Needs (7/31/2023)    PRAPARE - Transportation     Lack of Transportation (Medical): No     Lack of Transportation (Non-Medical): No    Physical Activity: Insufficiently Active (7/31/2023)    Exercise Vital Sign     Days of Exercise per Week: 2 days     Minutes of Exercise per Session: 30 min   Stress: Stress Concern Present (7/31/2023)    [employer] - Occupational Stress Questionnaire     Feeling of Stress : Rather much   Social Connections: Unknown (7/31/2023)    Social Connection and Isolation Panel [NHANES]     Frequency of Communication with Friends and Family: More than three times a week     Frequency of Social Gatherings with Friends and Family: Once a week     Active Member of Clubs or Organizations: Yes     Attends Club or Organization Meetings: More than 4 times per year     Marital Status:    Housing Stability: Low Risk  (7/31/2023)    Housing Stability Vital Sign     Unable to Pay for Housing in the Last Year: No     Number of Places Lived in the Last Year: 1     Unstable Housing in the Last Year: No       Current Medications  Current Outpatient Medications on File Prior to Visit   Medication Sig Dispense Refill    albuterol (PROVENTIL/VENTOLIN HFA) 90 mcg/actuation inhaler Inhale 2 puffs into the lungs every 4 (four) hours as needed for Wheezing or Shortness of Breath. Rescue 18 g 2    allopurinoL (ZYLOPRIM) 100 MG tablet Take 1 tablet (100 mg total) by mouth once daily. 60 tablet 1    b complex vitamins tablet Take 1 tablet by mouth once daily.      buPROPion (WELLBUTRIN SR) 100 MG TBSR 12 hr tablet Take 2 tabs q am, and 1 tab daily at noon 270 tablet 1    calcium citrate (CALCITRATE) 200 mg (950 mg) tablet Take 1 tablet by mouth 2 (two) times daily.      cetirizine (ZYRTEC) 10 MG tablet Take 1 tablet (10 mg total) by mouth once daily. 30 tablet 0    colchicine (COLCRYS) 0.6 mg tablet Take 1 tablet (0.6 mg total) by mouth daily as needed (gout flare). 30 tablet 0    cyanocobalamin 500 MCG tablet Take 500 mcg by mouth once daily.      cyclobenzaprine (FLEXERIL) 5 MG tablet Take 1 tablet (5 mg  total) by mouth 3 (three) times daily as needed for Muscle spasms. 60 tablet 3    ferrous fumarate/vit Bcomp,C (SUPER B COMPLEX ORAL) Take 1 capsule by mouth once daily.      furosemide (LASIX) 40 MG tablet Take 40 mg by mouth.      ipratropium (ATROVENT) 42 mcg (0.06 %) nasal spray by Each Nostril route.      lithium (ESKALITH) 300 MG capsule Take 1 capsule (300 mg total) by mouth 3 (three) times daily with meals. 270 capsule 1    [START ON 8/2/2023] LORazepam (ATIVAN) 0.5 MG tablet TAKE 2 TABLETS EVERY DAY AS DIRECTED 60 tablet 2    meclizine (ANTIVERT) 25 mg tablet Take 1 tablet (25 mg total) by mouth every 6 (six) hours as needed for Dizziness. 20 tablet 0    multivitamin capsule Take 1 capsule by mouth 2 (two) times daily.       omeprazole (PRILOSEC) 40 MG capsule TAKE 1 CAPSULE TWICE DAILY BEFORE MEALS 180 capsule 3    ondansetron (ZOFRAN-ODT) 8 MG TbDL Take 1 tablet (8 mg total) by mouth 2 (two) times daily as needed (nausea). 30 tablet 1    potassium chloride (KLOR-CON) 10 MEQ TbSR Take 10 mEq by mouth 2 (two) times daily.      pregabalin (LYRICA) 100 MG capsule Take 1 capsule (100 mg total) by mouth 2 (two) times daily. 60 capsule 5    promethazine (PHENERGAN) 12.5 MG Tab promethazine Take half tablet (oral) 2 times per day PRN - Nausea . Medication may cause drowsiness. [id number] tablet 2 times per day oral No set duration recorded No set duration amount recorded active 12.5 mg      risperiDONE (RISPERDAL) 0.5 MG Tab Take 1 tablet (0.5 mg total) by mouth 2 (two) times daily. 60 tablet 2    sertraline (ZOLOFT) 100 MG tablet Take 2 tablets (200 mg total) by mouth once daily. 180 tablet 1    tolterodine (DETROL LA) 4 MG 24 hr capsule TAKE ONE CAPSULE BY MOUTH EVERY DAY 30 capsule 11    topiramate (TOPAMAX) 200 MG Tab Take 1 tablet (200 mg total) by mouth once daily. 90 tablet 1    [DISCONTINUED] diclofenac sodium (VOLTAREN) 1 % Gel Apply 2 g topically 3 (three) times daily as needed (shoulder pain). 100 g 0     "fluticasone-salmeterol diskus inhaler 250-50 mcg Inhale 1 puff into the lungs 2 (two) times daily. (Patient not taking: Reported on 7/6/2023) 60 each 3    [DISCONTINUED] loratadine (CLARITIN) 10 mg tablet Take 1 tablet (10 mg total) by mouth once daily. for 20 days 60 tablet 0     No current facility-administered medications on file prior to visit.       Allergies   Review of patient's allergies indicates:   Allergen Reactions    Hydrocodone-acetaminophen Nausea And Vomiting       Review of Systems (Pertinent positives)  Review of Systems   Constitutional: Negative.    HENT: Negative.     Eyes: Negative.    Respiratory: Negative.     Cardiovascular: Negative.    Gastrointestinal: Negative.    Genitourinary: Negative.    Musculoskeletal:  Positive for joint pain. Negative for back pain, falls, myalgias and neck pain.   Skin: Negative.    Neurological: Negative.    Endo/Heme/Allergies: Negative.    Psychiatric/Behavioral: Negative.         /64 (BP Location: Left arm, Patient Position: Sitting, BP Method: X-Large (Manual))   Pulse 76   Temp 97.7 °F (36.5 °C) (Oral)   Ht 5' 7" (1.702 m)   Wt 126.1 kg (278 lb)   LMP  (LMP Unknown)   SpO2 98%   BMI 43.54 kg/m²     Physical Exam  Vitals reviewed.   Constitutional:       General: She is not in acute distress.     Appearance: Normal appearance. She is not ill-appearing, toxic-appearing or diaphoretic.   Cardiovascular:      Pulses: Normal pulses.   Pulmonary:      Effort: Pulmonary effort is normal. No respiratory distress.      Breath sounds: No wheezing.   Musculoskeletal:      Right elbow: No lacerations. Tenderness present in medial epicondyle. No radial head or olecranon process tenderness.      Comments: Bruising noted right medial epicondyle   Skin:     General: Skin is dry.   Neurological:      Mental Status: She is alert and oriented to person, place, and time.   Psychiatric:         Mood and Affect: Mood normal.         Behavior: Behavior normal.      "     Assessment/Plan:  Zaira Dowell is a 52 y.o. female who presents today for :    Zaira was seen today for elbow pain.    Diagnoses and all orders for this visit:    Injury of right elbow, initial encounter  -     X-Ray Elbow Complete Right; Future  -     diclofenac sodium (VOLTAREN) 1 % Gel; Apply 2 g topically 3 (three) times daily as needed (shoulder pain).    Check x-ray today.  Patient can not take p.o. NSAIDs due to history of gastric procedure.  Treat with Voltaren gel.  Recommended ice and compression.  Follow-up as needed.        This office note has been dictated.  This dictation has been generated using [employer] Fluency Direct dictation; some phonetic errors may occur.   My collaborating physician is Dr. Tobias Lundy.     Answers submitted by the patient for this visit:  Review of Systems Questionnaire (Submitted on 7/31/2023)  activity change: No  unexpected weight change: No  rhinorrhea: No  trouble swallowing: No  visual disturbance: No  chest tightness: No  polyuria: No  difficulty urinating: No  menstrual problem: No  joint swelling: Yes  arthralgias: Yes  confusion: No  dysphoric mood: Yes

## 2023-08-01 NOTE — DISCHARGE INSTRUCTIONS

## 2023-08-02 ENCOUNTER — HOSPITAL ENCOUNTER (OUTPATIENT)
Facility: HOSPITAL | Age: 53
Discharge: HOME | End: 2023-08-02
Attending: PAIN MEDICINE | Admitting: PAIN MEDICINE
Payer: MEDICARE

## 2023-08-02 ENCOUNTER — PATIENT MESSAGE (OUTPATIENT)
Dept: BARIATRICS | Facility: CLINIC | Age: 53
End: 2023-08-02
Payer: MEDICARE

## 2023-08-02 VITALS
TEMPERATURE: 98 F | HEIGHT: 67 IN | OXYGEN SATURATION: 97 % | WEIGHT: 275 LBS | RESPIRATION RATE: 18 BRPM | SYSTOLIC BLOOD PRESSURE: 135 MMHG | BODY MASS INDEX: 43.16 KG/M2 | HEART RATE: 60 BPM | DIASTOLIC BLOOD PRESSURE: 80 MMHG

## 2023-08-02 DIAGNOSIS — M47.816 LUMBAR SPONDYLOSIS: Primary | ICD-10-CM

## 2023-08-02 PROCEDURE — 64493 INJ PARAVERT F JNT L/S 1 LEV: CPT | Mod: 50,HCNC | Performed by: PAIN MEDICINE

## 2023-08-02 PROCEDURE — 64494 INJ PARAVERT F JNT L/S 2 LEV: CPT | Mod: 50,HCNC | Performed by: PAIN MEDICINE

## 2023-08-02 PROCEDURE — 63600175 PHARM REV CODE 636 W HCPCS: Mod: HCNC | Performed by: PAIN MEDICINE

## 2023-08-02 PROCEDURE — 64494 INJ PARAVERT F JNT L/S 2 LEV: CPT | Mod: 50,HCNC,, | Performed by: PAIN MEDICINE

## 2023-08-02 PROCEDURE — 64493 PR INJ DX/THER AGNT PARAVERT FACET JOINT,IMG GUIDE,LUMBAR/SAC,1ST LVL: ICD-10-PCS | Mod: 50,HCNC,, | Performed by: PAIN MEDICINE

## 2023-08-02 PROCEDURE — 25000003 PHARM REV CODE 250: Mod: HCNC | Performed by: PAIN MEDICINE

## 2023-08-02 PROCEDURE — 64494 PR INJ DX/THER AGNT PARAVERT FACET JOINT,IMG GUIDE,LUMBAR/SAC, 2ND LEVEL: ICD-10-PCS | Mod: 50,HCNC,, | Performed by: PAIN MEDICINE

## 2023-08-02 PROCEDURE — 64493 INJ PARAVERT F JNT L/S 1 LEV: CPT | Mod: 50,HCNC,, | Performed by: PAIN MEDICINE

## 2023-08-02 RX ORDER — LIDOCAINE HYDROCHLORIDE 10 MG/ML
INJECTION INFILTRATION; PERINEURAL
Status: DISCONTINUED
Start: 2023-08-02 | End: 2023-08-02 | Stop reason: HOSPADM

## 2023-08-02 RX ORDER — LIDOCAINE HYDROCHLORIDE 10 MG/ML
10 INJECTION INFILTRATION; PERINEURAL ONCE
Status: COMPLETED | OUTPATIENT
Start: 2023-08-02 | End: 2023-08-02

## 2023-08-02 RX ORDER — BUPIVACAINE HYDROCHLORIDE 2.5 MG/ML
10 INJECTION, SOLUTION EPIDURAL; INFILTRATION; INTRACAUDAL ONCE
Status: COMPLETED | OUTPATIENT
Start: 2023-08-02 | End: 2023-08-02

## 2023-08-02 RX ORDER — LIDOCAINE HYDROCHLORIDE 20 MG/ML
10 INJECTION, SOLUTION INFILTRATION; PERINEURAL ONCE
Status: DISCONTINUED | OUTPATIENT
Start: 2023-08-02 | End: 2023-08-02 | Stop reason: ALTCHOICE

## 2023-08-02 RX ORDER — BUPIVACAINE HYDROCHLORIDE 2.5 MG/ML
INJECTION, SOLUTION EPIDURAL; INFILTRATION; INTRACAUDAL
Status: DISCONTINUED
Start: 2023-08-02 | End: 2023-08-02 | Stop reason: HOSPADM

## 2023-08-02 NOTE — DISCHARGE SUMMARY
[facility] - Endoscopy  Discharge Note  Short Stay    Procedure(s) (LRB):  Bilateral L3, L4, L5 Medial Branch Blocks #2 (Bilateral)      OUTCOME: Patient tolerated treatment/procedure well without complication and is now ready for discharge.    DISPOSITION: Home or Self Care    FINAL DIAGNOSIS:  <principal problem not specified>    FOLLOWUP: In clinic    DISCHARGE INSTRUCTIONS:  No discharge procedures on file.       Discharge Diagnosis:Lumbar spondylosis [M47.816]  Condition on Discharge: Stable.  Diet on Discharge: Same as before.  Activity: as per instruction sheet.  Discharge to: Home with a responsible adult.  Follow up: as per Discharge instructions

## 2023-08-02 NOTE — OP NOTE
LUMBAR Medial Branch Block Under Fluoroscopy  Time-out taken to identify patient and procedure side prior to starting the procedure.   I attest that I have reviewed the patient's home medications prior to the procedure and no contraindication have been identified. I  re-evaluated the patient after the patient was positioned for the procedure in the procedure room immediately before the procedural time-out. The vital signs are current and represent the current state of the patient which has not significantly changed since the preprocedure assessment.            Date of Service: 08/02/2023    PCP: Shay Dai MD    Referring Physician:                                                           PROCEDURE:  Bilateral  L3, L4 and L5 medial branch block    REASON FOR PROCEDURE: Lumbar spondylosis [M47.816]    PHYSICIAN: Lito Jasso MD  ASSISTANTS: None    MEDICATIONS INJECTED: Bupivicaine 0.25% 1.5ml at each level      LOCAL ANESTHETIC USED: Xylocaine 1% 3ml each site.    SEDATION MEDICATIONS: None    ESTIMATED BLOOD LOSS:  None.    COMPLICATIONS:  None.    TECHNIQUE: Laying in a prone position, the patient was prepped and draped in the usual sterile fashion using ChloraPrep and fenestrated drape.  The level was determined under fluoroscopic guidance.  Local anesthetic was given by going down to the hub of the 27-gauge 1.25in needle and raising a wheal.  A 25-gauge 4.75 inch needle was introduced to the anatomic site of the medial branch at the lateral mass utilizing live fluoroscopy. Medication was then injected slowly at each level as stated above. The patient tolerated the procedure well.       The patient was monitored after the procedure.  Patient was given post procedure and discharge instructions to follow at home.  We will see the patient back in two weeks or the patient may call to inform of status. The patient was discharged in a stable condition

## 2023-08-03 ENCOUNTER — TELEPHONE (OUTPATIENT)
Dept: PAIN MEDICINE | Facility: CLINIC | Age: 53
End: 2023-08-03
Payer: MEDICARE

## 2023-08-03 DIAGNOSIS — M47.816 LUMBAR SPONDYLOSIS: Primary | ICD-10-CM

## 2023-08-03 NOTE — TELEPHONE ENCOUNTER
Mrs. Meneses reports 80-90% reduction in pain and improvement in ADLs following the bilat L3-5 MBB #2 performed yesterday.  Her pain has since returned to baseline.  She would like to proceed with the left (8/16/23) then right (8/30/23) RFAs- provider updated.

## 2023-08-08 ENCOUNTER — TELEPHONE (OUTPATIENT)
Dept: PAIN MEDICINE | Facility: CLINIC | Age: 53
End: 2023-08-08
Payer: MEDICARE

## 2023-08-08 NOTE — TELEPHONE ENCOUNTER
Reviewed pre-procedure instructions with Mrs. Meneses, as well as provided arrival time of 8:15am for 8/16/23.  All questions answered- verbalized understanding.

## 2023-08-16 ENCOUNTER — HOSPITAL ENCOUNTER (OUTPATIENT)
Facility: HOSPITAL | Age: 53
Discharge: HOME | End: 2023-08-16
Attending: PAIN MEDICINE | Admitting: PAIN MEDICINE
Payer: MEDICARE

## 2023-08-16 VITALS
TEMPERATURE: 98 F | OXYGEN SATURATION: 96 % | DIASTOLIC BLOOD PRESSURE: 64 MMHG | HEART RATE: 63 BPM | SYSTOLIC BLOOD PRESSURE: 112 MMHG | RESPIRATION RATE: 18 BRPM

## 2023-08-16 DIAGNOSIS — M47.816 LUMBAR SPONDYLOSIS: Primary | ICD-10-CM

## 2023-08-16 PROCEDURE — 64636 DESTROY L/S FACET JNT ADDL: CPT | Mod: HCNC,LT,, | Performed by: PAIN MEDICINE

## 2023-08-16 PROCEDURE — 25000003 PHARM REV CODE 250: Mod: HCNC | Performed by: PAIN MEDICINE

## 2023-08-16 PROCEDURE — 64635 DESTROY LUMB/SAC FACET JNT: CPT | Mod: HCNC,LT,, | Performed by: PAIN MEDICINE

## 2023-08-16 PROCEDURE — 64636 DESTROY L/S FACET JNT ADDL: CPT | Mod: HCNC,LT | Performed by: PAIN MEDICINE

## 2023-08-16 PROCEDURE — 64636 PR DESTROY L/S FACET JNT ADDL: ICD-10-PCS | Mod: HCNC,LT,, | Performed by: PAIN MEDICINE

## 2023-08-16 PROCEDURE — 64635 PR DESTROY LUMB/SAC FACET JNT: ICD-10-PCS | Mod: HCNC,LT,, | Performed by: PAIN MEDICINE

## 2023-08-16 PROCEDURE — 63600175 PHARM REV CODE 636 W HCPCS: Mod: HCNC | Performed by: PAIN MEDICINE

## 2023-08-16 PROCEDURE — 64635 DESTROY LUMB/SAC FACET JNT: CPT | Mod: HCNC,LT | Performed by: PAIN MEDICINE

## 2023-08-16 RX ORDER — ONDANSETRON 2 MG/ML
4 INJECTION INTRAMUSCULAR; INTRAVENOUS ONCE
Status: COMPLETED | OUTPATIENT
Start: 2023-08-16 | End: 2023-08-16

## 2023-08-16 RX ORDER — FENTANYL CITRATE 50 UG/ML
100 INJECTION, SOLUTION INTRAMUSCULAR; INTRAVENOUS
Status: DISCONTINUED | OUTPATIENT
Start: 2023-08-16 | End: 2023-08-16 | Stop reason: HOSPADM

## 2023-08-16 RX ORDER — LIDOCAINE HYDROCHLORIDE 10 MG/ML
10 INJECTION INFILTRATION; PERINEURAL ONCE
Status: COMPLETED | OUTPATIENT
Start: 2023-08-16 | End: 2023-08-16

## 2023-08-16 RX ORDER — ONDANSETRON 2 MG/ML
INJECTION INTRAMUSCULAR; INTRAVENOUS
Status: DISCONTINUED
Start: 2023-08-16 | End: 2023-08-16 | Stop reason: HOSPADM

## 2023-08-16 RX ORDER — TRIAMCINOLONE ACETONIDE 40 MG/ML
40 INJECTION, SUSPENSION INTRA-ARTICULAR; INTRAMUSCULAR ONCE
Status: COMPLETED | OUTPATIENT
Start: 2023-08-16 | End: 2023-08-16

## 2023-08-16 RX ORDER — TRIAMCINOLONE ACETONIDE 40 MG/ML
INJECTION, SUSPENSION INTRA-ARTICULAR; INTRAMUSCULAR
Status: DISCONTINUED
Start: 2023-08-16 | End: 2023-08-16 | Stop reason: HOSPADM

## 2023-08-16 RX ORDER — FENTANYL CITRATE 50 UG/ML
INJECTION, SOLUTION INTRAMUSCULAR; INTRAVENOUS
Status: DISCONTINUED
Start: 2023-08-16 | End: 2023-08-16 | Stop reason: HOSPADM

## 2023-08-16 RX ORDER — MIDAZOLAM HYDROCHLORIDE 1 MG/ML
5 INJECTION INTRAMUSCULAR; INTRAVENOUS
Status: DISCONTINUED | OUTPATIENT
Start: 2023-08-16 | End: 2023-08-16 | Stop reason: HOSPADM

## 2023-08-16 RX ORDER — SODIUM CHLORIDE 9 MG/ML
INJECTION, SOLUTION INTRAVENOUS CONTINUOUS
Status: DISCONTINUED | OUTPATIENT
Start: 2023-08-16 | End: 2023-08-16 | Stop reason: HOSPADM

## 2023-08-16 RX ORDER — LIDOCAINE HYDROCHLORIDE 20 MG/ML
10 INJECTION, SOLUTION INFILTRATION; PERINEURAL ONCE
Status: DISCONTINUED | OUTPATIENT
Start: 2023-08-16 | End: 2023-08-16 | Stop reason: ALTCHOICE

## 2023-08-16 RX ORDER — LIDOCAINE HYDROCHLORIDE 10 MG/ML
INJECTION INFILTRATION; PERINEURAL
Status: DISCONTINUED
Start: 2023-08-16 | End: 2023-08-16 | Stop reason: HOSPADM

## 2023-08-16 RX ORDER — BUPIVACAINE HYDROCHLORIDE 2.5 MG/ML
10 INJECTION, SOLUTION EPIDURAL; INFILTRATION; INTRACAUDAL ONCE
Status: COMPLETED | OUTPATIENT
Start: 2023-08-16 | End: 2023-08-16

## 2023-08-16 RX ORDER — MIDAZOLAM HYDROCHLORIDE 1 MG/ML
INJECTION INTRAMUSCULAR; INTRAVENOUS
Status: DISCONTINUED
Start: 2023-08-16 | End: 2023-08-16 | Stop reason: HOSPADM

## 2023-08-16 RX ORDER — BUPIVACAINE HYDROCHLORIDE 2.5 MG/ML
INJECTION, SOLUTION EPIDURAL; INFILTRATION; INTRACAUDAL
Status: DISCONTINUED
Start: 2023-08-16 | End: 2023-08-16 | Stop reason: HOSPADM

## 2023-08-16 RX ADMIN — SODIUM CHLORIDE: 9 INJECTION, SOLUTION INTRAVENOUS at 08:08

## 2023-08-16 NOTE — DISCHARGE INSTRUCTIONS
Home Care Instructions Pain Management:    1. DIET:   You may resume your normal diet today.   2. BATHING:   You may shower with luke warm water.  3. DRESSING:   You may remove your bandage today.   4. ACTIVITY LEVEL:   You may resume your normal activities 24 hrs after your procedure.  5. MEDICATIONS:   You may resume your normal medications today.   6. SPECIAL INSTRUCTIONS:   No heat to the injection site for 24 hrs including, bath or shower, heating pad, moist heat, or hot tubs.    Use ice pack to injection site for any pain or discomfort.  Apply ice packs for 20 minute intervals as needed.   If you have received any sedatives by mouth today you may not drive for 12 hours.    If you have received any sedation through your IV, you may not drive for 24 hrs.     PLEASE CALL YOUR DOCTOR IF:  1. Redness or swelling around the injection site.  2. Fever of 101 degrees  3. Drainage (pus) from the injection site.  4. For any continuous bleeding (some dried blood over the incision is normal.)    FOR EMERGENCIES:   If any unusual problems or difficulties occur during clinic hours, call [phone] or [phone].  Adult Procedural Sedation Instructions    Recovery After Procedural Sedation (Adult)  You have been given medicine by vein to make you sleep during your surgery. This may have included both a pain medicine and sleeping medicine. Most of the effects have worn off. But you may still have some drowsiness for the next 6 to 8 hours.  Home care  Follow these guidelines when you get home:  For the next 8 hours, you should be watched by a responsible adult. This person should make sure your condition is not getting worse.  Don't drink any alcohol for the next 24 hours.  Don't drive, operate dangerous machinery, or make important business or personal decisions during the next 24 hours.  Note: Your healthcare provider may tell you not to take any medicine by mouth for pain or sleep in the next 4 hours. These medicines may react  with the medicines you were given in the hospital. This could cause a much stronger response than usual.  Follow-up care  Follow up with your healthcare provider if you are not alert and back to your usual level of activity within 12 hours.  When to seek medical advice  Call your healthcare provider right away if any of these occur:  Drowsiness gets worse  Weakness or dizziness gets worse  Repeated vomiting  You can't be awakened   Date Last Reviewed: 10/18/2016  © 2898-5289 [employer]. [address] [place] [address]. All rights reserved. This information is not intended as a substitute for professional medical care. Always follow your healthcare professional's instructions.

## 2023-08-16 NOTE — DISCHARGE SUMMARY
[facility] - Endoscopy  Discharge Note  Short Stay    Procedure(s) (LRB):  Left L3, L4, L5 Radiofrequency Thermocoagulation of Medial Branches (Left)      OUTCOME: Patient tolerated treatment/procedure well without complication and is now ready for discharge.    DISPOSITION: Home or Self Care    FINAL DIAGNOSIS:  <principal problem not specified>    FOLLOWUP: In clinic    DISCHARGE INSTRUCTIONS:  No discharge procedures on file.       Discharge Diagnosis:Lumbar spondylosis [M47.816]  Condition on Discharge: Stable.  Diet on Discharge: Same as before.  Activity: as per instruction sheet.  Discharge to: Home with a responsible adult.  Follow up: as per Discharge instructions

## 2023-08-16 NOTE — OP NOTE
LUMBAR Medial Branch Radiofrequency Ablation Under Fluoroscopy  Time-out taken to identify patient and procedure side prior to starting the procedure.   I attest that I have reviewed the patient's home medications prior to the procedure and no contraindication have been identified. I  re-evaluated the patient after the patient was positioned for the procedure in the procedure room immediately before the procedural time-out. The vital signs are current and represent the current state of the patient which has not significantly changed since the preprocedure assessment.                         Date of Service: 08/16/2023    PCP: Shay Dai MD    Referring Physician:                                                PROCEDURE:  left L3, L4 and L5 medial branch radiofrequency ablation    REASON FOR PROCEDURE: Lumbar spondylosis [M47.816]    INDICATIONS:  Patient has completed 2 separate medial branch blocks at the specified levels.  Patient reported at least 80% relief of pain/symptoms for the expected duration of local anesthetic used.    PHYSICIAN: Lito Jasso MD  ASSISTANTS: None    MEDICATIONS INJECTED:  0.5 ml of Kenalog 40mg/ml, and Bupivacaine 0.25% 10ml. Of that, 1.5-3ml injected per level before & after the ablation.    LOCAL ANESTHETIC USED: Xylocaine 1% 3ml each site.  SEDATION MEDICATIONS: Versed 1 mg and fentanyl 75 mcg IV.  Conscious sedation provided by MD and monitored by RN.  Total sedation time:  15 minutes.  (See nurse documentation and case log for sedation time)      ESTIMATED BLOOD LOSS:  None.    COMPLICATIONS:  None.    TECHNIQUE:   Laying in a prone position, the patient was prepped and draped in the usual sterile fashion using ChloraPrep and fenestrated drape.  The level was determined under fluoroscopic guidance.  Local anesthetic was given by going down to the hub of the 27-gauge 1.25in needle and raising a wheal.  The 20-gauge needle was introduced to the anatomic local of the median  branch at the lateral mass utilizing live fluoroscopy at each level stated above. Motor stimulation done to confirm no motor nerve ablation takes place up to 2 Volt 2Hz..  Sensory stimulation done to detect similarities in pain location 1.5 Volts 50Hz.. Medication was then injected slowly.  Ablation then done per level utilizing [place] radiofrequency generator 80°C for 90 seconds. The patient tolerated the procedure well.         The patient was monitored after the procedure.  Patient was given post procedure and discharge instructions to follow at home.  We will see the patient back in two weeks or the patient may call to inform of status. The patient was discharged in a stable condition

## 2023-08-16 NOTE — INTERVAL H&P NOTE
The patient has been examined and the H&P has been reviewed:    I concur with the findings and no changes have occurred since H&P was written.    Procedure risks, benefits and alternative options discussed and understood by patient/family.      AAOx3 NAD  Mood and affect normal  Memory and language intact  RRR  CTAB      There are no hospital problems to display for this patient.

## 2023-08-22 ENCOUNTER — TELEPHONE (OUTPATIENT)
Dept: PAIN MEDICINE | Facility: CLINIC | Age: 53
End: 2023-08-22
Payer: MEDICARE

## 2023-08-22 NOTE — TELEPHONE ENCOUNTER
Reviewed pre-procedure instructions with Mrs. Meneses, as well as provided arrival time of 9:30a for 8/30/23.  All questions answered- verbalized understanding.

## 2023-08-26 DIAGNOSIS — N39.41 URGENCY INCONTINENCE: ICD-10-CM

## 2023-08-28 RX ORDER — TOLTERODINE 4 MG/1
4 CAPSULE, EXTENDED RELEASE ORAL DAILY
Qty: 30 CAPSULE | Refills: 11 | OUTPATIENT
Start: 2023-08-28

## 2023-08-28 RX ORDER — TOLTERODINE 4 MG/1
CAPSULE, EXTENDED RELEASE ORAL
Qty: 30 CAPSULE | Refills: 0 | OUTPATIENT
Start: 2023-08-28

## 2023-08-29 NOTE — DISCHARGE INSTRUCTIONS
Home Care Instructions Pain Management:    1. DIET:   You may resume your normal diet today.   2. BATHING:   You may shower with luke warm water.  3. DRESSING:   You may remove your bandage today.   4. ACTIVITY LEVEL:   You may resume your normal activities 24 hrs after your procedure.  5. MEDICATIONS:   You may resume your normal medications today.   6. SPECIAL INSTRUCTIONS:   No heat to the injection site for 24 hrs including, bath or shower, heating pad, moist heat, or hot tubs.    Use ice pack to injection site for any pain or discomfort.  Apply ice packs for 20 minute intervals as needed.   If you have received any sedatives by mouth today you may not drive for 12 hours.    If you have received any sedation through your IV, you may not drive for 24 hrs.     PLEASE CALL YOUR DOCTOR IF:  1. Redness or swelling around the injection site.  2. Fever of 101 degrees  3. Drainage (pus) from the injection site.  4. For any continuous bleeding (some dried blood over the incision is normal.)    FOR EMERGENCIES:   If any unusual problems or difficulties occur during clinic hours, call [phone] or [phone].  Adult Procedural Sedation Instructions    Recovery After Procedural Sedation (Adult)  You have been given medicine by vein to make you sleep during your surgery. This may have included both a pain medicine and sleeping medicine. Most of the effects have worn off. But you may still have some drowsiness for the next 6 to 8 hours.  Home care  Follow these guidelines when you get home:  For the next 8 hours, you should be watched by a responsible adult. This person should make sure your condition is not getting worse.  Don't drink any alcohol for the next 24 hours.  Don't drive, operate dangerous machinery, or make important business or personal decisions during the next 24 hours.  Note: Your healthcare provider may tell you not to take any medicine by mouth for pain or sleep in the next 4 hours. These medicines may react  with the medicines you were given in the hospital. This could cause a much stronger response than usual.  Follow-up care  Follow up with your healthcare provider if you are not alert and back to your usual level of activity within 12 hours.  When to seek medical advice  Call your healthcare provider right away if any of these occur:  Drowsiness gets worse  Weakness or dizziness gets worse  Repeated vomiting  You can't be awakened   Date Last Reviewed: 10/18/2016  © 2926-3549 [employer]. [address] [place] [id number]. All rights reserved. This information is not intended as a substitute for professional medical care. Always follow your healthcare professional's instructions.

## 2023-08-30 ENCOUNTER — HOSPITAL ENCOUNTER (OUTPATIENT)
Facility: HOSPITAL | Age: 53
Discharge: HOME | End: 2023-08-30
Attending: PAIN MEDICINE | Admitting: PAIN MEDICINE
Payer: MEDICARE

## 2023-08-30 VITALS
OXYGEN SATURATION: 96 % | TEMPERATURE: 98 F | DIASTOLIC BLOOD PRESSURE: 61 MMHG | HEART RATE: 59 BPM | SYSTOLIC BLOOD PRESSURE: 109 MMHG | RESPIRATION RATE: 18 BRPM

## 2023-08-30 DIAGNOSIS — M47.816 LUMBAR SPONDYLOSIS: Primary | ICD-10-CM

## 2023-08-30 PROCEDURE — 64635 DESTROY LUMB/SAC FACET JNT: CPT | Mod: HCNC,RT | Performed by: PAIN MEDICINE

## 2023-08-30 PROCEDURE — 25000003 PHARM REV CODE 250: Mod: HCNC | Performed by: PAIN MEDICINE

## 2023-08-30 PROCEDURE — 99152 PR MOD CONSCIOUS SEDATION, SAME PHYS, 5+ YRS, FIRST 15 MIN: ICD-10-PCS | Mod: ,,, | Performed by: PAIN MEDICINE

## 2023-08-30 PROCEDURE — 99152 MOD SED SAME PHYS/QHP 5/>YRS: CPT | Mod: ,,, | Performed by: PAIN MEDICINE

## 2023-08-30 PROCEDURE — 64635 DESTROY LUMB/SAC FACET JNT: CPT | Mod: HCNC,RT,, | Performed by: PAIN MEDICINE

## 2023-08-30 PROCEDURE — 64635 PR DESTROY LUMB/SAC FACET JNT: ICD-10-PCS | Mod: HCNC,RT,, | Performed by: PAIN MEDICINE

## 2023-08-30 PROCEDURE — 64636 PR DESTROY L/S FACET JNT ADDL: ICD-10-PCS | Mod: HCNC,RT,, | Performed by: PAIN MEDICINE

## 2023-08-30 PROCEDURE — 64636 DESTROY L/S FACET JNT ADDL: CPT | Mod: HCNC,RT,, | Performed by: PAIN MEDICINE

## 2023-08-30 PROCEDURE — 64636 DESTROY L/S FACET JNT ADDL: CPT | Mod: HCNC,RT | Performed by: PAIN MEDICINE

## 2023-08-30 PROCEDURE — 63600175 PHARM REV CODE 636 W HCPCS: Mod: HCNC | Performed by: PAIN MEDICINE

## 2023-08-30 RX ORDER — FENTANYL CITRATE 50 UG/ML
INJECTION, SOLUTION INTRAMUSCULAR; INTRAVENOUS
Status: DISCONTINUED
Start: 2023-08-30 | End: 2023-08-30 | Stop reason: HOSPADM

## 2023-08-30 RX ORDER — SODIUM CHLORIDE 9 MG/ML
INJECTION, SOLUTION INTRAVENOUS CONTINUOUS
Status: DISCONTINUED | OUTPATIENT
Start: 2023-08-30 | End: 2023-08-30 | Stop reason: HOSPADM

## 2023-08-30 RX ORDER — MIDAZOLAM HYDROCHLORIDE 1 MG/ML
5 INJECTION INTRAMUSCULAR; INTRAVENOUS
Status: DISCONTINUED | OUTPATIENT
Start: 2023-08-30 | End: 2023-08-30 | Stop reason: HOSPADM

## 2023-08-30 RX ORDER — BUPIVACAINE HYDROCHLORIDE 2.5 MG/ML
10 INJECTION, SOLUTION EPIDURAL; INFILTRATION; INTRACAUDAL ONCE
Status: COMPLETED | OUTPATIENT
Start: 2023-08-30 | End: 2023-08-30

## 2023-08-30 RX ORDER — MIDAZOLAM HYDROCHLORIDE 1 MG/ML
INJECTION INTRAMUSCULAR; INTRAVENOUS
Status: DISCONTINUED
Start: 2023-08-30 | End: 2023-08-30 | Stop reason: HOSPADM

## 2023-08-30 RX ORDER — LIDOCAINE HYDROCHLORIDE 20 MG/ML
10 INJECTION, SOLUTION INFILTRATION; PERINEURAL ONCE
Status: DISCONTINUED | OUTPATIENT
Start: 2023-08-30 | End: 2023-08-30 | Stop reason: ALTCHOICE

## 2023-08-30 RX ORDER — TRIAMCINOLONE ACETONIDE 40 MG/ML
INJECTION, SUSPENSION INTRA-ARTICULAR; INTRAMUSCULAR
Status: DISCONTINUED
Start: 2023-08-30 | End: 2023-08-30 | Stop reason: HOSPADM

## 2023-08-30 RX ORDER — TRIAMCINOLONE ACETONIDE 40 MG/ML
40 INJECTION, SUSPENSION INTRA-ARTICULAR; INTRAMUSCULAR ONCE
Status: COMPLETED | OUTPATIENT
Start: 2023-08-30 | End: 2023-08-30

## 2023-08-30 RX ORDER — FENTANYL CITRATE 50 UG/ML
100 INJECTION, SOLUTION INTRAMUSCULAR; INTRAVENOUS
Status: DISCONTINUED | OUTPATIENT
Start: 2023-08-30 | End: 2023-08-30 | Stop reason: HOSPADM

## 2023-08-30 RX ORDER — LIDOCAINE HYDROCHLORIDE 10 MG/ML
10 INJECTION INFILTRATION; PERINEURAL ONCE
Status: COMPLETED | OUTPATIENT
Start: 2023-08-30 | End: 2023-08-30

## 2023-08-30 RX ORDER — BUPIVACAINE HYDROCHLORIDE 2.5 MG/ML
INJECTION, SOLUTION EPIDURAL; INFILTRATION; INTRACAUDAL
Status: DISCONTINUED
Start: 2023-08-30 | End: 2023-08-30 | Stop reason: HOSPADM

## 2023-08-30 RX ORDER — LIDOCAINE HYDROCHLORIDE 10 MG/ML
INJECTION INFILTRATION; PERINEURAL
Status: DISCONTINUED
Start: 2023-08-30 | End: 2023-08-30 | Stop reason: HOSPADM

## 2023-08-30 RX ADMIN — SODIUM CHLORIDE: 9 INJECTION, SOLUTION INTRAVENOUS at 09:08

## 2023-08-30 NOTE — OP NOTE
LUMBAR Medial Branch Radiofrequency Ablation Under Fluoroscopy  Time-out taken to identify patient and procedure side prior to starting the procedure.   I attest that I have reviewed the patient's home medications prior to the procedure and no contraindication have been identified. I  re-evaluated the patient after the patient was positioned for the procedure in the procedure room immediately before the procedural time-out. The vital signs are current and represent the current state of the patient which has not significantly changed since the preprocedure assessment.                         Date of Service: 08/30/2023    PCP: Shay Dai MD    Referring Physician:                                                PROCEDURE:  right L3, L4 and L5 medial branch radiofrequency ablation    REASON FOR PROCEDURE: Lumbar spondylosis [M47.816]    INDICATIONS:  Patient has completed 2 separate medial branch blocks at the specified levels.  Patient reported at least 80% relief of pain/symptoms for the expected duration of local anesthetic used.    PHYSICIAN: Lito Jasso MD  ASSISTANTS: None    MEDICATIONS INJECTED:  0.5 ml of Kenalog 40mg/ml, and Bupivacaine 0.25% 10ml. Of that, 1.5-3ml injected per level before & after the ablation.    LOCAL ANESTHETIC USED: Xylocaine 1% 3ml each site.    SEDATION MEDICATIONS: Versed 1 mg and fentanyl 100 mcg IV.  Conscious sedation provided by MD and monitored by RN.  Total sedation time:  16 minutes.  (See nurse documentation and case log for sedation time)      ESTIMATED BLOOD LOSS:  None.    COMPLICATIONS:  None.    TECHNIQUE:   Laying in a prone position, the patient was prepped and draped in the usual sterile fashion using ChloraPrep and fenestrated drape.  The level was determined under fluoroscopic guidance.  Local anesthetic was given by going down to the hub of the 27-gauge 1.25in needle and raising a wheal.  The 20-gauge needle was introduced to the anatomic local of the  median branch at the lateral mass utilizing live fluoroscopy at each level stated above. Motor stimulation done to confirm no motor nerve ablation takes place up to 2 Volt 2Hz..  Sensory stimulation done to detect similarities in pain location 1.5 Volts 50Hz.. Medication was then injected slowly.  Ablation then done per level utilizing Halyard radiofrequency generator 80°C for 90 seconds. The patient tolerated the procedure well.         The patient was monitored after the procedure.  Patient was given post procedure and discharge instructions to follow at home.  We will see the patient back in two weeks or the patient may call to inform of status. The patient was discharged in a stable condition

## 2023-08-30 NOTE — DISCHARGE SUMMARY
[facility] [place] - [place]oscopy  Discharge Note  Short Stay    Procedure(s) (LRB):  Right L3, L4, L5 Radiofrequency Thermocoagulation of Medial Branches (Right)      OUTCOME: Patient tolerated treatment/procedure well without complication and is now ready for discharge.    DISPOSITION: Home or Self Care    FINAL DIAGNOSIS:  <principal problem not specified>    FOLLOWUP: In clinic    DISCHARGE INSTRUCTIONS:  No discharge procedures on file.       Discharge Diagnosis:Lumbar spondylosis [M47.816]  Condition on Discharge: Stable.  Diet on Discharge: Same as before.  Activity: as per instruction sheet.  Discharge to: Home with a responsible adult.  Follow up: as per Discharge instructions

## 2023-08-31 ENCOUNTER — PATIENT MESSAGE (OUTPATIENT)
Dept: PAIN MEDICINE | Facility: CLINIC | Age: 53
End: 2023-08-31
Payer: MEDICARE

## 2023-09-06 ENCOUNTER — PATIENT MESSAGE (OUTPATIENT)
Dept: BARIATRICS | Facility: CLINIC | Age: 53
End: 2023-09-06
Payer: MEDICARE

## 2023-09-07 ENCOUNTER — OFFICE VISIT (OUTPATIENT)
Dept: UROLOGY | Facility: CLINIC | Age: 53
End: 2023-09-07
Payer: MEDICARE

## 2023-09-07 VITALS — BODY MASS INDEX: 44.32 KG/M2 | WEIGHT: 282.94 LBS

## 2023-09-07 DIAGNOSIS — N39.41 URGENCY INCONTINENCE: Primary | ICD-10-CM

## 2023-09-07 DIAGNOSIS — R39.15 URINARY URGENCY: ICD-10-CM

## 2023-09-07 DIAGNOSIS — R35.1 NOCTURIA MORE THAN TWICE PER NIGHT: ICD-10-CM

## 2023-09-07 PROCEDURE — 3008F PR BODY MASS INDEX (BMI) DOCUMENTED: ICD-10-PCS | Mod: HCNC,CPTII,S$GLB, | Performed by: UROLOGY

## 2023-09-07 PROCEDURE — 99999 PR PBB SHADOW E&M-EST. PATIENT-LVL IV: ICD-10-PCS | Mod: PBBFAC,HCNC,, | Performed by: UROLOGY

## 2023-09-07 PROCEDURE — 99999 PR PBB SHADOW E&M-EST. PATIENT-LVL IV: CPT | Mod: PBBFAC,HCNC,, | Performed by: UROLOGY

## 2023-09-07 PROCEDURE — 1160F PR REVIEW ALL MEDS BY PRESCRIBER/CLIN PHARMACIST DOCUMENTED: ICD-10-PCS | Mod: HCNC,CPTII,S$GLB, | Performed by: UROLOGY

## 2023-09-07 PROCEDURE — 1159F PR MEDICATION LIST DOCUMENTED IN MEDICAL RECORD: ICD-10-PCS | Mod: HCNC,CPTII,S$GLB, | Performed by: UROLOGY

## 2023-09-07 PROCEDURE — 99213 OFFICE O/P EST LOW 20 MIN: CPT | Mod: HCNC,S$GLB,, | Performed by: UROLOGY

## 2023-09-07 PROCEDURE — 99213 PR OFFICE/OUTPT VISIT, EST, LEVL III, 20-29 MIN: ICD-10-PCS | Mod: HCNC,S$GLB,, | Performed by: UROLOGY

## 2023-09-07 PROCEDURE — 1160F RVW MEDS BY RX/DR IN RCRD: CPT | Mod: HCNC,CPTII,S$GLB, | Performed by: UROLOGY

## 2023-09-07 PROCEDURE — 1159F MED LIST DOCD IN RCRD: CPT | Mod: HCNC,CPTII,S$GLB, | Performed by: UROLOGY

## 2023-09-07 PROCEDURE — 3008F BODY MASS INDEX DOCD: CPT | Mod: HCNC,CPTII,S$GLB, | Performed by: UROLOGY

## 2023-09-07 RX ORDER — TOLTERODINE 4 MG/1
4 CAPSULE, EXTENDED RELEASE ORAL DAILY
Qty: 90 CAPSULE | Refills: 3 | Status: SHIPPED | OUTPATIENT
Start: 2023-09-07

## 2023-09-07 NOTE — PROGRESS NOTES
Subjective:       Patient ID: Zaira Dowell is a 52 y.o. female The patient's last visit with me was on 4/5/2021.     Chief Complaint:   Chief Complaint   Patient presents with    Other     Urinary Incontinence  Patient complains of urinary incontinence. This has been present for several years.  This issue seems to have worsened over the past several months.  She has also noted now to have enuresis.  She leaks urine with with urge, during the night. Patient describes the symptoms as frequent urination (10x per day) and urge to urinate with little or no warning. Factors associated with symptoms include none known. Evaluation to date includes none. Treatment to date includes oxybutinin, which was somewhat effective for a few years but is now less effective.  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  She denies any handwriting changes.    She has been taking less baclofen and her enuresis is improved.  She did not take Vesicare due to insurance issues.      09/07/2023  She last saw me in April 2021 with a negative cystoscopy for microscopic hematuria.  She is doing well with Detrol LA.  No significant leakage.      ACTIVE MEDICAL ISSUES:  Patient Active Problem List   Diagnosis    Fibromyalgia    Pulmonary hypertension    Anxiety    Depression    Bipolar 1 disorder    History of tobacco abuse    BMI 40.0-44.9, adult    SHARON (obstructive sleep apnea)    Trochanteric bursitis of both hips    Trochanteric bursitis    Pain of both hip joints    GERD (gastroesophageal reflux disease)    IIH (idiopathic intracranial hypertension)    Mild intermittent asthma with acute exacerbation    Ulnar neuropathy of left upper extremity    Adrenal incidentaloma    Nasal congestion    Insomnia    Decreased range of motion of lumbar spine    Weakness of trunk musculature    DDD (degenerative disc disease), lumbar    Lumbar spondylosis        PAST MEDICAL HISTORY  Past Medical History:   Diagnosis Date    Anxiety     Arthritis     Asthma     Back pain     Bipolar I     BMI 50.0-59.9, adult     Chronic obstructive pulmonary disease, unspecified COPD type 03/21/2022    Depression     Fibromyalgia     GERD (gastroesophageal reflux disease)     HFpEF     Insomnia 11/23/2021    Obesity     SHARON (obstructive sleep apnea)     Tobacco dependence     Urinary incontinence        PAST SURGICAL HISTORY:  Past Surgical History:   Procedure Laterality Date    CARPAL TUNNEL RELEASE      CHOLECYSTECTOMY      COLONOSCOPY N/A 4/25/2022    Procedure: COLONOSCOPY;  Surgeon: Ernesto Auguste MD;  Location: [facility] ENDO;  Service: Endoscopy;  Laterality: N/A;  fully vaccinated-GT    EPIDURAL STEROID INJECTION Bilateral 7/19/2023    Procedure: Bilateral L3, L4, L5 Medial Branch Blocks #1;  Surgeon: Lito Jasso Jr., MD;  Location: [facility] ENDO;  Service: Pain Management;  Laterality: Bilateral;  @1230  No ATC or DM    EPIDURAL STEROID INJECTION Bilateral 8/2/2023    Procedure: Bilateral L3, L4, L5 Medial Branch Blocks #2;  Surgeon: Lito Jasso Jr., MD;  Location: [facility];  Service: Pain Management;  Laterality: Bilateral;  @1000  No ATC or DM    EPIDURAL STEROID INJECTION Left 8/16/2023    Procedure: Left L3, L4, L5 Radiofrequency Thermocoagulation of Medial Branches;  Surgeon: Lito Jasso Jr., MD;  Location: [facility] ENDO;  Service: Pain Management;  Laterality: Left;  @0815  No ATC or DM    EPIDURAL STEROID INJECTION Right 8/30/2023    Procedure: Right L3, L4, L5 Radiofrequency Thermocoagulation of Medial Branches;  Surgeon: Lito Jasso Jr., MD;  Location: [facility];  Service: Pain Management;  Laterality: Right;  @0930  No ATC or DM  Last 1&75    ESOPHAGOGASTRODUODENOSCOPY N/A 5/21/2019    Procedure: EGD (ESOPHAGOGASTRODUODENOSCOPY);  Surgeon: Michelle Mc MD;  Location: [facility];  Service: Endoscopy;  Laterality: N/A;    HYSTERECTOMY       partial     KNEE SURGERY      LAPAROSCOPIC SLEEVE GASTRECTOMY N/A 10/18/2019    Procedure: GASTRECTOMY, SLEEVE, LAPAROSCOPIC, with intraop EGD;  Surgeon: Ean Kohli MD;  Location: [facility] OR [address] [place] [address];  Service: General;  Laterality: N/A;    pinched nerve      SHOULDER SURGERY      TONSILLECTOMY      WRIST SURGERY      had ganlin cyst       SOCIAL HISTORY:  Social History     Tobacco Use    Smoking status: Some Days     Types: Vaping with nicotine    Smokeless tobacco: Never    Tobacco comments:     vaping started April 2020; QUIT cigarettes Sept 2019   Substance Use Topics    Alcohol use: No    Drug use: No       FAMILY HISTORY:  Family History   Problem Relation Age of Onset    Diabetes Mother     Jumping Branch's disease Mother     Hypertension Mother     Heart disease Father     Stroke Father     Mental illness Father         ptsd    Hypertension Father     Diabetes Father     Depression Father     No Known Problems Brother     Heart disease Maternal Grandmother     Depression Maternal Grandmother     COPD Maternal Grandfather     Heart disease Maternal Grandfather     Stroke Maternal Grandfather     Kidney disease Paternal Grandmother     Heart disease Paternal Grandmother     Heart disease Paternal Grandfather     Hypertension Son     Rectal cancer Paternal Aunt        ALLERGIES AND MEDICATIONS: updated and reviewed.  Review of patient's allergies indicates:   Allergen Reactions    Hydrocodone-acetaminophen Nausea And Vomiting     Current Outpatient Medications   Medication Sig    albuterol (PROVENTIL/VENTOLIN HFA) 90 mcg/actuation inhaler Inhale 2 puffs into the lungs every 4 (four) hours as needed for Wheezing or Shortness of Breath. Rescue    allopurinoL (ZYLOPRIM) 100 MG tablet Take 1 tablet (100 mg total) by mouth once daily.    b complex vitamins tablet Take 1 tablet by mouth once daily.    buPROPion (WELLBUTRIN SR) 100 MG TBSR 12 hr tablet Take 2 tabs q am, and 1 tab daily at noon    calcium citrate  (CALCITRATE) 200 mg (950 mg) tablet Take 1 tablet by mouth 2 (two) times daily.    cetirizine (ZYRTEC) 10 MG tablet Take 1 tablet (10 mg total) by mouth once daily.    colchicine (COLCRYS) 0.6 mg tablet Take 1 tablet (0.6 mg total) by mouth daily as needed (gout flare).    cyanocobalamin 500 MCG tablet Take 500 mcg by mouth once daily.    cyclobenzaprine (FLEXERIL) 5 MG tablet Take 1 tablet (5 mg total) by mouth 3 (three) times daily as needed for Muscle spasms.    diclofenac sodium (VOLTAREN) 1 % Gel Apply 2 g topically 3 (three) times daily as needed (shoulder pain).    ferrous fumarate/vit Bcomp,C (SUPER B COMPLEX ORAL) Take 1 capsule by mouth once daily.    fluticasone-salmeterol diskus inhaler 250-50 mcg Inhale 1 puff into the lungs 2 (two) times daily. (Patient not taking: Reported on 7/6/2023)    furosemide (LASIX) 40 MG tablet Take 40 mg by mouth.    ipratropium (ATROVENT) 42 mcg (0.06 %) nasal spray by Each Nostril route.    lithium (ESKALITH) 300 MG capsule Take 1 capsule (300 mg total) by mouth 3 (three) times daily with meals.    LORazepam (ATIVAN) 0.5 MG tablet TAKE 2 TABLETS EVERY DAY AS DIRECTED    meclizine (ANTIVERT) 25 mg tablet Take 1 tablet (25 mg total) by mouth every 6 (six) hours as needed for Dizziness.    multivitamin capsule Take 1 capsule by mouth 2 (two) times daily.     omeprazole (PRILOSEC) 40 MG capsule TAKE 1 CAPSULE TWICE DAILY BEFORE MEALS    ondansetron (ZOFRAN-ODT) 8 MG TbDL Take 1 tablet (8 mg total) by mouth 2 (two) times daily as needed (nausea).    potassium chloride (KLOR-CON) 10 MEQ TbSR Take 10 mEq by mouth 2 (two) times daily.    pregabalin (LYRICA) 100 MG capsule Take 1 capsule (100 mg total) by mouth 2 (two) times daily.    promethazine (PHENERGAN) 12.5 MG Tab promethazine Take half tablet (oral) 2 times per day PRN - Nausea . Medication may cause drowsiness. [id number] tablet 2 times per day oral No set duration recorded No set duration amount recorded active 12.5 mg     risperiDONE (RISPERDAL) 0.5 MG Tab Take 1 tablet (0.5 mg total) by mouth 2 (two) times daily.    sertraline (ZOLOFT) 100 MG tablet Take 2 tablets (200 mg total) by mouth once daily.    tolterodine (DETROL LA) 4 MG 24 hr capsule Take 1 capsule (4 mg total) by mouth once daily.    topiramate (TOPAMAX) 200 MG Tab Take 1 tablet (200 mg total) by mouth once daily.     No current facility-administered medications for this visit.       Review of Systems   Constitutional:  Negative for chills, fatigue and fever.   Respiratory:  Negative for chest tightness and shortness of breath.    Cardiovascular:  Negative for chest pain.   Gastrointestinal:  Negative for abdominal distention, constipation, nausea and vomiting.   Genitourinary:  Negative for difficulty urinating, dysuria, flank pain, frequency, hematuria and urgency.   Musculoskeletal:  Negative for arthralgias.   Neurological:  Negative for light-headedness.   Psychiatric/Behavioral:  Negative for confusion.        Objective:      Vitals:    09/07/23 1015   Weight: 128.4 kg (282 lb 15.4 oz)     Physical Exam  Vitals and nursing note reviewed.   Constitutional:       Appearance: She is well-developed.   HENT:      Head: Normocephalic.   Eyes:      Conjunctiva/sclera: Conjunctivae normal.   Neck:      Thyroid: No thyromegaly.      Trachea: No tracheal deviation.   Cardiovascular:      Rate and Rhythm: Normal rate.      Pulses: Normal pulses.      Heart sounds: Normal heart sounds.   Pulmonary:      Effort: Pulmonary effort is normal. No respiratory distress.      Breath sounds: Normal breath sounds. No wheezing.   Abdominal:      General: There is no distension.      Palpations: Abdomen is soft. There is no mass.      Tenderness: There is no abdominal tenderness. There is no guarding or rebound.      Hernia: No hernia is present.   Musculoskeletal:         General: No tenderness. Normal range of motion.      Cervical back: Normal range of motion.   Lymphadenopathy:       Cervical: No cervical adenopathy.   Skin:     General: Skin is warm and dry.      Findings: No erythema or rash.   Neurological:      Mental Status: She is alert and oriented to person, place, and time.   Psychiatric:         Behavior: Behavior normal.         Thought Content: Thought content normal.         Judgment: Judgment normal.         Urine dipstick shows not done.  Micro exam: not done.    Assessment:       1. Urgency incontinence    2. Urinary urgency    3. Nocturia more than twice per night          Plan:       1. Urgency incontinence    - tolterodine (DETROL LA) 4 MG 24 hr capsule; Take 1 capsule (4 mg total) by mouth once daily.  Dispense: 90 capsule; Refill: 3    2. Urinary urgency      3. Nocturia more than twice per night              Follow up in about 1 year (around 9/7/2024) for Follow up Established.

## 2023-09-12 ENCOUNTER — PATIENT MESSAGE (OUTPATIENT)
Dept: BARIATRICS | Facility: CLINIC | Age: 53
End: 2023-09-12
Payer: MEDICARE

## 2023-09-18 ENCOUNTER — HOSPITAL ENCOUNTER (EMERGENCY)
Facility: HOSPITAL | Age: 53
Discharge: LEFT BEFORE DISCHARGE | End: 2023-09-18
Attending: STUDENT IN AN ORGANIZED HEALTH CARE EDUCATION/TRAINING PROGRAM
Payer: MEDICARE

## 2023-09-18 ENCOUNTER — PATIENT MESSAGE (OUTPATIENT)
Dept: PRIMARY CARE CLINIC | Facility: CLINIC | Age: 53
End: 2023-09-18
Payer: MEDICARE

## 2023-09-18 VITALS
TEMPERATURE: 98 F | DIASTOLIC BLOOD PRESSURE: 60 MMHG | HEIGHT: 67 IN | BODY MASS INDEX: 43.79 KG/M2 | HEART RATE: 55 BPM | SYSTOLIC BLOOD PRESSURE: 135 MMHG | WEIGHT: 279 LBS | OXYGEN SATURATION: 100 % | RESPIRATION RATE: 18 BRPM

## 2023-09-18 DIAGNOSIS — R06.02 SOB (SHORTNESS OF BREATH): ICD-10-CM

## 2023-09-18 DIAGNOSIS — R07.9 CHEST PAIN: Primary | ICD-10-CM

## 2023-09-18 LAB
BASOPHILS # BLD AUTO: 0.07 K/UL (ref 0–0.2)
BASOPHILS NFR BLD: 0.8 % (ref 0–1.9)
DIFFERENTIAL METHOD: NORMAL
EOSINOPHIL # BLD AUTO: 0.2 K/UL (ref 0–0.5)
EOSINOPHIL NFR BLD: 2.5 % (ref 0–8)
ERYTHROCYTE [DISTWIDTH] IN BLOOD BY AUTOMATED COUNT: 12.8 % (ref 11.5–14.5)
HCT VFR BLD AUTO: 41.8 % (ref 37–48.5)
HGB BLD-MCNC: 13.6 G/DL (ref 12–16)
IMM GRANULOCYTES # BLD AUTO: 0.03 K/UL (ref 0–0.04)
IMM GRANULOCYTES NFR BLD AUTO: 0.4 % (ref 0–0.5)
LYMPHOCYTES # BLD AUTO: 2.4 K/UL (ref 1–4.8)
LYMPHOCYTES NFR BLD: 28.7 % (ref 18–48)
MCH RBC QN AUTO: 30.9 PG (ref 27–31)
MCHC RBC AUTO-ENTMCNC: 32.5 G/DL (ref 32–36)
MCV RBC AUTO: 95 FL (ref 82–98)
MONOCYTES # BLD AUTO: 0.6 K/UL (ref 0.3–1)
MONOCYTES NFR BLD: 7.3 % (ref 4–15)
NEUTROPHILS # BLD AUTO: 5.1 K/UL (ref 1.8–7.7)
NEUTROPHILS NFR BLD: 60.3 % (ref 38–73)
NRBC BLD-RTO: 0 /100 WBC
PLATELET # BLD AUTO: 307 K/UL (ref 150–450)
PMV BLD AUTO: 9.5 FL (ref 9.2–12.9)
POCT GLUCOSE: 74 MG/DL (ref 70–110)
RBC # BLD AUTO: 4.4 M/UL (ref 4–5.4)
TROPONIN I SERPL DL<=0.01 NG/ML-MCNC: <0.006 NG/ML (ref 0–0.03)
WBC # BLD AUTO: 8.51 K/UL (ref 3.9–12.7)

## 2023-09-18 PROCEDURE — 82962 GLUCOSE BLOOD TEST: CPT | Mod: HCNC

## 2023-09-18 PROCEDURE — 93010 EKG 12-LEAD: ICD-10-PCS | Mod: HCNC,,, | Performed by: INTERNAL MEDICINE

## 2023-09-18 PROCEDURE — 93010 ELECTROCARDIOGRAM REPORT: CPT | Mod: HCNC,,, | Performed by: INTERNAL MEDICINE

## 2023-09-18 PROCEDURE — 85025 COMPLETE CBC W/AUTO DIFF WBC: CPT | Mod: HCNC | Performed by: STUDENT IN AN ORGANIZED HEALTH CARE EDUCATION/TRAINING PROGRAM

## 2023-09-18 PROCEDURE — 93005 ELECTROCARDIOGRAM TRACING: CPT | Mod: HCNC

## 2023-09-18 PROCEDURE — 84484 ASSAY OF TROPONIN QUANT: CPT | Mod: HCNC | Performed by: PHYSICIAN ASSISTANT

## 2023-09-18 PROCEDURE — 99285 EMERGENCY DEPT VISIT HI MDM: CPT | Mod: 25,HCNC

## 2023-09-18 NOTE — LETTER
Patient: Zaira Dowell  YOB: 1970  Date: 9/18/2023 Time: 8:25 PM  Location: [facility]    Leaving the Hospital Against Medical Advice    Chart #:[id number]    This will certify that I, the undersigned,    ______________________________________________________________________    A patient in the above named medical center, having requested discharge and removal from the medical center against the advice of my attending physician(s), hereby release [facility] [place], its physicians, officers and employees, severally and individually, from any and all liability of any nature whatsoever for any injury or harm or complication of any kind that may result directly or indirectly, by reason of my terminating my stay as a patient at [facility] and my departure from [facility], and hereby waive any and all rights of action I may now have or later acquire as a result of my voluntary departure from [facility] and the termination of my stay as a patient therein.    This release is made with the full knowledge of the danger that may result from the action which I am taking.      Date:_______________________                         ___________________________                                                                                    Patient/Legal Representative    Witness:        ____________________________                          ___________________________  Nurse                                                                        Physician

## 2023-09-19 ENCOUNTER — HOSPITAL ENCOUNTER (EMERGENCY)
Facility: HOSPITAL | Age: 53
Discharge: HOME | End: 2023-09-19
Attending: EMERGENCY MEDICINE
Payer: MEDICARE

## 2023-09-19 VITALS
TEMPERATURE: 98 F | DIASTOLIC BLOOD PRESSURE: 77 MMHG | HEIGHT: 67 IN | HEART RATE: 70 BPM | BODY MASS INDEX: 43.95 KG/M2 | RESPIRATION RATE: 18 BRPM | OXYGEN SATURATION: 98 % | SYSTOLIC BLOOD PRESSURE: 131 MMHG | WEIGHT: 280 LBS

## 2023-09-19 DIAGNOSIS — R06.02 SOB (SHORTNESS OF BREATH): ICD-10-CM

## 2023-09-19 DIAGNOSIS — R07.9 CHEST PAIN: ICD-10-CM

## 2023-09-19 LAB
ALBUMIN SERPL BCP-MCNC: 3.6 G/DL (ref 3.5–5.2)
ALP SERPL-CCNC: 67 U/L (ref 55–135)
ALT SERPL W/O P-5'-P-CCNC: 25 U/L (ref 10–44)
ANION GAP SERPL CALC-SCNC: 8 MMOL/L (ref 8–16)
AST SERPL-CCNC: 15 U/L (ref 10–40)
BASOPHILS # BLD AUTO: 0.07 K/UL (ref 0–0.2)
BASOPHILS NFR BLD: 1 % (ref 0–1.9)
BILIRUB SERPL-MCNC: 0.4 MG/DL (ref 0.1–1)
BILIRUB UR QL STRIP: NEGATIVE
BNP SERPL-MCNC: 31 PG/ML (ref 0–99)
BUN SERPL-MCNC: 12 MG/DL (ref 6–20)
CALCIUM SERPL-MCNC: 9.5 MG/DL (ref 8.7–10.5)
CHLORIDE SERPL-SCNC: 110 MMOL/L (ref 95–110)
CLARITY UR REFRACT.AUTO: CLEAR
CO2 SERPL-SCNC: 21 MMOL/L (ref 23–29)
COLOR UR AUTO: YELLOW
CREAT SERPL-MCNC: 0.7 MG/DL (ref 0.5–1.4)
DIFFERENTIAL METHOD: ABNORMAL
EOSINOPHIL # BLD AUTO: 0.2 K/UL (ref 0–0.5)
EOSINOPHIL NFR BLD: 2.3 % (ref 0–8)
ERYTHROCYTE [DISTWIDTH] IN BLOOD BY AUTOMATED COUNT: 12.8 % (ref 11.5–14.5)
EST. GFR  (NO RACE VARIABLE): >60 ML/MIN/1.73 M^2
GLUCOSE SERPL-MCNC: 80 MG/DL (ref 70–110)
GLUCOSE UR QL STRIP: NEGATIVE
HCT VFR BLD AUTO: 42.9 % (ref 37–48.5)
HCV AB SERPL QL IA: NORMAL
HGB BLD-MCNC: 13.8 G/DL (ref 12–16)
HGB UR QL STRIP: NEGATIVE
HIV 1+2 AB+HIV1 P24 AG SERPL QL IA: NORMAL
IMM GRANULOCYTES # BLD AUTO: 0.02 K/UL (ref 0–0.04)
IMM GRANULOCYTES NFR BLD AUTO: 0.3 % (ref 0–0.5)
INFLUENZA A, MOLECULAR: NEGATIVE
INFLUENZA B, MOLECULAR: NEGATIVE
KETONES UR QL STRIP: NEGATIVE
LEUKOCYTE ESTERASE UR QL STRIP: NEGATIVE
LIPASE SERPL-CCNC: 32 U/L (ref 4–60)
LYMPHOCYTES # BLD AUTO: 2.2 K/UL (ref 1–4.8)
LYMPHOCYTES NFR BLD: 30.3 % (ref 18–48)
MCH RBC QN AUTO: 30.5 PG (ref 27–31)
MCHC RBC AUTO-ENTMCNC: 32.2 G/DL (ref 32–36)
MCV RBC AUTO: 95 FL (ref 82–98)
MONOCYTES # BLD AUTO: 0.5 K/UL (ref 0.3–1)
MONOCYTES NFR BLD: 6.7 % (ref 4–15)
NEUTROPHILS # BLD AUTO: 4.3 K/UL (ref 1.8–7.7)
NEUTROPHILS NFR BLD: 59.4 % (ref 38–73)
NITRITE UR QL STRIP: NEGATIVE
NRBC BLD-RTO: 0 /100 WBC
PH UR STRIP: 7 [PH] (ref 5–8)
PLATELET # BLD AUTO: 309 K/UL (ref 150–450)
PMV BLD AUTO: 9.1 FL (ref 9.2–12.9)
POTASSIUM SERPL-SCNC: 4 MMOL/L (ref 3.5–5.1)
PROT SERPL-MCNC: 7.1 G/DL (ref 6–8.4)
PROT UR QL STRIP: NEGATIVE
RBC # BLD AUTO: 4.52 M/UL (ref 4–5.4)
SARS-COV-2 RDRP RESP QL NAA+PROBE: NEGATIVE
SODIUM SERPL-SCNC: 139 MMOL/L (ref 136–145)
SP GR UR STRIP: 1.01 (ref 1–1.03)
SPECIMEN SOURCE: NORMAL
TROPONIN I SERPL DL<=0.01 NG/ML-MCNC: <0.006 NG/ML (ref 0–0.03)
URN SPEC COLLECT METH UR: NORMAL
WBC # BLD AUTO: 7.26 K/UL (ref 3.9–12.7)

## 2023-09-19 PROCEDURE — 63600175 PHARM REV CODE 636 W HCPCS: Mod: HCNC

## 2023-09-19 PROCEDURE — 96361 HYDRATE IV INFUSION ADD-ON: CPT | Mod: HCNC

## 2023-09-19 PROCEDURE — 87389 HIV-1 AG W/HIV-1&-2 AB AG IA: CPT | Mod: HCNC | Performed by: PHYSICIAN ASSISTANT

## 2023-09-19 PROCEDURE — 85025 COMPLETE CBC W/AUTO DIFF WBC: CPT | Mod: HCNC

## 2023-09-19 PROCEDURE — 96375 TX/PRO/DX INJ NEW DRUG ADDON: CPT | Mod: HCNC

## 2023-09-19 PROCEDURE — 93010 EKG 12-LEAD: ICD-10-PCS | Mod: HCNC,,, | Performed by: INTERNAL MEDICINE

## 2023-09-19 PROCEDURE — 87502 INFLUENZA DNA AMP PROBE: CPT | Mod: HCNC

## 2023-09-19 PROCEDURE — 81003 URINALYSIS AUTO W/O SCOPE: CPT | Mod: HCNC

## 2023-09-19 PROCEDURE — 93010 ELECTROCARDIOGRAM REPORT: CPT | Mod: HCNC,,, | Performed by: INTERNAL MEDICINE

## 2023-09-19 PROCEDURE — 86803 HEPATITIS C AB TEST: CPT | Mod: HCNC | Performed by: PHYSICIAN ASSISTANT

## 2023-09-19 PROCEDURE — 83880 ASSAY OF NATRIURETIC PEPTIDE: CPT | Mod: HCNC

## 2023-09-19 PROCEDURE — U0002 COVID-19 LAB TEST NON-CDC: HCPCS | Mod: HCNC

## 2023-09-19 PROCEDURE — 93005 ELECTROCARDIOGRAM TRACING: CPT | Mod: HCNC

## 2023-09-19 PROCEDURE — 96374 THER/PROPH/DIAG INJ IV PUSH: CPT | Mod: HCNC

## 2023-09-19 PROCEDURE — 83690 ASSAY OF LIPASE: CPT | Mod: HCNC

## 2023-09-19 PROCEDURE — 80053 COMPREHEN METABOLIC PANEL: CPT | Mod: HCNC

## 2023-09-19 PROCEDURE — 84484 ASSAY OF TROPONIN QUANT: CPT | Mod: HCNC

## 2023-09-19 PROCEDURE — 25000003 PHARM REV CODE 250: Mod: HCNC

## 2023-09-19 PROCEDURE — 99285 EMERGENCY DEPT VISIT HI MDM: CPT | Mod: 25,HCNC

## 2023-09-19 RX ORDER — HYOSCYAMINE SULFATE 0.12 MG/1
0.12 TABLET SUBLINGUAL
Status: COMPLETED | OUTPATIENT
Start: 2023-09-19 | End: 2023-09-19

## 2023-09-19 RX ORDER — FAMOTIDINE 10 MG/ML
20 INJECTION INTRAVENOUS
Status: COMPLETED | OUTPATIENT
Start: 2023-09-19 | End: 2023-09-19

## 2023-09-19 RX ORDER — ONDANSETRON 2 MG/ML
4 INJECTION INTRAMUSCULAR; INTRAVENOUS
Status: COMPLETED | OUTPATIENT
Start: 2023-09-19 | End: 2023-09-19

## 2023-09-19 RX ADMIN — SODIUM CHLORIDE 1000 ML: 9 INJECTION, SOLUTION INTRAVENOUS at 05:09

## 2023-09-19 RX ADMIN — HYOSCYAMINE SULFATE 0.12 MG: 0.12 TABLET, ORALLY DISINTEGRATING ORAL at 05:09

## 2023-09-19 RX ADMIN — ONDANSETRON 4 MG: 2 INJECTION INTRAMUSCULAR; INTRAVENOUS at 05:09

## 2023-09-19 RX ADMIN — FAMOTIDINE 20 MG: 10 INJECTION, SOLUTION INTRAVENOUS at 05:09

## 2023-09-19 NOTE — ED PROVIDER NOTES
Encounter Date: 9/18/2023       History     Chief Complaint   Patient presents with    Chest Pain     Pt to ER with reports of Chest pain and SOB with reports of left sided arm weakness x 1.5hrs. Pt denies dizziness, headache, blurred vision, confusion, or difficulty walking/ speaking. PA in triage for assessment. No CODE stroke at this time      52-year-old female to ER with reports of intermittent left-sided chest pain associated with SOB and also left sided arm pain and secondary left sided arm weakness for the past 90 minutes. Pt denies dizziness, headache, blurred vision, confusion, or difficulty walking/ speaking.       Review of patient's allergies indicates:   Allergen Reactions    Hydrocodone-acetaminophen Nausea And Vomiting     Past Medical History:   Diagnosis Date    Anxiety     Arthritis     Asthma     Back pain     Bipolar I     BMI 50.0-59.9, adult     Chronic obstructive pulmonary disease, unspecified COPD type 03/21/2022    Depression     Fibromyalgia     GERD (gastroesophageal reflux disease)     HFpEF     Insomnia 11/23/2021    Obesity     SHARON (obstructive sleep apnea)     Tobacco dependence     Urinary incontinence      Past Surgical History:   Procedure Laterality Date    CARPAL TUNNEL RELEASE      CHOLECYSTECTOMY      COLONOSCOPY N/A 4/25/2022    Procedure: COLONOSCOPY;  Surgeon: Ernesto Auguste MD;  Location: [facility] ENDO;  Service: Endoscopy;  Laterality: N/A;  fully vaccinated-GT    EPIDURAL STEROID INJECTION Bilateral 7/19/2023    Procedure: Bilateral L3, L4, L5 Medial Branch Blocks #1;  Surgeon: Lito Jasso Jr., MD;  Location: [facility] ENDO;  Service: Pain Management;  Laterality: Bilateral;  @1230  No ATC or DM    EPIDURAL STEROID INJECTION Bilateral 8/2/2023    Procedure: Bilateral L3, L4, L5 Medial Branch Blocks #2;  Surgeon: Lito Jasso Jr., MD;  Location: [facility] ENDO;  Service: Pain Management;  Laterality: Bilateral;  @1000  No ATC or DM    EPIDURAL STEROID INJECTION Left  8/16/2023    Procedure: Left L3, L4, L5 Radiofrequency Thermocoagulation of Medial Branches;  Surgeon: Lito Jasso Jr., MD;  Location: [facility] ENDO;  Service: Pain Management;  Laterality: Left;  @0815  No ATC or DM    EPIDURAL STEROID INJECTION Right 8/30/2023    Procedure: Right L3, L4, L5 Radiofrequency Thermocoagulation of Medial Branches;  Surgeon: Lito Jasso Jr., MD;  Location: [facility] ENDO;  Service: Pain Management;  Laterality: Right;  @0930  No ATC or DM  Last 1&75    ESOPHAGOGASTRODUODENOSCOPY N/A 5/21/2019    Procedure: EGD (ESOPHAGOGASTRODUODENOSCOPY);  Surgeon: Michelle Mc MD;  Location: [facility];  Service: Endoscopy;  Laterality: N/A;    HYSTERECTOMY      partial     KNEE SURGERY      LAPAROSCOPIC SLEEVE GASTRECTOMY N/A 10/18/2019    Procedure: GASTRECTOMY, SLEEVE, LAPAROSCOPIC, with intraop EGD;  Surgeon: Ean Kohli MD;  Location: [facility] OR [address] [place] [address];  Service: General;  Laterality: N/A;    pinched nerve      SHOULDER SURGERY      TONSILLECTOMY      WRIST SURGERY      had ganlin cyst     Family History   Problem Relation Age of Onset    Diabetes Mother     Jose Juan's disease Mother     Hypertension Mother     Heart disease Father     Stroke Father     Mental illness Father         ptsd    Hypertension Father     Diabetes Father     Depression Father     No Known Problems Brother     Heart disease Maternal Grandmother     Depression Maternal Grandmother     COPD Maternal Grandfather     Heart disease Maternal Grandfather     Stroke Maternal Grandfather     Kidney disease Paternal Grandmother     Heart disease Paternal Grandmother     Heart disease Paternal Grandfather     Hypertension Son     Rectal cancer Paternal Aunt      Social History     Tobacco Use    Smoking status: Some Days     Types: Vaping with nicotine    Smokeless tobacco: Never    Tobacco comments:     vaping started April 2020; QUIT cigarettes Sept 2019   Substance Use Topics    Alcohol use: No    Drug use: No      Review of Systems   Constitutional:  Negative for activity change, appetite change, chills, fever and unexpected weight change.   HENT:  Negative for dental problem and drooling.    Eyes:  Negative for discharge and itching.   Respiratory:  Positive for shortness of breath. Negative for cough, chest tightness, wheezing and stridor.    Cardiovascular:  Positive for chest pain. Negative for palpitations and leg swelling.   Gastrointestinal:  Negative for abdominal distention, abdominal pain, diarrhea and nausea.   Genitourinary:  Negative for difficulty urinating, dysuria, frequency and urgency.   Musculoskeletal:  Positive for arthralgias. Negative for back pain, gait problem and joint swelling.   Neurological:  Negative for dizziness, syncope, numbness and headaches.   Psychiatric/Behavioral:  Negative for agitation, behavioral problems and confusion.        Physical Exam     Initial Vitals [09/18/23 1554]   BP Pulse Resp Temp SpO2   130/73 60 18 97.9 °F (36.6 °C) 100 %      MAP       --         Physical Exam    Nursing note and vitals reviewed.  Constitutional: She appears well-developed and well-nourished. She is not diaphoretic.   HENT:   Head: Normocephalic and atraumatic.   Mouth/Throat: Oropharynx is clear and moist.   Eyes: EOM are normal. Pupils are equal, round, and reactive to light. Right eye exhibits no discharge. Left eye exhibits no discharge.   Neck: No tracheal deviation present.   Normal range of motion.  Cardiovascular:  Normal rate, regular rhythm and intact distal pulses.           Pulmonary/Chest: No respiratory distress. She has no wheezes. She exhibits no tenderness.   Abdominal: Abdomen is soft. She exhibits no distension. There is no abdominal tenderness.   Musculoskeletal:         General: No tenderness or edema. Normal range of motion.      Cervical back: Normal range of motion.     Neurological: She is alert and oriented to person, place, and time. She has normal strength. No cranial  nerve deficit or sensory deficit. GCS eye subscore is 4. GCS verbal subscore is 5. GCS motor subscore is 6.   Skin: Skin is warm and dry. No rash noted.   Psychiatric: She has a normal mood and affect. Her behavior is normal. Thought content normal.         ED Course   Procedures  Labs Reviewed   TROPONIN I   CBC W/ AUTO DIFFERENTIAL   POCT GLUCOSE          Imaging Results              X-Ray Chest AP Portable (Final result)  Result time 09/18/23 17:33:10      Final result by Thiago Farmer MD (09/18/23 17:33:10)                   Impression:      No acute process.      Electronically signed by: Thiago Farmer MD  Date:    09/18/2023  Time:    17:33               Narrative:    EXAMINATION:  XR CHEST AP PORTABLE    CLINICAL HISTORY:  Chest Pain.    TECHNIQUE:  Single frontal view of the chest was performed.    COMPARISON:  04/02/2023.    FINDINGS:  The trachea is unremarkable.  The cardiomediastinal silhouette is within normal limits.  There is no evidence of free air beneath the hemidiaphragms.  There are no pleural effusions.  There is no evidence of a pneumothorax.  There is no evidence of pneumomediastinum.  No airspace opacity is present.  The osseous structures are unremarkable.                                       Medications - No data to display  Medical Decision Making  Amount and/or Complexity of Data Reviewed  Labs: ordered. Decision-making details documented in ED Course.               ED Course as of 09/19/23 0110   Mon Sep 18, 2023   0109 POCT glucose [BS]   1730 EKG shows sinus rhythm, regular rate, no acute ST elevations or depressions, low voltage, poor R-wave progression, flattened T-waves in V2 and V3, normal ND QRS and QT intervals, no STEMI [BS]   1731 I reviewed the CXR independently of the radiologist.     Chest x-ray was negative for acute cardiopulmonary abnormalities, infiltrates or bony abnormalities.       [BS]   2026 Patient upset because charge nurse advised her that she can not have  visitors in the hallway.  When advised that her  would have to wait in the lobby until the patient was roomed, the patient stated she is going to leave and not come back.  [BS]   2028 I advised patient that I would call her if any of her lab showed any abnormality.  I advised her to return to the emergency department if symptoms returned.  She expressed understanding.  She understands that leaving now against medical advice could result in heart attack, sudden cardiac death, permanent death for disability. [BS]   2047 Troponin I #1 [BS]   2047 CBC auto differential [BS]   2049 Patient advised to return if symptoms continue or if they worsen, to follow up with cardiologist tomorrow for further evaluation of her pain. [BS]      ED Course User Index  [BS] Sajan Ivey MD                    Clinical Impression:   Final diagnoses:  [R06.02] SOB (shortness of breath)  [R07.9] Chest pain (Primary)        ED Disposition Condition    AMA Stable                Sajan Ivey MD  09/19/23 0109       Sajan Ivey MD  09/19/23 0110

## 2023-09-19 NOTE — ED TRIAGE NOTES
"Zaira Dowell, an 52 y.o. female presents to the ED complaining of chest pain, SOB, and left arm left arm heaviness x1 week. States she was seen at [facility] ED yesterday and left AMA. Explains while here in intake the chest pain is not as bad as it was last night. Denies any aggravating or alleviating factors. Endorses mild nausea and "just does not feel good".       LOC: The patient is awake, alert and aware of environment with an appropriate affect, the patient is oriented x 3 and speaking appropriately.   APPEARANCE: Patient appears comfortable and in no acute distress, patient is clean and well groomed.  SKIN: The skin is warm and dry, color consistent with ethnicity.   MUSCULOSKELETAL: Patient moving all extremities spontaneously, no swelling noted.  RESPIRATORY: Airway is open and patent, respirations are spontaneous, patient has a normal effort and rate, no accessory muscle use noted. +self reports SOB, pt answering questions in full sentences without issue.  CARDIAC: Patient has a normal rate and regular rhythm, no edema noted, capillary refill < 3 seconds. +intermittent midsternal/left sided chest pain. Endorses arm heaviness and explains she feels like her arm "feels dead".  GASTRO: Soft and non tender to palpation, no distention noted.   : Pt denies any pain or frequency with urination.  NEURO: Pt opens eyes spontaneously, behavior appropriate to situation, follows commands.        Chief Complaint   Patient presents with    Chest Pain     Chest pain and SOB x 1 week. Denies cardiac history. Right arm numbness x 1 week.      Review of patient's allergies indicates:   Allergen Reactions    Hydrocodone-acetaminophen Nausea And Vomiting     Past Medical History:   Diagnosis Date    Anxiety     Arthritis     Asthma     Back pain     Bipolar I     BMI 50.0-59.9, adult     Chronic obstructive pulmonary disease, unspecified COPD type 03/21/2022    Depression     Fibromyalgia     GERD (gastroesophageal reflux " disease)     HFpEF     Insomnia 11/23/2021    Obesity     SHARON (obstructive sleep apnea)     Tobacco dependence     Urinary incontinence

## 2023-09-19 NOTE — ED NOTES
"Informed patient and patient's  because patient was in hallway that her  would have to wait in lobby until we had a private room available.  loudly states "I am not leaving my wife. I will stay here in hallway with her." Once again explained to  as soon as a room became available we would move patient out of hallway and he would be able to come back to room.  once again refused to leave. Security brought to bedside. Patient took out IV and said "I am leaving then." MD notified. Patient given AMA form and signed AMA form. Security walked patient and patient's  out of ED.   "

## 2023-09-19 NOTE — ED PROVIDER NOTES
Encounter Date: 9/19/2023       History     Chief Complaint   Patient presents with    Chest Pain     Chest pain and SOB x 1 week. Denies cardiac history. Right arm numbness x 1 week.      52-year-old male history of anxiety, COPD, asthma, fibromyalgia, GERD, obesity and tobacco dependence presents emergency department due to midsternal chest discomfort that has been intermittent over the last week.  Patient also endorses occasional shortness of breath as well.  Patient notes that she is been also experiencing nausea but no episodes of emesis. She also has been having loose bowel movements.  She reports just feeling generally unwell diffuse myalgias.  Patient reports her left arm also feels heavy but maintains sensation and ROM.  Patient was evaluated in the emergency department at [facility] yesterday however left AMA.  Patient denies bloody stools.  No dysuria or hematuria.  No reports of lower back pain.  No Abdominal pain.      Review of patient's allergies indicates:   Allergen Reactions    Hydrocodone-acetaminophen Nausea And Vomiting     Past Medical History:   Diagnosis Date    Anxiety     Arthritis     Asthma     Back pain     Bipolar I     BMI 50.0-59.9, adult     Chronic obstructive pulmonary disease, unspecified COPD type 03/21/2022    Depression     Fibromyalgia     GERD (gastroesophageal reflux disease)     HFpEF     Insomnia 11/23/2021    Obesity     SHARON (obstructive sleep apnea)     Tobacco dependence     Urinary incontinence      Past Surgical History:   Procedure Laterality Date    CARPAL TUNNEL RELEASE      CHOLECYSTECTOMY      COLONOSCOPY N/A 4/25/2022    Procedure: COLONOSCOPY;  Surgeon: Ernesto Auguste MD;  Location: [facility];  Service: Endoscopy;  Laterality: N/A;  fully vaccinated-GT    EPIDURAL STEROID INJECTION Bilateral 7/19/2023    Procedure: Bilateral L3, L4, L5 Medial Branch Blocks #1;  Surgeon: Lito Jasso Jr., MD;  Location: [facility] ENDO;  Service: Pain Management;   Laterality: Bilateral;  @1230  No ATC or DM    EPIDURAL STEROID INJECTION Bilateral 8/2/2023    Procedure: Bilateral L3, L4, L5 Medial Branch Blocks #2;  Surgeon: Lito Jasso Jr., MD;  Location: [facility];  Service: Pain Management;  Laterality: Bilateral;  @1000  No ATC or DM    EPIDURAL STEROID INJECTION Left 8/16/2023    Procedure: Left L3, L4, L5 Radiofrequency Thermocoagulation of Medial Branches;  Surgeon: Lito Jasso Jr., MD;  Location: [facility] ENDO;  Service: Pain Management;  Laterality: Left;  @0815  No ATC or DM    EPIDURAL STEROID INJECTION Right 8/30/2023    Procedure: Right L3, L4, L5 Radiofrequency Thermocoagulation of Medial Branches;  Surgeon: Lito Jasso Jr., MD;  Location: [facility];  Service: Pain Management;  Laterality: Right;  @0930  No ATC or DM  Last 1&75    ESOPHAGOGASTRODUODENOSCOPY N/A 5/21/2019    Procedure: EGD (ESOPHAGOGASTRODUODENOSCOPY);  Surgeon: Michelle Mc MD;  Location: [facility];  Service: Endoscopy;  Laterality: N/A;    HYSTERECTOMY      partial     KNEE SURGERY      LAPAROSCOPIC SLEEVE GASTRECTOMY N/A 10/18/2019    Procedure: GASTRECTOMY, SLEEVE, LAPAROSCOPIC, with intraop EGD;  Surgeon: Ean Kohli MD;  Location: [address];  Service: General;  Laterality: N/A;    pinched nerve      SHOULDER SURGERY      TONSILLECTOMY      WRIST SURGERY      had ganlin cyst     Family History   Problem Relation Age of Onset    Diabetes Mother     Real's disease Mother     Hypertension Mother     Heart disease Father     Stroke Father     Mental illness Father         ptsd    Hypertension Father     Diabetes Father     Depression Father     No Known Problems Brother     Heart disease Maternal Grandmother     Depression Maternal Grandmother     COPD Maternal Grandfather     Heart disease Maternal Grandfather     Stroke Maternal Grandfather     Kidney disease Paternal Grandmother     Heart disease Paternal Grandmother     Heart disease Paternal  Grandfather     Hypertension Son     Rectal cancer Paternal Aunt      Social History     Tobacco Use    Smoking status: Some Days     Types: Vaping with nicotine    Smokeless tobacco: Never    Tobacco comments:     vaping started April 2020; QUIT cigarettes Sept 2019   Substance Use Topics    Alcohol use: No    Drug use: No     Review of Systems   Constitutional:         See HPI       Physical Exam     Initial Vitals [09/19/23 1547]   BP Pulse Resp Temp SpO2   132/75 71 20 97.5 °F (36.4 °C) 99 %      MAP       --         Physical Exam    Vitals reviewed.  Constitutional: She appears well-developed and well-nourished.   HENT:   Head: Normocephalic and atraumatic.   Eyes: Conjunctivae and EOM are normal.   Neck:   Normal range of motion.  Cardiovascular:  Normal rate and regular rhythm.           Pulmonary/Chest: Breath sounds normal. No respiratory distress. She has no wheezes. She has no rales.   Abdominal: Abdomen is soft. She exhibits no distension. There is no abdominal tenderness. There is no rebound.   Musculoskeletal:         General: No tenderness or edema. Normal range of motion.      Cervical back: Normal range of motion.      Comments: No lower extremity edema, no posterior calf tenderness     Neurological: She is alert and oriented to person, place, and time. She has normal strength. No cranial nerve deficit.   Sensation intact of upper extremities and lower extremities  Strength equal in upper and lower extremities   Skin: Skin is warm and dry.   Psychiatric: She has a normal mood and affect. Thought content normal.         ED Course   Procedures  Labs Reviewed   CBC W/ AUTO DIFFERENTIAL - Abnormal; Notable for the following components:       Result Value    MPV 9.1 (*)     All other components within normal limits   COMPREHENSIVE METABOLIC PANEL - Abnormal; Notable for the following components:    CO2 21 (*)     All other components within normal limits   INFLUENZA A & B BY MOLECULAR   HIV 1 / 2  ANTIBODY    Narrative:     Release to patient->Immediate   HEPATITIS C ANTIBODY    Narrative:     Release to patient->Immediate   TROPONIN I   URINALYSIS, REFLEX TO URINE CULTURE    Narrative:     Specimen Source->Urine   LIPASE   B-TYPE NATRIURETIC PEPTIDE   SARS-COV-2 RNA AMPLIFICATION, QUAL          Imaging Results              X-Ray Chest PA And Lateral (Final result)  Result time 09/19/23 20:02:38      Final result by Dylon Corft MD (09/19/23 20:02:38)                   Impression:      No radiographic evidence of pneumonia or other source of shortness of breath, noting that early/mild viral pneumonia or nonspecific bronchitis may be radiographically occult.      Electronically signed by: Dylon Croft MD  Date:    09/19/2023  Time:    20:02               Narrative:    EXAMINATION:  XR CHEST PA AND LATERAL    CLINICAL HISTORY:  Shortness of breath    TECHNIQUE:  PA and lateral views of the chest were performed.    COMPARISON:  Chest radiograph 09/18/2023    FINDINGS:  No detrimental change.The lungs are well expanded and clear, with normal appearance of pulmonary vasculature and no pleural effusion or pneumothorax.    The cardiac silhouette is normal in size. The hilar and mediastinal contours are unremarkable.    Osseous structures appear stable without acute process seen                                       CT Head Without Contrast (Final result)  Result time 09/19/23 19:08:16      Final result by Romeo Todd MD (09/19/23 19:08:16)                   Impression:      No acute abnormality.  No change compared with the prior study.      Electronically signed by: Romeo Todd MD  Date:    09/19/2023  Time:    19:08               Narrative:    EXAMINATION:  CT HEAD WITHOUT CONTRAST    CLINICAL HISTORY:  Transient ischemic attack (TIA);    TECHNIQUE:  Low dose axial CT images obtained throughout the head without intravenous contrast. Sagittal and coronal reconstructions were  performed.    COMPARISON:  07/19/2015    FINDINGS:  Intracranial compartment:    Ventricles and sulci are normal in size for age without evidence of hydrocephalus. No extra-axial blood or fluid collections.    The brain parenchyma appears normal. No parenchymal mass, hemorrhage, edema or major vascular distribution infarct.    Skull/extracranial contents (limited evaluation): No fracture. Mastoid air cells and paranasal sinuses are essentially clear.                                       Medications   sodium chloride 0.9% bolus 1,000 mL 1,000 mL (0 mLs Intravenous Stopped 9/19/23 1900)   ondansetron injection 4 mg (4 mg Intravenous Given 9/19/23 1729)   hyoscyamine ODT 0.125 mg (0.125 mg Oral Given 9/19/23 1728)   famotidine (PF) injection 20 mg (20 mg Intravenous Given 9/19/23 1730)     Medical Decision Making  52-year-old female returns emergency department after being seen yesterday due to chest pain shortness of breath.  Patient also reports generally not feeling well.  Viral swabs ordered were negative today however still not rule out a viral process.  Patient also endorses discomfort in her left arm describes a heaviness however neuro exam was unremarkable.  CT head does not reveal subacute infarct.  Sensation is also intact with equal strength.  Would not say this presentation is consistent with a medical emergency at this time.   Patient's cardiac workup also within normal limits troponin yesterday at [facility] was normal with repeat today also within normal limits.  Based on patient's heart score I am comfortable with discharged home with outpatient follow up.  She will be discharged home and continue with PCP as needed I discussed if symptoms worsen she can return to the emergency department.     Amount and/or Complexity of Data Reviewed  Labs: ordered.  Radiology: ordered.  ECG/medicine tests: ordered and independent interpretation performed.     Details: Normal sinus 66 beats per minute.  No  STEMI    Risk  Prescription drug management.      Additional MDM:   Heart Score:    History:          Slightly suspicious.  ECG:             Normal  Age:               45-65 years  Risk factors: 1-2 risk factors  Troponin:       Less than or equal to normal limit  Heart Score = 2                                Clinical Impression:   Final diagnoses:  [R07.9] Chest pain  [R06.02] SOB (shortness of breath)        ED Disposition Condition    Discharge Stable          ED Prescriptions    None       Follow-up Information       Follow up With Specialties Details Why Contact Info    Shay Dai MD Internal Medicine Schedule an appointment as soon as possible for a visit  For follow up [address] [facility] [address]  [phone]               Caroline Robertson, PA-C  09/20/23 0020

## 2023-09-20 NOTE — DISCHARGE INSTRUCTIONS
As discussed you tested negative for COVID and influenza  CT head was also negative  Cardiac labs were normal  To multiple symptoms he may be experiencing a viral syndrome however I would like you to follow up with your primary care provider.  Due to family history of cardiovascular disease

## 2023-09-21 ENCOUNTER — HOSPITAL ENCOUNTER (OUTPATIENT)
Dept: RADIOLOGY | Facility: HOSPITAL | Age: 53
Discharge: HOME | End: 2023-09-21
Attending: NURSE PRACTITIONER
Payer: MEDICARE

## 2023-09-21 ENCOUNTER — OFFICE VISIT (OUTPATIENT)
Dept: FAMILY MEDICINE | Facility: CLINIC | Age: 53
End: 2023-09-21
Payer: MEDICARE

## 2023-09-21 VITALS
RESPIRATION RATE: 16 BRPM | HEART RATE: 59 BPM | WEIGHT: 285.06 LBS | BODY MASS INDEX: 44.74 KG/M2 | OXYGEN SATURATION: 98 % | DIASTOLIC BLOOD PRESSURE: 72 MMHG | TEMPERATURE: 98 F | HEIGHT: 67 IN | SYSTOLIC BLOOD PRESSURE: 112 MMHG

## 2023-09-21 DIAGNOSIS — Z82.49 FAMILY HISTORY OF CARDIAC DISORDER IN FATHER: ICD-10-CM

## 2023-09-21 DIAGNOSIS — R06.02 SHORTNESS OF BREATH: Primary | ICD-10-CM

## 2023-09-21 DIAGNOSIS — R07.9 ACUTE CHEST PAIN: ICD-10-CM

## 2023-09-21 DIAGNOSIS — Z23 NEED FOR INFLUENZA VACCINATION: ICD-10-CM

## 2023-09-21 DIAGNOSIS — R06.02 SHORTNESS OF BREATH: ICD-10-CM

## 2023-09-21 DIAGNOSIS — M79.89 LEG SWELLING: ICD-10-CM

## 2023-09-21 PROCEDURE — 3074F SYST BP LT 130 MM HG: CPT | Mod: HCNC,CPTII,S$GLB, | Performed by: NURSE PRACTITIONER

## 2023-09-21 PROCEDURE — 99999 PR PBB SHADOW E&M-EST. PATIENT-LVL III: CPT | Mod: PBBFAC,HCNC,, | Performed by: NURSE PRACTITIONER

## 2023-09-21 PROCEDURE — 99214 OFFICE O/P EST MOD 30 MIN: CPT | Mod: HCNC,S$GLB,, | Performed by: NURSE PRACTITIONER

## 2023-09-21 PROCEDURE — 3078F PR MOST RECENT DIASTOLIC BLOOD PRESSURE < 80 MM HG: ICD-10-PCS | Mod: HCNC,CPTII,S$GLB, | Performed by: NURSE PRACTITIONER

## 2023-09-21 PROCEDURE — 99214 PR OFFICE/OUTPT VISIT, EST, LEVL IV, 30-39 MIN: ICD-10-PCS | Mod: HCNC,S$GLB,, | Performed by: NURSE PRACTITIONER

## 2023-09-21 PROCEDURE — 25500020 PHARM REV CODE 255: Mod: HCNC | Performed by: NURSE PRACTITIONER

## 2023-09-21 PROCEDURE — 71275 CT ANGIOGRAPHY CHEST: CPT | Mod: TC,HCNC

## 2023-09-21 PROCEDURE — 90686 FLU VACCINE (QUAD) GREATER THAN OR EQUAL TO 3YO PRESERVATIVE FREE IM: ICD-10-PCS | Mod: HCNC,S$GLB,, | Performed by: NURSE PRACTITIONER

## 2023-09-21 PROCEDURE — G0008 FLU VACCINE (QUAD) GREATER THAN OR EQUAL TO 3YO PRESERVATIVE FREE IM: ICD-10-PCS | Mod: HCNC,S$GLB,, | Performed by: NURSE PRACTITIONER

## 2023-09-21 PROCEDURE — 71275 CT ANGIOGRAPHY CHEST: CPT | Mod: 26,HCNC,, | Performed by: RADIOLOGY

## 2023-09-21 PROCEDURE — 71275 CTA CHEST NON CORONARY (PE STUDIES): ICD-10-PCS | Mod: 26,HCNC,, | Performed by: RADIOLOGY

## 2023-09-21 PROCEDURE — G0008 ADMIN INFLUENZA VIRUS VAC: HCPCS | Mod: HCNC,S$GLB,, | Performed by: NURSE PRACTITIONER

## 2023-09-21 PROCEDURE — 1159F MED LIST DOCD IN RCRD: CPT | Mod: HCNC,CPTII,S$GLB, | Performed by: NURSE PRACTITIONER

## 2023-09-21 PROCEDURE — 3008F PR BODY MASS INDEX (BMI) DOCUMENTED: ICD-10-PCS | Mod: HCNC,CPTII,S$GLB, | Performed by: NURSE PRACTITIONER

## 2023-09-21 PROCEDURE — 1159F PR MEDICATION LIST DOCUMENTED IN MEDICAL RECORD: ICD-10-PCS | Mod: HCNC,CPTII,S$GLB, | Performed by: NURSE PRACTITIONER

## 2023-09-21 PROCEDURE — 1160F PR REVIEW ALL MEDS BY PRESCRIBER/CLIN PHARMACIST DOCUMENTED: ICD-10-PCS | Mod: HCNC,CPTII,S$GLB, | Performed by: NURSE PRACTITIONER

## 2023-09-21 PROCEDURE — 99999 PR PBB SHADOW E&M-EST. PATIENT-LVL III: ICD-10-PCS | Mod: PBBFAC,HCNC,, | Performed by: NURSE PRACTITIONER

## 2023-09-21 PROCEDURE — 1160F RVW MEDS BY RX/DR IN RCRD: CPT | Mod: HCNC,CPTII,S$GLB, | Performed by: NURSE PRACTITIONER

## 2023-09-21 PROCEDURE — 3074F PR MOST RECENT SYSTOLIC BLOOD PRESSURE < 130 MM HG: ICD-10-PCS | Mod: HCNC,CPTII,S$GLB, | Performed by: NURSE PRACTITIONER

## 2023-09-21 PROCEDURE — 90686 IIV4 VACC NO PRSV 0.5 ML IM: CPT | Mod: HCNC,S$GLB,, | Performed by: NURSE PRACTITIONER

## 2023-09-21 PROCEDURE — 3008F BODY MASS INDEX DOCD: CPT | Mod: HCNC,CPTII,S$GLB, | Performed by: NURSE PRACTITIONER

## 2023-09-21 PROCEDURE — 3078F DIAST BP <80 MM HG: CPT | Mod: HCNC,CPTII,S$GLB, | Performed by: NURSE PRACTITIONER

## 2023-09-21 RX ORDER — FUROSEMIDE 20 MG/1
20 TABLET ORAL DAILY PRN
Qty: 60 TABLET | Refills: 0 | Status: SHIPPED | OUTPATIENT
Start: 2023-09-21 | End: 2024-09-20

## 2023-09-21 RX ADMIN — IOHEXOL 100 ML: 350 INJECTION, SOLUTION INTRAVENOUS at 02:09

## 2023-09-21 NOTE — PROGRESS NOTES
Routine Office Visit    Patient Name: Zaira Dowell    : 1970  MRN: [id number]    Chief Complaint:  Chest Pain, shortness of breath, ER follow-up    Subjective:  Zaira is a 52 y.o. female who presents today for:    1.  chest pain, shortness of breath, ER follow-up - patient who is known to me with the below documented medical history reports today for evaluation.  In the last 1-2 weeks she has developed acute left-sided chest pain and shortness of breath requiring 2 ER visits recently.  She did have negative troponin and BNP there.  Since the visit she reports her chest pain has been improving but she is still short of breath at rest and on exertion.  She feels like her legs are swelling and has gained 5+ lb recently.  She denies any personal history of DVTs.  Denies any recent immobilizations for any reason and denies any active history of cancer.  She notes a significant family history of cardiac disease in her father.  She is wondering if she needs to see a cardiologist.  She did recently take a 4 hour drive to [place].    Past Medical History  Past Medical History:   Diagnosis Date    Anxiety     Arthritis     Asthma     Back pain     Bipolar I     BMI 50.0-59.9, adult     Chronic obstructive pulmonary disease, unspecified COPD type 2022    Depression     Fibromyalgia     GERD (gastroesophageal reflux disease)     HFpEF     Insomnia 2021    Obesity     SHARON (obstructive sleep apnea)     Tobacco dependence     Urinary incontinence        Past Surgical History  Past Surgical History:   Procedure Laterality Date    CARPAL TUNNEL RELEASE       SECTION  1993    CHOLECYSTECTOMY      COLONOSCOPY N/A 2022    Procedure: COLONOSCOPY;  Surgeon: Ernesto Auguste MD;  Location: [facility];  Service: Endoscopy;  Laterality: N/A;  fully vaccinated-GT    EPIDURAL STEROID INJECTION Bilateral 2023    Procedure: Bilateral L3, L4, L5 Medial Branch Blocks #1;  Surgeon: Lito  ERIS Jasso Jr., MD;  Location: [facility];  Service: Pain Management;  Laterality: Bilateral;  @1230  No ATC or DM    EPIDURAL STEROID INJECTION Bilateral 08/02/2023    Procedure: Bilateral L3, L4, L5 Medial Branch Blocks #2;  Surgeon: Lito Jasso Jr., MD;  Location: [facility] ENDO;  Service: Pain Management;  Laterality: Bilateral;  @1000  No ATC or DM    EPIDURAL STEROID INJECTION Left 08/16/2023    Procedure: Left L3, L4, L5 Radiofrequency Thermocoagulation of Medial Branches;  Surgeon: Lito Jasso Jr., MD;  Location: [facility] ENDO;  Service: Pain Management;  Laterality: Left;  @0815  No ATC or DM    EPIDURAL STEROID INJECTION Right 08/30/2023    Procedure: Right L3, L4, L5 Radiofrequency Thermocoagulation of Medial Branches;  Surgeon: Lito Jasso Jr., MD;  Location: [facility];  Service: Pain Management;  Laterality: Right;  @0930  No ATC or DM  Last 1&75    ESOPHAGOGASTRODUODENOSCOPY N/A 05/21/2019    Procedure: EGD (ESOPHAGOGASTRODUODENOSCOPY);  Surgeon: Michelle Mc MD;  Location: [facility];  Service: Endoscopy;  Laterality: N/A;    HYSTERECTOMY      partial     KNEE SURGERY      LAPAROSCOPIC SLEEVE GASTRECTOMY N/A 10/18/2019    Procedure: GASTRECTOMY, SLEEVE, LAPAROSCOPIC, with intraop EGD;  Surgeon: Ean Kohli MD;  Location: [facility] OR [address] [place] [address];  Service: General;  Laterality: N/A;    pinched nerve      SHOULDER SURGERY      TONSILLECTOMY      WRIST SURGERY      had ganlin cyst       Family History  Family History   Problem Relation Age of Onset    Diabetes Mother     Jose Juan's disease Mother     Hypertension Mother     Heart disease Father     Stroke Father     Mental illness Father         ptsd    Hypertension Father     Diabetes Father     Depression Father     Arthritis Father     No Known Problems Brother     Heart disease Maternal Grandmother     Depression Maternal Grandmother     COPD Maternal Grandfather     Heart disease Maternal Grandfather     Stroke Maternal  Grandfather     Kidney disease Paternal Grandmother     Heart disease Paternal Grandmother     Heart disease Paternal Grandfather     Early death Paternal Grandfather     Hypertension Son     Asthma Son     Rectal cancer Paternal Aunt     Asthma Maternal Aunt     Diabetes Maternal Aunt     Asthma Maternal Uncle        Social History  Social History     Socioeconomic History    Marital status:    Tobacco Use    Smoking status: Former     Types: Vaping with nicotine    Smokeless tobacco: Never    Tobacco comments:     vaping started April 2020; QUIT cigarettes Sept 2019   Substance and Sexual Activity    Alcohol use: No    Drug use: No    Sexual activity: Yes     Partners: Male     Birth control/protection: None     Social Determinants of Health     Financial Resource Strain: Low Risk  (9/5/2023)    Overall Financial Resource Strain (CARDIA)     Difficulty of Paying Living Expenses: Not very hard   Food Insecurity: No Food Insecurity (9/5/2023)    Hunger Vital Sign     Worried About Running Out of Food in the Last Year: Never true     Ran Out of Food in the Last Year: Never true   Transportation Needs: No Transportation Needs (9/5/2023)    PRAPARE - Transportation     Lack of Transportation (Medical): No     Lack of Transportation (Non-Medical): No   Physical Activity: Insufficiently Active (9/5/2023)    Exercise Vital Sign     Days of Exercise per Week: 1 day     Minutes of Exercise per Session: 30 min   Stress: Stress Concern Present (9/5/2023)    [employer] [place] [employer] - Occupational Stress Questionnaire     Feeling of Stress : Very much   Social Connections: Unknown (9/5/2023)    Social Connection and Isolation Panel [NHANES]     Frequency of Communication with Friends and Family: More than three times a week     Frequency of Social Gatherings with Friends and Family: Once a week     Active Member of Clubs or Organizations: Yes     Attends Club or Organization Meetings: More than 4 times  per year     Marital Status:    Housing Stability: Low Risk  (9/5/2023)    Housing Stability Vital Sign     Unable to Pay for Housing in the Last Year: No     Number of Places Lived in the Last Year: 1     Unstable Housing in the Last Year: No       Current Medications  Current Outpatient Medications on File Prior to Visit   Medication Sig Dispense Refill    albuterol (PROVENTIL/VENTOLIN HFA) 90 mcg/actuation inhaler Inhale 2 puffs into the lungs every 4 (four) hours as needed for Wheezing or Shortness of Breath. Rescue 18 g 2    allopurinoL (ZYLOPRIM) 100 MG tablet Take 1 tablet (100 mg total) by mouth once daily. 60 tablet 1    b complex vitamins tablet Take 1 tablet by mouth once daily.      buPROPion (WELLBUTRIN SR) 100 MG TBSR 12 hr tablet Take 2 tabs q am, and 1 tab daily at noon 270 tablet 1    calcium citrate (CALCITRATE) 200 mg (950 mg) tablet Take 1 tablet by mouth 2 (two) times daily.      cetirizine (ZYRTEC) 10 MG tablet Take 1 tablet (10 mg total) by mouth once daily. 30 tablet 0    colchicine (COLCRYS) 0.6 mg tablet Take 1 tablet (0.6 mg total) by mouth daily as needed (gout flare). 30 tablet 0    cyanocobalamin 500 MCG tablet Take 500 mcg by mouth once daily.      cyclobenzaprine (FLEXERIL) 5 MG tablet Take 1 tablet (5 mg total) by mouth 3 (three) times daily as needed for Muscle spasms. 60 tablet 3    diclofenac sodium (VOLTAREN) 1 % Gel Apply 2 g topically 3 (three) times daily as needed (shoulder pain). 100 g 0    ferrous fumarate/vit Bcomp,C (SUPER B COMPLEX ORAL) Take 1 capsule by mouth once daily.      ipratropium (ATROVENT) 42 mcg (0.06 %) nasal spray by Each Nostril route.      lithium (ESKALITH) 300 MG capsule Take 1 capsule (300 mg total) by mouth 3 (three) times daily with meals. 270 capsule 1    LORazepam (ATIVAN) 0.5 MG tablet TAKE 2 TABLETS EVERY DAY AS DIRECTED 60 tablet 2    meclizine (ANTIVERT) 25 mg tablet Take 1 tablet (25 mg total) by mouth every 6 (six) hours as needed for  Dizziness. 20 tablet 0    multivitamin capsule Take 1 capsule by mouth 2 (two) times daily.       omeprazole (PRILOSEC) 40 MG capsule TAKE 1 CAPSULE TWICE DAILY BEFORE MEALS 180 capsule 3    ondansetron (ZOFRAN-ODT) 8 MG TbDL Take 1 tablet (8 mg total) by mouth 2 (two) times daily as needed (nausea). 30 tablet 1    potassium chloride (KLOR-CON) 10 MEQ TbSR Take 10 mEq by mouth 2 (two) times daily.      pregabalin (LYRICA) 100 MG capsule Take 1 capsule (100 mg total) by mouth 2 (two) times daily. 60 capsule 5    promethazine (PHENERGAN) 12.5 MG Tab promethazine Take half tablet (oral) 2 times per day PRN - Nausea . Medication may cause drowsiness. 20210917 tablet 2 times per day oral No set duration recorded No set duration amount recorded active 12.5 mg      risperiDONE (RISPERDAL) 0.5 MG Tab Take 1 tablet (0.5 mg total) by mouth 2 (two) times daily. 60 tablet 2    sertraline (ZOLOFT) 100 MG tablet Take 2 tablets (200 mg total) by mouth once daily. 180 tablet 1    tolterodine (DETROL LA) 4 MG 24 hr capsule Take 1 capsule (4 mg total) by mouth once daily. 90 capsule 3    topiramate (TOPAMAX) 200 MG Tab Take 1 tablet (200 mg total) by mouth once daily. 90 tablet 1    [DISCONTINUED] furosemide (LASIX) 40 MG tablet Take 40 mg by mouth.      fluticasone-salmeterol diskus inhaler 250-50 mcg Inhale 1 puff into the lungs 2 (two) times daily. (Patient not taking: Reported on 7/6/2023) 60 each 3    [DISCONTINUED] loratadine (CLARITIN) 10 mg tablet Take 1 tablet (10 mg total) by mouth once daily. for 20 days 60 tablet 0     No current facility-administered medications on file prior to visit.       Allergies   Review of patient's allergies indicates:   Allergen Reactions    Hydrocodone-acetaminophen Nausea And Vomiting       Review of Systems (Pertinent positives)  Review of Systems   Constitutional:  Negative for chills, fever and weight loss.   HENT: Negative.     Eyes: Negative.    Respiratory:  Positive for shortness of  "breath. Negative for cough, hemoptysis, sputum production and wheezing.    Cardiovascular:  Positive for chest pain and leg swelling. Negative for palpitations, orthopnea and claudication.   Gastrointestinal: Negative.    Genitourinary: Negative.    Skin: Negative.    Neurological: Negative.    Endo/Heme/Allergies: Negative.    Psychiatric/Behavioral: Negative.         /72 (BP Location: Right forearm, Patient Position: Sitting, BP Method: X-Large (Manual))   Pulse (!) 59   Temp 97.7 °F (36.5 °C) (Oral)   Ht 5' 7" (1.702 m)   Wt 129.3 kg (285 lb 0.9 oz)   LMP  (LMP Unknown)   SpO2 98%   BMI 44.65 kg/m²     Physical Exam  Vitals reviewed.   Constitutional:       General: She is not in acute distress.     Appearance: Normal appearance. She is not ill-appearing, toxic-appearing or diaphoretic.   HENT:      Head: Normocephalic and atraumatic.   Cardiovascular:      Rate and Rhythm: Normal rate and regular rhythm.      Pulses: Normal pulses.      Heart sounds: Normal heart sounds.   Pulmonary:      Effort: Pulmonary effort is normal. No respiratory distress.      Breath sounds: Normal breath sounds. No wheezing.   Abdominal:      General: Bowel sounds are normal. There is no distension.      Palpations: Abdomen is soft.      Tenderness: There is no abdominal tenderness.   Musculoskeletal:         General: No swelling, tenderness or deformity. Normal range of motion.      Right lower le+ Pitting Edema present.      Left lower le+ Pitting Edema present.   Skin:     General: Skin is warm and dry.      Capillary Refill: Capillary refill takes less than 2 seconds.   Neurological:      General: No focal deficit present.      Mental Status: She is alert and oriented to person, place, and time.   Psychiatric:         Mood and Affect: Mood normal.         Behavior: Behavior normal.          Assessment/Plan:  Zaira Dowell is a 52 y.o. female who presents today for :    Zaira was seen today for " immunizations and follow-up.    Diagnoses and all orders for this visit:    Shortness of breath  -     CTA Chest Non-Coronary (PE Studies); Future    Leg swelling  -     furosemide (LASIX) 20 MG tablet; Take 1 tablet (20 mg total) by mouth daily as needed (leg swelling/fluid buildup).    Acute chest pain  -     CTA Chest Non-Coronary (PE Studies); Future    Family history of cardiac disorder in father  -     Ambulatory referral/consult to Cardiology; Future    Need for influenza vaccination  -     Influenza - Quadrivalent *Preferred* (6 months+) (PF)    Minimal clinical criteria for PE, however given acute onset of shortness of breath and chest pain will check CTA.  Will restart Lasix (patient had discontinue this for some time).  Will consult Cardiology given family history.  Reviewed imaging and lab work done during ED visits with patient.  Will follow-up with CT results.  All questions answered.        This office note has been dictated.  This dictation has been generated using M-Modal Fluency Direct dictation; some phonetic errors may occur.

## 2023-09-22 ENCOUNTER — TELEPHONE (OUTPATIENT)
Dept: CARDIOLOGY | Facility: CLINIC | Age: 53
End: 2023-09-22

## 2023-09-22 ENCOUNTER — OFFICE VISIT (OUTPATIENT)
Dept: CARDIOLOGY | Facility: CLINIC | Age: 53
End: 2023-09-22
Payer: MEDICARE

## 2023-09-22 VITALS
DIASTOLIC BLOOD PRESSURE: 72 MMHG | WEIGHT: 283.75 LBS | HEART RATE: 69 BPM | RESPIRATION RATE: 15 BRPM | SYSTOLIC BLOOD PRESSURE: 122 MMHG | OXYGEN SATURATION: 95 % | BODY MASS INDEX: 44.54 KG/M2 | HEIGHT: 67 IN

## 2023-09-22 DIAGNOSIS — F17.200 VAPING NICOTINE DEPENDENCE, NON-TOBACCO PRODUCT: ICD-10-CM

## 2023-09-22 DIAGNOSIS — G47.33 OSA (OBSTRUCTIVE SLEEP APNEA): ICD-10-CM

## 2023-09-22 DIAGNOSIS — Z82.49 FAMILY HISTORY OF CARDIAC DISORDER IN FATHER: ICD-10-CM

## 2023-09-22 DIAGNOSIS — Z78.9 PATIENT UNABLE TO EXERCISE: ICD-10-CM

## 2023-09-22 DIAGNOSIS — R07.9 CHEST PAIN, UNSPECIFIED TYPE: Primary | ICD-10-CM

## 2023-09-22 DIAGNOSIS — R94.31 ABNORMAL EKG: ICD-10-CM

## 2023-09-22 DIAGNOSIS — E66.01 MORBID OBESITY, UNSPECIFIED OBESITY TYPE: ICD-10-CM

## 2023-09-22 PROCEDURE — 1160F RVW MEDS BY RX/DR IN RCRD: CPT | Mod: HCNC,CPTII,S$GLB, | Performed by: INTERNAL MEDICINE

## 2023-09-22 PROCEDURE — 99204 PR OFFICE/OUTPT VISIT, NEW, LEVL IV, 45-59 MIN: ICD-10-PCS | Mod: HCNC,S$GLB,, | Performed by: INTERNAL MEDICINE

## 2023-09-22 PROCEDURE — 3078F DIAST BP <80 MM HG: CPT | Mod: HCNC,CPTII,S$GLB, | Performed by: INTERNAL MEDICINE

## 2023-09-22 PROCEDURE — 3074F SYST BP LT 130 MM HG: CPT | Mod: HCNC,CPTII,S$GLB, | Performed by: INTERNAL MEDICINE

## 2023-09-22 PROCEDURE — 99999 PR PBB SHADOW E&M-EST. PATIENT-LVL V: CPT | Mod: PBBFAC,HCNC,, | Performed by: INTERNAL MEDICINE

## 2023-09-22 PROCEDURE — 1159F PR MEDICATION LIST DOCUMENTED IN MEDICAL RECORD: ICD-10-PCS | Mod: HCNC,CPTII,S$GLB, | Performed by: INTERNAL MEDICINE

## 2023-09-22 PROCEDURE — 3008F BODY MASS INDEX DOCD: CPT | Mod: HCNC,CPTII,S$GLB, | Performed by: INTERNAL MEDICINE

## 2023-09-22 PROCEDURE — 99999 PR PBB SHADOW E&M-EST. PATIENT-LVL V: ICD-10-PCS | Mod: PBBFAC,HCNC,, | Performed by: INTERNAL MEDICINE

## 2023-09-22 PROCEDURE — 3078F PR MOST RECENT DIASTOLIC BLOOD PRESSURE < 80 MM HG: ICD-10-PCS | Mod: HCNC,CPTII,S$GLB, | Performed by: INTERNAL MEDICINE

## 2023-09-22 PROCEDURE — 1160F PR REVIEW ALL MEDS BY PRESCRIBER/CLIN PHARMACIST DOCUMENTED: ICD-10-PCS | Mod: HCNC,CPTII,S$GLB, | Performed by: INTERNAL MEDICINE

## 2023-09-22 PROCEDURE — 3074F PR MOST RECENT SYSTOLIC BLOOD PRESSURE < 130 MM HG: ICD-10-PCS | Mod: HCNC,CPTII,S$GLB, | Performed by: INTERNAL MEDICINE

## 2023-09-22 PROCEDURE — 99204 OFFICE O/P NEW MOD 45 MIN: CPT | Mod: HCNC,S$GLB,, | Performed by: INTERNAL MEDICINE

## 2023-09-22 PROCEDURE — 1159F MED LIST DOCD IN RCRD: CPT | Mod: HCNC,CPTII,S$GLB, | Performed by: INTERNAL MEDICINE

## 2023-09-22 PROCEDURE — 3008F PR BODY MASS INDEX (BMI) DOCUMENTED: ICD-10-PCS | Mod: HCNC,CPTII,S$GLB, | Performed by: INTERNAL MEDICINE

## 2023-09-22 NOTE — TELEPHONE ENCOUNTER
----- Message from Florina Nguyễn sent at 9/22/2023  4:36 PM CDT -----  Type:  Patient Returning Call    Who Called:GILLES HWANG [[id number]]  Who Left Message for Patient:unknown   Does the patient know what this is regarding?:insurance authorization   Would the patient rather a call back or a response via [facility]? Call back   Best Call Back Number:[phone]  Additional Information: Patient indicates she missed a call from the providers office in regards to insurance authorization. Patient indicates she would like to speak with someone in the providers office as soon as possible. Please call back with further assistance.

## 2023-09-22 NOTE — PROGRESS NOTES
CARDIOVASCULAR CONSULTATION    REASON FOR CONSULT:   Zaira Dowell is a 52 y.o. female who presents for CP.    PCP: Suhas Vazquez: CURTIS Nevarez  HISTORY OF PRESENT ILLNESS:   Last seen in 2018.      The patient is a pleasant 52-year-old woman primary care team for evaluation of chest pain.  She is accompanied by her  to today's visit.  She describes episodic stabbing chest discomfort which occur at rest.  She is had several episodes of this over the past several weeks.  She describes a 2 hour episode several days ago precipitating an ER visit.  Her evaluation was negative.  She subsequently had a CTA of her chest which was negative.  She does not describe any associated dyspnea or diaphoresis.  She does have lower extremity edema.  There has been no PND, orthopnea, melena, or hematuria.      Family history is positive for CAD.      The patient currently vapes nicotine.  She denies alcohol excess or illicit drug use.    CARDIOVASCULAR HISTORY:   SHARON on CPAP    PAST MEDICAL HISTORY:     Past Medical History:   Diagnosis Date    Anxiety     Arthritis     Asthma     Back pain     Bipolar I     BMI 50.0-59.9, adult     Chronic obstructive pulmonary disease, unspecified COPD type 2022    Depression     Fibromyalgia     GERD (gastroesophageal reflux disease)     HFpEF     Insomnia 2021    Obesity     SHARON (obstructive sleep apnea)     Tobacco dependence     Urinary incontinence        PAST SURGICAL HISTORY:     Past Surgical History:   Procedure Laterality Date    CARPAL TUNNEL RELEASE       SECTION  1993    CHOLECYSTECTOMY      COLONOSCOPY N/A 2022    Procedure: COLONOSCOPY;  Surgeon: Ernesto Auguste MD;  Location: [facility] ENDO;  Service: Endoscopy;  Laterality: N/A;  fully vaccinated-GT    EPIDURAL STEROID INJECTION Bilateral 2023    Procedure: Bilateral L3, L4, L5 Medial Branch Blocks #1;  Surgeon: Lito Jasso Jr., MD;  Location: [facility] ENDO;  Service: Pain Management;   Laterality: Bilateral;  @1230  No ATC or DM    EPIDURAL STEROID INJECTION Bilateral 08/02/2023    Procedure: Bilateral L3, L4, L5 Medial Branch Blocks #2;  Surgeon: Lito Jasso Jr., MD;  Location: [facility] ENDO;  Service: Pain Management;  Laterality: Bilateral;  @1000  No ATC or DM    EPIDURAL STEROID INJECTION Left 08/16/2023    Procedure: Left L3, L4, L5 Radiofrequency Thermocoagulation of Medial Branches;  Surgeon: Lito Jasso Jr., MD;  Location: [facility] ENDO;  Service: Pain Management;  Laterality: Left;  @0815  No ATC or DM    EPIDURAL STEROID INJECTION Right 08/30/2023    Procedure: Right L3, L4, L5 Radiofrequency Thermocoagulation of Medial Branches;  Surgeon: Lito Jasso Jr., MD;  Location: [facility];  Service: Pain Management;  Laterality: Right;  @0930  No ATC or DM  Last 1&75    ESOPHAGOGASTRODUODENOSCOPY N/A 05/21/2019    Procedure: EGD (ESOPHAGOGASTRODUODENOSCOPY);  Surgeon: Michelle Mc MD;  Location: [facility];  Service: Endoscopy;  Laterality: N/A;    HYSTERECTOMY      partial     KNEE SURGERY      LAPAROSCOPIC SLEEVE GASTRECTOMY N/A 10/18/2019    Procedure: GASTRECTOMY, SLEEVE, LAPAROSCOPIC, with intraop EGD;  Surgeon: Ean Kohli MD;  Location: [address];  Service: General;  Laterality: N/A;    pinched nerve      SHOULDER SURGERY      TONSILLECTOMY      WRIST SURGERY      had ganlin cyst       ALLERGIES AND MEDICATION:     Review of patient's allergies indicates:   Allergen Reactions    Hydrocodone-acetaminophen Nausea And Vomiting        Medication List            Accurate as of September 22, 2023  8:39 AM. If you have any questions, ask your nurse or doctor.                CONTINUE taking these medications      albuterol 90 mcg/actuation inhaler  Commonly known as: PROVENTIL/VENTOLIN HFA  Inhale 2 puffs into the lungs every 4 (four) hours as needed for Wheezing or Shortness of Breath. Rescue     allopurinoL 100 MG tablet  Commonly known as: ZYLOPRIM  Take  1 tablet (100 mg total) by mouth once daily.     b complex vitamins tablet     buPROPion 100 MG TBSR 12 hr tablet  Commonly known as: WELLBUTRIN SR  Take 2 tabs q am, and 1 tab daily at noon     calcium citrate 200 mg (950 mg) tablet  Commonly known as: CALCITRATE     cetirizine 10 MG tablet  Commonly known as: ZYRTEC  Take 1 tablet (10 mg total) by mouth once daily.     colchicine (gout) 0.6 mg tablet  Commonly known as: COLCRYS  Take 1 tablet (0.6 mg total) by mouth daily as needed (gout flare).     cyanocobalamin 500 MCG tablet     cyclobenzaprine 5 MG tablet  Commonly known as: FLEXERIL  Take 1 tablet (5 mg total) by mouth 3 (three) times daily as needed for Muscle spasms.     diclofenac sodium 1 % Gel  Commonly known as: VOLTAREN  Apply 2 g topically 3 (three) times daily as needed (shoulder pain).     furosemide 20 MG tablet  Commonly known as: LASIX  Take 1 tablet (20 mg total) by mouth daily as needed (leg swelling/fluid buildup).     ipratropium 42 mcg (0.06 %) nasal spray  Commonly known as: ATROVENT     lithium 300 MG capsule  Commonly known as: ESKALITH  Take 1 capsule (300 mg total) by mouth 3 (three) times daily with meals.     LORazepam 0.5 MG tablet  Commonly known as: ATIVAN  TAKE 2 TABLETS EVERY DAY AS DIRECTED     meclizine 25 mg tablet  Commonly known as: ANTIVERT  Take 1 tablet (25 mg total) by mouth every 6 (six) hours as needed for Dizziness.     multivitamin capsule     omeprazole 40 MG capsule  Commonly known as: PRILOSEC  TAKE 1 CAPSULE TWICE DAILY BEFORE MEALS     ondansetron 8 MG Tbdl  Commonly known as: ZOFRAN-ODT  Take 1 tablet (8 mg total) by mouth 2 (two) times daily as needed (nausea).     potassium chloride 10 MEQ Tbsr  Commonly known as: KLOR-CON     pregabalin 100 MG capsule  Commonly known as: LYRICA  Take 1 capsule (100 mg total) by mouth 2 (two) times daily.     promethazine 12.5 MG Tab  Commonly known as: PHENERGAN     risperiDONE 0.5 MG Tab  Commonly known as: RISPERDAL  Take  1 tablet (0.5 mg total) by mouth 2 (two) times daily.     sertraline 100 MG tablet  Commonly known as: ZOLOFT  Take 2 tablets (200 mg total) by mouth once daily.     SUPER B COMPLEX ORAL     tolterodine 4 MG 24 hr capsule  Commonly known as: DETROL LA  Take 1 capsule (4 mg total) by mouth once daily.     topiramate 200 MG Tab  Commonly known as: TOPAMAX  Take 1 tablet (200 mg total) by mouth once daily.            STOP taking these medications      fluticasone-salmeterol 250-50 mcg/dose 250-50 mcg/dose diskus inhaler  Commonly known as: ADVAIR  Stopped by: Lito Colmenares MD              SOCIAL HISTORY:     Social History     Socioeconomic History    Marital status:    Tobacco Use    Smoking status: Former     Types: Vaping with nicotine    Smokeless tobacco: Never    Tobacco comments:     vaping started April 2020; QUIT cigarettes Sept 2019   Substance and Sexual Activity    Alcohol use: No    Drug use: No    Sexual activity: Yes     Partners: Male     Birth control/protection: None     Social Determinants of Health     Financial Resource Strain: Low Risk  (9/21/2023)    Overall Financial Resource Strain (CARDIA)     Difficulty of Paying Living Expenses: Not very hard   Food Insecurity: No Food Insecurity (9/21/2023)    Hunger Vital Sign     Worried About Running Out of Food in the Last Year: Never true     Ran Out of Food in the Last Year: Never true   Transportation Needs: No Transportation Needs (9/21/2023)    PRAPARE - Transportation     Lack of Transportation (Medical): No     Lack of Transportation (Non-Medical): No   Physical Activity: Insufficiently Active (9/21/2023)    Exercise Vital Sign     Days of Exercise per Week: 4 days     Minutes of Exercise per Session: 30 min   Stress: Stress Concern Present (9/21/2023)    [employer] - Occupational Stress Questionnaire     Feeling of Stress : Very much   Social Connections: Unknown (9/21/2023)    Social Connection and  Isolation Panel [NHANES]     Frequency of Communication with Friends and Family: More than three times a week     Frequency of Social Gatherings with Friends and Family: Once a week     Active Member of Clubs or Organizations: No     Attends Club or Organization Meetings: More than 4 times per year     Marital Status:    Housing Stability: Low Risk  (9/21/2023)    Housing Stability Vital Sign     Unable to Pay for Housing in the Last Year: No     Number of Places Lived in the Last Year: 1     Unstable Housing in the Last Year: No       FAMILY HISTORY:     Family History   Problem Relation Age of Onset    Diabetes Mother     Flagler's disease Mother     Hypertension Mother     Heart disease Father     Stroke Father     Mental illness Father         ptsd    Hypertension Father     Diabetes Father     Depression Father     Arthritis Father     No Known Problems Brother     Heart disease Maternal Grandmother     Depression Maternal Grandmother     COPD Maternal Grandfather     Heart disease Maternal Grandfather     Stroke Maternal Grandfather     Kidney disease Paternal Grandmother     Heart disease Paternal Grandmother     Heart disease Paternal Grandfather     Early death Paternal Grandfather     Hypertension Son     Asthma Son     Rectal cancer Paternal Aunt     Asthma Maternal Aunt     Diabetes Maternal Aunt     Asthma Maternal Uncle        REVIEW OF SYSTEMS:   Review of Systems   Constitutional:  Negative for chills, diaphoresis and fever.   HENT:  Negative for nosebleeds.    Eyes:  Negative for blurred vision, double vision and photophobia.   Respiratory:  Negative for hemoptysis, shortness of breath and wheezing.    Cardiovascular:  Positive for chest pain and leg swelling. Negative for palpitations, orthopnea, claudication and PND.   Gastrointestinal:  Negative for abdominal pain, blood in stool, heartburn, melena, nausea and vomiting.   Genitourinary:  Negative for flank pain and hematuria.  "  Musculoskeletal:  Negative for falls, myalgias and neck pain.   Skin:  Negative for rash.   Neurological:  Negative for dizziness, seizures, loss of consciousness, weakness and headaches.   Endo/Heme/Allergies:  Negative for polydipsia. Does not bruise/bleed easily.   Psychiatric/Behavioral:  Negative for depression and memory loss. The patient is not nervous/anxious.        PHYSICAL EXAM:     Vitals:    09/22/23 0820   BP: 122/72   Pulse: 69   Resp: 15    Body mass index is 44.44 kg/m².  Weight: 128.7 kg (283 lb 11.7 oz)   Height: 5' 7" (170.2 cm)     Physical Exam  Vitals reviewed.   Constitutional:       General: She is not in acute distress.     Appearance: She is well-developed. She is obese. She is not ill-appearing, toxic-appearing or diaphoretic.   HENT:      Head: Normocephalic and atraumatic.   Eyes:      General: No scleral icterus.     Extraocular Movements: Extraocular movements intact.      Conjunctiva/sclera: Conjunctivae normal.      Pupils: Pupils are equal, round, and reactive to light.   Neck:      Thyroid: No thyromegaly.      Vascular: Normal carotid pulses. No carotid bruit or JVD.      Trachea: Trachea normal.   Cardiovascular:      Rate and Rhythm: Normal rate and regular rhythm.      Pulses:           Carotid pulses are 2+ on the right side and 2+ on the left side.     Heart sounds: S1 normal and S2 normal. No murmur heard.     No friction rub. No gallop.   Pulmonary:      Effort: Pulmonary effort is normal. No respiratory distress.      Breath sounds: Normal breath sounds. No stridor. No wheezing, rhonchi or rales.   Chest:      Chest wall: No tenderness.   Abdominal:      General: There is no distension.      Palpations: Abdomen is soft.   Musculoskeletal:         General: No swelling or tenderness. Normal range of motion.      Cervical back: Normal range of motion and neck supple. No edema or rigidity.      Right lower leg: No edema.      Left lower leg: No edema.   Feet:      Right " foot:      Skin integrity: No ulcer.      Left foot:      Skin integrity: No ulcer.   Skin:     General: Skin is warm and dry.      Coloration: Skin is not jaundiced.   Neurological:      General: No focal deficit present.      Mental Status: She is alert and oriented to person, place, and time.      Cranial Nerves: No cranial nerve deficit.   Psychiatric:         Mood and Affect: Mood normal.         Speech: Speech normal.         Behavior: Behavior normal. Behavior is cooperative.         DATA:   EKG: (personally reviewed tracing)  9/19/23 SR 66, low volt, PRWP/LAD    Laboratory:  CBC:  Recent Labs   Lab 04/02/23  1116 09/18/23  2020 09/19/23  1707   WBC 9.92 8.51 7.26   Hemoglobin 14.1 13.6 13.8   Hematocrit 43.0 41.8 42.9   Platelets 323 307 309       CHEMISTRIES:  Recent Labs   Lab 03/09/21  0911 06/10/21  0858 10/18/21  1037 03/29/22  0859 05/11/22  1044 11/04/22  0950 04/02/23  1116 06/27/23  1055 09/19/23  1707   Glucose 92 88 79 94 80 87 88 88 80   Sodium 139 140 141 138 140 139 139 142 139   Potassium 4.2 4.0 4.8 3.8 3.8 4.1 4.2 4.6 4.0   BUN 9 11 11 13 16 11 16 14 12   Creatinine 0.7 0.8 0.8 0.9 0.8 0.8 0.8 0.8 0.7   eGFR if non African American >60 >60.0 >60 >60 >60.0  --   --   --   --    eGFR  --   --   --   --   --  >60 >60 >60 >60.0   Calcium 9.1 9.3 9.9 9.2 9.2 9.3 9.4 9.7 9.5       CARDIAC BIOMARKERS:  Recent Labs   Lab 09/18/23  1946 09/19/23  1707   Troponin I <0.006 <0.006       COAGS:        LIPIDS/LFTS:  Recent Labs   Lab 06/10/21  0858 10/18/21  1037 03/29/22  0859 05/11/22  1044 11/04/22  0950 04/02/23  1116 09/19/23  1707   Cholesterol 162  --  154  --  183  --   --    Triglycerides 94  --  87  --  77  --   --    HDL 60  --  58  --  75  --   --    LDL Cholesterol 83.2  --  78.6  --  92.6  --   --    Non-HDL Cholesterol 102  --  96  --  108  --   --    AST 9 L   < > 18   < > 13 14 15   ALT 12   < > 77 H   < > 18 18 25    < > = values in this interval not displayed.     Lab Results    Component Value Date    TSH 3.488 11/04/2022         The 10-year ASCVD risk score (Jd DK, et al., 2019) is: 0.8%    Values used to calculate the score:      Age: 52 years      Sex: Female      Is Non- : No      Diabetic: No      Tobacco smoker: No      Systolic Blood Pressure: 122 mmHg      Is BP treated: No      HDL Cholesterol: 75 mg/dL      Total Cholesterol: 183 mg/dL      Cardiovascular Testing:  Ex OLIVER 6/6/19  Normal resting OLIVER bilaterally.  Normal exercise OLIVER bilaterally (but with drop in bilateral OLIVER suggesting possible occult bilat LE PAD).  R ankle PVR waveform abnormal suggesting possible RLE tibial PAD.  Otherwise normal PVR waveforms.    Echo 8/10/18    1 - Normal left ventricular systolic function (EF 60-65%).     2 - Concentric hypertrophy.     3 - Grade I LV diastolic function.      ASSESSMENT:   # CP, atyp  # sleep disturbance with prior hx of SHARON, not on CPAP  # Tob abuse (vaping)     PLAN:   Cont med rx  Justine MPI (pt unable to exercise)  Echo  Sleep med eval  Diet/exercise/weight loss  RTC 1 month (Oct 2023)      Lito Colmenares MD, FACC

## 2023-09-25 ENCOUNTER — TELEPHONE (OUTPATIENT)
Dept: FAMILY MEDICINE | Facility: CLINIC | Age: 53
End: 2023-09-25
Payer: MEDICARE

## 2023-09-25 ENCOUNTER — TELEPHONE (OUTPATIENT)
Dept: CARDIOLOGY | Facility: CLINIC | Age: 53
End: 2023-09-25
Payer: MEDICARE

## 2023-09-25 DIAGNOSIS — R91.1 PULMONARY NODULE: Primary | ICD-10-CM

## 2023-09-25 DIAGNOSIS — R91.1 SOLITARY PULMONARY NODULE: ICD-10-CM

## 2023-09-25 NOTE — TELEPHONE ENCOUNTER
----- Message from Farhana Castillo sent at 9/25/2023 11:46 AM CDT -----  Regarding: Patient call back  .Type: Patient Call Back    Who called:self     What is the request in detail:states her Nuclear Medicine and Echo appointment was canceled due to insurance not approving the test. Patient states she has reached out to her insurance company and they have now approved the test. She is calling to get the two test rescheduled     Can the clinic reply by [facility]?no     Would the patient rather a call back or a response via [facility]? Call    Best call back number: .[phone]      Additional Information:

## 2023-09-25 NOTE — TELEPHONE ENCOUNTER
Called and identification verified.  Notified patient of CT results showing no acute pulmonary embolus.  She does have a very small pulmonary nodule.  Given her history of nicotine use will recheck CT scan in 1 year. All questions answered. She has seen cardiology.

## 2023-10-04 ENCOUNTER — PATIENT MESSAGE (OUTPATIENT)
Dept: BARIATRICS | Facility: CLINIC | Age: 53
End: 2023-10-04
Payer: MEDICARE

## 2023-10-06 DIAGNOSIS — F31.9 BIPOLAR 1 DISORDER: ICD-10-CM

## 2023-10-09 ENCOUNTER — HOSPITAL ENCOUNTER (OUTPATIENT)
Dept: CARDIOLOGY | Facility: HOSPITAL | Age: 53
Discharge: HOME | End: 2023-10-09
Attending: INTERNAL MEDICINE
Payer: MEDICARE

## 2023-10-09 ENCOUNTER — HOSPITAL ENCOUNTER (OUTPATIENT)
Dept: RADIOLOGY | Facility: HOSPITAL | Age: 53
Discharge: HOME | End: 2023-10-09
Attending: INTERNAL MEDICINE
Payer: MEDICARE

## 2023-10-09 ENCOUNTER — PATIENT MESSAGE (OUTPATIENT)
Dept: PSYCHIATRY | Facility: CLINIC | Age: 53
End: 2023-10-09
Payer: MEDICARE

## 2023-10-09 DIAGNOSIS — R07.9 CHEST PAIN, UNSPECIFIED TYPE: ICD-10-CM

## 2023-10-09 DIAGNOSIS — E66.01 MORBID OBESITY, UNSPECIFIED OBESITY TYPE: ICD-10-CM

## 2023-10-09 DIAGNOSIS — Z78.9 PATIENT UNABLE TO EXERCISE: ICD-10-CM

## 2023-10-09 DIAGNOSIS — Z82.49 FAMILY HISTORY OF CARDIAC DISORDER IN FATHER: ICD-10-CM

## 2023-10-09 LAB
CV STRESS BASE HR: 65 BPM
DIASTOLIC BLOOD PRESSURE: 67 MMHG
NUC REST EJECTION FRACTION: 65
OHS CV CPX 85 PERCENT MAX PREDICTED HEART RATE MALE: 137
OHS CV CPX MAX PREDICTED HEART RATE: 161
OHS CV CPX PATIENT IS FEMALE: 1
OHS CV CPX PATIENT IS MALE: 0
OHS CV CPX PEAK DIASTOLIC BLOOD PRESSURE: 60 MMHG
OHS CV CPX PEAK HEAR RATE: 90 BPM
OHS CV CPX PEAK RATE PRESSURE PRODUCT: NORMAL
OHS CV CPX PEAK SYSTOLIC BLOOD PRESSURE: 113 MMHG
OHS CV CPX PERCENT MAX PREDICTED HEART RATE ACHIEVED: 56
OHS CV CPX RATE PRESSURE PRODUCT PRESENTING: 6435
SYSTOLIC BLOOD PRESSURE: 99 MMHG

## 2023-10-09 PROCEDURE — 78452 HT MUSCLE IMAGE SPECT MULT: CPT | Mod: 26,HCNC,, | Performed by: INTERNAL MEDICINE

## 2023-10-09 PROCEDURE — A9502 TC99M TETROFOSMIN: HCPCS | Mod: HCNC

## 2023-10-09 PROCEDURE — 93306 TTE W/DOPPLER COMPLETE: CPT | Mod: 26,HCNC,, | Performed by: INTERNAL MEDICINE

## 2023-10-09 PROCEDURE — 78452 NUCLEAR STRESS - CARDIOLOGY INTERPRETED (CUPID ONLY): ICD-10-PCS | Mod: 26,HCNC,, | Performed by: INTERNAL MEDICINE

## 2023-10-09 PROCEDURE — 93018 NUCLEAR STRESS - CARDIOLOGY INTERPRETED (CUPID ONLY): ICD-10-PCS | Mod: HCNC,,, | Performed by: INTERNAL MEDICINE

## 2023-10-09 PROCEDURE — 93306 ECHO (CUPID ONLY): ICD-10-PCS | Mod: 26,HCNC,, | Performed by: INTERNAL MEDICINE

## 2023-10-09 PROCEDURE — 93016 NUCLEAR STRESS - CARDIOLOGY INTERPRETED (CUPID ONLY): ICD-10-PCS | Mod: HCNC,,, | Performed by: INTERNAL MEDICINE

## 2023-10-09 PROCEDURE — 63600175 PHARM REV CODE 636 W HCPCS: Mod: HCNC | Performed by: INTERNAL MEDICINE

## 2023-10-09 PROCEDURE — 93016 CV STRESS TEST SUPVJ ONLY: CPT | Mod: HCNC,,, | Performed by: INTERNAL MEDICINE

## 2023-10-09 PROCEDURE — 93018 CV STRESS TEST I&R ONLY: CPT | Mod: HCNC,,, | Performed by: INTERNAL MEDICINE

## 2023-10-09 PROCEDURE — 78452 HT MUSCLE IMAGE SPECT MULT: CPT | Mod: HCNC

## 2023-10-09 PROCEDURE — 93306 TTE W/DOPPLER COMPLETE: CPT | Mod: HCNC

## 2023-10-09 PROCEDURE — 93017 CV STRESS TEST TRACING ONLY: CPT | Mod: HCNC

## 2023-10-09 RX ORDER — RISPERIDONE 0.5 MG/1
0.5 TABLET ORAL 2 TIMES DAILY
Qty: 180 TABLET | Refills: 0 | Status: SHIPPED | OUTPATIENT
Start: 2023-10-09 | End: 2024-01-18 | Stop reason: SDUPTHER

## 2023-10-09 RX ORDER — REGADENOSON 0.08 MG/ML
0.4 INJECTION, SOLUTION INTRAVENOUS ONCE
Status: COMPLETED | OUTPATIENT
Start: 2023-10-09 | End: 2023-10-09

## 2023-10-09 RX ADMIN — REGADENOSON 0.4 MG: 0.08 INJECTION, SOLUTION INTRAVENOUS at 10:10

## 2023-10-10 ENCOUNTER — PATIENT MESSAGE (OUTPATIENT)
Dept: BARIATRICS | Facility: CLINIC | Age: 53
End: 2023-10-10
Payer: MEDICARE

## 2023-10-10 LAB
ASCENDING AORTA: 3.42 CM
AV INDEX (PROSTH): 0.77
AV MEAN GRADIENT: 3 MMHG
AV PEAK GRADIENT: 5 MMHG
AV VALVE AREA BY VELOCITY RATIO: 3.13 CM²
AV VALVE AREA: 2.77 CM²
AV VELOCITY RATIO: 0.87
CV ECHO LV RWT: 0.33 CM
DOP CALC AO PEAK VEL: 1.08 M/S
DOP CALC AO VTI: 28.8 CM
DOP CALC LVOT AREA: 3.6 CM2
DOP CALC LVOT DIAMETER: 2.14 CM
DOP CALC LVOT PEAK VEL: 0.94 M/S
DOP CALC LVOT STROKE VOLUME: 79.81 CM3
DOP CALC MV VTI: 29.1 CM
DOP CALCLVOT PEAK VEL VTI: 22.2 CM
E WAVE DECELERATION TIME: 163.78 MSEC
E/A RATIO: 1.07
E/E' RATIO: 7.23 M/S
ECHO LV POSTERIOR WALL: 0.84 CM (ref 0.6–1.1)
FRACTIONAL SHORTENING: 30 % (ref 28–44)
INTERVENTRICULAR SEPTUM: 0.69 CM (ref 0.6–1.1)
IVC DIAMETER: 1.75 CM
IVRT: 87.54 MSEC
LA MAJOR: 5.22 CM
LA MINOR: 4.95 CM
LA WIDTH: 3.4 CM
LEFT ATRIUM SIZE: 3.82 CM
LEFT ATRIUM VOLUME: 56.1 CM3
LEFT INTERNAL DIMENSION IN SYSTOLE: 3.59 CM (ref 2.1–4)
LEFT VENTRICLE DIASTOLIC VOLUME: 126.69 ML
LEFT VENTRICLE SYSTOLIC VOLUME: 54.01 ML
LEFT VENTRICULAR INTERNAL DIMENSION IN DIASTOLE: 5.15 CM (ref 3.5–6)
LEFT VENTRICULAR MASS: 134.96 G
LV LATERAL E/E' RATIO: 6.71 M/S
LV SEPTAL E/E' RATIO: 7.83 M/S
LVOT MG: 1.73 MMHG
LVOT MV: 0.6 CM/S
MV MEAN GRADIENT: 1 MMHG
MV PEAK A VEL: 0.88 M/S
MV PEAK E VEL: 0.94 M/S
MV PEAK GRADIENT: 3 MMHG
MV STENOSIS PRESSURE HALF TIME: 47.5 MS
MV VALVE AREA BY CONTINUITY EQUATION: 2.74 CM2
MV VALVE AREA P 1/2 METHOD: 4.63 CM2
PISA MRMAX VEL: 2.52 M/S
PISA TR MAX VEL: 1.89 M/S
PV PEAK GRADIENT: 3 MMHG
PV PEAK VELOCITY: 0.93 M/S
RA MAJOR: 4.93 CM
RA PRESSURE ESTIMATED: 3 MMHG
RA WIDTH: 3.6 CM
RIGHT VENTRICULAR END-DIASTOLIC DIMENSION: 3.43 CM
RV TB RVSP: 5 MMHG
RV TISSUE DOPPLER FREE WALL SYSTOLIC VELOCITY 1 (APICAL 4 CHAMBER VIEW): 12.69 CM/S
SINUS: 2.89 CM
TDI LATERAL: 0.14 M/S
TDI SEPTAL: 0.12 M/S
TDI: 0.13 M/S
TR MAX PG: 14 MMHG
TRICUSPID ANNULAR PLANE SYSTOLIC EXCURSION: 2.26 CM
TV REST PULMONARY ARTERY PRESSURE: 17 MMHG

## 2023-10-11 ENCOUNTER — OFFICE VISIT (OUTPATIENT)
Dept: RHEUMATOLOGY | Facility: CLINIC | Age: 53
End: 2023-10-11
Payer: MEDICARE

## 2023-10-11 VITALS
OXYGEN SATURATION: 95 % | BODY MASS INDEX: 44.86 KG/M2 | SYSTOLIC BLOOD PRESSURE: 117 MMHG | WEIGHT: 285.81 LBS | HEIGHT: 67 IN | DIASTOLIC BLOOD PRESSURE: 79 MMHG | HEART RATE: 67 BPM

## 2023-10-11 DIAGNOSIS — Z71.89 COUNSELING AND COORDINATION OF CARE: ICD-10-CM

## 2023-10-11 DIAGNOSIS — E66.01 MORBID OBESITY: ICD-10-CM

## 2023-10-11 DIAGNOSIS — M79.7 FIBROMYALGIA: Primary | ICD-10-CM

## 2023-10-11 DIAGNOSIS — M15.9 PRIMARY OSTEOARTHRITIS INVOLVING MULTIPLE JOINTS: ICD-10-CM

## 2023-10-11 PROCEDURE — 3008F BODY MASS INDEX DOCD: CPT | Mod: HCNC,CPTII,S$GLB, | Performed by: INTERNAL MEDICINE

## 2023-10-11 PROCEDURE — 3078F DIAST BP <80 MM HG: CPT | Mod: HCNC,CPTII,S$GLB, | Performed by: INTERNAL MEDICINE

## 2023-10-11 PROCEDURE — 99214 OFFICE O/P EST MOD 30 MIN: CPT | Mod: HCNC,S$GLB,, | Performed by: INTERNAL MEDICINE

## 2023-10-11 PROCEDURE — 3078F PR MOST RECENT DIASTOLIC BLOOD PRESSURE < 80 MM HG: ICD-10-PCS | Mod: HCNC,CPTII,S$GLB, | Performed by: INTERNAL MEDICINE

## 2023-10-11 PROCEDURE — 99999 PR PBB SHADOW E&M-EST. PATIENT-LVL V: CPT | Mod: PBBFAC,HCNC,, | Performed by: INTERNAL MEDICINE

## 2023-10-11 PROCEDURE — 3074F SYST BP LT 130 MM HG: CPT | Mod: HCNC,CPTII,S$GLB, | Performed by: INTERNAL MEDICINE

## 2023-10-11 PROCEDURE — 99999 PR PBB SHADOW E&M-EST. PATIENT-LVL V: ICD-10-PCS | Mod: PBBFAC,HCNC,, | Performed by: INTERNAL MEDICINE

## 2023-10-11 PROCEDURE — 99214 PR OFFICE/OUTPT VISIT, EST, LEVL IV, 30-39 MIN: ICD-10-PCS | Mod: HCNC,S$GLB,, | Performed by: INTERNAL MEDICINE

## 2023-10-11 PROCEDURE — 3074F PR MOST RECENT SYSTOLIC BLOOD PRESSURE < 130 MM HG: ICD-10-PCS | Mod: HCNC,CPTII,S$GLB, | Performed by: INTERNAL MEDICINE

## 2023-10-11 PROCEDURE — 1159F MED LIST DOCD IN RCRD: CPT | Mod: HCNC,CPTII,S$GLB, | Performed by: INTERNAL MEDICINE

## 2023-10-11 PROCEDURE — 1159F PR MEDICATION LIST DOCUMENTED IN MEDICAL RECORD: ICD-10-PCS | Mod: HCNC,CPTII,S$GLB, | Performed by: INTERNAL MEDICINE

## 2023-10-11 PROCEDURE — 3008F PR BODY MASS INDEX (BMI) DOCUMENTED: ICD-10-PCS | Mod: HCNC,CPTII,S$GLB, | Performed by: INTERNAL MEDICINE

## 2023-10-11 RX ORDER — PREGABALIN 100 MG/1
100 CAPSULE ORAL 2 TIMES DAILY
Qty: 60 CAPSULE | Refills: 5 | Status: SHIPPED | OUTPATIENT
Start: 2023-10-11 | End: 2024-04-10

## 2023-10-11 RX ORDER — CYCLOBENZAPRINE HCL 5 MG
5 TABLET ORAL 3 TIMES DAILY PRN
Qty: 60 TABLET | Refills: 6 | Status: SHIPPED | OUTPATIENT
Start: 2023-10-11

## 2023-10-11 RX ORDER — MECLIZINE HYDROCHLORIDE 25 MG/1
25 TABLET ORAL EVERY 6 HOURS PRN
Qty: 60 TABLET | Refills: 1 | Status: SHIPPED | OUTPATIENT
Start: 2023-10-11

## 2023-10-11 NOTE — PROGRESS NOTES
Answers submitted by the patient for this visit:  Rheumatology Questionnaire (Submitted on 10/9/2023)  fever: No  eye redness: No  mouth sores: No  headaches: Yes  shortness of breath: Yes  chest pain: Yes  trouble swallowing: No  diarrhea: No  constipation: No  unexpected weight change: Yes  genital sore: No  dysuria: No  During the last 3 days, have you had a skin rash?: No  Bruises or bleeds easily: No  cough: No
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
## 2023-10-18 ENCOUNTER — PATIENT MESSAGE (OUTPATIENT)
Dept: CARDIOLOGY | Facility: CLINIC | Age: 53
End: 2023-10-18
Payer: MEDICARE

## 2023-10-23 ENCOUNTER — OFFICE VISIT (OUTPATIENT)
Dept: CARDIOLOGY | Facility: CLINIC | Age: 53
End: 2023-10-23
Payer: MEDICARE

## 2023-10-23 VITALS
DIASTOLIC BLOOD PRESSURE: 78 MMHG | WEIGHT: 283.06 LBS | RESPIRATION RATE: 18 BRPM | BODY MASS INDEX: 44.43 KG/M2 | HEART RATE: 58 BPM | SYSTOLIC BLOOD PRESSURE: 112 MMHG | OXYGEN SATURATION: 97 % | HEIGHT: 67 IN

## 2023-10-23 DIAGNOSIS — R07.2 PRECORDIAL PAIN: ICD-10-CM

## 2023-10-23 DIAGNOSIS — E66.01 MORBID OBESITY, UNSPECIFIED OBESITY TYPE: ICD-10-CM

## 2023-10-23 DIAGNOSIS — Z82.49 FH: CAD (CORONARY ARTERY DISEASE): ICD-10-CM

## 2023-10-23 DIAGNOSIS — G47.33 OSA (OBSTRUCTIVE SLEEP APNEA): ICD-10-CM

## 2023-10-23 DIAGNOSIS — R94.31 ABNORMAL EKG: ICD-10-CM

## 2023-10-23 DIAGNOSIS — R42 DIZZINESS: ICD-10-CM

## 2023-10-23 DIAGNOSIS — R94.39 ABNORMAL NUCLEAR STRESS TEST: Primary | ICD-10-CM

## 2023-10-23 PROCEDURE — 1160F RVW MEDS BY RX/DR IN RCRD: CPT | Mod: HCNC,CPTII,S$GLB, | Performed by: INTERNAL MEDICINE

## 2023-10-23 PROCEDURE — 3078F PR MOST RECENT DIASTOLIC BLOOD PRESSURE < 80 MM HG: ICD-10-PCS | Mod: HCNC,CPTII,S$GLB, | Performed by: INTERNAL MEDICINE

## 2023-10-23 PROCEDURE — 3074F SYST BP LT 130 MM HG: CPT | Mod: HCNC,CPTII,S$GLB, | Performed by: INTERNAL MEDICINE

## 2023-10-23 PROCEDURE — 3074F PR MOST RECENT SYSTOLIC BLOOD PRESSURE < 130 MM HG: ICD-10-PCS | Mod: HCNC,CPTII,S$GLB, | Performed by: INTERNAL MEDICINE

## 2023-10-23 PROCEDURE — 99215 PR OFFICE/OUTPT VISIT, EST, LEVL V, 40-54 MIN: ICD-10-PCS | Mod: HCNC,S$GLB,, | Performed by: INTERNAL MEDICINE

## 2023-10-23 PROCEDURE — 1159F MED LIST DOCD IN RCRD: CPT | Mod: HCNC,CPTII,S$GLB, | Performed by: INTERNAL MEDICINE

## 2023-10-23 PROCEDURE — 93000 ELECTROCARDIOGRAM COMPLETE: CPT | Mod: HCNC,S$GLB,, | Performed by: INTERNAL MEDICINE

## 2023-10-23 PROCEDURE — 99999 PR PBB SHADOW E&M-EST. PATIENT-LVL V: ICD-10-PCS | Mod: PBBFAC,HCNC,, | Performed by: INTERNAL MEDICINE

## 2023-10-23 PROCEDURE — 99999 PR PBB SHADOW E&M-EST. PATIENT-LVL V: CPT | Mod: PBBFAC,HCNC,, | Performed by: INTERNAL MEDICINE

## 2023-10-23 PROCEDURE — 3008F BODY MASS INDEX DOCD: CPT | Mod: HCNC,CPTII,S$GLB, | Performed by: INTERNAL MEDICINE

## 2023-10-23 PROCEDURE — 1159F PR MEDICATION LIST DOCUMENTED IN MEDICAL RECORD: ICD-10-PCS | Mod: HCNC,CPTII,S$GLB, | Performed by: INTERNAL MEDICINE

## 2023-10-23 PROCEDURE — 93000 EKG 12-LEAD: ICD-10-PCS | Mod: HCNC,S$GLB,, | Performed by: INTERNAL MEDICINE

## 2023-10-23 PROCEDURE — 3078F DIAST BP <80 MM HG: CPT | Mod: HCNC,CPTII,S$GLB, | Performed by: INTERNAL MEDICINE

## 2023-10-23 PROCEDURE — 99215 OFFICE O/P EST HI 40 MIN: CPT | Mod: HCNC,S$GLB,, | Performed by: INTERNAL MEDICINE

## 2023-10-23 PROCEDURE — 1160F PR REVIEW ALL MEDS BY PRESCRIBER/CLIN PHARMACIST DOCUMENTED: ICD-10-PCS | Mod: HCNC,CPTII,S$GLB, | Performed by: INTERNAL MEDICINE

## 2023-10-23 PROCEDURE — 3008F PR BODY MASS INDEX (BMI) DOCUMENTED: ICD-10-PCS | Mod: HCNC,CPTII,S$GLB, | Performed by: INTERNAL MEDICINE

## 2023-10-23 RX ORDER — CLOPIDOGREL BISULFATE 75 MG/1
75 TABLET ORAL DAILY
Qty: 30 TABLET | Refills: 11 | Status: ON HOLD | OUTPATIENT
Start: 2023-10-23 | End: 2023-11-07 | Stop reason: HOSPADM

## 2023-10-23 RX ORDER — DIPHENHYDRAMINE HCL 25 MG
50 CAPSULE ORAL ONCE
Status: CANCELLED | OUTPATIENT
Start: 2023-11-07

## 2023-10-23 RX ORDER — SODIUM CHLORIDE 0.9 % (FLUSH) 0.9 %
10 SYRINGE (ML) INJECTION
Status: SHIPPED | OUTPATIENT
Start: 2023-10-23

## 2023-10-23 RX ORDER — SODIUM CHLORIDE 9 MG/ML
INJECTION, SOLUTION INTRAVENOUS CONTINUOUS
Status: CANCELLED | OUTPATIENT
Start: 2023-11-07 | End: 2023-11-07

## 2023-10-23 RX ORDER — ASPIRIN 81 MG/1
81 TABLET ORAL DAILY
Qty: 90 TABLET | Refills: 3 | Status: ON HOLD | COMMUNITY
Start: 2023-10-23 | End: 2023-11-07 | Stop reason: HOSPADM

## 2023-10-23 NOTE — H&P (VIEW-ONLY)
CARDIOVASCULAR PROGRESS NOTE    REASON FOR CONSULT:   Zaira Dowell is a 52 y.o. female who presents for f/u CP, testing.    PCP: Suhas  HISTORY OF PRESENT ILLNESS:   The patient returns for follow-up, accompanied by her .  She continues to describe substernal chest discomfort.  She denies any palpitations or syncope.  There has been no PND, orthopnea, melena, hematuria, or claudication symptoms.      I reviewed her nuclear stress test and personally reviewed the images.  This revealed LAD territory ischemia.  Her echo notes normal LV function.  I discussed pros and cons of coronary CTA versus invasive angiography and we have decided to move forward with invasive angiogram.    CARDIOVASCULAR HISTORY:   SHARON on CPAP    PAST MEDICAL HISTORY:     Past Medical History:   Diagnosis Date    Anxiety     Arthritis     Asthma     Back pain     Bipolar I     BMI 50.0-59.9, adult     Chronic obstructive pulmonary disease, unspecified COPD type 2022    Depression     Fibromyalgia     GERD (gastroesophageal reflux disease)     HFpEF     Insomnia 2021    Obesity     SHARON (obstructive sleep apnea)     Tobacco dependence     Urinary incontinence        PAST SURGICAL HISTORY:     Past Surgical History:   Procedure Laterality Date    CARPAL TUNNEL RELEASE       SECTION  1993    CHOLECYSTECTOMY      COLONOSCOPY N/A 2022    Procedure: COLONOSCOPY;  Surgeon: Ernesto Auguste MD;  Location: [facility];  Service: Endoscopy;  Laterality: N/A;  fully vaccinated-GT    EPIDURAL STEROID INJECTION Bilateral 2023    Procedure: Bilateral L3, L4, L5 Medial Branch Blocks #1;  Surgeon: Lito Jasso Jr., MD;  Location: [facility] ENDO;  Service: Pain Management;  Laterality: Bilateral;  @1230  No ATC or DM    EPIDURAL STEROID INJECTION Bilateral 2023    Procedure: Bilateral L3, L4, L5 Medial Branch Blocks #2;  Surgeon: Lito Jasso Jr., MD;  Location: [facility] ENDO;  Service: Pain  Management;  Laterality: Bilateral;  @1000  No ATC or DM    EPIDURAL STEROID INJECTION Left 08/16/2023    Procedure: Left L3, L4, L5 Radiofrequency Thermocoagulation of Medial Branches;  Surgeon: Lito Jasso Jr., MD;  Location: [facility];  Service: Pain Management;  Laterality: Left;  @0815  No ATC or DM    EPIDURAL STEROID INJECTION Right 08/30/2023    Procedure: Right L3, L4, L5 Radiofrequency Thermocoagulation of Medial Branches;  Surgeon: Lito Jasso Jr., MD;  Location: [facility];  Service: Pain Management;  Laterality: Right;  @0930  No ATC or DM  Last 1&75    ESOPHAGOGASTRODUODENOSCOPY N/A 05/21/2019    Procedure: EGD (ESOPHAGOGASTRODUODENOSCOPY);  Surgeon: Michelle Mc MD;  Location: [facility];  Service: Endoscopy;  Laterality: N/A;    HYSTERECTOMY      partial     KNEE SURGERY      LAPAROSCOPIC SLEEVE GASTRECTOMY N/A 10/18/2019    Procedure: GASTRECTOMY, SLEEVE, LAPAROSCOPIC, with intraop EGD;  Surgeon: Ean Kohli MD;  Location: [address];  Service: General;  Laterality: N/A;    pinched nerve      SHOULDER SURGERY      TONSILLECTOMY      WRIST SURGERY      had ganlin cyst       ALLERGIES AND MEDICATION:     Review of patient's allergies indicates:   Allergen Reactions    Hydrocodone-acetaminophen Nausea And Vomiting        Medication List            Accurate as of October 23, 2023 11:16 AM. If you have any questions, ask your nurse or doctor.                CONTINUE taking these medications      albuterol 90 mcg/actuation inhaler  Commonly known as: PROVENTIL/VENTOLIN HFA  Inhale 2 puffs into the lungs every 4 (four) hours as needed for Wheezing or Shortness of Breath. Rescue     allopurinoL 100 MG tablet  Commonly known as: ZYLOPRIM  Take 1 tablet (100 mg total) by mouth once daily.     b complex vitamins tablet     buPROPion 100 MG TBSR 12 hr tablet  Commonly known as: WELLBUTRIN SR  Take 2 tabs q am, and 1 tab daily at noon     calcium citrate 200 mg (950 mg)  tablet  Commonly known as: CALCITRATE     colchicine (gout) 0.6 mg tablet  Commonly known as: COLCRYS  Take 1 tablet (0.6 mg total) by mouth daily as needed (gout flare).     cyanocobalamin 500 MCG tablet     cyclobenzaprine 5 MG tablet  Commonly known as: FLEXERIL  Take 1 tablet (5 mg total) by mouth 3 (three) times daily as needed for Muscle spasms.     furosemide 20 MG tablet  Commonly known as: LASIX  Take 1 tablet (20 mg total) by mouth daily as needed (leg swelling/fluid buildup).     lithium 300 MG capsule  Commonly known as: ESKALITH  Take 1 capsule (300 mg total) by mouth 3 (three) times daily with meals.     LORazepam 0.5 MG tablet  Commonly known as: ATIVAN  TAKE 2 TABLETS EVERY DAY AS DIRECTED     meclizine 25 mg tablet  Commonly known as: ANTIVERT  Take 1 tablet (25 mg total) by mouth every 6 (six) hours as needed for Dizziness.     multivitamin capsule     omeprazole 40 MG capsule  Commonly known as: PRILOSEC  TAKE 1 CAPSULE TWICE DAILY BEFORE MEALS     ondansetron 8 MG Tbdl  Commonly known as: ZOFRAN-ODT  Take 1 tablet (8 mg total) by mouth 2 (two) times daily as needed (nausea).     potassium chloride 10 MEQ Tbsr  Commonly known as: KLOR-CON     pregabalin 100 MG capsule  Commonly known as: LYRICA  Take 1 capsule (100 mg total) by mouth 2 (two) times daily.     promethazine 12.5 MG Tab  Commonly known as: PHENERGAN     risperiDONE 0.5 MG Tab  Commonly known as: RISPERDAL  TAKE 1 TABLET TWICE DAILY     sertraline 100 MG tablet  Commonly known as: ZOLOFT  Take 2 tablets (200 mg total) by mouth once daily.     SUPER B COMPLEX ORAL     tolterodine 4 MG 24 hr capsule  Commonly known as: DETROL LA  Take 1 capsule (4 mg total) by mouth once daily.     topiramate 200 MG Tab  Commonly known as: TOPAMAX  Take 1 tablet (200 mg total) by mouth once daily.            STOP taking these medications      cetirizine 10 MG tablet  Commonly known as: ZYRTEC  Stopped by: Lito Colmenares MD     diclofenac sodium 1 %  Gel  Commonly known as: VOLTAREN  Stopped by: Lito Colmenares MD     ipratropium 42 mcg (0.06 %) nasal spray  Commonly known as: ATROVENT  Stopped by: Lito Colmenares MD              SOCIAL HISTORY:     Social History     Socioeconomic History    Marital status:    Tobacco Use    Smoking status: Former     Types: Vaping with nicotine    Smokeless tobacco: Never    Tobacco comments:     vaping started April 2020; QUIT cigarettes Sept 2019   Substance and Sexual Activity    Alcohol use: No    Drug use: No    Sexual activity: Yes     Partners: Male     Birth control/protection: None     Social Determinants of Health     Financial Resource Strain: Low Risk  (10/22/2023)    Overall Financial Resource Strain (CARDIA)     Difficulty of Paying Living Expenses: Not very hard   Food Insecurity: No Food Insecurity (10/22/2023)    Hunger Vital Sign     Worried About Running Out of Food in the Last Year: Never true     Ran Out of Food in the Last Year: Never true   Transportation Needs: No Transportation Needs (10/22/2023)    PRAPARE - Transportation     Lack of Transportation (Medical): No     Lack of Transportation (Non-Medical): No   Physical Activity: Inactive (10/22/2023)    Exercise Vital Sign     Days of Exercise per Week: 0 days     Minutes of Exercise per Session: 0 min   Stress: Stress Concern Present (10/22/2023)    [employer] - Occupational Stress Questionnaire     Feeling of Stress : Very much   Social Connections: Unknown (10/22/2023)    Social Connection and Isolation Panel [NHANES]     Frequency of Communication with Friends and Family: More than three times a week     Frequency of Social Gatherings with Friends and Family: Once a week     Active Member of Clubs or Organizations: Yes     Attends Club or Organization Meetings: More than 4 times per year     Marital Status:    Housing Stability: Low Risk  (10/22/2023)    Housing Stability Vital Sign     Unable to  Pay for Housing in the Last Year: No     Number of Places Lived in the Last Year: 1     Unstable Housing in the Last Year: No       FAMILY HISTORY:     Family History   Problem Relation Age of Onset    Diabetes Mother     Jose Juan's disease Mother     Hypertension Mother     Heart disease Father     Stroke Father     Mental illness Father         ptsd    Hypertension Father     Diabetes Father     Depression Father     Arthritis Father     No Known Problems Brother     Heart disease Maternal Grandmother     Depression Maternal Grandmother     COPD Maternal Grandfather     Heart disease Maternal Grandfather     Stroke Maternal Grandfather     Kidney disease Paternal Grandmother     Heart disease Paternal Grandmother     Heart disease Paternal Grandfather     Early death Paternal Grandfather     Hypertension Son     Asthma Son     Rectal cancer Paternal Aunt     Asthma Maternal Aunt     Diabetes Maternal Aunt     Asthma Maternal Uncle        REVIEW OF SYSTEMS:   Review of Systems   Constitutional:  Negative for chills, diaphoresis and fever.   HENT:  Negative for nosebleeds.    Eyes:  Negative for blurred vision, double vision and photophobia.   Respiratory:  Negative for hemoptysis, shortness of breath and wheezing.    Cardiovascular:  Positive for chest pain and leg swelling. Negative for palpitations, orthopnea, claudication and PND.   Gastrointestinal:  Negative for abdominal pain, blood in stool, heartburn, melena, nausea and vomiting.   Genitourinary:  Negative for flank pain and hematuria.   Musculoskeletal:  Negative for falls, myalgias and neck pain.   Skin:  Negative for rash.   Neurological:  Negative for dizziness, seizures, loss of consciousness, weakness and headaches.   Endo/Heme/Allergies:  Negative for polydipsia. Does not bruise/bleed easily.   Psychiatric/Behavioral:  Negative for depression and memory loss. The patient is not nervous/anxious.        PHYSICAL EXAM:     Vitals:    10/23/23 1100   BP:  "112/78   Pulse: (!) 58   Resp: 18    Body mass index is 44.34 kg/m².  Weight: 128.4 kg (283 lb 1.1 oz)   Height: 5' 7" (170.2 cm)     Physical Exam  Vitals reviewed.   Constitutional:       General: She is not in acute distress.     Appearance: She is well-developed. She is obese. She is not ill-appearing, toxic-appearing or diaphoretic.   HENT:      Head: Normocephalic and atraumatic.   Eyes:      General: No scleral icterus.     Extraocular Movements: Extraocular movements intact.      Conjunctiva/sclera: Conjunctivae normal.      Pupils: Pupils are equal, round, and reactive to light.   Neck:      Thyroid: No thyromegaly.      Vascular: Normal carotid pulses. No carotid bruit or JVD.      Trachea: Trachea normal.   Cardiovascular:      Rate and Rhythm: Normal rate and regular rhythm.      Pulses:           Carotid pulses are 2+ on the right side and 2+ on the left side.       Radial pulses are 2+ on the right side.      Heart sounds: S1 normal and S2 normal. No murmur heard.     No friction rub. No gallop.   Pulmonary:      Effort: Pulmonary effort is normal. No respiratory distress.      Breath sounds: Normal breath sounds. No stridor. No wheezing, rhonchi or rales.   Chest:      Chest wall: No tenderness.   Abdominal:      General: There is no distension.      Palpations: Abdomen is soft.   Musculoskeletal:         General: No swelling or tenderness. Normal range of motion.      Cervical back: Normal range of motion and neck supple. No edema or rigidity.      Right lower leg: No edema.      Left lower leg: No edema.   Feet:      Right foot:      Skin integrity: No ulcer.      Left foot:      Skin integrity: No ulcer.   Skin:     General: Skin is warm and dry.      Coloration: Skin is not jaundiced.   Neurological:      General: No focal deficit present.      Mental Status: She is alert and oriented to person, place, and time.      Cranial Nerves: No cranial nerve deficit.   Psychiatric:         Mood and Affect: " Mood normal.         Speech: Speech normal.         Behavior: Behavior normal. Behavior is cooperative.         DATA:   EKG: (personally reviewed tracing)  10/23/23 SR 57, low volt, PRWP    Laboratory:  CBC:  Recent Labs   Lab 04/02/23  1116 09/18/23 2020 09/19/23  1707   WBC 9.92 8.51 7.26   Hemoglobin 14.1 13.6 13.8   Hematocrit 43.0 41.8 42.9   Platelets 323 307 309         CHEMISTRIES:  Recent Labs   Lab 03/09/21  0911 06/10/21  0858 10/18/21  1037 03/29/22  0859 05/11/22  1044 11/04/22  0950 04/02/23  1116 06/27/23  1055 09/19/23  1707   Glucose 92 88 79 94 80 87 88 88 80   Sodium 139 140 141 138 140 139 139 142 139   Potassium 4.2 4.0 4.8 3.8 3.8 4.1 4.2 4.6 4.0   BUN 9 11 11 13 16 11 16 14 12   Creatinine 0.7 0.8 0.8 0.9 0.8 0.8 0.8 0.8 0.7   eGFR if non African American >60 >60.0 >60 >60 >60.0  --   --   --   --    eGFR  --   --   --   --   --  >60 >60 >60 >60.0   Calcium 9.1 9.3 9.9 9.2 9.2 9.3 9.4 9.7 9.5         CARDIAC BIOMARKERS:  Recent Labs   Lab 09/18/23 1946 09/19/23  1707   Troponin I <0.006 <0.006         COAGS:        LIPIDS/LFTS:  Recent Labs   Lab 06/10/21  0858 10/18/21  1037 03/29/22  0859 05/11/22  1044 11/04/22  0950 04/02/23  1116 09/19/23  1707   Cholesterol 162  --  154  --  183  --   --    Triglycerides 94  --  87  --  77  --   --    HDL 60  --  58  --  75  --   --    LDL Cholesterol 83.2  --  78.6  --  92.6  --   --    Non-HDL Cholesterol 102  --  96  --  108  --   --    AST 9 L   < > 18   < > 13 14 15   ALT 12   < > 77 H   < > 18 18 25    < > = values in this interval not displayed.       Lab Results   Component Value Date    TSH 3.488 11/04/2022         The 10-year ASCVD risk score (Jd MILNER, et al., 2019) is: 0.6%    Values used to calculate the score:      Age: 52 years      Sex: Female      Is Non- : No      Diabetic: No      Tobacco smoker: No      Systolic Blood Pressure: 112 mmHg      Is BP treated: No      HDL Cholesterol: 75 mg/dL      Total  Cholesterol: 183 mg/dL      Cardiovascular Testing:  L MPI 10/9/23 (images personally reviewed and interpreted)    Abnormal myocardial perfusion scan.    There is a moderate intensity, small to moderate sized, mostly reversible perfusion abnormality with some fixed areas in the mid anterior wall(s).    There are no other significant perfusion abnormalities.    The gated perfusion images showed an ejection fraction of 65% at rest.    There is normal wall motion at rest and post stress.    The ECG portion of the study is negative for ischemia.    The patient reported no chest pain during the stress test.    There were no arrhythmias during stress.    Echo 10/9/23    Left Ventricle: The left ventricle is normal in size. Normal wall thickness. Normal wall motion. There is normal systolic function with a visually estimated ejection fraction of 60 - 65%. There is normal diastolic function.    Right Ventricle: Normal right ventricular cavity size. Wall thickness is normal. Right ventricle wall motion  is normal. Systolic function is normal.    Pulmonary Artery: The estimated pulmonary artery systolic pressure is 17 mmHg.    IVC/SVC: Normal venous pressure at 3 mmHg.    Ex OLIVER 6/6/19  Normal resting OLIVER bilaterally.  Normal exercise OLIVER bilaterally (but with drop in bilateral OLIVER suggesting possible occult bilat LE PAD).  R ankle PVR waveform abnormal suggesting possible RLE tibial PAD.  Otherwise normal PVR waveforms.    ASSESSMENT:   # CP, MPI abnl suggesting LAD ischemia.  HR/BP too low for anitinaginals.  # sleep disturbance with prior hx of SHARON, not on CPAP.  Gilberto appt with Dr. Lemus 1/31/2024.  # Tob abuse (vaping)     PLAN:   Cont med rx  Start ASA 81mg qd  Start Plavix 75mg qd  Cath 11/7/23 730am, R rad access  Diet/exercise/weight loss  RTC 1 month (Nov 2023)    Risks, benefits and alternatives of the catheterization procedure were discussed with the patient and her  in attendance, which include but are not  limited to: bleeding, infection, death, heart attack, arrhythmia, kidney failure, stroke, need for emergency surgery, etc.  Patient understands and and agrees to proceed.  Consent was placed on the chart.          Lito Colmenares MD, FACC

## 2023-10-23 NOTE — PROGRESS NOTES
CARDIOVASCULAR PROGRESS NOTE    REASON FOR CONSULT:   Zaira Dowell is a 52 y.o. female who presents for f/u CP, testing.    PCP: Suhas  HISTORY OF PRESENT ILLNESS:   The patient returns for follow-up, accompanied by her .  She continues to describe substernal chest discomfort.  She denies any palpitations or syncope.  There has been no PND, orthopnea, melena, hematuria, or claudication symptoms.      I reviewed her nuclear stress test and personally reviewed the images.  This revealed LAD territory ischemia.  Her echo notes normal LV function.  I discussed pros and cons of coronary CTA versus invasive angiography and we have decided to move forward with invasive angiogram.    CARDIOVASCULAR HISTORY:   SHARON on CPAP    PAST MEDICAL HISTORY:     Past Medical History:   Diagnosis Date    Anxiety     Arthritis     Asthma     Back pain     Bipolar I     BMI 50.0-59.9, adult     Chronic obstructive pulmonary disease, unspecified COPD type 2022    Depression     Fibromyalgia     GERD (gastroesophageal reflux disease)     HFpEF     Insomnia 2021    Obesity     SHARON (obstructive sleep apnea)     Tobacco dependence     Urinary incontinence        PAST SURGICAL HISTORY:     Past Surgical History:   Procedure Laterality Date    CARPAL TUNNEL RELEASE       SECTION  1993    CHOLECYSTECTOMY      COLONOSCOPY N/A 2022    Procedure: COLONOSCOPY;  Surgeon: Ernesto Auguste MD;  Location: [facility];  Service: Endoscopy;  Laterality: N/A;  fully vaccinated-GT    EPIDURAL STEROID INJECTION Bilateral 2023    Procedure: Bilateral L3, L4, L5 Medial Branch Blocks #1;  Surgeon: Lito Jasso Jr., MD;  Location: [facility] ENDO;  Service: Pain Management;  Laterality: Bilateral;  @1230  No ATC or DM    EPIDURAL STEROID INJECTION Bilateral 2023    Procedure: Bilateral L3, L4, L5 Medial Branch Blocks #2;  Surgeon: Lito Jasso Jr., MD;  Location: [facility] ENDO;  Service: Pain  Management;  Laterality: Bilateral;  @1000  No ATC or DM    EPIDURAL STEROID INJECTION Left 08/16/2023    Procedure: Left L3, L4, L5 Radiofrequency Thermocoagulation of Medial Branches;  Surgeon: Lito Jasso Jr., MD;  Location: [facility];  Service: Pain Management;  Laterality: Left;  @0815  No ATC or DM    EPIDURAL STEROID INJECTION Right 08/30/2023    Procedure: Right L3, L4, L5 Radiofrequency Thermocoagulation of Medial Branches;  Surgeon: Lito Jasso Jr., MD;  Location: [facility];  Service: Pain Management;  Laterality: Right;  @0930  No ATC or DM  Last 1&75    ESOPHAGOGASTRODUODENOSCOPY N/A 05/21/2019    Procedure: EGD (ESOPHAGOGASTRODUODENOSCOPY);  Surgeon: Michelle Mc MD;  Location: [facility];  Service: Endoscopy;  Laterality: N/A;    HYSTERECTOMY      partial     KNEE SURGERY      LAPAROSCOPIC SLEEVE GASTRECTOMY N/A 10/18/2019    Procedure: GASTRECTOMY, SLEEVE, LAPAROSCOPIC, with intraop EGD;  Surgeon: Ean Kohli MD;  Location: [address];  Service: General;  Laterality: N/A;    pinched nerve      SHOULDER SURGERY      TONSILLECTOMY      WRIST SURGERY      had ganlin cyst       ALLERGIES AND MEDICATION:     Review of patient's allergies indicates:   Allergen Reactions    Hydrocodone-acetaminophen Nausea And Vomiting        Medication List            Accurate as of October 23, 2023 11:16 AM. If you have any questions, ask your nurse or doctor.                CONTINUE taking these medications      albuterol 90 mcg/actuation inhaler  Commonly known as: PROVENTIL/VENTOLIN HFA  Inhale 2 puffs into the lungs every 4 (four) hours as needed for Wheezing or Shortness of Breath. Rescue     allopurinoL 100 MG tablet  Commonly known as: ZYLOPRIM  Take 1 tablet (100 mg total) by mouth once daily.     b complex vitamins tablet     buPROPion 100 MG TBSR 12 hr tablet  Commonly known as: WELLBUTRIN SR  Take 2 tabs q am, and 1 tab daily at noon     calcium citrate 200 mg (950 mg)  tablet  Commonly known as: CALCITRATE     colchicine (gout) 0.6 mg tablet  Commonly known as: COLCRYS  Take 1 tablet (0.6 mg total) by mouth daily as needed (gout flare).     cyanocobalamin 500 MCG tablet     cyclobenzaprine 5 MG tablet  Commonly known as: FLEXERIL  Take 1 tablet (5 mg total) by mouth 3 (three) times daily as needed for Muscle spasms.     furosemide 20 MG tablet  Commonly known as: LASIX  Take 1 tablet (20 mg total) by mouth daily as needed (leg swelling/fluid buildup).     lithium 300 MG capsule  Commonly known as: ESKALITH  Take 1 capsule (300 mg total) by mouth 3 (three) times daily with meals.     LORazepam 0.5 MG tablet  Commonly known as: ATIVAN  TAKE 2 TABLETS EVERY DAY AS DIRECTED     meclizine 25 mg tablet  Commonly known as: ANTIVERT  Take 1 tablet (25 mg total) by mouth every 6 (six) hours as needed for Dizziness.     multivitamin capsule     omeprazole 40 MG capsule  Commonly known as: PRILOSEC  TAKE 1 CAPSULE TWICE DAILY BEFORE MEALS     ondansetron 8 MG Tbdl  Commonly known as: ZOFRAN-ODT  Take 1 tablet (8 mg total) by mouth 2 (two) times daily as needed (nausea).     potassium chloride 10 MEQ Tbsr  Commonly known as: KLOR-CON     pregabalin 100 MG capsule  Commonly known as: LYRICA  Take 1 capsule (100 mg total) by mouth 2 (two) times daily.     promethazine 12.5 MG Tab  Commonly known as: PHENERGAN     risperiDONE 0.5 MG Tab  Commonly known as: RISPERDAL  TAKE 1 TABLET TWICE DAILY     sertraline 100 MG tablet  Commonly known as: ZOLOFT  Take 2 tablets (200 mg total) by mouth once daily.     SUPER B COMPLEX ORAL     tolterodine 4 MG 24 hr capsule  Commonly known as: DETROL LA  Take 1 capsule (4 mg total) by mouth once daily.     topiramate 200 MG Tab  Commonly known as: TOPAMAX  Take 1 tablet (200 mg total) by mouth once daily.            STOP taking these medications      cetirizine 10 MG tablet  Commonly known as: ZYRTEC  Stopped by: Lito Colmenares MD     diclofenac sodium 1 %  Gel  Commonly known as: VOLTAREN  Stopped by: Lito Colmenares MD     ipratropium 42 mcg (0.06 %) nasal spray  Commonly known as: ATROVENT  Stopped by: Lito Colmenares MD              SOCIAL HISTORY:     Social History     Socioeconomic History    Marital status:    Tobacco Use    Smoking status: Former     Types: Vaping with nicotine    Smokeless tobacco: Never    Tobacco comments:     vaping started April 2020; QUIT cigarettes Sept 2019   Substance and Sexual Activity    Alcohol use: No    Drug use: No    Sexual activity: Yes     Partners: Male     Birth control/protection: None     Social Determinants of Health     Financial Resource Strain: Low Risk  (10/22/2023)    Overall Financial Resource Strain (CARDIA)     Difficulty of Paying Living Expenses: Not very hard   Food Insecurity: No Food Insecurity (10/22/2023)    Hunger Vital Sign     Worried About Running Out of Food in the Last Year: Never true     Ran Out of Food in the Last Year: Never true   Transportation Needs: No Transportation Needs (10/22/2023)    PRAPARE - Transportation     Lack of Transportation (Medical): No     Lack of Transportation (Non-Medical): No   Physical Activity: Inactive (10/22/2023)    Exercise Vital Sign     Days of Exercise per Week: 0 days     Minutes of Exercise per Session: 0 min   Stress: Stress Concern Present (10/22/2023)    [employer] [place] [employer] - Occupational Stress Questionnaire     Feeling of Stress : Very much   Social Connections: Unknown (10/22/2023)    Social Connection and Isolation Panel [NHANES]     Frequency of Communication with Friends and Family: More than three times a week     Frequency of Social Gatherings with Friends and Family: Once a week     Active Member of Clubs or Organizations: Yes     Attends Club or Organization Meetings: More than 4 times per year     Marital Status:    Housing Stability: Low Risk  (10/22/2023)    Housing Stability Vital Sign     Unable to  Pay for Housing in the Last Year: No     Number of Places Lived in the Last Year: 1     Unstable Housing in the Last Year: No       FAMILY HISTORY:     Family History   Problem Relation Age of Onset    Diabetes Mother     Jose Juan's disease Mother     Hypertension Mother     Heart disease Father     Stroke Father     Mental illness Father         ptsd    Hypertension Father     Diabetes Father     Depression Father     Arthritis Father     No Known Problems Brother     Heart disease Maternal Grandmother     Depression Maternal Grandmother     COPD Maternal Grandfather     Heart disease Maternal Grandfather     Stroke Maternal Grandfather     Kidney disease Paternal Grandmother     Heart disease Paternal Grandmother     Heart disease Paternal Grandfather     Early death Paternal Grandfather     Hypertension Son     Asthma Son     Rectal cancer Paternal Aunt     Asthma Maternal Aunt     Diabetes Maternal Aunt     Asthma Maternal Uncle        REVIEW OF SYSTEMS:   Review of Systems   Constitutional:  Negative for chills, diaphoresis and fever.   HENT:  Negative for nosebleeds.    Eyes:  Negative for blurred vision, double vision and photophobia.   Respiratory:  Negative for hemoptysis, shortness of breath and wheezing.    Cardiovascular:  Positive for chest pain and leg swelling. Negative for palpitations, orthopnea, claudication and PND.   Gastrointestinal:  Negative for abdominal pain, blood in stool, heartburn, melena, nausea and vomiting.   Genitourinary:  Negative for flank pain and hematuria.   Musculoskeletal:  Negative for falls, myalgias and neck pain.   Skin:  Negative for rash.   Neurological:  Negative for dizziness, seizures, loss of consciousness, weakness and headaches.   Endo/Heme/Allergies:  Negative for polydipsia. Does not bruise/bleed easily.   Psychiatric/Behavioral:  Negative for depression and memory loss. The patient is not nervous/anxious.        PHYSICAL EXAM:     Vitals:    10/23/23 1100   BP:  "112/78   Pulse: (!) 58   Resp: 18    Body mass index is 44.34 kg/m².  Weight: 128.4 kg (283 lb 1.1 oz)   Height: 5' 7" (170.2 cm)     Physical Exam  Vitals reviewed.   Constitutional:       General: She is not in acute distress.     Appearance: She is well-developed. She is obese. She is not ill-appearing, toxic-appearing or diaphoretic.   HENT:      Head: Normocephalic and atraumatic.   Eyes:      General: No scleral icterus.     Extraocular Movements: Extraocular movements intact.      Conjunctiva/sclera: Conjunctivae normal.      Pupils: Pupils are equal, round, and reactive to light.   Neck:      Thyroid: No thyromegaly.      Vascular: Normal carotid pulses. No carotid bruit or JVD.      Trachea: Trachea normal.   Cardiovascular:      Rate and Rhythm: Normal rate and regular rhythm.      Pulses:           Carotid pulses are 2+ on the right side and 2+ on the left side.       Radial pulses are 2+ on the right side.      Heart sounds: S1 normal and S2 normal. No murmur heard.     No friction rub. No gallop.   Pulmonary:      Effort: Pulmonary effort is normal. No respiratory distress.      Breath sounds: Normal breath sounds. No stridor. No wheezing, rhonchi or rales.   Chest:      Chest wall: No tenderness.   Abdominal:      General: There is no distension.      Palpations: Abdomen is soft.   Musculoskeletal:         General: No swelling or tenderness. Normal range of motion.      Cervical back: Normal range of motion and neck supple. No edema or rigidity.      Right lower leg: No edema.      Left lower leg: No edema.   Feet:      Right foot:      Skin integrity: No ulcer.      Left foot:      Skin integrity: No ulcer.   Skin:     General: Skin is warm and dry.      Coloration: Skin is not jaundiced.   Neurological:      General: No focal deficit present.      Mental Status: She is alert and oriented to person, place, and time.      Cranial Nerves: No cranial nerve deficit.   Psychiatric:         Mood and Affect: " Mood normal.         Speech: Speech normal.         Behavior: Behavior normal. Behavior is cooperative.         DATA:   EKG: (personally reviewed tracing)  10/23/23 SR 57, low volt, PRWP    Laboratory:  CBC:  Recent Labs   Lab 04/02/23  1116 09/18/23 2020 09/19/23  1707   WBC 9.92 8.51 7.26   Hemoglobin 14.1 13.6 13.8   Hematocrit 43.0 41.8 42.9   Platelets 323 307 309         CHEMISTRIES:  Recent Labs   Lab 03/09/21  0911 06/10/21  0858 10/18/21  1037 03/29/22  0859 05/11/22  1044 11/04/22  0950 04/02/23  1116 06/27/23  1055 09/19/23  1707   Glucose 92 88 79 94 80 87 88 88 80   Sodium 139 140 141 138 140 139 139 142 139   Potassium 4.2 4.0 4.8 3.8 3.8 4.1 4.2 4.6 4.0   BUN 9 11 11 13 16 11 16 14 12   Creatinine 0.7 0.8 0.8 0.9 0.8 0.8 0.8 0.8 0.7   eGFR if non African American >60 >60.0 >60 >60 >60.0  --   --   --   --    eGFR  --   --   --   --   --  >60 >60 >60 >60.0   Calcium 9.1 9.3 9.9 9.2 9.2 9.3 9.4 9.7 9.5         CARDIAC BIOMARKERS:  Recent Labs   Lab 09/18/23 1946 09/19/23  1707   Troponin I <0.006 <0.006         COAGS:        LIPIDS/LFTS:  Recent Labs   Lab 06/10/21  0858 10/18/21  1037 03/29/22  0859 05/11/22  1044 11/04/22  0950 04/02/23  1116 09/19/23  1707   Cholesterol 162  --  154  --  183  --   --    Triglycerides 94  --  87  --  77  --   --    HDL 60  --  58  --  75  --   --    LDL Cholesterol 83.2  --  78.6  --  92.6  --   --    Non-HDL Cholesterol 102  --  96  --  108  --   --    AST 9 L   < > 18   < > 13 14 15   ALT 12   < > 77 H   < > 18 18 25    < > = values in this interval not displayed.       Lab Results   Component Value Date    TSH 3.488 11/04/2022         The 10-year ASCVD risk score (Jd MILNER, et al., 2019) is: 0.6%    Values used to calculate the score:      Age: 52 years      Sex: Female      Is Non- : No      Diabetic: No      Tobacco smoker: No      Systolic Blood Pressure: 112 mmHg      Is BP treated: No      HDL Cholesterol: 75 mg/dL      Total  Cholesterol: 183 mg/dL      Cardiovascular Testing:  L MPI 10/9/23 (images personally reviewed and interpreted)    Abnormal myocardial perfusion scan.    There is a moderate intensity, small to moderate sized, mostly reversible perfusion abnormality with some fixed areas in the mid anterior wall(s).    There are no other significant perfusion abnormalities.    The gated perfusion images showed an ejection fraction of 65% at rest.    There is normal wall motion at rest and post stress.    The ECG portion of the study is negative for ischemia.    The patient reported no chest pain during the stress test.    There were no arrhythmias during stress.    Echo 10/9/23    Left Ventricle: The left ventricle is normal in size. Normal wall thickness. Normal wall motion. There is normal systolic function with a visually estimated ejection fraction of 60 - 65%. There is normal diastolic function.    Right Ventricle: Normal right ventricular cavity size. Wall thickness is normal. Right ventricle wall motion  is normal. Systolic function is normal.    Pulmonary Artery: The estimated pulmonary artery systolic pressure is 17 mmHg.    IVC/SVC: Normal venous pressure at 3 mmHg.    Ex OLIVER 6/6/19  Normal resting OLIVER bilaterally.  Normal exercise OLIVER bilaterally (but with drop in bilateral OLIVER suggesting possible occult bilat LE PAD).  R ankle PVR waveform abnormal suggesting possible RLE tibial PAD.  Otherwise normal PVR waveforms.    ASSESSMENT:   # CP, MPI abnl suggesting LAD ischemia.  HR/BP too low for anitinaginals.  # sleep disturbance with prior hx of SHARON, not on CPAP.  Gilberto appt with Dr. Lemus 1/31/2024.  # Tob abuse (vaping)     PLAN:   Cont med rx  Start ASA 81mg qd  Start Plavix 75mg qd  Cath 11/7/23 730am, R rad access  Diet/exercise/weight loss  RTC 1 month (Nov 2023)    Risks, benefits and alternatives of the catheterization procedure were discussed with the patient and her  in attendance, which include but are not  limited to: bleeding, infection, death, heart attack, arrhythmia, kidney failure, stroke, need for emergency surgery, etc.  Patient understands and and agrees to proceed.  Consent was placed on the chart.          Lito Colmenares MD, FACC

## 2023-10-30 ENCOUNTER — LAB VISIT (OUTPATIENT)
Dept: LAB | Facility: HOSPITAL | Age: 53
End: 2023-10-30
Attending: INTERNAL MEDICINE
Payer: MEDICARE

## 2023-10-30 DIAGNOSIS — Z00.00 HEALTHCARE MAINTENANCE: ICD-10-CM

## 2023-10-30 DIAGNOSIS — R79.9 ABNORMAL FINDING OF BLOOD CHEMISTRY, UNSPECIFIED: ICD-10-CM

## 2023-10-30 DIAGNOSIS — Z13.6 ENCOUNTER FOR LIPID SCREENING FOR CARDIOVASCULAR DISEASE: ICD-10-CM

## 2023-10-30 DIAGNOSIS — Z86.39 H/O: OBESITY: ICD-10-CM

## 2023-10-30 DIAGNOSIS — Z13.220 ENCOUNTER FOR LIPID SCREENING FOR CARDIOVASCULAR DISEASE: ICD-10-CM

## 2023-10-30 DIAGNOSIS — M79.7 FIBROMYALGIA: ICD-10-CM

## 2023-10-30 LAB
25(OH)D3+25(OH)D2 SERPL-MCNC: 30 NG/ML (ref 30–96)
ALBUMIN SERPL BCP-MCNC: 3.6 G/DL (ref 3.5–5.2)
ALP SERPL-CCNC: 69 U/L (ref 55–135)
ALT SERPL W/O P-5'-P-CCNC: 27 U/L (ref 10–44)
ANION GAP SERPL CALC-SCNC: 7 MMOL/L (ref 8–16)
AST SERPL-CCNC: 19 U/L (ref 10–40)
BASOPHILS # BLD AUTO: 0.07 K/UL (ref 0–0.2)
BASOPHILS NFR BLD: 1 % (ref 0–1.9)
BILIRUB SERPL-MCNC: 0.4 MG/DL (ref 0.1–1)
BUN SERPL-MCNC: 10 MG/DL (ref 6–20)
CALCIUM SERPL-MCNC: 9.2 MG/DL (ref 8.7–10.5)
CHLORIDE SERPL-SCNC: 112 MMOL/L (ref 95–110)
CHOLEST SERPL-MCNC: 172 MG/DL (ref 120–199)
CHOLEST/HDLC SERPL: 2.4 {RATIO} (ref 2–5)
CO2 SERPL-SCNC: 21 MMOL/L (ref 23–29)
CREAT SERPL-MCNC: 0.9 MG/DL (ref 0.5–1.4)
DIFFERENTIAL METHOD: ABNORMAL
EOSINOPHIL # BLD AUTO: 0.2 K/UL (ref 0–0.5)
EOSINOPHIL NFR BLD: 2.9 % (ref 0–8)
ERYTHROCYTE [DISTWIDTH] IN BLOOD BY AUTOMATED COUNT: 12.4 % (ref 11.5–14.5)
EST. GFR  (NO RACE VARIABLE): >60 ML/MIN/1.73 M^2
ESTIMATED AVG GLUCOSE: 97 MG/DL (ref 68–131)
GLUCOSE SERPL-MCNC: 86 MG/DL (ref 70–110)
HBA1C MFR BLD: 5 % (ref 4–5.6)
HCT VFR BLD AUTO: 41.9 % (ref 37–48.5)
HDLC SERPL-MCNC: 73 MG/DL (ref 40–75)
HDLC SERPL: 42.4 % (ref 20–50)
HGB BLD-MCNC: 13 G/DL (ref 12–16)
IMM GRANULOCYTES # BLD AUTO: 0.03 K/UL (ref 0–0.04)
IMM GRANULOCYTES NFR BLD AUTO: 0.4 % (ref 0–0.5)
LDLC SERPL CALC-MCNC: 81.6 MG/DL (ref 63–159)
LYMPHOCYTES # BLD AUTO: 1.9 K/UL (ref 1–4.8)
LYMPHOCYTES NFR BLD: 27.1 % (ref 18–48)
MCH RBC QN AUTO: 30.2 PG (ref 27–31)
MCHC RBC AUTO-ENTMCNC: 31 G/DL (ref 32–36)
MCV RBC AUTO: 97 FL (ref 82–98)
MONOCYTES # BLD AUTO: 0.5 K/UL (ref 0.3–1)
MONOCYTES NFR BLD: 7.6 % (ref 4–15)
NEUTROPHILS # BLD AUTO: 4.3 K/UL (ref 1.8–7.7)
NEUTROPHILS NFR BLD: 61 % (ref 38–73)
NONHDLC SERPL-MCNC: 99 MG/DL
NRBC BLD-RTO: 0 /100 WBC
PLATELET # BLD AUTO: 287 K/UL (ref 150–450)
PMV BLD AUTO: 9.6 FL (ref 9.2–12.9)
POTASSIUM SERPL-SCNC: 3.8 MMOL/L (ref 3.5–5.1)
PROT SERPL-MCNC: 7 G/DL (ref 6–8.4)
RBC # BLD AUTO: 4.31 M/UL (ref 4–5.4)
SODIUM SERPL-SCNC: 140 MMOL/L (ref 136–145)
TRIGL SERPL-MCNC: 87 MG/DL (ref 30–150)
TSH SERPL DL<=0.005 MIU/L-ACNC: 2.5 UIU/ML (ref 0.4–4)
WBC # BLD AUTO: 7.12 K/UL (ref 3.9–12.7)

## 2023-10-30 PROCEDURE — 82306 VITAMIN D 25 HYDROXY: CPT | Mod: HCNC | Performed by: INTERNAL MEDICINE

## 2023-10-30 PROCEDURE — 83036 HEMOGLOBIN GLYCOSYLATED A1C: CPT | Mod: HCNC | Performed by: INTERNAL MEDICINE

## 2023-10-30 PROCEDURE — 36415 COLL VENOUS BLD VENIPUNCTURE: CPT | Mod: HCNC,PN | Performed by: INTERNAL MEDICINE

## 2023-10-30 PROCEDURE — 80061 LIPID PANEL: CPT | Mod: HCNC | Performed by: INTERNAL MEDICINE

## 2023-10-30 PROCEDURE — 84443 ASSAY THYROID STIM HORMONE: CPT | Mod: HCNC | Performed by: INTERNAL MEDICINE

## 2023-10-30 PROCEDURE — 85025 COMPLETE CBC W/AUTO DIFF WBC: CPT | Mod: HCNC | Performed by: INTERNAL MEDICINE

## 2023-10-30 PROCEDURE — 80053 COMPREHEN METABOLIC PANEL: CPT | Mod: HCNC | Performed by: INTERNAL MEDICINE

## 2023-11-02 ENCOUNTER — HOSPITAL ENCOUNTER (OUTPATIENT)
Dept: PREADMISSION TESTING | Facility: HOSPITAL | Age: 53
Discharge: HOME | End: 2023-11-02
Attending: INTERNAL MEDICINE
Payer: MEDICARE

## 2023-11-02 VITALS
RESPIRATION RATE: 16 BRPM | SYSTOLIC BLOOD PRESSURE: 122 MMHG | HEART RATE: 68 BPM | WEIGHT: 288.56 LBS | DIASTOLIC BLOOD PRESSURE: 81 MMHG | HEIGHT: 66 IN | TEMPERATURE: 97 F | BODY MASS INDEX: 46.38 KG/M2 | OXYGEN SATURATION: 100 %

## 2023-11-02 DIAGNOSIS — R07.2 PRECORDIAL PAIN: ICD-10-CM

## 2023-11-02 DIAGNOSIS — R94.39 ABNORMAL NUCLEAR STRESS TEST: ICD-10-CM

## 2023-11-02 LAB
INR PPP: 0.9 (ref 0.8–1.2)
PROTHROMBIN TIME: 9.8 SEC (ref 9–12.5)

## 2023-11-02 PROCEDURE — 85610 PROTHROMBIN TIME: CPT | Mod: HCNC | Performed by: INTERNAL MEDICINE

## 2023-11-02 NOTE — DISCHARGE INSTRUCTIONS
YOUR PROCEDURE WILL BE AT [facility] at [address] [place] [address]                              Before 7 AM, enter through the Emergency Entrance..   After 7 AM enter through the Main Entrance.                      Report to the Same Day Surgery Registration Desk in the hallway.(Just beside the Same Day Surgery Unit)      Your procedure  is scheduled for _____11/7/23____________.    Call [phone] between 2pm and 5pm on _11/6/23______to find out your arrival time for the day of surgery.    You may have two visitors.  No children under 12 years old.      You will be going to the Same Day Surgery Unit on the 2nd floor of the hospital.    Important instructions:  Do not eat anything after midnight.  You may have plain water, non carbonated.  You may also have Gatorade or Powerade after midnight.    Stop all fluids 2 hours before your surgery.    It is okay to brush your teeth.  Do not have gum, candy or mints.    Do not take any diabetic medication on the morning of surgery unless instructed to do so by your doctor or pre op nurse.        Metformin, Invokamet and Synjardy must be stopped two days before your procedure.  Do not take on the day of procedure.  Resume when instructed.      All GLP-1 weekly diabetic/weight loss medications must not be taken for one week before your surgery, or your surgery could be canceled.      Please shower the night before and the morning of your surgery.        Use Chlorhexidine soap as instructed by your pre op nurse.   Please place clean linens on your bed the night before surgery. Please wear fresh clean clothing after each shower.    No shaving of procedural area at least 4-5 days before surgery due to increased risk of skin irritation and/or possible infection.    Contact lenses and removable denture work may not be worn during your procedure.    You may wear deodorant only.     Do not wear powder, body lotion, perfume/cologne or  make-up.    Do not wear any jewelry or have any metal on your body.    You will be asked to remove any dentures or partials for the procedure.    If you are going home on the same day of surgery, you must arrange for a family member or a friend to drive you home.  Public transportation is prohibited.  You will not be able to drive home if you were given anesthesia or sedation.    Please leave money and valuables home.      You may bring your cell phone.    Call the doctor if fever or illness should occur before your surgery.    Call [phone] to contact us here if needed.

## 2023-11-06 ENCOUNTER — TELEPHONE (OUTPATIENT)
Dept: SURGERY | Facility: HOSPITAL | Age: 53
End: 2023-11-06
Payer: MEDICARE

## 2023-11-07 ENCOUNTER — HOSPITAL ENCOUNTER (OUTPATIENT)
Facility: HOSPITAL | Age: 53
Discharge: HOME | End: 2023-11-07
Attending: INTERNAL MEDICINE | Admitting: INTERNAL MEDICINE
Payer: MEDICARE

## 2023-11-07 VITALS
HEART RATE: 77 BPM | TEMPERATURE: 98 F | DIASTOLIC BLOOD PRESSURE: 73 MMHG | RESPIRATION RATE: 14 BRPM | SYSTOLIC BLOOD PRESSURE: 135 MMHG | OXYGEN SATURATION: 97 %

## 2023-11-07 DIAGNOSIS — R07.2 PRECORDIAL PAIN: Primary | ICD-10-CM

## 2023-11-07 DIAGNOSIS — R94.39 ABNORMAL NUCLEAR STRESS TEST: ICD-10-CM

## 2023-11-07 DIAGNOSIS — R42 DIZZINESS: ICD-10-CM

## 2023-11-07 PROCEDURE — 99152 MOD SED SAME PHYS/QHP 5/>YRS: CPT | Mod: HCNC,,, | Performed by: INTERNAL MEDICINE

## 2023-11-07 PROCEDURE — 93458 L HRT ARTERY/VENTRICLE ANGIO: CPT | Mod: 26,HCNC,, | Performed by: INTERNAL MEDICINE

## 2023-11-07 PROCEDURE — 93458 L HRT ARTERY/VENTRICLE ANGIO: CPT | Mod: HCNC | Performed by: INTERNAL MEDICINE

## 2023-11-07 PROCEDURE — 99152 MOD SED SAME PHYS/QHP 5/>YRS: CPT | Mod: HCNC | Performed by: INTERNAL MEDICINE

## 2023-11-07 PROCEDURE — C1769 GUIDE WIRE: HCPCS | Mod: HCNC | Performed by: INTERNAL MEDICINE

## 2023-11-07 PROCEDURE — 25500020 PHARM REV CODE 255: Mod: HCNC | Performed by: INTERNAL MEDICINE

## 2023-11-07 PROCEDURE — 93458 PR CATH PLACE/CORON ANGIO, IMG SUPER/INTERP,W LEFT HEART VENTRICULOGRAPHY: ICD-10-PCS | Mod: 26,HCNC,, | Performed by: INTERNAL MEDICINE

## 2023-11-07 PROCEDURE — 25000003 PHARM REV CODE 250: Mod: HCNC | Performed by: INTERNAL MEDICINE

## 2023-11-07 PROCEDURE — 99152 PR MOD CONSCIOUS SEDATION, SAME PHYS, 5+ YRS, FIRST 15 MIN: ICD-10-PCS | Mod: HCNC,,, | Performed by: INTERNAL MEDICINE

## 2023-11-07 PROCEDURE — C1887 CATHETER, GUIDING: HCPCS | Mod: HCNC | Performed by: INTERNAL MEDICINE

## 2023-11-07 PROCEDURE — C1894 INTRO/SHEATH, NON-LASER: HCPCS | Mod: HCNC | Performed by: INTERNAL MEDICINE

## 2023-11-07 PROCEDURE — 63600175 PHARM REV CODE 636 W HCPCS: Mod: HCNC | Performed by: INTERNAL MEDICINE

## 2023-11-07 RX ORDER — ONDANSETRON 8 MG/1
8 TABLET, ORALLY DISINTEGRATING ORAL EVERY 8 HOURS PRN
Status: DISCONTINUED | OUTPATIENT
Start: 2023-11-07 | End: 2023-11-07 | Stop reason: HOSPADM

## 2023-11-07 RX ORDER — LIDOCAINE HYDROCHLORIDE 10 MG/ML
INJECTION, SOLUTION EPIDURAL; INFILTRATION; INTRACAUDAL; PERINEURAL
Status: DISCONTINUED | OUTPATIENT
Start: 2023-11-07 | End: 2023-11-07 | Stop reason: HOSPADM

## 2023-11-07 RX ORDER — HEPARIN SODIUM 200 [USP'U]/100ML
INJECTION, SOLUTION INTRAVENOUS
Status: DISCONTINUED | OUTPATIENT
Start: 2023-11-07 | End: 2023-11-07 | Stop reason: HOSPADM

## 2023-11-07 RX ORDER — SODIUM CHLORIDE 9 MG/ML
INJECTION, SOLUTION INTRAVENOUS CONTINUOUS
Status: ACTIVE | OUTPATIENT
Start: 2023-11-07 | End: 2023-11-07

## 2023-11-07 RX ORDER — NITROGLYCERIN 5 MG/ML
INJECTION, SOLUTION INTRAVENOUS
Status: DISCONTINUED | OUTPATIENT
Start: 2023-11-07 | End: 2023-11-07 | Stop reason: HOSPADM

## 2023-11-07 RX ORDER — MORPHINE SULFATE 4 MG/ML
2 INJECTION, SOLUTION INTRAMUSCULAR; INTRAVENOUS EVERY 10 MIN PRN
Status: DISCONTINUED | OUTPATIENT
Start: 2023-11-07 | End: 2023-11-07 | Stop reason: HOSPADM

## 2023-11-07 RX ORDER — ACETAMINOPHEN 325 MG/1
650 TABLET ORAL EVERY 4 HOURS PRN
Status: DISCONTINUED | OUTPATIENT
Start: 2023-11-07 | End: 2023-11-07 | Stop reason: HOSPADM

## 2023-11-07 RX ORDER — HEPARIN SODIUM 1000 [USP'U]/ML
INJECTION, SOLUTION INTRAVENOUS; SUBCUTANEOUS
Status: DISCONTINUED | OUTPATIENT
Start: 2023-11-07 | End: 2023-11-07 | Stop reason: HOSPADM

## 2023-11-07 RX ORDER — ATROPINE SULFATE 0.1 MG/ML
0.5 INJECTION INTRAVENOUS
Status: DISCONTINUED | OUTPATIENT
Start: 2023-11-07 | End: 2023-11-07 | Stop reason: HOSPADM

## 2023-11-07 RX ORDER — FENTANYL CITRATE 50 UG/ML
INJECTION, SOLUTION INTRAMUSCULAR; INTRAVENOUS
Status: DISCONTINUED | OUTPATIENT
Start: 2023-11-07 | End: 2023-11-07 | Stop reason: HOSPADM

## 2023-11-07 RX ORDER — MIDAZOLAM HYDROCHLORIDE 1 MG/ML
INJECTION, SOLUTION INTRAMUSCULAR; INTRAVENOUS
Status: DISCONTINUED | OUTPATIENT
Start: 2023-11-07 | End: 2023-11-07 | Stop reason: HOSPADM

## 2023-11-07 RX ORDER — VERAPAMIL HYDROCHLORIDE 2.5 MG/ML
INJECTION, SOLUTION INTRAVENOUS
Status: DISCONTINUED | OUTPATIENT
Start: 2023-11-07 | End: 2023-11-07 | Stop reason: HOSPADM

## 2023-11-07 RX ORDER — DIPHENHYDRAMINE HCL 25 MG
50 CAPSULE ORAL ONCE
Status: COMPLETED | OUTPATIENT
Start: 2023-11-07 | End: 2023-11-07

## 2023-11-07 RX ADMIN — SODIUM CHLORIDE 1500 ML: 9 INJECTION, SOLUTION INTRAVENOUS at 08:11

## 2023-11-07 RX ADMIN — DIPHENHYDRAMINE HYDROCHLORIDE 50 MG: 25 CAPSULE ORAL at 06:11

## 2023-11-07 RX ADMIN — SODIUM CHLORIDE: 9 INJECTION, SOLUTION INTRAVENOUS at 06:11

## 2023-11-07 NOTE — Clinical Note
34 ml of contrast were injected throughout the case. 66 mL of contrast was the total wasted during the case. 100 mL was the total amount used during the case.

## 2023-11-07 NOTE — BRIEF OP NOTE
[facility] [place] - Cath Lab  Brief Operative Note     SUMMARY     Surgery Date: 11/7/2023     Surgeon(s) and Role:     * Lito Colmenares MD - Primary    Assisting Surgeon: None    Pre-op Diagnosis:  Precordial pain [R07.2]  Abnormal nuclear stress test [R94.39]    Post-op Diagnosis:  Post-Op Diagnosis Codes:     * Precordial pain [R07.2]     * Abnormal nuclear stress test [R94.39]    Procedure(s) (LRB):  Left heart cath (Left)    Anesthesia: RN IV Sedation    Description of the findings of the procedure: uneventful LHC/cor angio via R radial.  Normal cors.    Findings/Key Components:  LVEDP: 15 mmHg  LVEF: 60% by echo    Dominance: Right  LM: normal  LAD: normal  LCx: normal  RCA: normal    Hemostasis:  R Radial band    Impression:  CP with abnl MPi suggesting LAD territory ischemia.  Normal cors.  Normal LV fxn by echo  R rad vasband for hemostasis    Plan:  Cont med rx  Stop ASA/Plavix  Home today  Follow up with Dr. Colmenares as planned.  Outpat GI eval if sxs persist.    Estimated Blood Loss: <50cc         Specimens: None

## 2023-11-07 NOTE — INTERVAL H&P NOTE
The patient has been examined and the H&P has been reviewed:    I concur with the findings and no changes have occurred since H&P was written.    Anesthesia/Surgery risks, benefits and alternative options discussed and understood by patient/family.    Lab Results   Component Value Date    WBC 7.12 10/30/2023    HGB 13.0 10/30/2023    HCT 41.9 10/30/2023    MCV 97 10/30/2023     10/30/2023       Lab Results   Component Value Date    INR 0.9 11/02/2023    INR 0.9 09/25/2019     BMP  Lab Results   Component Value Date     10/30/2023    K 3.8 10/30/2023     (H) 10/30/2023    CO2 21 (L) 10/30/2023    BUN 10 10/30/2023    CREATININE 0.9 10/30/2023    CALCIUM 9.2 10/30/2023    ANIONGAP 7 (L) 10/30/2023    EGFRNORACEVR >60 10/30/2023           There are no hospital problems to display for this patient.

## 2023-11-07 NOTE — Clinical Note
The catheter was inserted into the, was removed from the, was repositioned into the and was inserted over the wire into the ostium   left main  ostium   right coronary artery. An angiography was performed of the left coronary arteries and right coronary arteries. Multiple views were taken.

## 2023-11-07 NOTE — Clinical Note
The catheter was inserted into the, was repositioned into the and was inserted over the wire into the left ventricle. Hemodynamics were performed.  and Pullback was recorded.

## 2023-11-07 NOTE — DISCHARGE INSTRUCTIONS
Drink plenty of fluids for the next 48 hours and follow your doctor's diet orders.  Rest for the next 72 hours. Try not to keep the injected leg bent for a long period of time.  Remove the dressing in 24 hours, and you may shower. Clean the area with soap and water, and apply a band aid for the  next 5 days.      No Lifting  the first day. Next 3 days over 5, then  10 lbs., that is, not more than 1 gallon of water, or straining for 72 hours.    No driving, no drinking alcohol, and no signing legal documents for the next 24 hours.    Call your doctor for elevated temperature, shortness of breath, chest pain, or cold discolored leg.   If oozing occurs at the injections site, lie down. Apply pressure with a clean wash cloth for 20 to 30 minutes and call  your doctor.  If severe bleeding occurs, lie down, apply pressure. Call 911 and request an ambulance to take you to the nearest  hospital emergency room.  Continue to take your regular medications as instructed.  Follow the instructions in the handout Limit movement of the wrist.  Do not bend wrist or perform heavy lifting for 24 hours.      NO blood pressure cuff or venipuncture to affected arm for 24 hours.    If bleeding occurs, apply pressure to site for 20 minutes. Apply band aid once bleeding stops.  Call 911 if unable to stop bleeding with pressure.     Keep your follow up appointment.      Do not drive, drink alcohol, or sign legal documents for 24 hours, or if taking narcotic pain medication. given to you.     Fall Prevention  Millions of people fall every year and injure themselves. You may have had anesthesia or sedation which may increase your risk of falling. You may have health issues that put you at an increased risk of falling.     Here are ways to reduce your risk of falling.    Make your home safe by keeping walkways clear of objects you may trip over.  Use non-slip pads under rugs. Do not use area rugs or small throw rugs.  Use non-slip mats in  bathtubs and showers.  Install handrails and lights on staircases.  Do not walk in poorly lit areas.  Do not stand on chairs or wobbly ladders.  Use caution when reaching overhead or looking upward. This position can cause a loss of balance.  Be sure your shoes fit properly, have non-slip bottoms and are in good condition.   Wear shoes both inside and out. Avoid going barefoot or wearing slippers.  Be cautious when going up and down stairs, curbs, and when walking on uneven sidewalks.  If your balance is poor, consider using a cane or walker.  If your fall was related to alcohol use, stop or limit alcohol intake.   If your fall was related to use of sleeping medicines, talk to your doctor about this. You may need to reduce your dosage at bedtime if you awaken during the night to go to the bathroom.    To reduce the need for nighttime bathroom trips:  Avoid drinking fluids for several hours before going to bed  Empty your bladder before going to bed  Men can keep a urinal at the bedside  Stay as active as you can. Balance, flexibility, strength, and endurance all come from exercise. They all play a role in preventing falls. Ask your healthcare provider which types of activity are right for you.  Get your vision checked on a regular basis.  If you have pets, know where they are before you stand up or walk so you don't trip over them.  Use night lights.

## 2023-11-07 NOTE — DISCHARGE SUMMARY
[facility]  Discharge Note    SUMMARY     Admit Date: 11/7/2023    Discharge Date and Time:  11/07/2023 1PM    Hospital Course (synopsis of major diagnoses, care, treatment, and services provided during the course of the hospital stay): uneventful LHC/cor angio via R radial.  Normal cors.     Final Diagnosis: Post-Op Diagnosis Codes:     * Precordial pain [R07.2]     * Abnormal nuclear stress test [R94.39]    Disposition: Home or Self Care    Follow Up/Patient Instructions:     Medications:  Reconciled Home Medications:      Medication List        CONTINUE taking these medications      albuterol 90 mcg/actuation inhaler  Commonly known as: PROVENTIL/VENTOLIN HFA  Inhale 2 puffs into the lungs every 4 (four) hours as needed for Wheezing or Shortness of Breath. Rescue     allopurinoL 100 MG tablet  Commonly known as: ZYLOPRIM  Take 1 tablet (100 mg total) by mouth once daily.     b complex vitamins tablet  Take 1 tablet by mouth once daily.     buPROPion 100 MG TBSR 12 hr tablet  Commonly known as: WELLBUTRIN SR  Take 2 tabs q am, and 1 tab daily at noon     calcium citrate 200 mg (950 mg) tablet  Commonly known as: CALCITRATE  Take 1 tablet by mouth 2 (two) times daily.     colchicine (gout) 0.6 mg tablet  Commonly known as: COLCRYS  Take 1 tablet (0.6 mg total) by mouth daily as needed (gout flare).     cyanocobalamin 500 MCG tablet  Take 500 mcg by mouth once daily.     cyclobenzaprine 5 MG tablet  Commonly known as: FLEXERIL  Take 1 tablet (5 mg total) by mouth 3 (three) times daily as needed for Muscle spasms.     furosemide 20 MG tablet  Commonly known as: LASIX  Take 1 tablet (20 mg total) by mouth daily as needed (leg swelling/fluid buildup).     lithium 300 MG capsule  Commonly known as: ESKALITH  Take 1 capsule (300 mg total) by mouth 3 (three) times daily with meals.     LORazepam 0.5 MG tablet  Commonly known as: ATIVAN  TAKE 2 TABLETS EVERY DAY AS DIRECTED     meclizine 25 mg tablet  Commonly  known as: ANTIVERT  Take 1 tablet (25 mg total) by mouth every 6 (six) hours as needed for Dizziness.     multivitamin capsule  Take 1 capsule by mouth 2 (two) times daily.     omeprazole 40 MG capsule  Commonly known as: PRILOSEC  TAKE 1 CAPSULE TWICE DAILY BEFORE MEALS     ondansetron 8 MG Tbdl  Commonly known as: ZOFRAN-ODT  Take 1 tablet (8 mg total) by mouth 2 (two) times daily as needed (nausea).     potassium chloride 10 MEQ Tbsr  Commonly known as: KLOR-CON  Take 10 mEq by mouth 2 (two) times daily.     pregabalin 100 MG capsule  Commonly known as: LYRICA  Take 1 capsule (100 mg total) by mouth 2 (two) times daily.     promethazine 12.5 MG Tab  Commonly known as: PHENERGAN  promethazine Take half tablet (oral) 2 times per day PRN - Nausea . Medication may cause drowsiness. [id number] tablet 2 times per day oral No set duration recorded No set duration amount recorded active 12.5 mg     risperiDONE 0.5 MG Tab  Commonly known as: RISPERDAL  TAKE 1 TABLET TWICE DAILY     sertraline 100 MG tablet  Commonly known as: ZOLOFT  Take 2 tablets (200 mg total) by mouth once daily.     SUPER B COMPLEX ORAL  Take 1 capsule by mouth once daily.     tolterodine 4 MG 24 hr capsule  Commonly known as: DETROL LA  Take 1 capsule (4 mg total) by mouth once daily.     topiramate 200 MG Tab  Commonly known as: TOPAMAX  Take 1 tablet (200 mg total) by mouth once daily.            STOP taking these medications      aspirin 81 MG EC tablet  Commonly known as: ECOTRIN     clopidogreL 75 mg tablet  Commonly known as: PLAVIX            Discharge Procedure Orders   Diet general     Remove dressing in 24 hours     Call MD for:  temperature >100.4     Call MD for:  persistent nausea and vomiting     Call MD for:  severe uncontrolled pain     Call MD for:  difficulty breathing, headache or visual disturbances     Call MD for:  redness, tenderness, or signs of infection (pain, swelling, redness, odor or green/yellow discharge around incision  site)     Call MD for:  hives     Call MD for:  persistent dizziness or light-headedness     Call MD for:  extreme fatigue     Activity as tolerated      Follow-up Information       Lito Colmenares MD. Schedule an appointment as soon as possible for a visit in 3 week(s).    Specialties: Cardiology, Interventional Cardiology  Why: for planned follow up appointment  Contact information:  [address]  [address]  [facility] [address]  [phone]                               Diet: regular    Activity: ad olga.

## 2023-11-07 NOTE — PLAN OF CARE
Pre-op plan of care reviewed with patient and spouse. Admit assessment complete. Questions encouraged and answered. Post-op education begun with pt. Pt ready to proceed.

## 2023-11-14 ENCOUNTER — PATIENT MESSAGE (OUTPATIENT)
Dept: BARIATRICS | Facility: CLINIC | Age: 53
End: 2023-11-14
Payer: MEDICARE

## 2023-11-24 ENCOUNTER — OFFICE VISIT (OUTPATIENT)
Dept: FAMILY MEDICINE | Facility: CLINIC | Age: 53
End: 2023-11-24
Payer: MEDICARE

## 2023-11-24 VITALS
HEIGHT: 66 IN | HEART RATE: 66 BPM | TEMPERATURE: 99 F | DIASTOLIC BLOOD PRESSURE: 82 MMHG | WEIGHT: 283.94 LBS | BODY MASS INDEX: 45.63 KG/M2 | OXYGEN SATURATION: 98 % | SYSTOLIC BLOOD PRESSURE: 124 MMHG

## 2023-11-24 DIAGNOSIS — R39.89 SUSPECTED UTI: ICD-10-CM

## 2023-11-24 DIAGNOSIS — H65.00 ACUTE SEROUS OTITIS MEDIA, RECURRENCE NOT SPECIFIED, UNSPECIFIED LATERALITY: ICD-10-CM

## 2023-11-24 DIAGNOSIS — R30.0 DYSURIA: Primary | ICD-10-CM

## 2023-11-24 DIAGNOSIS — R11.0 NAUSEA: ICD-10-CM

## 2023-11-24 DIAGNOSIS — R09.81 SINUS CONGESTION: ICD-10-CM

## 2023-11-24 LAB
BILIRUB SERPL-MCNC: NORMAL MG/DL
BLOOD URINE, POC: NORMAL
CLARITY, POC UA: CLEAR
COLOR, POC UA: YELLOW
GLUCOSE UR QL STRIP: NORMAL
KETONES UR QL STRIP: NORMAL
LEUKOCYTE ESTERASE URINE, POC: NORMAL
NITRITE, POC UA: NORMAL
PH, POC UA: 1.01
PROTEIN, POC: NORMAL
SPECIFIC GRAVITY, POC UA: NORMAL
UROBILINOGEN, POC UA: NORMAL

## 2023-11-24 PROCEDURE — 81002 URINALYSIS NONAUTO W/O SCOPE: CPT | Mod: HCNC,S$GLB,, | Performed by: NURSE PRACTITIONER

## 2023-11-24 PROCEDURE — 99999 PR PBB SHADOW E&M-EST. PATIENT-LVL V: ICD-10-PCS | Mod: PBBFAC,HCNC,, | Performed by: NURSE PRACTITIONER

## 2023-11-24 PROCEDURE — 1159F MED LIST DOCD IN RCRD: CPT | Mod: HCNC,CPTII,S$GLB, | Performed by: NURSE PRACTITIONER

## 2023-11-24 PROCEDURE — 81002 POCT URINE DIPSTICK WITHOUT MICROSCOPE: ICD-10-PCS | Mod: HCNC,S$GLB,, | Performed by: NURSE PRACTITIONER

## 2023-11-24 PROCEDURE — 1159F PR MEDICATION LIST DOCUMENTED IN MEDICAL RECORD: ICD-10-PCS | Mod: HCNC,CPTII,S$GLB, | Performed by: NURSE PRACTITIONER

## 2023-11-24 PROCEDURE — 3008F PR BODY MASS INDEX (BMI) DOCUMENTED: ICD-10-PCS | Mod: HCNC,CPTII,S$GLB, | Performed by: NURSE PRACTITIONER

## 2023-11-24 PROCEDURE — 87086 URINE CULTURE/COLONY COUNT: CPT | Mod: HCNC | Performed by: NURSE PRACTITIONER

## 2023-11-24 PROCEDURE — 1160F RVW MEDS BY RX/DR IN RCRD: CPT | Mod: HCNC,CPTII,S$GLB, | Performed by: NURSE PRACTITIONER

## 2023-11-24 PROCEDURE — 3079F DIAST BP 80-89 MM HG: CPT | Mod: HCNC,CPTII,S$GLB, | Performed by: NURSE PRACTITIONER

## 2023-11-24 PROCEDURE — 3079F PR MOST RECENT DIASTOLIC BLOOD PRESSURE 80-89 MM HG: ICD-10-PCS | Mod: HCNC,CPTII,S$GLB, | Performed by: NURSE PRACTITIONER

## 2023-11-24 PROCEDURE — 99213 PR OFFICE/OUTPT VISIT, EST, LEVL III, 20-29 MIN: ICD-10-PCS | Mod: HCNC,S$GLB,, | Performed by: NURSE PRACTITIONER

## 2023-11-24 PROCEDURE — 99999 PR PBB SHADOW E&M-EST. PATIENT-LVL V: CPT | Mod: PBBFAC,HCNC,, | Performed by: NURSE PRACTITIONER

## 2023-11-24 PROCEDURE — 3074F SYST BP LT 130 MM HG: CPT | Mod: HCNC,CPTII,S$GLB, | Performed by: NURSE PRACTITIONER

## 2023-11-24 PROCEDURE — 3044F PR MOST RECENT HEMOGLOBIN A1C LEVEL <7.0%: ICD-10-PCS | Mod: HCNC,CPTII,S$GLB, | Performed by: NURSE PRACTITIONER

## 2023-11-24 PROCEDURE — 3074F PR MOST RECENT SYSTOLIC BLOOD PRESSURE < 130 MM HG: ICD-10-PCS | Mod: HCNC,CPTII,S$GLB, | Performed by: NURSE PRACTITIONER

## 2023-11-24 PROCEDURE — 99213 OFFICE O/P EST LOW 20 MIN: CPT | Mod: HCNC,S$GLB,, | Performed by: NURSE PRACTITIONER

## 2023-11-24 PROCEDURE — 3008F BODY MASS INDEX DOCD: CPT | Mod: HCNC,CPTII,S$GLB, | Performed by: NURSE PRACTITIONER

## 2023-11-24 PROCEDURE — 1160F PR REVIEW ALL MEDS BY PRESCRIBER/CLIN PHARMACIST DOCUMENTED: ICD-10-PCS | Mod: HCNC,CPTII,S$GLB, | Performed by: NURSE PRACTITIONER

## 2023-11-24 PROCEDURE — 3044F HG A1C LEVEL LT 7.0%: CPT | Mod: HCNC,CPTII,S$GLB, | Performed by: NURSE PRACTITIONER

## 2023-11-24 RX ORDER — AMOXICILLIN AND CLAVULANATE POTASSIUM 875; 125 MG/1; MG/1
1 TABLET, FILM COATED ORAL 2 TIMES DAILY
Qty: 20 TABLET | Refills: 0 | Status: SHIPPED | OUTPATIENT
Start: 2023-11-24 | End: 2023-12-04

## 2023-11-24 RX ORDER — ONDANSETRON 8 MG/1
8 TABLET, ORALLY DISINTEGRATING ORAL 2 TIMES DAILY PRN
Qty: 30 TABLET | Refills: 0 | Status: SHIPPED | OUTPATIENT
Start: 2023-11-24

## 2023-11-24 NOTE — PROGRESS NOTES
Chief Complaint  Chief Complaint   Patient presents with    Female  Problem     X2 days    Sinus Problem     X7 days       HPI    HPI   Ms. Zaira Dowell is a 53 y.o. female with medical problems as listed below. The patient presents to clinic with c/o dysuria and pressure for about 2 days. She also states that she has been having sinus congestion, ear pain and nausaea. She went to urgent care and was tested for COVID and flu which both were negative. She does not feel like her symptoms are getting any better. She has been having symptoms since last Saturday. Has been taking Tylenol and Zofran.    PAST MEDICAL HISTORY:  Past Medical History:   Diagnosis Date    Anxiety     Arthritis     Asthma     Back pain     Bipolar I     BMI 50.0-59.9, adult     Chronic obstructive pulmonary disease, unspecified COPD type 2022    Depression     Fibromyalgia     GERD (gastroesophageal reflux disease)     HFpEF     Insomnia 2021    Obesity     SHARON (obstructive sleep apnea)     Tobacco dependence     Urinary incontinence        PAST SURGICAL HISTORY:  Past Surgical History:   Procedure Laterality Date    CARPAL TUNNEL RELEASE       SECTION  1993    CHOLECYSTECTOMY      COLONOSCOPY N/A 2022    Procedure: COLONOSCOPY;  Surgeon: Ernesto Auguste MD;  Location: [facility];  Service: Endoscopy;  Laterality: N/A;  fully vaccinated-GT    EPIDURAL STEROID INJECTION Bilateral 2023    Procedure: Bilateral L3, L4, L5 Medial Branch Blocks #1;  Surgeon: Lito Jasso Jr., MD;  Location: [facility] ENDO;  Service: Pain Management;  Laterality: Bilateral;  @1230  No ATC or DM    EPIDURAL STEROID INJECTION Bilateral 2023    Procedure: Bilateral L3, L4, L5 Medial Branch Blocks #2;  Surgeon: Lito Jasso Jr., MD;  Location: [facility] ENDO;  Service: Pain Management;  Laterality: Bilateral;  @1000  No ATC or DM    EPIDURAL STEROID INJECTION Left 2023    Procedure: Left L3, L4, L5  Radiofrequency Thermocoagulation of Medial Branches;  Surgeon: Lito Jasso Jr., MD;  Location: [facility] ENDO;  Service: Pain Management;  Laterality: Left;  @0815  No ATC or DM    EPIDURAL STEROID INJECTION Right 08/30/2023    Procedure: Right L3, L4, L5 Radiofrequency Thermocoagulation of Medial Branches;  Surgeon: Lito Jasso Jr., MD;  Location: [facility] ENDO;  Service: Pain Management;  Laterality: Right;  @0930  No ATC or DM  Last 1&75    ESOPHAGOGASTRODUODENOSCOPY N/A 05/21/2019    Procedure: EGD (ESOPHAGOGASTRODUODENOSCOPY);  Surgeon: Michelle Mc MD;  Location: [facility] ENDO;  Service: Endoscopy;  Laterality: N/A;    HYSTERECTOMY      partial     KNEE SURGERY      LAPAROSCOPIC SLEEVE GASTRECTOMY N/A 10/18/2019    Procedure: GASTRECTOMY, SLEEVE, LAPAROSCOPIC, with intraop EGD;  Surgeon: Ean Kohli MD;  Location: [facility] OR [address] [place] [address];  Service: General;  Laterality: N/A;    LEFT HEART CATHETERIZATION Left 11/7/2023    Procedure: Left heart cath;  Surgeon: Lito Colmenares MD;  Location: [facility] CATH LAB;  Service: Cardiology;  Laterality: Left;  730am start, R rad access  RN PRE OP 11/2/23    pinched nerve      SHOULDER SURGERY      TONSILLECTOMY      WRIST SURGERY      had ganlin cyst       SOCIAL HISTORY:  Social History     Socioeconomic History    Marital status:    Tobacco Use    Smoking status: Former     Types: Vaping with nicotine    Smokeless tobacco: Never    Tobacco comments:     vaping started April 2020; QUIT cigarettes Sept 2019   Substance and Sexual Activity    Alcohol use: No    Drug use: No    Sexual activity: Yes     Partners: Male     Birth control/protection: None     Social Determinants of Health     Financial Resource Strain: Low Risk  (11/24/2023)    Overall Financial Resource Strain (CARDIA)     Difficulty of Paying Living Expenses: Not very hard   Food Insecurity: No Food Insecurity (11/24/2023)    Hunger Vital Sign     Worried About Running Out of Food in the  Last Year: Never true     Ran Out of Food in the Last Year: Never true   Transportation Needs: No Transportation Needs (11/24/2023)    PRAPARE - Transportation     Lack of Transportation (Medical): No     Lack of Transportation (Non-Medical): No   Physical Activity: Insufficiently Active (11/24/2023)    Exercise Vital Sign     Days of Exercise per Week: 2 days     Minutes of Exercise per Session: 20 min   Stress: Stress Concern Present (11/24/2023)    [employer] [place] [employer] - Occupational Stress Questionnaire     Feeling of Stress : Very much   Social Connections: Unknown (11/24/2023)    Social Connection and Isolation Panel [NHANES]     Frequency of Communication with Friends and Family: More than three times a week     Frequency of Social Gatherings with Friends and Family: Once a week     Active Member of Clubs or Organizations: Yes     Attends Club or Organization Meetings: More than 4 times per year     Marital Status:    Housing Stability: Low Risk  (11/24/2023)    Housing Stability Vital Sign     Unable to Pay for Housing in the Last Year: No     Number of Places Lived in the Last Year: 1     Unstable Housing in the Last Year: No       FAMILY HISTORY:  Family History   Problem Relation Age of Onset    Diabetes Mother     Jose Juan's disease Mother     Hypertension Mother     Heart disease Father     Stroke Father     Mental illness Father         ptsd    Hypertension Father     Diabetes Father     Depression Father     Arthritis Father     No Known Problems Brother     Heart disease Maternal Grandmother     Depression Maternal Grandmother     COPD Maternal Grandfather     Heart disease Maternal Grandfather     Stroke Maternal Grandfather     Kidney disease Paternal Grandmother     Heart disease Paternal Grandmother     Heart disease Paternal Grandfather     Early death Paternal Grandfather     Hypertension Son     Asthma Son     Rectal cancer Paternal Aunt     Asthma Maternal Aunt      Diabetes Maternal Aunt     Asthma Maternal Uncle        ALLERGIES AND MEDICATIONS: updated and reviewed.  Review of patient's allergies indicates:   Allergen Reactions    Hydrocodone-acetaminophen Nausea And Vomiting     Current Outpatient Medications   Medication Sig Dispense Refill    albuterol (PROVENTIL/VENTOLIN HFA) 90 mcg/actuation inhaler Inhale 2 puffs into the lungs every 4 (four) hours as needed for Wheezing or Shortness of Breath. Rescue 18 g 2    allopurinoL (ZYLOPRIM) 100 MG tablet Take 1 tablet (100 mg total) by mouth once daily. 60 tablet 1    b complex vitamins tablet Take 1 tablet by mouth once daily.      buPROPion (WELLBUTRIN SR) 100 MG TBSR 12 hr tablet Take 2 tabs q am, and 1 tab daily at noon 270 tablet 1    calcium citrate (CALCITRATE) 200 mg (950 mg) tablet Take 1 tablet by mouth 2 (two) times daily.      colchicine (COLCRYS) 0.6 mg tablet Take 1 tablet (0.6 mg total) by mouth daily as needed (gout flare). 30 tablet 0    cyanocobalamin 500 MCG tablet Take 500 mcg by mouth once daily.      cyclobenzaprine (FLEXERIL) 5 MG tablet Take 1 tablet (5 mg total) by mouth 3 (three) times daily as needed for Muscle spasms. 60 tablet 6    ferrous fumarate/vit Bcomp,C (SUPER B COMPLEX ORAL) Take 1 capsule by mouth once daily.      furosemide (LASIX) 20 MG tablet Take 1 tablet (20 mg total) by mouth daily as needed (leg swelling/fluid buildup). 60 tablet 0    LORazepam (ATIVAN) 0.5 MG tablet TAKE 2 TABLETS EVERY DAY AS DIRECTED 60 tablet 2    meclizine (ANTIVERT) 25 mg tablet Take 1 tablet (25 mg total) by mouth every 6 (six) hours as needed for Dizziness. 60 tablet 1    multivitamin capsule Take 1 capsule by mouth 2 (two) times daily.       omeprazole (PRILOSEC) 40 MG capsule TAKE 1 CAPSULE TWICE DAILY BEFORE MEALS 180 capsule 3    potassium chloride (KLOR-CON) 10 MEQ TbSR Take 10 mEq by mouth 2 (two) times daily.      pregabalin (LYRICA) 100 MG capsule Take 1 capsule (100 mg total) by mouth 2 (two) times  daily. 60 capsule 5    promethazine (PHENERGAN) 12.5 MG Tab promethazine Take half tablet (oral) 2 times per day PRN - Nausea . Medication may cause drowsiness. [id number] tablet 2 times per day oral No set duration recorded No set duration amount recorded active 12.5 mg      risperiDONE (RISPERDAL) 0.5 MG Tab TAKE 1 TABLET TWICE DAILY 180 tablet 0    sertraline (ZOLOFT) 100 MG tablet Take 2 tablets (200 mg total) by mouth once daily. 180 tablet 1    tolterodine (DETROL LA) 4 MG 24 hr capsule Take 1 capsule (4 mg total) by mouth once daily. 90 capsule 3    topiramate (TOPAMAX) 200 MG Tab Take 1 tablet (200 mg total) by mouth once daily. 90 tablet 1    amoxicillin-clavulanate 875-125mg (AUGMENTIN) 875-125 mg per tablet Take 1 tablet by mouth 2 (two) times daily. for 10 days 20 tablet 0    lithium (ESKALITH) 300 MG capsule Take 1 capsule (300 mg total) by mouth 3 (three) times daily with meals. 270 capsule 1    ondansetron (ZOFRAN-ODT) 8 MG TbDL Take 1 tablet (8 mg total) by mouth 2 (two) times daily as needed (nausea). 30 tablet 0     Current Facility-Administered Medications   Medication Dose Route Frequency Provider Last Rate Last Admin    sodium chloride 0.9% flush 10 mL  10 mL Intravenous PRN Lito Colmenares MD           Patient Care Team:  Shay Dai MD as PCP - General (Internal Medicine)  Delvin Nagy Jr., MD (Inactive) as Consulting Physician (Psychiatry)  José Hernandez MD as Consulting Physician (Endocrinology)  Brittaney Brandon NP (Inactive) as Nurse Practitioner (Urology)  Svetlana Nelson LPN as Care Coordinator    ROS  Review of Systems   Constitutional:  Negative for chills, fatigue, fever and unexpected weight change.   HENT:  Positive for congestion, ear pain and sinus pressure. Negative for ear discharge and postnasal drip.    Eyes:  Negative for photophobia, pain and visual disturbance.   Respiratory:  Negative for cough, shortness of breath and wheezing.    Cardiovascular:   "Negative for chest pain, palpitations and leg swelling.   Gastrointestinal:  Positive for nausea. Negative for abdominal pain, constipation, diarrhea and vomiting.   Genitourinary:  Positive for dysuria and pelvic pain. Negative for frequency, urgency and vaginal discharge.   Musculoskeletal:  Negative for back pain, joint swelling and neck stiffness.   Skin:  Negative for rash.   Neurological:  Positive for headaches. Negative for weakness.   Psychiatric/Behavioral:  Negative for dysphoric mood and sleep disturbance. The patient is not nervous/anxious.            Physical Exam  Vitals:    11/24/23 0824   BP: 124/82   BP Location: Right arm   Patient Position: Sitting   BP Method: Large (Manual)   Pulse: 66   Temp: 98.7 °F (37.1 °C)   TempSrc: Oral   SpO2: 98%   Weight: 128.8 kg (283 lb 15.2 oz)   Height: 5' 6" (1.676 m)    Body mass index is 45.83 kg/m².  Weight: 128.8 kg (283 lb 15.2 oz)   Height: 5' 6" (167.6 cm)     Physical Exam  Constitutional:       Appearance: She is well-developed.   HENT:      Head: Normocephalic and atraumatic.      Salivary Glands: Right salivary gland is diffusely enlarged and tender. Left salivary gland is diffusely enlarged and tender.      Right Ear: External ear normal. A middle ear effusion is present. Tympanic membrane is injected.      Left Ear: External ear normal. A middle ear effusion is present. Tympanic membrane is erythematous and bulging.      Nose: Nose normal.   Eyes:      Extraocular Movements: Extraocular movements intact.   Cardiovascular:      Rate and Rhythm: Normal rate and regular rhythm.   Pulmonary:      Effort: Pulmonary effort is normal.      Breath sounds: Normal breath sounds.   Musculoskeletal:         General: Normal range of motion.      Cervical back: Normal range of motion and neck supple.   Lymphadenopathy:      Cervical: No cervical adenopathy.   Skin:     General: Skin is warm and dry.   Neurological:      Mental Status: She is alert and oriented to " person, place, and time.   Psychiatric:         Behavior: Behavior normal.             Health Maintenance         Date Due Completion Date    TETANUS VACCINE Never done ---    COVID-19 Vaccine (4 - 2023-24 season) 09/01/2023 7/28/2022    Mammogram 01/27/2025 1/27/2023    Hemoglobin A1c (Diabetic Prevention Screening) 10/30/2026 10/30/2023    Colorectal Cancer Screening 04/25/2027 4/25/2022    Lipid Panel 10/30/2028 10/30/2023    Pneumococcal Vaccines (Age 0-64) (3 - PPSV23 or PCV20) 11/24/2036 6/28/2022          Health maintenance reviewed at this time.    Assessment & Plan  Dysuria  -     POCT URINE DIPSTICK WITHOUT MICROSCOPE    Suspected UTI  -     amoxicillin-clavulanate 875-125mg (AUGMENTIN) 875-125 mg per tablet; Take 1 tablet by mouth 2 (two) times daily. for 10 days  Dispense: 20 tablet; Refill: 0  -     Urine culture    Acute serous otitis media, recurrence not specified, unspecified laterality/Sinus congestion  -     amoxicillin-clavulanate 875-125mg (AUGMENTIN) 875-125 mg per tablet; Take 1 tablet by mouth 2 (two) times daily. for 10 days  Dispense: 20 tablet; Refill: 0    Nausea  -     ondansetron (ZOFRAN-ODT) 8 MG TbDL; Take 1 tablet (8 mg total) by mouth 2 (two) times daily as needed (nausea).  Dispense: 30 tablet; Refill: 0           Follow-up: Follow up if symptoms worsen or fail to improve.

## 2023-11-26 LAB — BACTERIA UR CULT: NORMAL

## 2023-11-30 ENCOUNTER — TELEPHONE (OUTPATIENT)
Dept: FAMILY MEDICINE | Facility: CLINIC | Age: 53
End: 2023-11-30

## 2023-11-30 ENCOUNTER — OFFICE VISIT (OUTPATIENT)
Dept: FAMILY MEDICINE | Facility: CLINIC | Age: 53
End: 2023-11-30
Payer: MEDICARE

## 2023-11-30 VITALS
WEIGHT: 286.38 LBS | TEMPERATURE: 98 F | SYSTOLIC BLOOD PRESSURE: 126 MMHG | OXYGEN SATURATION: 99 % | DIASTOLIC BLOOD PRESSURE: 78 MMHG | HEART RATE: 59 BPM | HEIGHT: 66 IN | BODY MASS INDEX: 46.02 KG/M2

## 2023-11-30 DIAGNOSIS — M1A.00X0 IDIOPATHIC CHRONIC GOUT WITHOUT TOPHUS, UNSPECIFIED SITE: ICD-10-CM

## 2023-11-30 DIAGNOSIS — Z12.31 ENCOUNTER FOR SCREENING MAMMOGRAM FOR MALIGNANT NEOPLASM OF BREAST: ICD-10-CM

## 2023-11-30 DIAGNOSIS — J31.0 RHINITIS, UNSPECIFIED TYPE: ICD-10-CM

## 2023-11-30 DIAGNOSIS — F32.A DEPRESSION, UNSPECIFIED DEPRESSION TYPE: ICD-10-CM

## 2023-11-30 DIAGNOSIS — E27.8 ADRENAL INCIDENTALOMA: ICD-10-CM

## 2023-11-30 DIAGNOSIS — F31.9 BIPOLAR 1 DISORDER: ICD-10-CM

## 2023-11-30 DIAGNOSIS — M47.816 LUMBAR SPONDYLOSIS: ICD-10-CM

## 2023-11-30 DIAGNOSIS — B37.31 YEAST VAGINITIS: Primary | ICD-10-CM

## 2023-11-30 DIAGNOSIS — M51.36 DDD (DEGENERATIVE DISC DISEASE), LUMBAR: ICD-10-CM

## 2023-11-30 PROCEDURE — 1159F MED LIST DOCD IN RCRD: CPT | Mod: HCNC,CPTII,S$GLB, | Performed by: INTERNAL MEDICINE

## 2023-11-30 PROCEDURE — 99214 PR OFFICE/OUTPT VISIT, EST, LEVL IV, 30-39 MIN: ICD-10-PCS | Mod: HCNC,S$GLB,, | Performed by: INTERNAL MEDICINE

## 2023-11-30 PROCEDURE — 99999 PR PBB SHADOW E&M-EST. PATIENT-LVL V: ICD-10-PCS | Mod: PBBFAC,HCNC,, | Performed by: INTERNAL MEDICINE

## 2023-11-30 PROCEDURE — 3078F PR MOST RECENT DIASTOLIC BLOOD PRESSURE < 80 MM HG: ICD-10-PCS | Mod: HCNC,CPTII,S$GLB, | Performed by: INTERNAL MEDICINE

## 2023-11-30 PROCEDURE — 3044F HG A1C LEVEL LT 7.0%: CPT | Mod: HCNC,CPTII,S$GLB, | Performed by: INTERNAL MEDICINE

## 2023-11-30 PROCEDURE — 99214 OFFICE O/P EST MOD 30 MIN: CPT | Mod: HCNC,S$GLB,, | Performed by: INTERNAL MEDICINE

## 2023-11-30 PROCEDURE — 1159F PR MEDICATION LIST DOCUMENTED IN MEDICAL RECORD: ICD-10-PCS | Mod: HCNC,CPTII,S$GLB, | Performed by: INTERNAL MEDICINE

## 2023-11-30 PROCEDURE — 1160F PR REVIEW ALL MEDS BY PRESCRIBER/CLIN PHARMACIST DOCUMENTED: ICD-10-PCS | Mod: HCNC,CPTII,S$GLB, | Performed by: INTERNAL MEDICINE

## 2023-11-30 PROCEDURE — 3008F BODY MASS INDEX DOCD: CPT | Mod: HCNC,CPTII,S$GLB, | Performed by: INTERNAL MEDICINE

## 2023-11-30 PROCEDURE — 3078F DIAST BP <80 MM HG: CPT | Mod: HCNC,CPTII,S$GLB, | Performed by: INTERNAL MEDICINE

## 2023-11-30 PROCEDURE — 3044F PR MOST RECENT HEMOGLOBIN A1C LEVEL <7.0%: ICD-10-PCS | Mod: HCNC,CPTII,S$GLB, | Performed by: INTERNAL MEDICINE

## 2023-11-30 PROCEDURE — 3074F PR MOST RECENT SYSTOLIC BLOOD PRESSURE < 130 MM HG: ICD-10-PCS | Mod: HCNC,CPTII,S$GLB, | Performed by: INTERNAL MEDICINE

## 2023-11-30 PROCEDURE — 1160F RVW MEDS BY RX/DR IN RCRD: CPT | Mod: HCNC,CPTII,S$GLB, | Performed by: INTERNAL MEDICINE

## 2023-11-30 PROCEDURE — 99999 PR PBB SHADOW E&M-EST. PATIENT-LVL V: CPT | Mod: PBBFAC,HCNC,, | Performed by: INTERNAL MEDICINE

## 2023-11-30 PROCEDURE — 3074F SYST BP LT 130 MM HG: CPT | Mod: HCNC,CPTII,S$GLB, | Performed by: INTERNAL MEDICINE

## 2023-11-30 PROCEDURE — 3008F PR BODY MASS INDEX (BMI) DOCUMENTED: ICD-10-PCS | Mod: HCNC,CPTII,S$GLB, | Performed by: INTERNAL MEDICINE

## 2023-11-30 RX ORDER — FLUTICASONE PROPIONATE 50 MCG
1 SPRAY, SUSPENSION (ML) NASAL DAILY
Qty: 9.9 ML | Refills: 2 | Status: SHIPPED | OUTPATIENT
Start: 2023-11-30 | End: 2024-02-22

## 2023-11-30 RX ORDER — ALLOPURINOL 100 MG/1
100 TABLET ORAL DAILY
Qty: 90 TABLET | Refills: 1 | Status: SHIPPED | OUTPATIENT
Start: 2023-11-30

## 2023-11-30 RX ORDER — FLUCONAZOLE 150 MG/1
150 TABLET ORAL DAILY
Qty: 1 TABLET | Refills: 0 | Status: SHIPPED | OUTPATIENT
Start: 2023-11-30 | End: 2023-12-01

## 2023-11-30 RX ORDER — CETIRIZINE HYDROCHLORIDE 10 MG/1
10 TABLET ORAL DAILY
Qty: 30 TABLET | Refills: 2 | Status: SHIPPED | OUTPATIENT
Start: 2023-11-30 | End: 2024-02-28

## 2023-11-30 NOTE — TELEPHONE ENCOUNTER
----- Message from Shay Dai MD sent at 11/30/2023  9:48 AM CST -----  Please call patient to inform she is due for follow up with endocrinology for evaluation of adrenal nodule with last visit 4/2022.  Provide number for scheduling.  Also please offer her follow up visit with me at 6 months.  Thanks

## 2023-11-30 NOTE — PROGRESS NOTES
HISTORY OF PRESENT ILLNESS:  Zaira Dowell is a 53 y.o. female who presents to the clinic today for Follow-up (8 Months F/U) and Vaginitis (Itching and burning X 1 day)    Last seen by me 3/2023.    She was treated for gout flare 5/2023.  She was recently started on Detrol LA for urinary incontinence.    Noted ED visit for CP with subsequent evaluation in cardiology clinic.  Noted abnl nuclear stress test.  Left heart cath done 11/7/23 showing normal coronaries.    Recently treated for UTI with Augmentin.    Upcoming appt with pulm for SHARON evaluation.  Has previously used CPAP.    Some stress with father's health issues.    Fibromyalgia  Followed by Dr. Salcedo.  Prescribed Flexeril and Lyrica.    Lumbar DDD  She was seen by Dr. Jasso earlier this year.  MRI done and L3/4/5 medial branch blocks were done in July & August 2023.     Hip pain  Seen by Dr. Smith for trochanteric bursitis mgmt in March.    Shoulder pain  Evaluated by Dr. Thorpe for right adhesive capsulitis.    Depression, Bipolar disorder  Followed by psychiatry.  Managed with lithium, risperidone, sertraline, bupropion, PRN Ativan.     Obesity  S/p sleeve gastrectomy 10/2019.  Weight is increased to 283 lb.  Lowest weight post procedure was 248 lb.  Not compliant with bariatric diet and not exercising.  Feels being depressed makes it harder.  Declined nutrition referral.     Left Adrenal incidentaloma  Seen by Dr. Hernandez 4/2022 and due to follow up.    PAST MEDICAL HISTORY:  Past Medical History:   Diagnosis Date    Anxiety     Arthritis     Asthma     Back pain     Bipolar I     BMI 50.0-59.9, adult     Chronic obstructive pulmonary disease, unspecified COPD type 03/21/2022    Depression     Fibromyalgia     GERD (gastroesophageal reflux disease)     HFpEF     Insomnia 11/23/2021    Obesity     SHARON (obstructive sleep apnea)     Tobacco dependence     Urinary incontinence        PAST SURGICAL HISTORY:  Past Surgical History:   Procedure Laterality  Date    CARPAL TUNNEL RELEASE       SECTION  1993    CHOLECYSTECTOMY      COLONOSCOPY N/A 2022    Procedure: COLONOSCOPY;  Surgeon: Ernesto Auguste MD;  Location: [facility] ENDO;  Service: Endoscopy;  Laterality: N/A;  fully vaccinated-GT    EPIDURAL STEROID INJECTION Bilateral 2023    Procedure: Bilateral L3, L4, L5 Medial Branch Blocks #1;  Surgeon: Lito Jasso Jr., MD;  Location: [facility] ENDO;  Service: Pain Management;  Laterality: Bilateral;  @1230  No ATC or DM    EPIDURAL STEROID INJECTION Bilateral 2023    Procedure: Bilateral L3, L4, L5 Medial Branch Blocks #2;  Surgeon: Lito Jasso Jr., MD;  Location: [facility] ENDO;  Service: Pain Management;  Laterality: Bilateral;  @1000  No ATC or DM    EPIDURAL STEROID INJECTION Left 2023    Procedure: Left L3, L4, L5 Radiofrequency Thermocoagulation of Medial Branches;  Surgeon: Lito Jasso Jr., MD;  Location: [facility] ENDO;  Service: Pain Management;  Laterality: Left;  @0815  No ATC or DM    EPIDURAL STEROID INJECTION Right 2023    Procedure: Right L3, L4, L5 Radiofrequency Thermocoagulation of Medial Branches;  Surgeon: Lito Jasso Jr., MD;  Location: [facility] ENDO;  Service: Pain Management;  Laterality: Right;  @0930  No ATC or DM  Last 1&75    ESOPHAGOGASTRODUODENOSCOPY N/A 2019    Procedure: EGD (ESOPHAGOGASTRODUODENOSCOPY);  Surgeon: Michelle Mc MD;  Location: [facility] ENDO;  Service: Endoscopy;  Laterality: N/A;    HYSTERECTOMY      partial     KNEE SURGERY      LAPAROSCOPIC SLEEVE GASTRECTOMY N/A 10/18/2019    Procedure: GASTRECTOMY, SLEEVE, LAPAROSCOPIC, with intraop EGD;  Surgeon: Ean Kohli MD;  Location: [facility] OR [address] [place] [address];  Service: General;  Laterality: N/A;    LEFT HEART CATHETERIZATION Left 2023    Procedure: Left heart cath;  Surgeon: Lito Colmenares MD;  Location: [facility] CATH LAB;  Service: Cardiology;  Laterality: Left;  730am start, R rad access  RN PRE OP  11/2/23    pinched nerve      SHOULDER SURGERY      TONSILLECTOMY      WRIST SURGERY      had ganlin cyst       SOCIAL HISTORY:  Social History     Socioeconomic History    Marital status:    Tobacco Use    Smoking status: Former     Types: Vaping with nicotine    Smokeless tobacco: Never    Tobacco comments:     vaping started April 2020; QUIT cigarettes Sept 2019   Substance and Sexual Activity    Alcohol use: No    Drug use: No    Sexual activity: Yes     Partners: Male     Birth control/protection: None     Social Determinants of Health     Financial Resource Strain: Low Risk  (11/24/2023)    Overall Financial Resource Strain (CARDIA)     Difficulty of Paying Living Expenses: Not very hard   Food Insecurity: No Food Insecurity (11/24/2023)    Hunger Vital Sign     Worried About Running Out of Food in the Last Year: Never true     Ran Out of Food in the Last Year: Never true   Transportation Needs: No Transportation Needs (11/24/2023)    PRAPARE - Transportation     Lack of Transportation (Medical): No     Lack of Transportation (Non-Medical): No   Physical Activity: Insufficiently Active (11/24/2023)    Exercise Vital Sign     Days of Exercise per Week: 2 days     Minutes of Exercise per Session: 20 min   Stress: Stress Concern Present (11/24/2023)    [employer] [place] [employer] - Occupational Stress Questionnaire     Feeling of Stress : Very much   Social Connections: Unknown (11/24/2023)    Social Connection and Isolation Panel [NHANES]     Frequency of Communication with Friends and Family: More than three times a week     Frequency of Social Gatherings with Friends and Family: Once a week     Active Member of Clubs or Organizations: Yes     Attends Club or Organization Meetings: More than 4 times per year     Marital Status:    Housing Stability: Low Risk  (11/24/2023)    Housing Stability Vital Sign     Unable to Pay for Housing in the Last Year: No     Number of Places Lived in  the Last Year: 1     Unstable Housing in the Last Year: No       FAMILY HISTORY:  Family History   Problem Relation Age of Onset    Diabetes Mother     Jose Juan's disease Mother     Hypertension Mother     Heart disease Father     Stroke Father     Mental illness Father         ptsd    Hypertension Father     Diabetes Father     Depression Father     Arthritis Father     No Known Problems Brother     Heart disease Maternal Grandmother     Depression Maternal Grandmother     COPD Maternal Grandfather     Heart disease Maternal Grandfather     Stroke Maternal Grandfather     Kidney disease Paternal Grandmother     Heart disease Paternal Grandmother     Heart disease Paternal Grandfather     Early death Paternal Grandfather     Hypertension Son     Asthma Son     Rectal cancer Paternal Aunt     Asthma Maternal Aunt     Diabetes Maternal Aunt     Asthma Maternal Uncle        ALLERGIES AND MEDICATIONS: updated and reviewed.  Review of patient's allergies indicates:   Allergen Reactions    Hydrocodone-acetaminophen Nausea And Vomiting     Medication List with Changes/Refills   Current Medications    ALBUTEROL (PROVENTIL/VENTOLIN HFA) 90 MCG/ACTUATION INHALER    Inhale 2 puffs into the lungs every 4 (four) hours as needed for Wheezing or Shortness of Breath. Rescue    ALLOPURINOL (ZYLOPRIM) 100 MG TABLET    Take 1 tablet (100 mg total) by mouth once daily.    AMOXICILLIN-CLAVULANATE 875-125MG (AUGMENTIN) 875-125 MG PER TABLET    Take 1 tablet by mouth 2 (two) times daily. for 10 days    B COMPLEX VITAMINS TABLET    Take 1 tablet by mouth once daily.    BUPROPION (WELLBUTRIN SR) 100 MG TBSR 12 HR TABLET    Take 2 tabs q am, and 1 tab daily at noon    CALCIUM CITRATE (CALCITRATE) 200 MG (950 MG) TABLET    Take 1 tablet by mouth 2 (two) times daily.    COLCHICINE (COLCRYS) 0.6 MG TABLET    Take 1 tablet (0.6 mg total) by mouth daily as needed (gout flare).    CYANOCOBALAMIN 500 MCG TABLET    Take 500 mcg by mouth once daily.     CYCLOBENZAPRINE (FLEXERIL) 5 MG TABLET    Take 1 tablet (5 mg total) by mouth 3 (three) times daily as needed for Muscle spasms.    FERROUS FUMARATE/VIT BCOMP,C (SUPER B COMPLEX ORAL)    Take 1 capsule by mouth once daily.    FUROSEMIDE (LASIX) 20 MG TABLET    Take 1 tablet (20 mg total) by mouth daily as needed (leg swelling/fluid buildup).    LITHIUM (ESKALITH) 300 MG CAPSULE    Take 1 capsule (300 mg total) by mouth 3 (three) times daily with meals.    LORAZEPAM (ATIVAN) 0.5 MG TABLET    TAKE 2 TABLETS EVERY DAY AS DIRECTED    MECLIZINE (ANTIVERT) 25 MG TABLET    Take 1 tablet (25 mg total) by mouth every 6 (six) hours as needed for Dizziness.    MULTIVITAMIN CAPSULE    Take 1 capsule by mouth 2 (two) times daily.     OMEPRAZOLE (PRILOSEC) 40 MG CAPSULE    TAKE 1 CAPSULE TWICE DAILY BEFORE MEALS    ONDANSETRON (ZOFRAN-ODT) 8 MG TBDL    Take 1 tablet (8 mg total) by mouth 2 (two) times daily as needed (nausea).    POTASSIUM CHLORIDE (KLOR-CON) 10 MEQ TBSR    Take 10 mEq by mouth 2 (two) times daily.    PREGABALIN (LYRICA) 100 MG CAPSULE    Take 1 capsule (100 mg total) by mouth 2 (two) times daily.    PROMETHAZINE (PHENERGAN) 12.5 MG TAB    promethazine Take half tablet (oral) 2 times per day PRN - Nausea . Medication may cause drowsiness. [id number] tablet 2 times per day oral No set duration recorded No set duration amount recorded active 12.5 mg    RISPERIDONE (RISPERDAL) 0.5 MG TAB    TAKE 1 TABLET TWICE DAILY    SERTRALINE (ZOLOFT) 100 MG TABLET    Take 2 tablets (200 mg total) by mouth once daily.    TOLTERODINE (DETROL LA) 4 MG 24 HR CAPSULE    Take 1 capsule (4 mg total) by mouth once daily.    TOPIRAMATE (TOPAMAX) 200 MG TAB    Take 1 tablet (200 mg total) by mouth once daily.          CARE TEAM:  Patient Care Team:  Shay Dai MD as PCP - General (Internal Medicine)  Delvin Nagy Jr., MD (Inactive) as Consulting Physician (Psychiatry)  José Hernandez MD as Consulting Physician  (Endocrinology)  Brittaney Brandon, NP (Inactive) as Nurse Practitioner (Urology)  Svetlana Nelson LPN as Care Coordinator         REVIEW OF SYSTEMS:  Review of Systems   Constitutional:  Negative for chills and fever.   HENT:  Negative for congestion and postnasal drip.    Eyes:  Negative for photophobia and visual disturbance.   Respiratory:  Negative for cough and shortness of breath.    Cardiovascular:  Negative for chest pain and palpitations.   Gastrointestinal:  Negative for nausea and vomiting.   Genitourinary:  Positive for vaginal discharge. Negative for dysuria.   Musculoskeletal:  Negative for arthralgias and back pain.   Neurological:  Negative for light-headedness and headaches.   Psychiatric/Behavioral:  Negative for dysphoric mood. The patient is not nervous/anxious.          PHYSICAL EXAM:   Vitals:    11/30/23 0902   BP: 126/78   Pulse: (!) 59   Temp: 97.7 °F (36.5 °C)             Body mass index is 46.22 kg/m².     General appearance - alert, well appearing, and in no distress and obese  Mental status - normal mood, behavior, speech, dress, motor activity, and thought processes  Eyes - sclera anicteric, left eye normal, right eye normal  Chest - clear to auscultation, no wheezes, rales or rhonchi, symmetric air entry  Heart - normal rate, regular rhythm, normal S1, S2, no murmurs, rubs, clicks or gallops  Neurological - alert, oriented, normal speech, no focal findings or movement disorder noted  Extremities - no pedal edema noted      ASSESSMENT AND PLAN:  Yeast vaginitis  -     fluconazole (DIFLUCAN) 150 MG Tab; Take 1 tablet (150 mg total) by mouth once daily. for 1 day  Dispense: 1 tablet; Refill: 0    Rhinitis, unspecified type  -     cetirizine (ZYRTEC) 10 MG tablet; Take 1 tablet (10 mg total) by mouth once daily.  Dispense: 30 tablet; Refill: 2  -     fluticasone propionate (FLONASE) 50 mcg/actuation nasal spray; 1 spray (50 mcg total) by Each Nostril route once daily.  Dispense:  9.9 mL; Refill: 2    Idiopathic chronic gout without tophus, unspecified site  -     allopurinoL (ZYLOPRIM) 100 MG tablet; Take 1 tablet (100 mg total) by mouth once daily.  Dispense: 90 tablet; Refill: 1  -     URIC ACID; Future; Expected date: 11/30/2023    DDD (degenerative disc disease), lumbar  -     Ambulatory referral/consult to [facility]; Future; Expected date: 12/07/2023    Lumbar spondylosis  -     Ambulatory referral/consult to [facility]; Future; Expected date: 12/07/2023    Encounter for screening mammogram for malignant neoplasm of breast  -     Mammo Digital Screening Bilat w/ Demetris; Future; Expected date: 01/29/2024    Bipolar 1 disorder  -     Ambulatory referral/consult to Psychiatry; Future; Expected date: 12/07/2023    Depression, unspecified depression type  -     Ambulatory referral/consult to Psychiatry; Future; Expected date: 12/07/2023    Adrenal incidentaloma  -     Ambulatory referral/consult to Endocrinology; Future; Expected date: 12/07/2023            Follow up 6 months or sooner as needed.

## 2023-12-12 ENCOUNTER — PATIENT MESSAGE (OUTPATIENT)
Dept: BARIATRICS | Facility: CLINIC | Age: 53
End: 2023-12-12
Payer: MEDICARE

## 2023-12-13 ENCOUNTER — PATIENT MESSAGE (OUTPATIENT)
Dept: BARIATRICS | Facility: CLINIC | Age: 53
End: 2023-12-13
Payer: MEDICARE

## 2023-12-19 ENCOUNTER — OFFICE VISIT (OUTPATIENT)
Dept: FAMILY MEDICINE | Facility: CLINIC | Age: 53
End: 2023-12-19
Payer: MEDICARE

## 2023-12-19 VITALS
HEIGHT: 66 IN | DIASTOLIC BLOOD PRESSURE: 80 MMHG | RESPIRATION RATE: 20 BRPM | TEMPERATURE: 99 F | SYSTOLIC BLOOD PRESSURE: 128 MMHG | BODY MASS INDEX: 45.8 KG/M2 | OXYGEN SATURATION: 95 % | HEART RATE: 85 BPM | WEIGHT: 285 LBS

## 2023-12-19 DIAGNOSIS — J11.1 INFLUENZA: Primary | ICD-10-CM

## 2023-12-19 DIAGNOSIS — Z20.828 EXPOSURE TO VIRAL DISEASE: ICD-10-CM

## 2023-12-19 LAB
CTP QC/QA: YES
CTP QC/QA: YES
FLUAV AG NPH QL: POSITIVE
FLUBV AG NPH QL: NEGATIVE
SARS-COV-2 AG RESP QL IA.RAPID: NEGATIVE

## 2023-12-19 PROCEDURE — 96372 THER/PROPH/DIAG INJ SC/IM: CPT | Mod: ,,, | Performed by: FAMILY MEDICINE

## 2023-12-19 PROCEDURE — 3079F DIAST BP 80-89 MM HG: CPT | Mod: ,,, | Performed by: FAMILY MEDICINE

## 2023-12-19 PROCEDURE — 3079F PR MOST RECENT DIASTOLIC BLOOD PRESSURE 80-89 MM HG: ICD-10-PCS | Mod: ,,, | Performed by: FAMILY MEDICINE

## 2023-12-19 PROCEDURE — 3044F PR MOST RECENT HEMOGLOBIN A1C LEVEL <7.0%: ICD-10-PCS | Mod: ,,, | Performed by: FAMILY MEDICINE

## 2023-12-19 PROCEDURE — 1160F PR REVIEW ALL MEDS BY PRESCRIBER/CLIN PHARMACIST DOCUMENTED: ICD-10-PCS | Mod: ,,, | Performed by: FAMILY MEDICINE

## 2023-12-19 PROCEDURE — 99214 PR OFFICE/OUTPT VISIT, EST, LEVL IV, 30-39 MIN: ICD-10-PCS | Mod: 25,,, | Performed by: FAMILY MEDICINE

## 2023-12-19 PROCEDURE — 87426 SARSCOV CORONAVIRUS AG IA: CPT | Mod: RHCUB | Performed by: FAMILY MEDICINE

## 2023-12-19 PROCEDURE — 99214 OFFICE O/P EST MOD 30 MIN: CPT | Mod: 25,,, | Performed by: FAMILY MEDICINE

## 2023-12-19 PROCEDURE — 3074F PR MOST RECENT SYSTOLIC BLOOD PRESSURE < 130 MM HG: ICD-10-PCS | Mod: ,,, | Performed by: FAMILY MEDICINE

## 2023-12-19 PROCEDURE — 87804 INFLUENZA ASSAY W/OPTIC: CPT | Mod: 59,RHCUB | Performed by: FAMILY MEDICINE

## 2023-12-19 PROCEDURE — 3008F PR BODY MASS INDEX (BMI) DOCUMENTED: ICD-10-PCS | Mod: ,,, | Performed by: FAMILY MEDICINE

## 2023-12-19 PROCEDURE — 1160F RVW MEDS BY RX/DR IN RCRD: CPT | Mod: ,,, | Performed by: FAMILY MEDICINE

## 2023-12-19 PROCEDURE — 3044F HG A1C LEVEL LT 7.0%: CPT | Mod: ,,, | Performed by: FAMILY MEDICINE

## 2023-12-19 PROCEDURE — 1159F MED LIST DOCD IN RCRD: CPT | Mod: ,,, | Performed by: FAMILY MEDICINE

## 2023-12-19 PROCEDURE — 1159F PR MEDICATION LIST DOCUMENTED IN MEDICAL RECORD: ICD-10-PCS | Mod: ,,, | Performed by: FAMILY MEDICINE

## 2023-12-19 PROCEDURE — 96372 PR INJECTION,THERAP/PROPH/DIAG2ST, IM OR SUBCUT: ICD-10-PCS | Mod: ,,, | Performed by: FAMILY MEDICINE

## 2023-12-19 PROCEDURE — 3074F SYST BP LT 130 MM HG: CPT | Mod: ,,, | Performed by: FAMILY MEDICINE

## 2023-12-19 PROCEDURE — 3008F BODY MASS INDEX DOCD: CPT | Mod: ,,, | Performed by: FAMILY MEDICINE

## 2023-12-19 RX ORDER — DEXAMETHASONE SODIUM PHOSPHATE 4 MG/ML
4 INJECTION, SOLUTION INTRA-ARTICULAR; INTRALESIONAL; INTRAMUSCULAR; INTRAVENOUS; SOFT TISSUE
Status: COMPLETED | OUTPATIENT
Start: 2023-12-19 | End: 2023-12-19

## 2023-12-19 RX ORDER — OSELTAMIVIR PHOSPHATE 75 MG/1
75 CAPSULE ORAL 2 TIMES DAILY
Qty: 10 CAPSULE | Refills: 0 | Status: SHIPPED | OUTPATIENT
Start: 2023-12-19 | End: 2023-12-24

## 2023-12-19 RX ORDER — PREDNISONE 20 MG/1
20 TABLET ORAL DAILY
Qty: 5 TABLET | Refills: 0 | Status: SHIPPED | OUTPATIENT
Start: 2023-12-19 | End: 2023-12-24

## 2023-12-19 RX ORDER — BENZONATATE 100 MG/1
100 CAPSULE ORAL 3 TIMES DAILY PRN
Qty: 20 CAPSULE | Refills: 0 | Status: SHIPPED | OUTPATIENT
Start: 2023-12-19 | End: 2024-01-11

## 2023-12-19 RX ADMIN — DEXAMETHASONE SODIUM PHOSPHATE 4 MG: 4 INJECTION, SOLUTION INTRA-ARTICULAR; INTRALESIONAL; INTRAMUSCULAR; INTRAVENOUS; SOFT TISSUE at 11:12

## 2023-12-19 NOTE — PROGRESS NOTES
Subjective:       Patient ID: Zaira Dowell is a 53 y.o. female.    Chief Complaint: Cough, Shortness of Breath, Nasal Congestion, Generalized Body Aches (Hx of fibromyalgia), Otalgia, Chest Congestion, and Fever    Cough  Associated symptoms include ear pain, a fever, postnasal drip, rhinorrhea and shortness of breath. Pertinent negatives include no chest pain, chills, headaches, myalgias, rash, sore throat or wheezing. There is no history of environmental allergies.   Shortness of Breath  Associated symptoms include ear pain, a fever and rhinorrhea. Pertinent negatives include no abdominal pain, chest pain, headaches, leg pain, leg swelling, neck pain, rash, sore throat, vomiting or wheezing.   Otalgia   Associated symptoms include coughing and rhinorrhea. Pertinent negatives include no abdominal pain, diarrhea, ear discharge, headaches, hearing loss, neck pain, rash, sore throat or vomiting.   Fever   Associated symptoms include congestion, coughing and ear pain. Pertinent negatives include no abdominal pain, chest pain, diarrhea, headaches, nausea, rash, sore throat, urinary pain, vomiting or wheezing.     Review of Systems   Constitutional:  Positive for fever. Negative for activity change, appetite change, chills, diaphoresis, fatigue and unexpected weight change.   HENT:  Positive for nasal congestion, ear pain, postnasal drip, rhinorrhea and sinus pressure/congestion. Negative for dental problem, drooling, ear discharge, facial swelling, hearing loss, mouth sores, nosebleeds, sneezing, sore throat, tinnitus, trouble swallowing, voice change and goiter.    Eyes:  Negative for photophobia, discharge, itching and visual disturbance.   Respiratory:  Positive for cough and shortness of breath. Negative for apnea, choking, chest tightness, wheezing and stridor.    Cardiovascular:  Negative for chest pain, palpitations, leg swelling and claudication.   Gastrointestinal:  Negative for abdominal distention,  abdominal pain, anal bleeding, blood in stool, change in bowel habit, constipation, diarrhea, nausea and vomiting.   Endocrine: Negative for cold intolerance, heat intolerance, polydipsia, polyphagia and polyuria.   Genitourinary:  Negative for bladder incontinence, decreased urine volume, difficulty urinating, dysuria, enuresis, flank pain, frequency, hematuria, nocturia, pelvic pain and urgency.   Musculoskeletal:  Negative for arthralgias, back pain, gait problem, joint swelling, leg pain, myalgias, neck pain, neck stiffness and joint deformity.   Integumentary:  Negative for pallor, rash, wound, breast mass and breast tenderness.   Allergic/Immunologic: Negative for environmental allergies, food allergies and immunocompromised state.   Neurological:  Negative for dizziness, vertigo, tremors, seizures, syncope, facial asymmetry, speech difficulty, weakness, light-headedness, numbness, headaches, memory loss and coordination difficulties.   Hematological:  Negative for adenopathy. Does not bruise/bleed easily.   Psychiatric/Behavioral:  Negative for agitation, behavioral problems, confusion, decreased concentration, dysphoric mood, hallucinations, self-injury, sleep disturbance and suicidal ideas. The patient is not nervous/anxious and is not hyperactive.    Breast: Negative for mass and tenderness        Objective:      Physical Exam  Vitals reviewed.   Constitutional:       Appearance: Normal appearance.   HENT:      Head: Normocephalic and atraumatic.      Right Ear: Tympanic membrane, ear canal and external ear normal.      Left Ear: Tympanic membrane, ear canal and external ear normal.      Nose: Congestion and rhinorrhea present.      Mouth/Throat:      Mouth: Mucous membranes are moist.      Pharynx: Oropharynx is clear. Posterior oropharyngeal erythema present.   Eyes:      Extraocular Movements: Extraocular movements intact.      Conjunctiva/sclera: Conjunctivae normal.      Pupils: Pupils are equal,  round, and reactive to light.   Cardiovascular:      Rate and Rhythm: Normal rate and regular rhythm.      Pulses: Normal pulses.      Heart sounds: Normal heart sounds.   Pulmonary:      Effort: Pulmonary effort is normal.      Breath sounds: Normal breath sounds.   Abdominal:      General: Bowel sounds are normal.      Palpations: Abdomen is soft.   Musculoskeletal:         General: Normal range of motion.      Cervical back: Normal range of motion and neck supple.   Skin:     General: Skin is warm and dry.   Neurological:      General: No focal deficit present.      Mental Status: She is alert. Mental status is at baseline.   Psychiatric:         Mood and Affect: Mood normal.         Behavior: Behavior normal.         Thought Content: Thought content normal.         Judgment: Judgment normal.         Assessment:       1. Influenza    2. Exposure to viral disease        Plan:     Influenza  -     dexAMETHasone injection 4 mg  -     predniSONE (DELTASONE) 20 MG tablet; Take 1 tablet (20 mg total) by mouth once daily. for 5 days  Dispense: 5 tablet; Refill: 0  -     benzonatate (TESSALON) 100 MG capsule; Take 1 capsule (100 mg total) by mouth 3 (three) times daily as needed for Cough.  Dispense: 20 capsule; Refill: 0  -     oseltamivir (TAMIFLU) 75 MG capsule; Take 1 capsule (75 mg total) by mouth 2 (two) times daily. for 5 days  Dispense: 10 capsule; Refill: 0    Exposure to viral disease  -     POCT Influenza A/B Rapid Antigen  -     SARS Coronavirus 2 Antigen, POCT

## 2023-12-21 DIAGNOSIS — G43.809 OTHER MIGRAINE WITHOUT STATUS MIGRAINOSUS, NOT INTRACTABLE: ICD-10-CM

## 2023-12-21 RX ORDER — TOPIRAMATE 200 MG/1
200 TABLET ORAL DAILY
Qty: 90 TABLET | Refills: 0 | Status: SHIPPED | OUTPATIENT
Start: 2023-12-21

## 2024-01-09 ENCOUNTER — PATIENT MESSAGE (OUTPATIENT)
Dept: BARIATRICS | Facility: CLINIC | Age: 54
End: 2024-01-09
Payer: MEDICARE

## 2024-01-10 ENCOUNTER — PATIENT MESSAGE (OUTPATIENT)
Dept: PSYCHIATRY | Facility: CLINIC | Age: 54
End: 2024-01-10
Payer: MEDICARE

## 2024-01-11 ENCOUNTER — HOSPITAL ENCOUNTER (OUTPATIENT)
Dept: RADIOLOGY | Facility: HOSPITAL | Age: 54
Discharge: HOME | End: 2024-01-11
Attending: NURSE PRACTITIONER
Payer: MEDICARE

## 2024-01-11 ENCOUNTER — OFFICE VISIT (OUTPATIENT)
Dept: FAMILY MEDICINE | Facility: CLINIC | Age: 54
End: 2024-01-11
Payer: MEDICARE

## 2024-01-11 VITALS
TEMPERATURE: 98 F | RESPIRATION RATE: 15 BRPM | HEART RATE: 65 BPM | HEIGHT: 66 IN | OXYGEN SATURATION: 97 % | DIASTOLIC BLOOD PRESSURE: 70 MMHG | WEIGHT: 293 LBS | BODY MASS INDEX: 47.09 KG/M2 | SYSTOLIC BLOOD PRESSURE: 124 MMHG

## 2024-01-11 DIAGNOSIS — J20.9 ACUTE BRONCHITIS, UNSPECIFIED ORGANISM: ICD-10-CM

## 2024-01-11 DIAGNOSIS — R60.0 BILATERAL LOWER EXTREMITY EDEMA: ICD-10-CM

## 2024-01-11 DIAGNOSIS — J01.90 ACUTE BACTERIAL SINUSITIS: Primary | ICD-10-CM

## 2024-01-11 DIAGNOSIS — B96.89 ACUTE BACTERIAL SINUSITIS: Primary | ICD-10-CM

## 2024-01-11 PROBLEM — E66.01 MORBID OBESITY, UNSPECIFIED OBESITY TYPE: Status: ACTIVE | Noted: 2024-01-11

## 2024-01-11 PROCEDURE — 1160F RVW MEDS BY RX/DR IN RCRD: CPT | Mod: HCNC,CPTII,S$GLB, | Performed by: NURSE PRACTITIONER

## 2024-01-11 PROCEDURE — 1159F MED LIST DOCD IN RCRD: CPT | Mod: HCNC,CPTII,S$GLB, | Performed by: NURSE PRACTITIONER

## 2024-01-11 PROCEDURE — 99999 PR PBB SHADOW E&M-EST. PATIENT-LVL V: CPT | Mod: PBBFAC,HCNC,, | Performed by: NURSE PRACTITIONER

## 2024-01-11 PROCEDURE — 93970 EXTREMITY STUDY: CPT | Mod: TC,HCNC

## 2024-01-11 PROCEDURE — 99214 OFFICE O/P EST MOD 30 MIN: CPT | Mod: HCNC,S$GLB,, | Performed by: NURSE PRACTITIONER

## 2024-01-11 PROCEDURE — 93970 EXTREMITY STUDY: CPT | Mod: 26,HCNC,, | Performed by: RADIOLOGY

## 2024-01-11 PROCEDURE — 3008F BODY MASS INDEX DOCD: CPT | Mod: HCNC,CPTII,S$GLB, | Performed by: NURSE PRACTITIONER

## 2024-01-11 PROCEDURE — 3074F SYST BP LT 130 MM HG: CPT | Mod: HCNC,CPTII,S$GLB, | Performed by: NURSE PRACTITIONER

## 2024-01-11 PROCEDURE — 3078F DIAST BP <80 MM HG: CPT | Mod: HCNC,CPTII,S$GLB, | Performed by: NURSE PRACTITIONER

## 2024-01-11 RX ORDER — AMOXICILLIN AND CLAVULANATE POTASSIUM 875; 125 MG/1; MG/1
1 TABLET, FILM COATED ORAL EVERY 12 HOURS
Qty: 20 TABLET | Refills: 0 | Status: SHIPPED | OUTPATIENT
Start: 2024-01-11 | End: 2024-02-22

## 2024-01-11 RX ORDER — PROMETHAZINE HYDROCHLORIDE AND DEXTROMETHORPHAN HYDROBROMIDE 6.25; 15 MG/5ML; MG/5ML
5 SYRUP ORAL NIGHTLY PRN
Qty: 118 ML | Refills: 0 | Status: SHIPPED | OUTPATIENT
Start: 2024-01-11

## 2024-01-11 RX ORDER — BENZONATATE 200 MG/1
200 CAPSULE ORAL 3 TIMES DAILY PRN
Qty: 30 CAPSULE | Refills: 0 | Status: SHIPPED | OUTPATIENT
Start: 2024-01-11 | End: 2024-01-21

## 2024-01-11 NOTE — PROGRESS NOTES
Routine Office Visit    Patient Name: Zaira Dowell    : 1970  MRN: [id number]    Chief Complaint:  Cough, chest congestion, sinus pain, leg swelling    Subjective:  Zaira is a 53 y.o. female who presents today for:    Cough, chest congestion, sinus pain, leg swelling - patient who is known to me reports today for evaluation.  Around 3 weeks ago was diagnosed with the flu and was given Tamiflu a steroid shot and prednisone to take at home.  Since then she reports that she is still having significant cough with chest congestion, nasal congestion, and sinus pain.  She states that her ears feel full because of the symptoms.  In the last week or so she has noticed some increase in her lower extremity swelling as well despite using Lasix.  She denies any associated chest pain, palpitations, or shortness of breath.  She has had cardiac workup recently without evidence of heart failure and normal coronary arteries via cardiac catheterization.  Denies any orthopnea or PND.    Past Medical History  Past Medical History:   Diagnosis Date    Anxiety     Arthritis     Asthma     Back pain     Bipolar I     BMI 50.0-59.9, adult     Chronic obstructive pulmonary disease, unspecified COPD type 2022    Depression     Fibromyalgia     GERD (gastroesophageal reflux disease)     HFpEF     Insomnia 2021    Obesity     SHARON (obstructive sleep apnea)     Tobacco dependence     Urinary incontinence        Family History  Family History   Problem Relation Age of Onset    Diabetes Mother     Jose Juan's disease Mother     Hypertension Mother     Heart disease Father     Stroke Father     Mental illness Father         ptsd    Hypertension Father     Diabetes Father     Depression Father     Arthritis Father     No Known Problems Brother     Heart disease Maternal Grandmother     Depression Maternal Grandmother     COPD Maternal Grandfather     Heart disease Maternal Grandfather     Stroke Maternal Grandfather     Kidney  disease Paternal Grandmother     Heart disease Paternal Grandmother     Heart disease Paternal Grandfather     Early death Paternal Grandfather     Hypertension Son     Asthma Son     Rectal cancer Paternal Aunt     Asthma Maternal Aunt     Diabetes Maternal Aunt     Asthma Maternal Uncle        Current Medications  Current Outpatient Medications on File Prior to Visit   Medication Sig Dispense Refill    albuterol (PROVENTIL/VENTOLIN HFA) 90 mcg/actuation inhaler Inhale 2 puffs into the lungs every 4 (four) hours as needed for Wheezing or Shortness of Breath. Rescue 18 g 2    allopurinoL (ZYLOPRIM) 100 MG tablet Take 1 tablet (100 mg total) by mouth once daily. 90 tablet 1    b complex vitamins tablet Take 1 tablet by mouth once daily.      buPROPion (WELLBUTRIN SR) 100 MG TBSR 12 hr tablet Take 2 tabs q am, and 1 tab daily at noon 270 tablet 1    calcium citrate (CALCITRATE) 200 mg (950 mg) tablet Take 1 tablet by mouth 2 (two) times daily.      cetirizine (ZYRTEC) 10 MG tablet Take 1 tablet (10 mg total) by mouth once daily. 30 tablet 2    colchicine (COLCRYS) 0.6 mg tablet Take 1 tablet (0.6 mg total) by mouth daily as needed (gout flare). 30 tablet 0    cyanocobalamin 500 MCG tablet Take 500 mcg by mouth once daily.      cyclobenzaprine (FLEXERIL) 5 MG tablet Take 1 tablet (5 mg total) by mouth 3 (three) times daily as needed for Muscle spasms. 60 tablet 6    ferrous fumarate/vit Bcomp,C (SUPER B COMPLEX ORAL) Take 1 capsule by mouth once daily.      fluticasone propionate (FLONASE) 50 mcg/actuation nasal spray 1 spray (50 mcg total) by Each Nostril route once daily. 9.9 mL 2    furosemide (LASIX) 20 MG tablet Take 1 tablet (20 mg total) by mouth daily as needed (leg swelling/fluid buildup). 60 tablet 0    LORazepam (ATIVAN) 0.5 MG tablet TAKE 2 TABLETS EVERY DAY AS DIRECTED 60 tablet 2    meclizine (ANTIVERT) 25 mg tablet Take 1 tablet (25 mg total) by mouth every 6 (six) hours as needed for Dizziness. 60  tablet 1    multivitamin capsule Take 1 capsule by mouth 2 (two) times daily.       omeprazole (PRILOSEC) 40 MG capsule TAKE 1 CAPSULE TWICE DAILY BEFORE MEALS 180 capsule 3    ondansetron (ZOFRAN-ODT) 8 MG TbDL Take 1 tablet (8 mg total) by mouth 2 (two) times daily as needed (nausea). 30 tablet 0    potassium chloride (KLOR-CON) 10 MEQ TbSR Take 10 mEq by mouth 2 (two) times daily.      pregabalin (LYRICA) 100 MG capsule Take 1 capsule (100 mg total) by mouth 2 (two) times daily. 60 capsule 5    promethazine (PHENERGAN) 12.5 MG Tab promethazine Take half tablet (oral) 2 times per day PRN - Nausea . Medication may cause drowsiness. [id number] tablet 2 times per day oral No set duration recorded No set duration amount recorded active 12.5 mg      risperiDONE (RISPERDAL) 0.5 MG Tab TAKE 1 TABLET TWICE DAILY 180 tablet 0    sertraline (ZOLOFT) 100 MG tablet Take 2 tablets (200 mg total) by mouth once daily. 180 tablet 1    tolterodine (DETROL LA) 4 MG 24 hr capsule Take 1 capsule (4 mg total) by mouth once daily. 90 capsule 3    topiramate (TOPAMAX) 200 MG Tab Take 1 tablet (200 mg total) by mouth once daily. 90 tablet 0    [DISCONTINUED] benzonatate (TESSALON) 100 MG capsule Take 1 capsule (100 mg total) by mouth 3 (three) times daily as needed for Cough. 20 capsule 0    lithium (ESKALITH) 300 MG capsule Take 1 capsule (300 mg total) by mouth 3 (three) times daily with meals. 270 capsule 1    [DISCONTINUED] loratadine (CLARITIN) 10 mg tablet Take 1 tablet (10 mg total) by mouth once daily. for 20 days 60 tablet 0     Current Facility-Administered Medications on File Prior to Visit   Medication Dose Route Frequency Provider Last Rate Last Admin    sodium chloride 0.9% flush 10 mL  10 mL Intravenous PRN Lito Colmenares MD           Allergies   Review of patient's allergies indicates:   Allergen Reactions    Hydrocodone-acetaminophen Nausea And Vomiting       Review of Systems (Pertinent positives)  Review of Systems  "  Constitutional:  Negative for chills and fever.   HENT:  Positive for congestion and sinus pain. Negative for ear pain, hearing loss, sore throat and tinnitus.    Eyes: Negative.    Respiratory:  Positive for cough and sputum production. Negative for shortness of breath, wheezing and stridor.    Cardiovascular:  Positive for leg swelling. Negative for chest pain, palpitations, orthopnea, claudication and PND.   Gastrointestinal: Negative.    Genitourinary: Negative.    Musculoskeletal: Negative.    Skin: Negative.    Neurological: Negative.    Endo/Heme/Allergies: Negative.    Psychiatric/Behavioral: Negative.         /70 (BP Location: Left arm, Patient Position: Sitting, BP Method: X-Large (Manual))   Pulse 65   Temp 98 °F (36.7 °C) (Oral)   Resp 15   Ht 5' 6" (1.676 m)   Wt 133.1 kg (293 lb 6.9 oz)   LMP  (LMP Unknown)   SpO2 97%   BMI 47.36 kg/m²     Physical Exam  Vitals reviewed.   Constitutional:       General: She is not in acute distress.     Appearance: Normal appearance. She is not ill-appearing, toxic-appearing or diaphoretic.   HENT:      Head: Normocephalic and atraumatic.      Right Ear: Tympanic membrane, ear canal and external ear normal.      Left Ear: Tympanic membrane, ear canal and external ear normal.      Nose: Mucosal edema and congestion present.      Right Sinus: Maxillary sinus tenderness present. No frontal sinus tenderness.      Left Sinus: Maxillary sinus tenderness present. No frontal sinus tenderness.      Mouth/Throat:      Lips: Pink.      Mouth: Mucous membranes are moist.      Pharynx: Oropharynx is clear.   Cardiovascular:      Rate and Rhythm: Normal rate and regular rhythm.      Pulses: Normal pulses.      Heart sounds: Normal heart sounds.   Pulmonary:      Effort: Pulmonary effort is normal. No respiratory distress.      Breath sounds: Normal breath sounds. No wheezing.   Musculoskeletal:      Right lower le+ Pitting Edema present.      Left lower le+ " Pitting Edema present.   Skin:     General: Skin is warm and dry.      Capillary Refill: Capillary refill takes less than 2 seconds.   Neurological:      Mental Status: She is alert and oriented to person, place, and time.   Psychiatric:         Mood and Affect: Mood normal.         Behavior: Behavior normal.          Assessment/Plan:  Zaira Dowell is a 53 y.o. female who presents today for :    Zaira was seen today for cough, fever and chest congestion.    Diagnoses and all orders for this visit:    Acute bacterial sinusitis  -     amoxicillin-clavulanate 875-125mg (AUGMENTIN) 875-125 mg per tablet; Take 1 tablet by mouth every 12 (twelve) hours. Take with food  -     benzonatate (TESSALON) 200 MG capsule; Take 1 capsule (200 mg total) by mouth 3 (three) times daily as needed for Cough.  -     promethazine-dextromethorphan (PROMETHAZINE-DM) 6.25-15 mg/5 mL Syrp; Take 5 mLs by mouth nightly as needed (cough).    Given prolonged and severe symptoms will treat with antibiotics today.  She has gotten some benefit for the coughing with Tessalon so will refill.  P.r.n. promethazine DM at night to promote sleep.  Discussed with patient that this medication can make her drowsy.    Acute bronchitis, unspecified organism  -     benzonatate (TESSALON) 200 MG capsule; Take 1 capsule (200 mg total) by mouth 3 (three) times daily as needed for Cough.  -     promethazine-dextromethorphan (PROMETHAZINE-DM) 6.25-15 mg/5 mL Syrp; Take 5 mLs by mouth nightly as needed (cough).    Breath sounds clear today.  Cough likely due to bronchitis and prolonged sinus infection.  No need for chest imaging.    Bilateral lower extremity edema  -     US Lower Extremity Veins Bilateral Insufficiency; Future    Chronic problem.  Likely due to dependency and weight.  Recent steroid use may be contributing.  Reviewed previous cardiac workup.  Will check venous insufficiency ultrasound to rule out venous reflux as cause.  Continue Lasix p.r.n..   Continue leg elevation.  May need compression stockings.    Recommended patient to follow up for any worsening.  All questions answered.        This office note has been dictated.  This dictation has been generated using [employer] Fluency Direct dictation; some phonetic errors may occur.

## 2024-01-18 DIAGNOSIS — F31.9 BIPOLAR 1 DISORDER: ICD-10-CM

## 2024-01-18 RX ORDER — LORAZEPAM 0.5 MG/1
TABLET ORAL
Qty: 60 TABLET | Refills: 0 | Status: SHIPPED | OUTPATIENT
Start: 2024-01-18 | End: 2024-01-29 | Stop reason: SDUPTHER

## 2024-01-18 RX ORDER — SERTRALINE HYDROCHLORIDE 100 MG/1
200 TABLET, FILM COATED ORAL DAILY
Qty: 180 TABLET | Refills: 0 | Status: SHIPPED | OUTPATIENT
Start: 2024-01-18 | End: 2024-01-29 | Stop reason: SDUPTHER

## 2024-01-18 RX ORDER — BUPROPION HYDROCHLORIDE 100 MG/1
TABLET, EXTENDED RELEASE ORAL
Qty: 270 TABLET | Refills: 0 | Status: SHIPPED | OUTPATIENT
Start: 2024-01-18 | End: 2024-01-29 | Stop reason: SDUPTHER

## 2024-01-18 RX ORDER — LITHIUM CARBONATE 300 MG/1
300 CAPSULE ORAL
Qty: 270 CAPSULE | Refills: 0 | Status: SHIPPED | OUTPATIENT
Start: 2024-01-18 | End: 2024-01-29 | Stop reason: SDUPTHER

## 2024-01-18 RX ORDER — RISPERIDONE 0.5 MG/1
0.5 TABLET ORAL 2 TIMES DAILY
Qty: 180 TABLET | Refills: 0 | Status: SHIPPED | OUTPATIENT
Start: 2024-01-18 | End: 2024-01-29 | Stop reason: SDUPTHER

## 2024-01-29 ENCOUNTER — OFFICE VISIT (OUTPATIENT)
Dept: PSYCHIATRY | Facility: CLINIC | Age: 54
End: 2024-01-29
Payer: MEDICARE

## 2024-01-29 ENCOUNTER — HOSPITAL ENCOUNTER (OUTPATIENT)
Dept: RADIOLOGY | Facility: HOSPITAL | Age: 54
Discharge: HOME | End: 2024-01-29
Attending: INTERNAL MEDICINE
Payer: MEDICARE

## 2024-01-29 DIAGNOSIS — Z12.31 ENCOUNTER FOR SCREENING MAMMOGRAM FOR MALIGNANT NEOPLASM OF BREAST: ICD-10-CM

## 2024-01-29 DIAGNOSIS — F41.9 ANXIETY DISORDER, UNSPECIFIED TYPE: ICD-10-CM

## 2024-01-29 DIAGNOSIS — F31.9 BIPOLAR 1 DISORDER: Primary | ICD-10-CM

## 2024-01-29 DIAGNOSIS — G47.00 INSOMNIA, UNSPECIFIED TYPE: ICD-10-CM

## 2024-01-29 PROCEDURE — 99214 OFFICE O/P EST MOD 30 MIN: CPT | Mod: HCNC,95,, | Performed by: NURSE PRACTITIONER

## 2024-01-29 PROCEDURE — 77067 SCR MAMMO BI INCL CAD: CPT | Mod: 26,HCNC,, | Performed by: RADIOLOGY

## 2024-01-29 PROCEDURE — 77067 SCR MAMMO BI INCL CAD: CPT | Mod: TC,HCNC

## 2024-01-29 PROCEDURE — 77063 BREAST TOMOSYNTHESIS BI: CPT | Mod: 26,HCNC,, | Performed by: RADIOLOGY

## 2024-01-29 RX ORDER — LITHIUM CARBONATE 300 MG/1
300 CAPSULE ORAL
Qty: 270 CAPSULE | Refills: 0 | Status: SHIPPED | OUTPATIENT
Start: 2024-01-29 | End: 2024-03-27 | Stop reason: SDUPTHER

## 2024-01-29 RX ORDER — LORAZEPAM 0.5 MG/1
TABLET ORAL
Qty: 60 TABLET | Refills: 2 | Status: SHIPPED | OUTPATIENT
Start: 2024-02-18 | End: 2024-02-29 | Stop reason: SDUPTHER

## 2024-01-29 RX ORDER — BUPROPION HYDROCHLORIDE 100 MG/1
TABLET, EXTENDED RELEASE ORAL
Qty: 270 TABLET | Refills: 0 | Status: SHIPPED | OUTPATIENT
Start: 2024-01-29 | End: 2024-03-27 | Stop reason: SDUPTHER

## 2024-01-29 RX ORDER — SERTRALINE HYDROCHLORIDE 100 MG/1
200 TABLET, FILM COATED ORAL DAILY
Qty: 180 TABLET | Refills: 0 | Status: SHIPPED | OUTPATIENT
Start: 2024-01-29 | End: 2024-03-27 | Stop reason: SDUPTHER

## 2024-01-29 RX ORDER — RISPERIDONE 0.5 MG/1
0.5 TABLET ORAL 2 TIMES DAILY
Qty: 180 TABLET | Refills: 0 | Status: SHIPPED | OUTPATIENT
Start: 2024-01-29 | End: 2024-03-27 | Stop reason: SDUPTHER

## 2024-01-29 NOTE — PROGRESS NOTES
"  Outpatient Psychiatry Follow-Up Visit (MD/NP)    1/29/2024   Clinical Status of Patient:  Outpatient (Ambulatory)    The patient location is: Home, [place]    Visit type: audiovisual due to patient's problem in logging on to video visit from her phone    Face to Face time with patient: 24 minutes  30 minutes of total time spent on the encounter, which includes face to face time and non-face to face time preparing to see the patient (eg, review of tests), Obtaining and/or reviewing separately obtained history, Documenting clinical information in the electronic or other health record, Independently interpreting results (not separately reported) and communicating results to the patient/family/caregiver, or Care coordination (not separately reported).         Each patient to whom he or she provides medical services by telemedicine is:  (1) informed of the relationship between the physician and patient and the respective role of any other health care provider with respect to management of the patient; and (2) notified that he or she may decline to receive medical services by telemedicine and may withdraw from such care at any time.    Notes:       Chief Complaint:  Zaira Dowell is a 53 y.o. female who presents today for follow-up of mood disorder.  Met with patient and no relatives present for interview .      Interval History and Content of Current Session:  Interim Events/Subjective Report/Content of Current Session: She stated her mood is "okay" and her affect was appropriate. She stated her father passed away on December 10th and shared her feelings toward it. She stated she had multiple losses since her last visit and processed her feelings. She stated she still has family problems and still not close to her brother. Patient stated she enjoys seeing her grandchildren in [place]. Patient is in good self control. Patient is able to stay positive however in the face of the family stresses. Patent stated she has " no thoughts of harming herself and no signs of lida, hypomania, or psychosis. Provided patient with supportive therapy. She is not in therapy and we discussed starting therapy.     We discussed the importance to continual monitoring her labs and lithium level and ordered labs today and reminded patient to get them done ASAP to continue prescribing it and she agreed. I reviewed and reported results of patient's blood studies to patient including her lithium level. Support was offered to patient re her concerns and stressors. Patient is also still dealing with her discomfort related to fibromyalgia. She denied SI/HI/AVH. She denied suicide ideation and no suicide plan or intent. She reported managed depression and anxiety symptoms with her current medication regimen. She reported no change in her sleep, gets up and goes back to sleep at night, and some times she sleeps at day time. She stated this is her sleep pattern for many years.     She denied lida or hypomania like symptoms at this time.     Contracted for safety and non suicide plan (contact family, suicide hotline, call 911 or go the nearest emergency room)    Psychiatric Review Of Systems - Is patient experiencing or having changes in:  sleep: yes, varies   appetite: no  weight: no  energy/anergy: no  interest/pleasure/anhedonia: no  somatic symptoms: no  anxiety/panic: yes but managed  guilty/hopelessness: no  concentration: no  S.I.B.s/risky behavior: no  Irritability: no  Racing thoughts: yes due to worry  Impulsive behaviors: no  Paranoia:no  AVH:no  Suicidal thoughts/plan/intent: no  SE: no  ETOH abuse: no  Drugs abuse: no      Psychotherapy:  Target symptoms: anxiety , mood disorder  Why chosen therapy is appropriate versus another modality: relevant to diagnosis, patient responds to this modality, evidence based practice  Outcome monitoring methods: self-report  Therapeutic intervention type: supportive psychotherapy  Topics discussed/themes:  mood  "concerns, relationships difficulties, illness/death of a loved one  The patient's response to the intervention is accepting. The patient's progress toward treatment goals is good.   Duration of intervention: 16 minutes.    Review of Systems   PSYCHIATRIC: Pertinant items are noted in the narrative.    Past Medical, Family and Social History: The patient's past medical, family and social history have been reviewed and updated as appropriate within the electronic medical record - see encounter notes.    Compliance: yes    Side effects: None Patient also reported a lithium tremor. Patient has no signs of TD at this time.    Risk Parameters:  Patient reports no suicidal ideation  Patient reports no homicidal ideation  Patient reports no self-injurious behavior  Patient reports no violent behavior    Exam (detailed: at least 9 elements; comprehensive: all 15 elements)   Constitutional  Vitals:  Most recent vital signs, dated less than 90 days prior to this appointment, were reviewed.   There were no vitals filed for this visit.   Patient reports stable vital signs     General:  Not examined     Musculoskeletal  Muscle Strength/Tone:  not examined   Gait & Station:  Not examined     Psychiatric  Speech:  no latency; no press, spontaneous   Mood & Affect:  "okay"     Thought Process:  normal and logical   Associations:  intact   Thought Content:  normal, no suicidality, no homicidality, delusions, or paranoia   Insight:  has awareness of illness   Judgement: behavior is adequate to circumstances   Orientation:  grossly intact   Memory: intact for content of interview   Language: grossly intact, able to name, able to repeat   Attention Span & Concentration:  able to focus   Fund of Knowledge:  intact and appropriate to age and level of education     Assessment and Diagnosis   Status/Progress: Based on the examination today, the patient's problem(s) is/are adequately but not ideally controlled.  New problems have not been " "presented today.    no new obstacles presented.  are not complicating management of the primary condition.  The working differential for this patient includes Bipolar Disorder.     General Impression: Patient is doing as well as possible in coping with her family stresses and her own medical concerns as well. Patient does have things to look forward to in her life and enjoys her grandchildren and also has a supportive marriage. Patient remains motivated to do as well as possible and is pleased with her progress as well. Patient is not a danger to self or others at this time. Patient has a stable mood at this time overall.      ICD-10-CM ICD-9-CM   1. Bipolar 1 disorder  F31.9 296.7   2. Anxiety disorder, unspecified type  F41.9 300.00   3. Insomnia, unspecified type  G47.00 780.52       Intervention/Counseling/Treatment Plan   Medication Management: The risks and benefits of medication were discussed with the patient.   Patient agrees to continue   Zoloft 200 mg daily for anxiety and depressed mood  Risperdal 0.5 mg BID for bipolar disorder. Has been taking it for 7-9 years. Discussed in the future discontinuing Risperdal and staring vraylar. She will think about it  lithium 300mg  (takes one tab po qam and 2 tabs po qhs). She stated she has been taking it since 2013-14 and finds it very helpful. She stated, " If I missed one of medicine I get agitated or irritable. They are all work really well together".  She has been taking lithium 7-9 yeas.  AIMs = 0 and new AIMs due on 4/24  Takes 1/2 tab of Ativan 0.25 mg am and 0.25 mg po qnoon and 0.5 mg in pm for anxiety   Wellbutrin  mg q am and 100 mg daily at noon for depression  Reviewed patient's recent labs and all labs within the normal range from 11/9/22. Reviewed new lithium level and Lithium level was 0.5 on 11/9/2022. Ordered EKG and lithium level and reminded patient to get them done ASAP  -Discussed informed consent, diagnosis, risks and benefits of " proposed treatment above vs alternative treatments vs no treatment, and potential side effects of these treatments. The patient expresses understanding of the above and displays the capacity to agree with this treatment given said understanding. Patient also agrees that, currently, the benefits outweigh the risks and would like to pursue treatment at this time. Answered all questions and discussed follow up. Encouraged patient to contact us with any questions or concerns.   Contracted for safety and non suicide plan (contact family, suicide hotline, call 911 or go the nearest emergency room)  -Encouraged Patient to keep future appointments.  -Take medications as prescribed and abstain from substance abuse.  -Patient to present to Emergency department for thoughts to harm herself or others      Return to Clinic: 2 months or earlier as needed      MICHELE Phan-BC

## 2024-01-31 ENCOUNTER — OFFICE VISIT (OUTPATIENT)
Dept: PULMONOLOGY | Facility: CLINIC | Age: 54
End: 2024-01-31
Payer: MEDICARE

## 2024-01-31 ENCOUNTER — LAB VISIT (OUTPATIENT)
Dept: LAB | Facility: HOSPITAL | Age: 54
End: 2024-01-31
Attending: NURSE PRACTITIONER
Payer: MEDICARE

## 2024-01-31 VITALS
WEIGHT: 291.31 LBS | DIASTOLIC BLOOD PRESSURE: 88 MMHG | HEART RATE: 78 BPM | BODY MASS INDEX: 46.82 KG/M2 | SYSTOLIC BLOOD PRESSURE: 144 MMHG | OXYGEN SATURATION: 99 % | HEIGHT: 66 IN

## 2024-01-31 DIAGNOSIS — E66.01 MORBID OBESITY, UNSPECIFIED OBESITY TYPE: ICD-10-CM

## 2024-01-31 DIAGNOSIS — G47.33 OSA (OBSTRUCTIVE SLEEP APNEA): ICD-10-CM

## 2024-01-31 DIAGNOSIS — J45.21 MILD INTERMITTENT ASTHMA WITH ACUTE EXACERBATION: ICD-10-CM

## 2024-01-31 DIAGNOSIS — G47.00 INSOMNIA, UNSPECIFIED TYPE: Primary | ICD-10-CM

## 2024-01-31 DIAGNOSIS — F31.9 BIPOLAR 1 DISORDER: ICD-10-CM

## 2024-01-31 LAB — LITHIUM SERPL-SCNC: 0.6 MMOL/L (ref 0.6–1.2)

## 2024-01-31 PROCEDURE — 36415 COLL VENOUS BLD VENIPUNCTURE: CPT | Mod: HCNC | Performed by: NURSE PRACTITIONER

## 2024-01-31 PROCEDURE — 80178 ASSAY OF LITHIUM: CPT | Mod: HCNC | Performed by: NURSE PRACTITIONER

## 2024-01-31 PROCEDURE — 3079F DIAST BP 80-89 MM HG: CPT | Mod: HCNC,CPTII,S$GLB, | Performed by: INTERNAL MEDICINE

## 2024-01-31 PROCEDURE — 99214 OFFICE O/P EST MOD 30 MIN: CPT | Mod: HCNC,S$GLB,, | Performed by: INTERNAL MEDICINE

## 2024-01-31 PROCEDURE — 3077F SYST BP >= 140 MM HG: CPT | Mod: HCNC,CPTII,S$GLB, | Performed by: INTERNAL MEDICINE

## 2024-01-31 PROCEDURE — 99999 PR PBB SHADOW E&M-EST. PATIENT-LVL III: CPT | Mod: PBBFAC,HCNC,, | Performed by: INTERNAL MEDICINE

## 2024-01-31 PROCEDURE — 1159F MED LIST DOCD IN RCRD: CPT | Mod: HCNC,CPTII,S$GLB, | Performed by: INTERNAL MEDICINE

## 2024-01-31 PROCEDURE — 3008F BODY MASS INDEX DOCD: CPT | Mod: HCNC,CPTII,S$GLB, | Performed by: INTERNAL MEDICINE

## 2024-01-31 RX ORDER — FLUTICASONE FUROATE 100 UG/1
1 POWDER RESPIRATORY (INHALATION) DAILY
Qty: 30 EACH | Refills: 3 | Status: SHIPPED | OUTPATIENT
Start: 2024-01-31 | End: 2024-02-22

## 2024-01-31 NOTE — PATIENT INSTRUCTIONS
Sleep hygiene Instructions     Sleep only as much as you need to feel refreshed during the following day.  Restricting your time in bed helps consolidated and deepen your sleep.    Excessively long times in bed lead to fragmented and shallow sleep.    Get up at your regular time the next day, no matter how little you slept.  Get up the same time each day, 7 days a week.    A regular wake time in the morning leads to regular times of sleep onset and helps to set your biological clock.  Exercise regularly  Schedule exercise times so that they do not occur within 3 hours of when you intend to go to bed.  Exercise makes it easier to initiate sleep and deepen sleep.  Make sure your bedroom is comfortable and free from light and noise.  A comfortable, noise-free sleep environment will reduce the likelihood that you will wake up during the night.  Noise that does not awaken you may also disturb the quality of your sleep.  Carpeting, insulated curtains, and closing the door may help.  Make sure that your bedroom is at a comfortable temperature during the night.  Excessively war or cold sleep environment may disturb sleep.  Eat regular meals and do not go to bed hungry.  Hung may disturb sleep.  A light snack at bedtime (especially carbohydrates) may help sleep, but avoid greasy or heavy foods.  Avoid excessive liquids in the evening.  Reducing liquid intake will minimize the need for nighttime trips to the bathroom.  Cut down on all caffeine products.  Caffeinated beverages and foods (coffee, tea, cola, chocolate) can cause difficulty falling asleep, awakenings during the night, and shallow sleep.  Even caffeine early in the day can disrupt nighttime sleep.  Avoid alcohol, especially in the evening.   Although alcohol helps tense people fall asleep more easily, it causes awakenings later in the night.  Smoking may disturb sleep.  Nicotine is a stimulant.  Try not to smoke during the night when you have trouble  sleeping.  Dont take you problem to bed.  Plan some time earlier in the evening for working on your problems or planning the next days activities.    Worrying may interfere with initiating sleep and produce shallow sleep.  Do not try to fall asleep.  This will only makes the problem worse.  Instead, turn on the light, leave the bedroom, and do something different like reading a book.  Dont engage in stimulating activity.  Return to bed only when you are sleepy.  Put the clock under the bed or turn it so that you cant see it.  Clock watching may lead to frustration, anger, and worry which interfere with sleep.  Avoid naps.  Staying awake during the day helps you to fall asleep at night.    [employer] for more information

## 2024-01-31 NOTE — PROGRESS NOTES
Zaira Dowell  was seen as a follow up.    CHIEF COMPLAINT:    Chief Complaint   Patient presents with    Apnea       HISTORY OF PRESENT ILLNESS: Zaira Dowell is a 53 y.o. female is here for sleep evaluation.   Our first encounter was 4/20/18.  Patient was diagnosed with arturo 10/17 in [place], [place].  Patient was using cpap 7 cm H20.  Patient moved to [place] 11/17.  Patient is using cpap nightly without issue.  Sleep quality much improve with cpap.  With cpap, patient denied snoring.  No witnessed apnea.  Feeling rested upon awake.  No parasomnia.  No cataplexy.  No rls symptoms.    [place] Sleepiness Scale score during initial sleep evaluation was 18.  Today's ESS 14    Patient was switched from cpap 7 cm H20 to APAP during our initial visit.  Patient s/p bariatric surgery 2018 and lost about 80 lbs.  Patient have not been using apap mainly due aggravation.  Repeat hsat 12/6/21 with ahi of 1.  Patient gained back 25 lbs.  Did not get in lab psg.  Patient with replacement Dreamstation 2.  Sleep remained restless.  +snoring.  Sleep is very irregular.  Lots of pain in hip and back.      Patient contracted influenza 12/23.  Worsening of cough and congestion with flu.  Currently with albuterol.     SLEEP ROUTINE:  Activity the hour prior to sleep: tablet in bed    Bed partner:  frances  Time to bed:  2- 3 am  Lights off:  off  Sleep onset latency:  5 minutes        Disruptions or awakenings:    3-5 times     Wakeup time:     4-9 am   Perceived sleep quality:  tire      Daytime naps:      Rarely.    Weekend sleep routine:      same  Caffeine use: 5-6 diet Dr. Franks  exercise habit:   none      PAST MEDICAL HISTORY:    Active Ambulatory Problems     Diagnosis Date Noted    Fibromyalgia 12/14/2017    Pulmonary hypertension 12/14/2017    Anxiety 12/14/2017    Depression 12/14/2017    Bipolar 1 disorder 02/19/2018    History of tobacco abuse 07/17/2018    BMI 40.0-44.9, adult 07/17/2018    ARTURO (obstructive sleep  apnea) 2018    Trochanteric bursitis of both hips 01/10/2019    Trochanteric bursitis 2019    Pain of both hip joints 2019    GERD (gastroesophageal reflux disease) 2019    IIH (idiopathic intracranial hypertension) 2019    Mild intermittent asthma with acute exacerbation 2019    Ulnar neuropathy of left upper extremity 2020    Adrenal incidentaloma 2021    Nasal congestion 2021    Insomnia 2021    Decreased range of motion of lumbar spine 2022    Weakness of trunk musculature 2022    DDD (degenerative disc disease), lumbar 2023    Lumbar spondylosis 2023    Precordial pain 2023    Abnormal nuclear stress test 2023    Morbid obesity, unspecified obesity type 2024     Resolved Ambulatory Problems     Diagnosis Date Noted    Chronic idiopathic gout involving toe of left foot without tophus 2018    Poor posture 2020    Hip pain, bilateral 2020    Low back pain at multiple sites 2020    Antalgic gait 2020    Chronic obstructive pulmonary disease, unspecified COPD type 2022     Past Medical History:   Diagnosis Date    Arthritis     Asthma     Back pain     Bipolar I     BMI 50.0-59.9, adult     HFpEF     Obesity     Tobacco dependence     Urinary incontinence                 PAST SURGICAL HISTORY:    Past Surgical History:   Procedure Laterality Date    CARPAL TUNNEL RELEASE       SECTION  1993    CHOLECYSTECTOMY      COLONOSCOPY N/A 2022    Procedure: COLONOSCOPY;  Surgeon: Ernesto Auguste MD;  Location: [facility] ENDO;  Service: Endoscopy;  Laterality: N/A;  fully vaccinated-GT    EPIDURAL STEROID INJECTION Bilateral 2023    Procedure: Bilateral L3, L4, L5 Medial Branch Blocks #1;  Surgeon: Lito Jasso Jr., MD;  Location: [facility] ENDO;  Service: Pain Management;  Laterality: Bilateral;  @1230  No ATC or DM    EPIDURAL STEROID INJECTION Bilateral  08/02/2023    Procedure: Bilateral L3, L4, L5 Medial Branch Blocks #2;  Surgeon: Lito Jasso Jr., MD;  Location: [facility] ENDO;  Service: Pain Management;  Laterality: Bilateral;  @1000  No ATC or DM    EPIDURAL STEROID INJECTION Left 08/16/2023    Procedure: Left L3, L4, L5 Radiofrequency Thermocoagulation of Medial Branches;  Surgeon: Lito Jasso Jr., MD;  Location: [facility] ENDO;  Service: Pain Management;  Laterality: Left;  @0815  No ATC or DM    EPIDURAL STEROID INJECTION Right 08/30/2023    Procedure: Right L3, L4, L5 Radiofrequency Thermocoagulation of Medial Branches;  Surgeon: Lito Jasso Jr., MD;  Location: [facility] ENDO;  Service: Pain Management;  Laterality: Right;  @0930  No ATC or DM  Last 1&75    ESOPHAGOGASTRODUODENOSCOPY N/A 05/21/2019    Procedure: EGD (ESOPHAGOGASTRODUODENOSCOPY);  Surgeon: Michelle Mc MD;  Location: [facility];  Service: Endoscopy;  Laterality: N/A;    HYSTERECTOMY      partial     KNEE SURGERY      LAPAROSCOPIC SLEEVE GASTRECTOMY N/A 10/18/2019    Procedure: GASTRECTOMY, SLEEVE, LAPAROSCOPIC, with intraop EGD;  Surgeon: Ean Kohli MD;  Location: [facility] OR [address] [place] [address];  Service: General;  Laterality: N/A;    LEFT HEART CATHETERIZATION Left 11/7/2023    Procedure: Left heart cath;  Surgeon: Lito Colmenares MD;  Location: [facility] CATH LAB;  Service: Cardiology;  Laterality: Left;  730am start, R rad access  RN PRE OP 11/2/23    pinched nerve      SHOULDER SURGERY      TONSILLECTOMY      WRIST SURGERY      had ganlin cyst         FAMILY HISTORY:                Family History   Problem Relation Age of Onset    Diabetes Mother     Allegany's disease Mother     Hypertension Mother     Heart disease Father     Stroke Father     Mental illness Father         ptsd    Hypertension Father     Diabetes Father     Depression Father     Arthritis Father     No Known Problems Brother     Heart disease Maternal Grandmother     Depression Maternal Grandmother     COPD  Maternal Grandfather     Heart disease Maternal Grandfather     Stroke Maternal Grandfather     Kidney disease Paternal Grandmother     Heart disease Paternal Grandmother     Heart disease Paternal Grandfather     Early death Paternal Grandfather     Hypertension Son     Asthma Son     Rectal cancer Paternal Aunt     Asthma Maternal Aunt     Diabetes Maternal Aunt     Asthma Maternal Uncle        SOCIAL HISTORY:          Tobacco:   Social History     Tobacco Use   Smoking Status Former    Types: Vaping with nicotine   Smokeless Tobacco Never   Tobacco Comments    vaping started April 2020; QUIT cigarettes Sept 2019       alcohol use:    Social History     Substance and Sexual Activity   Alcohol Use No                 Occupation:  disable    ALLERGIES:    Review of patient's allergies indicates:   Allergen Reactions    Vicodin [hydrocodone-acetaminophen] Nausea And Vomiting       CURRENT MEDICATIONS:    Current Outpatient Medications   Medication Sig Dispense Refill    albuterol (PROVENTIL/VENTOLIN HFA) 90 mcg/actuation inhaler Inhale 2 puffs into the lungs every 4 (four) hours as needed for Wheezing or Shortness of Breath. Rescue 18 g 2    allopurinoL (ZYLOPRIM) 100 MG tablet Take 1 tablet (100 mg total) by mouth once daily. 90 tablet 1    amoxicillin-clavulanate 875-125mg (AUGMENTIN) 875-125 mg per tablet Take 1 tablet by mouth every 12 (twelve) hours. Take with food 20 tablet 0    b complex vitamins tablet Take 1 tablet by mouth once daily.      buPROPion (WELLBUTRIN SR) 100 MG TBSR 12 hr tablet Take 2 tabs q am, and 1 tab daily at noon 270 tablet 0    calcium citrate (CALCITRATE) 200 mg (950 mg) tablet Take 1 tablet by mouth 2 (two) times daily.      cetirizine (ZYRTEC) 10 MG tablet Take 1 tablet (10 mg total) by mouth once daily. 30 tablet 2    colchicine (COLCRYS) 0.6 mg tablet Take 1 tablet (0.6 mg total) by mouth daily as needed (gout flare). 30 tablet 0    cyanocobalamin 500 MCG tablet Take 500 mcg by mouth  once daily.      cyclobenzaprine (FLEXERIL) 5 MG tablet Take 1 tablet (5 mg total) by mouth 3 (three) times daily as needed for Muscle spasms. 60 tablet 6    ferrous fumarate/vit Bcomp,C (SUPER B COMPLEX ORAL) Take 1 capsule by mouth once daily.      fluticasone propionate (FLONASE) 50 mcg/actuation nasal spray 1 spray (50 mcg total) by Each Nostril route once daily. 9.9 mL 2    furosemide (LASIX) 20 MG tablet Take 1 tablet (20 mg total) by mouth daily as needed (leg swelling/fluid buildup). 60 tablet 0    lithium (ESKALITH) 300 MG capsule Take 1 capsule (300 mg total) by mouth 3 (three) times daily with meals. 270 capsule 0    [START ON 2/18/2024] LORazepam (ATIVAN) 0.5 MG tablet TAKE 2 TABLETS EVERY DAY AS DIRECTED 60 tablet 2    meclizine (ANTIVERT) 25 mg tablet Take 1 tablet (25 mg total) by mouth every 6 (six) hours as needed for Dizziness. 60 tablet 1    multivitamin capsule Take 1 capsule by mouth 2 (two) times daily.       omeprazole (PRILOSEC) 40 MG capsule TAKE 1 CAPSULE TWICE DAILY BEFORE MEALS 180 capsule 3    ondansetron (ZOFRAN-ODT) 8 MG TbDL Take 1 tablet (8 mg total) by mouth 2 (two) times daily as needed (nausea). 30 tablet 0    potassium chloride (KLOR-CON) 10 MEQ TbSR Take 10 mEq by mouth 2 (two) times daily.      pregabalin (LYRICA) 100 MG capsule Take 1 capsule (100 mg total) by mouth 2 (two) times daily. 60 capsule 5    promethazine (PHENERGAN) 12.5 MG Tab promethazine Take half tablet (oral) 2 times per day PRN - Nausea . Medication may cause drowsiness. [id number] tablet 2 times per day oral No set duration recorded No set duration amount recorded active 12.5 mg      promethazine-dextromethorphan (PROMETHAZINE-DM) 6.25-15 mg/5 mL Syrp Take 5 mLs by mouth nightly as needed (cough). 118 mL 0    risperiDONE (RISPERDAL) 0.5 MG Tab Take 1 tablet (0.5 mg total) by mouth 2 (two) times daily. 180 tablet 0    sertraline (ZOLOFT) 100 MG tablet Take 2 tablets (200 mg total) by mouth once daily. 180 tablet  "0    tolterodine (DETROL LA) 4 MG 24 hr capsule Take 1 capsule (4 mg total) by mouth once daily. 90 capsule 3    topiramate (TOPAMAX) 200 MG Tab Take 1 tablet (200 mg total) by mouth once daily. 90 tablet 0    fluticasone furoate (ARNUITY ELLIPTA) 100 mcg/actuation inhaler Inhale 1 puff into the lungs once daily. Controller 30 each 3     Current Facility-Administered Medications   Medication Dose Route Frequency Provider Last Rate Last Admin    sodium chloride 0.9% flush 10 mL  10 mL Intravenous PRN Lito Colmenares MD                      REVIEW OF SYSTEMS:     Sleep related symptoms as per HPI.  CONST:Denies weight gain; loss weight s/p bariatric surgery   HEENT: + sinus congestion  PULM: Denies dyspnea  CARD:  Denies palpitations   GI:  +intermittent acid reflux  : Denies polyuria  NEURO: + headaches that improve with cpap  PSYCH: Denies mood disturbance  HEME: Denies anemia   Otherwise, a balance of systems reviewed is negative.          PHYSICAL EXAM:  Vitals:    01/31/24 0956   BP: (!) 144/88   Pulse: 78   SpO2: 99%   Weight: 132.2 kg (291 lb 5.4 oz)   Height: 5' 6" (1.676 m)   PainSc:   4   PainLoc: Generalized       Body mass index is 47.02 kg/m².     GENERAL: Normal development, well groomed  HEENT:  Conjunctivae are non-erythematous; Pupils equal, round, and reactive to light; Nose is symmetrical; Nasal mucosa is congested; Septum is midline; Inferior turbinates are normal; Nasal airflow is mildly congested; Posterior pharynx is pink; Modified Mallampati: 4; Posterior palate is normal; Tonsils +1; Uvula is normal and pink;Tongue is normal; Dentition is fair; No TMJ tenderness; Jaw opening and protrusion without click and without discomfort.  NECK: Supple. Neck circumference is 17.5 inches. No thyromegaly. No palpable nodes.     SKIN: On face and neck: No abrasions, no rashes, no lesions.  No subcutaneous nodules are palpable.  RESPIRATORY: mild expiratory wheeze.  Normal chest expansion and " non-labored breathing at rest.  CARDIOVASCULAR: Normal S1, S2.  No murmurs, gallops or rubs. No carotid bruits bilaterally.  EXTREMITIES: No edema. No clubbing. No cyanosis. Station normal. Gait normal.        NEURO/PSYCH: Oriented to time, place and person. Normal attention span and concentration. Affect is full. Mood is normal.                                              DATA prior sleep study not available.    PFT 7/11/19 Ratio of 80%; FVC 3.55 L (91%); FEV1 2.85 L (92%); TLC 4.63 L (85%); dlco 25 (93%)       Lab Results   Component Value Date    TSH 2.496 10/30/2023     ASSESSMENT  Problem List Items Addressed This Visit       Insomnia - Primary    Overview     maintenance is the issue.   Irregular sleep/wake time.    Sleep hygiene d/w patient  Will address after sleep study         Mild intermittent asthma with acute exacerbation    Overview     Lots nasal congestion on exam.  +intermittent wheezing by history.    Will restart ics         Relevant Medications    fluticasone furoate (ARNUITY ELLIPTA) 100 mcg/actuation inhaler    Morbid obesity, unspecified obesity type    Overview     S/p bariatric surgery with weight loss  Gaining some weight back  Will be more mindful in dietary intake  Follow up with pcp         SHARON (obstructive sleep apnea)    Overview     ahi unknown.  Original cpap order by Dr. Sanderson at [place].    S/p bariatric surgery 10/2019.  80 lbs weigh loss.    Hsat 12/6/21 with ahi of 1  Currently with Dreamstation 2 apap (5-20)  Given high pretest, will set up in lab psg  If sharon is confirmed, will prescribed supplies and recommend resumption of apap.           Relevant Orders    Polysomnogram (CPAP will be added if patient meets diagnostic criteria.)        Education: During our discussion today, we talked about the etiology of obstructive sleep apnea as well as the potential ramifications of untreated sleep apnea, which could include daytime sleepiness, hypertension, heart disease and/or  stroke.     Precautions: The patient was advised to abstain from driving should they feel sleepy or drowsy.     Patient will No follow-ups on file. with md/np.    30 minutes of total time spent on the encounter, which includes face to face time and non-face to face time preparing to see the patient (eg, review of tests), Obtaining and/or reviewing separately obtained history, documenting clinical information in the electronic or other health record, independently interpreting results (not separately reported) and communicating results to the patient/family/caregiver, or Care coordination (not separately reported).

## 2024-02-03 ENCOUNTER — PATIENT MESSAGE (OUTPATIENT)
Dept: PULMONOLOGY | Facility: CLINIC | Age: 54
End: 2024-02-03
Payer: MEDICARE

## 2024-02-14 ENCOUNTER — PATIENT MESSAGE (OUTPATIENT)
Dept: BARIATRICS | Facility: CLINIC | Age: 54
End: 2024-02-14
Payer: MEDICARE

## 2024-02-14 ENCOUNTER — HOSPITAL ENCOUNTER (OUTPATIENT)
Dept: SLEEP MEDICINE | Facility: HOSPITAL | Age: 54
Discharge: HOME | End: 2024-02-14
Attending: INTERNAL MEDICINE
Payer: MEDICARE

## 2024-02-14 DIAGNOSIS — G47.33 OSA (OBSTRUCTIVE SLEEP APNEA): ICD-10-CM

## 2024-02-14 PROCEDURE — 95810 POLYSOM 6/> YRS 4/> PARAM: CPT | Mod: 26,,, | Performed by: INTERNAL MEDICINE

## 2024-02-14 PROCEDURE — 95810 POLYSOM 6/> YRS 4/> PARAM: CPT

## 2024-02-15 NOTE — PROGRESS NOTES
A Diagnostic study was performed on Zaira Dowell. The entire procedure was explained, including Bio calibration procedure, patient was informed that there may be a need to enter the room during the night to fix lead or make adjustments to the equipment. Questions were answered prior to start of study. She was given instructions on how to call out for help including how to use the nurse call unit in the bathroom. Patient was given [employer] phone number to call if patient needed tech for anything, in case tech was out of tech room.  Patient education was performed. This includes the possible use of CPAP machine and different CPAP masks. She was given the after visit summary.   The lowest oxygen saturation observed during sleep was 72%   She did not meet criteria for a split night study due to insufficient amount of events during the 1st part of the study  The EKG appeared to be NSR   Supine Rem and soft to moderate snoring was observed.

## 2024-02-20 ENCOUNTER — PATIENT MESSAGE (OUTPATIENT)
Dept: BARIATRICS | Facility: CLINIC | Age: 54
End: 2024-02-20
Payer: MEDICARE

## 2024-02-22 ENCOUNTER — OFFICE VISIT (OUTPATIENT)
Dept: CARDIOLOGY | Facility: CLINIC | Age: 54
End: 2024-02-22
Payer: MEDICARE

## 2024-02-22 ENCOUNTER — PATIENT MESSAGE (OUTPATIENT)
Dept: PULMONOLOGY | Facility: CLINIC | Age: 54
End: 2024-02-22
Payer: MEDICARE

## 2024-02-22 VITALS
SYSTOLIC BLOOD PRESSURE: 118 MMHG | HEIGHT: 66 IN | RESPIRATION RATE: 18 BRPM | OXYGEN SATURATION: 99 % | BODY MASS INDEX: 46.98 KG/M2 | DIASTOLIC BLOOD PRESSURE: 76 MMHG | WEIGHT: 292.31 LBS | HEART RATE: 69 BPM

## 2024-02-22 DIAGNOSIS — E66.01 MORBID OBESITY, UNSPECIFIED OBESITY TYPE: ICD-10-CM

## 2024-02-22 DIAGNOSIS — F17.200 VAPING NICOTINE DEPENDENCE, NON-TOBACCO PRODUCT: ICD-10-CM

## 2024-02-22 DIAGNOSIS — R94.39 ABNORMAL NUCLEAR STRESS TEST: Primary | ICD-10-CM

## 2024-02-22 DIAGNOSIS — I87.2 VENOUS INSUFFICIENCY OF LEFT LEG: ICD-10-CM

## 2024-02-22 DIAGNOSIS — G47.33 OSA (OBSTRUCTIVE SLEEP APNEA): Primary | ICD-10-CM

## 2024-02-22 DIAGNOSIS — G47.33 OSA (OBSTRUCTIVE SLEEP APNEA): ICD-10-CM

## 2024-02-22 PROBLEM — I27.20 PULMONARY HYPERTENSION: Status: RESOLVED | Noted: 2017-12-14 | Resolved: 2024-02-22

## 2024-02-22 PROCEDURE — 99999 PR PBB SHADOW E&M-EST. PATIENT-LVL V: CPT | Mod: PBBFAC,,, | Performed by: INTERNAL MEDICINE

## 2024-02-22 PROCEDURE — 99214 OFFICE O/P EST MOD 30 MIN: CPT | Mod: S$GLB,,, | Performed by: INTERNAL MEDICINE

## 2024-02-22 NOTE — PROGRESS NOTES
CARDIOVASCULAR PROGRESS NOTE    REASON FOR CONSULT:   Zaira Dowell is a 53 y.o. female who presents for f/u CP, cath.    PCP: Suhas  HISTORY OF PRESENT ILLNESS:   The patient returns for follow up of her heart catheterization.  This revealed normal coronaries.  She has had no further chest discomfort.  She denies any shortness breath, palpitations, or syncope.  There has been no PND, orthopnea, melena, or hematuria.  She denies any claudication symptoms.  She does describe some left lower extremity edema and has recently been tested for venous insufficiency noting left GSV reflux.  I have recommended compression therapy, and if this is ineffective, referral to vascular surgery.    CARDIOVASCULAR HISTORY:   SHARON on CPAP    Cath 2023: normal cors.    PAST MEDICAL HISTORY:     Past Medical History:   Diagnosis Date    Anxiety     Arthritis     Asthma     Back pain     Bipolar I     BMI 50.0-59.9, adult     Chronic obstructive pulmonary disease, unspecified COPD type 2022    Depression     Fibromyalgia     GERD (gastroesophageal reflux disease)     HFpEF     Insomnia 2021    Obesity     SHARON (obstructive sleep apnea)     Tobacco dependence     Urinary incontinence        PAST SURGICAL HISTORY:     Past Surgical History:   Procedure Laterality Date    CARPAL TUNNEL RELEASE       SECTION  1993    CHOLECYSTECTOMY      COLONOSCOPY N/A 2022    Procedure: COLONOSCOPY;  Surgeon: Ernesto Auguste MD;  Location: [facility] ENDO;  Service: Endoscopy;  Laterality: N/A;  fully vaccinated-GT    EPIDURAL STEROID INJECTION Bilateral 2023    Procedure: Bilateral L3, L4, L5 Medial Branch Blocks #1;  Surgeon: Lito Jasso Jr., MD;  Location: [facility] ENDO;  Service: Pain Management;  Laterality: Bilateral;  @1230  No ATC or DM    EPIDURAL STEROID INJECTION Bilateral 2023    Procedure: Bilateral L3, L4, L5 Medial Branch Blocks #2;  Surgeon: Lito Jasso Jr., MD;  Location: [facility]  ENDO;  Service: Pain Management;  Laterality: Bilateral;  @1000  No ATC or DM    EPIDURAL STEROID INJECTION Left 08/16/2023    Procedure: Left L3, L4, L5 Radiofrequency Thermocoagulation of Medial Branches;  Surgeon: Lito Jasso Jr., MD;  Location: [facility] ENDO;  Service: Pain Management;  Laterality: Left;  @0815  No ATC or DM    EPIDURAL STEROID INJECTION Right 08/30/2023    Procedure: Right L3, L4, L5 Radiofrequency Thermocoagulation of Medial Branches;  Surgeon: Lito Jasso Jr., MD;  Location: [facility] ENDO;  Service: Pain Management;  Laterality: Right;  @0930  No ATC or DM  Last 1&75    ESOPHAGOGASTRODUODENOSCOPY N/A 05/21/2019    Procedure: EGD (ESOPHAGOGASTRODUODENOSCOPY);  Surgeon: Michelle Mc MD;  Location: [facility];  Service: Endoscopy;  Laterality: N/A;    HYSTERECTOMY      partial     KNEE SURGERY      LAPAROSCOPIC SLEEVE GASTRECTOMY N/A 10/18/2019    Procedure: GASTRECTOMY, SLEEVE, LAPAROSCOPIC, with intraop EGD;  Surgeon: Ean Kohli MD;  Location: [facility] OR [address] [place] [address];  Service: General;  Laterality: N/A;    LEFT HEART CATHETERIZATION Left 11/7/2023    Procedure: Left heart cath;  Surgeon: Lito Colmenares MD;  Location: [facility] CATH LAB;  Service: Cardiology;  Laterality: Left;  730am start, R rad access  RN PRE OP 11/2/23    pinched nerve      SHOULDER SURGERY      TONSILLECTOMY      WRIST SURGERY      had ganlin cyst       ALLERGIES AND MEDICATION:     Review of patient's allergies indicates:   Allergen Reactions    Hydrocodone-acetaminophen Nausea And Vomiting        Medication List            Accurate as of February 22, 2024 11:14 AM. If you have any questions, ask your nurse or doctor.                CONTINUE taking these medications      albuterol 90 mcg/actuation inhaler  Commonly known as: PROVENTIL/VENTOLIN HFA  Inhale 2 puffs into the lungs every 4 (four) hours as needed for Wheezing or Shortness of Breath. Rescue     allopurinoL 100 MG tablet  Commonly known as:  ZYLOPRIM  Take 1 tablet (100 mg total) by mouth once daily.     b complex vitamins tablet     buPROPion 100 MG TBSR 12 hr tablet  Commonly known as: WELLBUTRIN SR  Take 2 tabs q am, and 1 tab daily at noon     calcium citrate 200 mg (950 mg) tablet  Commonly known as: CALCITRATE     cetirizine 10 MG tablet  Commonly known as: ZYRTEC  Take 1 tablet (10 mg total) by mouth once daily.     colchicine 0.6 mg tablet  Commonly known as: COLCRYS  Take 1 tablet (0.6 mg total) by mouth daily as needed (gout flare).     cyanocobalamin 500 MCG tablet     cyclobenzaprine 5 MG tablet  Commonly known as: FLEXERIL  Take 1 tablet (5 mg total) by mouth 3 (three) times daily as needed for Muscle spasms.     furosemide 20 MG tablet  Commonly known as: LASIX  Take 1 tablet (20 mg total) by mouth daily as needed (leg swelling/fluid buildup).     lithium 300 MG capsule  Commonly known as: ESKALITH  Take 1 capsule (300 mg total) by mouth 3 (three) times daily with meals.     LORazepam 0.5 MG tablet  Commonly known as: ATIVAN  TAKE 2 TABLETS EVERY DAY AS DIRECTED     meclizine 25 mg tablet  Commonly known as: ANTIVERT  Take 1 tablet (25 mg total) by mouth every 6 (six) hours as needed for Dizziness.     multivitamin capsule     omeprazole 40 MG capsule  Commonly known as: PRILOSEC  TAKE 1 CAPSULE TWICE DAILY BEFORE MEALS     ondansetron 8 MG Tbdl  Commonly known as: ZOFRAN-ODT  Take 1 tablet (8 mg total) by mouth 2 (two) times daily as needed (nausea).     potassium chloride 10 MEQ Tbsr  Commonly known as: KLOR-CON     pregabalin 100 MG capsule  Commonly known as: LYRICA  Take 1 capsule (100 mg total) by mouth 2 (two) times daily.     promethazine 12.5 MG Tab  Commonly known as: PHENERGAN     promethazine-dextromethorphan 6.25-15 mg/5 mL Syrp  Commonly known as: PROMETHAZINE-DM  Take 5 mLs by mouth nightly as needed (cough).     risperiDONE 0.5 MG Tab  Commonly known as: RISPERDAL  Take 1 tablet (0.5 mg total) by mouth 2 (two) times  daily.     sertraline 100 MG tablet  Commonly known as: ZOLOFT  Take 2 tablets (200 mg total) by mouth once daily.     SUPER B COMPLEX ORAL     tolterodine 4 MG 24 hr capsule  Commonly known as: DETROL LA  Take 1 capsule (4 mg total) by mouth once daily.     topiramate 200 MG Tab  Commonly known as: TOPAMAX  Take 1 tablet (200 mg total) by mouth once daily.            STOP taking these medications      amoxicillin-clavulanate 875-125mg 875-125 mg per tablet  Commonly known as: AUGMENTIN  Stopped by: Lito Colmenares MD     ARNUITY ELLIPTA 100 mcg/actuation inhaler  Generic drug: fluticasone furoate  Stopped by: Lito Colmenares MD     fluticasone propionate 50 mcg/actuation nasal spray  Commonly known as: FLONASE  Stopped by: Lito Colmenares MD              SOCIAL HISTORY:     Social History     Socioeconomic History    Marital status:    Tobacco Use    Smoking status: Former     Types: Vaping with nicotine    Smokeless tobacco: Never    Tobacco comments:     vaping started April 2020; QUIT cigarettes Sept 2019   Substance and Sexual Activity    Alcohol use: No    Drug use: No    Sexual activity: Yes     Partners: Male     Birth control/protection: None     Social Determinants of Health     Financial Resource Strain: Low Risk  (1/30/2024)    Overall Financial Resource Strain (CARDIA)     Difficulty of Paying Living Expenses: Not very hard   Food Insecurity: No Food Insecurity (1/30/2024)    Hunger Vital Sign     Worried About Running Out of Food in the Last Year: Never true     Ran Out of Food in the Last Year: Never true   Transportation Needs: No Transportation Needs (1/30/2024)    PRAPARE - Transportation     Lack of Transportation (Medical): No     Lack of Transportation (Non-Medical): No   Physical Activity: Inactive (1/30/2024)    Exercise Vital Sign     Days of Exercise per Week: 0 days     Minutes of Exercise per Session: 0 min   Stress: Stress Concern Present (1/30/2024)    [employer] - Occupational Stress Questionnaire     Feeling of Stress : Very much   Social Connections: Unknown (1/30/2024)    Social Connection and Isolation Panel [NHANES]     Frequency of Communication with Friends and Family: More than three times a week     Frequency of Social Gatherings with Friends and Family: Once a week     Active Member of Clubs or Organizations: No     Attends Club or Organization Meetings: Never     Marital Status:    Housing Stability: Low Risk  (1/30/2024)    Housing Stability Vital Sign     Unable to Pay for Housing in the Last Year: No     Number of Places Lived in the Last Year: 1     Unstable Housing in the Last Year: No       FAMILY HISTORY:     Family History   Problem Relation Age of Onset    Diabetes Mother     Crockett's disease Mother     Hypertension Mother     Heart disease Father     Stroke Father     Mental illness Father         ptsd    Hypertension Father     Diabetes Father     Depression Father     Arthritis Father     No Known Problems Brother     Heart disease Maternal Grandmother     Depression Maternal Grandmother     COPD Maternal Grandfather     Heart disease Maternal Grandfather     Stroke Maternal Grandfather     Kidney disease Paternal Grandmother     Heart disease Paternal Grandmother     Heart disease Paternal Grandfather     Early death Paternal Grandfather     Hypertension Son     Asthma Son     Rectal cancer Paternal Aunt     Asthma Maternal Aunt     Diabetes Maternal Aunt     Asthma Maternal Uncle        REVIEW OF SYSTEMS:   Review of Systems   Constitutional:  Negative for chills, diaphoresis and fever.   HENT:  Negative for nosebleeds.    Eyes:  Negative for blurred vision, double vision and photophobia.   Respiratory:  Negative for hemoptysis, shortness of breath and wheezing.    Cardiovascular:  Positive for leg swelling (L>R). Negative for chest pain, palpitations, orthopnea, claudication and PND.   Gastrointestinal:  Negative for  "abdominal pain, blood in stool, heartburn, melena, nausea and vomiting.   Genitourinary:  Negative for flank pain and hematuria.   Musculoskeletal:  Negative for falls, myalgias and neck pain.   Skin:  Negative for rash.   Neurological:  Negative for dizziness, seizures, loss of consciousness, weakness and headaches.   Endo/Heme/Allergies:  Negative for polydipsia. Does not bruise/bleed easily.   Psychiatric/Behavioral:  Negative for depression and memory loss. The patient is not nervous/anxious.        PHYSICAL EXAM:     Vitals:    02/22/24 1102   BP: 118/76   Pulse: 69   Resp: 18    Body mass index is 47.18 kg/m².  Weight: 132.6 kg (292 lb 5.3 oz)   Height: 5' 6" (167.6 cm)     Physical Exam  Vitals reviewed.   Constitutional:       General: She is not in acute distress.     Appearance: She is well-developed. She is obese. She is not ill-appearing, toxic-appearing or diaphoretic.   HENT:      Head: Normocephalic and atraumatic.   Eyes:      General: No scleral icterus.     Extraocular Movements: Extraocular movements intact.      Conjunctiva/sclera: Conjunctivae normal.      Pupils: Pupils are equal, round, and reactive to light.   Neck:      Thyroid: No thyromegaly.      Vascular: Normal carotid pulses. No carotid bruit or JVD.      Trachea: Trachea normal.   Cardiovascular:      Rate and Rhythm: Normal rate and regular rhythm.      Pulses:           Carotid pulses are 2+ on the right side and 2+ on the left side.       Radial pulses are 2+ on the right side.      Heart sounds: S1 normal and S2 normal. No murmur heard.     No friction rub. No gallop.      Comments: R rad access site c/d/i  Pulmonary:      Effort: Pulmonary effort is normal. No respiratory distress.      Breath sounds: Normal breath sounds. No stridor. No wheezing, rhonchi or rales.   Chest:      Chest wall: No tenderness.   Abdominal:      General: There is no distension.      Palpations: Abdomen is soft.   Musculoskeletal:         General: No " swelling or tenderness. Normal range of motion.      Cervical back: Normal range of motion and neck supple. No edema or rigidity.      Right lower leg: No edema.      Left lower leg: No edema.   Feet:      Right foot:      Skin integrity: No ulcer.      Left foot:      Skin integrity: No ulcer.   Skin:     General: Skin is warm and dry.      Coloration: Skin is not jaundiced.   Neurological:      General: No focal deficit present.      Mental Status: She is alert and oriented to person, place, and time.      Cranial Nerves: No cranial nerve deficit.   Psychiatric:         Mood and Affect: Mood normal.         Speech: Speech normal.         Behavior: Behavior normal. Behavior is cooperative.         DATA:   EKG: (personally reviewed tracing)  10/23/23 SR 57, low volt, PRWP    Laboratory:  CBC:  Recent Labs   Lab 09/18/23 2020 09/19/23  1707 10/30/23  0833   WBC 8.51 7.26 7.12   Hemoglobin 13.6 13.8 13.0   Hematocrit 41.8 42.9 41.9   Platelets 307 309 287       CHEMISTRIES:  Recent Labs   Lab 03/09/21  0911 06/10/21  0858 10/18/21  1037 03/29/22  0859 05/11/22  1044 11/04/22  0950 04/02/23  1116 06/27/23  1055 09/19/23  1707 10/30/23  0833   Glucose 92 88 79 94 80 87 88 88 80 86   Sodium 139 140 141 138 140 139 139 142 139 140   Potassium 4.2 4.0 4.8 3.8 3.8 4.1 4.2 4.6 4.0 3.8   BUN 9 11 11 13 16 11 16 14 12 10   Creatinine 0.7 0.8 0.8 0.9 0.8 0.8 0.8 0.8 0.7 0.9   eGFR if non African American >60 >60.0 >60 >60 >60.0  --   --   --   --   --    eGFR  --   --   --   --   --  >60 >60 >60 >60.0 >60   Calcium 9.1 9.3 9.9 9.2 9.2 9.3 9.4 9.7 9.5 9.2       CARDIAC BIOMARKERS:  Recent Labs   Lab 09/18/23  1946 09/19/23 1707   Troponin I <0.006 <0.006       COAGS:  Recent Labs   Lab 11/02/23  1320   INR 0.9       LIPIDS/LFTS:  Recent Labs   Lab 03/29/22  0859 05/11/22  1044 11/04/22  0950 04/02/23  1116 09/19/23  1707 10/30/23  0833   Cholesterol 154  --  183  --   --  172   Triglycerides 87  --  77  --   --  87   HDL 58   --  75  --   --  73   LDL Cholesterol 78.6  --  92.6  --   --  81.6   Non-HDL Cholesterol 96  --  108  --   --  99   AST 18   < > 13 14 15 19   ALT 77 H   < > 18 18 25 27    < > = values in this interval not displayed.     Lab Results   Component Value Date    TSH 2.496 10/30/2023         The 10-year ASCVD risk score (Jd MILNER, et al., 2019) is: 0.8%    Values used to calculate the score:      Age: 53 years      Sex: Female      Is Non- : No      Diabetic: No      Tobacco smoker: No      Systolic Blood Pressure: 118 mmHg      Is BP treated: No      HDL Cholesterol: 73 mg/dL      Total Cholesterol: 172 mg/dL      Cardiovascular Testing:  LEVR 1/11/24  1. There is evidence of hemodynamically significant venous reflux at the left greater saphenous vein.  2. There is no evidence of bilateral lower extremity deep venous thrombosis.    Cath 11/7/23  LVEDP: 15 mmHg  LVEF: 60% by echo  Dominance: Right  LM: normal  LAD: normal  LCx: normal  RCA: normal  Hemostasis:  R Radial band  Impression:  CP with abnl MPi suggesting LAD territory ischemia.  Normal cors.  Normal LV fxn by echo  R rad vasband for hemostasis  Plan:  Cont med rx  Stop ASA/Plavix  Home today  Follow up with Dr. Colmenares as planned.  Outpat GI eval if sxs persist.    L MPI 10/9/23     Abnormal myocardial perfusion scan.    There is a moderate intensity, small to moderate sized, mostly reversible perfusion abnormality with some fixed areas in the mid anterior wall(s).    There are no other significant perfusion abnormalities.    The gated perfusion images showed an ejection fraction of 65% at rest.    There is normal wall motion at rest and post stress.    The ECG portion of the study is negative for ischemia.    The patient reported no chest pain during the stress test.    There were no arrhythmias during stress.    Echo 10/9/23    Left Ventricle: The left ventricle is normal in size. Normal wall thickness. Normal wall motion. There  is normal systolic function with a visually estimated ejection fraction of 60 - 65%. There is normal diastolic function.    Right Ventricle: Normal right ventricular cavity size. Wall thickness is normal. Right ventricle wall motion  is normal. Systolic function is normal.    Pulmonary Artery: The estimated pulmonary artery systolic pressure is 17 mmHg.    IVC/SVC: Normal venous pressure at 3 mmHg.    Ex OLIVER 6/6/19  Normal resting OLIVER bilaterally.  Normal exercise OLIVER bilaterally (but with drop in bilateral OLIVER suggesting possible occult bilat LE PAD).  R ankle PVR waveform abnormal suggesting possible RLE tibial PAD.  Otherwise normal PVR waveforms.    ASSESSMENT:   # CP, MPI abnl suggesting LAD ischemia.  HR/BP too low for anitinaginals.  Cath 11/2023 with normal cors.  # sleep disturbance with prior hx of SHARON, not on CPAP.  Sleep study recently completed.  # Tob abuse (vaping)  # BMI 47  # CVI (LGSV on US 1/2024)     PLAN:   Cont med rx  Diet/exercise/weight loss  Compression rx for CVI, vasc surg as contingency  RTC prn      Lito Colmenares MD, FACC

## 2024-02-23 NOTE — PROCEDURES
"Dear Provider,     You have ordered sleep LAB services to perform the sleep study for Zaira Dowell.  The sleep study that you ordered is complete.      Please find Sleep Study result in "Chart Review" under the "Media tab."      As the ordering provider, you are responsible for reviewing the results and implementing a treatment plan with your patient.    If you need a Sleep Medicine provider to explain the sleep study findings and arrange treatment for the patient, please refer patient for consultation to our Sleep Clinic via [facility] with Ambulatory Consult Sleep.    To do that please place an order for an  "Ambulatory Consult Sleep" - it will go to our clinic work queue for our Medical Assistant to contact the patient for an appointment.     For any questions, please contact our clinic staff at [phone] to talk to clinical staff.   "

## 2024-02-28 ENCOUNTER — OFFICE VISIT (OUTPATIENT)
Dept: ENDOCRINOLOGY | Facility: CLINIC | Age: 54
End: 2024-02-28
Payer: MEDICARE

## 2024-02-28 VITALS
BODY MASS INDEX: 47.52 KG/M2 | WEIGHT: 293 LBS | HEART RATE: 78 BPM | DIASTOLIC BLOOD PRESSURE: 80 MMHG | SYSTOLIC BLOOD PRESSURE: 110 MMHG

## 2024-02-28 DIAGNOSIS — F31.9 BIPOLAR 1 DISORDER: ICD-10-CM

## 2024-02-28 DIAGNOSIS — E27.8 ADRENAL INCIDENTALOMA: Primary | ICD-10-CM

## 2024-02-28 DIAGNOSIS — G47.33 OSA (OBSTRUCTIVE SLEEP APNEA): ICD-10-CM

## 2024-02-28 DIAGNOSIS — G93.2 IIH (IDIOPATHIC INTRACRANIAL HYPERTENSION): ICD-10-CM

## 2024-02-28 DIAGNOSIS — M79.7 FIBROMYALGIA: ICD-10-CM

## 2024-02-28 DIAGNOSIS — Z87.891 HISTORY OF TOBACCO ABUSE: ICD-10-CM

## 2024-02-28 PROCEDURE — 99999 PR PBB SHADOW E&M-EST. PATIENT-LVL IV: CPT | Mod: PBBFAC,,, | Performed by: HOSPITALIST

## 2024-02-28 PROCEDURE — 99214 OFFICE O/P EST MOD 30 MIN: CPT | Mod: S$GLB,,, | Performed by: HOSPITALIST

## 2024-02-28 RX ORDER — DEXAMETHASONE 1 MG/1
1 TABLET ORAL NIGHTLY
Qty: 1 TABLET | Refills: 0 | Status: SHIPPED | OUTPATIENT
Start: 2024-02-28 | End: 2024-03-29

## 2024-02-28 NOTE — ASSESSMENT & PLAN NOTE
- Body mass index is 47.52 kg/m².  - weight gain noted.  Per patient dietary indiscretion, drinking 3 cases of diet Dr. Pepper in a week.   - Reportedly have drank 5 can of diet Dr. Pepper today

## 2024-02-28 NOTE — ASSESSMENT & PLAN NOTE
- chronic longstanding left Adrenal incidentaloma- reviewed incidence, role of adrenal glands in hormonal control discussed with patient  - last CT done 2021:  2.1 cm well-circumscribed left adrenal nodule, pre contrast attenuation values of less than 10 Hounsfield units.  Right adrenal gland is normal.  - here for follow-up yearly  - 2021 and 2022 hormonal workup has been negative.   DST within normal range, normal metanephrine/normetanephrine, normal renin/aldosterone ratio  - will repeat lab work for 2024.  If normal, no need to follow up with endocrinology as chance of conversion to productive adenoma very low

## 2024-02-28 NOTE — PROGRESS NOTES
Subjective:     Patient ID: Zaira Dowell is a 53 y.o. female presented to Endocrinology clinic on 2/28/2024.  Chief Complaint:  adrenal incidentaloma    History of Present Illness: Zaira Dowell is a 53 y.o. female with left adrenal incidentaloma  Significant past medical history:  Bipolar, obesity      Interval history:  Patient is here for follow-up of left adrenal incidentaloma.  Doing well.  No other issues noted on recent lab work.  Here for yearly follow-up  Mood stable  reportedly has fibromyalgia seen Rheumatology soon  Noted current weight in clinic 294  Per patient dietary indiscretion, drinking 3 cases of diet Dr. Pepper in a week. Reportedly have drank 5 can of diet Dr. Pepper today  Also vapes  Also has lab work coming up to monitor for gout      1) Left adrenal mass, incidental finding   - She was found to have adrenal nodule on imaging performed for evaluation of hematuria  - CT abdomen done on 2/1/2021: There is a 2.1 cm well-circumscribed left adrenal nodule that demonstrates pre contrast attenuation values of less than 10 - Hounsfield units is consistent with an adenoma.  Right adrenal gland is normal.  - Previous workup done 03/2021  Normal DST  Normal metanephrine/normetanephrine  Normal renin/aldosterone ratio  - Pt reports no abdominal discomfort  - History of hypertension, hypertensive emergency   - Pt denies history of paroxysmal symptoms such as syncope, pallor or dizziness  - No palpitations   - NO history of diabetes or hyperglycemia   - No easy bruisability or abnormal new stretch marks   - Denies weight gain over time   - Denies cancer history   - History of obesity    Adrenal lesion imaging characteristics:    Left adrenal nodule mass 2.1 cm well circumscribed, Smooth, heterogenous, no calcifications, houndsfield units <10     Lab Results   Component Value Date    METANEPHRINE 61 03/09/2021    LABCORT <1.00 (L) 04/12/2022    LABCORT <1.00 (L) 03/09/2021    ACTH 14 04/12/2022     "ACTH 11 03/09/2021    DHEASO4 59.1 03/09/2021     Lab Results   Component Value Date    ALDOSTERONE 11.1 03/09/2021       No results found for: "DOPAMINEPLAS", "LABEPIN", "LABNORE", "CATECHOLAMIN"  No results found for: "UTOTVOL", "UCD", "OYTIQP39KQE", "[id number]", "[id number]"         Reviewed past surgical, medical, family, social history and updated as appropriate.  Review of Systems: See above    Objective:   /80   Pulse 78   Wt 133.5 kg (294 lb 6.4 oz)   LMP  (LMP Unknown)   BMI 47.52 kg/m²     Body mass index is 47.52 kg/m².  Vital signs reviewed    Physical Exam  Vitals and nursing note reviewed.   Constitutional:       General: She is not in acute distress.     Appearance: Normal appearance. She is well-developed. She is obese. She is not toxic-appearing.   Neck:      Thyroid: No thyromegaly.   Pulmonary:      Effort: Pulmonary effort is normal. No respiratory distress.   Musculoskeletal:         General: No deformity. Normal range of motion.   Skin:     Findings: No bruising.   Neurological:      Mental Status: She is alert and oriented to person, place, and time.   Psychiatric:         Mood and Affect: Mood normal.       Lab Reviewed:  See results in subjective  Lab Results   Component Value Date    HGBA1C 5.0 10/30/2023     Lab Results   Component Value Date    CHOL 172 10/30/2023    HDL 73 10/30/2023    LDLCALC 81.6 10/30/2023    TRIG 87 10/30/2023    CHOLHDL 42.4 10/30/2023     Lab Results   Component Value Date     10/30/2023    K 3.8 10/30/2023     (H) 10/30/2023    CO2 21 (L) 10/30/2023    GLU 86 10/30/2023    BUN 10 10/30/2023    CREATININE 0.9 10/30/2023    CALCIUM 9.2 10/30/2023    PHOS 2.3 (L) 10/19/2019    PROT 7.0 10/30/2023    ALBUMIN 3.6 10/30/2023    BILITOT 0.4 10/30/2023    ALKPHOS 69 10/30/2023    AST 19 10/30/2023    ALT 27 10/30/2023    ANIONGAP 7 (L) 10/30/2023    ESTGFRAFRICA >60.0 05/11/2022    EGFRNONAA >60.0 05/11/2022    TSH 2.496 10/30/2023    " [id number] 30 10/30/2023     Assessment     1. Adrenal incidentaloma  Ambulatory referral/consult to Endocrinology    Cortisol, 8AM    ACTH    Metanephrines, Plasma Free    Renin    Aldosterone    dexAMETHasone (DECADRON) 1 MG Tab    HEMOGLOBIN A1C      2. IIH (idiopathic intracranial hypertension)        3. Fibromyalgia        4. Bipolar 1 disorder        5. History of tobacco abuse        6. BMI 40.0-44.9, adult        7. SHARON (obstructive sleep apnea)           Plan     Adrenal incidentaloma  - chronic longstanding left Adrenal incidentaloma- reviewed incidence, role of adrenal glands in hormonal control discussed with patient  - last CT done 2021:  2.1 cm well-circumscribed left adrenal nodule, pre contrast attenuation values of less than 10 Hounsfield units.  Right adrenal gland is normal.  - here for follow-up yearly  - 2021 and 2022 hormonal workup has been negative.   DST within normal range, normal metanephrine/normetanephrine, normal renin/aldosterone ratio  - will repeat lab work for 2024.  If normal, no need to follow up with endocrinology as chance of conversion to productive adenoma very low    Fibromyalgia  - can lead to fatigue weight gain.    Bipolar 1 disorder  - on multiple medication including lithium.    - medication can lead to weight gain     History of tobacco abuse  - currently vapes.    BMI 40.0-44.9, adult  - Body mass index is 47.52 kg/m².  - weight gain noted.  Per patient dietary indiscretion, drinking 3 cases of diet Dr. Pepper in a week.   - Reportedly have drank 5 can of diet Dr. Pepper today      SHARON (obstructive sleep apnea)  - on CPAP    Advised patient to follow up with PCP for routine health maintenance care.   Pending results may not need to see endocrine any further      José Hernandez M.D  Endocrinology  [facility]  2/28/2024      Disclaimer: This note has been generated using voice-recognition software. There may be typographical errors that have been  missed during proof-reading.

## 2024-02-29 ENCOUNTER — PATIENT MESSAGE (OUTPATIENT)
Dept: BARIATRICS | Facility: CLINIC | Age: 54
End: 2024-02-29
Payer: MEDICARE

## 2024-02-29 ENCOUNTER — LAB VISIT (OUTPATIENT)
Dept: LAB | Facility: HOSPITAL | Age: 54
End: 2024-02-29
Payer: MEDICARE

## 2024-02-29 DIAGNOSIS — M10.471 ACUTE GOUT DUE TO OTHER SECONDARY CAUSE INVOLVING TOE OF RIGHT FOOT: ICD-10-CM

## 2024-02-29 DIAGNOSIS — M1A.00X0 IDIOPATHIC CHRONIC GOUT WITHOUT TOPHUS, UNSPECIFIED SITE: ICD-10-CM

## 2024-02-29 DIAGNOSIS — E27.8 ADRENAL INCIDENTALOMA: ICD-10-CM

## 2024-02-29 LAB
ANION GAP SERPL CALC-SCNC: 6 MMOL/L (ref 8–16)
BUN SERPL-MCNC: 10 MG/DL (ref 6–20)
CALCIUM SERPL-MCNC: 9.1 MG/DL (ref 8.7–10.5)
CHLORIDE SERPL-SCNC: 115 MMOL/L (ref 95–110)
CO2 SERPL-SCNC: 20 MMOL/L (ref 23–29)
CORTIS SERPL-MCNC: <1 UG/DL (ref 4.3–22.4)
CREAT SERPL-MCNC: 0.8 MG/DL (ref 0.5–1.4)
EST. GFR  (NO RACE VARIABLE): >60 ML/MIN/1.73 M^2
ESTIMATED AVG GLUCOSE: 94 MG/DL (ref 68–131)
GLUCOSE SERPL-MCNC: 83 MG/DL (ref 70–110)
HBA1C MFR BLD: 4.9 % (ref 4–5.6)
POTASSIUM SERPL-SCNC: 3.9 MMOL/L (ref 3.5–5.1)
SODIUM SERPL-SCNC: 141 MMOL/L (ref 136–145)
URATE SERPL-MCNC: 5.9 MG/DL (ref 2.4–5.7)

## 2024-02-29 PROCEDURE — 82024 ASSAY OF ACTH: CPT | Performed by: HOSPITALIST

## 2024-02-29 PROCEDURE — 83835 ASSAY OF METANEPHRINES: CPT | Performed by: HOSPITALIST

## 2024-02-29 PROCEDURE — 80048 BASIC METABOLIC PNL TOTAL CA: CPT | Performed by: NURSE PRACTITIONER

## 2024-02-29 PROCEDURE — 84550 ASSAY OF BLOOD/URIC ACID: CPT | Performed by: INTERNAL MEDICINE

## 2024-02-29 PROCEDURE — 84244 ASSAY OF RENIN: CPT | Performed by: HOSPITALIST

## 2024-02-29 PROCEDURE — 82533 TOTAL CORTISOL: CPT | Performed by: HOSPITALIST

## 2024-02-29 PROCEDURE — 36415 COLL VENOUS BLD VENIPUNCTURE: CPT | Mod: PN | Performed by: HOSPITALIST

## 2024-02-29 PROCEDURE — 83036 HEMOGLOBIN GLYCOSYLATED A1C: CPT | Performed by: HOSPITALIST

## 2024-02-29 PROCEDURE — 82088 ASSAY OF ALDOSTERONE: CPT | Performed by: HOSPITALIST

## 2024-02-29 RX ORDER — LORAZEPAM 0.5 MG/1
TABLET ORAL
Qty: 60 TABLET | Refills: 2 | Status: SHIPPED | OUTPATIENT
Start: 2024-02-29 | End: 2024-03-27 | Stop reason: SDUPTHER

## 2024-03-03 LAB — RENIN PLAS-CCNC: 0.8 NG/ML/H

## 2024-03-04 LAB — ALDOST SERPL-MCNC: 5 NG/DL

## 2024-03-05 LAB — ACTH PLAS-MCNC: 12 PG/ML (ref 0–46)

## 2024-03-06 ENCOUNTER — PATIENT MESSAGE (OUTPATIENT)
Dept: BARIATRICS | Facility: CLINIC | Age: 54
End: 2024-03-06
Payer: MEDICARE

## 2024-03-08 LAB
METANEPH FREE SERPL-MCNC: <25 PG/ML
METANEPHS SERPL-MCNC: 55 PG/ML
NORMETANEPH FREE SERPL-MCNC: 55 PG/ML

## 2024-03-15 ENCOUNTER — PATIENT MESSAGE (OUTPATIENT)
Dept: FAMILY MEDICINE | Facility: CLINIC | Age: 54
End: 2024-03-15
Payer: MEDICARE

## 2024-03-22 ENCOUNTER — PATIENT MESSAGE (OUTPATIENT)
Dept: BARIATRICS | Facility: CLINIC | Age: 54
End: 2024-03-22
Payer: MEDICARE

## 2024-03-22 ENCOUNTER — TELEPHONE (OUTPATIENT)
Dept: BARIATRICS | Facility: CLINIC | Age: 54
End: 2024-03-22
Payer: MEDICARE

## 2024-03-23 ENCOUNTER — HOSPITAL ENCOUNTER (EMERGENCY)
Facility: HOSPITAL | Age: 54
Discharge: HOME | End: 2024-03-23
Attending: EMERGENCY MEDICINE
Payer: MEDICARE

## 2024-03-23 VITALS
DIASTOLIC BLOOD PRESSURE: 82 MMHG | SYSTOLIC BLOOD PRESSURE: 126 MMHG | WEIGHT: 290 LBS | HEART RATE: 68 BPM | OXYGEN SATURATION: 99 % | BODY MASS INDEX: 46.61 KG/M2 | RESPIRATION RATE: 17 BRPM | HEIGHT: 66 IN | TEMPERATURE: 99 F
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
## 2024-03-23 DIAGNOSIS — M79.671 RIGHT FOOT PAIN: ICD-10-CM

## 2024-03-23 PROCEDURE — 99283 EMERGENCY DEPT VISIT LOW MDM: CPT | Mod: 25

## 2024-03-23 RX ORDER — ACETAMINOPHEN 500 MG
500 TABLET ORAL EVERY 4 HOURS PRN
Qty: 30 TABLET | Refills: 0 | Status: SHIPPED | OUTPATIENT
Start: 2024-03-23

## 2024-03-23 RX ORDER — IBUPROFEN 600 MG/1
600 TABLET ORAL EVERY 6 HOURS PRN
Qty: 30 TABLET | Refills: 0 | Status: SHIPPED | OUTPATIENT
Start: 2024-03-23

## 2024-03-23 NOTE — ED TRIAGE NOTES
Pt reports that last night she slipped from 2 steps and injured right foot.  No relief with ibuprofen.

## 2024-03-23 NOTE — ED PROVIDER NOTES
"Encounter Date: 3/23/2024    SCRIBE #1 NOTE: I, Dhruvjohanna Acosta, am scribing for, and in the presence of,  Verna Zhou PA-C.       History     Chief Complaint   Patient presents with    Fall     Tripped on 2 steps and fell, twisting RIGHT ankle and injured top of same foot and "rubia" neck. Swelling noted to foot. Denies hitting head. Using IBU without relief     53 y.o. female with PMHx of anxiety, depression, asthma, COPD, fibromyalgia, SHARON, presents to the ED for evaluation of R ankle pain and R foot pain and swelling s/p fall that occurred last night. Patient states she accidentally slipped causing her to fall down 2 steps and twisting her R ankle. Denies any head trauma or LOC. She reports her neck "rubia" during the fall.  She denies any direct trauma to her neck.  She reports straining her neck with the fall.  She denies any neck pain at this time.  Attempted tx for pain with Tylenol. No alleviating or exacerbating factors noted. Denies fever, chills, CP, SOB, abdominal pain, nausea/vomiting or any associated symptoms. Patient is allergic to hydrocodone-acetaminophen.     The history is provided by the patient. No  was used.     Review of patient's allergies indicates:   Allergen Reactions    Hydrocodone-acetaminophen Nausea And Vomiting     Past Medical History:   Diagnosis Date    Anxiety     Arthritis     Asthma     Back pain     Bipolar I     BMI 50.0-59.9, adult     Chronic obstructive pulmonary disease, unspecified COPD type 2022    Depression     Fibromyalgia     GERD (gastroesophageal reflux disease)     HFpEF     Insomnia 2021    Obesity     SHARON (obstructive sleep apnea)     Tobacco dependence     Urinary incontinence      Past Surgical History:   Procedure Laterality Date    CARPAL TUNNEL RELEASE       SECTION  1993    CHOLECYSTECTOMY      COLONOSCOPY N/A 2022    Procedure: COLONOSCOPY;  Surgeon: Ernesto Auguste MD;  Location: [facility] ENDO;  " Service: Endoscopy;  Laterality: N/A;  fully vaccinated-GT    EPIDURAL STEROID INJECTION Bilateral 07/19/2023    Procedure: Bilateral L3, L4, L5 Medial Branch Blocks #1;  Surgeon: Lito Jasso Jr., MD;  Location: [facility] ENDO;  Service: Pain Management;  Laterality: Bilateral;  @1230  No ATC or DM    EPIDURAL STEROID INJECTION Bilateral 08/02/2023    Procedure: Bilateral L3, L4, L5 Medial Branch Blocks #2;  Surgeon: Lito Jasso Jr., MD;  Location: [facility] ENDO;  Service: Pain Management;  Laterality: Bilateral;  @1000  No ATC or DM    EPIDURAL STEROID INJECTION Left 08/16/2023    Procedure: Left L3, L4, L5 Radiofrequency Thermocoagulation of Medial Branches;  Surgeon: Lito Jasso Jr., MD;  Location: [facility] ENDO;  Service: Pain Management;  Laterality: Left;  @0815  No ATC or DM    EPIDURAL STEROID INJECTION Right 08/30/2023    Procedure: Right L3, L4, L5 Radiofrequency Thermocoagulation of Medial Branches;  Surgeon: Lito Jasso Jr., MD;  Location: [facility] ENDO;  Service: Pain Management;  Laterality: Right;  @0930  No ATC or DM  Last 1&75    ESOPHAGOGASTRODUODENOSCOPY N/A 05/21/2019    Procedure: EGD (ESOPHAGOGASTRODUODENOSCOPY);  Surgeon: Michelle Mc MD;  Location: [facility];  Service: Endoscopy;  Laterality: N/A;    HYSTERECTOMY      partial     KNEE SURGERY      LAPAROSCOPIC SLEEVE GASTRECTOMY N/A 10/18/2019    Procedure: GASTRECTOMY, SLEEVE, LAPAROSCOPIC, with intraop EGD;  Surgeon: Ean Kohli MD;  Location: [facility] OR [address] [place] [address];  Service: General;  Laterality: N/A;    LEFT HEART CATHETERIZATION Left 11/7/2023    Procedure: Left heart cath;  Surgeon: Lito Colmenares MD;  Location: [facility] CATH LAB;  Service: Cardiology;  Laterality: Left;  730am start, R rad access  RN PRE OP 11/2/23    pinched nerve      SHOULDER SURGERY      TONSILLECTOMY      WRIST SURGERY      had ganlin cyst     Family History   Problem Relation Age of Onset    Diabetes Mother     Jose Juan's disease  Mother     Hypertension Mother     Heart disease Father     Stroke Father     Mental illness Father         ptsd    Hypertension Father     Diabetes Father     Depression Father     Arthritis Father     No Known Problems Brother     Heart disease Maternal Grandmother     Depression Maternal Grandmother     COPD Maternal Grandfather     Heart disease Maternal Grandfather     Stroke Maternal Grandfather     Kidney disease Paternal Grandmother     Heart disease Paternal Grandmother     Heart disease Paternal Grandfather     Early death Paternal Grandfather     Hypertension Son     Asthma Son     Rectal cancer Paternal Aunt     Asthma Maternal Aunt     Diabetes Maternal Aunt     Asthma Maternal Uncle      Social History     Tobacco Use    Smoking status: Former     Types: Vaping with nicotine    Smokeless tobacco: Never    Tobacco comments:     vaping started April 2020; QUIT cigarettes Sept 2019   Substance Use Topics    Alcohol use: No    Drug use: No     Review of Systems   Constitutional:  Negative for chills, diaphoresis, fatigue and fever.   HENT:  Negative for nosebleeds, sinus pain, sore throat and voice change.         (-) head trauma.    Eyes:  Negative for pain and redness.   Respiratory:  Negative for cough, shortness of breath and wheezing.    Cardiovascular:  Negative for chest pain and leg swelling.   Gastrointestinal:  Negative for abdominal distention, abdominal pain, blood in stool and vomiting.   Genitourinary:  Negative for dysuria, flank pain and hematuria.   Musculoskeletal:  Positive for arthralgias (R ankle and R foot) and joint swelling (R ankle). Negative for neck stiffness.   Skin:  Negative for rash and wound.   Neurological:  Negative for syncope, weakness and headaches.        (-) head trauma  (-) loc   Psychiatric/Behavioral:  Negative for confusion. The patient is not nervous/anxious.        Physical Exam     Initial Vitals [03/23/24 1333]   BP Pulse Resp Temp SpO2   125/68 69 17 99 °F  (37.2 °C) 97 %      MAP       --         Physical Exam    Nursing note and vitals reviewed.  Constitutional: She appears well-developed and well-nourished.  Non-toxic appearance. She does not appear ill.   HENT:   Head: Normocephalic and atraumatic.   Right Ear: Hearing, tympanic membrane, external ear and ear canal normal. Tympanic membrane is not perforated, not erythematous and not bulging.   Left Ear: Hearing, tympanic membrane, external ear and ear canal normal. Tympanic membrane is not perforated, not erythematous and not bulging.   Nose: Nose normal.   Mouth/Throat: Uvula is midline, oropharynx is clear and moist and mucous membranes are normal.   Eyes: Conjunctivae and EOM are normal.   Neck: Neck supple.   Normal range of motion.   Full passive range of motion without pain.     Cardiovascular:  Normal rate and regular rhythm.           Pulses:       Radial pulses are 2+ on the right side and 2+ on the left side.        Dorsalis pedis pulses are 2+ on the right side and 2+ on the left side.   Pulmonary/Chest: Effort normal and breath sounds normal. No accessory muscle usage. No respiratory distress. She has no decreased breath sounds.   Abdominal: Abdomen is soft. Bowel sounds are normal. She exhibits no distension. There is no abdominal tenderness. There is no rebound and no guarding.   Musculoskeletal:         General: Normal range of motion.      Cervical back: Full passive range of motion without pain, normal range of motion and neck supple. No rigidity.      Comments: Tenderness to palpation to dorsal aspect of right foot.  No surrounding areas of erythema or cellulitis.  There is some mild edema.  No tenderness to palpation to medial or lateral aspect of right ankle.  Decreased range of motion of right toes secondary to pain.  Full range of motion of bilateral ankles, left toes, bilateral hips, bilateral knees.  Strength and sensation intact to bilateral lower extremities.  Patient able to ambulate  into the room.  Negative Homans sign bilaterally.    Full ROM of neck. No neck rigidity. No midline tenderness to cervical, thoracic, or lumbar spine.  No bony step-offs.  Full range of motion of bilateral upper and lower extremities.  Strength and sensation intact bilateral upper and lower extremities.  Patient able to ambulate into the room.  No erythema, edema, bruising, rash, or cellulitis patient's back.      Neurological: She is alert. No cranial nerve deficit.   Neuro intact.  Strength and sensation intact bilateral upper and lower extremities.   Skin: Skin is warm and dry.   Psychiatric: She has a normal mood and affect.         ED Course   Procedures  Labs Reviewed - No data to display       Imaging Results              X-Ray Foot Complete Right (Final result)  Result time 03/23/24 14:16:11      Final result by Dylon Croft MD (03/23/24 14:16:11)                   Impression:      Distal right lower leg and ankle nonspecific soft tissue swelling without acute displaced fracture-dislocation identified.      Electronically signed by: Dylon Croft MD  Date:    03/23/2024  Time:    14:16               Narrative:    EXAMINATION:  XR ANKLE COMPLETE 3 VIEW RIGHT; XR FOOT COMPLETE 3 VIEW RIGHT    CLINICAL HISTORY:  Unspecified fall, initial encounter    TECHNIQUE:  AP, lateral, and oblique images of the right ankle and right foot were performed.    COMPARISON:  Right foot series 04/15/2021    FINDINGS:  Bones are well mineralized. Overall alignment is within normal limits.  Prominence of the soft tissues about the distal right lower leg and ankle suggesting swelling.  Ankle mortise is intact.  Lisfranc articulation is congruent.  No displaced fracture, dislocation or destructive osseous process.  Mild DJD at the 1st MTP joint and dorsal midfoot.  Prominent plantar calcaneal spur.  Prominent enthesophyte at the Achilles insertion site.  Few similar nonspecific soft tissue calcifications along the medial aspect  "of the distal tibial shaft.  No subcutaneous emphysema or radiodense retained foreign body.                                       X-Ray Ankle Complete Right (Final result)  Result time 03/23/24 14:16:11      Final result by Dylon Croft MD (03/23/24 14:16:11)                   Impression:      Distal right lower leg and ankle nonspecific soft tissue swelling without acute displaced fracture-dislocation identified.      Electronically signed by: Dylon Croft MD  Date:    03/23/2024  Time:    14:16               Narrative:    EXAMINATION:  XR ANKLE COMPLETE 3 VIEW RIGHT; XR FOOT COMPLETE 3 VIEW RIGHT    CLINICAL HISTORY:  Unspecified fall, initial encounter    TECHNIQUE:  AP, lateral, and oblique images of the right ankle and right foot were performed.    COMPARISON:  Right foot series 04/15/2021    FINDINGS:  Bones are well mineralized. Overall alignment is within normal limits.  Prominence of the soft tissues about the distal right lower leg and ankle suggesting swelling.  Ankle mortise is intact.  Lisfranc articulation is congruent.  No displaced fracture, dislocation or destructive osseous process.  Mild DJD at the 1st MTP joint and dorsal midfoot.  Prominent plantar calcaneal spur.  Prominent enthesophyte at the Achilles insertion site.  Few similar nonspecific soft tissue calcifications along the medial aspect of the distal tibial shaft.  No subcutaneous emphysema or radiodense retained foreign body.                                       Medications - No data to display  Medical Decision Making  This is a 53 y.o. female with PMHx of anxiety, depression, asthma, COPD, fibromyalgia, SHARON, presents to the ED for evaluation of R ankle pain and R foot pain and swelling s/p fall that occurred last night. Patient states she accidentally slipped causing her to fall down 2 steps and twisting her R ankle. Denies any head trauma or LOC. She reports her neck "rubia" during the fall.  She denies any direct trauma to her " neck.  She reports straining her neck with the fall.  She denies any neck pain at this time.   On physical exam, patient is well-appearing and in no acute distress.  Nontoxic appearing.  Lungs are clear to auscultation bilaterally.  Abdomen is soft and nontender.  No guarding, rigidity, rebound.  2+ radial pulses bilaterally.  Posterior oropharynx is not erythematous.  No edema or exudate.  Uvula midline.  Bilateral tympanic membrane is normal.  No erythema, bulging, or perforations.  Neuro intact.  Strength and sensation intact bilateral upper and lower extremities.  2+ DP pulses bilaterally.  Tenderness to palpation to dorsal aspect of right foot.  No surrounding areas of erythema or cellulitis.  There is some mild edema.  No tenderness to palpation to medial or lateral aspect of right ankle.  Decreased range of motion of right toes secondary to pain.  Full range of motion of bilateral ankles, left toes, bilateral hips, bilateral knees.  Strength and sensation intact to bilateral lower extremities.  Patient able to ambulate into the room.  Negative Homans sign bilaterally. Full ROM of neck. No neck rigidity. No midline tenderness to cervical, thoracic, or lumbar spine.  No bony step-offs.  Full range of motion of bilateral upper and lower extremities.  Strength and sensation intact bilateral upper and lower extremities.  Patient able to ambulate into the room.  No erythema, edema, bruising, rash, or cellulitis patient's back.  X-ray revealed Distal right lower leg and ankle nonspecific soft tissue swelling without acute displaced fracture-dislocation identified.  Bones are well mineralized. Overall alignment is within normal limits.  Prominence of the soft tissues about the distal right lower leg and ankle suggesting swelling.  Ankle mortise is intact.  Lisfranc articulation is congruent.  No displaced fracture, dislocation or destructive osseous process.  Mild DJD at the 1st MTP joint and dorsal midfoot.   Prominent plantar calcaneal spur.  Prominent enthesophyte at the Achilles insertion site.  Few similar nonspecific soft tissue calcifications along the medial aspect of the distal tibial shaft.  No subcutaneous emphysema or radiodense retained foreign body. Ace wrap applied.  Crutches given.  Advised rice therapy upon discharge.  Will discharge patient on anti-inflammatories and Tylenol as needed for pain.  Patient has an orthopedist that she sees already, Dr. Mendez.  Urged prompt follow-up with orthopedist and PCP for further evaluation.    Strict return precautions given. I discussed with the patient/family the diagnosis, treatment plan, indications for return to the emergency department, and for expected follow-up. The patient/family verbalized an understanding. The patient/family is asked if there are any questions or concerns. We discuss the case, until all issues are addressed to the patient/family's satisfaction. Patient/family understands and is agreeable to the plan. Patient is stable and ready for discharge.            Amount and/or Complexity of Data Reviewed  Radiology: ordered. Decision-making details documented in ED Course.            Scribe Attestation:   Scribe #1: I performed the above scribed service and the documentation accurately describes the services I performed. I attest to the accuracy of the note.                                 I, Verna Zhou, personally performed the services described in this documentation. All medical record entries made by the scribe were at my direction and in my presence. I have reviewed the chart and agree that the record reflects my personal performance and is accurate and complete.                Clinical Impression:  Final diagnoses:  [W19.XXXA] Fall (Primary)  [M79.671] Right foot pain          ED Disposition Condition    Discharge Stable          ED Prescriptions       Medication Sig Dispense Start Date End Date Auth. Provider    acetaminophen (TYLENOL)  500 MG tablet Take 1 tablet (500 mg total) by mouth every 4 (four) hours as needed for Pain or Temperature greater than (100.5 or greater). 30 tablet 3/23/2024 -- Verna Zhou PA-C    ibuprofen (ADVIL,MOTRIN) 600 MG tablet Take 1 tablet (600 mg total) by mouth every 6 (six) hours as needed for Pain or Temperature greater than (100.5 or greater). 30 tablet 3/23/2024 -- Verna Zhou PA-C          Follow-up Information       Follow up With Specialties Details Why Contact Info    Shay Dai MD Internal Medicine In 2 days for further evaluation [address] [facility] [id number]  [phone]      [facility] Emergency Dept Emergency Medicine In 2 days If symptoms worsen [address] [facility] [place] [address] [phone]             Verna Zhou PA-C  03/23/24 1444

## 2024-03-23 NOTE — DISCHARGE INSTRUCTIONS

## 2024-03-25 ENCOUNTER — PATIENT OUTREACH (OUTPATIENT)
Dept: EMERGENCY MEDICINE | Facility: HOSPITAL | Age: 54
End: 2024-03-25
Payer: MEDICARE

## 2024-03-25 NOTE — PROGRESS NOTES
Patient was seen in the ED on 3/23/24. Phoned patient to assist with Post ED Discharge Navigation. Patient agreed to assistance scheduling a PCP follow-up appointment. In Basket message sent to PCP staff for assistance scheduling.  Nick Pearce

## 2024-03-26 ENCOUNTER — TELEPHONE (OUTPATIENT)
Dept: FAMILY MEDICINE | Facility: CLINIC | Age: 54
End: 2024-03-26
Payer: MEDICARE

## 2024-03-26 NOTE — TELEPHONE ENCOUNTER
----- Message from Nick Pearce sent at 3/26/2024 10:31 AM CDT -----  Regarding: FW: ED F/U  Please assist. With scheduling a follow-up.  ----- Message -----  From: Nick Pearce  Sent: 3/25/2024   2:53 PM CDT  To: Suhas Pickering Staff  Subject: ED F/U                                           Good afternoon,  The above named patient was seen in the ED on 3/23/24. She is a part of the [facility] quality work for patients with 2+ chronic conditions for the system. Pleased assist with scheduling a PCP follow-up appointment by 3/30/24.  Nick Pearce

## 2024-03-27 ENCOUNTER — OFFICE VISIT (OUTPATIENT)
Dept: PSYCHIATRY | Facility: CLINIC | Age: 54
End: 2024-03-27
Payer: COMMERCIAL

## 2024-03-27 DIAGNOSIS — F31.9 BIPOLAR 1 DISORDER: Primary | ICD-10-CM

## 2024-03-27 DIAGNOSIS — F41.9 ANXIETY DISORDER, UNSPECIFIED TYPE: ICD-10-CM

## 2024-03-27 DIAGNOSIS — G47.00 INSOMNIA, UNSPECIFIED TYPE: ICD-10-CM

## 2024-03-27 PROCEDURE — 99214 OFFICE O/P EST MOD 30 MIN: CPT | Mod: 95,,, | Performed by: NURSE PRACTITIONER

## 2024-03-27 RX ORDER — LORAZEPAM 0.5 MG/1
TABLET ORAL
Qty: 60 TABLET | Refills: 2 | Status: SHIPPED | OUTPATIENT
Start: 2024-03-28

## 2024-03-27 RX ORDER — BUPROPION HYDROCHLORIDE 100 MG/1
TABLET, EXTENDED RELEASE ORAL
Qty: 270 TABLET | Refills: 1 | Status: SHIPPED | OUTPATIENT
Start: 2024-03-27

## 2024-03-27 RX ORDER — LITHIUM CARBONATE 300 MG/1
300 CAPSULE ORAL
Qty: 270 CAPSULE | Refills: 1 | Status: SHIPPED | OUTPATIENT
Start: 2024-03-27 | End: 2024-06-25

## 2024-03-27 RX ORDER — RISPERIDONE 0.5 MG/1
0.5 TABLET ORAL 2 TIMES DAILY
Qty: 180 TABLET | Refills: 1 | Status: SHIPPED | OUTPATIENT
Start: 2024-03-27

## 2024-03-27 RX ORDER — SERTRALINE HYDROCHLORIDE 100 MG/1
200 TABLET, FILM COATED ORAL DAILY
Qty: 180 TABLET | Refills: 1 | Status: SHIPPED | OUTPATIENT
Start: 2024-03-27

## 2024-03-27 NOTE — PROGRESS NOTES
"  Outpatient Psychiatry Follow-Up Visit (MD/NP)    3/27/2024   Clinical Status of Patient:  Outpatient (Ambulatory)    The patient location is: Home, [place]    Visit type: audiovisual due to patient's problem in logging on to video visit from her phone    Face to Face time with patient: 24 minutes  30 minutes of total time spent on the encounter, which includes face to face time and non-face to face time preparing to see the patient (eg, review of tests), Obtaining and/or reviewing separately obtained history, Documenting clinical information in the electronic or other health record, Independently interpreting results (not separately reported) and communicating results to the patient/family/caregiver, or Care coordination (not separately reported).         Each patient to whom he or she provides medical services by telemedicine is:  (1) informed of the relationship between the physician and patient and the respective role of any other health care provider with respect to management of the patient; and (2) notified that he or she may decline to receive medical services by telemedicine and may withdraw from such care at any time.    Notes:       Chief Complaint:  Zaira Dowell is a 53 y.o. female who presents today for follow-up of mood disorder.  Met with patient and no relatives present for interview .      Interval History and Content of Current Session:  Interim Events/Subjective Report/Content of Current Session: She stated her mood is "okay" and her affect was appropriate. She stated she is still grieving the loss of her father who passed away on December 10th, 2023. She stated she still has family problems and still not close to her brother. Patient stated she still enjoys seeing her grandchildren in [place]. Patient is in good self control. Patient is able to stay positive however in the face of the family stresses. Patent stated she has no thoughts of harming herself and no signs of lida, hypomania, or " psychosis. Provided patient with supportive therapy and skills to better manage her grief. She is not in therapy and we discussed starting therapy.     We discussed the importance to continual monitoring of patient's labs and lithium level. Reviewed all patient labs and lithium level and discussed the importance of getting regular labs and patient verbalized understanding. Patient is also still dealing with her discomfort related to fibromyalgia and encouraged following up with her PCP and rheumatologists. She denied SI/HI/AVH. She denied suicide ideation and no suicide plan or intent. She reported managed depression and anxiety symptoms with her current medication regimen. She reported improved sleep with her CPAP and gets total 8 hours of sleep gets up and goes back to sleep at night, and some times she sleeps at day time. She stated this is her sleep pattern for many years but has improved with her CPAP recently.     She denied lida or hypomania like symptoms at this time.     Contracted for safety and non suicide plan (contact family, suicide hotline, call 911 or go the nearest emergency room)    Psychiatric Review Of Systems - Is patient experiencing or having changes in:  sleep: yes, better sleep with CPAP  appetite: no  weight: normal  energy/anergy: normal  interest/pleasure/anhedonia: no  somatic symptoms: no  anxiety/panic: yes but managed  guilty/hopelessness: no  concentration: no  S.I.B.s/risky behavior: no  Irritability: no  Racing thoughts: yes due to worry  Impulsive behaviors: no  Paranoia:no  AVH:no  Suicidal thoughts/plan/intent: no  SE: no  ETOH abuse: no  Drugs abuse: no      Psychotherapy:  Target symptoms: anxiety , mood disorder  Why chosen therapy is appropriate versus another modality: relevant to diagnosis, patient responds to this modality, evidence based practice  Outcome monitoring methods: self-report  Therapeutic intervention type: supportive psychotherapy  Topics discussed/themes:   "mood concerns, relationships difficulties, illness/death of a loved one  The patient's response to the intervention is accepting. The patient's progress toward treatment goals is good.   Duration of intervention: 15 minutes.    Review of Systems   PSYCHIATRIC: Pertinant items are noted in the narrative.    Past Medical, Family and Social History: The patient's past medical, family and social history have been reviewed and updated as appropriate within the electronic medical record - see encounter notes.    Compliance: yes    Side effects: None Patient also reported a lithium tremor. Patient has no signs of TD at this time.    Risk Parameters:  Patient reports no suicidal ideation  Patient reports no homicidal ideation  Patient reports no self-injurious behavior  Patient reports no violent behavior    Exam (detailed: at least 9 elements; comprehensive: all 15 elements)   Constitutional  Vitals:  Most recent vital signs, dated less than 90 days prior to this appointment, were reviewed.   There were no vitals filed for this visit.   Patient reports stable vital signs     General:  Not examined     Musculoskeletal  Muscle Strength/Tone:  not examined   Gait & Station:  Not examined     Psychiatric  Speech:  no latency; no press, spontaneous   Mood & Affect:  "okay"     Thought Process:  normal and logical   Associations:  intact   Thought Content:  normal, no suicidality, no homicidality, delusions, or paranoia   Insight:  has awareness of illness   Judgement: behavior is adequate to circumstances   Orientation:  grossly intact   Memory: intact for content of interview   Language: grossly intact, able to name, able to repeat   Attention Span & Concentration:  able to focus   Fund of Knowledge:  intact and appropriate to age and level of education     Assessment and Diagnosis   Status/Progress: Based on the examination today, the patient's problem(s) is/are adequately but not ideally controlled.  New problems have not " "been presented today.    no new obstacles presented.  are not complicating management of the primary condition.  The working differential for this patient includes Bipolar Disorder.     General Impression: Patient is doing as well as possible in coping with her family stresses and her own medical concerns as well. Patient does have things to look forward to in her life and enjoys her grandchildren and also has a supportive marriage. Patient remains motivated to do as well as possible and is pleased with her progress as well. Patient is not a danger to self or others at this time. Patient has a stable mood at this time overall.  Patient requested keeping the same dosages of medications due to their effectiveness in stabilizing her mood and improving her symptoms.  We discussed changing Risperdal to Vraylar but patient is reluctant to switch due to effectiveness of Risperdal over the years and believes the benefits of taking Risperdal 0.5 mg po BID outweigh the risks.  She stated she will research Vraylar think about it but is not ready to make the change now.      ICD-10-CM ICD-9-CM   1. Bipolar 1 disorder  F31.9 296.7   2. Anxiety disorder, unspecified type  F41.9 300.00   3. Insomnia, unspecified type  G47.00 780.52         Intervention/Counseling/Treatment Plan   Medication Management: The risks and benefits of medication were discussed with the patient.   Patient agrees to continue   Continue Zoloft 200 mg daily for anxiety and depressed mood  Continue Risperdal 0.5 mg BID for bipolar disorder. Has been taking it for 7-9 years. Discussed in the future discontinuing Risperdal and staring Vraylar. She will think about it  Continue lithium 300mg  (takes one tab po qam and 2 tabs po qhs). She stated she has been taking it since 2013-14 and finds it very helpful. She stated, " If I missed one of medicine I get agitated or irritable. They are all work really well together".  She has been taking lithium 7-9 yeas.  AIMs " = 0 and new AIMs due on 4/24  Continue ativan and takes 1/2 tab of Ativan 0.25 mg am and 0.25 mg po qnoon and 0.5 mg in pm for anxiety   Continue Wellbutrin  mg q am and 100 mg daily at noon for depression  Reviewed patient's recent labs and all labs within the normal range from 11/9/22. Reviewed new lithium level and Lithium level was 0.5 on 11/9/2022. Ordered EKG and lithium level and reminded patient to get them done ASAP  -Discussed informed consent, diagnosis, risks and benefits of proposed treatment above vs alternative treatments vs no treatment, and potential side effects of these treatments. The patient expresses understanding of the above and displays the capacity to agree with this treatment given said understanding. Patient also agrees that, currently, the benefits outweigh the risks and would like to pursue treatment at this time. Answered all questions and discussed follow up. Encouraged patient to contact us with any questions or concerns.   Contracted for safety and non suicide plan (contact family, suicide hotline, call 911 or go the nearest emergency room)  -Encouraged Patient to keep future appointments.  -Take medications as prescribed and abstain from substance abuse.  -Patient to present to Emergency department for thoughts to harm herself or others      Return to Clinic: 2 months or earlier as needed      MICHELE Phan-BC

## 2024-04-03 ENCOUNTER — PATIENT MESSAGE (OUTPATIENT)
Dept: BARIATRICS | Facility: CLINIC | Age: 54
End: 2024-04-03
Payer: MEDICARE

## 2024-04-09 ENCOUNTER — PATIENT MESSAGE (OUTPATIENT)
Dept: BARIATRICS | Facility: CLINIC | Age: 54
End: 2024-04-09
Payer: MEDICARE

## 2024-04-19 ENCOUNTER — OFFICE VISIT (OUTPATIENT)
Dept: RHEUMATOLOGY | Facility: CLINIC | Age: 54
End: 2024-04-19
Payer: MEDICARE

## 2024-04-19 VITALS
HEIGHT: 66 IN | DIASTOLIC BLOOD PRESSURE: 68 MMHG | HEART RATE: 70 BPM | SYSTOLIC BLOOD PRESSURE: 112 MMHG | WEIGHT: 293 LBS | BODY MASS INDEX: 47.09 KG/M2

## 2024-04-19 DIAGNOSIS — M15.9 PRIMARY OSTEOARTHRITIS INVOLVING MULTIPLE JOINTS: Primary | ICD-10-CM

## 2024-04-19 DIAGNOSIS — E66.01 CLASS 3 SEVERE OBESITY DUE TO EXCESS CALORIES WITHOUT SERIOUS COMORBIDITY WITH BODY MASS INDEX (BMI) OF 45.0 TO 49.9 IN ADULT: ICD-10-CM

## 2024-04-19 DIAGNOSIS — M79.7 FIBROMYALGIA: ICD-10-CM

## 2024-04-19 PROCEDURE — 3008F BODY MASS INDEX DOCD: CPT | Mod: CPTII,S$GLB,, | Performed by: STUDENT IN AN ORGANIZED HEALTH CARE EDUCATION/TRAINING PROGRAM

## 2024-04-19 PROCEDURE — 1159F MED LIST DOCD IN RCRD: CPT | Mod: CPTII,S$GLB,, | Performed by: STUDENT IN AN ORGANIZED HEALTH CARE EDUCATION/TRAINING PROGRAM

## 2024-04-19 PROCEDURE — 99214 OFFICE O/P EST MOD 30 MIN: CPT | Mod: S$GLB,,, | Performed by: STUDENT IN AN ORGANIZED HEALTH CARE EDUCATION/TRAINING PROGRAM

## 2024-04-19 PROCEDURE — 3074F SYST BP LT 130 MM HG: CPT | Mod: CPTII,S$GLB,, | Performed by: STUDENT IN AN ORGANIZED HEALTH CARE EDUCATION/TRAINING PROGRAM

## 2024-04-19 PROCEDURE — 3044F HG A1C LEVEL LT 7.0%: CPT | Mod: CPTII,S$GLB,, | Performed by: STUDENT IN AN ORGANIZED HEALTH CARE EDUCATION/TRAINING PROGRAM

## 2024-04-19 PROCEDURE — 99999 PR PBB SHADOW E&M-EST. PATIENT-LVL V: CPT | Mod: PBBFAC,,, | Performed by: STUDENT IN AN ORGANIZED HEALTH CARE EDUCATION/TRAINING PROGRAM

## 2024-04-19 PROCEDURE — 3078F DIAST BP <80 MM HG: CPT | Mod: CPTII,S$GLB,, | Performed by: STUDENT IN AN ORGANIZED HEALTH CARE EDUCATION/TRAINING PROGRAM

## 2024-04-19 RX ORDER — CYCLOBENZAPRINE HCL 5 MG
5 TABLET ORAL 3 TIMES DAILY PRN
Qty: 60 TABLET | Refills: 6 | Status: SHIPPED | OUTPATIENT
Start: 2024-04-19

## 2024-04-19 RX ORDER — CYCLOBENZAPRINE HCL 5 MG
5 TABLET ORAL 3 TIMES DAILY PRN
Qty: 60 TABLET | Refills: 6 | Status: SHIPPED | OUTPATIENT
Start: 2024-04-19 | End: 2024-04-19

## 2024-04-19 RX ORDER — PREGABALIN 100 MG/1
100 CAPSULE ORAL 2 TIMES DAILY
Qty: 60 CAPSULE | Refills: 5 | Status: SHIPPED | OUTPATIENT
Start: 2024-04-19 | End: 2024-04-19

## 2024-04-19 RX ORDER — PREGABALIN 100 MG/1
100 CAPSULE ORAL 2 TIMES DAILY
Qty: 60 CAPSULE | Refills: 5 | Status: SHIPPED | OUTPATIENT
Start: 2024-04-19 | End: 2024-10-18
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
## 2024-04-19 NOTE — PATIENT INSTRUCTIONS
Palmitoylethanolamide (PEA), a naturally occurring fatty acid amide, has demonstrated utility in the treatment of neuropathic and inflammatory pain.        Include these anti-Inflammatory foods:  ? Fresh vegetables (all kinds): Aim for a variety of types and colours (a rainbow  of veggies provides phytonutrients) - with a minimum of four to five servings  per day - especially dark, leafy greens (spinach, kale, Asian greens) broccoli  and cauliflower, Brussel sprouts, beetroot  ? Whole pieces of fruit (not juice that strips them of fibre): Three to four  servings per day is a good amount for most people, especially berries and  cherries  ? Fatty fish - such as salmon, trout, sardines, mackerel & tuna  ? Herbs & spices: turmeric, jennifer, basil, oregano, thyme, cinnamon etc.,  ? Healthy fats: extra virgin olive oil is the best option, avocado oil and the fats  found in seeds, nuts, fish  ? Nuts/seeds - such as walnuts, cashews, almonds, pistachios, pine nuts, herson,  hemp  ? Whole grains - brown rice, amaranth, buckwheat, and quinoa  ? Legumes/beans: especially black beans, black-eyed peas, chickpeas, lentils,  red kidney  ? Drinks: water, green tea and organic coffee in moderation  Avoid these inflammatory / processed foods  ? Processed meats - sausages and cold cut meats - ham, salami etc.  ? Refined sugars - found in soft drinks, cookies, cake, lollies, ice cream, some  breakfast cereals  ? Trans fats - found in deep fried foods, fast foods, commercially baked goods  ? Processed snack foods - such as chips and crackers  ? Gluten, white bread & pasta & too many carbohydrates  ? Soybean oil and vegetable oil  ? Alcohol in excessive quantities  www.arthritisnsw.org.au  Easy Food Swaps & Meal Ideas  Breakfast  Instead of white bread (crumpets and English muffins), butter, jam, Nutella, processed  breakfast cereals, pancakes and chin, try:  ? Avocado, nut spread (not peanut), banana/cinnamon on rye bread  ? Have porridge  (whole oats) & berries- flash with a small amount of maple  syrup or honey  ? Organic scrambled / boiled eggs with herbs or turmeric  ? Avocado & smoked salmon on whole grain bread  ? Granola with Greek yoghurt and berries  Lunch  Instead of hot chips, frozen meals, white bread sandwiches (cold cuts),  pasta/potato/Caesar salads try:  ? Vegetable frittata with salad  ? Salads with a variety of fruit and vegetables - also add brown rice, quinoa and  nuts to be more filling - dress with oil and vinegar and spices (not store-bought  dressings); replace lettuce with spinach  ? [place] cakes & salad  ? Lettuce wraps with salmon or trout, herbs and carrot/cucumber  Dinner  Instead of pizza, pasta, hamburgers, red meat roasts try:  ? Try one-tray bakes with fish, lean chicken and vegies (less washing up!)  ? Have minestrone soup instead of pumpkin soup - use a variety of vegies & herbs  - count how many colours and types you can get in one soup  ? Replace side serves of pasta, white rice and potatoes with more veggies, salad  or whole grains  ? [place] fish and vegies on the BBQ and service with a salad  ? Asian soups with salmon and greens  Snacks  Instead of chips, biscuits, cakes, chocolate, try:  ? A handful of nuts - roast them or buy roasted for some extra crunch and flavour  ? Kale chips - kale, olive oil and a small sprinkle of salt & roast in the oven  ? Toasted emanuel spread, carrots, cucumber with hummus or avocado dip  ? Mixed berries and Greek yoghurt  ? Grab a piece of fruit  Drinks  Instead of soft drinks and excessive alcohol, try:  ? Water with a squeeze of citrus or flavoured with cut up fruit  ? Mineral water  ? Green tea and organic coffee in moderation

## 2024-05-06 ENCOUNTER — PATIENT MESSAGE (OUTPATIENT)
Dept: FAMILY MEDICINE | Facility: CLINIC | Age: 54
End: 2024-05-06

## 2024-05-06 ENCOUNTER — OFFICE VISIT (OUTPATIENT)
Dept: FAMILY MEDICINE | Facility: CLINIC | Age: 54
End: 2024-05-06
Payer: MEDICARE

## 2024-05-06 VITALS
HEIGHT: 66 IN | BODY MASS INDEX: 47.09 KG/M2 | DIASTOLIC BLOOD PRESSURE: 72 MMHG | TEMPERATURE: 99 F | SYSTOLIC BLOOD PRESSURE: 118 MMHG | WEIGHT: 293 LBS | OXYGEN SATURATION: 96 % | HEART RATE: 76 BPM

## 2024-05-06 DIAGNOSIS — J31.0 RHINITIS, UNSPECIFIED TYPE: ICD-10-CM

## 2024-05-06 DIAGNOSIS — Z98.890 POST-OPERATIVE STATE: ICD-10-CM

## 2024-05-06 DIAGNOSIS — M1A.00X0 IDIOPATHIC CHRONIC GOUT WITHOUT TOPHUS, UNSPECIFIED SITE: ICD-10-CM

## 2024-05-06 DIAGNOSIS — E66.01 CLASS 3 SEVERE OBESITY DUE TO EXCESS CALORIES WITH SERIOUS COMORBIDITY AND BODY MASS INDEX (BMI) OF 45.0 TO 49.9 IN ADULT: ICD-10-CM

## 2024-05-06 DIAGNOSIS — M79.671 FOOT PAIN, RIGHT: Primary | ICD-10-CM

## 2024-05-06 DIAGNOSIS — K21.9 GASTROESOPHAGEAL REFLUX DISEASE, UNSPECIFIED WHETHER ESOPHAGITIS PRESENT: ICD-10-CM

## 2024-05-06 DIAGNOSIS — Z98.84 HISTORY OF BARIATRIC SURGERY: ICD-10-CM

## 2024-05-06 PROCEDURE — 1159F MED LIST DOCD IN RCRD: CPT | Mod: CPTII,S$GLB,, | Performed by: INTERNAL MEDICINE

## 2024-05-06 PROCEDURE — 3008F BODY MASS INDEX DOCD: CPT | Mod: CPTII,S$GLB,, | Performed by: INTERNAL MEDICINE

## 2024-05-06 PROCEDURE — 3074F SYST BP LT 130 MM HG: CPT | Mod: CPTII,S$GLB,, | Performed by: INTERNAL MEDICINE

## 2024-05-06 PROCEDURE — 3044F HG A1C LEVEL LT 7.0%: CPT | Mod: CPTII,S$GLB,, | Performed by: INTERNAL MEDICINE

## 2024-05-06 PROCEDURE — 3078F DIAST BP <80 MM HG: CPT | Mod: CPTII,S$GLB,, | Performed by: INTERNAL MEDICINE

## 2024-05-06 PROCEDURE — 99999 PR PBB SHADOW E&M-EST. PATIENT-LVL V: CPT | Mod: PBBFAC,,, | Performed by: INTERNAL MEDICINE

## 2024-05-06 PROCEDURE — 99214 OFFICE O/P EST MOD 30 MIN: CPT | Mod: S$GLB,,, | Performed by: INTERNAL MEDICINE

## 2024-05-06 RX ORDER — OMEPRAZOLE 40 MG/1
CAPSULE, DELAYED RELEASE ORAL
Qty: 180 CAPSULE | Refills: 3 | Status: SHIPPED | OUTPATIENT
Start: 2024-05-06 | End: 2024-05-07 | Stop reason: SDUPTHER

## 2024-05-06 RX ORDER — CETIRIZINE HYDROCHLORIDE 10 MG/1
10 TABLET ORAL DAILY
Qty: 30 TABLET | Refills: 2 | Status: SHIPPED | OUTPATIENT
Start: 2024-05-06 | End: 2024-05-07 | Stop reason: SDUPTHER

## 2024-05-06 RX ORDER — ALLOPURINOL 100 MG/1
100 TABLET ORAL DAILY
Qty: 90 TABLET | Refills: 1 | Status: SHIPPED | OUTPATIENT
Start: 2024-05-06 | End: 2024-05-07 | Stop reason: SDUPTHER

## 2024-05-06 RX ORDER — SEMAGLUTIDE 0.5 MG/.5ML
0.5 INJECTION, SOLUTION SUBCUTANEOUS
Qty: 2 ML | Refills: 3 | Status: SHIPPED | OUTPATIENT
Start: 2024-06-03

## 2024-05-06 RX ORDER — SEMAGLUTIDE 0.25 MG/.5ML
0.25 INJECTION, SOLUTION SUBCUTANEOUS
Qty: 2 ML | Refills: 0 | Status: SHIPPED | OUTPATIENT
Start: 2024-05-06

## 2024-05-06 NOTE — PATIENT INSTRUCTIONS
Start Wegovy 0.25 mg once a week x 1 month. At the end of that month, the dose will continue to increase monthly.     Decrease portions as soon as you start wegovy. Avoid fried, rich or greasy foods.   Some nausea in the first 2 weeks is not unusual.   If you get pain across the upper abdomen and around to your back, please call the office.     [email or web address] to sign up for coupon card.           NUTRITION    Before & After  BARIATRIC SURGERY        [facility]  Surgical Weight Loss Program        Regular Bariatric Diet: 2-3 months and Beyond  Follow a high protein, low carb, low fat diet [place]:    MAY ADD THE FOLLOWING: slowly, one by one, back in the diet. Please chew foods well and if you do not tolerate certain foods at first, wait a few more week and try again. Raw/crunchy vegetables (artichoke, asparagus, baby corn, bamboo shoots, bean sprouts, carrots, celery, cherry tomatoes, cucumber, green onions or scallions, lettuce, pea pods, radishes, water chestnuts), plain/unsalted nuts and seeds and Protein bars w/ 0-4 g of sugar per serving (such as: Think Thin, Atkins, Pure Protein, Quest, Cake Bites, Power Crunch, ONE).   CONTINUE TO GET IN BETWEEN  GRAMS OF PROTEIN PER DAY EVERY DAY using foods and low sugar protein shakes   FLUID INTAKE: AIM FOR 64 OUNCES TOTAL FOR THE DAY. This includes ice, sugar-free popsicles, sugar-free jello, low sodium broths and any sugar-free non-carbonated beverages. You may resume caffeinated beverages.  LIMIT FRUITS TO 2 SERVINGS PER DAY. One serving of fruit is 1 small piece of fruit, 1 - ½ cup container of canned fruit (in its own juices or water) or ½ cup cubed fruit  LIMIT UNSALTED PLAIN NUTS AND SEEDS TO ¼ CUP TOTAL PER DAY  Aim to stay between 800-1000 calories per day      SAMPLE MENU STARTING 2-3 MONTHS AFTER SURGERY  Time of Day Day 1 Day 2   7am:    Egg omelet made with 1 egg and 1 slice low-fat cheese Quest Cinnamon Roll Protein bar (warm in microwave  for 10-15 seconds)   7:30am-9:30am:  Water/SF beverage water/SF beverage   10am:  1 ounce turkey with 1 light string cheese ¼ cup unsalted nuts + ½ banana   10:30-11:30am:  water/SF beverage water/SF beverage   12pm:    Grilled chicken (2 ounces) salad with 1 Tablespoon of low-fat Italian dressing and 1 apple  Taco Lettuce wraps: 1-2 oz of lean ground meat, sprinkle of low fat cheese, tomatoes, salsa wrapped in lettuce leaves   12:30pm-2:30pm:  water/SF beverage water/SF beverage   3pm:  Protein drink   Light yogurt   3:30-5:30pm:  water/SF beverage water/SF beverage   6pm:  Grilled shrimp kebobs (2 ounces shrimp, pineapple chunks, bell pepper and onions) with ¼ cup sautéed spinach and garlic (use Kajal spray)   ½ cup Red Beans (no rice) served over ¼ cup cauliflower rice   6:30pm-9pm:  water/SF beverage water/SF beverage   9pm:  1 tbsp slivered almonds sprinkled over 6 oz Greek yogurt container Protein shake         Protein Content of Foods Recommended after  Weight Loss Surgery    Food Name Portion Calories Protein (gms)   Almonds (unsalted) 1/4 cup 160 6   [place] milk, unsweetened 1 cup 30  1   Beef, Roast 1 oz 46 8   Beef, Steak, sirloin, trimmed 1 oz 55 9   Catfish, broiled or baked 1 oz 30 5   Cheese, American FF 1 oz 40 6   Cheese, Cottage 1% fat ¼ cup 41 7   Cheese, Parmesan, grated 2 tsp 20 2   Cheese, Mozzarella, part skim 1 oz 78 8   Cheese, part skim Ricotta ¼ cup 90 8   Chicken, white breast w/o skin 1 oz 46 9   Chicken, leg w/o skin 1 oz 54 7   Crab, steamed ¼ cup  40 9   Crawfish tails, boiled ¼ cup 35 8   [place], shelled ¼ cup 50 4   Egg 1 78 6   Ham, lean 5% 1 oz 44 7   Hamburger, lean 1 oz 56 7   Hummus ¼ cup 100 5   Milk, skim or 1%  1 cup 90 8   Milk, Fairlife non-fat 1 cup 80 13   Pork Tenderloin 1 oz 46 7   Pudding, SF 1 serv 60 2   Red beans ¼ cup 56 4   Refried beans, fat free ¼ cup 65 4   Silver, baked 1 oz 52 7   Shrimp, steamed 1 oz 28 6   Soymilk, plain ½ cup 40 3   Tilapia, white fish,  cooked 1 oz 36 8   Tofu ¼ cup 47 5   Trout 1 oz 48 7   Tuna, canned in water 1 oz 37 8   Turkey, white meat 1 oz 35 7   Veal Loin 1 oz 50 7   Yogurt, SF, frozen vanilla 6 oz 140 7   Yogurt, light 5 oz 80 5   Yogurt, Greek 5 oz  12-15     *Abbreviations: SF=sugar free, LF=low fat, FF= fat free, gms=grams  *3oz of cooked meat/protein = size of deck of cards or ladies palm   *1oz cheese = 1 inch cube or 1 slice American cheese      Lifelong Nutrition Guidelines after   Weight Loss Surgery    Weight Loss Surgery is designed to help people lose weight after previous attempts at weight loss have failed.  However, safe and successful weight loss with this procedure requires you to make a commitment.  A commitment to change current eating habits and behavior is essential to develop substantial weight loss.    As your stomach is greatly reduced following surgery (to that of a small egg), your nutritional intake is one of the most important aspects of your treatment.  Adequate nutrition helps in the healing of your incisions, preventing gastric discomfort, and in maintaining your nutritional health.    Why should you follow this diet?  You could develop nutrient deficiencies, which may consequently affect your health.  You may not achieve the maximal amount of weight loss, or the rate of weight loss may slow down.    The following guidelines have been developed to assist you in making these changes.      Eat Slowly.  Immediately after surgery the stomach is swollen and needs time to heal.  Eating too quickly may cause you to over fill your pouch and bring forth discomfort (i.e. nausea and vomiting).  Eat and Drink Small Amounts at a Time.  Learn to sip.  Try not to fill your entire mouth with food or fluid.  Use a baby spoon and a 2oz medicine cup to help determine a safe amount.  Stop Eating or Drinking When You Feel Full.  Learn to listen to your body.  If you are unable to recognize fullness, consume only the  quantity of food recommended.  Eating or drinking too much may eventually stretch your pouch and prevent you from achieving maximal weight loss.  Also, overeating may cause you to have nausea and vomiting.  Chew Food Thoroughly Before Swallowing.  Try to chew each bite 30 times before swallowing.  A big chunk of food could get caught and make you very uncomfortable.  Drink Adequate Fluids in between meals to Prevent Dehydration.  Consume at least 6 cups of liquids per day (>48 oz).  No drinking with meals!  Eat Protein Rich Foods First.  This is necessary to meet your protein needs.  Protein is necessary to promote adequate healing and to help you maintain lean body mass as you lose weight.  Aim for >80 grams of protein per day.  Keep Your Food Choices Sugar Free and Low in Fat.  Foods high in sugar and fat may cause diarrhea and abdominal discomfort, or the Dumping Syndrome.  Avoid Starchy Carbohydrates (bread, rice, pasta, corn, peas, potatoes, crackers, pretzels, chips, grits, oatmeal, dry cereals/granola, tortillas).         Common Nutritional Problems and Prevention Tips   Nausea and vomiting   Cause: overeating or eating too quickly   Prevention tip: eat slowly, chew your food very well, and stop eating as soon as you feel full   Chronic malnutrition problems   Cause: nutrients are absorbed differently following surgery   Symptoms: fatigue and aching muscles; tingling feet, calves or hands  Prevention tip: eat a healthy diet and always take your vitamin and mineral supplements   Lactose intolerance   Symptoms: gas, bloating, cramping and diarrhea after drinking milk   Prevention tips: Switch to lactose-free milk such as: Fairlife non-fat milk, unsweetened soy milk, unsweetened almond milk. Refer to lactose-free protein supplement suggestions on pg. 4.  Gas and Bloating  Cause: digestive tract changes after surgery  Prevention tips: eat slowly, avoid overeating, avoid carbonated beverages and drinking through  straws. Try switching to lactose-free protein drinks. Try using Gas-X chewables.  Temporary hair loss   Cause: rapid weight loss and/or lack of protein in the diet   Prevention tip: eat the amount of protein recommended by your Registered Dietitian   Dehydration   Cause: Not drinking enough fluids; or persistent vomiting  Symptoms: dark and strong smelling urine, dry mouth, headache and fatigue   Prevention tip: take frequent sips of liquid throughout the day   Dumping syndrome (Gastric Bypass)  Cause: food emptying too quickly from the stomach   Symptoms: diarrhea, nausea, cold sweats and light-headedness   Prevention tips: avoid consuming sugary foods or beverages, drinking fluids too soon after a meal, or eating high fat foods   Constipation   Cause: food and fiber intake are reduced following surgery   Prevention tips:   Drink at least 48 ounces water daily in addition to protein drinks  Exercise daily   Try Miralax (stool softener). No laxatives if possible.  Try a fiber supplement (Metamucil or Benefiber)                Physical Activity    Physical activity and exercise are essential to achieve and maintain your weight loss goals. Activity and exercise beginning right after your surgery will help you feel better, recover faster and minimize the likelihood of post-surgical complications. Staying active promotes mental well-being, relieves stress and reduces feelings of depression and anxiety. You feel good about your body when you exercise regularly, and therefore have a healthier body image.       Choose a form of activity or exercise that you enjoy.    Ask a friend or family member to participate in an activity or exercise with you. Think of it as a serena system.    Join an exercise club or class.   Listen to your favorite music as you exercise.    Try mall walking, aerobics, swimming or dancing.    Park 15 minutes from your destination and walk.   Use stairs instead of the elevator.    Prioritize activity  and exercise time into your schedule.    Keep a record or journal of your activity.          Required Vitamin/Mineral Supplements:    Sleeve Gastrectomy: No swallowing large whole pills for 2 weeks after surgery  Gastric Bypass: No swallowing large whole pills for 1 month after surgery    *NO gummy multivitamins due to poor quality and sugar content.  *Do NOT take calcium citrate and Iron within 2 hours of each other due to poor absorption.  *Pills may be swallowed if the size of a No. 2 pencil eraser (or smaller). They may be cut to this size with a pill cutter and swallowed if tolerated. Compare to this size:          Bariatric surgery patients will need the following Vit/Min   FOR LIFE:    Multivitamin with or without Iron, 2 per day  At least 18mg Iron, if not included in multivitamin  50mg Thiamine (Vit B1) per day  Calcium Citrate + Vit D  500mg 3 times per day OR 600mg twice per day  Vit B12 sublingual (dissolve under the tongue for 30 sec)   500mcg per day, 2500mcg per wk, or 5000mcg every 2 wks        Suggested vitamin/mineral regimen for first 2-4 weeks after surgery when you cannot swallow large whole pills:    EZ Melts Multivitamin with Iron OR Flintstones Complete chewables (not the gummies)   EZ Melts B1 (Amazon)  Nature's Way liquid Calcium Citrate + Vit D OR GNC Calcium Citrate soft chews   B12 Sublingual         Bariatric Vitamins Online:  [email or web address]        Resources for Bariatric Patients:    Before & After: Living & Eating Well After Weight Loss Surgery by Yanet Castillo. [employer], 2004.    Eating Well After Weight Loss Surgery by Padmini Escobar & Justin Low. [employer], 2004.  Weight Loss Surgery for Dummies by Myesha Burt MD, FACS. [employer], 2005.  Exodus from Obesity: The Guide to Long-Term Success After Weight Loss Surgery by Marianna Terry.  Press, 2003.  Weight Loss Surgery: Finding the Thin Person Hiding Inside You by Nel  "Allen. [employer], 2003.  The Don't Diet Live It Workbook by Irvin Carmichael & Santana Mendoza available from    The Overeater's Journal by Lakeshia Puente. [employer] also available at    Moving Away from Diets available from    Overeater's Anonymous--online, in-person, and telephone meetings available; .  Online Food Database at  to find out the calories, protein, sugar, fat, fiber, etc of any certain food.  50 Ways to Soothe Yourself Without Food by Yanet Huddleston PSY.D. [employer], 2009.     Helpful Apps for Bariatric Patients:    [employer]. [facility] Code: 57801  MyFitVericantPal  Diabetes logbook by Jamshid (track blood sugar, upload meal pics)        TEN TIPS FOR HEALTHY AND CONSCIOUS EATING  Keep track of everything you eat and drink. Write it down as soon as you swallow so you don't forget! Include the type of food or beverage, amount, time, physical feeling of hunger vs. fullness, etc.   Base meals around LEAN PROTEIN and VEGETABLES, incorporating them into main dishes and as snacks.   Buy plenty of fresh or frozen FRUITS and VEGETABLES to keep on hand, wash and chop them (if applicable) ASAP, and snack on them ANYTIME! Eat at least 2 servings of fresh fruits and 3 servings of vegetables each day.   Eat throughout the day rather than "saving" your appetite for a huge meal. Your body can only use so much fuel at a time, so extra will more likely be stored as fat! Smaller, more frequent meals (every 3-5 hours) will help keep your energy level more consistent. Start listening to your body's signals regarding hunger and fullness!   Keep "junk food" and "trigger" foods out of the house. Make a special trip to the store when you MUST have it and savor it.   Include good sources of protein with your meals: chicken, fish, shellfish, legumes, eggs, dairy products, soy products, and lean meats.   Use low fat, fat free and lean dairy and animal products. High fat animal products " "tend to have a lot of saturated fat, which promotes high blood cholesterol levels.   Choose calories you can chew - that means drinking more water instead of juice, sports drinks, regular soda, alcohol, and specialty coffees.   Shut off the TV, put down the book or newspaper, and turn off the computer whenever you eat - this includes meals and snacks. People tend to eat larger portions when snacking in front of the tube, and the foods chosen are often high in fat, sugar and calories. What's more, when you associate eating with particular activities, you may automatically look for something to eat when engaging in those activities, regardless of hunger.   Plan ahead for meals and snacks, have foods on hand to prepare them, and pack them "to go" if necessary. If you wait until you're really hungry, there may not be many healthy choices around to choose from.            (Revised 10.2022)    "

## 2024-05-06 NOTE — PROGRESS NOTES
"HISTORY OF PRESENT ILLNESS:  Zaira Dowell is a 53 y.o. female who presents to the clinic today for Follow-up    Last seen by me 11/2023.    Still with right foot and ankle pain.    SHARON  Seen in sleep clinic 1/2024. Sleep study ordered.  Prescribed CPAP.    Fibromyalgia  Seen by Dr. Mota in [place].  Prescribed Flexeril and Lyrica.     Lumbar DDD  She was seen by Dr. Jasso 2023.  MRI done and L3/4/5 medial branch blocks were done in July & August 2023.     Hip pain  Seen by Dr. Smith for trochanteric bursitis mgmt.     Shoulder pain  Evaluated by Dr. Thorpe for right adhesive capsulitis.     Depression, Bipolar disorder  Followed by psychiatry.  Managed with lithium, risperidone, sertraline, bupropion, PRN Ativan.     Obesity  S/p sleeve gastrectomy 10/2019.  Lowest weight post procedure was 248 lb.  Not compliant with bariatric diet and not exercising.  Feels being depressed makes it harder.  Declined nutrition referral.  Trying to do better with diet.    Has taken Adipex in the past.     Wt Readings from Last 3 Encounters:   05/06/24 1448 133.4 kg (294 lb 1.5 oz)   04/19/24 1115 134.4 kg (296 lb 4.8 oz)   03/23/24 1333 131.5 kg (290 lb)        Estimated body mass index is 47.82 kg/m² as calculated from the following:    Height as of 4/19/24: 5' 6" (1.676 m).    Weight as of 4/19/24: 134.4 kg (296 lb 4.8 oz).  Patient weight not recorded       Left Adrenal incidentaloma  Seen by Dr. Hernandez 2/2024.  Blood work again normal and no further work up was advised.    Varicose veins  U/S with evidence of hemodynamically significant venous reflux at the left greater saphenous vein .    PAST MEDICAL HISTORY:  Past Medical History:   Diagnosis Date    Anxiety     Arthritis     Asthma     Back pain     Bipolar I     BMI 50.0-59.9, adult     Chronic obstructive pulmonary disease, unspecified COPD type 03/21/2022    Depression     Fibromyalgia     GERD (gastroesophageal reflux disease)     HFpEF     Insomnia 11/23/2021 "    Obesity     SHARON (obstructive sleep apnea)     Tobacco dependence     Urinary incontinence        PAST SURGICAL HISTORY:  Past Surgical History:   Procedure Laterality Date    CARPAL TUNNEL RELEASE       SECTION  1993    CHOLECYSTECTOMY      COLONOSCOPY N/A 2022    Procedure: COLONOSCOPY;  Surgeon: Ernesto Auguste MD;  Location: [facility];  Service: Endoscopy;  Laterality: N/A;  fully vaccinated-GT    EPIDURAL STEROID INJECTION Bilateral 2023    Procedure: Bilateral L3, L4, L5 Medial Branch Blocks #1;  Surgeon: Lito Jasso Jr., MD;  Location: [facility];  Service: Pain Management;  Laterality: Bilateral;  @1230  No ATC or DM    EPIDURAL STEROID INJECTION Bilateral 2023    Procedure: Bilateral L3, L4, L5 Medial Branch Blocks #2;  Surgeon: Lito Jasso Jr., MD;  Location: [facility];  Service: Pain Management;  Laterality: Bilateral;  @1000  No ATC or DM    EPIDURAL STEROID INJECTION Left 2023    Procedure: Left L3, L4, L5 Radiofrequency Thermocoagulation of Medial Branches;  Surgeon: Lito Jasso Jr., MD;  Location: [facility];  Service: Pain Management;  Laterality: Left;  @0815  No ATC or DM    EPIDURAL STEROID INJECTION Right 2023    Procedure: Right L3, L4, L5 Radiofrequency Thermocoagulation of Medial Branches;  Surgeon: Lito Jasso Jr., MD;  Location: [facility];  Service: Pain Management;  Laterality: Right;  @0930  No ATC or DM  Last 1&75    ESOPHAGOGASTRODUODENOSCOPY N/A 2019    Procedure: EGD (ESOPHAGOGASTRODUODENOSCOPY);  Surgeon: Michelle Mc MD;  Location: [facility];  Service: Endoscopy;  Laterality: N/A;    HYSTERECTOMY      partial     KNEE SURGERY      LAPAROSCOPIC SLEEVE GASTRECTOMY N/A 10/18/2019    Procedure: GASTRECTOMY, SLEEVE, LAPAROSCOPIC, with intraop EGD;  Surgeon: Ean Kohli MD;  Location: [facility] OR [address] [place] [address];  Service: General;  Laterality: N/A;    LEFT HEART CATHETERIZATION Left 2023     Procedure: Left heart cath;  Surgeon: Lito Colmenares MD;  Location: [facility] CATH LAB;  Service: Cardiology;  Laterality: Left;  730am start, R rad access  RN PRE OP 11/2/23    pinched nerve      SHOULDER SURGERY      TONSILLECTOMY      WRIST SURGERY      had ganlin cyst       SOCIAL HISTORY:  Social History     Socioeconomic History    Marital status:    Tobacco Use    Smoking status: Former     Types: Vaping with nicotine    Smokeless tobacco: Never    Tobacco comments:     vaping started April 2020; QUIT cigarettes Sept 2019   Substance and Sexual Activity    Alcohol use: No    Drug use: No    Sexual activity: Yes     Partners: Male     Birth control/protection: None     Social Determinants of Health     Financial Resource Strain: Low Risk  (1/30/2024)    Overall Financial Resource Strain (CARDIA)     Difficulty of Paying Living Expenses: Not very hard   Food Insecurity: No Food Insecurity (1/30/2024)    Hunger Vital Sign     Worried About Running Out of Food in the Last Year: Never true     Ran Out of Food in the Last Year: Never true   Transportation Needs: No Transportation Needs (3/25/2024)    PRAPARE - Transportation     Lack of Transportation (Medical): No     Lack of Transportation (Non-Medical): No   Physical Activity: Inactive (1/30/2024)    Exercise Vital Sign     Days of Exercise per Week: 0 days     Minutes of Exercise per Session: 0 min   Stress: Stress Concern Present (1/30/2024)    [employer] [place] [place] [employer] - Occupational Stress Questionnaire     Feeling of Stress : Very much   Housing Stability: Low Risk  (1/30/2024)    Housing Stability Vital Sign     Unable to Pay for Housing in the Last Year: No     Number of Places Lived in the Last Year: 1     Unstable Housing in the Last Year: No       FAMILY HISTORY:  Family History   Problem Relation Name Age of Onset    Diabetes Mother Mother     [place]'s disease Mother Mother     Hypertension Mother Mother     Heart disease  Father Father     Stroke Father Father     Mental illness Father Father         ptsd    Hypertension Father Father     Diabetes Father Father     Depression Father Father     Arthritis Father Father     No Known Problems Brother      Heart disease Maternal Grandmother Grandmother     Depression Maternal Grandmother Grandmother     COPD Maternal Grandfather Grandfather     Heart disease Maternal Grandfather Grandfather     Stroke Maternal Grandfather Grandfather     Kidney disease Paternal Grandmother Grandmother     Heart disease Paternal Grandmother Grandmother     Heart disease Paternal Grandfather Grandfather     Early death Paternal Grandfather Grandfather     Hypertension Son Son     Asthma Son Son     Rectal cancer Paternal Aunt      Asthma Maternal Aunt Aunt     Diabetes Maternal Aunt Aunt     Asthma Maternal Uncle Uncle        ALLERGIES AND MEDICATIONS: updated and reviewed.  Review of patient's allergies indicates:   Allergen Reactions    Hydrocodone-acetaminophen Nausea And Vomiting     Medication List with Changes/Refills   Current Medications    ACETAMINOPHEN (TYLENOL) 500 MG TABLET    Take 1 tablet (500 mg total) by mouth every 4 (four) hours as needed for Pain or Temperature greater than (100.5 or greater).    ALBUTEROL (PROVENTIL/VENTOLIN HFA) 90 MCG/ACTUATION INHALER    Inhale 2 puffs into the lungs every 4 (four) hours as needed for Wheezing or Shortness of Breath. Rescue    ALLOPURINOL (ZYLOPRIM) 100 MG TABLET    Take 1 tablet (100 mg total) by mouth once daily.    B COMPLEX VITAMINS TABLET    Take 1 tablet by mouth once daily.    BUPROPION (WELLBUTRIN SR) 100 MG TBSR 12 HR TABLET    Take 2 tabs q am, and 1 tab daily at noon    CALCIUM CITRATE (CALCITRATE) 200 MG (950 MG) TABLET    Take 1 tablet by mouth 2 (two) times daily.    CETIRIZINE (ZYRTEC) 10 MG TABLET    Take 1 tablet (10 mg total) by mouth once daily.    COLCHICINE (COLCRYS) 0.6 MG TABLET    Take 1 tablet (0.6 mg total) by mouth daily as  needed (gout flare).    CYANOCOBALAMIN 500 MCG TABLET    Take 500 mcg by mouth once daily.    CYCLOBENZAPRINE (FLEXERIL) 5 MG TABLET    Take 1 tablet (5 mg total) by mouth 3 (three) times daily as needed for Muscle spasms.    FERROUS FUMARATE/VIT BCOMP,C (SUPER B COMPLEX ORAL)    Take 1 capsule by mouth once daily.    FUROSEMIDE (LASIX) 20 MG TABLET    Take 1 tablet (20 mg total) by mouth daily as needed (leg swelling/fluid buildup).    IBUPROFEN (ADVIL,MOTRIN) 600 MG TABLET    Take 1 tablet (600 mg total) by mouth every 6 (six) hours as needed for Pain or Temperature greater than (100.5 or greater).    LITHIUM (ESKALITH) 300 MG CAPSULE    Take 1 capsule (300 mg total) by mouth 3 (three) times daily with meals.    LORAZEPAM (ATIVAN) 0.5 MG TABLET    TAKE 2 TABLETS EVERY DAY AS DIRECTED    MECLIZINE (ANTIVERT) 25 MG TABLET    Take 1 tablet (25 mg total) by mouth every 6 (six) hours as needed for Dizziness.    MULTIVITAMIN CAPSULE    Take 1 capsule by mouth 2 (two) times daily.     OMEPRAZOLE (PRILOSEC) 40 MG CAPSULE    TAKE 1 CAPSULE TWICE DAILY BEFORE MEALS    ONDANSETRON (ZOFRAN-ODT) 8 MG TBDL    Take 1 tablet (8 mg total) by mouth 2 (two) times daily as needed (nausea).    POTASSIUM CHLORIDE (KLOR-CON) 10 MEQ TBSR    Take 10 mEq by mouth 2 (two) times daily.    PREGABALIN (LYRICA) 100 MG CAPSULE    Take 1 capsule (100 mg total) by mouth 2 (two) times daily.    PROMETHAZINE (PHENERGAN) 12.5 MG TAB    promethazine Take half tablet (oral) 2 times per day PRN - Nausea . Medication may cause drowsiness. [id number] tablet 2 times per day oral No set duration recorded No set duration amount recorded active 12.5 mg    PROMETHAZINE-DEXTROMETHORPHAN (PROMETHAZINE-DM) 6.25-15 MG/5 ML SYRP    Take 5 mLs by mouth nightly as needed (cough).    RISPERIDONE (RISPERDAL) 0.5 MG TAB    Take 1 tablet (0.5 mg total) by mouth 2 (two) times daily.    SERTRALINE (ZOLOFT) 100 MG TABLET    Take 2 tablets (200 mg total) by mouth once daily.     TOLTERODINE (DETROL LA) 4 MG 24 HR CAPSULE    Take 1 capsule (4 mg total) by mouth once daily.    TOPIRAMATE (TOPAMAX) 200 MG TAB    Take 1 tablet (200 mg total) by mouth once daily.          CARE TEAM:  Patient Care Team:  Shay Dai MD as PCP - General (Internal Medicine)  Delvin Nagy Jr., MD (Inactive) as Consulting Physician (Psychiatry)  José Hernandez MD as Consulting Physician (Endocrinology)  Brittaney Brandon NP (Inactive) as Nurse Practitioner (Urology)  Svetlana Nelson LPN as Care Coordinator  Nick Pearce as ED Navigator         REVIEW OF SYSTEMS:  Review of Systems   Constitutional:  Negative for chills and fever.   HENT:  Negative for congestion and postnasal drip.    Eyes:  Negative for photophobia and visual disturbance.   Respiratory:  Negative for cough and shortness of breath.    Cardiovascular:  Negative for chest pain and palpitations.   Gastrointestinal:  Negative for nausea and vomiting.   Genitourinary:  Negative for dysuria and frequency.   Musculoskeletal:  Positive for arthralgias. Negative for gait problem.   Neurological:  Negative for light-headedness and headaches.   Psychiatric/Behavioral:  Negative for dysphoric mood. The patient is not nervous/anxious.          PHYSICAL EXAM:   Vitals:    05/06/24 1448   BP: 118/72   Pulse: 76   Temp: 99 °F (37.2 °C)             Body mass index is 47.47 kg/m².     General appearance - alert, well appearing, and in no distress  Mental status - alert, oriented to person, place, and time  Eyes - pupils equal and reactive, extraocular eye movements intact  Chest - clear to auscultation, no wheezes, rales or rhonchi, symmetric air entry  Neurological - alert, oriented, normal speech, no focal findings or movement disorder noted  Extremities - peripheral pulses normal, no pedal edema, no clubbing or cyanosis      ASSESSMENT AND PLAN:  Foot pain, right  -     Ambulatory referral/consult to Podiatry; Future; Expected date:  05/13/2024    Class 3 severe obesity due to excess calories with serious comorbidity and body mass index (BMI) of 45.0 to 49.9 in adult  -     semaglutide, weight loss, (WEGOVY) 0.25 mg/0.5 mL PnIj; Inject 0.25 mg into the skin every 7 days.  Dispense: 2 mL; Refill: 0  -     semaglutide, weight loss, (WEGOVY) 0.5 mg/0.5 mL PnIj; Inject 0.5 mg into the skin every 7 days.  Dispense: 2 mL; Refill: 3  -     Ambulatory referral/consult to Nutrition Services; Future; Expected date: 05/13/2024    History of bariatric surgery  -     semaglutide, weight loss, (WEGOVY) 0.25 mg/0.5 mL PnIj; Inject 0.25 mg into the skin every 7 days.  Dispense: 2 mL; Refill: 0  -     semaglutide, weight loss, (WEGOVY) 0.5 mg/0.5 mL PnIj; Inject 0.5 mg into the skin every 7 days.  Dispense: 2 mL; Refill: 3  -     Ambulatory referral/consult to Nutrition Services; Future; Expected date: 05/13/2024    Idiopathic chronic gout without tophus, unspecified site  -     allopurinoL (ZYLOPRIM) 100 MG tablet; Take 1 tablet (100 mg total) by mouth once daily.  Dispense: 90 tablet; Refill: 1    Rhinitis, unspecified type  -     cetirizine (ZYRTEC) 10 MG tablet; Take 1 tablet (10 mg total) by mouth once daily.  Dispense: 30 tablet; Refill: 2    Gastroesophageal reflux disease  -     omeprazole (PRILOSEC) 40 MG capsule; TAKE 1 CAPSULE TWICE DAILY BEFORE MEALS  Dispense: 180 capsule; Refill: 3    Post-operative state  -     omeprazole (PRILOSEC) 40 MG capsule; TAKE 1 CAPSULE TWICE DAILY BEFORE MEALS  Dispense: 180 capsule; Refill: 3              Follow up 3 months or sooner as needed.

## 2024-05-07 ENCOUNTER — PATIENT MESSAGE (OUTPATIENT)
Dept: FAMILY MEDICINE | Facility: CLINIC | Age: 54
End: 2024-05-07
Payer: MEDICARE

## 2024-05-07 RX ORDER — OMEPRAZOLE 40 MG/1
CAPSULE, DELAYED RELEASE ORAL
Qty: 180 CAPSULE | Refills: 3 | Status: SHIPPED | OUTPATIENT
Start: 2024-05-07

## 2024-05-07 RX ORDER — CETIRIZINE HYDROCHLORIDE 10 MG/1
10 TABLET ORAL DAILY
Qty: 30 TABLET | Refills: 2 | Status: SHIPPED | OUTPATIENT
Start: 2024-05-07 | End: 2024-08-05

## 2024-05-07 RX ORDER — ALLOPURINOL 100 MG/1
100 TABLET ORAL DAILY
Qty: 90 TABLET | Refills: 1 | Status: SHIPPED | OUTPATIENT
Start: 2024-05-07

## 2024-05-07 NOTE — TELEPHONE ENCOUNTER
No care due was identified.  [facility] Embedded Care Due Messages. Reference number: [id number].   5/07/2024 7:45:34 AM CDT

## 2024-05-14 ENCOUNTER — PATIENT MESSAGE (OUTPATIENT)
Dept: BARIATRICS | Facility: CLINIC | Age: 54
End: 2024-05-14
Payer: MEDICARE

## 2024-05-14 ENCOUNTER — OFFICE VISIT (OUTPATIENT)
Dept: PODIATRY | Facility: CLINIC | Age: 54
End: 2024-05-14
Payer: MEDICARE

## 2024-05-14 ENCOUNTER — HOSPITAL ENCOUNTER (OUTPATIENT)
Dept: RADIOLOGY | Facility: HOSPITAL | Age: 54
Discharge: HOME | End: 2024-05-14
Attending: PODIATRIST
Payer: MEDICARE

## 2024-05-14 VITALS
SYSTOLIC BLOOD PRESSURE: 127 MMHG | BODY MASS INDEX: 47.09 KG/M2 | HEIGHT: 66 IN | WEIGHT: 293 LBS | DIASTOLIC BLOOD PRESSURE: 75 MMHG

## 2024-05-14 DIAGNOSIS — S99.921A INJURY OF RIGHT FOOT, INITIAL ENCOUNTER: ICD-10-CM

## 2024-05-14 DIAGNOSIS — M79.671 FOOT PAIN, RIGHT: Primary | ICD-10-CM

## 2024-05-14 DIAGNOSIS — M79.671 FOOT PAIN, RIGHT: ICD-10-CM

## 2024-05-14 PROCEDURE — 73630 X-RAY EXAM OF FOOT: CPT | Mod: 26,RT,, | Performed by: RADIOLOGY

## 2024-05-14 PROCEDURE — 3074F SYST BP LT 130 MM HG: CPT | Mod: CPTII,S$GLB,, | Performed by: PODIATRIST

## 2024-05-14 PROCEDURE — 3044F HG A1C LEVEL LT 7.0%: CPT | Mod: CPTII,S$GLB,, | Performed by: PODIATRIST

## 2024-05-14 PROCEDURE — 3078F DIAST BP <80 MM HG: CPT | Mod: CPTII,S$GLB,, | Performed by: PODIATRIST

## 2024-05-14 PROCEDURE — 1159F MED LIST DOCD IN RCRD: CPT | Mod: CPTII,S$GLB,, | Performed by: PODIATRIST

## 2024-05-14 PROCEDURE — 99999 PR PBB SHADOW E&M-EST. PATIENT-LVL V: CPT | Mod: PBBFAC,,, | Performed by: PODIATRIST

## 2024-05-14 PROCEDURE — 73630 X-RAY EXAM OF FOOT: CPT | Mod: TC,FY,PO,RT

## 2024-05-14 PROCEDURE — 3008F BODY MASS INDEX DOCD: CPT | Mod: CPTII,S$GLB,, | Performed by: PODIATRIST

## 2024-05-14 PROCEDURE — 99214 OFFICE O/P EST MOD 30 MIN: CPT | Mod: S$GLB,,, | Performed by: PODIATRIST

## 2024-05-14 RX ORDER — DICLOFENAC SODIUM 10 MG/G
2 GEL TOPICAL 4 TIMES DAILY
Qty: 450 G | Refills: 3 | Status: SHIPPED | OUTPATIENT
Start: 2024-05-14

## 2024-05-14 NOTE — PROGRESS NOTES
Subjective:     Patient ID: Zaira Dowell is a 53 y.o. female.    Chief Complaint: Foot Injury (Right foot toes)    Zaira is a 53 y.o. female who presents to the podiatry clinic  with complaint of  right foot pain. Onset of the symptoms was several weeks ago. Precipitating event: reports first falling up 2 stairs in her home then falling down 2 stairs in her home in the past several weeks. . Current symptoms include: ability to bear weight, but with some pain. Aggravating factors: any weight bearing. Symptoms have progressed to a point and plateaued. Patient has had no prior foot problems.     Review of Systems   Constitutional: Negative for chills.   Cardiovascular:  Negative for chest pain and claudication.   Respiratory:  Negative for cough.    Skin:  Positive for color change, dry skin and nail changes.   Musculoskeletal:  Positive for joint pain.   Gastrointestinal:  Negative for nausea.   Neurological:  Positive for paresthesias. Negative for numbness.   Psychiatric/Behavioral:  The patient is not nervous/anxious.         Objective:     Physical Exam  Constitutional:       Appearance: She is well-developed.      Comments: Oriented to time, place, and person.   Cardiovascular:      Comments: DP and PT pulses are palpable bilaterally. 3 sec capillary refill time and toes and feet are warm to touch proximally .  There is  hair growth on the feet and toes b/l. There is no edema b/l. No spider veins or varicosities present b/l.     Musculoskeletal:      Comments: Equinus noted b/l ankles with < 10 deg DF noted. MMT 5/5 in DF/PF/Inv/Ev resistance with no reproduction of pain in any direction. Passive range of motion of ankle and pedal joints is painless b/l.    Right 2nd toe pain upon ROM DF   Feet:      Right foot:      Skin integrity: No callus or dry skin.      Left foot:      Skin integrity: No callus or dry skin.   Lymphadenopathy:      Comments: Negative lymphadenopathy bilateral popliteal fossa and  tarsal tunnel.   Skin:     Comments: No open lesions, lacerations or wounds noted.Interdigital spaces clean, dry and intact b/l. No erythema noted to b/l foot.  Nails normal color and trophic qualities.     Neurological:      Mental Status: She is alert.      Comments: Light touch, proprioception, and sharp/dull sensation are all intact bilaterally. Protective threshold with the Waldron-Wienstein monofilament is intact bilaterally.    Psychiatric:         Behavior: Behavior is cooperative.           Assessment:      Encounter Diagnosis   Name Primary?    Foot pain, right Yes     Plan:     Zaira was seen today for foot injury.    Diagnoses and all orders for this visit:    Foot pain, right  -     Ambulatory referral/consult to Podiatry  -     X-Ray Foot Complete Right; Future    Other orders  -     diclofenac sodium (VOLTAREN ARTHRITIS PAIN) 1 % Gel; Apply 2 g topically 4 (four) times daily.      I counseled the patient on her conditions, their implications and medical management.        Xray right foot ordered    Rx Voltaren gel to be applied to affected area up to 3-4 x daily as needed for pain    CAM boot dispensed and applied to affected foot. Advised to use at all times while weightbearing and ambulating even for few minutes. Advised to use sneaker style shoe in opposite foot to maintain balance. Ok to remove  at night but reapply if patient is to get up in the middle of the night. Verbalized understanding. Advised to use for 3 -4 weeks.     Discussed conservative treatment with shoes of adequate dimensions, material, and style to alleviate symptoms and delay or prevent surgical intervention.    RTC PRN

## 2024-06-24 ENCOUNTER — PATIENT MESSAGE (OUTPATIENT)
Dept: NUTRITION | Facility: CLINIC | Age: 54
End: 2024-06-24
Payer: MEDICARE

## 2024-07-23 ENCOUNTER — OFFICE VISIT (OUTPATIENT)
Dept: FAMILY MEDICINE | Facility: CLINIC | Age: 54
End: 2024-07-23
Payer: MEDICARE

## 2024-07-23 VITALS
SYSTOLIC BLOOD PRESSURE: 130 MMHG | OXYGEN SATURATION: 96 % | RESPIRATION RATE: 18 BRPM | HEIGHT: 66 IN | WEIGHT: 293 LBS | HEART RATE: 65 BPM | BODY MASS INDEX: 47.09 KG/M2 | TEMPERATURE: 98 F | DIASTOLIC BLOOD PRESSURE: 78 MMHG

## 2024-07-23 DIAGNOSIS — M79.7 FIBROMYALGIA: ICD-10-CM

## 2024-07-23 DIAGNOSIS — K21.9 GASTROESOPHAGEAL REFLUX DISEASE, UNSPECIFIED WHETHER ESOPHAGITIS PRESENT: ICD-10-CM

## 2024-07-23 DIAGNOSIS — E27.8 ADRENAL INCIDENTALOMA: ICD-10-CM

## 2024-07-23 DIAGNOSIS — Z00.00 ENCOUNTER FOR PREVENTIVE HEALTH EXAMINATION: Primary | ICD-10-CM

## 2024-07-23 DIAGNOSIS — F13.20 BENZODIAZEPINE DEPENDENCE: ICD-10-CM

## 2024-07-23 DIAGNOSIS — F31.9 BIPOLAR 1 DISORDER: ICD-10-CM

## 2024-07-23 DIAGNOSIS — N39.41 URGE INCONTINENCE OF URINE: ICD-10-CM

## 2024-07-23 PROBLEM — E66.01 MORBID OBESITY, UNSPECIFIED OBESITY TYPE: Status: RESOLVED | Noted: 2024-01-11 | Resolved: 2024-07-23

## 2024-07-23 PROBLEM — F41.9 ANXIETY: Status: RESOLVED | Noted: 2017-12-14 | Resolved: 2024-07-23

## 2024-07-23 PROBLEM — M62.81 WEAKNESS OF TRUNK MUSCULATURE: Status: RESOLVED | Noted: 2022-04-27 | Resolved: 2024-07-23

## 2024-07-23 PROBLEM — G56.22 ULNAR NEUROPATHY OF LEFT UPPER EXTREMITY: Status: RESOLVED | Noted: 2020-04-14 | Resolved: 2024-07-23

## 2024-07-23 PROBLEM — M70.62 TROCHANTERIC BURSITIS OF BOTH HIPS: Status: RESOLVED | Noted: 2019-01-10 | Resolved: 2024-07-23

## 2024-07-23 PROBLEM — M25.50 ARTHRALGIA OF MULTIPLE JOINTS: Status: ACTIVE | Noted: 2019-01-21

## 2024-07-23 PROBLEM — M53.86 DECREASED RANGE OF MOTION OF LUMBAR SPINE: Status: RESOLVED | Noted: 2022-04-27 | Resolved: 2024-07-23

## 2024-07-23 PROBLEM — R07.2 PRECORDIAL PAIN: Status: RESOLVED | Noted: 2023-11-07 | Resolved: 2024-07-23

## 2024-07-23 PROBLEM — M70.61 TROCHANTERIC BURSITIS OF BOTH HIPS: Status: RESOLVED | Noted: 2019-01-10 | Resolved: 2024-07-23

## 2024-07-23 PROBLEM — M70.60 TROCHANTERIC BURSITIS: Status: RESOLVED | Noted: 2019-01-21 | Resolved: 2024-07-23

## 2024-07-23 PROBLEM — F32.A DEPRESSION: Status: RESOLVED | Noted: 2017-12-14 | Resolved: 2024-07-23

## 2024-07-23 PROCEDURE — 1159F MED LIST DOCD IN RCRD: CPT | Mod: CPTII,S$GLB,, | Performed by: NURSE PRACTITIONER

## 2024-07-23 PROCEDURE — G0439 PPPS, SUBSEQ VISIT: HCPCS | Mod: S$GLB,,, | Performed by: NURSE PRACTITIONER

## 2024-07-23 PROCEDURE — 1160F RVW MEDS BY RX/DR IN RCRD: CPT | Mod: CPTII,S$GLB,, | Performed by: NURSE PRACTITIONER

## 2024-07-23 PROCEDURE — 3044F HG A1C LEVEL LT 7.0%: CPT | Mod: CPTII,S$GLB,, | Performed by: NURSE PRACTITIONER

## 2024-07-23 PROCEDURE — 99999 PR PBB SHADOW E&M-EST. PATIENT-LVL V: CPT | Mod: PBBFAC,,, | Performed by: NURSE PRACTITIONER

## 2024-07-23 PROCEDURE — 3075F SYST BP GE 130 - 139MM HG: CPT | Mod: CPTII,S$GLB,, | Performed by: NURSE PRACTITIONER

## 2024-07-23 PROCEDURE — 3078F DIAST BP <80 MM HG: CPT | Mod: CPTII,S$GLB,, | Performed by: NURSE PRACTITIONER

## 2024-07-23 RX ORDER — ACETAZOLAMIDE 250 MG/1
250 TABLET ORAL
COMMUNITY
Start: 2024-06-04

## 2024-07-23 RX ORDER — PROMETHAZINE HYDROCHLORIDE 25 MG/1
25 TABLET ORAL
COMMUNITY
Start: 2024-04-23

## 2024-07-23 NOTE — PATIENT INSTRUCTIONS
Counseling and Referral of Other Preventative  (Italic type indicates deductible and co-insurance are waived)    Patient Name: Zaira Dowell  Today's Date: 7/23/2024    Health Maintenance       Date Due Completion Date    COVID-19 Vaccine (4 - 2023-24 season) 09/01/2023 7/28/2022    Influenza Vaccine (1) 09/01/2024 9/21/2023    Mammogram 01/29/2025 1/29/2024    Hemoglobin A1c (Diabetic Prevention Screening) 02/28/2027 2/29/2024    Colorectal Cancer Screening 04/25/2027 4/25/2022    Lipid Panel 10/30/2028 10/30/2023    TETANUS VACCINE 01/27/2033 1/27/2023    Pneumococcal Vaccines (Age 0-64) (3 of 3 - PPSV23 or PCV20) 11/24/2036 6/28/2022        No orders of the defined types were placed in this encounter.    The following information is provided to all patients.  This information is to help you find resources for any of the problems found today that may be affecting your health:                  Living healthy guide: www.[facility].[place].gov      Understanding Diabetes: www.diabetes.org      Eating healthy: www.cdc.gov/healthyweight      [facility] home safety checklist: www.cdc.gov/steadi/patient.html      Agency on Aging: www.goea.[place].gov      Alcoholics anonymous (AA): www.aa.org      Physical Activity: www.aneesh.nih.gov/yx4hylh      Tobacco use: www.quitwithusla.org

## 2024-07-25 PROBLEM — F13.20 BENZODIAZEPINE DEPENDENCE: Status: ACTIVE | Noted: 2024-07-25

## 2024-07-29 PROBLEM — R32 URINARY INCONTINENCE: Status: ACTIVE | Noted: 2024-07-29

## 2024-07-29 NOTE — PROGRESS NOTES
"  Zaira Dowell presented for a  Medicare AWV and comprehensive Health Risk Assessment today. The following components were reviewed and updated:    Medical history  Family History  Social history  Allergies and Current Medications  Health Risk Assessment  Health Maintenance  Care Team         ** See Completed Assessments for Annual Wellness Visit within the encounter summary.**         The following assessments were completed:  Living Situation  CAGE  Depression Screening  Timed Get Up and Go  Whisper Test  Cognitive Function Screening  Nutrition Screening  ADL Screening  PAQ Screening      Opioid documentation:  Patient does not have a current opioid prescription.        Vitals:    07/23/24 0851   BP: 130/78   Pulse: 65   Resp: 18   Temp: 97.7 °F (36.5 °C)   TempSrc: Oral   SpO2: 96%   Weight: 133.2 kg (293 lb 10.4 oz)   Height: 5' 6" (1.676 m)     Body mass index is 47.4 kg/m².  Physical Exam  Vitals and nursing note reviewed.   Constitutional:       General: She is not in acute distress.     Appearance: Normal appearance. She is well-developed and well-groomed. She is not ill-appearing.   HENT:      Head: Normocephalic and atraumatic.      Right Ear: External ear normal.      Left Ear: External ear normal.      Nose: Nose normal.      Mouth/Throat:      Lips: Pink.      Mouth: Mucous membranes are moist.   Eyes:      General: Lids are normal. Vision grossly intact. Gaze aligned appropriately.      Conjunctiva/sclera: Conjunctivae normal.   Neck:      Trachea: Phonation normal.   Pulmonary:      Effort: Pulmonary effort is normal. No accessory muscle usage or respiratory distress.   Abdominal:      General: Abdomen is flat. There is no distension.   Musculoskeletal:      Cervical back: Neck supple.   Skin:     General: Skin is warm and dry.      Findings: No rash.   Neurological:      General: No focal deficit present.      Mental Status: She is alert and oriented to person, place, and time. Mental status is at " baseline.      Motor: No abnormal muscle tone.      Gait: Gait normal.   Psychiatric:         Attention and Perception: Attention and perception normal.         Mood and Affect: Mood and affect normal.         Speech: Speech normal.         Behavior: Behavior normal. Behavior is cooperative.         Thought Content: Thought content normal.         Cognition and Memory: Cognition and memory normal.         Judgment: Judgment normal.             Diagnoses and health risks identified today and associated recommendations/orders:    1. Encounter for preventive health examination  Health maintenance reviewed and up to date, will f/u with PCP for routine preventative care  Review for Opioid Screening: Pt does not have Rx for Opioids   Review for Substance Use Disorders: Patient does use other controlled substance, on chronic benzo therapy    2. Bipolar 1 disorder  Mood stable on current medication regimen, is under psychiatry care   Instructed patient to contact office or on-call physician promptly should condition worsen or any new symptoms appear and provided on-call telephone numbers. IF THE PATIENT HAS ANY SUICIDAL OR HOMICIDAL IDEATIONS, CALL THE OFFICE, DISCUSS WITH A SUPPORT MEMBER, OR GO TO THE ER IMMEDIATELY. Patient was agreeable with this plan.    3. Benzodiazepine dependence  On lorazepam for bipolar disorder, closely managed by psychiatry    4. Adrenal incidentaloma  BP WNL and stable with chronic diuretic therapy daily  Recommendations: decrease sodium in the diet, elevate feet above the level of the heart whenever possible, increase physical activity, use of compression stockings, and weight loss.  The patient was also instructed to call IMMEDIATELY (i.e., day or night) if any cardiopulmonary symptoms occur, especially chest pain, shortness of breath, dyspnea on exertion, paroxysmal nocturnal dyspnea, or orthopnea, and these were explained.    5. Fibromyalgia  Chronic pain controlled with lyrica daily  without mood exacerbation, no longer seeing rheumatology and doing well under psychiatry care    6. Urge incontinence of urine  Stable with detrol nightly, no longer seeing urology, may benefit from pelvic floor therapy if worsen    7. Gastroesophageal reflux disease, unspecified whether esophagitis present  Nonpharmacologic treatments were discussed including: eating smaller meals, elevation of the head of bed at night, avoidance of caffeine, chocolate, nicotine and peppermint, and avoiding tight fitting clothing.      Provided Zaira with a 5-10 year written screening schedule and personal prevention plan. Recommendations were developed using the USPSTF age appropriate recommendations. Education, counseling, and referrals were provided as needed. After Visit Summary printed and given to patient which includes a list of additional screenings\tests needed.    Follow up in about 1 year (around 7/23/2025).    Ellyn Espinal NP

## 2024-08-06 ENCOUNTER — OFFICE VISIT (OUTPATIENT)
Dept: FAMILY MEDICINE | Facility: CLINIC | Age: 54
End: 2024-08-06
Payer: MEDICARE

## 2024-08-06 ENCOUNTER — OFFICE VISIT (OUTPATIENT)
Dept: PSYCHIATRY | Facility: CLINIC | Age: 54
End: 2024-08-06
Payer: MEDICARE

## 2024-08-06 VITALS
HEIGHT: 66 IN | BODY MASS INDEX: 47.09 KG/M2 | HEART RATE: 80 BPM | SYSTOLIC BLOOD PRESSURE: 120 MMHG | DIASTOLIC BLOOD PRESSURE: 72 MMHG | OXYGEN SATURATION: 96 % | TEMPERATURE: 98 F | WEIGHT: 293 LBS

## 2024-08-06 VITALS
DIASTOLIC BLOOD PRESSURE: 72 MMHG | WEIGHT: 291.13 LBS | HEART RATE: 66 BPM | SYSTOLIC BLOOD PRESSURE: 134 MMHG | BODY MASS INDEX: 46.99 KG/M2

## 2024-08-06 DIAGNOSIS — E66.01 OBESITY, MORBID, BMI 40.0-49.9: ICD-10-CM

## 2024-08-06 DIAGNOSIS — E66.01 CLASS 3 SEVERE OBESITY DUE TO EXCESS CALORIES WITH SERIOUS COMORBIDITY AND BODY MASS INDEX (BMI) OF 45.0 TO 49.9 IN ADULT: Primary | ICD-10-CM

## 2024-08-06 DIAGNOSIS — T50.905A WEIGHT GAIN DUE TO MEDICATION: ICD-10-CM

## 2024-08-06 DIAGNOSIS — R63.5 WEIGHT GAIN DUE TO MEDICATION: ICD-10-CM

## 2024-08-06 DIAGNOSIS — N90.7 LABIAL CYST: ICD-10-CM

## 2024-08-06 DIAGNOSIS — F31.9 BIPOLAR 1 DISORDER: ICD-10-CM

## 2024-08-06 DIAGNOSIS — R79.9 ABNORMAL FINDING OF BLOOD CHEMISTRY, UNSPECIFIED: ICD-10-CM

## 2024-08-06 DIAGNOSIS — G93.2 IIH (IDIOPATHIC INTRACRANIAL HYPERTENSION): ICD-10-CM

## 2024-08-06 DIAGNOSIS — Z86.39 H/O: OBESITY: ICD-10-CM

## 2024-08-06 DIAGNOSIS — G47.33 OSA (OBSTRUCTIVE SLEEP APNEA): ICD-10-CM

## 2024-08-06 DIAGNOSIS — F41.9 ANXIETY DISORDER, UNSPECIFIED TYPE: ICD-10-CM

## 2024-08-06 DIAGNOSIS — Z98.84 HISTORY OF BARIATRIC SURGERY: ICD-10-CM

## 2024-08-06 DIAGNOSIS — Z00.00 HEALTHCARE MAINTENANCE: ICD-10-CM

## 2024-08-06 PROCEDURE — 3008F BODY MASS INDEX DOCD: CPT | Mod: CPTII,S$GLB,, | Performed by: NURSE PRACTITIONER

## 2024-08-06 PROCEDURE — 99999 PR PBB SHADOW E&M-EST. PATIENT-LVL V: CPT | Mod: PBBFAC,,, | Performed by: INTERNAL MEDICINE

## 2024-08-06 PROCEDURE — 3008F BODY MASS INDEX DOCD: CPT | Mod: CPTII,S$GLB,, | Performed by: INTERNAL MEDICINE

## 2024-08-06 PROCEDURE — 3044F HG A1C LEVEL LT 7.0%: CPT | Mod: CPTII,S$GLB,, | Performed by: NURSE PRACTITIONER

## 2024-08-06 PROCEDURE — 3075F SYST BP GE 130 - 139MM HG: CPT | Mod: CPTII,S$GLB,, | Performed by: NURSE PRACTITIONER

## 2024-08-06 PROCEDURE — 99214 OFFICE O/P EST MOD 30 MIN: CPT | Mod: S$GLB,,, | Performed by: INTERNAL MEDICINE

## 2024-08-06 PROCEDURE — 3074F SYST BP LT 130 MM HG: CPT | Mod: CPTII,S$GLB,, | Performed by: INTERNAL MEDICINE

## 2024-08-06 PROCEDURE — 1159F MED LIST DOCD IN RCRD: CPT | Mod: CPTII,S$GLB,, | Performed by: INTERNAL MEDICINE

## 2024-08-06 PROCEDURE — 99999 PR PBB SHADOW E&M-EST. PATIENT-LVL II: CPT | Mod: PBBFAC,,, | Performed by: NURSE PRACTITIONER

## 2024-08-06 PROCEDURE — 99214 OFFICE O/P EST MOD 30 MIN: CPT | Mod: S$GLB,,, | Performed by: NURSE PRACTITIONER

## 2024-08-06 PROCEDURE — 3078F DIAST BP <80 MM HG: CPT | Mod: CPTII,S$GLB,, | Performed by: INTERNAL MEDICINE

## 2024-08-06 PROCEDURE — 3044F HG A1C LEVEL LT 7.0%: CPT | Mod: CPTII,S$GLB,, | Performed by: INTERNAL MEDICINE

## 2024-08-06 PROCEDURE — 3078F DIAST BP <80 MM HG: CPT | Mod: CPTII,S$GLB,, | Performed by: NURSE PRACTITIONER

## 2024-08-06 PROCEDURE — 1160F RVW MEDS BY RX/DR IN RCRD: CPT | Mod: CPTII,S$GLB,, | Performed by: INTERNAL MEDICINE

## 2024-08-06 RX ORDER — LITHIUM CARBONATE 300 MG/1
300 CAPSULE ORAL
Qty: 270 CAPSULE | Refills: 1 | Status: SHIPPED | OUTPATIENT
Start: 2024-08-06 | End: 2024-11-04

## 2024-08-06 RX ORDER — LORAZEPAM 0.5 MG/1
TABLET ORAL
Qty: 60 TABLET | Refills: 2 | Status: SHIPPED | OUTPATIENT
Start: 2024-08-06

## 2024-08-06 RX ORDER — TIRZEPATIDE 2.5 MG/.5ML
2.5 INJECTION, SOLUTION SUBCUTANEOUS
Qty: 4 PEN | Refills: 0 | Status: SHIPPED | OUTPATIENT
Start: 2024-08-06

## 2024-08-06 RX ORDER — METFORMIN HYDROCHLORIDE 500 MG/1
500 TABLET, EXTENDED RELEASE ORAL
Qty: 30 TABLET | Refills: 5 | Status: SHIPPED | OUTPATIENT
Start: 2024-08-06 | End: 2025-02-02

## 2024-08-06 RX ORDER — TIRZEPATIDE 5 MG/.5ML
5 INJECTION, SOLUTION SUBCUTANEOUS
Qty: 2 ML | Refills: 3 | Status: SHIPPED | OUTPATIENT
Start: 2024-08-26

## 2024-08-06 RX ORDER — RISPERIDONE 0.5 MG/1
0.5 TABLET ORAL 2 TIMES DAILY
Qty: 180 TABLET | Refills: 1 | Status: SHIPPED | OUTPATIENT
Start: 2024-08-06

## 2024-08-06 RX ORDER — SERTRALINE HYDROCHLORIDE 100 MG/1
200 TABLET, FILM COATED ORAL DAILY
Qty: 180 TABLET | Refills: 1 | Status: SHIPPED | OUTPATIENT
Start: 2024-08-06

## 2024-08-06 RX ORDER — BUPROPION HYDROCHLORIDE 100 MG/1
TABLET, EXTENDED RELEASE ORAL
Qty: 270 TABLET | Refills: 1 | Status: SHIPPED | OUTPATIENT
Start: 2024-08-06

## 2024-08-08 ENCOUNTER — LAB VISIT (OUTPATIENT)
Dept: LAB | Facility: HOSPITAL | Age: 54
End: 2024-08-08
Attending: NURSE PRACTITIONER
Payer: MEDICARE

## 2024-08-08 DIAGNOSIS — F31.9 BIPOLAR 1 DISORDER: ICD-10-CM

## 2024-08-08 LAB — LITHIUM SERPL-SCNC: 0.6 MMOL/L (ref 0.6–1.2)

## 2024-08-08 PROCEDURE — 36415 COLL VENOUS BLD VENIPUNCTURE: CPT | Performed by: NURSE PRACTITIONER

## 2024-08-08 PROCEDURE — 80178 ASSAY OF LITHIUM: CPT | Performed by: NURSE PRACTITIONER

## 2024-08-12 ENCOUNTER — OFFICE VISIT (OUTPATIENT)
Dept: RHEUMATOLOGY | Facility: CLINIC | Age: 54
End: 2024-08-12
Payer: MEDICARE

## 2024-08-12 VITALS
OXYGEN SATURATION: 98 % | WEIGHT: 289.69 LBS | SYSTOLIC BLOOD PRESSURE: 129 MMHG | BODY MASS INDEX: 46.56 KG/M2 | HEIGHT: 66 IN | RESPIRATION RATE: 18 BRPM | DIASTOLIC BLOOD PRESSURE: 81 MMHG | HEART RATE: 86 BPM | TEMPERATURE: 98 F

## 2024-08-12 DIAGNOSIS — Z71.89 COUNSELING AND COORDINATION OF CARE: ICD-10-CM

## 2024-08-12 DIAGNOSIS — M15.9 PRIMARY OSTEOARTHRITIS INVOLVING MULTIPLE JOINTS: ICD-10-CM

## 2024-08-12 DIAGNOSIS — E66.01 CLASS 3 SEVERE OBESITY DUE TO EXCESS CALORIES WITHOUT SERIOUS COMORBIDITY WITH BODY MASS INDEX (BMI) OF 45.0 TO 49.9 IN ADULT: ICD-10-CM

## 2024-08-12 DIAGNOSIS — E66.01 MORBID OBESITY: ICD-10-CM

## 2024-08-12 DIAGNOSIS — M79.7 FIBROMYALGIA: Primary | ICD-10-CM

## 2024-08-12 PROCEDURE — 99999 PR PBB SHADOW E&M-EST. PATIENT-LVL V: CPT | Mod: PBBFAC,,, | Performed by: STUDENT IN AN ORGANIZED HEALTH CARE EDUCATION/TRAINING PROGRAM

## 2024-08-12 PROCEDURE — 3008F BODY MASS INDEX DOCD: CPT | Mod: CPTII,S$GLB,, | Performed by: STUDENT IN AN ORGANIZED HEALTH CARE EDUCATION/TRAINING PROGRAM

## 2024-08-12 PROCEDURE — 99214 OFFICE O/P EST MOD 30 MIN: CPT | Mod: S$GLB,,, | Performed by: STUDENT IN AN ORGANIZED HEALTH CARE EDUCATION/TRAINING PROGRAM

## 2024-08-12 PROCEDURE — 3044F HG A1C LEVEL LT 7.0%: CPT | Mod: CPTII,S$GLB,, | Performed by: STUDENT IN AN ORGANIZED HEALTH CARE EDUCATION/TRAINING PROGRAM

## 2024-08-12 PROCEDURE — 3074F SYST BP LT 130 MM HG: CPT | Mod: CPTII,S$GLB,, | Performed by: STUDENT IN AN ORGANIZED HEALTH CARE EDUCATION/TRAINING PROGRAM

## 2024-08-12 PROCEDURE — 3079F DIAST BP 80-89 MM HG: CPT | Mod: CPTII,S$GLB,, | Performed by: STUDENT IN AN ORGANIZED HEALTH CARE EDUCATION/TRAINING PROGRAM

## 2024-08-12 NOTE — PATIENT INSTRUCTIONS
Will send a message with instructions to transition from Lyrica to gabapentin   Include these anti-Inflammatory foods:  ? Fresh vegetables (all kinds): Aim for a variety of types and colours (a rainbow  of veggies provides phytonutrients) - with a minimum of four to five servings  per day - especially dark, leafy greens (spinach, kale, Asian greens) broccoli  and cauliflower, Brussel sprouts, beetroot  ? Whole pieces of fruit (not juice that strips them of fibre): Three to four  servings per day is a good amount for most people, especially berries and  cherries  ? Fatty fish - such as salmon, trout, sardines, mackerel & tuna  ? Herbs & spices: turmeric, jennifer, basil, oregano, thyme, cinnamon etc.,  ? Healthy fats: extra virgin olive oil is the best option, avocado oil and the fats  found in seeds, nuts, fish  ? Nuts/seeds - such as walnuts, cashews, almonds, pistachios, pine nuts, herson,  hemp  ? Whole grains - brown rice, amaranth, buckwheat, and quinoa  ? Legumes/beans: especially black beans, black-eyed peas, chickpeas, lentils,  red kidney  ? Drinks: water, green tea and organic coffee in moderation  Avoid these inflammatory / processed foods  ? Processed meats - sausages and cold cut meats - ham, salami etc.  ? Refined sugars - found in soft drinks, cookies, cake, lollies, ice cream, some  breakfast cereals  ? Trans fats - found in deep fried foods, fast foods, commercially baked goods  ? Processed snack foods - such as chips and crackers  ? Gluten, white bread & pasta & too many carbohydrates  ? Soybean oil and vegetable oil  ? Alcohol in excessive quantities  www.arthritisnsw.org.au  Easy Food Swaps & Meal Ideas  Breakfast  Instead of white bread (crumpets and English muffins), butter, jam, Nutella, processed  breakfast cereals, pancakes and chin, try:  ? Avocado, nut spread (not peanut), banana/cinnamon on rye bread  ? Have porridge (whole oats) & berries- flash with a small amount of maple  syrup or  honey  ? Organic scrambled / boiled eggs with herbs or turmeric  ? Avocado & smoked salmon on whole grain bread  ? Granola with Greek yoghurt and berries  Lunch  Instead of hot chips, frozen meals, white bread sandwiches (cold cuts),  pasta/potato/Caesar salads try:  ? Vegetable frittata with salad  ? Salads with a variety of fruit and vegetables - also add brown rice, quinoa and  nuts to be more filling - dress with oil and vinegar and spices (not store-bought  dressings); replace lettuce with spinach  ? [place] cakes & salad  ? Lettuce wraps with salmon or trout, herbs and carrot/cucumber  Dinner  Instead of pizza, pasta, hamburgers, red meat roasts try:  ? Try one-tray bakes with fish, lean chicken and vegies (less washing up!)  ? Have minestrone soup instead of pumpkin soup - use a variety of vegies & herbs  - count how many colours and types you can get in one soup  ? Replace side serves of pasta, white rice and potatoes with more veggies, salad  or whole grains  ? [place] fish and vegies on the BBQ and service with a salad  ? Asian soups with salmon and greens  Snacks  Instead of chips, biscuits, cakes, chocolate, try:  ? A handful of nuts - roast them or buy roasted for some extra crunch and flavour  ? Kale chips - kale, olive oil and a small sprinkle of salt & roast in the oven  ? Toasted emanuel spread, carrots, cucumber with hummus or avocado dip  ? Mixed berries and Greek yoghurt  ? Grab a piece of fruit  Drinks  Instead of soft drinks and excessive alcohol, try:  ? Water with a squeeze of citrus or flavoured with cut up fruit  ? Mineral water  ? Green tea and organic coffee in moderation
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
## 2024-08-20 NOTE — PROGRESS NOTES
HISTORY OF PRESENT ILLNESS:    Zaira Dowell is a 53 y.o. female,   presents today for her routine visit. She is s/p hysterectomy. Reports GYN complaints - LABIAL CYST. Reports hot flashes and mood changes in the last 6 months, some vaginal dryenss. Denies vaginal bleeding. The patient participates in regular exercise: no.  The patient does not smoke.  The patient wears seatbelts.   Pt denies any domestic violence.  yes Tattoos/blood transfusions.     SCREENING:  PAP: n/a - hyst   MAMMOGRAM:  , TC Score:  5.15 %   COLONOSCOPY: due    DEXA: n/a     GYN FH:  Breast cancer: none  Colon cancer: none  Ovarian cancer: none  Endometrial cancer: none    Past Medical History:   Diagnosis Date    Anxiety     Arthritis     Asthma     Back pain     Bipolar I     BMI 50.0-59.9, adult     Chronic obstructive pulmonary disease, unspecified COPD type 2022    Depression     Fibromyalgia     GERD (gastroesophageal reflux disease)     HFpEF     Insomnia 2021    Obesity     SHARON (obstructive sleep apnea)     Tobacco dependence     Urinary incontinence        Past Surgical History:   Procedure Laterality Date    CARPAL TUNNEL RELEASE       SECTION  1993    CHOLECYSTECTOMY      COLONOSCOPY N/A 2022    Procedure: COLONOSCOPY;  Surgeon: Ernesto Auguste MD;  Location: [facility] ENDO;  Service: Endoscopy;  Laterality: N/A;  fully vaccinated-GT    EPIDURAL STEROID INJECTION Bilateral 2023    Procedure: Bilateral L3, L4, L5 Medial Branch Blocks #1;  Surgeon: Lito Jasso Jr., MD;  Location: [facility] ENDO;  Service: Pain Management;  Laterality: Bilateral;  @1230  No ATC or DM    EPIDURAL STEROID INJECTION Bilateral 2023    Procedure: Bilateral L3, L4, L5 Medial Branch Blocks #2;  Surgeon: Lito Jasso Jr., MD;  Location: [facility] ENDO;  Service: Pain Management;  Laterality: Bilateral;  @1000  No ATC or DM    EPIDURAL STEROID INJECTION Left 2023    Procedure: Left L3,  L4, L5 Radiofrequency Thermocoagulation of Medial Branches;  Surgeon: Lito Jasso Jr., MD;  Location: [facility] ENDO;  Service: Pain Management;  Laterality: Left;  @0815  No ATC or DM    EPIDURAL STEROID INJECTION Right 08/30/2023    Procedure: Right L3, L4, L5 Radiofrequency Thermocoagulation of Medial Branches;  Surgeon: Lito Jasso Jr., MD;  Location: [facility] ENDO;  Service: Pain Management;  Laterality: Right;  @0930  No ATC or DM  Last 1&75    ESOPHAGOGASTRODUODENOSCOPY N/A 05/21/2019    Procedure: EGD (ESOPHAGOGASTRODUODENOSCOPY);  Surgeon: Michelle Mc MD;  Location: [facility] ENDO;  Service: Endoscopy;  Laterality: N/A;    HYSTERECTOMY      partial     KNEE SURGERY      LAPAROSCOPIC SLEEVE GASTRECTOMY N/A 10/18/2019    Procedure: GASTRECTOMY, SLEEVE, LAPAROSCOPIC, with intraop EGD;  Surgeon: Ean Kohli MD;  Location: [facility] OR [address] [place] [address];  Service: General;  Laterality: N/A;    LEFT HEART CATHETERIZATION Left 11/7/2023    Procedure: Left heart cath;  Surgeon: Lito Colmenares MD;  Location: [facility] CATH LAB;  Service: Cardiology;  Laterality: Left;  730am start, R rad access  RN PRE OP 11/2/23    pinched nerve      SHOULDER SURGERY      TONSILLECTOMY      WRIST SURGERY      had ganlin cyst       MEDICATIONS AND ALLERGIES:      Current Outpatient Medications:     acetaminophen (TYLENOL) 500 MG tablet, Take 1 tablet (500 mg total) by mouth every 4 (four) hours as needed for Pain or Temperature greater than (100.5 or greater)., Disp: 30 tablet, Rfl: 0    acetaZOLAMIDE (DIAMOX) 250 MG tablet, Take 250 mg by mouth., Disp: , Rfl:     albuterol (PROVENTIL/VENTOLIN HFA) 90 mcg/actuation inhaler, Inhale 2 puffs into the lungs every 4 (four) hours as needed for Wheezing or Shortness of Breath. Rescue, Disp: 18 g, Rfl: 2    allopurinoL (ZYLOPRIM) 100 MG tablet, Take 1 tablet (100 mg total) by mouth once daily., Disp: 90 tablet, Rfl: 1    b complex vitamins tablet, Take 1 tablet by mouth once daily.,  Disp: , Rfl:     buPROPion (WELLBUTRIN SR) 100 MG TBSR 12 hr tablet, Take 2 tabs q am, and 1 tab daily at noon, Disp: 270 tablet, Rfl: 1    calcium citrate (CALCITRATE) 200 mg (950 mg) tablet, Take 1 tablet by mouth 2 (two) times daily., Disp: , Rfl:     cetirizine (ZYRTEC) 10 MG tablet, Take 1 tablet (10 mg total) by mouth once daily., Disp: 30 tablet, Rfl: 2    colchicine (COLCRYS) 0.6 mg tablet, Take 1 tablet (0.6 mg total) by mouth daily as needed (gout flare)., Disp: 30 tablet, Rfl: 0    cyanocobalamin 500 MCG tablet, Take 500 mcg by mouth once daily., Disp: , Rfl:     cyclobenzaprine (FLEXERIL) 5 MG tablet, Take 1 tablet (5 mg total) by mouth 3 (three) times daily as needed for Muscle spasms., Disp: 60 tablet, Rfl: 6    diclofenac sodium (VOLTAREN ARTHRITIS PAIN) 1 % Gel, Apply 2 g topically 4 (four) times daily., Disp: 450 g, Rfl: 3    estradioL (ESTRACE) 0.5 MG tablet, Take 1 tablet (0.5 mg total) by mouth once daily., Disp: 90 tablet, Rfl: 3    ferrous fumarate/vit Bcomp,C (SUPER B COMPLEX ORAL), Take 1 capsule by mouth once daily., Disp: , Rfl:     furosemide (LASIX) 20 MG tablet, Take 1 tablet (20 mg total) by mouth daily as needed (leg swelling/fluid buildup)., Disp: 60 tablet, Rfl: 0    ibuprofen (ADVIL,MOTRIN) 600 MG tablet, Take 1 tablet (600 mg total) by mouth every 6 (six) hours as needed for Pain or Temperature greater than (100.5 or greater)., Disp: 30 tablet, Rfl: 0    lithium (ESKALITH) 300 MG capsule, Take 1 capsule (300 mg total) by mouth 3 (three) times daily with meals., Disp: 270 capsule, Rfl: 1    LORazepam (ATIVAN) 0.5 MG tablet, TAKE 2 TABLETS EVERY DAY AS DIRECTED, Disp: 60 tablet, Rfl: 2    meclizine (ANTIVERT) 25 mg tablet, Take 1 tablet (25 mg total) by mouth every 6 (six) hours as needed for Dizziness., Disp: 60 tablet, Rfl: 1    metFORMIN (GLUCOPHAGE-XR) 500 MG ER 24hr tablet, Take 1 tablet (500 mg total) by mouth daily with breakfast., Disp: 30 tablet, Rfl: 5    multivitamin  capsule, Take 1 capsule by mouth 2 (two) times daily. , Disp: , Rfl:     omeprazole (PRILOSEC) 40 MG capsule, TAKE 1 CAPSULE TWICE DAILY BEFORE MEALS, Disp: 180 capsule, Rfl: 3    ondansetron (ZOFRAN-ODT) 8 MG TbDL, Take 1 tablet (8 mg total) by mouth 2 (two) times daily as needed (nausea)., Disp: 30 tablet, Rfl: 0    potassium chloride (KLOR-CON) 10 MEQ TbSR, Take 10 mEq by mouth 2 (two) times daily., Disp: , Rfl:     pregabalin (LYRICA) 100 MG capsule, Take 1 capsule (100 mg total) by mouth 2 (two) times daily., Disp: 60 capsule, Rfl: 5    promethazine (PHENERGAN) 12.5 MG Tab, promethazine Take half tablet (oral) 2 times per day PRN - Nausea . Medication may cause drowsiness. [id number] tablet 2 times per day oral No set duration recorded No set duration amount recorded active 12.5 mg, Disp: , Rfl:     promethazine (PHENERGAN) 25 MG tablet, Take 25 mg by mouth., Disp: , Rfl:     promethazine-dextromethorphan (PROMETHAZINE-DM) 6.25-15 mg/5 mL Syrp, Take 5 mLs by mouth nightly as needed (cough)., Disp: 118 mL, Rfl: 0    risperiDONE (RISPERDAL) 0.5 MG Tab, Take 1 tablet (0.5 mg total) by mouth 2 (two) times daily., Disp: 180 tablet, Rfl: 1    sertraline (ZOLOFT) 100 MG tablet, Take 2 tablets (200 mg total) by mouth once daily., Disp: 180 tablet, Rfl: 1    tirzepatide, weight loss, (ZEPBOUND) 2.5 mg/0.5 mL PnIj, Inject 2.5 mg into the skin every 7 days., Disp: 4 Pen, Rfl: 0    [START ON 8/26/2024] tirzepatide, weight loss, (ZEPBOUND) 5 mg/0.5 mL PnIj, Inject 5 mg into the skin every 7 days., Disp: 2 mL, Rfl: 3    tolterodine (DETROL LA) 4 MG 24 hr capsule, Take 1 capsule (4 mg total) by mouth once daily., Disp: 90 capsule, Rfl: 3    topiramate (TOPAMAX) 200 MG Tab, Take 1 tablet (200 mg total) by mouth once daily., Disp: 90 tablet, Rfl: 0    Current Facility-Administered Medications:     sodium chloride 0.9% flush 10 mL, 10 mL, Intravenous, PRN, Lito Colmenares MD    Review of patient's allergies indicates:    Allergen Reactions    Hydrocodone-acetaminophen Nausea And Vomiting       Family History   Problem Relation Name Age of Onset    Diabetes Mother Mother     Coryell's disease Mother Mother     Hypertension Mother Mother     Heart disease Father Father     Stroke Father Father     Mental illness Father Father         ptsd    Hypertension Father Father     Diabetes Father Father     Depression Father Father     Arthritis Father Father     No Known Problems Brother      Heart disease Maternal Grandmother Grandmother     Depression Maternal Grandmother Grandmother     COPD Maternal Grandfather Grandfather     Heart disease Maternal Grandfather Grandfather     Stroke Maternal Grandfather Grandfather     Kidney disease Paternal Grandmother Grandmother     Heart disease Paternal Grandmother Grandmother     Heart disease Paternal Grandfather Grandfather     Early death Paternal Grandfather Grandfather     Hypertension Son Son     Asthma Son Son     Rectal cancer Paternal Aunt      Asthma Maternal Aunt Aunt     Diabetes Maternal Aunt Aunt     Asthma Maternal Uncle Uncle        Social History     Socioeconomic History    Marital status:    Tobacco Use    Smoking status: Every Day     Types: Vaping with nicotine    Smokeless tobacco: Never    Tobacco comments:     vaping started April 2020; QUIT cigarettes Sept 2019   Substance and Sexual Activity    Alcohol use: No    Drug use: No    Sexual activity: Yes     Partners: Male     Birth control/protection: None     Social Determinants of Health     Financial Resource Strain: Low Risk  (7/23/2024)    Overall Financial Resource Strain (CARDIA)     Difficulty of Paying Living Expenses: Not very hard   Food Insecurity: No Food Insecurity (7/23/2024)    Hunger Vital Sign     Worried About Running Out of Food in the Last Year: Never true     Ran Out of Food in the Last Year: Never true   Transportation Needs: No Transportation Needs (7/23/2024)    PRAPARE - Transportation      Lack of Transportation (Medical): No     Lack of Transportation (Non-Medical): No   Physical Activity: Inactive (7/23/2024)    Exercise Vital Sign     Days of Exercise per Week: 0 days     Minutes of Exercise per Session: 0 min   Stress: Stress Concern Present (7/23/2024)    [employer] - Occupational Stress Questionnaire     Feeling of Stress : Very much   Housing Stability: Low Risk  (7/23/2024)    Housing Stability Vital Sign     Unable to Pay for Housing in the Last Year: No     Homeless in the Last Year: No       COMPREHENSIVE GYN HISTORY:    PAP History: Denies abnormal Paps.  Infection History: Denies STDs. Denies PID.  Benign History: + uterine fibroids. Denies ovarian cysts. Denies endometriosis. Denies other conditions.  Cancer History: Denies cervical cancer. Denies uterine cancer or hyperplasia. Denies ovarian cancer. Denies vulvar cancer or pre-cancer. Denies vaginal cancer or pre-cancer. Denies breast cancer. Denies colon cancer.  Menstrual History: Denies menses.     ROS:  GENERAL: Feeling well overall. No weight changes. No swelling. No fatigue. No fever.  CARDIOVASCULAR: No chest pain. No shortness of breath. No leg cramps.   NEUROLOGICAL: No headaches. No vision changes.  BREASTS: No pain. No lumps. No discharge.  ABDOMEN: No pain. No nausea. No vomiting. No diarrhea. No constipation.  REPRODUCTIVE: No abnormal bleeding. See HPI  VULVA: No pain. No lesions. No itching.  VAGINA: No relaxation. No itching. No odor. No discharge. No lesions.  URINARY: No incontinence. No nocturia. No frequency. No dysuria.  PSYCHIATRIC: Denies depression.    /80   Wt 60.3 kg (133 lb)   LMP  (LMP Unknown)   BMI 21.47 kg/m²     PE:  APPEARANCE: Well nourished, well developed, in no acute distress.  AFFECT: WNL, alert and oriented x 3.  SKIN: No acne or hirsutism.  NECK: Neck symmetric without masses or thyromegaly.  NODES: No inguinal, cervical, axillary or femoral lymph node  enlargement.  CHEST: Good respiratory effort.   ABDOMEN: Soft. No tenderness or masses. No hepatosplenomegaly. No hernias.  PELVIC: ATROPHIC EXTERNAL FEMALE GENITALIA with scarring in inguinal folds from previously healed cysts/abscesses. Normal hair distribution. Adequate perineal body, normal urethral meatus. VAGINA DRY without lesions or discharge. No significant cystocele or rectocele. Bimanual exam shows CERVIX and UTERUS to be SURGICALLY ABSENT. Adnexa without masses or tenderness.  EXTREMITIES: No edema.    DIAGNOSIS:  1. Women's annual routine gynecological examination        2. Labial cyst  Ambulatory referral/consult to Obstetrics / Gynecology      3. Hot flashes due to menopause  estradioL (ESTRACE) 0.5 MG tablet          PLAN:   - Pap no longer indicated.  Hyst, no abn pap   - Screening tests as ordered.  - Diet and exercise encouraged.  Seat belt use encouraged.  Calcium and vitamin D recommended.     Counseling: Perimenopause/Menopause  Healthy life style choices including diet and exercise, 1200 mg calcium and 600 IU until age 71 then 800 IU vitamin D supplementation daily, healthy sexual decision making, development of healthy and safe relationships.  Patient was counseled today on postmenopausal issues.     The patient was counseled today on osteoporosis prevention, calcium supplementation, and regular weight bearing exercise. The patient was also counseled today on ACS PAP guidelines, with recommendations for yearly pelvic exams unless their uterus, cervix, and ovaries were removed for benign reasons; in that case, examinations every 3-5 years are recommended.  The patient was also counseled regarding monthly breast self-examination, routine STD screening for at-risk populations, prophylactic immunizations for transmitted infections such as  HPV, Pertussis, or Influenza as appropriate, and yearly mammograms when indicated by ACS guidelines.  She was advised to see her primary care physician for all  other health maintenance.  FOLLOW-UP with me for next routine visit.   Kimberley Sherman PA-C

## 2024-08-21 ENCOUNTER — PATIENT MESSAGE (OUTPATIENT)
Dept: FAMILY MEDICINE | Facility: CLINIC | Age: 54
End: 2024-08-21
Payer: MEDICARE

## 2024-08-21 ENCOUNTER — OFFICE VISIT (OUTPATIENT)
Dept: OBSTETRICS AND GYNECOLOGY | Facility: CLINIC | Age: 54
End: 2024-08-21
Payer: MEDICARE

## 2024-08-21 VITALS — DIASTOLIC BLOOD PRESSURE: 80 MMHG | SYSTOLIC BLOOD PRESSURE: 124 MMHG | WEIGHT: 133 LBS | BODY MASS INDEX: 21.47 KG/M2

## 2024-08-21 DIAGNOSIS — N90.7 LABIAL CYST: ICD-10-CM

## 2024-08-21 DIAGNOSIS — Z01.419 WOMEN'S ANNUAL ROUTINE GYNECOLOGICAL EXAMINATION: Primary | ICD-10-CM

## 2024-08-21 DIAGNOSIS — N95.1 HOT FLASHES DUE TO MENOPAUSE: ICD-10-CM

## 2024-08-21 PROCEDURE — 3079F DIAST BP 80-89 MM HG: CPT | Mod: CPTII,S$GLB,, | Performed by: PHYSICIAN ASSISTANT

## 2024-08-21 PROCEDURE — 99386 PREV VISIT NEW AGE 40-64: CPT | Mod: S$GLB,,, | Performed by: PHYSICIAN ASSISTANT

## 2024-08-21 PROCEDURE — 1159F MED LIST DOCD IN RCRD: CPT | Mod: CPTII,S$GLB,, | Performed by: PHYSICIAN ASSISTANT

## 2024-08-21 PROCEDURE — 3074F SYST BP LT 130 MM HG: CPT | Mod: CPTII,S$GLB,, | Performed by: PHYSICIAN ASSISTANT

## 2024-08-21 PROCEDURE — 99999 PR PBB SHADOW E&M-EST. PATIENT-LVL IV: CPT | Mod: PBBFAC,,, | Performed by: PHYSICIAN ASSISTANT

## 2024-08-21 PROCEDURE — 3044F HG A1C LEVEL LT 7.0%: CPT | Mod: CPTII,S$GLB,, | Performed by: PHYSICIAN ASSISTANT

## 2024-08-21 PROCEDURE — 3008F BODY MASS INDEX DOCD: CPT | Mod: CPTII,S$GLB,, | Performed by: PHYSICIAN ASSISTANT

## 2024-08-21 RX ORDER — ESTRADIOL 0.5 MG/1
0.5 TABLET ORAL DAILY
Qty: 90 TABLET | Refills: 3 | Status: SHIPPED | OUTPATIENT
Start: 2024-08-21 | End: 2025-08-21

## 2024-08-21 NOTE — PATIENT INSTRUCTIONS
Recommend healthy life style choices including diet and exercise, calcium and vitamin D supplementation daily, healthy sexual decision making, development of healthy and safe relationships.    Calcium and Vitamin D Recommendation:   Persons nine to 18 years of age: 1,300 mg of calcium, 600 IU of vitamin D  Persons 19 to 50 years of age: 1,000 mg of calcium, 600 IU of vitamin D  Persons 51 to 70 years of age: 1,200 mg of calcium, 600 IU of vitamin D  Persons 71 years and older: 1,200 mg of calcium, 800 IU of vitamin D    I recommend a daily vitamin.    VULVAR MASS  - WARM COMPRESS 2X A DAY FOR 10-15 MINUTES   - SITZ BATH  - KEEP AREA CLEAN AND DRY   - RTC IF SYMPTOMS WORSEN, YOU DEVELOP FEVER.     Non-Hormonal Menopausal Supplements:   First line of treatment for postmenopausal hot flushes is treatment with dietary and over-the-counter phytoestrogens such as soy-based products.  Estroven or Healthy Woman Soy formulations can reduce the number of and severity of hot flushes.  You can also add soy into your diet in the form of endemame, tofu, and soy milk.  Most patients report improvement but not complete resolution of their menopausal hot flushes and night sweats.  There are a variety of plant-based therapies used for hot flashes including isoflavones, phytoestrogens, and herbal therapies. Two types of isoflavones are found in soybeans, chickpeas, and lentils. Lignans (eg, enterolactone and enterodiol) are found in flaxseed, lentils, grains, fruits, and vegetables.   Black cohosh   Evening primrose oil

## 2024-09-12 DIAGNOSIS — N39.41 URGENCY INCONTINENCE: ICD-10-CM

## 2024-09-12 RX ORDER — TOLTERODINE 4 MG/1
CAPSULE, EXTENDED RELEASE ORAL
Qty: 30 CAPSULE | Refills: 0 | Status: SHIPPED | OUTPATIENT
Start: 2024-09-12

## 2024-09-15 DIAGNOSIS — G43.809 OTHER MIGRAINE WITHOUT STATUS MIGRAINOSUS, NOT INTRACTABLE: ICD-10-CM

## 2024-09-15 DIAGNOSIS — N39.41 URGENCY INCONTINENCE: ICD-10-CM

## 2024-09-16 RX ORDER — TOLTERODINE 4 MG/1
CAPSULE, EXTENDED RELEASE ORAL
Qty: 30 CAPSULE | Refills: 0 | OUTPATIENT
Start: 2024-09-16

## 2024-09-16 NOTE — TELEPHONE ENCOUNTER
Refill Routing Note   Medication(s) are not appropriate for processing by [facility] for the following reason(s):        Outside of protocol    [facility] action(s):  Route               Appointments  past 12m or future 3m with PCP    Date Provider   Last Visit   8/6/2024 Shay Dai MD   Next Visit   11/20/2024 Shay Dai MD   ED visits in past 90 days: 0        Note composed:2:40 PM 09/16/2024

## 2024-09-17 RX ORDER — TOPIRAMATE 200 MG/1
200 TABLET ORAL DAILY
Qty: 90 TABLET | Refills: 0 | Status: SHIPPED | OUTPATIENT
Start: 2024-09-17

## 2024-09-22 DIAGNOSIS — M1A.00X0 IDIOPATHIC CHRONIC GOUT WITHOUT TOPHUS, UNSPECIFIED SITE: ICD-10-CM

## 2024-09-22 DIAGNOSIS — M79.7 FIBROMYALGIA: ICD-10-CM

## 2024-09-22 NOTE — TELEPHONE ENCOUNTER
Care Due:                  Date            Visit Type   Department     Provider  --------------------------------------------------------------------------------                                [facility]                              PRIMARY      MEDICINE/  Last Visit: 08-      CARE ([facility])   INTERNAL MED   Shay Dai                              [facility]                              PRIMARY      MEDICINE/  Next Visit: 11-      CARE ([facility])   INTERNAL MED   Shay Dai                                                            Last  Test          Frequency    Reason                     Performed    Due Date  --------------------------------------------------------------------------------    CBC.........  12 months..  allopurinoL..............  10-   10-    CMP.........  12 months..  allopurinoL..............  10-   10-    HBA1C.......  6 months...  metFORMIN, tirzepatide,..  02- 08-    [facility] Embedded Care Due Messages. Reference number: [id number].   9/22/2024 2:52:20 PM CDT

## 2024-09-23 ENCOUNTER — OFFICE VISIT (OUTPATIENT)
Dept: UROLOGY | Facility: CLINIC | Age: 54
End: 2024-09-23
Payer: MEDICARE

## 2024-09-23 VITALS — WEIGHT: 290.56 LBS | BODY MASS INDEX: 46.9 KG/M2

## 2024-09-23 DIAGNOSIS — N39.41 URGENCY INCONTINENCE: ICD-10-CM

## 2024-09-23 PROCEDURE — 1160F RVW MEDS BY RX/DR IN RCRD: CPT | Mod: CPTII,S$GLB,, | Performed by: UROLOGY

## 2024-09-23 PROCEDURE — 99999 PR PBB SHADOW E&M-EST. PATIENT-LVL IV: CPT | Mod: PBBFAC,,, | Performed by: UROLOGY

## 2024-09-23 PROCEDURE — 3044F HG A1C LEVEL LT 7.0%: CPT | Mod: CPTII,S$GLB,, | Performed by: UROLOGY

## 2024-09-23 PROCEDURE — 3008F BODY MASS INDEX DOCD: CPT | Mod: CPTII,S$GLB,, | Performed by: UROLOGY

## 2024-09-23 PROCEDURE — 1159F MED LIST DOCD IN RCRD: CPT | Mod: CPTII,S$GLB,, | Performed by: UROLOGY

## 2024-09-23 PROCEDURE — 99213 OFFICE O/P EST LOW 20 MIN: CPT | Mod: S$GLB,,, | Performed by: UROLOGY

## 2024-09-23 RX ORDER — TOLTERODINE 4 MG/1
4 CAPSULE, EXTENDED RELEASE ORAL DAILY
Qty: 90 CAPSULE | Refills: 3 | Status: SHIPPED | OUTPATIENT
Start: 2024-09-23

## 2024-09-23 RX ORDER — ALLOPURINOL 100 MG/1
100 TABLET ORAL DAILY
Qty: 90 TABLET | Refills: 1 | Status: SHIPPED | OUTPATIENT
Start: 2024-09-23

## 2024-09-23 NOTE — TELEPHONE ENCOUNTER
Refill Decision Note   Zaira Dowell  is requesting a refill authorization.  Brief Assessment and Rationale for Refill:  Approve     Medication Therapy Plan:         Comments:     Note composed:10:34 AM 09/23/2024

## 2024-09-23 NOTE — PROGRESS NOTES
Subjective:       Patient ID: Zaira Dowell is a 53 y.o. female The patient's last visit with me was on 9/7/2023    Chief Complaint:   Chief Complaint   Patient presents with    Medication Refill     Urinary Incontinence  Patient complains of urinary incontinence. This has been present for several years.  This issue seems to have worsened over the past several months.  She has also noted now to have enuresis.  She leaks urine with with urge, during the night. Patient describes the symptoms as frequent urination (10x per day) and urge to urinate with little or no warning. Factors associated with symptoms include none known. Evaluation to date includes none. Treatment to date includes oxybutinin, which was somewhat effective for a few years but is now less effective.  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  She denies any handwriting changes.    She has been taking less baclofen and her enuresis is improved.  She did not take Vesicare due to insurance issues.      9/7/2023  She last saw me in April 2021 with a negative cystoscopy for microscopic hematuria.  She is doing well with Detrol LA.  No significant leakage.    09/23/2024  She is doing well with Detrol LA.       ACTIVE MEDICAL ISSUES:  Patient Active Problem List   Diagnosis    Fibromyalgia    Bipolar 1 disorder    History of tobacco abuse    Obesity, morbid, BMI 40.0-49.9    SHARON (obstructive sleep apnea)    Arthralgia of multiple joints    GERD (gastroesophageal reflux disease)    IIH (idiopathic intracranial hypertension)    Mild intermittent asthma without complication    Adrenal incidentaloma    Nasal congestion    Insomnia    DDD (degenerative disc disease), lumbar    Lumbar spondylosis    Abnormal nuclear stress test    Benzodiazepine dependence    Urinary incontinence       PAST MEDICAL HISTORY  Past Medical History:   Diagnosis Date    Anxiety      Arthritis     Asthma     Back pain     Bipolar I     BMI 50.0-59.9, adult     Chronic obstructive pulmonary disease, unspecified COPD type 2022    Depression     Fibromyalgia     GERD (gastroesophageal reflux disease)     HFpEF     Insomnia 2021    Obesity     SHARON (obstructive sleep apnea)     Tobacco dependence     Urinary incontinence        PAST SURGICAL HISTORY:  Past Surgical History:   Procedure Laterality Date    CARPAL TUNNEL RELEASE       SECTION  1993    CHOLECYSTECTOMY      COLONOSCOPY N/A 2022    Procedure: COLONOSCOPY;  Surgeon: Ernesto Auguste MD;  Location: [facility] ENDO;  Service: Endoscopy;  Laterality: N/A;  fully vaccinated-GT    EPIDURAL STEROID INJECTION Bilateral 2023    Procedure: Bilateral L3, L4, L5 Medial Branch Blocks #1;  Surgeon: Lito Jasso Jr., MD;  Location: [facility] ENDO;  Service: Pain Management;  Laterality: Bilateral;  @1230  No ATC or DM    EPIDURAL STEROID INJECTION Bilateral 2023    Procedure: Bilateral L3, L4, L5 Medial Branch Blocks #2;  Surgeon: Lito Jasso Jr., MD;  Location: [facility] ENDO;  Service: Pain Management;  Laterality: Bilateral;  @1000  No ATC or DM    EPIDURAL STEROID INJECTION Left 2023    Procedure: Left L3, L4, L5 Radiofrequency Thermocoagulation of Medial Branches;  Surgeon: Lito Jasso Jr., MD;  Location: [facility] ENDO;  Service: Pain Management;  Laterality: Left;  @0815  No ATC or DM    EPIDURAL STEROID INJECTION Right 2023    Procedure: Right L3, L4, L5 Radiofrequency Thermocoagulation of Medial Branches;  Surgeon: Lito Jasso Jr., MD;  Location: [facility] ENDO;  Service: Pain Management;  Laterality: Right;  @0930  No ATC or DM  Last 1&75    ESOPHAGOGASTRODUODENOSCOPY N/A 2019    Procedure: EGD (ESOPHAGOGASTRODUODENOSCOPY);  Surgeon: Michelle Mc MD;  Location: [facility] ENDO;  Service: Endoscopy;  Laterality: N/A;    HYSTERECTOMY      partial     KNEE SURGERY       LAPAROSCOPIC SLEEVE GASTRECTOMY N/A 10/18/2019    Procedure: GASTRECTOMY, SLEEVE, LAPAROSCOPIC, with intraop EGD;  Surgeon: Ean Kohli MD;  Location: [facility] OR [address] [place] [address];  Service: General;  Laterality: N/A;    LEFT HEART CATHETERIZATION Left 11/7/2023    Procedure: Left heart cath;  Surgeon: Lito Colmenares MD;  Location: [facility] CATH LAB;  Service: Cardiology;  Laterality: Left;  730am start, R rad access  RN PRE OP 11/2/23    pinched nerve      SHOULDER SURGERY      TONSILLECTOMY      WRIST SURGERY      had ganlin cyst       SOCIAL HISTORY:  Social History     Tobacco Use    Smoking status: Every Day     Types: Vaping with nicotine    Smokeless tobacco: Never    Tobacco comments:     vaping started April 2020; QUIT cigarettes Sept 2019   Substance Use Topics    Alcohol use: No    Drug use: No       FAMILY HISTORY:  Family History   Problem Relation Name Age of Onset    Diabetes Mother Mother     Huntington's disease Mother Mother     Hypertension Mother Mother     Heart disease Father Father     Stroke Father Father     Mental illness Father Father         ptsd    Hypertension Father Father     Diabetes Father Father     Depression Father Father     Arthritis Father Father     No Known Problems Brother      Heart disease Maternal Grandmother Grandmother     Depression Maternal Grandmother Grandmother     COPD Maternal Grandfather Grandfather     Heart disease Maternal Grandfather Grandfather     Stroke Maternal Grandfather Grandfather     Kidney disease Paternal Grandmother Grandmother     Heart disease Paternal Grandmother Grandmother     Heart disease Paternal Grandfather Grandfather     Early death Paternal Grandfather Grandfather     Hypertension Son Son     Asthma Son Son     Rectal cancer Paternal Aunt      Asthma Maternal Aunt Aunt     Diabetes Maternal Aunt Aunt     Asthma Maternal Uncle Uncle        ALLERGIES AND MEDICATIONS: updated and reviewed.  Review of patient's allergies indicates:    Allergen Reactions    Hydrocodone-acetaminophen Nausea And Vomiting     Current Outpatient Medications   Medication Sig    acetaminophen (TYLENOL) 500 MG tablet Take 1 tablet (500 mg total) by mouth every 4 (four) hours as needed for Pain or Temperature greater than (100.5 or greater).    acetaZOLAMIDE (DIAMOX) 250 MG tablet Take 250 mg by mouth.    albuterol (PROVENTIL/VENTOLIN HFA) 90 mcg/actuation inhaler Inhale 2 puffs into the lungs every 4 (four) hours as needed for Wheezing or Shortness of Breath. Rescue    allopurinoL (ZYLOPRIM) 100 MG tablet Take 1 tablet (100 mg total) by mouth once daily.    b complex vitamins tablet Take 1 tablet by mouth once daily.    buPROPion (WELLBUTRIN SR) 100 MG TBSR 12 hr tablet Take 2 tabs q am, and 1 tab daily at noon    calcium citrate (CALCITRATE) 200 mg (950 mg) tablet Take 1 tablet by mouth 2 (two) times daily.    cyanocobalamin 500 MCG tablet Take 500 mcg by mouth once daily.    cyclobenzaprine (FLEXERIL) 5 MG tablet Take 1 tablet (5 mg total) by mouth 3 (three) times daily as needed for Muscle spasms.    diclofenac sodium (VOLTAREN ARTHRITIS PAIN) 1 % Gel Apply 2 g topically 4 (four) times daily.    estradioL (ESTRACE) 0.5 MG tablet Take 1 tablet (0.5 mg total) by mouth once daily.    ferrous fumarate/vit Bcomp,C (SUPER B COMPLEX ORAL) Take 1 capsule by mouth once daily.    ibuprofen (ADVIL,MOTRIN) 600 MG tablet Take 1 tablet (600 mg total) by mouth every 6 (six) hours as needed for Pain or Temperature greater than (100.5 or greater).    lithium (ESKALITH) 300 MG capsule Take 1 capsule (300 mg total) by mouth 3 (three) times daily with meals.    LORazepam (ATIVAN) 0.5 MG tablet TAKE 2 TABLETS EVERY DAY AS DIRECTED    meclizine (ANTIVERT) 25 mg tablet Take 1 tablet (25 mg total) by mouth every 6 (six) hours as needed for Dizziness.    metFORMIN (GLUCOPHAGE-XR) 500 MG ER 24hr tablet Take 1 tablet (500 mg total) by mouth daily with breakfast.    multivitamin capsule Take 1  capsule by mouth 2 (two) times daily.     omeprazole (PRILOSEC) 40 MG capsule TAKE 1 CAPSULE TWICE DAILY BEFORE MEALS    ondansetron (ZOFRAN-ODT) 8 MG TbDL Take 1 tablet (8 mg total) by mouth 2 (two) times daily as needed (nausea).    potassium chloride (KLOR-CON) 10 MEQ TbSR Take 10 mEq by mouth 2 (two) times daily.    pregabalin (LYRICA) 100 MG capsule Take 1 capsule (100 mg total) by mouth 2 (two) times daily.    promethazine (PHENERGAN) 12.5 MG Tab promethazine Take half tablet (oral) 2 times per day PRN - Nausea . Medication may cause drowsiness. [id number] tablet 2 times per day oral No set duration recorded No set duration amount recorded active 12.5 mg    promethazine (PHENERGAN) 25 MG tablet Take 25 mg by mouth.    promethazine-dextromethorphan (PROMETHAZINE-DM) 6.25-15 mg/5 mL Syrp Take 5 mLs by mouth nightly as needed (cough).    risperiDONE (RISPERDAL) 0.5 MG Tab Take 1 tablet (0.5 mg total) by mouth 2 (two) times daily.    sertraline (ZOLOFT) 100 MG tablet Take 2 tablets (200 mg total) by mouth once daily.    tirzepatide, weight loss, (ZEPBOUND) 2.5 mg/0.5 mL PnIj Inject 2.5 mg into the skin every 7 days.    tirzepatide, weight loss, (ZEPBOUND) 5 mg/0.5 mL PnIj Inject 5 mg into the skin every 7 days.    topiramate (TOPAMAX) 200 MG Tab Take 1 tablet (200 mg total) by mouth once daily.    cetirizine (ZYRTEC) 10 MG tablet Take 1 tablet (10 mg total) by mouth once daily.    colchicine (COLCRYS) 0.6 mg tablet Take 1 tablet (0.6 mg total) by mouth daily as needed (gout flare).    furosemide (LASIX) 20 MG tablet Take 1 tablet (20 mg total) by mouth daily as needed (leg swelling/fluid buildup).    tolterodine (DETROL LA) 4 MG 24 hr capsule Take 1 capsule (4 mg total) by mouth once daily.     Current Facility-Administered Medications   Medication    sodium chloride 0.9% flush 10 mL       Review of Systems   Constitutional:  Negative for chills, fatigue and fever.   Respiratory:  Negative for chest tightness and  shortness of breath.    Cardiovascular:  Negative for chest pain.   Gastrointestinal:  Negative for abdominal distention, constipation, nausea and vomiting.   Genitourinary:  Negative for difficulty urinating, dysuria, flank pain, frequency, hematuria and urgency.   Musculoskeletal:  Negative for arthralgias.   Neurological:  Negative for light-headedness.   Psychiatric/Behavioral:  Negative for confusion.        Objective:      Vitals:    09/23/24 1548   Weight: 131.8 kg (290 lb 9.1 oz)       Physical Exam  Vitals and nursing note reviewed.   Constitutional:       Appearance: She is well-developed.   HENT:      Head: Normocephalic.   Eyes:      Conjunctiva/sclera: Conjunctivae normal.   Neck:      Thyroid: No thyromegaly.      Trachea: No tracheal deviation.   Cardiovascular:      Rate and Rhythm: Normal rate.      Pulses: Normal pulses.      Heart sounds: Normal heart sounds.   Pulmonary:      Effort: Pulmonary effort is normal. No respiratory distress.      Breath sounds: Normal breath sounds. No wheezing.   Abdominal:      General: There is no distension.      Palpations: Abdomen is soft. There is no mass.      Tenderness: There is no abdominal tenderness. There is no guarding or rebound.      Hernia: No hernia is present.   Musculoskeletal:         General: No tenderness. Normal range of motion.      Cervical back: Normal range of motion.   Lymphadenopathy:      Cervical: No cervical adenopathy.   Skin:     General: Skin is warm and dry.      Findings: No erythema or rash.   Neurological:      Mental Status: She is alert and oriented to person, place, and time.   Psychiatric:         Behavior: Behavior normal.         Thought Content: Thought content normal.         Judgment: Judgment normal.         Urine dipstick shows not done.  Micro exam: not done.    Assessment:       1. Urgency incontinence            Plan:       1. Urgency incontinence    - tolterodine (DETROL LA) 4 MG 24 hr capsule; Take 1 capsule (4  mg total) by mouth once daily.  Dispense: 90 capsule; Refill: 3    2. Urinary urgency      3. Nocturia more than twice per night              Follow up in about 1 year (around 9/23/2025) for Follow up Established.

## 2024-09-25 RX ORDER — CYCLOBENZAPRINE HCL 5 MG
5 TABLET ORAL 3 TIMES DAILY PRN
Qty: 60 TABLET | Refills: 6 | Status: SHIPPED | OUTPATIENT
Start: 2024-09-25

## 2024-10-02 DIAGNOSIS — G43.809 OTHER MIGRAINE WITHOUT STATUS MIGRAINOSUS, NOT INTRACTABLE: ICD-10-CM

## 2024-10-02 NOTE — TELEPHONE ENCOUNTER
Refill Routing Note   Medication(s) are not appropriate for processing by [facility] for the following reason(s):        Outside of protocol    [facility] action(s):  Route             Appointments  past 12m or future 3m with PCP    Date Provider   Last Visit   8/6/2024 Shay Dai MD   Next Visit   11/20/2024 Shay Dai MD   ED visits in past 90 days: 0        Note composed:6:10 PM 10/02/2024

## 2024-10-04 RX ORDER — TOPIRAMATE 200 MG/1
200 TABLET ORAL DAILY
Qty: 90 TABLET | Refills: 0 | Status: SHIPPED | OUTPATIENT
Start: 2024-10-04

## 2024-10-09 ENCOUNTER — HOSPITAL ENCOUNTER (EMERGENCY)
Facility: HOSPITAL | Age: 54
Discharge: HOME | End: 2024-10-09
Attending: STUDENT IN AN ORGANIZED HEALTH CARE EDUCATION/TRAINING PROGRAM
Payer: MEDICARE

## 2024-10-09 VITALS
OXYGEN SATURATION: 98 % | RESPIRATION RATE: 20 BRPM | DIASTOLIC BLOOD PRESSURE: 61 MMHG | HEART RATE: 86 BPM | BODY MASS INDEX: 46 KG/M2 | TEMPERATURE: 98 F | WEIGHT: 285 LBS | SYSTOLIC BLOOD PRESSURE: 138 MMHG

## 2024-10-09 DIAGNOSIS — R42 VERTIGO: Primary | ICD-10-CM

## 2024-10-09 DIAGNOSIS — R11.10 VOMITING, UNSPECIFIED VOMITING TYPE, UNSPECIFIED WHETHER NAUSEA PRESENT: ICD-10-CM

## 2024-10-09 DIAGNOSIS — R11.0 NAUSEA: ICD-10-CM

## 2024-10-09 DIAGNOSIS — R42 LIGHTHEADEDNESS: ICD-10-CM

## 2024-10-09 LAB
ALBUMIN SERPL BCP-MCNC: 3.8 G/DL (ref 3.5–5.2)
ALP SERPL-CCNC: 81 U/L (ref 55–135)
ALT SERPL W/O P-5'-P-CCNC: 129 U/L (ref 10–44)
ANION GAP SERPL CALC-SCNC: 11 MMOL/L (ref 8–16)
AST SERPL-CCNC: 58 U/L (ref 10–40)
BASOPHILS # BLD AUTO: 0.07 K/UL (ref 0–0.2)
BASOPHILS NFR BLD: 1.1 % (ref 0–1.9)
BILIRUB SERPL-MCNC: 0.6 MG/DL (ref 0.1–1)
BILIRUB UR QL STRIP: NEGATIVE
BUN SERPL-MCNC: 9 MG/DL (ref 6–20)
CALCIUM SERPL-MCNC: 9.4 MG/DL (ref 8.7–10.5)
CHLORIDE SERPL-SCNC: 110 MMOL/L (ref 95–110)
CLARITY UR: CLEAR
CO2 SERPL-SCNC: 20 MMOL/L (ref 23–29)
COLOR UR: YELLOW
CREAT SERPL-MCNC: 0.8 MG/DL (ref 0.5–1.4)
CTP QC/QA: YES
CTP QC/QA: YES
DIFFERENTIAL METHOD BLD: ABNORMAL
EOSINOPHIL # BLD AUTO: 0.1 K/UL (ref 0–0.5)
EOSINOPHIL NFR BLD: 2.2 % (ref 0–8)
ERYTHROCYTE [DISTWIDTH] IN BLOOD BY AUTOMATED COUNT: 12.4 % (ref 11.5–14.5)
EST. GFR  (NO RACE VARIABLE): >60 ML/MIN/1.73 M^2
GLUCOSE SERPL-MCNC: 108 MG/DL (ref 70–110)
GLUCOSE UR QL STRIP: NEGATIVE
HCT VFR BLD AUTO: 47.6 % (ref 37–48.5)
HGB BLD-MCNC: 14.9 G/DL (ref 12–16)
HGB UR QL STRIP: NEGATIVE
IMM GRANULOCYTES # BLD AUTO: 0.02 K/UL (ref 0–0.04)
IMM GRANULOCYTES NFR BLD AUTO: 0.3 % (ref 0–0.5)
KETONES UR QL STRIP: NEGATIVE
LEUKOCYTE ESTERASE UR QL STRIP: NEGATIVE
LIPASE SERPL-CCNC: 41 U/L (ref 4–60)
LYMPHOCYTES # BLD AUTO: 2 K/UL (ref 1–4.8)
LYMPHOCYTES NFR BLD: 32 % (ref 18–48)
MAGNESIUM SERPL-MCNC: 1.7 MG/DL (ref 1.6–2.6)
MCH RBC QN AUTO: 30.2 PG (ref 27–31)
MCHC RBC AUTO-ENTMCNC: 31.3 G/DL (ref 32–36)
MCV RBC AUTO: 97 FL (ref 82–98)
MONOCYTES # BLD AUTO: 0.4 K/UL (ref 0.3–1)
MONOCYTES NFR BLD: 6.9 % (ref 4–15)
NEUTROPHILS # BLD AUTO: 3.7 K/UL (ref 1.8–7.7)
NEUTROPHILS NFR BLD: 57.5 % (ref 38–73)
NITRITE UR QL STRIP: NEGATIVE
NRBC BLD-RTO: 0 /100 WBC
PH UR STRIP: 6 [PH] (ref 5–8)
PLATELET # BLD AUTO: 294 K/UL (ref 150–450)
PMV BLD AUTO: 9.6 FL (ref 9.2–12.9)
POC MOLECULAR INFLUENZA A AGN: NEGATIVE
POC MOLECULAR INFLUENZA B AGN: NEGATIVE
POTASSIUM SERPL-SCNC: 3.8 MMOL/L (ref 3.5–5.1)
PROT SERPL-MCNC: 7.3 G/DL (ref 6–8.4)
PROT UR QL STRIP: NEGATIVE
RBC # BLD AUTO: 4.93 M/UL (ref 4–5.4)
SARS-COV-2 RDRP RESP QL NAA+PROBE: NEGATIVE
SODIUM SERPL-SCNC: 141 MMOL/L (ref 136–145)
SP GR UR STRIP: 1 (ref 1–1.03)
TSH SERPL DL<=0.005 MIU/L-ACNC: 1.15 UIU/ML (ref 0.4–4)
URN SPEC COLLECT METH UR: NORMAL
UROBILINOGEN UR STRIP-ACNC: NEGATIVE EU/DL
WBC # BLD AUTO: 6.35 K/UL (ref 3.9–12.7)

## 2024-10-09 PROCEDURE — 87635 SARS-COV-2 COVID-19 AMP PRB: CPT | Performed by: STUDENT IN AN ORGANIZED HEALTH CARE EDUCATION/TRAINING PROGRAM

## 2024-10-09 PROCEDURE — 96361 HYDRATE IV INFUSION ADD-ON: CPT

## 2024-10-09 PROCEDURE — 99284 EMERGENCY DEPT VISIT MOD MDM: CPT | Mod: 25

## 2024-10-09 PROCEDURE — 96374 THER/PROPH/DIAG INJ IV PUSH: CPT

## 2024-10-09 PROCEDURE — 80053 COMPREHEN METABOLIC PANEL: CPT | Performed by: STUDENT IN AN ORGANIZED HEALTH CARE EDUCATION/TRAINING PROGRAM

## 2024-10-09 PROCEDURE — 83690 ASSAY OF LIPASE: CPT | Performed by: STUDENT IN AN ORGANIZED HEALTH CARE EDUCATION/TRAINING PROGRAM

## 2024-10-09 PROCEDURE — 87502 INFLUENZA DNA AMP PROBE: CPT

## 2024-10-09 PROCEDURE — 84443 ASSAY THYROID STIM HORMONE: CPT | Performed by: STUDENT IN AN ORGANIZED HEALTH CARE EDUCATION/TRAINING PROGRAM

## 2024-10-09 PROCEDURE — 63600175 PHARM REV CODE 636 W HCPCS: Performed by: STUDENT IN AN ORGANIZED HEALTH CARE EDUCATION/TRAINING PROGRAM

## 2024-10-09 PROCEDURE — 83735 ASSAY OF MAGNESIUM: CPT | Performed by: STUDENT IN AN ORGANIZED HEALTH CARE EDUCATION/TRAINING PROGRAM

## 2024-10-09 PROCEDURE — 85025 COMPLETE CBC W/AUTO DIFF WBC: CPT | Performed by: STUDENT IN AN ORGANIZED HEALTH CARE EDUCATION/TRAINING PROGRAM

## 2024-10-09 PROCEDURE — 81003 URINALYSIS AUTO W/O SCOPE: CPT | Performed by: STUDENT IN AN ORGANIZED HEALTH CARE EDUCATION/TRAINING PROGRAM

## 2024-10-09 PROCEDURE — 25000003 PHARM REV CODE 250: Performed by: STUDENT IN AN ORGANIZED HEALTH CARE EDUCATION/TRAINING PROGRAM

## 2024-10-09 RX ORDER — PREDNISONE 50 MG/1
50 TABLET ORAL DAILY
Qty: 5 TABLET | Refills: 0 | Status: SHIPPED | OUTPATIENT
Start: 2024-10-09

## 2024-10-09 RX ORDER — ONDANSETRON 8 MG/1
8 TABLET, ORALLY DISINTEGRATING ORAL 2 TIMES DAILY PRN
Qty: 21 TABLET | Refills: 0 | Status: SHIPPED | OUTPATIENT
Start: 2024-10-09

## 2024-10-09 RX ORDER — SODIUM CHLORIDE 9 MG/ML
500 INJECTION, SOLUTION INTRAVENOUS
Status: COMPLETED | OUTPATIENT
Start: 2024-10-09 | End: 2024-10-09

## 2024-10-09 RX ORDER — ONDANSETRON HYDROCHLORIDE 2 MG/ML
4 INJECTION, SOLUTION INTRAVENOUS
Status: COMPLETED | OUTPATIENT
Start: 2024-10-09 | End: 2024-10-09

## 2024-10-09 RX ORDER — MECLIZINE HYDROCHLORIDE 25 MG/1
50 TABLET ORAL
Status: COMPLETED | OUTPATIENT
Start: 2024-10-09 | End: 2024-10-09

## 2024-10-09 RX ADMIN — MECLIZINE HYDROCHLORIDE 50 MG: 25 TABLET ORAL at 02:10

## 2024-10-09 RX ADMIN — ONDANSETRON 4 MG: 2 INJECTION INTRAMUSCULAR; INTRAVENOUS at 12:10

## 2024-10-09 RX ADMIN — SODIUM CHLORIDE 500 ML: 9 INJECTION, SOLUTION INTRAVENOUS at 02:10

## 2024-10-09 RX ADMIN — SODIUM CHLORIDE, POTASSIUM CHLORIDE, SODIUM LACTATE AND CALCIUM CHLORIDE 1000 ML: 600; 310; 30; 20 INJECTION, SOLUTION INTRAVENOUS at 12:10

## 2024-10-09 NOTE — ED TRIAGE NOTES
Pt to ED from home with c/o n/v/d since 09/29/2024. Pt was seen at Urgent Care at 10/03/2024 and was diagnosed with an inner ear infection - prescribed abx and zofran with no relief. Pt has a history of fibromyalgia and is on meclizine, last dose taken today at 11:10 am with no relief from dizziness. Pt denies cp/sob. Last episode of vomitus was last night at 1830. :ast episode of diarrhea ws 0330 this morning.

## 2024-10-09 NOTE — ED NOTES
Pt reports feeling much better after po medication. Pt was able to sit on the side of the bed with minor dizziness. Pt ambulated to bathroom with steady gait.

## 2024-10-09 NOTE — ED NOTES
Attempted to get pt up to ambulate. Pt sat on side of bed and reports feeling dizzy. Pt was not able to get up to ambulate. MD notified

## 2024-10-09 NOTE — ED PROVIDER NOTES
"Encounter Date: 10/9/2024    SCRIBE #1 NOTE: I, Saida Thakur, am scribing for, and in the presence of,  Radha Conway MD. I have scribed the following portions of the note - Other sections scribed: HPI,ROS,PE.       History     Chief Complaint   Patient presents with    Dizziness     Pt c/o dizziness since 9/29. Seen at  last Thursday and dx with an inner ear infection. Pt reports persistant dizziness, diarrhea and vomiting. Denies fever       53 y.o. female, with a PMHx of COPD, GERD, Fibromyalgia and PSHx of cholecystectomy(1994), who presents to the ED with lightheadedness onset 11 days ago. Patient reports associated symptoms of cough onset 1 day ago, nausea, diarrhea, vomiting, generalized abdominal pain and fever(alleviated). Patient reports "I am unable to hold anything down", she reports her symptoms being intermittent since 10/29. She states that her symptoms started with emesis then diarrhea, patient reports being seen at an urgent care(10/3) and being diagnosed with an ear infection(COVID, RSV and Flu tests were negative). Patient reports that her symptoms have worsened over time, reporting that she has 3-4 episodes of "green" diarrhea daily. She reports 1-2 daily episodes of emesis. Patient reports that her abdominal pain has made it painful to fully pull her pants up. Patient reports that her lightheadedness makes it hard to pick her head up, stating that she will feel too lightheaded and will have to lay down for alleviation. Patient reports that she had 1-2 episodes of low grade fever(100-100.6). Patient denies having any known allergies but reports that pain medication "makes me sick".  She notes that when she has episodes of fibromyalgias, the pain is localized to her back and bilateral legs. No other exacerbating or alleviating factors. Denies dysuria, chest pain, SOB or other associated symptoms.      The history is provided by the patient. No  was used.     Review of " patient's allergies indicates:   Allergen Reactions    Hydrocodone-acetaminophen Nausea And Vomiting     Past Medical History:   Diagnosis Date    Anxiety     Arthritis     Asthma     Back pain     Bipolar I     BMI 50.0-59.9, adult     Chronic obstructive pulmonary disease, unspecified COPD type 2022    Depression     Fibromyalgia     GERD (gastroesophageal reflux disease)     HFpEF     Insomnia 2021    Obesity     SHARON (obstructive sleep apnea)     Tobacco dependence     Urinary incontinence      Past Surgical History:   Procedure Laterality Date    CARPAL TUNNEL RELEASE       SECTION  1993    CHOLECYSTECTOMY      COLONOSCOPY N/A 2022    Procedure: COLONOSCOPY;  Surgeon: Ernesto Auguste MD;  Location: [facility] ENDO;  Service: Endoscopy;  Laterality: N/A;  fully vaccinated-GT    EPIDURAL STEROID INJECTION Bilateral 2023    Procedure: Bilateral L3, L4, L5 Medial Branch Blocks #1;  Surgeon: Lito Jasso Jr., MD;  Location: [facility] ENDO;  Service: Pain Management;  Laterality: Bilateral;  @1230  No ATC or DM    EPIDURAL STEROID INJECTION Bilateral 2023    Procedure: Bilateral L3, L4, L5 Medial Branch Blocks #2;  Surgeon: Lito Jasso Jr., MD;  Location: [facility] ENDO;  Service: Pain Management;  Laterality: Bilateral;  @1000  No ATC or DM    EPIDURAL STEROID INJECTION Left 2023    Procedure: Left L3, L4, L5 Radiofrequency Thermocoagulation of Medial Branches;  Surgeon: Lito Jasso Jr., MD;  Location: [facility] ENDO;  Service: Pain Management;  Laterality: Left;  @0815  No ATC or DM    EPIDURAL STEROID INJECTION Right 2023    Procedure: Right L3, L4, L5 Radiofrequency Thermocoagulation of Medial Branches;  Surgeon: Lito Jasso Jr., MD;  Location: [facility] ENDO;  Service: Pain Management;  Laterality: Right;  @0930  No ATC or DM  Last 1&75    ESOPHAGOGASTRODUODENOSCOPY N/A 2019    Procedure: EGD (ESOPHAGOGASTRODUODENOSCOPY);  Surgeon: Michelle  LAZARO Mc MD;  Location: [facility] ENDO;  Service: Endoscopy;  Laterality: N/A;    HYSTERECTOMY      partial     KNEE SURGERY      LAPAROSCOPIC SLEEVE GASTRECTOMY N/A 10/18/2019    Procedure: GASTRECTOMY, SLEEVE, LAPAROSCOPIC, with intraop EGD;  Surgeon: Ean Kohli MD;  Location: [facility] OR [address] [place] [address];  Service: General;  Laterality: N/A;    LEFT HEART CATHETERIZATION Left 11/7/2023    Procedure: Left heart cath;  Surgeon: Lito Colmenares MD;  Location: [facility] CATH LAB;  Service: Cardiology;  Laterality: Left;  730am start, R rad access  RN PRE OP 11/2/23    pinched nerve      SHOULDER SURGERY      TONSILLECTOMY      WRIST SURGERY      had ganlin cyst     Family History   Problem Relation Name Age of Onset    Diabetes Mother Mother     Harrison's disease Mother Mother     Hypertension Mother Mother     Heart disease Father Father     Stroke Father Father     Mental illness Father Father         ptsd    Hypertension Father Father     Diabetes Father Father     Depression Father Father     Arthritis Father Father     No Known Problems Brother      Heart disease Maternal Grandmother Grandmother     Depression Maternal Grandmother Grandmother     COPD Maternal Grandfather Grandfather     Heart disease Maternal Grandfather Grandfather     Stroke Maternal Grandfather Grandfather     Kidney disease Paternal Grandmother Grandmother     Heart disease Paternal Grandmother Grandmother     Heart disease Paternal Grandfather Grandfather     Early death Paternal Grandfather Grandfather     Hypertension Son Son     Asthma Son Son     Rectal cancer Paternal Aunt      Asthma Maternal Aunt Aunt     Diabetes Maternal Aunt Aunt     Asthma Maternal Uncle Uncle      Social History     Tobacco Use    Smoking status: Every Day     Types: Vaping with nicotine    Smokeless tobacco: Current    Tobacco comments:     vaping started April 2020; QUIT cigarettes Sept 2019   Substance Use Topics    Alcohol use: No    Drug use: No     Review of  Systems   Constitutional:  Positive for fever (resolved).   HENT:  Negative for sore throat.    Eyes:  Negative for visual disturbance.   Respiratory:  Positive for cough. Negative for shortness of breath.    Cardiovascular:  Negative for chest pain.   Gastrointestinal:  Positive for abdominal pain, diarrhea, nausea and vomiting.   Genitourinary:  Negative for dysuria.   Skin:  Negative for rash.   Neurological:  Positive for light-headedness. Negative for syncope, weakness and headaches.       Physical Exam     Initial Vitals [10/09/24 1143]   BP Pulse Resp Temp SpO2   (!) 141/78 69 18 97.9 °F (36.6 °C) 98 %      MAP       --         Physical Exam    Nursing note and vitals reviewed.  Constitutional:  Non-toxic appearance. No distress.   HENT:   Head: Atraumatic. Mouth/Throat: Mucous membranes are normal.   TM bilaterally clear with no effusion, no external ear tenderness   Eyes: Conjunctivae and EOM are normal.   Cardiovascular:  Normal rate, regular rhythm, normal heart sounds and intact distal pulses.           No murmur heard.  Pulmonary/Chest: Breath sounds normal. No respiratory distress. She has no wheezes. She has no rhonchi.   Abdominal: Abdomen is soft. She exhibits no distension. There is no abdominal tenderness.   No right CVA tenderness.  No left CVA tenderness. There is no guarding.     Neurological: She is alert and oriented to person, place, and time. No cranial nerve deficit or sensory deficit.         ED Course   Procedures  Labs Reviewed   CBC W/ AUTO DIFFERENTIAL - Abnormal       Result Value    WBC 6.35      RBC 4.93      Hemoglobin 14.9      Hematocrit 47.6      MCV 97      MCH 30.2      MCHC 31.3 (*)     RDW 12.4      Platelets 294      MPV 9.6      Immature Granulocytes 0.3      Gran # (ANC) 3.7      Immature Grans (Abs) 0.02      Lymph # 2.0      Mono # 0.4      Eos # 0.1      Baso # 0.07      nRBC 0      Gran % 57.5      Lymph % 32.0      Mono % 6.9      Eosinophil % 2.2      Basophil %  1.1      Differential Method Automated     COMPREHENSIVE METABOLIC PANEL - Abnormal    Sodium 141      Potassium 3.8      Chloride 110      CO2 20 (*)     Glucose 108      BUN 9      Creatinine 0.8      Calcium 9.4      Total Protein 7.3      Albumin 3.8      Total Bilirubin 0.6      Alkaline Phosphatase 81      AST 58 (*)      (*)     eGFR >60      Anion Gap 11     MAGNESIUM    Magnesium 1.7     LIPASE    Lipase 41     TSH    TSH 1.148     URINALYSIS, REFLEX TO URINE CULTURE    Specimen UA Urine, Clean Catch      Color, UA Yellow      Appearance, UA Clear      pH, UA 6.0      Specific Gravity, UA 1.005      Protein, UA Negative      Glucose, UA Negative      Ketones, UA Negative      Bilirubin (UA) Negative      Occult Blood UA Negative      Nitrite, UA Negative      Urobilinogen, UA Negative      Leukocytes, UA Negative      Narrative:     Specimen Source->Urine   SARS-COV-2 RDRP GENE    POC Rapid COVID Negative       Acceptable Yes     POCT INFLUENZA A/B MOLECULAR    POC Molecular Influenza A Ag Negative      POC Molecular Influenza B Ag Negative       Acceptable Yes            Imaging Results    None          Medications   lactated ringers bolus 1,000 mL (0 mLs Intravenous Stopped 10/9/24 1342)   ondansetron injection 4 mg (4 mg Intravenous Given 10/9/24 1209)   meclizine tablet 50 mg (50 mg Oral Given 10/9/24 1422)   0.9%  NaCl infusion (0 mLs Intravenous Stopped 10/9/24 1515)     Medical Decision Making  53 y.o. female, with a PMHx of COPD, GERD, Fibromyalgia and PSHx of cholecystectomy(1994), who presents to the ED with lightheadedness, nausea/vomiting, diarrhea onset 11 days ago.     Differential diagnosis include but are not limited to:  Viral syndrome, BPPV, vestibular neuritis, electrolyte abnormality, fibromyalgia, orthostatic symptoms    Given orthostatic hypotension along with dizziness with ambulation attempt, worsening with head turning, was given fluid hydration,  meclizine in ED. No significant abnormalities on labs, with no concern for CVA given episodic dizziness, no dizziness at rest, no focal neuro deficits on exam. Nausea/vomiting/diarrhea/subjective fever may be due to viral syndrome, with nausea/vomiting exacerbated by peripheral vertigo symptoms.     Able to tolerate PO fluids in ED after meclizine treatment, ambulated in ED by self without dizziness after IVF, meclizine.  Discharged with short course of prednisone for possible vestibular neuritis, meclizine, zofran, follow up with ENT. No significant abnormalities on labs including CBC, CMP, Mg, lipase, TSH, UA negative for UTI, COVID/flu negative.    I discussed with the patient/family the diagnosis, treatment plan, indications for return to the emergency department, as well as for expected follow-up. The patient/family verbalized an understanding. The patient/family is asked if there were any questions or concerns, which were addressed to patient/family satisfaction. Patient/family understands and is agreeable to the plan.         Amount and/or Complexity of Data Reviewed  External Data Reviewed: notes.     Details: See HPI  Labs: ordered. Decision-making details documented in ED Course.    Risk  Prescription drug management.            Scribe Attestation:   Scribe #1: I performed the above scribed service and the documentation accurately describes the services I performed. I attest to the accuracy of the note.        ED Course as of 10/10/24 0224   Wed Oct 09, 2024   1421 Attempted to ambulate patient, however patient became lightheaded when sitting up, orthostatic vitals pending, will give meclizine 50 mg [LF]   1449 Slight drop in BP after standing, will give additional 500cc IVF NS [LF]   1450 UA negative for UTI [LF]   1521 Patient with significant improvement in lightheadedness after fluids, meclizine, able to ambulate to the bathroom by herself [LF]      ED Course User Index  [LF] Radha Conway MD           I, Radha Conway, personally performed the services described in this documentation. All medical record entries made by the scribe were at my direction and in my presence. I have reviewed the chart and agree that the record reflects my personal performance and is accurate and complete.        Clinical Impression:  Final diagnoses:  [R42] Vertigo (Primary)  [R42] Lightheadedness  [R11.10] Vomiting, unspecified vomiting type, unspecified whether nausea present          ED Disposition Condition    Discharge Stable          ED Prescriptions       Medication Sig Dispense Start Date End Date Auth. Provider    predniSONE (DELTASONE) 50 MG Tab Take 1 tablet (50 mg total) by mouth once daily. 5 tablet 10/9/2024 -- Radha Conway MD    ondansetron (ZOFRAN-ODT) 8 MG TbDL Take 1 tablet (8 mg total) by mouth 2 (two) times daily as needed (nausea). 21 tablet 10/9/2024 -- Radha Conway MD          Follow-up Information       Follow up With Specialties Details Why Contact Info    Shay Dai MD Internal Medicine  vestibular neuritis/BPPV [address] [facility] [address]  [phone]      [facility] ENT Otolaryngology   [address] [facility] [place] [address] [phone]             Radha Conway MD  10/10/24 0228

## 2024-10-09 NOTE — ED NOTES
Pt aware urine sample is needed. Pt side rails upx2 with call light within reach. Family at bedside.

## 2024-10-09 NOTE — DISCHARGE INSTRUCTIONS

## 2024-10-10 ENCOUNTER — PATIENT OUTREACH (OUTPATIENT)
Dept: EMERGENCY MEDICINE | Facility: HOSPITAL | Age: 54
End: 2024-10-10
Payer: MEDICARE

## 2024-10-11 NOTE — PROGRESS NOTES
Patient visited the ED on 10/9/2024.  Patient outreached on two separate Post ED Navigation discharge but unable to reach.  Appointment reminder has been set for next scheduled appointment.

## 2024-11-18 ENCOUNTER — PATIENT OUTREACH (OUTPATIENT)
Dept: EMERGENCY MEDICINE | Facility: HOSPITAL | Age: 54
End: 2024-11-18
Payer: MEDICARE

## 2024-11-18 NOTE — PROGRESS NOTES
Phoned patient for follow-up. Patient was unavailable. Message sent urging patient to call if additional assistance is needed.  Nick Pearce

## 2024-11-18 NOTE — PROGRESS NOTES
Reminded patient of upcoming appointment appointment on 11/19/24 at 11/30/24 with Dr Alex Paniagua MD [facility], Rheumatology [address], [place] [address] [place] [address].   Nick Pearce

## 2024-11-19 ENCOUNTER — OFFICE VISIT (OUTPATIENT)
Dept: RHEUMATOLOGY | Facility: CLINIC | Age: 54
End: 2024-11-19
Payer: MEDICARE

## 2024-11-19 VITALS
TEMPERATURE: 98 F | DIASTOLIC BLOOD PRESSURE: 59 MMHG | OXYGEN SATURATION: 98 % | HEIGHT: 66 IN | HEART RATE: 64 BPM | WEIGHT: 293 LBS | BODY MASS INDEX: 47.09 KG/M2 | RESPIRATION RATE: 18 BRPM | SYSTOLIC BLOOD PRESSURE: 121 MMHG

## 2024-11-19 DIAGNOSIS — M79.7 FIBROMYALGIA: Primary | ICD-10-CM

## 2024-11-19 DIAGNOSIS — M15.0 PRIMARY OSTEOARTHRITIS INVOLVING MULTIPLE JOINTS: ICD-10-CM

## 2024-11-19 DIAGNOSIS — Z71.89 COUNSELING AND COORDINATION OF CARE: ICD-10-CM

## 2024-11-19 DIAGNOSIS — F41.9 ANXIETY DISORDER, UNSPECIFIED TYPE: Primary | ICD-10-CM

## 2024-11-19 DIAGNOSIS — E66.813 CLASS 3 SEVERE OBESITY DUE TO EXCESS CALORIES WITH SERIOUS COMORBIDITY AND BODY MASS INDEX (BMI) OF 45.0 TO 49.9 IN ADULT: ICD-10-CM

## 2024-11-19 DIAGNOSIS — E66.01 CLASS 3 SEVERE OBESITY DUE TO EXCESS CALORIES WITH SERIOUS COMORBIDITY AND BODY MASS INDEX (BMI) OF 45.0 TO 49.9 IN ADULT: ICD-10-CM

## 2024-11-19 PROCEDURE — 99214 OFFICE O/P EST MOD 30 MIN: CPT | Mod: S$GLB,,, | Performed by: STUDENT IN AN ORGANIZED HEALTH CARE EDUCATION/TRAINING PROGRAM

## 2024-11-19 PROCEDURE — 3074F SYST BP LT 130 MM HG: CPT | Mod: CPTII,S$GLB,, | Performed by: STUDENT IN AN ORGANIZED HEALTH CARE EDUCATION/TRAINING PROGRAM

## 2024-11-19 PROCEDURE — 3008F BODY MASS INDEX DOCD: CPT | Mod: CPTII,S$GLB,, | Performed by: STUDENT IN AN ORGANIZED HEALTH CARE EDUCATION/TRAINING PROGRAM

## 2024-11-19 PROCEDURE — 3078F DIAST BP <80 MM HG: CPT | Mod: CPTII,S$GLB,, | Performed by: STUDENT IN AN ORGANIZED HEALTH CARE EDUCATION/TRAINING PROGRAM

## 2024-11-19 PROCEDURE — 99999 PR PBB SHADOW E&M-EST. PATIENT-LVL IV: CPT | Mod: PBBFAC,,, | Performed by: STUDENT IN AN ORGANIZED HEALTH CARE EDUCATION/TRAINING PROGRAM

## 2024-11-19 RX ORDER — LORAZEPAM 0.5 MG/1
TABLET ORAL
Qty: 60 TABLET | Refills: 0 | Status: SHIPPED | OUTPATIENT
Start: 2024-11-19

## 2024-11-20 ENCOUNTER — OFFICE VISIT (OUTPATIENT)
Dept: FAMILY MEDICINE | Facility: CLINIC | Age: 54
End: 2024-11-20
Payer: MEDICARE

## 2024-11-20 VITALS
HEIGHT: 66 IN | WEIGHT: 293 LBS | BODY MASS INDEX: 47.09 KG/M2 | TEMPERATURE: 99 F | SYSTOLIC BLOOD PRESSURE: 122 MMHG | OXYGEN SATURATION: 98 % | DIASTOLIC BLOOD PRESSURE: 78 MMHG | HEART RATE: 67 BPM

## 2024-11-20 DIAGNOSIS — M79.7 FIBROMYALGIA: ICD-10-CM

## 2024-11-20 DIAGNOSIS — E66.813 CLASS 3 SEVERE OBESITY DUE TO EXCESS CALORIES WITH SERIOUS COMORBIDITY AND BODY MASS INDEX (BMI) OF 45.0 TO 49.9 IN ADULT: ICD-10-CM

## 2024-11-20 DIAGNOSIS — E66.01 CLASS 3 SEVERE OBESITY DUE TO EXCESS CALORIES WITH SERIOUS COMORBIDITY AND BODY MASS INDEX (BMI) OF 45.0 TO 49.9 IN ADULT: ICD-10-CM

## 2024-11-20 DIAGNOSIS — R63.5 WEIGHT GAIN DUE TO MEDICATION: ICD-10-CM

## 2024-11-20 DIAGNOSIS — T50.905A WEIGHT GAIN DUE TO MEDICATION: ICD-10-CM

## 2024-11-20 DIAGNOSIS — F31.9 BIPOLAR 1 DISORDER: ICD-10-CM

## 2024-11-20 DIAGNOSIS — Z00.00 HEALTHCARE MAINTENANCE: ICD-10-CM

## 2024-11-20 DIAGNOSIS — E66.01 CLASS 3 SEVERE OBESITY DUE TO EXCESS CALORIES WITHOUT SERIOUS COMORBIDITY WITH BODY MASS INDEX (BMI) OF 45.0 TO 49.9 IN ADULT: Primary | ICD-10-CM

## 2024-11-20 DIAGNOSIS — E66.813 CLASS 3 SEVERE OBESITY DUE TO EXCESS CALORIES WITHOUT SERIOUS COMORBIDITY WITH BODY MASS INDEX (BMI) OF 45.0 TO 49.9 IN ADULT: Primary | ICD-10-CM

## 2024-11-20 PROCEDURE — 90656 IIV3 VACC NO PRSV 0.5 ML IM: CPT | Mod: S$GLB,,, | Performed by: INTERNAL MEDICINE

## 2024-11-20 PROCEDURE — G0008 ADMIN INFLUENZA VIRUS VAC: HCPCS | Mod: S$GLB,,, | Performed by: INTERNAL MEDICINE

## 2024-11-20 PROCEDURE — 90480 ADMN SARSCOV2 VAC 1/ONLY CMP: CPT | Mod: S$GLB,,, | Performed by: INTERNAL MEDICINE

## 2024-11-20 PROCEDURE — 3044F HG A1C LEVEL LT 7.0%: CPT | Mod: CPTII,S$GLB,, | Performed by: INTERNAL MEDICINE

## 2024-11-20 PROCEDURE — 1160F RVW MEDS BY RX/DR IN RCRD: CPT | Mod: CPTII,S$GLB,, | Performed by: INTERNAL MEDICINE

## 2024-11-20 PROCEDURE — 99214 OFFICE O/P EST MOD 30 MIN: CPT | Mod: 25,S$GLB,, | Performed by: INTERNAL MEDICINE

## 2024-11-20 PROCEDURE — 3078F DIAST BP <80 MM HG: CPT | Mod: CPTII,S$GLB,, | Performed by: INTERNAL MEDICINE

## 2024-11-20 PROCEDURE — 3008F BODY MASS INDEX DOCD: CPT | Mod: CPTII,S$GLB,, | Performed by: INTERNAL MEDICINE

## 2024-11-20 PROCEDURE — 99999 PR PBB SHADOW E&M-EST. PATIENT-LVL V: CPT | Mod: PBBFAC,,, | Performed by: INTERNAL MEDICINE

## 2024-11-20 PROCEDURE — 91320 SARSCV2 VAC 30MCG TRS-SUC IM: CPT | Mod: S$GLB,,, | Performed by: INTERNAL MEDICINE

## 2024-11-20 PROCEDURE — 3074F SYST BP LT 130 MM HG: CPT | Mod: CPTII,S$GLB,, | Performed by: INTERNAL MEDICINE

## 2024-11-20 PROCEDURE — 1159F MED LIST DOCD IN RCRD: CPT | Mod: CPTII,S$GLB,, | Performed by: INTERNAL MEDICINE

## 2024-11-20 RX ORDER — METFORMIN HYDROCHLORIDE 500 MG/1
500 TABLET, EXTENDED RELEASE ORAL 2 TIMES DAILY WITH MEALS
Qty: 60 TABLET | Refills: 5 | Status: SHIPPED | OUTPATIENT
Start: 2024-11-20 | End: 2025-05-19

## 2024-11-20 NOTE — PROGRESS NOTES
HISTORY OF PRESENT ILLNESS:  Zaira Dowell is a 53 y.o. female who presents to the clinic today for Follow-up  .     History of Present Illness    Zaira presents today for follow-up on weight management and other concerns.    She is considering weight loss supplements but understands they may not be effective without proper nutrition and exercise. She is currently taking metformin for weight management and is agreeable to increasing the dosage to 1000 mg daily, split into two 500 mg doses. She reports no side effects from the current metformin regimen. She expresses frustration with insurance coverage issues for weight loss medications, noting that her previous insurance covered these treatments. She is planning to switch insurance providers in January, hoping for better coverage of weight management options.    She is currently taking lithium and risperidone as part of her psychiatric medication regimen. She expresses concern about potential weight gain associated with these medications, particularly the antipsychotics. She reports having previously discussed the possibility of changing her medication regimen with her psychiatrist.  Psychiatry discussed with her a transition to Vraylar for bipolar and getting off Risperdal but patient wanted to think about it.  Noted Vraylar can also be associated with weight gain.    Her current diet includes a protein shake for breakfast, either a small meal or another protein shake for lunch, and a regular meal for supper. She acknowledges that her evening snacking habits after supper have not been optimal. She reports a decrease in exercise activity since early October due to illness and recent surgery.    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  Lowest weight post procedure was 248 lb.    Seen in ED 10/2024 for vertigo.  Testing unremarkable. Advised for ENT follow up.  This resulted in severe symptoms that left her bedridden for over two weeks, which she describes as  ""horrible". The vertigo has since mostly resolved. Following this illness, she underwent surgery for a pinched nerve and carpal tunnel syndrome, which further limited her activities.    She received her flu vaccine and COVID booster during the visit. She has a mammogram scheduled. She expresses interest in a nutrition referral but faces coverage issues.    Wt Readings from Last 3 Encounters:   24 1411 134.9 kg (297 lb 6.4 oz)   24 1137 134.1 kg (295 lb 10.2 oz)   10/09/24 1143 129.3 kg (285 lb)        Estimated body mass index is 47.72 kg/m² as calculated from the following:    Height as of 24: 5' 6" (1.676 m).    Weight as of 24: 134.1 kg (295 lb 10.2 oz).      ROS:  General: -fever, -chills, -fatigue, +weight gain, -weight loss  Eyes: -vision changes, -redness, -discharge  ENT: -ear pain, -nasal congestion, -sore throat  Cardiovascular: -chest pain, -palpitations, -lower extremity edema  Respiratory: -cough, -shortness of breath  Gastrointestinal: -abdominal pain, -nausea, -vomiting, -diarrhea, -constipation, -blood in stool  Genitourinary: -dysuria, -hematuria, -frequency  Musculoskeletal: -joint pain, -muscle pain  Skin: -rash, -lesion  Neurological: -headache, -dizziness, -numbness, -tingling  Psychiatric: -anxiety, -depression, -sleep difficulty             PAST MEDICAL HISTORY:  Past Medical History:   Diagnosis Date    Anxiety     Arthritis     Asthma     Back pain     Bipolar I     BMI 50.0-59.9, adult     Chronic obstructive pulmonary disease, unspecified COPD type 2022    Depression     Fibromyalgia     GERD (gastroesophageal reflux disease)     HFpEF     Insomnia 2021    Obesity     SHARON (obstructive sleep apnea)     Tobacco dependence     Urinary incontinence        PAST SURGICAL HISTORY:  Past Surgical History:   Procedure Laterality Date    CARPAL TUNNEL RELEASE       SECTION  1993    CHOLECYSTECTOMY      COLONOSCOPY N/A 2022    Procedure: " COLONOSCOPY;  Surgeon: Ernesto Auguste MD;  Location: [facility] ENDO;  Service: Endoscopy;  Laterality: N/A;  fully vaccinated-GT    EPIDURAL STEROID INJECTION Bilateral 07/19/2023    Procedure: Bilateral L3, L4, L5 Medial Branch Blocks #1;  Surgeon: Lito Jasso Jr., MD;  Location: [facility] ENDO;  Service: Pain Management;  Laterality: Bilateral;  @1230  No ATC or DM    EPIDURAL STEROID INJECTION Bilateral 08/02/2023    Procedure: Bilateral L3, L4, L5 Medial Branch Blocks #2;  Surgeon: Lito Jasso Jr., MD;  Location: [facility] ENDO;  Service: Pain Management;  Laterality: Bilateral;  @1000  No ATC or DM    EPIDURAL STEROID INJECTION Left 08/16/2023    Procedure: Left L3, L4, L5 Radiofrequency Thermocoagulation of Medial Branches;  Surgeon: Lito Jasso Jr., MD;  Location: [facility] ENDO;  Service: Pain Management;  Laterality: Left;  @0815  No ATC or DM    EPIDURAL STEROID INJECTION Right 08/30/2023    Procedure: Right L3, L4, L5 Radiofrequency Thermocoagulation of Medial Branches;  Surgeon: Lito Jasso Jr., MD;  Location: [facility] ENDO;  Service: Pain Management;  Laterality: Right;  @0930  No ATC or DM  Last 1&75    ESOPHAGOGASTRODUODENOSCOPY N/A 05/21/2019    Procedure: EGD (ESOPHAGOGASTRODUODENOSCOPY);  Surgeon: Michelle Mc MD;  Location: [facility] ENDO;  Service: Endoscopy;  Laterality: N/A;    HYSTERECTOMY      partial     KNEE SURGERY      LAPAROSCOPIC SLEEVE GASTRECTOMY N/A 10/18/2019    Procedure: GASTRECTOMY, SLEEVE, LAPAROSCOPIC, with intraop EGD;  Surgeon: Ean Kohli MD;  Location: [facility] OR [address] [place] [address];  Service: General;  Laterality: N/A;    LEFT HEART CATHETERIZATION Left 11/7/2023    Procedure: Left heart cath;  Surgeon: Lito Colmenares MD;  Location: [facility] CATH LAB;  Service: Cardiology;  Laterality: Left;  730am start, R rad access  RN PRE OP 11/2/23    pinched nerve      SHOULDER SURGERY      TONSILLECTOMY      WRIST SURGERY      had ganlin cyst       SOCIAL HISTORY:  Social  History     Socioeconomic History    Marital status:    Tobacco Use    Smoking status: Every Day     Types: Vaping with nicotine    Smokeless tobacco: Current    Tobacco comments:     vaping started April 2020; QUIT cigarettes Sept 2019   Substance and Sexual Activity    Alcohol use: No    Drug use: No    Sexual activity: Yes     Partners: Male     Birth control/protection: None     Social Drivers of Health     Financial Resource Strain: Low Risk  (8/21/2024)    Received from [facility]    Overall Financial Resource Strain (CARDIA)     Difficulty of Paying Living Expenses: Not hard at all   Food Insecurity: No Food Insecurity (8/21/2024)    Received from [facility]    Hunger Vital Sign     Worried About Running Out of Food in the Last Year: Never true     Ran Out of Food in the Last Year: Never true   Transportation Needs: No Transportation Needs (8/21/2024)    Received from [facility]    PRAPARE - Transportation     Lack of Transportation (Medical): No     Lack of Transportation (Non-Medical): No   Physical Activity: Inactive (8/21/2024)    Received from [facility]    Exercise Vital Sign     Days of Exercise per Week: 0 days     Minutes of Exercise per Session: 10 min   Stress: Stress Concern Present (8/21/2024)    Received from [facility]    [employer] [place] [employer] - Occupational Stress Questionnaire     Feeling of Stress : Very much   Housing Stability: Low Risk  (7/23/2024)    Housing Stability Vital Sign     Unable to Pay for Housing in the Last Year: No     Homeless in the Last Year: No       FAMILY HISTORY:  Family History   Problem Relation Name Age of Onset    Diabetes Mother Mother     Jose Juan's disease Mother Mother     Hypertension Mother Mother     Heart disease Father Father     Stroke Father Father     Mental illness Father Father         ptsd    Hypertension Father Father     Diabetes Father Father     Depression Father Father     Arthritis Father Father     No Known  Problems Brother      Heart disease Maternal Grandmother Grandmother     Depression Maternal Grandmother Grandmother     COPD Maternal Grandfather Grandfather     Heart disease Maternal Grandfather Grandfather     Stroke Maternal Grandfather Grandfather     Kidney disease Paternal Grandmother Grandmother     Heart disease Paternal Grandmother Grandmother     Heart disease Paternal Grandfather Grandfather     Early death Paternal Grandfather Grandfather     Hypertension Son Son     Asthma Son Son     Rectal cancer Paternal Aunt      Asthma Maternal Aunt Aunt     Diabetes Maternal Aunt Aunt     Asthma Maternal Uncle Uncle        ALLERGIES AND MEDICATIONS: updated and reviewed.  Review of patient's allergies indicates:   Allergen Reactions    Hydrocodone-acetaminophen Nausea And Vomiting     Medication List with Changes/Refills   Current Medications    ACETAMINOPHEN (TYLENOL) 500 MG TABLET    Take 1 tablet (500 mg total) by mouth every 4 (four) hours as needed for Pain or Temperature greater than (100.5 or greater).    ACETAZOLAMIDE (DIAMOX) 250 MG TABLET    Take 250 mg by mouth.    ALBUTEROL (PROVENTIL/VENTOLIN HFA) 90 MCG/ACTUATION INHALER    Inhale 2 puffs into the lungs every 4 (four) hours as needed for Wheezing or Shortness of Breath. Rescue    ALLOPURINOL (ZYLOPRIM) 100 MG TABLET    Take 1 tablet (100 mg total) by mouth once daily.    B COMPLEX VITAMINS TABLET    Take 1 tablet by mouth once daily.    BUPROPION (WELLBUTRIN SR) 100 MG TBSR 12 HR TABLET    Take 2 tabs q am, and 1 tab daily at noon    CALCIUM CITRATE (CALCITRATE) 200 MG (950 MG) TABLET    Take 1 tablet by mouth 2 (two) times daily.    CETIRIZINE (ZYRTEC) 10 MG TABLET    Take 1 tablet (10 mg total) by mouth once daily.    COLCHICINE (COLCRYS) 0.6 MG TABLET    Take 1 tablet (0.6 mg total) by mouth daily as needed (gout flare).    CYANOCOBALAMIN 500 MCG TABLET    Take 500 mcg by mouth once daily.    CYCLOBENZAPRINE (FLEXERIL) 5 MG TABLET    Take 1  tablet (5 mg total) by mouth 3 (three) times daily as needed for Muscle spasms.    DICLOFENAC SODIUM (VOLTAREN ARTHRITIS PAIN) 1 % GEL    Apply 2 g topically 4 (four) times daily.    ESTRADIOL (ESTRACE) 0.5 MG TABLET    Take 1 tablet (0.5 mg total) by mouth once daily.    FERROUS FUMARATE/VIT BCOMP,C (SUPER B COMPLEX ORAL)    Take 1 capsule by mouth once daily.    FUROSEMIDE (LASIX) 20 MG TABLET    Take 1 tablet (20 mg total) by mouth daily as needed (leg swelling/fluid buildup).    IBUPROFEN (ADVIL,MOTRIN) 600 MG TABLET    Take 1 tablet (600 mg total) by mouth every 6 (six) hours as needed for Pain or Temperature greater than (100.5 or greater).    LITHIUM (ESKALITH) 300 MG CAPSULE    Take 1 capsule (300 mg total) by mouth 3 (three) times daily with meals.    LORAZEPAM (ATIVAN) 0.5 MG TABLET    TAKE 2 TABLETS EVERY DAY AS DIRECTED    MECLIZINE (ANTIVERT) 25 MG TABLET    Take 1 tablet (25 mg total) by mouth every 6 (six) hours as needed for Dizziness.    MULTIVITAMIN CAPSULE    Take 1 capsule by mouth 2 (two) times daily.     OMEPRAZOLE (PRILOSEC) 40 MG CAPSULE    TAKE 1 CAPSULE TWICE DAILY BEFORE MEALS    ONDANSETRON (ZOFRAN-ODT) 8 MG TBDL    Take 1 tablet (8 mg total) by mouth 2 (two) times daily as needed (nausea).    POTASSIUM CHLORIDE (KLOR-CON) 10 MEQ TBSR    Take 10 mEq by mouth 2 (two) times daily.    PREDNISONE (DELTASONE) 50 MG TAB    Take 1 tablet (50 mg total) by mouth once daily.    PREGABALIN (LYRICA) 100 MG CAPSULE    Take 1 capsule (100 mg total) by mouth 2 (two) times daily.    PROMETHAZINE (PHENERGAN) 12.5 MG TAB    promethazine Take half tablet (oral) 2 times per day PRN - Nausea . Medication may cause drowsiness. [id number] tablet 2 times per day oral No set duration recorded No set duration amount recorded active 12.5 mg    PROMETHAZINE (PHENERGAN) 25 MG TABLET    Take 25 mg by mouth.    PROMETHAZINE-DEXTROMETHORPHAN (PROMETHAZINE-DM) 6.25-15 MG/5 ML SYRP    Take 5 mLs by mouth nightly as needed  "(cough).    RISPERIDONE (RISPERDAL) 0.5 MG TAB    Take 1 tablet (0.5 mg total) by mouth 2 (two) times daily.    SERTRALINE (ZOLOFT) 100 MG TABLET    Take 2 tablets (200 mg total) by mouth once daily.    TOLTERODINE (DETROL LA) 4 MG 24 HR CAPSULE    Take 1 capsule (4 mg total) by mouth once daily.    TOPIRAMATE (TOPAMAX) 200 MG TAB    Take 1 tablet (200 mg total) by mouth once daily.   Changed and/or Refilled Medications    Modified Medication Previous Medication    METFORMIN (GLUCOPHAGE-XR) 500 MG ER 24HR TABLET metFORMIN (GLUCOPHAGE-XR) 500 MG ER 24hr tablet       Take 1 tablet (500 mg total) by mouth 2 (two) times daily with meals.    Take 1 tablet (500 mg total) by mouth daily with breakfast.   Discontinued Medications    TIRZEPATIDE, WEIGHT LOSS, (ZEPBOUND) 2.5 MG/0.5 ML PNIJ    Inject 2.5 mg into the skin every 7 days.    TIRZEPATIDE, WEIGHT LOSS, (ZEPBOUND) 5 MG/0.5 ML PNIJ    Inject 5 mg into the skin every 7 days.          CARE TEAM:  Patient Care Team:  Shay Dai MD as PCP - General (Internal Medicine)  Delvin Nagy Jr., MD (Inactive) as Consulting Physician (Psychiatry)  José Hernandez MD as Consulting Physician (Endocrinology)  Brittaney Brandon NP (Inactive) as Nurse Practitioner (Urology)  Svetlana Nelson LPN as Care Coordinator  Nick Pearce as ED Navigator         PHYSICAL EXAM:   Vitals:    11/20/24 1411   BP: 122/78   Pulse: 67   Temp: 98.7 °F (37.1 °C)     Weight: 134.9 kg (297 lb 6.4 oz)   Height: 5' 6" (167.6 cm)   Body mass index is 48 kg/m².    Physical Exam    General: No acute distress. Well-developed. Well-nourished.  Eyes: EOMI. Sclerae anicteric.  HENT: Normocephalic. Atraumatic. Nares patent. Moist oral mucosa.  Ears: Bilateral TMs clear. Bilateral EACs clear.  Cardiovascular: Regular rate. Regular rhythm. No murmurs. No rubs. No gallops. Normal S1, S2.  Respiratory: Normal respiratory effort. Clear to auscultation bilaterally. No rales. No rhonchi. No " wheezing.  Abdomen: Soft. Non-tender. Non-distended. Normoactive bowel sounds.  Musculoskeletal: No  obvious deformity.  Extremities: No lower extremity edema.  Neurological: Alert & oriented x3. No slurred speech. Normal gait.  Psychiatric: Normal mood. Normal affect. Good insight. Good judgment.  Skin: Warm. Dry. No rash.             ASSESSMENT AND PLAN:  Assessment & Plan    Assessed weight management challenges, considering impact of psychiatric medications (lithium, risperidone) on weight gain  Evaluated current nutrition and exercise habits  Will increase metformin dosage to address weight gain, particularly related to antipsychotic use  Reviewed recent lab work, noting low CO2 levels  Adjusted metformin dosage plan due to potential risk of metabolic acidosis    Class 3 severe obesity due to excess calories without serious comorbidity with body mass index (BMI) of 45.0 to 49.9 in adult  -     Ambulatory Referral/Consult to Lifestyle Nutrition; Future; Expected date: 11/27/2024    Bipolar 1 disorder        - Follow up psychiatry.  Advised to discuss alternative regimen that may contribute less to weight increase.    Fibromyalgia    Weight gain due to medication  Class 3 severe obesity due to excess calories with serious comorbidity and body mass index (BMI) of 45.0 to 49.9 in adult  -     metFORMIN (GLUCOPHAGE-XR) 500 MG ER 24hr tablet; Take 1 tablet (500 mg total) by mouth 2 (two) times daily with meals.  Dispense: 60 tablet; Refill: 5    Healthcare maintenance  -     influenza (Flulaval, Fluzone, Fluarix) 45 mcg/0.5 mL IM vaccine (> or = 6 mo) 0.5 mL  -     metFORMIN (GLUCOPHAGE-XR) 500 MG ER 24hr tablet; Take 1 tablet (500 mg total) by mouth 2 (two) times daily with meals.  Dispense: 60 tablet; Refill: 5  -     Basic Metabolic Panel; Future; Expected date: 01/01/2025  -     COVID-19 (Pfizer) 30 mcg/0.3 mL IM vaccine (>/= 11 yo) 0.3 mL    - Discussed limitations of weight loss supplements and importance of  nutrition and exercise for effective weight management.  - Explained potential weight gain side effects of current psychiatric medications.  - Discussed lack of FDA regulation for compounded weight loss medications.  - Zaira to focus on improving evening snacking habits.  - Zaira to resume exercise routine once cleared from recent medical procedures.  - Recommend walking, biking, or seated exercises if experiencing joint pain.  - Referred to nutritionist for January (pending new insurance coverage).    - Increased metformin to 500 mg twice daily.    - Flu shot administered during visit.  - COVID-19 booster vaccine administered during visit.    - BMP (Basic Metabolic Panel) ordered for early January.    - Follow up after lab work in early January.  - Contact the office if insurance coverage changes to reassess options for weight loss medications.                    Follow up 3 months or sooner as needed.    This note was generated with the assistance of ambient listening technology. Verbal consent was obtained by the patient and accompanying visitor(s) for the recording of patient appointment to facilitate this note. I attest to having reviewed and edited the generated note for accuracy, though some syntax or spelling errors may persist. Please contact the author of this note for any clarification.

## 2024-11-20 NOTE — PATIENT INSTRUCTIONS
SEATED RESISTANCE BAND EXERCISES     If you do not have a resistance band, or do not feel comfortable using a resistance band, these exercises can also be done holding a light hand weight or water bottle.  If you are just starting to exercise, you may want to go through the motions without any weights or resistance till you become comfortable with the movements.     Do each of the movements shown 10 times (10 repetitions). You can repeat the exercises a second or  third time as well for greater benefit. The amount of tension on the resistance bands should be adjusted so  you can complete one set of 10 repetitions with effort. Increase the tension every few weeks. Do these  exercises 2 or 3 times a week.     * If an exercise hurts your back or joints, stop doing that particular exercise, but keep doing all the others *        CHEST EXERCISE          Start Position:   Sit tall, with feet shoulder width apart and feet in front of knees.   Belly pulled in.   Place the band around your upper back, grasp band in each hand, knuckles (rings) facing front. Arms  should be bent so that knuckles are in front of elbows   Slide shoulder blades down your back and slightly together (as if making a V).   Relax your neck.     To Perform This Exercise:   Press hands forward to lengthen arms using chest muscles (not arms!).   Dont arch your back!)   Return to start position and repeat 10 times.   Breathe!           BACK EXERCISE          Start Position:   Sit tall, with feet shoulder width apart and feet in front of knees.   Belly pulled in.   Grasp band in each hand and raise overhead. Arms should be slightly wider than shoulder width and no  slack in the band.   Slide shoulder blades down your back and slightly together (as if making a V).   Relax your neck.     To Perform This Exercise:   Open arms pulling down towards chest using upper back muscles (not arms!).   Squeeze through shoulder blades at the bottom of the movement  (dont arch your back!).   Return to start position and repeat 10 times.   Breathe!           SHOULDER EXERCISE          Start Position:   Sit tall, with feet shoulder width apart and feet in front of knees.   Belly pulled in.   Sit on band so that you can grasp band in one hand with tension on the band, but not so much tension that  you cannot straighten the arm. It may take a few tries to find the right amount of tension.   Slide shoulder blades down your back and slightly together (as if making a V).   Relax your neck.     To Perform This Exercise:   Press fist to the ceiling, slightly in front of the body.   SLOWLY return to start position and repeat 10 times.   Switch sides and repeat on the other side.   Breathe!           TRICEPS EXERCISE          Start Position:   Sit tall, with feet shoulder width apart and feet in front of knees.   Belly pulled in.   Sit on one end of the band. Grasp other end of band in one hand.   Point elbow directly toward the ceiling (if this is difficult, you may support the upper arm with the opposite  hand)   Be sure there is no slack in the band in the starting position.   Slide shoulder blades down your back and slightly together (as if making a V).   Relax your neck.     To Perform This Exercise:   Extend your arm up to the ceiling, as shown.   Squeeze through shoulder blades at the bottom of the movement (dont arch your back!).   SLOWLY return to start position and repeat 10 times.   Repeat on the other side.   Breathe!           BICEP EXERCISE          Start Position:   Sit tall, with feet shoulder width apart and feet in front of knees.   Belly pulled in.   Place center of exercise band under one foot and step the end of the band under the other foot.   Grasp each end of the band with one hand. Make sure there is no slack between your foot and the hand  that holds the band.   Hold your elbows to your sides.   Pull abdominals in, lift chest, press shoulders down and  back.     To Perform This Exercise:   As you curl up, keep your wrist from changing position in relation to your forearm and your arm stable  from the shoulder to the elbow.   Bend and straighten your elbow in a slow and controlled movement. Repeat this motion 10 times.   Repeat on the other side.   Breathe!

## 2024-12-03 ENCOUNTER — OFFICE VISIT (OUTPATIENT)
Dept: OBSTETRICS AND GYNECOLOGY | Facility: CLINIC | Age: 54
End: 2024-12-03
Payer: MEDICARE

## 2024-12-03 ENCOUNTER — OFFICE VISIT (OUTPATIENT)
Dept: PSYCHIATRY | Facility: CLINIC | Age: 54
End: 2024-12-03
Payer: MEDICARE

## 2024-12-03 VITALS — SYSTOLIC BLOOD PRESSURE: 134 MMHG | BODY MASS INDEX: 47.33 KG/M2 | DIASTOLIC BLOOD PRESSURE: 86 MMHG | WEIGHT: 293 LBS

## 2024-12-03 VITALS
HEART RATE: 68 BPM | DIASTOLIC BLOOD PRESSURE: 71 MMHG | WEIGHT: 293 LBS | BODY MASS INDEX: 47.89 KG/M2 | SYSTOLIC BLOOD PRESSURE: 147 MMHG

## 2024-12-03 DIAGNOSIS — F31.9 BIPOLAR 1 DISORDER: ICD-10-CM

## 2024-12-03 DIAGNOSIS — N95.1 HOT FLASHES DUE TO MENOPAUSE: Primary | ICD-10-CM

## 2024-12-03 DIAGNOSIS — F41.9 ANXIETY DISORDER, UNSPECIFIED TYPE: ICD-10-CM

## 2024-12-03 PROCEDURE — 99999 PR PBB SHADOW E&M-EST. PATIENT-LVL II: CPT | Mod: PBBFAC,,, | Performed by: NURSE PRACTITIONER

## 2024-12-03 PROCEDURE — 3044F HG A1C LEVEL LT 7.0%: CPT | Mod: CPTII,S$GLB,, | Performed by: PHYSICIAN ASSISTANT

## 2024-12-03 PROCEDURE — 99999 PR PBB SHADOW E&M-EST. PATIENT-LVL III: CPT | Mod: PBBFAC,,, | Performed by: PHYSICIAN ASSISTANT

## 2024-12-03 PROCEDURE — 3078F DIAST BP <80 MM HG: CPT | Mod: CPTII,S$GLB,, | Performed by: NURSE PRACTITIONER

## 2024-12-03 PROCEDURE — 3008F BODY MASS INDEX DOCD: CPT | Mod: CPTII,S$GLB,, | Performed by: NURSE PRACTITIONER

## 2024-12-03 PROCEDURE — 3008F BODY MASS INDEX DOCD: CPT | Mod: CPTII,S$GLB,, | Performed by: PHYSICIAN ASSISTANT

## 2024-12-03 PROCEDURE — 99214 OFFICE O/P EST MOD 30 MIN: CPT | Mod: S$GLB,,, | Performed by: NURSE PRACTITIONER

## 2024-12-03 PROCEDURE — 99213 OFFICE O/P EST LOW 20 MIN: CPT | Mod: S$GLB,,, | Performed by: PHYSICIAN ASSISTANT

## 2024-12-03 PROCEDURE — 3044F HG A1C LEVEL LT 7.0%: CPT | Mod: CPTII,S$GLB,, | Performed by: NURSE PRACTITIONER

## 2024-12-03 PROCEDURE — 3079F DIAST BP 80-89 MM HG: CPT | Mod: CPTII,S$GLB,, | Performed by: PHYSICIAN ASSISTANT

## 2024-12-03 PROCEDURE — G2211 COMPLEX E/M VISIT ADD ON: HCPCS | Mod: S$GLB,,, | Performed by: NURSE PRACTITIONER

## 2024-12-03 PROCEDURE — 3075F SYST BP GE 130 - 139MM HG: CPT | Mod: CPTII,S$GLB,, | Performed by: PHYSICIAN ASSISTANT

## 2024-12-03 PROCEDURE — 1159F MED LIST DOCD IN RCRD: CPT | Mod: CPTII,S$GLB,, | Performed by: PHYSICIAN ASSISTANT

## 2024-12-03 PROCEDURE — 3077F SYST BP >= 140 MM HG: CPT | Mod: CPTII,S$GLB,, | Performed by: NURSE PRACTITIONER

## 2024-12-03 RX ORDER — BUPROPION HYDROCHLORIDE 100 MG/1
TABLET, EXTENDED RELEASE ORAL
Qty: 270 TABLET | Refills: 1 | Status: SHIPPED | OUTPATIENT
Start: 2024-12-03

## 2024-12-03 RX ORDER — ESTRADIOL 1 MG/1
1 TABLET ORAL DAILY
Qty: 90 TABLET | Refills: 3 | Status: SHIPPED | OUTPATIENT
Start: 2024-12-03 | End: 2025-12-03

## 2024-12-03 RX ORDER — LITHIUM CARBONATE 300 MG/1
300 CAPSULE ORAL
Qty: 270 CAPSULE | Refills: 1 | Status: SHIPPED | OUTPATIENT
Start: 2024-12-03 | End: 2025-03-03

## 2024-12-03 RX ORDER — SERTRALINE HYDROCHLORIDE 100 MG/1
200 TABLET, FILM COATED ORAL DAILY
Qty: 180 TABLET | Refills: 1 | Status: SHIPPED | OUTPATIENT
Start: 2024-12-03

## 2024-12-03 RX ORDER — LORAZEPAM 0.5 MG/1
TABLET ORAL
Qty: 60 TABLET | Refills: 2 | Status: SHIPPED | OUTPATIENT
Start: 2024-12-17

## 2024-12-03 NOTE — PROGRESS NOTES
Subjective     Patient ID: Zaira Dowell is a 54 y.o. female.    Chief Complaint:  No chief complaint on file.      History of Present Illness  HPI  Hormone Replacement Counseling  Patient presents to discuss hormone replacement therapy. Patient is requesting hormone replacement therapy due to hot flashes, moodiness, and vaginal dryness. The patient is taking hormone replacement therapy. Patient denies post-menopausal vaginal bleeding.  Patient is hormone deficient due to menopause and hysterectomy. The patient currently has symptoms of hot flashes, moodiness, vaginal dryness. States hot flashes have improved since starting HRT, not completely resolved. Dryness and mood changes have not improved.     Current hormone therapy:   Estrogen: estrace 0.5mg     2024  Zaira Dowell is a 53 y.o. female,   presents today for her routine visit. She is s/p hysterectomy. Reports GYN complaints - LABIAL CYST. Reports hot flashes and mood changes in the last 6 months, some vaginal dryenss. Denies vaginal bleeding. The patient participates in regular exercise: no.  The patient does not smoke.  The patient wears seatbelts.   Pt denies any domestic violence.  yes Tattoos/blood transfusions.       GYN & OB History  No LMP recorded (lmp unknown). Patient has had a hysterectomy.   Date of Last Pap: No result found    OB History    Para Term  AB Living   1 1 1         SAB IAB Ectopic Multiple Live Births                  # Outcome Date GA Lbr Joe/2nd Weight Sex Type Anes PTL Lv   1 Term 1993    M CS-Unspec          Review of Systems  Review of Systems   Constitutional:  Negative for fever.   Respiratory:  Negative for shortness of breath.    Cardiovascular:  Negative for chest pain and leg swelling.   Gastrointestinal:  Negative for abdominal pain.   Endocrine: Positive for hot flashes.   Genitourinary:  Positive for vaginal dryness. Negative for menstrual problem.   Musculoskeletal:  Negative for back  pain.   Integumentary:  Negative for acne.   Neurological:  Negative for headaches.   Psychiatric/Behavioral:  Negative for depression. The patient is nervous/anxious.           Objective   Physical Exam:   Constitutional: She appears well-developed and well-nourished.    HENT:   Head: Normocephalic and atraumatic.    Eyes: Pupils are equal, round, and reactive to light. EOM and lids are normal. Lids are everted and swept, no foreign bodies found.      Pulmonary/Chest: Effort normal.        Abdominal: Flat.             Musculoskeletal: Normal range of motion.       Neurological: She is alert.    Skin: Skin is warm.    Psychiatric: She has a normal mood and affect. Her behavior is normal. Judgment and thought content normal.            Assessment and Plan     1. Hot flashes due to menopause            Plan:  Increase from 0.5 to 1mg, return in 3 months if no improvement.   Discussed other reasons mood changes may be occurring, if hot flashes are resolved with new dose, may follow up with psychiatrist regarding mood changes.   Kimberley Sherman PA-C

## 2024-12-03 NOTE — PROGRESS NOTES
"  Outpatient Psychiatry Follow-Up Visit (MD/NP)    12/3/2024   Clinical Status of Patient:  Outpatient (Ambulatory)    The [place] location is: [place]    Notes:     Chief Complaint:  Zaira Dowell is a 54 y.o. female who presents today for follow-up of mood disorder.  Met with patient and no relatives present for interview .      Interval History and Content of Current Session:  Interim Events/Subjective Report/Content of Current Session:  Patient presented for in-person follow-up today.  She stated her mood is "okay" and her affect was appropriate. She states she has been stable and doing okay since her last visit.  She stated she is grieving the loss of her father and feeling sad because this is the 1st holiday season without her father who passed away December 10, 2023.  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 Patient stated she still enjoys seeing her grandchildren in [place]. Patient is in good self control. Patient is able to stay positive however in the face of the family stresses. Patent stated she has no thoughts of harming herself and no signs of lida, hypomania, or psychosis. Provided patient with supportive therapy and skills to better manage her grief. She is not in therapy and we discussed starting therapy.     We discussed the importance to continual monitoring of patient's labs and lithium level and she agreed. Reviewed all patient labs and lithium level and discussed the importance of getting regular labs and patient verbalized understanding. Patient is also still dealing with her discomfort related to fibromyalgia and encouraged following up with her PCP and rheumatologists. She denied SI/HI/AVH. She denied suicide ideation and no suicide plan or intent.  Patient denied previous suicide attempts.  She stated her  has hunting guns. She reported managed depression and anxiety symptoms with her current medication regimen. She reported improved sleep with her " CPAP and gets total 8 hours of sleep gets up and goes back to sleep at night, and some times she sleeps at day time. She stated this is her sleep pattern for many years but has improved with her CPAP recently.     Patient stated she gained weight and wants to be tapered off Risperdal 0.5 mg p.o. b.i.d. She stated she has been stable and believes this is the right time to be tapered off Risperdal.  She stated she wants to be off Risperdal completely and see how she feels before she makes decision in regards to starting Vraylar. Provided patient with the taper program for Risperdal 0.5 mg p.o. b.i.d. Patient agreed to contact us immediately if there is any changes in mood or any relapse of symptoms and/or go to the emergency department if she ever has thoughts of harming or hurting herself/others or if she is experiencing lida like symptoms.    She denied lida or hypomania like symptoms at this time.     Contracted for safety and non suicide plan (contact family, suicide hotline, call 911 or go the nearest emergency room)    Psychiatric Review Of Systems - Is patient experiencing or having changes in:  sleep: yes, better sleep with CPAP  appetite: no  weight: normal  energy/anergy: normal  interest/pleasure/anhedonia: no  somatic symptoms: no  anxiety/panic: yes but managed  guilty/hopelessness: no  concentration: no  S.I.B.s/risky behavior: no  Irritability: no  Racing thoughts: yes due to worry but managed  Impulsive behaviors: no  Paranoia:no  AVH:no  Suicidal thoughts/plan/intent: no  SE: no  ETOH abuse: no  Drugs abuse: no      Psychotherapy:  Target symptoms: anxiety , mood disorder  Why chosen therapy is appropriate versus another modality: relevant to diagnosis, patient responds to this modality, evidence based practice  Outcome monitoring methods: self-report  Therapeutic intervention type: supportive psychotherapy  Topics discussed/themes:  mood concerns, relationships difficulties, illness/death of a  "loved one  The patient's response to the intervention is accepting. The patient's progress toward treatment goals is good.   Duration of intervention: 15 minutes.    Review of Systems   PSYCHIATRIC: Pertinant items are noted in the narrative.    Past Medical, Family and Social History: The patient's past medical, family and social history have been reviewed and updated as appropriate within the electronic medical record - see encounter notes.    Compliance: yes    Side effects: None Patient also reported a lithium tremor. Patient has no signs of TD at this time.    Risk Parameters:  Patient reports no suicidal ideation  Patient reports no homicidal ideation  Patient reports no self-injurious behavior  Patient reports no violent behavior    Exam (detailed: at least 9 elements; comprehensive: all 15 elements)   Constitutional  Vitals:  Most recent vital signs, dated less than 90 days prior to this appointment, were reviewed.   Vitals:    12/03/24 0858   BP: (!) 147/71   Pulse: 68   Weight: 134.6 kg (296 lb 11.8 oz)      Patient reports stable vital signs     General:  age appropriate, casually dressed     Musculoskeletal  Muscle Strength/Tone:  not examined   Gait & Station:  Normal     Psychiatric  Speech:  no latency; no press, spontaneous   Mood & Affect:  "okay"  Appropriate   Thought Process:  normal and logical   Associations:  intact   Thought Content:  normal, no suicidality, no homicidality, delusions, or paranoia   Insight:  has awareness of illness   Judgement: behavior is adequate to circumstances   Orientation:  grossly intact   Memory: intact for content of interview   Language: grossly intact, able to name, able to repeat   Attention Span & Concentration:  able to focus   Fund of Knowledge:  intact and appropriate to age and level of education     Assessment and Diagnosis   Status/Progress: Based on the examination today, the patient's problem(s) is/are adequately but not ideally controlled.  New " problems have not been presented today.    no new obstacles presented.  are not complicating management of the primary condition.  The working differential for this patient includes Bipolar Disorder.     General Impression: Patient is doing as well as possible in coping with her family stresses and her own medical concerns as well. Patient does have things to look forward to in her life and enjoys her grandchildren and also has a supportive marriage. Patient remains motivated to do as well as possible and is pleased with her progress as well. Patient is not a danger to self or others at this time. Patient has a stable mood at this time overall.  Patient requested keeping the same dosages of medications due to their effectiveness in stabilizing her mood and improving her symptoms.  We discussed changing Risperdal to Vraylar but patient is reluctant to switch due to effectiveness of Risperdal over the years and believes the benefits of taking Risperdal 0.5 mg po BID outweigh the risks.  She stated she will research Vraylar and think about it but is not ready to make the change now. 8/6/2024 stable on her current medication regimen. We discussed medication alternatives to Risperdal like vraylar and she will think about it. In the meant time, patient requested keeping the same dosages of her medications due to their effectiveness in treating her symptoms. 12/3/2024 stable and doing well.  And doing well she requested being tapered off Risperdal 0.5 mg p.o. b.i.d. due to concerns of weight gain.  She is aware of the risks and benefits of taking and discontinuing Risperdal.  Patient is also taking lithium which has been effective in mood stabilization.  Patient is given instructions about tapering Risperdal and encouraged patient to contact us immediately if any changes in mood or relapse of symptoms and she verbalized understanding.  She is also aware to go to the emergency department if she is ever experiencing manic  "symptoms or if she has any thoughts of harming herself or others.      ICD-10-CM ICD-9-CM   1. Bipolar 1 disorder  F31.9 296.7   2. Anxiety disorder, unspecified type  F41.9 300.00             Intervention/Counseling/Treatment Plan   Medication Management: The risks and benefits of medication were discussed with the patient.   Patient agrees to continue   Continue Zoloft 200 mg daily for anxiety and depressed mood  Agreed with this taper plan for risperdal: Decrease to 1/2 tab of Risperdal 0.5 mg po am and one tab po qhs for 2 weeks, then decrease to Risperdal 0.25 p.o. b.i.d. for 2 more weeks, then decrease to Risperdal 0.25 q.h.s. for 2 more weeks and then discontinue.  This is for treating patient's bipolar disorder. Has been taking it for 9 years. Discussed in the future discontinuing Risperdal and staring Vraylar.  Patient wants to be off Risperdal 1st and then evaluate for the need to starting Vraylar  I discussed with the patient the risks of Extrapyramidal Side Effects (dystonia, akathisia, parkinsonism, tardive dyskinesia), Metabolic syndrome (including Hyperglycemia, hyperlipidemia. Patient is aware of the most common and rare side effects including the possibility of irreversible abnormal movement disorder from taking Risperdal or Vraylar.  Continue lithium 300mg  (takes one tab po qam and 2 tabs po qhs). She stated she has been taking it since 2013-14 and finds it very helpful. She stated, "If I missed one of medicine I get agitated or irritable. They are all work really well together".  She has been taking lithium 9 yeas.  AIMs = 0   Continue ativan and takes 1/2 tab of Ativan 0.25 mg am and 0.5 mg in pm for anxiety   Informed pt of the risks of continuous Benzodiazepine use including tolerance, dependence, dizziness, drowsiness, memory problems, and withdrawals that may be life threatening upon abrupt cessation. Also advised not to take Benzodiazepines with Opiates or other sedatives and also not to " drive or operate heavy machinery while using Benzodiazepines. Avoid alcohol with Benzodiazapine. Patient agreed to take it and verbalized understanding  Continue Wellbutrin  mg q am and discontinued wellbutrin 100 mg daily at noon for depression  Reviewed patient's recent labs from October 9, 2024. Reviewed new lithium level and Lithium level was 0.6 on 8/8/2024. Ordered EKG and lithium level and reminded patient to get them done.  Patient is aware of all the most common and rare side effects that could occur from continual lithium use including thyroid problems, kidney problems, and lithium toxicity, etc  -Discussed informed consent, diagnosis, risks and benefits of proposed treatment above vs alternative treatments vs no treatment, and potential side effects of these treatments. The patient expresses understanding of the above and displays the capacity to agree with this treatment given said understanding. Patient also agrees that, currently, the benefits outweigh the risks and would like to pursue treatment at this time. Answered all questions and discussed follow up. Encouraged patient to contact us with any questions or concerns.   Contracted for safety and non suicide plan (contact family, suicide hotline, call 911 or go the nearest emergency room)  -Encouraged Patient to keep future appointments.  -Take medications as prescribed and abstain from any substance use  -Patient to present to Emergency department for thoughts to harm herself or others      Return to Clinic: 1 month or earlier as needed      MICHELE Phan-BC

## 2025-01-06 ENCOUNTER — TELEPHONE (OUTPATIENT)
Dept: PSYCHIATRY | Facility: CLINIC | Age: 55
End: 2025-01-06

## 2025-01-06 ENCOUNTER — OFFICE VISIT (OUTPATIENT)
Dept: PSYCHIATRY | Facility: CLINIC | Age: 55
End: 2025-01-06

## 2025-01-06 DIAGNOSIS — F41.1 GAD (GENERALIZED ANXIETY DISORDER): ICD-10-CM

## 2025-01-06 DIAGNOSIS — F31.9 BIPOLAR 1 DISORDER: Primary | ICD-10-CM

## 2025-01-06 RX ORDER — CARIPRAZINE 1.5 MG/1
1.5 CAPSULE, GELATIN COATED ORAL DAILY
Qty: 30 CAPSULE | Refills: 1 | Status: SHIPPED | OUTPATIENT
Start: 2025-01-06 | End: 2025-02-06

## 2025-01-06 NOTE — PROGRESS NOTES
"  Outpatient Psychiatry Follow-Up Visit (MD/NP)    1/6/2025   Clinical Status of Patient:  Outpatient (Ambulatory)    The patient location is: Home [place]    The chief complaint leading to consultation is: mood disorder an anxiety    Visit type: audiovisual    Face to Face time with patient: 25 minutes  30 minutes of total time spent on the encounter, which includes face to face time and non-face to face time preparing to see the patient (eg, review of tests), Obtaining and/or reviewing separately obtained history, Documenting clinical information in the electronic or other health record, Independently interpreting results (not separately reported) and communicating results to the patient/family/caregiver, or Care coordination (not separately reported).         Each patient to whom he or she provides medical services by telemedicine is:  (1) informed of the relationship between the physician and patient and the respective role of any other health care provider with respect to management of the patient; and (2) notified that he or she may decline to receive medical services by telemedicine and may withdraw from such care at any time.    Notes:       Notes:     Chief Complaint:  Zaira Dowell is a 54 y.o. female who presents today for follow-up of mood disorder.  Met with patient and no relatives present for interview .      Interval History and Content of Current Session:  Interim Events/Subjective Report/Content of Current Session:  Patient presented for follow-up today. She stated she changed her insurance and wants her prescriptions to go to "[facility] pharmacy. She stated her mood is "fine" and still stable and her affect was appropriate. She has been doing well since her last visit.  She stated she is moving through the grieving process of losing her father who passed away on December 10, 2023.  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 Patient stated she still enjoys seeing her grandchildren in " [place]. Patient is in good self control. Patient is able to stay positive however in the face of the family stresses and life challenges. Patent stated she has no thoughts of harming herself and no signs of lida, hypomania, or psychosis. Provided patient with supportive therapy and effective coping skills. She is not in therapy and we discussed starting therapy.     We discussed the importance to continual monitoring of patient's labs and lithium level and she agreed. Reviewed all patient labs and lithium level and discussed the importance of getting regular labs and patient verbalized understanding. Patient is also still dealing with her discomfort related to fibromyalgia and encouraged following up with her PCP and rheumatologists. She denied SI/HI/AVH. She denied suicide ideation and no suicide plan or intent.  Patient denied previous suicide attempts. She stated her  has hunting guns. She reported managed depression and anxiety symptoms with her current medication regimen. She reported improved sleep with her CPAP and gets total 8 hours of sleep gets up and goes back to sleep at night, and some times she sleeps at day time. She stated this is her sleep pattern for many years but has improved with her CPAP recently.     Patient stated she wants to continue being tapered off Risperdal 0.25 mg p.o. b.i.d due to weight gain and concerns of metabolic syndrome.  She stated she has been stable and believes this is the right time to be tapered off Risperdal.  She reported no changes in mood with this taper. She reported weight loss of 3 lbs. Provided patient with the taper program and agreed to decreasing Risperdal 0.25 mg po nightly for 1-2 weeks and then discontinue and cross taper with vraylar 1.5 mg po daily. Patient agreed to contact us immediately if there is any changes in mood or any relapse of symptoms and/or go to the emergency department if she ever has thoughts of harming or hurting  herself/others or if she is experiencing lida like symptoms.    She reported managed anxiety symptoms of worry, nervousness, and restlessness with ativan.     She denied lida or hypomania like symptoms at this time.     Contracted for safety and non suicide plan (contact family, suicide hotline, call 911 or go the nearest emergency room)    Encouraged patient to avoid vaping to improve sleep and overall medical health.     Psychiatric Review Of Systems - Is patient experiencing or having changes in:  sleep: yes, better sleep with CPAP  appetite: no  weight: lost 3 lbs  energy/anergy: normal  interest/pleasure/anhedonia: no  somatic symptoms: no  anxiety/panic: yes but managed  guilty/hopelessness: no  concentration: no  S.I.B.s/risky behavior: no  Irritability: no  Racing thoughts: yes due to worry but managed  Impulsive behaviors: no  Paranoia:no  AVH:no  Suicidal thoughts/plan/intent: no  SE: no  ETOH abuse: no  Drugs abuse: no      Psychotherapy:  Target symptoms: anxiety , mood disorder  Why chosen therapy is appropriate versus another modality: relevant to diagnosis, patient responds to this modality, evidence based practice  Outcome monitoring methods: self-report  Therapeutic intervention type: supportive psychotherapy  Topics discussed/themes:  mood concerns, relationships difficulties, illness/death of a loved one  The patient's response to the intervention is accepting. The patient's progress toward treatment goals is good.   Duration of intervention: 15 minutes.    Review of Systems   PSYCHIATRIC: Pertinant items are noted in the narrative.    Past Medical, Family and Social History: The patient's past medical, family and social history have been reviewed and updated as appropriate within the electronic medical record - see encounter notes.    Compliance: yes    Side effects: None Patient also reported a lithium tremor. Patient has no signs of TD at this time.    Risk Parameters:  Patient reports no  "suicidal ideation  Patient reports no homicidal ideation  Patient reports no self-injurious behavior  Patient reports no violent behavior    Exam (detailed: at least 9 elements; comprehensive: all 15 elements)   Constitutional  Vitals:  Most recent vital signs, dated less than 90 days prior to this appointment, were reviewed.   There were no vitals filed for this visit.     Patient reports stable vital signs     General:  age appropriate, casually dressed     Musculoskeletal  Muscle Strength/Tone:  not examined   Gait & Station:  Normal     Psychiatric  Speech:  no latency; no press, spontaneous   Mood & Affect:  "fine"  Appropriate   Thought Process:  normal and logical   Associations:  intact   Thought Content:  normal, no suicidality, no homicidality, delusions, or paranoia   Insight:  has awareness of illness   Judgement: behavior is adequate to circumstances   Orientation:  grossly intact   Memory: intact for content of interview   Language: grossly intact, able to name, able to repeat   Attention Span & Concentration:  able to focus   Fund of Knowledge:  intact and appropriate to age and level of education     Assessment and Diagnosis   Status/Progress: Based on the examination today, the patient's problem(s) is/are adequately but not ideally controlled.  New problems have not been presented today.    no new obstacles presented.  are not complicating management of the primary condition.  The working differential for this patient includes Bipolar Disorder.     General Impression: Patient is doing as well as possible in coping with her family stresses and her own medical concerns as well. Patient does have things to look forward to in her life and enjoys her grandchildren and also has a supportive marriage. Patient remains motivated to do as well as possible and is pleased with her progress as well. Patient is not a danger to self or others at this time. Patient has a stable mood at this time overall.  Patient " requested keeping the same dosages of medications due to their effectiveness in stabilizing her mood and improving her symptoms.  We discussed changing Risperdal to Vraylar but patient is reluctant to switch due to effectiveness of Risperdal over the years and believes the benefits of taking Risperdal 0.5 mg po BID outweigh the risks.  She stated she will research Vraylar and think about it but is not ready to make the change now. 8/6/2024 stable on her current medication regimen. We discussed medication alternatives to Risperdal like vraylar and she will think about it. In the meant time, patient requested keeping the same dosages of her medications due to their effectiveness in treating her symptoms. 12/3/2024 stable and doing well.  And doing well she requested being tapered off Risperdal 0.5 mg p.o. b.i.d. due to concerns of weight gain.  She is aware of the risks and benefits of taking and discontinuing Risperdal.  Patient is also taking lithium which has been effective in mood stabilization.  Patient is given instructions about tapering Risperdal and encouraged patient to contact us immediately if any changes in mood or relapse of symptoms and she verbalized understanding.  She is also aware to go to the emergency department if she is ever experiencing manic symptoms or if she has any thoughts of harming herself or others. 1/6/25 stable and doing well. Patient wants to be tapered off Risperdal and agreed to decrease Risperdal to 0.25 mg po qhs for 1-2 weeks and cross taper with vraylar 1.5 mg po daily. She requested refills to the same dosages of her other medications, lithium 300 mg po am and 2 tabs po qhs, zoloft 200 mg po daily, Wellbutrin sr 200 mg po daily, and ativan (takes 1/2 tab of Ativan 0.25 mg am and 0.5 mg in pm for anxiety).      ICD-10-CM ICD-9-CM   1. Bipolar 1 disorder  F31.9 296.7   2. CINDY (generalized anxiety disorder)  F41.1 300.02           Intervention/Counseling/Treatment Plan  "  Medication Management: The risks and benefits of medication were discussed with the patient.   Patient agrees to continue   Continue Zoloft 200 mg daily for anxiety and depressed mood  Wants to continue the taper plan to be completely off Risperdal: Decrease Risperdal to 0.25 mg po qhs for 1-2 weeks then discontinue it. This is for treating patient's bipolar disorder. Has been taking it for 9 years.   Cross taper by starting vraylar 1.5 mg po daily for bipolar disorder. She wants medicine that does not cause weight gain or metabolic disorder.  I discussed with the patient the risks of Extrapyramidal Side Effects (dystonia, akathisia, parkinsonism, tardive dyskinesia), Metabolic syndrome (including Hyperglycemia, hyperlipidemia. Patient is aware of the most common and rare side effects including the possibility of irreversible abnormal movement disorder from taking Risperdal or Vraylar.  Continue lithium 300mg  (takes one tab po qam and 2 tabs po qhs). She stated she has been taking it since 2013-14 and finds it very helpful. She stated, "If I missed one of medicine I get agitated or irritable. They are all work really well together".  She has been taking lithium 9 yeas.  AIMs = 0   Continue ativan and takes 1/2 tab of Ativan 0.25 mg am and 0.5 mg in pm for anxiety   Informed pt of the risks of continuous Benzodiazepine use including tolerance, dependence, dizziness, drowsiness, memory problems, and withdrawals that may be life threatening upon abrupt cessation. Also advised not to take Benzodiazepines with Opiates or other sedatives and also not to drive or operate heavy machinery while using Benzodiazepines. Avoid alcohol with Benzodiazapine. Patient agreed to take it and verbalized understanding  Continue Wellbutrin  mg q am and discontinued wellbutrin 100 mg daily at noon for depression  Reviewed patient's recent labs from October 9, 2024. Reviewed new lithium level and Lithium level was 0.6 on 8/8/2024. " Ordered EKG and lithium level and reminded patient to get them done.  Patient is aware of all the most common and rare side effects that could occur from continual lithium use including thyroid problems, kidney problems, and lithium toxicity, etc  -Discussed informed consent, diagnosis, risks and benefits of proposed treatment above vs alternative treatments vs no treatment, and potential side effects of these treatments. The patient expresses understanding of the above and displays the capacity to agree with this treatment given said understanding. Patient also agrees that, currently, the benefits outweigh the risks and would like to pursue treatment at this time. Answered all questions and discussed follow up. Encouraged patient to contact us with any questions or concerns.   Contracted for safety and non suicide plan (contact family, suicide hotline, call 911 or go the nearest emergency room)  -Encouraged Patient to keep future appointments.  -Take medications as prescribed and abstain from any substance use  -Patient to present to Emergency department for thoughts to harm herself or others      Return to Clinic: 2 weeks or earlier as needed      MICHELE Phan-BC
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
## 2025-01-08 ENCOUNTER — PATIENT MESSAGE (OUTPATIENT)
Dept: PSYCHIATRY | Facility: CLINIC | Age: 55
End: 2025-01-08
Payer: MEDICARE

## 2025-01-16 ENCOUNTER — TELEPHONE (OUTPATIENT)
Dept: UROLOGY | Facility: HOSPITAL | Age: 55
End: 2025-01-16
Payer: MEDICARE

## 2025-01-16 DIAGNOSIS — N39.41 URGENCY INCONTINENCE: ICD-10-CM

## 2025-01-16 RX ORDER — TOLTERODINE 4 MG/1
4 CAPSULE, EXTENDED RELEASE ORAL DAILY
Qty: 90 CAPSULE | Refills: 3 | Status: SHIPPED | OUTPATIENT
Start: 2025-01-16 | End: 2025-02-03

## 2025-01-16 NOTE — TELEPHONE ENCOUNTER
----- Message from Med Assistant [place] sent at 1/16/2025 12:21 PM CST -----  Type: RX Refill Request    Who Called:  Francoise with [facility]   Have you contacted your pharmacy:    Refill    RX Name and Strength:  tolterodine (DETROL LA) 4 MG 24 hr capsule  Preferred Pharmacy with phone number:      [facility] - [place], [place] - [address]  [address] [address]  Phone: [phone] Fax: [phone]     Local or Mail Order:    Would the patient rather a call back or a response via [facility]?    Best Call Back Number:  [phone]   Additional Information:    Thank you.

## 2025-01-27 ENCOUNTER — LAB VISIT (OUTPATIENT)
Dept: LAB | Facility: HOSPITAL | Age: 55
End: 2025-01-27
Attending: INTERNAL MEDICINE
Payer: MEDICARE

## 2025-01-27 DIAGNOSIS — Z00.00 HEALTHCARE MAINTENANCE: ICD-10-CM

## 2025-01-27 LAB
ANION GAP SERPL CALC-SCNC: 12 MMOL/L (ref 8–16)
BUN SERPL-MCNC: 11 MG/DL (ref 6–20)
CALCIUM SERPL-MCNC: 9.8 MG/DL (ref 8.7–10.5)
CHLORIDE SERPL-SCNC: 107 MMOL/L (ref 95–110)
CO2 SERPL-SCNC: 21 MMOL/L (ref 23–29)
CREAT SERPL-MCNC: 0.9 MG/DL (ref 0.5–1.4)
EST. GFR  (NO RACE VARIABLE): >60 ML/MIN/1.73 M^2
GLUCOSE SERPL-MCNC: 131 MG/DL (ref 70–110)
POTASSIUM SERPL-SCNC: 4 MMOL/L (ref 3.5–5.1)
SODIUM SERPL-SCNC: 140 MMOL/L (ref 136–145)

## 2025-01-27 PROCEDURE — 36415 COLL VENOUS BLD VENIPUNCTURE: CPT | Mod: HCNC,PN | Performed by: INTERNAL MEDICINE

## 2025-01-27 PROCEDURE — 80048 BASIC METABOLIC PNL TOTAL CA: CPT | Mod: HCNC | Performed by: INTERNAL MEDICINE

## 2025-01-29 ENCOUNTER — HOSPITAL ENCOUNTER (OUTPATIENT)
Dept: RADIOLOGY | Facility: HOSPITAL | Age: 55
Discharge: HOME | End: 2025-01-29
Attending: INTERNAL MEDICINE
Payer: MEDICARE

## 2025-01-29 DIAGNOSIS — T50.905A WEIGHT GAIN DUE TO MEDICATION: ICD-10-CM

## 2025-01-29 DIAGNOSIS — Z98.890 POST-OPERATIVE STATE: ICD-10-CM

## 2025-01-29 DIAGNOSIS — Z12.31 ENCOUNTER FOR SCREENING MAMMOGRAM FOR BREAST CANCER: ICD-10-CM

## 2025-01-29 DIAGNOSIS — Z00.00 HEALTHCARE MAINTENANCE: ICD-10-CM

## 2025-01-29 DIAGNOSIS — R63.5 WEIGHT GAIN DUE TO MEDICATION: ICD-10-CM

## 2025-01-29 DIAGNOSIS — G43.809 OTHER MIGRAINE WITHOUT STATUS MIGRAINOSUS, NOT INTRACTABLE: ICD-10-CM

## 2025-01-29 DIAGNOSIS — E66.813 CLASS 3 SEVERE OBESITY DUE TO EXCESS CALORIES WITH SERIOUS COMORBIDITY AND BODY MASS INDEX (BMI) OF 45.0 TO 49.9 IN ADULT: ICD-10-CM

## 2025-01-29 DIAGNOSIS — K21.9 GASTROESOPHAGEAL REFLUX DISEASE, UNSPECIFIED WHETHER ESOPHAGITIS PRESENT: ICD-10-CM

## 2025-01-29 DIAGNOSIS — E66.01 CLASS 3 SEVERE OBESITY DUE TO EXCESS CALORIES WITH SERIOUS COMORBIDITY AND BODY MASS INDEX (BMI) OF 45.0 TO 49.9 IN ADULT: ICD-10-CM

## 2025-01-29 DIAGNOSIS — M1A.00X0 IDIOPATHIC CHRONIC GOUT WITHOUT TOPHUS, UNSPECIFIED SITE: ICD-10-CM

## 2025-01-29 PROCEDURE — 77063 BREAST TOMOSYNTHESIS BI: CPT | Mod: 26,HCNC,, | Performed by: RADIOLOGY

## 2025-01-29 PROCEDURE — 77067 SCR MAMMO BI INCL CAD: CPT | Mod: TC,HCNC

## 2025-01-29 PROCEDURE — 77067 SCR MAMMO BI INCL CAD: CPT | Mod: 26,HCNC,, | Performed by: RADIOLOGY

## 2025-01-29 NOTE — TELEPHONE ENCOUNTER
Care Due:                  Date            Visit Type   Department     Provider  --------------------------------------------------------------------------------                                [facility]                              PRIMARY      MEDICINE/  Last Visit: 11-      CARE ([facility])   INTERNAL MED   Shay Dai                              [facility]                              PRIMARY      MEDICINE/  Next Visit: 03-      CARE ([facility])   INTERNAL MED   Shay Dai                                                            Last  Test          Frequency    Reason                     Performed    Due Date  --------------------------------------------------------------------------------    HBA1C.......  6 months...  metFORMIN................  02- 08-    Uric Acid...  12 months..  allopurinoL..............  02- 02-    [facility] Embedded Care Due Messages. Reference number: [id number].   1/29/2025 10:37:10 AM CST

## 2025-01-30 RX ORDER — TOPIRAMATE 200 MG/1
200 TABLET ORAL DAILY
Qty: 90 TABLET | Refills: 0 | Status: SHIPPED | OUTPATIENT
Start: 2025-01-30

## 2025-01-30 RX ORDER — ALLOPURINOL 100 MG/1
100 TABLET ORAL DAILY
Qty: 90 TABLET | Refills: 1 | Status: SHIPPED | OUTPATIENT
Start: 2025-01-30

## 2025-01-30 RX ORDER — OMEPRAZOLE 40 MG/1
CAPSULE, DELAYED RELEASE ORAL
Qty: 180 CAPSULE | Refills: 3 | Status: SHIPPED | OUTPATIENT
Start: 2025-01-30

## 2025-01-30 RX ORDER — METFORMIN HYDROCHLORIDE 500 MG/1
500 TABLET, EXTENDED RELEASE ORAL 2 TIMES DAILY WITH MEALS
Qty: 60 TABLET | Refills: 5 | Status: SHIPPED | OUTPATIENT
Start: 2025-01-30 | End: 2025-07-29

## 2025-02-01 ENCOUNTER — PATIENT MESSAGE (OUTPATIENT)
Dept: UROLOGY | Facility: CLINIC | Age: 55
End: 2025-02-01
Payer: MEDICARE

## 2025-02-01 ENCOUNTER — PATIENT MESSAGE (OUTPATIENT)
Dept: FAMILY MEDICINE | Facility: CLINIC | Age: 55
End: 2025-02-01
Payer: MEDICARE

## 2025-02-01 ENCOUNTER — PATIENT MESSAGE (OUTPATIENT)
Dept: PSYCHIATRY | Facility: CLINIC | Age: 55
End: 2025-02-01
Payer: MEDICARE

## 2025-02-01 DIAGNOSIS — F41.9 ANXIETY DISORDER, UNSPECIFIED TYPE: ICD-10-CM

## 2025-02-01 DIAGNOSIS — Z98.890 POST-OPERATIVE STATE: ICD-10-CM

## 2025-02-01 DIAGNOSIS — K21.9 GASTROESOPHAGEAL REFLUX DISEASE, UNSPECIFIED WHETHER ESOPHAGITIS PRESENT: ICD-10-CM

## 2025-02-01 DIAGNOSIS — G43.809 OTHER MIGRAINE WITHOUT STATUS MIGRAINOSUS, NOT INTRACTABLE: ICD-10-CM

## 2025-02-01 DIAGNOSIS — N39.41 URGENCY INCONTINENCE: ICD-10-CM

## 2025-02-01 DIAGNOSIS — F31.9 BIPOLAR 1 DISORDER: ICD-10-CM

## 2025-02-02 RX ORDER — LITHIUM CARBONATE 300 MG/1
300 CAPSULE ORAL
Qty: 270 CAPSULE | Refills: 1 | Status: SHIPPED | OUTPATIENT
Start: 2025-02-02 | End: 2025-05-03

## 2025-02-02 RX ORDER — BUPROPION HYDROCHLORIDE 100 MG/1
TABLET, EXTENDED RELEASE ORAL
Qty: 270 TABLET | Refills: 1 | Status: SHIPPED | OUTPATIENT
Start: 2025-02-02

## 2025-02-02 RX ORDER — RISPERIDONE 0.5 MG/1
0.5 TABLET ORAL NIGHTLY
Qty: 90 TABLET | Refills: 0 | Status: SHIPPED | OUTPATIENT
Start: 2025-02-02 | End: 2025-05-03

## 2025-02-02 RX ORDER — SERTRALINE HYDROCHLORIDE 100 MG/1
200 TABLET, FILM COATED ORAL DAILY
Qty: 180 TABLET | Refills: 1 | Status: SHIPPED | OUTPATIENT
Start: 2025-02-02

## 2025-02-02 RX ORDER — LORAZEPAM 0.5 MG/1
TABLET ORAL
Qty: 60 TABLET | Refills: 1 | Status: SHIPPED | OUTPATIENT
Start: 2025-02-02

## 2025-02-03 ENCOUNTER — TELEPHONE (OUTPATIENT)
Dept: OBSTETRICS AND GYNECOLOGY | Facility: CLINIC | Age: 55
End: 2025-02-03
Payer: MEDICARE

## 2025-02-03 RX ORDER — TOLTERODINE 4 MG/1
4 CAPSULE, EXTENDED RELEASE ORAL DAILY
Qty: 90 CAPSULE | Refills: 3 | Status: SHIPPED | OUTPATIENT
Start: 2025-02-03

## 2025-02-03 NOTE — TELEPHONE ENCOUNTER
"Pt call was return in regards to Rx's pt is requesting new Rx to be sent to new pharmacy.. Kimberley sent Rx"s  "

## 2025-02-04 RX ORDER — OMEPRAZOLE 40 MG/1
CAPSULE, DELAYED RELEASE ORAL
Qty: 180 CAPSULE | Refills: 3 | Status: SHIPPED | OUTPATIENT
Start: 2025-02-04

## 2025-02-04 RX ORDER — TOPIRAMATE 200 MG/1
200 TABLET ORAL DAILY
Qty: 90 TABLET | Refills: 0 | Status: SHIPPED | OUTPATIENT
Start: 2025-02-04

## 2025-02-04 NOTE — TELEPHONE ENCOUNTER
No care due was identified.  Health [facility] Embedded Care Due Messages. Reference number: [id number].   2/04/2025 10:11:05 AM CST

## 2025-02-07 ENCOUNTER — LAB VISIT (OUTPATIENT)
Dept: LAB | Facility: HOSPITAL | Age: 55
End: 2025-02-07
Attending: INTERNAL MEDICINE
Payer: MEDICARE

## 2025-02-07 DIAGNOSIS — Z86.39 H/O: OBESITY: ICD-10-CM

## 2025-02-07 DIAGNOSIS — E66.01 CLASS 3 SEVERE OBESITY DUE TO EXCESS CALORIES WITH SERIOUS COMORBIDITY AND BODY MASS INDEX (BMI) OF 45.0 TO 49.9 IN ADULT: ICD-10-CM

## 2025-02-07 DIAGNOSIS — T50.905A WEIGHT GAIN DUE TO MEDICATION: ICD-10-CM

## 2025-02-07 DIAGNOSIS — E66.813 CLASS 3 SEVERE OBESITY DUE TO EXCESS CALORIES WITH SERIOUS COMORBIDITY AND BODY MASS INDEX (BMI) OF 45.0 TO 49.9 IN ADULT: ICD-10-CM

## 2025-02-07 DIAGNOSIS — Z00.00 HEALTHCARE MAINTENANCE: ICD-10-CM

## 2025-02-07 DIAGNOSIS — R79.9 ABNORMAL FINDING OF BLOOD CHEMISTRY, UNSPECIFIED: ICD-10-CM

## 2025-02-07 DIAGNOSIS — R63.5 WEIGHT GAIN DUE TO MEDICATION: ICD-10-CM

## 2025-02-07 LAB
ALBUMIN SERPL BCP-MCNC: 3.8 G/DL (ref 3.5–5.2)
ALP SERPL-CCNC: 64 U/L (ref 40–150)
ALT SERPL W/O P-5'-P-CCNC: 61 U/L (ref 10–44)
ANION GAP SERPL CALC-SCNC: 6 MMOL/L (ref 8–16)
ANION GAP SERPL CALC-SCNC: 6 MMOL/L (ref 8–16)
AST SERPL-CCNC: 28 U/L (ref 10–40)
BASOPHILS # BLD AUTO: 0.05 K/UL (ref 0–0.2)
BASOPHILS NFR BLD: 0.9 % (ref 0–1.9)
BILIRUB SERPL-MCNC: 0.4 MG/DL (ref 0.1–1)
BUN SERPL-MCNC: 12 MG/DL (ref 6–20)
BUN SERPL-MCNC: 12 MG/DL (ref 6–20)
CALCIUM SERPL-MCNC: 9.1 MG/DL (ref 8.7–10.5)
CALCIUM SERPL-MCNC: 9.1 MG/DL (ref 8.7–10.5)
CHLORIDE SERPL-SCNC: 113 MMOL/L (ref 95–110)
CHLORIDE SERPL-SCNC: 113 MMOL/L (ref 95–110)
CHOLEST SERPL-MCNC: 173 MG/DL (ref 120–199)
CHOLEST/HDLC SERPL: 2.4 {RATIO} (ref 2–5)
CO2 SERPL-SCNC: 19 MMOL/L (ref 23–29)
CO2 SERPL-SCNC: 19 MMOL/L (ref 23–29)
CREAT SERPL-MCNC: 0.8 MG/DL (ref 0.5–1.4)
CREAT SERPL-MCNC: 0.8 MG/DL (ref 0.5–1.4)
DIFFERENTIAL METHOD BLD: ABNORMAL
EOSINOPHIL # BLD AUTO: 0.2 K/UL (ref 0–0.5)
EOSINOPHIL NFR BLD: 3.2 % (ref 0–8)
ERYTHROCYTE [DISTWIDTH] IN BLOOD BY AUTOMATED COUNT: 12.6 % (ref 11.5–14.5)
EST. GFR  (NO RACE VARIABLE): >60 ML/MIN/1.73 M^2
EST. GFR  (NO RACE VARIABLE): >60 ML/MIN/1.73 M^2
GLUCOSE SERPL-MCNC: 83 MG/DL (ref 70–110)
GLUCOSE SERPL-MCNC: 83 MG/DL (ref 70–110)
HCT VFR BLD AUTO: 43 % (ref 37–48.5)
HDLC SERPL-MCNC: 72 MG/DL (ref 40–75)
HDLC SERPL: 41.6 % (ref 20–50)
HGB BLD-MCNC: 13.8 G/DL (ref 12–16)
IMM GRANULOCYTES # BLD AUTO: 0.02 K/UL (ref 0–0.04)
IMM GRANULOCYTES NFR BLD AUTO: 0.4 % (ref 0–0.5)
LDLC SERPL CALC-MCNC: 83 MG/DL (ref 63–159)
LYMPHOCYTES # BLD AUTO: 1.9 K/UL (ref 1–4.8)
LYMPHOCYTES NFR BLD: 33.1 % (ref 18–48)
MCH RBC QN AUTO: 31.3 PG (ref 27–31)
MCHC RBC AUTO-ENTMCNC: 32.1 G/DL (ref 32–36)
MCV RBC AUTO: 98 FL (ref 82–98)
MONOCYTES # BLD AUTO: 0.4 K/UL (ref 0.3–1)
MONOCYTES NFR BLD: 7.8 % (ref 4–15)
NEUTROPHILS # BLD AUTO: 3.1 K/UL (ref 1.8–7.7)
NEUTROPHILS NFR BLD: 54.6 % (ref 38–73)
NONHDLC SERPL-MCNC: 101 MG/DL
NRBC BLD-RTO: 0 /100 WBC
PLATELET # BLD AUTO: 334 K/UL (ref 150–450)
PMV BLD AUTO: 9.8 FL (ref 9.2–12.9)
POTASSIUM SERPL-SCNC: 4 MMOL/L (ref 3.5–5.1)
POTASSIUM SERPL-SCNC: 4 MMOL/L (ref 3.5–5.1)
PROT SERPL-MCNC: 7.1 G/DL (ref 6–8.4)
RBC # BLD AUTO: 4.41 M/UL (ref 4–5.4)
SODIUM SERPL-SCNC: 138 MMOL/L (ref 136–145)
SODIUM SERPL-SCNC: 138 MMOL/L (ref 136–145)
TRIGL SERPL-MCNC: 90 MG/DL (ref 30–150)
TSH SERPL DL<=0.005 MIU/L-ACNC: 1.59 UIU/ML (ref 0.4–4)
WBC # BLD AUTO: 5.65 K/UL (ref 3.9–12.7)

## 2025-02-07 PROCEDURE — 84443 ASSAY THYROID STIM HORMONE: CPT | Mod: HCNC | Performed by: INTERNAL MEDICINE

## 2025-02-07 PROCEDURE — 85025 COMPLETE CBC W/AUTO DIFF WBC: CPT | Mod: HCNC | Performed by: INTERNAL MEDICINE

## 2025-02-07 PROCEDURE — 80061 LIPID PANEL: CPT | Mod: HCNC | Performed by: INTERNAL MEDICINE

## 2025-02-07 PROCEDURE — 80053 COMPREHEN METABOLIC PANEL: CPT | Mod: HCNC | Performed by: INTERNAL MEDICINE

## 2025-02-07 PROCEDURE — 36415 COLL VENOUS BLD VENIPUNCTURE: CPT | Mod: HCNC,PN | Performed by: INTERNAL MEDICINE

## 2025-02-07 PROCEDURE — 83036 HEMOGLOBIN GLYCOSYLATED A1C: CPT | Mod: HCNC | Performed by: INTERNAL MEDICINE

## 2025-02-08 LAB
ESTIMATED AVG GLUCOSE: 97 MG/DL (ref 68–131)
HBA1C MFR BLD: 5 % (ref 4–5.6)

## 2025-02-10 ENCOUNTER — OFFICE VISIT (OUTPATIENT)
Dept: PSYCHIATRY | Facility: CLINIC | Age: 55
End: 2025-02-10
Payer: MEDICARE

## 2025-02-10 DIAGNOSIS — F41.1 GAD (GENERALIZED ANXIETY DISORDER): ICD-10-CM

## 2025-02-10 DIAGNOSIS — F31.9 BIPOLAR 1 DISORDER: Primary | ICD-10-CM

## 2025-02-10 NOTE — PROGRESS NOTES
"  Outpatient Psychiatry Follow-Up Visit (MD/NP)    2/10/2025   Clinical Status of Patient:  Outpatient (Ambulatory)    The patient location is: Home [place]    The chief complaint leading to consultation is: mood disorder an anxiety    Visit type: audiovisual    Face to Face time with patient: 15 minutes  20 minutes of total time spent on the encounter, which includes face to face time and non-face to face time preparing to see the patient (eg, review of tests), Obtaining and/or reviewing separately obtained history, Documenting clinical information in the electronic or other health record, Independently interpreting results (not separately reported) and communicating results to the patient/family/caregiver, or Care coordination (not separately reported).         Each patient to whom he or she provides medical services by telemedicine is:  (1) informed of the relationship between the physician and patient and the respective role of any other health care provider with respect to management of the patient; and (2) notified that he or she may decline to receive medical services by telemedicine and may withdraw from such care at any time.    Notes:       Chief Complaint:  Zaira Dowell is a 54 y.o. female who presents today for follow-up of mood disorder.  Met with patient and no relatives present for interview .      Interval History and Content of Current Session:  Interim Events/Subjective Report/Content of Current Session:  Patient presented for follow-up today. She stated she changed her insurance and wants her prescriptions to go to [facility]. She stated her mood is "good" and still stable and her affect was appropriate. She has been doing well since her last visit.  She stated she is moving through the grieving process of losing her father who passed away on December 10, 2023.  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 Patient stated she still enjoys seeing her grandchildren in [place]. " Patient is in good self control. Patient is able to stay positive however in the face of the family stresses and life challenges. Patent stated she has no thoughts of harming herself and no signs of lida, hypomania, or psychosis.     We discussed the importance to continual monitoring of patient's labs and lithium level and she agreed. Reviewed all patient labs and lithium level and discussed the importance of getting regular labs and patient verbalized understanding. Patient is also still dealing with her discomfort related to fibromyalgia and encouraged following up with her PCP and rheumatologists. She denied SI/HI/AVH. She denied suicide ideation and no suicide plan or intent.  Patient denied previous suicide attempts. She stated her  has hunting guns.     She reported managed depression and anxiety symptoms with her current medication regimen. She reported improved sleep with her CPAP and gets total 8 hours of sleep gets up and goes back to sleep at night, and some times she sleeps at day time. She stated this is her sleep pattern for many years but has improved with her CPAP recently.     Patient stated she is now taking 1/4 tab of Risperdal 0.5 mg p.o qhs for 2-3 weeks without any changes in mood and no lida, hypomania, or depressive episodes. She stated she wanted to be off Risperdal due to weight gain and concerns of metabolic syndrome. She stated she has been stable and believes this is the right time to discontinue 1/4 tab of Risperdal 0.5 mg po qhs. She reported weight loss of 7.2 lbs. Patient stated she was not able to fill vraylar 1.5 mg po daily due to cost. Patient stated her  didn't notice any changes inpatient's mood and believes the patient is doing well. Patient agreed to contact us immediately if there is any changes in mood or any relapse of symptoms and/or go to the emergency department if she ever has thoughts of harming or hurting herself/others or if she is experiencing  ilda like symptoms.    She reported managed anxiety symptoms of worry, nervousness, and restlessness. She stated she weaned herself off ativan and will use her current prescription only in case of emergency anxiety situations.     She denied lida or hypomania like symptoms at this time.     Contracted for safety and non suicide plan (contact family, suicide hotline, call 911 or go the nearest emergency room)    Encouraged patient to avoid vaping to improve sleep and overall medical health.     Psychiatric Review Of Systems - Is patient experiencing or having changes in:  sleep: yes, better sleep with CPAP  appetite: no  weight: yes lost 7.2 lbs  energy/anergy: normal  interest/pleasure/anhedonia: no  somatic symptoms: no  anxiety/panic: yes but managed  guilty/hopelessness: no  concentration: no  S.I.B.s/risky behavior: no  Irritability: no  Racing thoughts: yes due to worry but managed  Impulsive behaviors: no  Paranoia:no  AVH:no  Suicidal thoughts/plan/intent: no  SE: no  ETOH abuse: no  Drugs abuse: no      Psychotherapy:  Target symptoms: anxiety , mood disorder  Why chosen therapy is appropriate versus another modality: relevant to diagnosis, patient responds to this modality, evidence based practice  Outcome monitoring methods: self-report  Therapeutic intervention type: supportive psychotherapy  Topics discussed/themes:  mood concerns, relationships difficulties, illness/death of a loved one  The patient's response to the intervention is accepting. The patient's progress toward treatment goals is good.   Duration of intervention: 5 minutes.    Review of Systems   PSYCHIATRIC: Pertinant items are noted in the narrative.    Past Medical, Family and Social History: The patient's past medical, family and social history have been reviewed and updated as appropriate within the electronic medical record - see encounter notes.    Compliance: yes    Side effects: None Patient also reported a lithium tremor. Patient  "has no signs of TD at this time.    Risk Parameters:  Patient reports no suicidal ideation  Patient reports no homicidal ideation  Patient reports no self-injurious behavior  Patient reports no violent behavior    Exam (detailed: at least 9 elements; comprehensive: all 15 elements)   Constitutional  Vitals:  Most recent vital signs, dated less than 90 days prior to this appointment, were reviewed.   There were no vitals filed for this visit.     Patient reports stable vital signs     General:  age appropriate, casually dressed     Musculoskeletal  Muscle Strength/Tone:  not examined   Gait & Station:  Normal     Psychiatric  Speech:  no latency; no press, spontaneous   Mood & Affect:  "Good"  Appropriate   Thought Process:  normal and logical   Associations:  intact   Thought Content:  normal, no suicidality, no homicidality, delusions, or paranoia   Insight:  has awareness of illness   Judgement: behavior is adequate to circumstances   Orientation:  grossly intact   Memory: intact for content of interview   Language: grossly intact, able to name, able to repeat   Attention Span & Concentration:  able to focus   Fund of Knowledge:  intact and appropriate to age and level of education     Assessment and Diagnosis   Status/Progress: Based on the examination today, the patient's problem(s) is/are adequately but not ideally controlled.  New problems have not been presented today.    no new obstacles presented.  are not complicating management of the primary condition.  The working differential for this patient includes Bipolar Disorder.     General Impression: Patient is doing as well as possible in coping with her family stresses and her own medical concerns as well. Patient does have things to look forward to in her life and enjoys her grandchildren and also has a supportive marriage. Patient remains motivated to do as well as possible and is pleased with her progress as well. Patient is not a danger to self or others " at this time. Patient has a stable mood at this time overall.  Patient requested keeping the same dosages of medications due to their effectiveness in stabilizing her mood and improving her symptoms.  We discussed changing Risperdal to Vraylar but patient is reluctant to switch due to effectiveness of Risperdal over the years and believes the benefits of taking Risperdal 0.5 mg po BID outweigh the risks.  She stated she will research Vraylar and think about it but is not ready to make the change now. 8/6/2024 stable on her current medication regimen. We discussed medication alternatives to Risperdal like vraylar and she will think about it. In the meant time, patient requested keeping the same dosages of her medications due to their effectiveness in treating her symptoms. 12/3/2024 stable and doing well.  And doing well she requested being tapered off Risperdal 0.5 mg p.o. b.i.d. due to concerns of weight gain.  She is aware of the risks and benefits of taking and discontinuing Risperdal.  Patient is also taking lithium which has been effective in mood stabilization.  Patient is given instructions about tapering Risperdal and encouraged patient to contact us immediately if any changes in mood or relapse of symptoms and she verbalized understanding.  She is also aware to go to the emergency department if she is ever experiencing manic symptoms or if she has any thoughts of harming herself or others. 1/6/25 stable and doing well. Patient wants to be tapered off Risperdal and agreed to decrease Risperdal to 0.25 mg po qhs for 1-2 weeks and cross taper with vraylar 1.5 mg po daily. She requested refills to the same dosages of her other medications, lithium 300 mg po am and 2 tabs po qhs, zoloft 200 mg po daily, Wellbutrin sr 200 mg po daily, and ativan (takes 1/2 tab of Ativan 0.25 mg am and 0.5 mg in pm for anxiety). 2/10/25 Patient is stable and doing well.  Patient stated she has been tapering off Risperdal and is  "now taking 1/4 tab of Risperdal 0.5 mg p.o. q.h.s. and wants to discontinue it due to concerns of weight gain and metabolic syndrome.  Patient stated her and her  did not notice any changes in patient's mood since tapering patient off Risperdal.  Patient stated she has weaned her off of ativan 1 mg po BID prn and will only take it only as needed for severe anxiety. Patient requested keeping the same dosages of the rest of current medication due to their effectiveness in managing all of her symptoms.         ICD-10-CM ICD-9-CM   1. Bipolar 1 disorder  F31.9 296.7   2. CINDY (generalized anxiety disorder)  F41.1 300.02             Intervention/Counseling/Treatment Plan   Medication Management: The risks and benefits of medication were discussed with the patient.   Patient agrees to continue   Continue Zoloft 200 mg daily for anxiety and depressed mood  Wants to continue the taper plan to be completely off Risperdal: Discontinue 1/4 tab of Risperdal 0.5 mg po qhs  for treating patient's bipolar disorder. Has been taking it for 9 years.   Didn't fill vraylar 1.5 mg po daily for bipolar disorder due to const of $ 600. She wants medicine that does not cause weight gain or metabolic disorder.  Patient does not want to add another medication due to feeling well despite being tapered off Risperdal  I discussed with the patient the risks of Extrapyramidal Side Effects (dystonia, akathisia, parkinsonism, tardive dyskinesia), Metabolic syndrome (including Hyperglycemia, hyperlipidemia. Patient is aware of the most common and rare side effects including the possibility of irreversible abnormal movement disorder from taking Risperdal or Vraylar.  Continue lithium 300mg  (takes one tab po qam and 2 tabs po qhs). She stated she has been taking it since 2013-14 and finds it very helpful. She stated, "If I missed one of medicine I get agitated or irritable".  She has been taking lithium 9 yeas.  AIMs = 0   Continue ativan 1 mg " po BID prn. Rarely takes and has been weaning herself off it gradually  Informed pt of the risks of continuous Benzodiazepine use including tolerance, dependence, dizziness, drowsiness, memory problems, and withdrawals that may be life threatening upon abrupt cessation. Also advised not to take Benzodiazepines with Opiates or other sedatives and also not to drive or operate heavy machinery while using Benzodiazepines. Avoid alcohol with Benzodiazapine. Patient agreed to take it and verbalized understanding  Continue Wellbutrin  mg q am and discontinued wellbutrin 100 mg daily at noon for depression  Reviewed patient's recent labs from October 9, 2024. Reviewed new lithium level and Lithium level was 0.6 on 8/8/2024. Ordered EKG and lithium level and reminded patient to get them done.  Patient is aware of all the most common and rare side effects that could occur from continual lithium use including thyroid problems, kidney problems, and lithium toxicity, etc  -Discussed informed consent, diagnosis, risks and benefits of proposed treatment above vs alternative treatments vs no treatment, and potential side effects of these treatments. The patient expresses understanding of the above and displays the capacity to agree with this treatment given said understanding. Patient also agrees that, currently, the benefits outweigh the risks and would like to pursue treatment at this time. Answered all questions and discussed follow up. Encouraged patient to contact us with any questions or concerns.   Contracted for safety and non suicide plan (contact family, suicide hotline, call 911 or go the nearest emergency room)  -Encouraged Patient to keep future appointments.  -Take medications as prescribed and abstain from any substance use  -Patient to present to Emergency department for thoughts to harm herself or others      Return to Clinic: 2-3 weeks or earlier as needed      MICHELE Phan-BC

## 2025-03-20 ENCOUNTER — OFFICE VISIT (OUTPATIENT)
Dept: RHEUMATOLOGY | Facility: CLINIC | Age: 55
End: 2025-03-20
Payer: MEDICARE

## 2025-03-20 VITALS
BODY MASS INDEX: 45.67 KG/M2 | OXYGEN SATURATION: 98 % | WEIGHT: 284.19 LBS | TEMPERATURE: 98 F | HEIGHT: 66 IN | HEART RATE: 60 BPM | DIASTOLIC BLOOD PRESSURE: 68 MMHG | SYSTOLIC BLOOD PRESSURE: 116 MMHG | RESPIRATION RATE: 16 BRPM

## 2025-03-20 DIAGNOSIS — Z71.89 COUNSELING AND COORDINATION OF CARE: ICD-10-CM

## 2025-03-20 DIAGNOSIS — M15.0 PRIMARY OSTEOARTHRITIS INVOLVING MULTIPLE JOINTS: ICD-10-CM

## 2025-03-20 DIAGNOSIS — E66.01 CLASS 3 SEVERE OBESITY DUE TO EXCESS CALORIES WITH SERIOUS COMORBIDITY AND BODY MASS INDEX (BMI) OF 45.0 TO 49.9 IN ADULT: ICD-10-CM

## 2025-03-20 DIAGNOSIS — E66.813 CLASS 3 SEVERE OBESITY DUE TO EXCESS CALORIES WITH SERIOUS COMORBIDITY AND BODY MASS INDEX (BMI) OF 45.0 TO 49.9 IN ADULT: ICD-10-CM

## 2025-03-20 DIAGNOSIS — R20.2 PARESTHESIA: ICD-10-CM

## 2025-03-20 DIAGNOSIS — M79.7 FIBROMYALGIA: Primary | ICD-10-CM

## 2025-03-20 PROCEDURE — 3078F DIAST BP <80 MM HG: CPT | Mod: HCNC,CPTII,S$GLB, | Performed by: STUDENT IN AN ORGANIZED HEALTH CARE EDUCATION/TRAINING PROGRAM

## 2025-03-20 PROCEDURE — 99999 PR PBB SHADOW E&M-EST. PATIENT-LVL V: CPT | Mod: PBBFAC,HCNC,, | Performed by: STUDENT IN AN ORGANIZED HEALTH CARE EDUCATION/TRAINING PROGRAM

## 2025-03-20 PROCEDURE — 3044F HG A1C LEVEL LT 7.0%: CPT | Mod: HCNC,CPTII,S$GLB, | Performed by: STUDENT IN AN ORGANIZED HEALTH CARE EDUCATION/TRAINING PROGRAM

## 2025-03-20 PROCEDURE — 99215 OFFICE O/P EST HI 40 MIN: CPT | Mod: HCNC,S$GLB,, | Performed by: STUDENT IN AN ORGANIZED HEALTH CARE EDUCATION/TRAINING PROGRAM

## 2025-03-20 PROCEDURE — 3074F SYST BP LT 130 MM HG: CPT | Mod: HCNC,CPTII,S$GLB, | Performed by: STUDENT IN AN ORGANIZED HEALTH CARE EDUCATION/TRAINING PROGRAM

## 2025-03-20 PROCEDURE — 3008F BODY MASS INDEX DOCD: CPT | Mod: HCNC,CPTII,S$GLB, | Performed by: STUDENT IN AN ORGANIZED HEALTH CARE EDUCATION/TRAINING PROGRAM
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
## 2025-03-21 ENCOUNTER — TELEPHONE (OUTPATIENT)
Dept: NEUROLOGY | Facility: CLINIC | Age: 55
End: 2025-03-21
Payer: MEDICARE

## 2025-03-24 ENCOUNTER — OFFICE VISIT (OUTPATIENT)
Dept: FAMILY MEDICINE | Facility: CLINIC | Age: 55
End: 2025-03-24
Payer: MEDICARE

## 2025-03-24 VITALS
WEIGHT: 285.5 LBS | DIASTOLIC BLOOD PRESSURE: 70 MMHG | HEIGHT: 66 IN | HEART RATE: 81 BPM | SYSTOLIC BLOOD PRESSURE: 120 MMHG | TEMPERATURE: 98 F | RESPIRATION RATE: 18 BRPM | OXYGEN SATURATION: 96 % | BODY MASS INDEX: 45.88 KG/M2

## 2025-03-24 DIAGNOSIS — F13.20 BENZODIAZEPINE DEPENDENCE: ICD-10-CM

## 2025-03-24 DIAGNOSIS — E66.813 CLASS 3 SEVERE OBESITY DUE TO EXCESS CALORIES WITH SERIOUS COMORBIDITY AND BODY MASS INDEX (BMI) OF 45.0 TO 49.9 IN ADULT: Primary | ICD-10-CM

## 2025-03-24 DIAGNOSIS — G47.33 OSA (OBSTRUCTIVE SLEEP APNEA): ICD-10-CM

## 2025-03-24 DIAGNOSIS — E66.01 CLASS 3 SEVERE OBESITY DUE TO EXCESS CALORIES WITH SERIOUS COMORBIDITY AND BODY MASS INDEX (BMI) OF 45.0 TO 49.9 IN ADULT: Primary | ICD-10-CM

## 2025-03-24 DIAGNOSIS — T50.905A WEIGHT GAIN DUE TO MEDICATION: ICD-10-CM

## 2025-03-24 DIAGNOSIS — F31.9 BIPOLAR 1 DISORDER: ICD-10-CM

## 2025-03-24 DIAGNOSIS — R63.5 WEIGHT GAIN DUE TO MEDICATION: ICD-10-CM

## 2025-03-24 PROCEDURE — 3044F HG A1C LEVEL LT 7.0%: CPT | Mod: HCNC,CPTII,S$GLB, | Performed by: INTERNAL MEDICINE

## 2025-03-24 PROCEDURE — 3008F BODY MASS INDEX DOCD: CPT | Mod: HCNC,CPTII,S$GLB, | Performed by: INTERNAL MEDICINE

## 2025-03-24 PROCEDURE — 99999 PR PBB SHADOW E&M-EST. PATIENT-LVL V: CPT | Mod: PBBFAC,HCNC,, | Performed by: INTERNAL MEDICINE

## 2025-03-24 PROCEDURE — 1159F MED LIST DOCD IN RCRD: CPT | Mod: HCNC,CPTII,S$GLB, | Performed by: INTERNAL MEDICINE

## 2025-03-24 PROCEDURE — 99214 OFFICE O/P EST MOD 30 MIN: CPT | Mod: HCNC,S$GLB,, | Performed by: INTERNAL MEDICINE

## 2025-03-24 PROCEDURE — 3074F SYST BP LT 130 MM HG: CPT | Mod: HCNC,CPTII,S$GLB, | Performed by: INTERNAL MEDICINE

## 2025-03-24 PROCEDURE — 1160F RVW MEDS BY RX/DR IN RCRD: CPT | Mod: HCNC,CPTII,S$GLB, | Performed by: INTERNAL MEDICINE

## 2025-03-24 PROCEDURE — 3078F DIAST BP <80 MM HG: CPT | Mod: HCNC,CPTII,S$GLB, | Performed by: INTERNAL MEDICINE

## 2025-03-24 RX ORDER — SEMAGLUTIDE 1 MG/.5ML
1 INJECTION, SOLUTION SUBCUTANEOUS WEEKLY
Qty: 2 ML | Refills: 0 | Status: SHIPPED | OUTPATIENT
Start: 2025-05-25 | End: 2025-06-24

## 2025-03-24 RX ORDER — SEMAGLUTIDE 0.25 MG/.5ML
0.25 INJECTION, SOLUTION SUBCUTANEOUS WEEKLY
Qty: 2 ML | Refills: 0 | Status: SHIPPED | OUTPATIENT
Start: 2025-03-24 | End: 2025-04-23

## 2025-03-24 RX ORDER — SEMAGLUTIDE 0.5 MG/.5ML
0.5 INJECTION, SOLUTION SUBCUTANEOUS WEEKLY
Qty: 2 ML | Refills: 0 | Status: SHIPPED | OUTPATIENT
Start: 2025-04-24 | End: 2025-05-24

## 2025-03-24 NOTE — PATIENT INSTRUCTIONS
Sample Mediterranean Meal Plan    A Mediterranean meal plan for weight loss focuses on nutrient-dense, whole foods that promote satiety while supporting metabolic health. It is rich in fruits, vegetables, whole grains, legumes, lean proteins, and healthy fats, particularly olive oil. The Mediterranean diet is known for its emphasis on heart health, reducing inflammation, and promoting sustainable weight loss. Here's a balanced, 7-day meal plan that follows these principles:    General Guidelines:  Portion Control: Focus on moderate portion sizes, especially for higher-calorie foods like nuts, oils, and grains.  Hydration: Drink plenty of water (aim for 8-10 cups daily) and limit sugary drinks.  Physical Activity: Incorporate regular physical activity, such as walking or light strength training, alongside the diet.  Limit Processed Foods: Avoid refined sugars, processed snacks, and red meat.    Day 1  Breakfast:  Greek yogurt with a handful of mixed berries, 1 tablespoon of herson seeds, and a drizzle of honey.    Lunch:  Grilled chicken salad with mixed greens, tomatoes, cucumbers, red onions, [place] olives, and feta cheese. Drizzle with olive oil and lemon juice.    Snack:  A small handful of raw almonds.    Dinner:  Baked salmon with a side of roasted vegetables (zucchini, bell peppers, and eggplant), and quinoa.    Day 2  Breakfast:  Whole grain toast or Apollo toast with avocado, a poached egg, and a sprinkle of chili flakes.    Lunch:  Quinoa and chickpea bowl with spinach, cherry tomatoes, cucumber, red bell pepper, and a lemon-olive oil dressing.    Snack:  Sliced cucumber and hummus.    Dinner:  Grilled shrimp with a side of steamed broccoli and a small serving of whole grain couscous.    Day 3  Breakfast:  Oatmeal (prefer old fashioned or steel cut oats) made with unsweetened almond milk, topped with sliced strawberries and a sprinkle of cinnamon.    Lunch:  Mediterranean wrap: Whole wheat tortilla filled  with grilled chicken, tzatziki, mixed greens, cucumber, and tomatoes.    Snack:  A small portion of mixed nuts (walnuts, almonds, pistachios).    Dinner:  Lentil soup with a side of mixed greens salad (olive oil and vinegar dressing) and a small piece of whole grain bread.    Day 4  Breakfast:  Scrambled eggs with spinach, tomatoes, and a small amount of feta cheese.    Lunch:  Grilled vegetable salad with roasted eggplant, zucchini, bell peppers, tomatoes, and a drizzle of olive oil, topped with a few slices of grilled chicken breast.    Snack:  Apple slices with almond butter.    Dinner:  Baked cod with garlic and lemon, served with a side of sautéed green beans and brown rice.    Day 5  Breakfast:  Smoothie made with spinach, banana, unsweetened almond milk, herson seeds, and a handful of frozen berries.    Lunch:  Tuna salad (canned tuna in olive oil, mixed greens, tomatoes, red onion, and olives) dressed with lemon juice and olive oil.    Snack:  Carrot sticks with hummus.    Dinner:  Stuffed bell peppers with ground turkey, quinoa, tomatoes, and spices, served with a side of steamed asparagus.    Day 6  Breakfast:  Whole grain toast or Apollo toast with ricotta cheese, sliced figs, and a drizzle of honey.    Lunch:  Grilled chicken Caesar salad (use a light dressing made with Greek yogurt), served with a side of roasted sweet potatoes.    Snack:  A handful of fresh mixed berries.    Dinner:  Grilled lamb chops with a side of roasted [place] sprouts and a quinoa salad with tomatoes, cucumbers, and olive oil dressing.    Day 7  Breakfast:  [place] pudding made with unsweetened almond milk, topped with fresh berries and a sprinkle of flaxseed.    Lunch:  Mediterranean bowl: Brown rice, grilled shrimp, cucumber, tomatoes, olives, red onion, and a drizzle of olive oil and lemon.    Snack:  A small portion of Greek yogurt with a teaspoon of honey.    Dinner:  Grilled chicken with a side of roasted cauliflower and a  small serving of whole grain couscous.    Key Nutritional Points for Weight Loss:  Lean Proteins: Chicken, turkey, fish (especially fatty fish like salmon), and legumes (like lentils and chickpeas) help build muscle, promote satiety, and support metabolism.  Healthy Fats: Olive oil, nuts, and seeds are rich in heart-healthy fats that also help keep you full longer.  Fiber-Rich Vegetables & Fruits: These help with digestion, reduce hunger, and provide essential vitamins and minerals while being low in calories.  Whole Grains: Brown rice, quinoa, and whole wheat bread provide fiber and energy to fuel the body without causing blood sugar spikes.    This meal plan emphasizes nutrient-dense foods, portion control, and balanced meals, which are the core principles for sustainable weight loss on the Mediterranean diet.         Sample Weight Training Plan ( adapted from [facility] [place]):    1.Goblet Squat  How to:    Stand with feet hip-width apart and hold a weight vertically in front of chest, elbows pointing toward the floor.  Push hips back and bend knees to lower into a squat.  Drive through heels to stand back up to starting position. That's 1 rep. Complete three to five reps with a heavy weight.      2.Bent-Over Row  How to:    With a dumbbell in each hand and feet under hips, hinge at hips with knees slightly bent and arms just in front of legs.  Drive one elbow back toward hips, feeling shoulder blades squeeze together, pulling weight toward side body.  Slowly lower weight back down, then repeat with other arm. That's 1 rep. Complete three to five reps with a medium-heavy weight.        3.Lateral Lunge  How to:    Holding a weight at chest or dumbbells at each side, stand up straight with feet hip-width apart.  Take a large step to the right, sit hips back, and lower down until right knee is nearly parallel with the floor. Your left leg should be straight.  Return to start. That's 1 rep. Complete 10 reps on  each side.      4.Chest Press  How to:    Lie flat on back, or on a bench, with feet flat on the ground. With a dumbbell in each hand, extend arms directly over shoulders, palms facing toward feet.  Squeeze shoulder blades together and slowly bend elbows, lowering the weights out to the side, parallel with shoulders, until elbows form 90-degree angles.  Slowly drive the dumbbells back up to start, squeezing shoulder blades the entire time. Thats 1 rep. Complete three to five reps with a medium-heavy weight.      5.Split Stance Shoulder Press    How to:    Grab a pair of dumbbells or a resistance band. Stagger stance into a wide step, one foot forward and one back with hips squared, and hold the weights or band just above shoulders, elbows close to sides.  Leaning forward ever so slightly, bend both knees, and press through front heel while simultaneously lifting the weights or band to the renita, keeping elbows forward and arms in line with your ears.  Lower weights or band back to shoulders. That's 1 rep. Do 10 reps, alternating side for each set.        6.Alternating Reverse Lunge To Bicep Curl  How to:    Grab a pair of dumbbells and hold them at arm's length next to sides, palms facing each other. Stand tall with feet hip-width apart.  Step backward with right leg and lower body until front knee is bent 90 degrees. At the same time as you lunge, curl both dumbbells up to shoulders.  Lower the dumbbells as you return to the starting position. Step back with the other leg and repeat.That's 1 rep. Complete 12 reps with a medium weight.

## 2025-03-24 NOTE — PROGRESS NOTES
HISTORY OF PRESENT ILLNESS:  Zaira Dowell is a 54 y.o. female who presents to the clinic today for Follow-up  .   Last seen by me 11/2024.    Prescribed estradiol for vasomotor symptoms related to menopause.    She has fibromyalgia, followed by rheumatology. She takes metformin without issues. She reports previous use of phentermine with initial effectiveness that diminished after several months. She takes Tylenol PRN with decreased frequency compared to previous use.    Recent notes from psychiatry reflect: Patient stated she is now taking 1/4 tab of Risperdal 0.5 mg p.o qhs for 2-3 weeks without any changes in mood and no lida, hypomania, or depressive episodes. She stated she wanted to be off Risperdal due to weight gain and concerns of metabolic syndrome. She stated she has been stable and believes this is the right time to discontinue 1/4 tab of Risperdal 0.5 mg po qhs. Patient stated she was not able to fill vraylar 1.5 mg po daily due to cost.   She recently discontinued Risperdal and was unable to start Vraylar due to cost. Since discontinuing Risperdal, she reports being more vocal with spouse while maintaining appropriate interactions with others. Current psychiatric medications include Lithium 300mg TID since 2014, Sertraline 200mg daily, Wellbutrin daily, and Topiramate.    She is on lithium.  Has been on it since 2013/2014.  On prn ativan.  On Wellbutrin.    Patient reported Vraylar was too expensive ($600).    Obesity  S/p sleeve gastrectomy 10/2019.  Lowest weight post procedure was 248 lb.     Prescribed metformin 500 mg bid.  She is on topiramate.    Her diet consists of grilled and baked foods, avoiding fried foods. Protein sources include chicken, ground meat, and beans. She snacks on granola bars and has increased her fruit consumption. She includes vegetables such as broccoli, cauliflower, squash, and zucchini in her diet. She uses a vibration plate at home for exercise and is considering  "implementing walking.    Wt Readings from Last 3 Encounters:   03/24/25 1342 129.5 kg (285 lb 7.9 oz)   03/20/25 1444 128.9 kg (284 lb 2.8 oz)   12/03/24 1314 133 kg (293 lb 3.4 oz)       11/20/2024   Vitals - 1 value per visit    Weight (lb) 297.4       3/24/2025   Vitals - 1 value per visit    Weight (lb) 285.5      Lost 11.9 lb since November 2024.  TBWL 4%.    Estimated body mass index is 45.87 kg/m² as calculated from the following:    Height as of 3/20/25: 5' 6" (1.676 m).    Weight as of 3/20/25: 128.9 kg (284 lb 2.8 oz).    Hemoglobin A1C   Date Value Ref Range Status   02/07/2025 5.0 4.0 - 5.6 % Final     Comment:     ADA Screening Guidelines:  5.7-6.4%  Consistent with prediabetes  >or=6.5%  Consistent with diabetes    High levels of fetal hemoglobin interfere with the HbA1C  assay. Heterozygous hemoglobin variants (HbS, HgC, etc)do  not significantly interfere with this assay.   However, presence of multiple variants may affect accuracy.     02/29/2024 4.9 4.0 - 5.6 % Final     Comment:     ADA Screening Guidelines:  5.7-6.4%  Consistent with prediabetes  >or=6.5%  Consistent with diabetes    High levels of fetal hemoglobin interfere with the HbA1C  assay. Heterozygous hemoglobin variants (HbS, HgC, etc)do  not significantly interfere with this assay.   However, presence of multiple variants may affect accuracy.     10/30/2023 5.0 4.0 - 5.6 % Final     Comment:     ADA Screening Guidelines:  5.7-6.4%  Consistent with prediabetes  >or=6.5%  Consistent with diabetes    High levels of fetal hemoglobin interfere with the HbA1C  assay. Heterozygous hemoglobin variants (HbS, HgC, etc)do  not significantly interfere with this assay.   However, presence of multiple variants may affect accuracy.             ROS:  General: -fever, -chills, -fatigue, -weight gain, +weight loss  Eyes: -vision changes, -redness, -discharge  ENT: -ear pain, -nasal congestion, -sore throat  Cardiovascular: -chest pain, -palpitations, " -lower extremity edema  Respiratory: -cough, -shortness of breath  Gastrointestinal: -abdominal pain, -nausea, -vomiting, -diarrhea, -constipation, -blood in stool  Genitourinary: -dysuria, -hematuria, -frequency  Musculoskeletal: -joint pain, -muscle pain  Skin: -rash, -lesion  Neurological: -headache, -dizziness, -numbness, -tingling  Psychiatric: -anxiety, -depression, -sleep difficulty             PAST MEDICAL HISTORY:  Past Medical History:   Diagnosis Date    Anxiety     Arthritis     Asthma     Back pain     Bipolar I     BMI 50.0-59.9, adult     Chronic obstructive pulmonary disease, unspecified COPD type 2022    Depression     Fibromyalgia     GERD (gastroesophageal reflux disease)     HFpEF     Insomnia 2021    Obesity     SHARON (obstructive sleep apnea)     Tobacco dependence     Urinary incontinence        PAST SURGICAL HISTORY:  Past Surgical History:   Procedure Laterality Date    CARPAL TUNNEL RELEASE       SECTION  1993    CHOLECYSTECTOMY      COLONOSCOPY N/A 2022    Procedure: COLONOSCOPY;  Surgeon: Ernesto Auguste MD;  Location: [facility] ENDO;  Service: Endoscopy;  Laterality: N/A;  fully vaccinated-GT    EPIDURAL STEROID INJECTION Bilateral 2023    Procedure: Bilateral L3, L4, L5 Medial Branch Blocks #1;  Surgeon: Lito Jasso Jr., MD;  Location: [facility] ENDO;  Service: Pain Management;  Laterality: Bilateral;  @1230  No ATC or DM    EPIDURAL STEROID INJECTION Bilateral 2023    Procedure: Bilateral L3, L4, L5 Medial Branch Blocks #2;  Surgeon: Lito Jasso Jr., MD;  Location: [facility] ENDO;  Service: Pain Management;  Laterality: Bilateral;  @1000  No ATC or DM    EPIDURAL STEROID INJECTION Left 2023    Procedure: Left L3, L4, L5 Radiofrequency Thermocoagulation of Medial Branches;  Surgeon: Lito Jasso Jr., MD;  Location: [facility] ENDO;  Service: Pain Management;  Laterality: Left;  @0815  No ATC or DM    EPIDURAL STEROID INJECTION  Right 08/30/2023    Procedure: Right L3, L4, L5 Radiofrequency Thermocoagulation of Medial Branches;  Surgeon: Lito Jasso Jr., MD;  Location: [facility] ENDO;  Service: Pain Management;  Laterality: Right;  @0930  No ATC or DM  Last 1&75    ESOPHAGOGASTRODUODENOSCOPY N/A 05/21/2019    Procedure: EGD (ESOPHAGOGASTRODUODENOSCOPY);  Surgeon: Michelle Mc MD;  Location: [facility] ENDO;  Service: Endoscopy;  Laterality: N/A;    HYSTERECTOMY      partial     KNEE SURGERY      LAPAROSCOPIC SLEEVE GASTRECTOMY N/A 10/18/2019    Procedure: GASTRECTOMY, SLEEVE, LAPAROSCOPIC, with intraop EGD;  Surgeon: Ean Kohli MD;  Location: [facility] OR [address] [place] [address];  Service: General;  Laterality: N/A;    LEFT HEART CATHETERIZATION Left 11/7/2023    Procedure: Left heart cath;  Surgeon: Lito Colmenares MD;  Location: [facility] CATH LAB;  Service: Cardiology;  Laterality: Left;  730am start, R rad access  RN PRE OP 11/2/23    pinched nerve      SHOULDER SURGERY      TONSILLECTOMY      WRIST SURGERY      had ganlin cyst       SOCIAL HISTORY:  Social History[1]    FAMILY HISTORY:  Family History   Problem Relation Name Age of Onset    Diabetes Mother Mother     Bannock's disease Mother Mother     Hypertension Mother Mother     Heart disease Father Father     Stroke Father Father     Mental illness Father Father         ptsd    Hypertension Father Father     Diabetes Father Father     Depression Father Father     Arthritis Father Father     No Known Problems Brother      Heart disease Maternal Grandmother Grandmother     Depression Maternal Grandmother Grandmother     COPD Maternal Grandfather Grandfather     Heart disease Maternal Grandfather Grandfather     Stroke Maternal Grandfather Grandfather     Kidney disease Paternal Grandmother Grandmother     Heart disease Paternal Grandmother Grandmother     Heart disease Paternal Grandfather Grandfather     Early death Paternal Grandfather Grandfather     Hypertension Son Son     Asthma Son  Son     Rectal cancer Paternal Aunt      Asthma Maternal Aunt Aunt     Diabetes Maternal Aunt Aunt     Asthma Maternal Uncle Uncle        ALLERGIES AND MEDICATIONS: updated and reviewed.  Review of patient's allergies indicates:   Allergen Reactions    Hydrocodone-acetaminophen Nausea And Vomiting     Medication List with Changes/Refills   New Medications    SEMAGLUTIDE, WEIGHT LOSS, (WEGOVY) 0.25 MG/0.5 ML PNIJ    Inject 0.25 mg into the skin once a week.    SEMAGLUTIDE, WEIGHT LOSS, (WEGOVY) 0.5 MG/0.5 ML PNIJ    Inject 0.5 mg into the skin once a week.    SEMAGLUTIDE, WEIGHT LOSS, (WEGOVY) 1 MG/0.5 ML PNIJ    Inject 1 mg into the skin once a week.   Current Medications    ACETAMINOPHEN (TYLENOL) 500 MG TABLET    Take 1 tablet (500 mg total) by mouth every 4 (four) hours as needed for Pain or Temperature greater than (100.5 or greater).    ACETAZOLAMIDE (DIAMOX) 250 MG TABLET    Take 250 mg by mouth.    ALBUTEROL (PROVENTIL/VENTOLIN HFA) 90 MCG/ACTUATION INHALER    Inhale 2 puffs into the lungs every 4 (four) hours as needed for Wheezing or Shortness of Breath. Rescue    ALLOPURINOL (ZYLOPRIM) 100 MG TABLET    Take 1 tablet (100 mg total) by mouth once daily.    B COMPLEX VITAMINS TABLET    Take 1 tablet by mouth once daily.    BUPROPION (WELLBUTRIN SR) 100 MG TBSR 12 HR TABLET    Take 2 tabs q am, and 1 tab daily at noon    CALCIUM CITRATE (CALCITRATE) 200 MG (950 MG) TABLET    Take 1 tablet by mouth 2 (two) times daily.    CETIRIZINE (ZYRTEC) 10 MG TABLET    Take 1 tablet (10 mg total) by mouth once daily.    COLCHICINE (COLCRYS) 0.6 MG TABLET    Take 1 tablet (0.6 mg total) by mouth daily as needed (gout flare).    CYANOCOBALAMIN 500 MCG TABLET    Take 500 mcg by mouth once daily.    CYCLOBENZAPRINE (FLEXERIL) 5 MG TABLET    Take 1 tablet (5 mg total) by mouth 3 (three) times daily as needed for Muscle spasms.    DICLOFENAC SODIUM (VOLTAREN ARTHRITIS PAIN) 1 % GEL    Apply 2 g topically 4 (four) times daily.     ESTRADIOL (ESTRACE) 1 MG TABLET    Take 1 tablet (1 mg total) by mouth once daily.    FERROUS FUMARATE/VIT BCOMP,C (SUPER B COMPLEX ORAL)    Take 1 capsule by mouth once daily.    FUROSEMIDE (LASIX) 20 MG TABLET    Take 1 tablet (20 mg total) by mouth daily as needed (leg swelling/fluid buildup).    IBUPROFEN (ADVIL,MOTRIN) 600 MG TABLET    Take 1 tablet (600 mg total) by mouth every 6 (six) hours as needed for Pain or Temperature greater than (100.5 or greater).    LITHIUM (ESKALITH) 300 MG CAPSULE    Take 1 capsule (300 mg total) by mouth 3 (three) times daily with meals.    LORAZEPAM (ATIVAN) 0.5 MG TABLET    TAKE 2 TABLETS EVERY DAY AS DIRECTED    MECLIZINE (ANTIVERT) 25 MG TABLET    Take 1 tablet (25 mg total) by mouth every 6 (six) hours as needed for Dizziness.    METFORMIN (GLUCOPHAGE-XR) 500 MG ER 24HR TABLET    Take 1 tablet (500 mg total) by mouth 2 (two) times daily with meals.    MULTIVITAMIN CAPSULE    Take 1 capsule by mouth 2 (two) times daily.     OMEPRAZOLE (PRILOSEC) 40 MG CAPSULE    TAKE 1 CAPSULE TWICE DAILY BEFORE MEALS    ONDANSETRON (ZOFRAN-ODT) 8 MG TBDL    Take 1 tablet (8 mg total) by mouth 2 (two) times daily as needed (nausea).    POTASSIUM CHLORIDE (KLOR-CON) 10 MEQ TBSR    Take 10 mEq by mouth 2 (two) times daily.    PREDNISONE (DELTASONE) 50 MG TAB    Take 1 tablet (50 mg total) by mouth once daily.    PREGABALIN (LYRICA) 100 MG CAPSULE    Take 1 capsule (100 mg total) by mouth 2 (two) times daily.    PROMETHAZINE (PHENERGAN) 12.5 MG TAB    promethazine Take half tablet (oral) 2 times per day PRN - Nausea . Medication may cause drowsiness. [id number] tablet 2 times per day oral No set duration recorded No set duration amount recorded active 12.5 mg    PROMETHAZINE (PHENERGAN) 25 MG TABLET    Take 25 mg by mouth.    PROMETHAZINE-DEXTROMETHORPHAN (PROMETHAZINE-DM) 6.25-15 MG/5 ML SYRP    Take 5 mLs by mouth nightly as needed (cough).    RISPERIDONE (RISPERDAL) 0.5 MG TAB    Take 1 tablet  "(0.5 mg total) by mouth every evening.    SERTRALINE (ZOLOFT) 100 MG TABLET    Take 2 tablets (200 mg total) by mouth once daily.    TOLTERODINE (DETROL LA) 4 MG 24 HR CAPSULE    Take 1 capsule (4 mg total) by mouth once daily.    TOPIRAMATE (TOPAMAX) 200 MG TAB    Take 1 tablet (200 mg total) by mouth once daily.          CARE TEAM:  Patient Care Team:  Shay Dai MD as PCP - General (Internal Medicine)  Delvin Nagy Jr., MD (Inactive) as Consulting Physician (Psychiatry)  José Hernandez MD as Consulting Physician (Endocrinology)  Brittaney Brandon NP (Inactive) as Nurse Practitioner (Urology)  Svetlana Nelson LPN as Care Coordinator  Nick Pearce as ED Navigator         PHYSICAL EXAM:   Vitals:    03/24/25 1342   BP: 120/70   Pulse: 81   Resp: 18   Temp: 97.7 °F (36.5 °C)     Weight: 129.5 kg (285 lb 7.9 oz)   Height: 5' 6" (167.6 cm)   Body mass index is 46.08 kg/m².    Physical Exam    Vitals: Weight: 285.5 lbs.  General: No acute distress. Well-developed. Well-nourished.  Eyes: EOMI. Sclerae anicteric.  HENT: Normocephalic. Atraumatic. Nares patent. Moist oral mucosa.  Ears: Bilateral TMs clear. Bilateral EACs clear.  Cardiovascular: Regular rate. Regular rhythm. No murmurs. No rubs. No gallops. Normal S1, S2.  Respiratory: Normal respiratory effort. Clear to auscultation bilaterally. No rales. No rhonchi. No wheezing.  Abdomen: Soft. Non-tender. Non-distended. Normoactive bowel sounds.  Musculoskeletal: No  obvious deformity.  Extremities: No lower extremity edema.  Neurological: Alert & oriented x3. No slurred speech. Normal gait.  Psychiatric: Normal mood. Normal affect. Good insight. Good judgment.  Skin: Warm. Dry. No rash.             ASSESSMENT AND PLAN:  IMPRESSION:  - Reviewed recent psychiatric follow-up and medication changes, including discontinuation of risperidone. Assessed response to being off risperidone, noting no significant issues reported.  - Evaluated current " psychotropic regimen, including lithium, Wellbutrin, and sertraline.  - Considered weight management strategies in context of medication side effects.  - Reviewed recent labs, noting normal A1C and improved ALT levels.  - Initiated Wegovy for weight management, with Phentermine (alongside current topiramate) as an alternative if insurance does not cover Wegovy.    Class 3 severe obesity due to excess calories with serious comorbidity and body mass index (BMI) of 45.0 to 49.9 in adult  Weight gain due to medication  SHARON (obstructive sleep apnea)  - Reviewed recent lab results, including A1C, which was normal.  - Noted that the patient does not currently have a diagnosis of diabetes.  - Continued Metformin at current dose for diabetes management and monitored patient's adherence to the regimen.  - Monitored patient's weight, noting a loss of 11.9 lbs since November 2024, with current weight at 285.5 lbs.  - Evaluated weight loss progress, considering it reasonable given patient's lithium treatment.  - Assessed current diet and exercise habits, noting improvements in food choices.  - Emphasized the importance of gradual weight loss for long-term success and maintaining muscle mass.  - Monitored ALT liver function test, noting improvement from October to February.  - Educated patient on the potential link between elevated ALT levels and fatty liver infiltration.  - Planned to continue monitoring ALT levels and order liver imaging if levels increase.  - Discussed the need for moderate-intensity exercise, including guidance on appropriate exertion levels and the importance of muscle preservation during weight loss.  - Zaiar to start with 10 minutes of moderate-intensity exercise daily at the [facility], gradually increasing to 30 minutes over 3-4 weeks.  - Recommend incorporating weight training exercises 1-2 times per week.  - Zaira to continue current diet of grilled and baked foods, with emphasis on lean proteins,  fruits, and vegetables.    Benzodiazepine dependence  Bipolar 1 disorder   - Continued Lithium 300 mg 3 times daily, Wellbutrin daily, and Sertraline 200 mg daily.  She followed with psychiatry as well.      - Continued Topiramate at current dose.  - Advised patient to limit Tylenol intake to no more than 1000 mg once or twice daily.  - Zaira has been referred to a rheumatologist for fibromyalgia management, who is ordering additional tests.                   Follow up 3-4 months or sooner as needed.    This note was generated with the assistance of ambient listening technology. Verbal consent was obtained by the patient and accompanying visitor(s) for the recording of patient appointment to facilitate this note. I attest to having reviewed and edited the generated note for accuracy, though some syntax or spelling errors may persist. Please contact the author of this note for any clarification.         [1]   Social History  Socioeconomic History    Marital status:    Tobacco Use    Smoking status: Every Day     Types: Vaping with nicotine    Smokeless tobacco: Current    Tobacco comments:     vaping started April 2020; QUIT cigarettes Sept 2019   Substance and Sexual Activity    Alcohol use: No    Drug use: No    Sexual activity: Yes     Partners: Male     Birth control/protection: None     Social Drivers of Health     Financial Resource Strain: Low Risk  (2/10/2025)    Overall Financial Resource Strain (CARDIA)     Difficulty of Paying Living Expenses: Not hard at all   Food Insecurity: No Food Insecurity (2/10/2025)    Hunger Vital Sign     Worried About Running Out of Food in the Last Year: Never true     Ran Out of Food in the Last Year: Never true   Transportation Needs: No Transportation Needs (8/21/2024)    [facility]    [facility] - [facility]     Lack of Transportation (Medical): No     Lack of Transportation (Non-Medical): No   Physical Activity: Insufficiently Active (2/10/2025)     Exercise Vital Sign     Days of Exercise per Week: 2 days     Minutes of Exercise per Session: 20 min   Stress: Stress Concern Present (2/10/2025)    [employer] - Occupational Stress Questionnaire     Feeling of Stress : To some extent   Housing Stability: Unknown (2/10/2025)    Housing Stability Vital Sign     Unable to Pay for Housing in the Last Year: No     Homeless in the Last Year: No

## 2025-03-26 ENCOUNTER — PATIENT MESSAGE (OUTPATIENT)
Dept: FAMILY MEDICINE | Facility: CLINIC | Age: 55
End: 2025-03-26
Payer: MEDICARE

## 2025-04-02 ENCOUNTER — PATIENT MESSAGE (OUTPATIENT)
Dept: FAMILY MEDICINE | Facility: CLINIC | Age: 55
End: 2025-04-02
Payer: MEDICARE

## 2025-04-07 DIAGNOSIS — E66.813 CLASS 3 SEVERE OBESITY DUE TO EXCESS CALORIES WITH SERIOUS COMORBIDITY AND BODY MASS INDEX (BMI) OF 45.0 TO 49.9 IN ADULT: Primary | ICD-10-CM

## 2025-04-07 DIAGNOSIS — E66.01 CLASS 3 SEVERE OBESITY DUE TO EXCESS CALORIES WITH SERIOUS COMORBIDITY AND BODY MASS INDEX (BMI) OF 45.0 TO 49.9 IN ADULT: Primary | ICD-10-CM

## 2025-04-07 RX ORDER — PHENTERMINE HYDROCHLORIDE 37.5 MG/1
37.5 TABLET ORAL
Qty: 30 TABLET | Refills: 2 | Status: SHIPPED | OUTPATIENT
Start: 2025-04-07 | End: 2025-07-06

## 2025-04-14 ENCOUNTER — OFFICE VISIT (OUTPATIENT)
Dept: FAMILY MEDICINE | Facility: CLINIC | Age: 55
End: 2025-04-14
Payer: MEDICARE

## 2025-04-14 VITALS
HEIGHT: 66 IN | TEMPERATURE: 98 F | RESPIRATION RATE: 18 BRPM | SYSTOLIC BLOOD PRESSURE: 115 MMHG | OXYGEN SATURATION: 99 % | BODY MASS INDEX: 45.49 KG/M2 | WEIGHT: 283.06 LBS | DIASTOLIC BLOOD PRESSURE: 78 MMHG | HEART RATE: 80 BPM

## 2025-04-14 DIAGNOSIS — M54.9 UPPER BACK PAIN ON LEFT SIDE: ICD-10-CM

## 2025-04-14 DIAGNOSIS — M77.02 MEDIAL EPICONDYLITIS OF LEFT ELBOW: Primary | ICD-10-CM

## 2025-04-14 PROCEDURE — 99213 OFFICE O/P EST LOW 20 MIN: CPT | Mod: S$GLB,,, | Performed by: NURSE PRACTITIONER

## 2025-04-14 PROCEDURE — 3044F HG A1C LEVEL LT 7.0%: CPT | Mod: CPTII,S$GLB,, | Performed by: NURSE PRACTITIONER

## 2025-04-14 PROCEDURE — 3008F BODY MASS INDEX DOCD: CPT | Mod: CPTII,S$GLB,, | Performed by: NURSE PRACTITIONER

## 2025-04-14 PROCEDURE — 99999 PR PBB SHADOW E&M-EST. PATIENT-LVL V: CPT | Mod: PBBFAC,,, | Performed by: NURSE PRACTITIONER

## 2025-04-14 PROCEDURE — 1159F MED LIST DOCD IN RCRD: CPT | Mod: CPTII,S$GLB,, | Performed by: NURSE PRACTITIONER

## 2025-04-14 PROCEDURE — G2211 COMPLEX E/M VISIT ADD ON: HCPCS | Mod: S$GLB,,, | Performed by: NURSE PRACTITIONER

## 2025-04-14 PROCEDURE — 3078F DIAST BP <80 MM HG: CPT | Mod: CPTII,S$GLB,, | Performed by: NURSE PRACTITIONER

## 2025-04-14 PROCEDURE — 3074F SYST BP LT 130 MM HG: CPT | Mod: CPTII,S$GLB,, | Performed by: NURSE PRACTITIONER

## 2025-04-14 PROCEDURE — 1160F RVW MEDS BY RX/DR IN RCRD: CPT | Mod: CPTII,S$GLB,, | Performed by: NURSE PRACTITIONER

## 2025-04-14 RX ORDER — LIDOCAINE 50 MG/G
1 PATCH TOPICAL DAILY
Qty: 14 PATCH | Refills: 0 | Status: SHIPPED | OUTPATIENT
Start: 2025-04-14

## 2025-04-14 RX ORDER — DICLOFENAC SODIUM 10 MG/G
2 GEL TOPICAL 3 TIMES DAILY PRN
Qty: 100 G | Refills: 0 | Status: SHIPPED | OUTPATIENT
Start: 2025-04-14

## 2025-04-14 NOTE — PROGRESS NOTES
Routine Office Visit    Patient Name: Zaira Dowell    : 1970  MRN: [id number]    Chief Complaint:  Back pain, elbow pain    Subjective:  Zaira is a 54 y.o. female who presents today for:    Back pain, elbow pain:    Patient who is known to me reports today for evaluation.  Yesterday without any inciting event or injury she developed some midline upper thoracic back pain which improved but now she feels pain in between the spine and the shoulder bladeof the left side of the upper back.  The pain does not radiate anywhere and she denies any associated sternal chest pain.  Denies any dyspnea.  She does feel the pain constantly but it is worse with certain motions and taking deep breaths.  No reported neck pain or extremity weakness or paresthesias.  She has taken Tylenol without significant benefit.    She has also noticed some significant pain in the medial side of the left elbow for the last week without any inciting event or injury.  She reports a history of ulnar release surgery last year as well as carpal tunnel release of the same time of the left side.  She does feel the left elbow pain with certain motions as well.  Tylenol has not provided much benefit either.    Past Medical History  Past Medical History:   Diagnosis Date    Anxiety     Arthritis     Asthma     Back pain     Bipolar I     BMI 50.0-59.9, adult     Chronic obstructive pulmonary disease, unspecified COPD type 2022    Depression     Fibromyalgia     GERD (gastroesophageal reflux disease)     HFpEF     Insomnia 2021    Obesity     SHARON (obstructive sleep apnea)     Tobacco dependence     Urinary incontinence        Family History  Family History   Problem Relation Name Age of Onset    Diabetes Mother Mother     Jose Juan's disease Mother Mother     Hypertension Mother Mother     Heart disease Father Father     Stroke Father Father     Mental illness Father Father         ptsd    Hypertension Father Father     Diabetes Father  "Father     Depression Father Father     Arthritis Father Father     No Known Problems Brother      Heart disease Maternal Grandmother Grandmother     Depression Maternal Grandmother Grandmother     COPD Maternal Grandfather Grandfather     Heart disease Maternal Grandfather Grandfather     Stroke Maternal Grandfather Grandfather     Kidney disease Paternal Grandmother Grandmother     Heart disease Paternal Grandmother Grandmother     Heart disease Paternal Grandfather Grandfather     Early death Paternal Grandfather Grandfather     Hypertension Son Son     Asthma Son Son     Rectal cancer Paternal Aunt      Asthma Maternal Aunt Aunt     Diabetes Maternal Aunt Aunt     Asthma Maternal Uncle Uncle        Current Medications  Medications Ordered Prior to Encounter[1]    Allergies   Review of patient's allergies indicates:   Allergen Reactions    Hydrocodone-acetaminophen Nausea And Vomiting       Review of Systems (Pertinent positives)  Review of Systems   Constitutional:  Negative for fever and weight loss.   HENT: Negative.     Eyes: Negative.    Cardiovascular:  Negative for chest pain, palpitations, orthopnea, claudication and leg swelling.   Gastrointestinal:  Negative for abdominal pain.   Genitourinary:  Negative for dysuria and hematuria.   Musculoskeletal:  Positive for back pain. Negative for myalgias.   Neurological:  Negative for tingling, weakness and headaches.       /78 (BP Location: Right arm, Patient Position: Sitting)   Pulse 80   Temp 98.2 °F (36.8 °C) (Oral)   Resp 18   Ht 5' 6" (1.676 m)   Wt 128.4 kg (283 lb 1.1 oz)   LMP  (LMP Unknown)   SpO2 99%   BMI 45.69 kg/m²     Physical Exam  Vitals reviewed.   Constitutional:       General: She is not in acute distress.     Appearance: Normal appearance. She is not ill-appearing, toxic-appearing or diaphoretic.   HENT:      Head: Normocephalic and atraumatic.   Cardiovascular:      Rate and Rhythm: Normal rate and regular rhythm.      " Pulses: Normal pulses.      Heart sounds: Normal heart sounds.   Pulmonary:      Effort: Pulmonary effort is normal. No respiratory distress.      Breath sounds: Normal breath sounds. No wheezing.   Musculoskeletal:         General: No swelling or deformity. Normal range of motion.      Left elbow: Tenderness present in medial epicondyle.      Cervical back: No bony tenderness.      Thoracic back: Tenderness (Left-sided paraspinous) present. No swelling or bony tenderness.   Skin:     General: Skin is warm and dry.      Capillary Refill: Capillary refill takes less than 2 seconds.   Neurological:      General: No focal deficit present.      Mental Status: She is alert and oriented to person, place, and time.   Psychiatric:         Mood and Affect: Mood normal.         Behavior: Behavior normal.            Assessment/Plan:  Zaira Dowell is a 54 y.o. female who presents today for :    Zaira was seen today for shoulder pain and elbow injury.    Diagnoses and all orders for this visit:    Medial epicondylitis of left elbow  -     diclofenac sodium (VOLTAREN) 1 % Gel; Apply 2 g topically 3 (three) times daily as needed (pain).    Recommended elbow sleeve and p.r.n. diclofenac.  No weakness of the arm.  Can consult Orthopedics if no improvement.  Not likely related to ulnar neuropathy.    Upper back pain on left side  -     LIDOcaine (LIDODERM) 5 %; Place 1 patch onto the skin once daily. Remove & Discard patch within 12 hours or as directed by MD    Likely thoracic or trapezius muscle spasm.  She will continue her prescribed muscle relaxer as needed.  She can not tolerate NSAIDs.  Lidocaine patch recommended.  Can consider imaging if no improvement.    All questions answered.        This office note has been dictated.  This dictation has been generated using M-Modal Fluency Direct dictation; some phonetic errors may occur.          [1]   Current Outpatient Medications on File Prior to Visit   Medication Sig  Dispense Refill    acetaminophen (TYLENOL) 500 MG tablet Take 1 tablet (500 mg total) by mouth every 4 (four) hours as needed for Pain or Temperature greater than (100.5 or greater). 30 tablet 0    acetaZOLAMIDE (DIAMOX) 250 MG tablet Take 250 mg by mouth.      albuterol (PROVENTIL/VENTOLIN HFA) 90 mcg/actuation inhaler Inhale 2 puffs into the lungs every 4 (four) hours as needed for Wheezing or Shortness of Breath. Rescue 18 g 2    allopurinoL (ZYLOPRIM) 100 MG tablet Take 1 tablet (100 mg total) by mouth once daily. 90 tablet 1    b complex vitamins tablet Take 1 tablet by mouth once daily.      buPROPion (WELLBUTRIN SR) 100 MG TBSR 12 hr tablet Take 2 tabs q am, and 1 tab daily at noon 270 tablet 1    calcium citrate (CALCITRATE) 200 mg (950 mg) tablet Take 1 tablet by mouth 2 (two) times daily.      cyanocobalamin 500 MCG tablet Take 500 mcg by mouth once daily.      cyclobenzaprine (FLEXERIL) 5 MG tablet Take 1 tablet (5 mg total) by mouth 3 (three) times daily as needed for Muscle spasms. 60 tablet 6    estradioL (ESTRACE) 1 MG tablet Take 1 tablet (1 mg total) by mouth once daily. 90 tablet 3    ferrous fumarate/vit Bcomp,C (SUPER B COMPLEX ORAL) Take 1 capsule by mouth once daily.      lithium (ESKALITH) 300 MG capsule Take 1 capsule (300 mg total) by mouth 3 (three) times daily with meals. 270 capsule 1    LORazepam (ATIVAN) 0.5 MG tablet TAKE 2 TABLETS EVERY DAY AS DIRECTED 60 tablet 1    meclizine (ANTIVERT) 25 mg tablet Take 1 tablet (25 mg total) by mouth every 6 (six) hours as needed for Dizziness. 60 tablet 1    metFORMIN (GLUCOPHAGE-XR) 500 MG ER 24hr tablet Take 1 tablet (500 mg total) by mouth 2 (two) times daily with meals. 60 tablet 5    multivitamin capsule Take 1 capsule by mouth 2 (two) times daily.       omeprazole (PRILOSEC) 40 MG capsule TAKE 1 CAPSULE TWICE DAILY BEFORE MEALS 180 capsule 3    ondansetron (ZOFRAN-ODT) 8 MG TbDL Take 1 tablet (8 mg total) by mouth 2 (two) times daily as  needed (nausea). 21 tablet 0    phentermine (ADIPEX-P) 37.5 mg tablet Take 1 tablet (37.5 mg total) by mouth before breakfast. 30 tablet 2    potassium chloride (KLOR-CON) 10 MEQ TbSR Take 10 mEq by mouth 2 (two) times daily.      predniSONE (DELTASONE) 50 MG Tab Take 1 tablet (50 mg total) by mouth once daily. 5 tablet 0    promethazine (PHENERGAN) 12.5 MG Tab       promethazine (PHENERGAN) 25 MG tablet Take 25 mg by mouth.      promethazine-dextromethorphan (PROMETHAZINE-DM) 6.25-15 mg/5 mL Syrp Take 5 mLs by mouth nightly as needed (cough). 118 mL 0    risperiDONE (RISPERDAL) 0.5 MG Tab Take 1 tablet (0.5 mg total) by mouth every evening. 90 tablet 0    sertraline (ZOLOFT) 100 MG tablet Take 2 tablets (200 mg total) by mouth once daily. 180 tablet 1    tolterodine (DETROL LA) 4 MG 24 hr capsule Take 1 capsule (4 mg total) by mouth once daily. 90 capsule 3    topiramate (TOPAMAX) 200 MG Tab Take 1 tablet (200 mg total) by mouth once daily. 90 tablet 0    [DISCONTINUED] ibuprofen (ADVIL,MOTRIN) 600 MG tablet Take 1 tablet (600 mg total) by mouth every 6 (six) hours as needed for Pain or Temperature greater than (100.5 or greater). 30 tablet 0    cetirizine (ZYRTEC) 10 MG tablet Take 1 tablet (10 mg total) by mouth once daily. (Patient not taking: Reported on 3/20/2025) 30 tablet 2    colchicine (COLCRYS) 0.6 mg tablet Take 1 tablet (0.6 mg total) by mouth daily as needed (gout flare). 30 tablet 0    furosemide (LASIX) 20 MG tablet Take 1 tablet (20 mg total) by mouth daily as needed (leg swelling/fluid buildup). 60 tablet 0    pregabalin (LYRICA) 100 MG capsule Take 1 capsule (100 mg total) by mouth 2 (two) times daily. (Patient taking differently: Take 100 mg by mouth 2 (two) times daily as needed.) 60 capsule 5    [DISCONTINUED] loratadine (CLARITIN) 10 mg tablet Take 1 tablet (10 mg total) by mouth once daily. for 20 days 60 tablet 0     Current Facility-Administered Medications on File Prior to Visit    Medication Dose Route Frequency Provider Last Rate Last Admin    sodium chloride 0.9% flush 10 mL  10 mL Intravenous PRN Lito Colmenares MD

## 2025-04-15 ENCOUNTER — PROCEDURE VISIT (OUTPATIENT)
Dept: NEUROLOGY | Facility: CLINIC | Age: 55
End: 2025-04-15
Payer: MEDICARE

## 2025-04-15 ENCOUNTER — LAB VISIT (OUTPATIENT)
Dept: LAB | Facility: HOSPITAL | Age: 55
End: 2025-04-15
Attending: STUDENT IN AN ORGANIZED HEALTH CARE EDUCATION/TRAINING PROGRAM
Payer: MEDICARE

## 2025-04-15 DIAGNOSIS — M79.7 FIBROMYALGIA: ICD-10-CM

## 2025-04-15 DIAGNOSIS — R20.2 PARESTHESIA: Primary | ICD-10-CM

## 2025-04-15 DIAGNOSIS — M25.50 ARTHRALGIA OF MULTIPLE JOINTS: ICD-10-CM

## 2025-04-15 DIAGNOSIS — R20.2 PARESTHESIA: ICD-10-CM

## 2025-04-15 DIAGNOSIS — M51.360 DEGENERATION OF INTERVERTEBRAL DISC OF LUMBAR REGION WITH DISCOGENIC BACK PAIN: ICD-10-CM

## 2025-04-15 PROCEDURE — 83520 IMMUNOASSAY QUANT NOS NONAB: CPT

## 2025-04-15 PROCEDURE — 86038 ANTINUCLEAR ANTIBODIES: CPT

## 2025-04-15 PROCEDURE — 82164 ANGIOTENSIN I ENZYME TEST: CPT

## 2025-04-15 PROCEDURE — 99214 OFFICE O/P EST MOD 30 MIN: CPT | Mod: 25,S$GLB,, | Performed by: STUDENT IN AN ORGANIZED HEALTH CARE EDUCATION/TRAINING PROGRAM

## 2025-04-15 PROCEDURE — 95911 NRV CNDJ TEST 9-10 STUDIES: CPT | Mod: S$GLB,,, | Performed by: STUDENT IN AN ORGANIZED HEALTH CARE EDUCATION/TRAINING PROGRAM

## 2025-04-15 PROCEDURE — 86235 NUCLEAR ANTIGEN ANTIBODY: CPT | Mod: 59

## 2025-04-15 PROCEDURE — 36415 COLL VENOUS BLD VENIPUNCTURE: CPT

## 2025-04-15 PROCEDURE — 95886 MUSC TEST DONE W/N TEST COMP: CPT | Mod: S$GLB,,, | Performed by: STUDENT IN AN ORGANIZED HEALTH CARE EDUCATION/TRAINING PROGRAM

## 2025-04-15 PROCEDURE — 86235 NUCLEAR ANTIGEN ANTIBODY: CPT

## 2025-04-15 NOTE — PROCEDURES
Chief Complaint and Duration     Numbness and tingling in her feet    History of Present Illness     Zaira Dowell is a 54 y.o. female with a history of multiple medical diagnoses as listed below that presents for nerve conduction studies/EMG for numbness in her feet.    Patient with a history of SHARON, gastric sleeve in 2019, gout, fibromyalgia.  Follows with Rheumatology.  Does have chronic overall body pain as well as neck and back pain.  No significant changes to bowel or bladder acutely.      Review of patient's allergies indicates:   Allergen Reactions    Hydrocodone-acetaminophen Nausea And Vomiting     Current Medications[1]    Medical History     Past Medical History:   Diagnosis Date    Anxiety     Arthritis     Asthma     Back pain     Bipolar I     BMI 50.0-59.9, adult     Chronic obstructive pulmonary disease, unspecified COPD type 2022    Depression     Fibromyalgia     GERD (gastroesophageal reflux disease)     HFpEF     Insomnia 2021    Obesity     SHARON (obstructive sleep apnea)     Tobacco dependence     Urinary incontinence      Past Surgical History:   Procedure Laterality Date    CARPAL TUNNEL RELEASE       SECTION  1993    CHOLECYSTECTOMY      COLONOSCOPY N/A 2022    Procedure: COLONOSCOPY;  Surgeon: Ernesto Auguste MD;  Location: [facility] ENDO;  Service: Endoscopy;  Laterality: N/A;  fully vaccinated-GT    EPIDURAL STEROID INJECTION Bilateral 2023    Procedure: Bilateral L3, L4, L5 Medial Branch Blocks #1;  Surgeon: Lito Jasso Jr., MD;  Location: [facility] ENDO;  Service: Pain Management;  Laterality: Bilateral;  @1230  No ATC or DM    EPIDURAL STEROID INJECTION Bilateral 2023    Procedure: Bilateral L3, L4, L5 Medial Branch Blocks #2;  Surgeon: Lito Jasso Jr., MD;  Location: [facility] ENDO;  Service: Pain Management;  Laterality: Bilateral;  @1000  No ATC or DM    EPIDURAL STEROID INJECTION Left 2023    Procedure: Left  L3, L4, L5 Radiofrequency Thermocoagulation of Medial Branches;  Surgeon: Lito Jasso Jr., MD;  Location: [facility] ENDO;  Service: Pain Management;  Laterality: Left;  @0815  No ATC or DM    EPIDURAL STEROID INJECTION Right 08/30/2023    Procedure: Right L3, L4, L5 Radiofrequency Thermocoagulation of Medial Branches;  Surgeon: Lito Jasso Jr., MD;  Location: [facility] ENDO;  Service: Pain Management;  Laterality: Right;  @0930  No ATC or DM  Last 1&75    ESOPHAGOGASTRODUODENOSCOPY N/A 05/21/2019    Procedure: EGD (ESOPHAGOGASTRODUODENOSCOPY);  Surgeon: Michelle Mc MD;  Location: [facility] ENDO;  Service: Endoscopy;  Laterality: N/A;    HYSTERECTOMY      partial     KNEE SURGERY      LAPAROSCOPIC SLEEVE GASTRECTOMY N/A 10/18/2019    Procedure: GASTRECTOMY, SLEEVE, LAPAROSCOPIC, with intraop EGD;  Surgeon: Ean Kohli MD;  Location: [facility] OR [address] [place] [address];  Service: General;  Laterality: N/A;    LEFT HEART CATHETERIZATION Left 11/7/2023    Procedure: Left heart cath;  Surgeon: Lito Colmenares MD;  Location: [facility] CATH LAB;  Service: Cardiology;  Laterality: Left;  730am start, R rad access  RN PRE OP 11/2/23    pinched nerve      SHOULDER SURGERY      TONSILLECTOMY      WRIST SURGERY      had ganlin cyst     Family History   Problem Relation Name Age of Onset    Diabetes Mother Mother     Jose Juan's disease Mother Mother     Hypertension Mother Mother     Heart disease Father Father     Stroke Father Father     Mental illness Father Father         ptsd    Hypertension Father Father     Diabetes Father Father     Depression Father Father     Arthritis Father Father     No Known Problems Brother      Heart disease Maternal Grandmother Grandmother     Depression Maternal Grandmother Grandmother     COPD Maternal Grandfather Grandfather     Heart disease Maternal Grandfather Grandfather     Stroke Maternal Grandfather Grandfather     Kidney disease Paternal Grandmother Grandmother     Heart disease  Paternal Grandmother Grandmother     Heart disease Paternal Grandfather Grandfather     Early death Paternal Grandfather Grandfather     Hypertension Son Son     Asthma Son Son     Rectal cancer Paternal Aunt      Asthma Maternal Aunt Aunt     Diabetes Maternal Aunt Aunt     Asthma Maternal Uncle Uncle      Social History[2]    Exam     There were no vitals filed for this visit.   Physical Exam:  General: Not in acute distress. Not ill-appearing.     Labs and Imaging     Labs: reviewed  No results found for this or any previous visit (from the past 24 hours).    Thyroid normal  HgA1C%:  5  LDL:  83      Imaging:   I have personally reviewed the images performed.   Foot x-ray in May of 2024 with no acute fractures, does have osteoarthritic/degenerative changes    Assessment and Plan     Problem List Items Addressed This Visit          Neuro    DDD (degenerative disc disease), lumbar    Paresthesia - Primary       Orthopedic    Fibromyalgia    Arthralgia of multiple joints     This is a 54-year-old female patient new to me.  History of SHARON, fibromyalgia, chronic neck and back pain.  Noted by Rheumatology to have new numbness and tingling in her feet, here for nerve conduction studies/EMG which shows:    Discussed w patient multifactorial nature of symptoms. Discussed lifestyle modifications including diet and exercise.    Questions answered w patient. Appreciate opportunity to care for this patient.    Follow-up:  Ordering provider    This note was created by combination of typed  and M-Modal dictation. Transcription and phonetic errors may be present.  If there are any questions, please contact me.         [1]   Current Outpatient Medications   Medication Sig Dispense Refill    acetaminophen (TYLENOL) 500 MG tablet Take 1 tablet (500 mg total) by mouth every 4 (four) hours as needed for Pain or Temperature greater than (100.5 or greater). 30 tablet 0    acetaZOLAMIDE (DIAMOX) 250 MG tablet Take 250 mg by  mouth.      albuterol (PROVENTIL/VENTOLIN HFA) 90 mcg/actuation inhaler Inhale 2 puffs into the lungs every 4 (four) hours as needed for Wheezing or Shortness of Breath. Rescue 18 g 2    allopurinoL (ZYLOPRIM) 100 MG tablet Take 1 tablet (100 mg total) by mouth once daily. 90 tablet 1    b complex vitamins tablet Take 1 tablet by mouth once daily.      buPROPion (WELLBUTRIN SR) 100 MG TBSR 12 hr tablet Take 2 tabs q am, and 1 tab daily at noon 270 tablet 1    calcium citrate (CALCITRATE) 200 mg (950 mg) tablet Take 1 tablet by mouth 2 (two) times daily.      cetirizine (ZYRTEC) 10 MG tablet Take 1 tablet (10 mg total) by mouth once daily. (Patient not taking: Reported on 3/20/2025) 30 tablet 2    colchicine (COLCRYS) 0.6 mg tablet Take 1 tablet (0.6 mg total) by mouth daily as needed (gout flare). 30 tablet 0    cyanocobalamin 500 MCG tablet Take 500 mcg by mouth once daily.      cyclobenzaprine (FLEXERIL) 5 MG tablet Take 1 tablet (5 mg total) by mouth 3 (three) times daily as needed for Muscle spasms. 60 tablet 6    diclofenac sodium (VOLTAREN) 1 % Gel Apply 2 g topically 3 (three) times daily as needed (pain). 100 g 0    estradioL (ESTRACE) 1 MG tablet Take 1 tablet (1 mg total) by mouth once daily. 90 tablet 3    ferrous fumarate/vit Bcomp,C (SUPER B COMPLEX ORAL) Take 1 capsule by mouth once daily.      furosemide (LASIX) 20 MG tablet Take 1 tablet (20 mg total) by mouth daily as needed (leg swelling/fluid buildup). 60 tablet 0    LIDOcaine (LIDODERM) 5 % Place 1 patch onto the skin once daily. Remove & Discard patch within 12 hours or as directed by MD 14 patch 0    lithium (ESKALITH) 300 MG capsule Take 1 capsule (300 mg total) by mouth 3 (three) times daily with meals. 270 capsule 1    LORazepam (ATIVAN) 0.5 MG tablet TAKE 2 TABLETS EVERY DAY AS DIRECTED 60 tablet 1    meclizine (ANTIVERT) 25 mg tablet Take 1 tablet (25 mg total) by mouth every 6 (six) hours as needed for Dizziness. 60 tablet 1    metFORMIN  (GLUCOPHAGE-XR) 500 MG ER 24hr tablet Take 1 tablet (500 mg total) by mouth 2 (two) times daily with meals. 60 tablet 5    multivitamin capsule Take 1 capsule by mouth 2 (two) times daily.       omeprazole (PRILOSEC) 40 MG capsule TAKE 1 CAPSULE TWICE DAILY BEFORE MEALS 180 capsule 3    ondansetron (ZOFRAN-ODT) 8 MG TbDL Take 1 tablet (8 mg total) by mouth 2 (two) times daily as needed (nausea). 21 tablet 0    phentermine (ADIPEX-P) 37.5 mg tablet Take 1 tablet (37.5 mg total) by mouth before breakfast. 30 tablet 2    potassium chloride (KLOR-CON) 10 MEQ TbSR Take 10 mEq by mouth 2 (two) times daily.      predniSONE (DELTASONE) 50 MG Tab Take 1 tablet (50 mg total) by mouth once daily. 5 tablet 0    pregabalin (LYRICA) 100 MG capsule Take 1 capsule (100 mg total) by mouth 2 (two) times daily. (Patient taking differently: Take 100 mg by mouth 2 (two) times daily as needed.) 60 capsule 5    promethazine (PHENERGAN) 12.5 MG Tab       promethazine (PHENERGAN) 25 MG tablet Take 25 mg by mouth.      promethazine-dextromethorphan (PROMETHAZINE-DM) 6.25-15 mg/5 mL Syrp Take 5 mLs by mouth nightly as needed (cough). 118 mL 0    risperiDONE (RISPERDAL) 0.5 MG Tab Take 1 tablet (0.5 mg total) by mouth every evening. 90 tablet 0    sertraline (ZOLOFT) 100 MG tablet Take 2 tablets (200 mg total) by mouth once daily. 180 tablet 1    tolterodine (DETROL LA) 4 MG 24 hr capsule Take 1 capsule (4 mg total) by mouth once daily. 90 capsule 3    topiramate (TOPAMAX) 200 MG Tab Take 1 tablet (200 mg total) by mouth once daily. 90 tablet 0     Current Facility-Administered Medications   Medication Dose Route Frequency Provider Last Rate Last Admin    sodium chloride 0.9% flush 10 mL  10 mL Intravenous PRN Lito Colmenares MD       [2]   Social History  Socioeconomic History    Marital status:    Tobacco Use    Smoking status: Every Day     Types: Vaping with nicotine    Smokeless tobacco: Current    Tobacco comments:     vaping  started April 2020; QUIT cigarettes Sept 2019   Substance and Sexual Activity    Alcohol use: No    Drug use: No    Sexual activity: Yes     Partners: Male     Birth control/protection: None     Social Drivers of Health     Financial Resource Strain: Low Risk  (2/10/2025)    Overall Financial Resource Strain (CARDIA)     Difficulty of Paying Living Expenses: Not hard at all   Food Insecurity: No Food Insecurity (2/10/2025)    Hunger Vital Sign     Worried About Running Out of Food in the Last Year: Never true     Ran Out of Food in the Last Year: Never true   Transportation Needs: No Transportation Needs (8/21/2024)    Received from [facility] - [facility]     Lack of Transportation (Medical): No     Lack of Transportation (Non-Medical): No   Physical Activity: Insufficiently Active (2/10/2025)    Exercise Vital Sign     Days of Exercise per Week: 2 days     Minutes of Exercise per Session: 20 min   Stress: Stress Concern Present (2/10/2025)    [employer] - Occupational Stress Questionnaire     Feeling of Stress : To some extent   Housing Stability: Unknown (2/10/2025)    Housing Stability Vital Sign     Unable to Pay for Housing in the Last Year: No     Homeless in the Last Year: No

## 2025-04-16 LAB — ANA (OHS): NORMAL

## 2025-04-17 LAB
ACE SERPL-CCNC: 30 U/L (ref 16–85)
SSA  ANTIBODY (OHS): 0.08 RATIO (ref 0–0.99)
SSA INTERPRETATION (OHS): NEGATIVE
SSB  ANTIBODY (OHS): 0.06 RATIO
SSB INTERPRETATION (OHS): NEGATIVE

## 2025-04-21 LAB — SOL IL2 RECEP SERPL-MCNC: 518 PG/ML

## 2025-04-22 DIAGNOSIS — G43.809 OTHER MIGRAINE WITHOUT STATUS MIGRAINOSUS, NOT INTRACTABLE: ICD-10-CM

## 2025-04-23 RX ORDER — TOPIRAMATE 200 MG/1
200 TABLET, FILM COATED ORAL DAILY
Qty: 90 TABLET | Refills: 0 | Status: SHIPPED | OUTPATIENT
Start: 2025-04-23

## 2025-04-23 NOTE — TELEPHONE ENCOUNTER
Refill Routing Note   Medication(s) are not appropriate for processing by [facility] for the following reason(s):        Outside of protocol    [facility] action(s):  Route               Appointments  past 12m or future 3m with PCP    Date Provider   Last Visit   3/24/2025 Shay Dai MD   Next Visit   6/30/2025 Shay Dai MD   ED visits in past 90 days: 0        Note composed:7:16 AM 04/23/2025

## 2025-04-24 DIAGNOSIS — F31.9 BIPOLAR 1 DISORDER: ICD-10-CM

## 2025-04-24 RX ORDER — RISPERIDONE 0.5 MG/1
0.5 TABLET ORAL NIGHTLY
Qty: 90 TABLET | Refills: 3 | OUTPATIENT
Start: 2025-04-24

## 2025-05-06 ENCOUNTER — OFFICE VISIT (OUTPATIENT)
Dept: FAMILY MEDICINE | Facility: CLINIC | Age: 55
End: 2025-05-06
Payer: MEDICARE

## 2025-05-06 ENCOUNTER — PATIENT MESSAGE (OUTPATIENT)
Dept: FAMILY MEDICINE | Facility: CLINIC | Age: 55
End: 2025-05-06

## 2025-05-06 VITALS
RESPIRATION RATE: 17 BRPM | BODY MASS INDEX: 44.93 KG/M2 | HEART RATE: 85 BPM | TEMPERATURE: 98 F | DIASTOLIC BLOOD PRESSURE: 86 MMHG | SYSTOLIC BLOOD PRESSURE: 116 MMHG | OXYGEN SATURATION: 97 % | WEIGHT: 279.56 LBS | HEIGHT: 66 IN

## 2025-05-06 DIAGNOSIS — J06.9 VIRAL URI WITH COUGH: Primary | ICD-10-CM

## 2025-05-06 DIAGNOSIS — E66.01 CLASS 3 SEVERE OBESITY DUE TO EXCESS CALORIES WITH SERIOUS COMORBIDITY AND BODY MASS INDEX (BMI) OF 45.0 TO 49.9 IN ADULT: ICD-10-CM

## 2025-05-06 DIAGNOSIS — E66.813 CLASS 3 SEVERE OBESITY DUE TO EXCESS CALORIES WITH SERIOUS COMORBIDITY AND BODY MASS INDEX (BMI) OF 45.0 TO 49.9 IN ADULT: ICD-10-CM

## 2025-05-06 DIAGNOSIS — J31.0 RHINITIS, UNSPECIFIED TYPE: ICD-10-CM

## 2025-05-06 DIAGNOSIS — F31.9 BIPOLAR 1 DISORDER: ICD-10-CM

## 2025-05-06 DIAGNOSIS — R09.81 NASAL CONGESTION: ICD-10-CM

## 2025-05-06 DIAGNOSIS — L30.4 INTERTRIGO: ICD-10-CM

## 2025-05-06 LAB
CTP QC/QA: YES
POC MOLECULAR INFLUENZA A AGN: NEGATIVE
POC MOLECULAR INFLUENZA B AGN: NEGATIVE

## 2025-05-06 PROCEDURE — 99999 PR PBB SHADOW E&M-EST. PATIENT-LVL V: CPT | Mod: PBBFAC,HCNC,, | Performed by: INTERNAL MEDICINE

## 2025-05-06 PROCEDURE — 87635 SARS-COV-2 COVID-19 AMP PRB: CPT | Mod: HCNC | Performed by: INTERNAL MEDICINE

## 2025-05-06 RX ORDER — ALBUTEROL SULFATE 90 UG/1
2 INHALANT RESPIRATORY (INHALATION) EVERY 4 HOURS PRN
Qty: 18 G | Refills: 2 | Status: SHIPPED | OUTPATIENT
Start: 2025-05-06

## 2025-05-06 RX ORDER — CLOTRIMAZOLE AND BETAMETHASONE DIPROPIONATE 10; .64 MG/G; MG/G
CREAM TOPICAL 2 TIMES DAILY
Qty: 45 G | Refills: 0 | Status: SHIPPED | OUTPATIENT
Start: 2025-05-06

## 2025-05-06 RX ORDER — CETIRIZINE HYDROCHLORIDE 10 MG/1
10 TABLET ORAL DAILY
Qty: 30 TABLET | Refills: 2 | Status: SHIPPED | OUTPATIENT
Start: 2025-05-06 | End: 2025-08-04

## 2025-05-06 RX ORDER — BENZONATATE 200 MG/1
200 CAPSULE ORAL 3 TIMES DAILY PRN
Qty: 20 CAPSULE | Refills: 0 | Status: SHIPPED | OUTPATIENT
Start: 2025-05-06 | End: 2025-05-16

## 2025-05-06 RX ORDER — GUAIFENESIN 600 MG/1
1200 TABLET, EXTENDED RELEASE ORAL 2 TIMES DAILY
Qty: 40 TABLET | Refills: 0 | Status: SHIPPED | OUTPATIENT
Start: 2025-05-06 | End: 2025-05-16

## 2025-05-06 NOTE — PROGRESS NOTES
"HISTORY OF PRESENT ILLNESS:  Zaira Dowell is a 54 y.o. female who presents to the clinic today for Cough and Nasal Congestion    Last seen by me 3/2025.    She reports upper respiratory symptoms that began  with congestion, productive cough, post nasal drip, and mild runny nose. She experiences feeling hot and cold without fever, headache, ear itching, occasional shortness of breath, and scratchy throat. She denies sinus pressure, significant body aches, or severe shortness of breath with activity.    She recently traveled to [place] to visit her mother who was subsequently diagnosed with bronchitis. She denies any other significant exposures.    She is taking Tylenol, Mucinex, Zyrtec, and Claritin for symptom management. She has an  albuterol inhaler from  of previous year. She has two months remaining of her first Adipex prescription.    She reports a two-week history of red, itchy heat rash along the panty line. She has been self-treating with Leona's buttock paste and corn starch powder without improvement.    Bipolar d/o  Seen by psychiatry last in 2025. On Zoloft, Wellbutrin, Lithium, prn Ativan    Obesity  Adipex was sent for obesity treatment.  She feels she is making progress in her efforts towards lifestyle management and weight reduction at this time.    Wt Readings from Last 3 Encounters:   25 1339 126.8 kg (279 lb 8.7 oz)   25 1104 128.4 kg (283 lb 1.1 oz)   25 1342 129.5 kg (285 lb 7.9 oz)       2024   Vitals - 1 value per visit    Weight (lb) 297.4       3/21/2023   Vitals - 1 value per visit    Weight (lb) 269.84      Estimated body mass index is 45.69 kg/m² as calculated from the following:    Height as of 25: 5' 6" (1.676 m).    Weight as of 25: 128.4 kg (283 lb 1.1 oz).    Hemoglobin A1C   Date Value Ref Range Status   2025 5.0 4.0 - 5.6 % Final     Comment:     ADA Screening Guidelines:  5.7-6.4%  Consistent with " prediabetes  >or=6.5%  Consistent with diabetes    High levels of fetal hemoglobin interfere with the HbA1C  assay. Heterozygous hemoglobin variants (HbS, HgC, etc)do  not significantly interfere with this assay.   However, presence of multiple variants may affect accuracy.     02/29/2024 4.9 4.0 - 5.6 % Final     Comment:     ADA Screening Guidelines:  5.7-6.4%  Consistent with prediabetes  >or=6.5%  Consistent with diabetes    High levels of fetal hemoglobin interfere with the HbA1C  assay. Heterozygous hemoglobin variants (HbS, HgC, etc)do  not significantly interfere with this assay.   However, presence of multiple variants may affect accuracy.     10/30/2023 5.0 4.0 - 5.6 % Final     Comment:     ADA Screening Guidelines:  5.7-6.4%  Consistent with prediabetes  >or=6.5%  Consistent with diabetes    High levels of fetal hemoglobin interfere with the HbA1C  assay. Heterozygous hemoglobin variants (HbS, HgC, etc)do  not significantly interfere with this assay.   However, presence of multiple variants may affect accuracy.             ROS:  General: -fever, -chills, -fatigue, -weight gain, -weight loss, +heat intolerance, +cold intolerance  Eyes: -vision changes, -redness, -discharge  ENT: -ear pain, +nasal congestion, -sore throat, +runny nose, +post nasal drip, +ear pruritus  Cardiovascular: -chest pain, -palpitations, -lower extremity edema  Respiratory: -cough, +shortness of breath, +productive cough  Gastrointestinal: -abdominal pain, -nausea, -vomiting, -diarrhea, -constipation, -blood in stool  Genitourinary: -dysuria, -hematuria, -frequency  Musculoskeletal: -joint pain, -muscle pain  Skin: +rash, -lesion, +skin redness  Neurological: +headache, -dizziness, -numbness, -tingling  Psychiatric: -anxiety, -depression, -sleep difficulty  Head: +head pain             PAST MEDICAL HISTORY:  Past Medical History:   Diagnosis Date    Anxiety     Arthritis     Asthma     Back pain     Bipolar I     BMI 50.0-59.9,  adult     Chronic obstructive pulmonary disease, unspecified COPD type 2022    Depression     Fibromyalgia     GERD (gastroesophageal reflux disease)     HFpEF     Insomnia 2021    Obesity     SHARON (obstructive sleep apnea)     Tobacco dependence     Urinary incontinence        PAST SURGICAL HISTORY:  Past Surgical History:   Procedure Laterality Date    CARPAL TUNNEL RELEASE       SECTION  1993    CHOLECYSTECTOMY      COLONOSCOPY N/A 2022    Procedure: COLONOSCOPY;  Surgeon: Ernesto Auguste MD;  Location: [facility] ENDO;  Service: Endoscopy;  Laterality: N/A;  fully vaccinated-GT    EPIDURAL STEROID INJECTION Bilateral 2023    Procedure: Bilateral L3, L4, L5 Medial Branch Blocks #1;  Surgeon: Lito Jasso Jr., MD;  Location: [facility] ENDO;  Service: Pain Management;  Laterality: Bilateral;  @1230  No ATC or DM    EPIDURAL STEROID INJECTION Bilateral 2023    Procedure: Bilateral L3, L4, L5 Medial Branch Blocks #2;  Surgeon: Lito Jasso Jr., MD;  Location: [facility] ENDO;  Service: Pain Management;  Laterality: Bilateral;  @1000  No ATC or DM    EPIDURAL STEROID INJECTION Left 2023    Procedure: Left L3, L4, L5 Radiofrequency Thermocoagulation of Medial Branches;  Surgeon: Lito Jasso Jr., MD;  Location: [facility] ENDO;  Service: Pain Management;  Laterality: Left;  @0815  No ATC or DM    EPIDURAL STEROID INJECTION Right 2023    Procedure: Right L3, L4, L5 Radiofrequency Thermocoagulation of Medial Branches;  Surgeon: Lito Jasso Jr., MD;  Location: [facility] ENDO;  Service: Pain Management;  Laterality: Right;  @0930  No ATC or DM  Last 1&75    ESOPHAGOGASTRODUODENOSCOPY N/A 2019    Procedure: EGD (ESOPHAGOGASTRODUODENOSCOPY);  Surgeon: Michelle Mc MD;  Location: [facility] [place];  Service: Endoscopy;  Laterality: N/A;    HYSTERECTOMY      partial     KNEE SURGERY      LAPAROSCOPIC SLEEVE GASTRECTOMY N/A 10/18/2019    Procedure: GASTRECTOMY,  SLEEVE, LAPAROSCOPIC, with intraop EGD;  Surgeon: Ean Kohli MD;  Location: [facility] OR [address] [place] [address];  Service: General;  Laterality: N/A;    LEFT HEART CATHETERIZATION Left 11/7/2023    Procedure: Left heart cath;  Surgeon: Lito Colmenares MD;  Location: [facility] CATH LAB;  Service: Cardiology;  Laterality: Left;  730am start, R rad access  RN PRE OP 11/2/23    pinched nerve      SHOULDER SURGERY      TONSILLECTOMY      WRIST SURGERY      had ganlin cyst       SOCIAL HISTORY:  Social History[1]    FAMILY HISTORY:  Family History   Problem Relation Name Age of Onset    Diabetes Mother Mother     Strafford's disease Mother Mother     Hypertension Mother Mother     Heart disease Father Father     Stroke Father Father     Mental illness Father Father         ptsd    Hypertension Father Father     Diabetes Father Father     Depression Father Father     Arthritis Father Father     No Known Problems Brother      Heart disease Maternal Grandmother Grandmother     Depression Maternal Grandmother Grandmother     COPD Maternal Grandfather Grandfather     Heart disease Maternal Grandfather Grandfather     Stroke Maternal Grandfather Grandfather     Kidney disease Paternal Grandmother Grandmother     Heart disease Paternal Grandmother Grandmother     Heart disease Paternal Grandfather Grandfather     Early death Paternal Grandfather Grandfather     Hypertension Son Son     Asthma Son Son     Rectal cancer Paternal Aunt      Asthma Maternal Aunt Aunt     Diabetes Maternal Aunt Aunt     Asthma Maternal Uncle Uncle        ALLERGIES AND MEDICATIONS: updated and reviewed.  Review of patient's allergies indicates:   Allergen Reactions    Hydrocodone-acetaminophen Nausea And Vomiting     Medication List with Changes/Refills   New Medications    BENZONATATE (TESSALON) 200 MG CAPSULE    Take 1 capsule (200 mg total) by mouth 3 (three) times daily as needed for Cough.    CLOTRIMAZOLE-BETAMETHASONE 1-0.05% (LOTRISONE) CREAM    Apply  topically 2 (two) times daily.    GUAIFENESIN (MUCINEX) 600 MG 12 HR TABLET    Take 2 tablets (1,200 mg total) by mouth 2 (two) times daily. for 10 days   Current Medications    ACETAMINOPHEN (TYLENOL) 500 MG TABLET    Take 1 tablet (500 mg total) by mouth every 4 (four) hours as needed for Pain or Temperature greater than (100.5 or greater).    ACETAZOLAMIDE (DIAMOX) 250 MG TABLET    Take 250 mg by mouth.    ALLOPURINOL (ZYLOPRIM) 100 MG TABLET    Take 1 tablet (100 mg total) by mouth once daily.    B COMPLEX VITAMINS TABLET    Take 1 tablet by mouth once daily.    BUPROPION (WELLBUTRIN SR) 100 MG TBSR 12 HR TABLET    Take 2 tabs q am, and 1 tab daily at noon    CALCIUM CITRATE (CALCITRATE) 200 MG (950 MG) TABLET    Take 1 tablet by mouth 2 (two) times daily.    COLCHICINE (COLCRYS) 0.6 MG TABLET    Take 1 tablet (0.6 mg total) by mouth daily as needed (gout flare).    CYANOCOBALAMIN 500 MCG TABLET    Take 500 mcg by mouth once daily.    CYCLOBENZAPRINE (FLEXERIL) 5 MG TABLET    Take 1 tablet (5 mg total) by mouth 3 (three) times daily as needed for Muscle spasms.    DICLOFENAC SODIUM (VOLTAREN) 1 % GEL    Apply 2 g topically 3 (three) times daily as needed (pain).    ESTRADIOL (ESTRACE) 1 MG TABLET    Take 1 tablet (1 mg total) by mouth once daily.    FERROUS FUMARATE/VIT BCOMP,C (SUPER B COMPLEX ORAL)    Take 1 capsule by mouth once daily.    FUROSEMIDE (LASIX) 20 MG TABLET    Take 1 tablet (20 mg total) by mouth daily as needed (leg swelling/fluid buildup).    LIDOCAINE (LIDODERM) 5 %    Place 1 patch onto the skin once daily. Remove & Discard patch within 12 hours or as directed by MD    LITHIUM (ESKALITH) 300 MG CAPSULE    Take 1 capsule (300 mg total) by mouth 3 (three) times daily with meals.    LORAZEPAM (ATIVAN) 0.5 MG TABLET    TAKE 2 TABLETS EVERY DAY AS DIRECTED    MECLIZINE (ANTIVERT) 25 MG TABLET    Take 1 tablet (25 mg total) by mouth every 6 (six) hours as needed for Dizziness.    METFORMIN  (GLUCOPHAGE-XR) 500 MG ER 24HR TABLET    Take 1 tablet (500 mg total) by mouth 2 (two) times daily with meals.    MULTIVITAMIN CAPSULE    Take 1 capsule by mouth 2 (two) times daily.     OMEPRAZOLE (PRILOSEC) 40 MG CAPSULE    TAKE 1 CAPSULE TWICE DAILY BEFORE MEALS    ONDANSETRON (ZOFRAN-ODT) 8 MG TBDL    Take 1 tablet (8 mg total) by mouth 2 (two) times daily as needed (nausea).    PHENTERMINE (ADIPEX-P) 37.5 MG TABLET    Take 1 tablet (37.5 mg total) by mouth before breakfast.    POTASSIUM CHLORIDE (KLOR-CON) 10 MEQ TBSR    Take 10 mEq by mouth 2 (two) times daily.    PREDNISONE (DELTASONE) 50 MG TAB    Take 1 tablet (50 mg total) by mouth once daily.    PREGABALIN (LYRICA) 100 MG CAPSULE    Take 1 capsule (100 mg total) by mouth 2 (two) times daily.    PROMETHAZINE (PHENERGAN) 12.5 MG TAB        PROMETHAZINE (PHENERGAN) 25 MG TABLET    Take 25 mg by mouth.    PROMETHAZINE-DEXTROMETHORPHAN (PROMETHAZINE-DM) 6.25-15 MG/5 ML SYRP    Take 5 mLs by mouth nightly as needed (cough).    RISPERIDONE (RISPERDAL) 0.5 MG TAB    Take 1 tablet (0.5 mg total) by mouth every evening.    SERTRALINE (ZOLOFT) 100 MG TABLET    Take 2 tablets (200 mg total) by mouth once daily.    TOLTERODINE (DETROL LA) 4 MG 24 HR CAPSULE    Take 1 capsule (4 mg total) by mouth once daily.    TOPIRAMATE (TOPAMAX) 200 MG TAB    Take 1 tablet (200 mg total) by mouth once daily.   Changed and/or Refilled Medications    Modified Medication Previous Medication    ALBUTEROL (PROVENTIL/VENTOLIN HFA) 90 MCG/ACTUATION INHALER albuterol (PROVENTIL/VENTOLIN HFA) 90 mcg/actuation inhaler       Inhale 2 puffs into the lungs every 4 (four) hours as needed for Wheezing or Shortness of Breath. Rescue    Inhale 2 puffs into the lungs every 4 (four) hours as needed for Wheezing or Shortness of Breath. Rescue    CETIRIZINE (ZYRTEC) 10 MG TABLET cetirizine (ZYRTEC) 10 MG tablet       Take 1 tablet (10 mg total) by mouth once daily.    Take 1 tablet (10 mg total) by  "mouth once daily.          CARE TEAM:  Patient Care Team:  Shay Dai MD as PCP - General (Internal Medicine)  Delvin Nagy Jr., MD (Inactive) as Consulting Physician (Psychiatry)  José Hernandez MD as Consulting Physician (Endocrinology)  Brittaney Brandon NP (Inactive) as Nurse Practitioner (Urology)  Svetlana Nelson LPN as Care Coordinator  Nick Pearce as ED Navigator         PHYSICAL EXAM:   Vitals:    05/06/25 1339   BP: 116/86   Pulse: 85   Resp: 17   Temp: 98.3 °F (36.8 °C)     Weight: 126.8 kg (279 lb 8.7 oz)   Height: 5' 6" (167.6 cm)   Body mass index is 45.12 kg/m².    Physical Exam    General: No acute distress. Well-developed. Well-nourished.  Eyes: EOMI. Sclerae anicteric.  HENT: Normocephalic. Atraumatic. Nares patent. Moist oral mucosa.  Ears: Bilateral TMs clear. Bilateral EACs clear. Little bit of fullness in left ear.  Cardiovascular: Regular rate. Regular rhythm. No murmurs. No rubs. No gallops. Normal S1, S2.  Respiratory: Normal respiratory effort. Clear to auscultation bilaterally. No rales. No rhonchi. No wheezing. Congested lower airway.  Musculoskeletal: No  obvious deformity.  Extremities: No lower extremity edema.  Neurological: Alert & oriented x3. No slurred speech. Normal gait.  Psychiatric: Normal mood. Normal affect. Good insight. Good judgment.  Skin: Warm. Dry. No rash.             ASSESSMENT AND PLAN:  IMPRESSION:  - Assessed symptoms as likely viral respiratory infection.  - Evaluated skin rash in panty line area, suspecting fungal infection due to moisture in skin folds.  - Continued effectiveness of Adipex for weight loss progress noted.    Viral URI with cough  Nasal congestion  Rhinitis, unspecified type  -     COVID-19 Routine Screening  -     POCT Influenza A/B Molecular - NEGATIVE  -     albuterol (PROVENTIL/VENTOLIN HFA) 90 mcg/actuation inhaler; Inhale 2 puffs into the lungs every 4 (four) hours as needed for Wheezing or Shortness of Breath. " Rescue  Dispense: 18 g; Refill: 2  -     guaiFENesin (MUCINEX) 600 mg 12 hr tablet; Take 2 tablets (1,200 mg total) by mouth 2 (two) times daily. for 10 days  Dispense: 40 tablet; Refill: 0  -     benzonatate (TESSALON) 200 MG capsule; Take 1 capsule (200 mg total) by mouth 3 (three) times daily as needed for Cough.  Dispense: 20 capsule; Refill: 0  -     cetirizine (ZYRTEC) 10 MG tablet; Take 1 tablet (10 mg total) by mouth once daily.  Dispense: 30 tablet; Refill: 2  - Zaira presents with cough, nasal congestion, post-nasal drip, and headache for 2-3 days.  - Exam shows slight ear fullness from sinus pressure and mild lower airway congestion, consistent with viral respiratory infection.  - COVID-19 and Influenza rapid tests ordered.  - Prescribed Mucinex 1200 mg BID for 7-10 days, Tessalon Perles for cough suppression, Zyrtec for rhinorrhea/congestion, and albuterol inhaler PRN for respiratory symptoms.  - Antibiotics deferred at this time.  - Advised supportive care including rest, adequate hydration, and discontinuation of vaping to aid recovery.    Intertrigo  -     clotrimazole-betamethasone 1-0.05% (LOTRISONE) cream; Apply topically 2 (two) times daily.  Dispense: 45 g; Refill: 0  - Started antifungal medication with steroid combination to be applied twice daily for 7-10 days to affected skin fold area.  - Educated patient on keeping skin folds dry to prevent fungal infections, including thorough drying after bathing and managing sweating to prevent moisture buildup.     Bipolar 1 disorder  - Stable on current medical management.    Class 3 severe obesity due to excess calories with serious comorbidity and body mass index (BMI) of 45.0 to 49.9 in adult  -Positive encouragement provided regarding progress with weight loss at this time.  To complete total 3 months of phentermine.  Will follow up with me next month.       Follow up 2 weeks if not better or sooner as needed.    This note was generated with the  assistance of ambient listening technology. Verbal consent was obtained by the patient and accompanying visitor(s) for the recording of patient appointment to facilitate this note. I attest to having reviewed and edited the generated note for accuracy, though some syntax or spelling errors may persist. Please contact the author of this note for any clarification.         [1]   Social History  Socioeconomic History    Marital status:    Tobacco Use    Smoking status: Every Day     Types: Vaping with nicotine    Smokeless tobacco: Current    Tobacco comments:     vaping started April 2020; QUIT cigarettes Sept 2019   Substance and Sexual Activity    Alcohol use: No    Drug use: No    Sexual activity: Yes     Partners: Male     Birth control/protection: None     Social Drivers of Health     Financial Resource Strain: Low Risk  (2/10/2025)    Overall Financial Resource Strain (CARDIA)     Difficulty of Paying Living Expenses: Not hard at all   Food Insecurity: No Food Insecurity (2/10/2025)    Hunger Vital Sign     Worried About Running Out of Food in the Last Year: Never true     Ran Out of Food in the Last Year: Never true   Transportation Needs: No Transportation Needs (8/21/2024)    Received from [facility]     Lack of Transportation (Medical): No     Lack of Transportation (Non-Medical): No   Physical Activity: Insufficiently Active (2/10/2025)    Exercise Vital Sign     Days of Exercise per Week: 2 days     Minutes of Exercise per Session: 20 min   Stress: Stress Concern Present (2/10/2025)    [employer] [place] - Occupational Stress Questionnaire     Feeling of Stress : To some extent   Housing Stability: Unknown (2/10/2025)    Housing Stability Vital Sign     Unable to Pay for Housing in the Last Year: No     Homeless in the Last Year: No

## 2025-05-07 ENCOUNTER — RESULTS FOLLOW-UP (OUTPATIENT)
Dept: FAMILY MEDICINE | Facility: CLINIC | Age: 55
End: 2025-05-07

## 2025-05-07 LAB — SARS-COV-2 RNA RESP QL NAA+PROBE: NEGATIVE

## 2025-05-09 ENCOUNTER — OFFICE VISIT (OUTPATIENT)
Dept: FAMILY MEDICINE | Facility: CLINIC | Age: 55
End: 2025-05-09
Payer: MEDICARE

## 2025-05-09 ENCOUNTER — RESULTS FOLLOW-UP (OUTPATIENT)
Dept: FAMILY MEDICINE | Facility: CLINIC | Age: 55
End: 2025-05-09

## 2025-05-09 ENCOUNTER — APPOINTMENT (OUTPATIENT)
Dept: RADIOLOGY | Facility: HOSPITAL | Age: 55
End: 2025-05-09
Payer: MEDICARE

## 2025-05-09 VITALS
SYSTOLIC BLOOD PRESSURE: 124 MMHG | TEMPERATURE: 99 F | BODY MASS INDEX: 45.03 KG/M2 | DIASTOLIC BLOOD PRESSURE: 78 MMHG | WEIGHT: 280.19 LBS | HEIGHT: 66 IN | HEART RATE: 85 BPM | OXYGEN SATURATION: 98 %

## 2025-05-09 DIAGNOSIS — J20.9 ACUTE BRONCHITIS, UNSPECIFIED ORGANISM: ICD-10-CM

## 2025-05-09 DIAGNOSIS — G47.33 OSA (OBSTRUCTIVE SLEEP APNEA): ICD-10-CM

## 2025-05-09 DIAGNOSIS — R09.81 NASAL CONGESTION: ICD-10-CM

## 2025-05-09 DIAGNOSIS — J20.9 ACUTE BRONCHITIS, UNSPECIFIED ORGANISM: Primary | ICD-10-CM

## 2025-05-09 DIAGNOSIS — J45.20 MILD INTERMITTENT ASTHMA WITHOUT COMPLICATION: ICD-10-CM

## 2025-05-09 PROCEDURE — 71046 X-RAY EXAM CHEST 2 VIEWS: CPT | Mod: 26,HCNC,, | Performed by: RADIOLOGY

## 2025-05-09 PROCEDURE — 99999 PR PBB SHADOW E&M-EST. PATIENT-LVL V: CPT | Mod: PBBFAC,HCNC,,

## 2025-05-09 PROCEDURE — 71046 X-RAY EXAM CHEST 2 VIEWS: CPT | Mod: TC,HCNC,FY,PN

## 2025-05-09 RX ORDER — DOXYCYCLINE 100 MG/1
100 CAPSULE ORAL EVERY 12 HOURS
Qty: 14 CAPSULE | Refills: 0 | Status: SHIPPED | OUTPATIENT
Start: 2025-05-09 | End: 2025-05-16

## 2025-05-09 RX ORDER — FLUTICASONE PROPIONATE 50 MCG
1 SPRAY, SUSPENSION (ML) NASAL DAILY
Qty: 16 ML | Refills: 3 | Status: SHIPPED | OUTPATIENT
Start: 2025-05-09

## 2025-05-09 NOTE — PATIENT INSTRUCTIONS
- OR     - Coricidin can be taken once in the morning and once in the PM. It's okay to take with the mucinex and tessalon perles  - Daily antihistamines, such as Allegra, Zyrtec, or Claritin over-the-counter for nasal congestion   - Tessalon perles three times a day (breakfast, lunch, dinner) to help suppress the cough    - Mucinex helps thin out the mucus and helps you cough it up. You can take once in the morning and once at night time.

## 2025-05-09 NOTE — PROGRESS NOTES
HPI     Zaira Dowell is a 54 y.o. female with multiple medical diagnoses as listed in the medical history and problem list that presents for   Chief Complaint   Patient presents with    Follow-up     Pt c/o cough and chest congestion.       HPI  Pt presents today for URI f/u. She saw PCP 2 days ago for same symptoms, however, feels like her sxs have been worsening. She's endorsing productive cough w/green mucus, mild nasal congestion, worsening chest congestion, mild wheezing, sore throat, headaches, chest wall pain and tightness w/every coughing fits. She feels like cough is worse at night time. She has a hx of asthma and SHARON though does not use CPAP, and is a daily vape user. She's been taking the Mucinex, Zyrtec, Tessalon perles, not much relief. Notes that she was not able to tolerate the promethazine cough syrup int he past. Has not used the albuterol inhaler.  Denies facial pain/pressure, fevers, chills      Assessment & Plan     1. Acute bronchitis, unspecified organism  - Given patient's worsening sxs, hx of asthma, daily vape smoking, will presumptively treat w/doxycycline. No wheezing on exam, so will hold off on steroid treatment. Advised patient on using her Albuterol inhaler at home PRN shortness of breath/wheezing. Take CXR today to r/o infectious process    - doxycycline (VIBRAMYCIN) 100 MG Cap; Take 1 capsule (100 mg total) by mouth every 12 (twelve) hours. for 7 days  Dispense: 14 capsule; Refill: 0  - X-Ray Chest PA And Lateral; Future    2. Mild intermittent asthma without complication  - Given patient's worsening sxs, hx of asthma, daily vape smoking, will presumptively treat w/doxycycline. No wheezing on exam, so will hold off on steroid treatment. Advised patient on using her Albuterol inhaler at home PRN shortness of breath/wheezing. Take CXR today to r/o infectious process    3. Nasal congestion  - Pt advised on daily antihistamine, such as Claritin, Zyrtec, or Allegra. Provided proper  instructions of utilizing nasal spray.     - fluticasone propionate (FLONASE) 50 mcg/actuation nasal spray; 1 spray (50 mcg total) by Each Nostril route once daily.  Dispense: 16 mL; Refill: 3    4. SHARON (obstructive sleep apnea)  - Pt to remain compliance w/using her cpap machine    --------------------------------------------    Health Maintenance         Date Due Completion Date    Mammogram 01/29/2026 1/29/2025    Colorectal Cancer Screening 04/25/2027 4/25/2022    Pneumococcal Vaccines (Age 50+) (3 of 3 - PCV20 or PCV21) 06/28/2027 6/28/2022    Hemoglobin A1c (Diabetic Prevention Screening) 02/07/2028 2/7/2025    Lipid Panel 02/07/2030 2/7/2025    TETANUS VACCINE 01/27/2033 1/27/2023    RSV Vaccine (Age 60+ and Pregnant patients) (1 - 1-dose 75+ series) 11/24/2046 ---            Health maintenance reviewed    Follow Up:  Follow up if symptoms worsen or fail to improve.    Exam     Review of Systems:  (as noted above)  Review of Systems   Constitutional:  Positive for fatigue. Negative for chills and fever.   HENT:  Positive for congestion and sore throat. Negative for sinus pressure, sinus pain and trouble swallowing.    Respiratory:  Positive for cough, chest tightness, shortness of breath (w/coughing) and wheezing.    Cardiovascular:  Negative for chest pain and palpitations.   Gastrointestinal:  Negative for diarrhea, nausea and vomiting.   Musculoskeletal:  Negative for myalgias.   Neurological:  Positive for dizziness and headaches. Negative for weakness and light-headedness.       Physical Exam  Constitutional:       Appearance: Normal appearance. She is not ill-appearing.   HENT:      Head: Normocephalic and atraumatic.      Right Ear: No middle ear effusion. Tympanic membrane is not injected, retracted or bulging.      Left Ear: A middle ear effusion is present. Tympanic membrane is injected. Tympanic membrane is not retracted or bulging.      Nose:      Right Turbinates: Swollen and pale.      Left  "Turbinates: Swollen and pale.      Mouth/Throat:      Pharynx: Posterior oropharyngeal erythema present.      Tonsils: No tonsillar exudate.   Eyes:      Extraocular Movements: Extraocular movements intact.      Conjunctiva/sclera: Conjunctivae normal.   Cardiovascular:      Rate and Rhythm: Normal rate and regular rhythm.      Pulses: Normal pulses.      Heart sounds: Normal heart sounds. No murmur heard.     No friction rub. No gallop.   Pulmonary:      Effort: Pulmonary effort is normal. No respiratory distress.      Breath sounds: Normal breath sounds. No wheezing, rhonchi or rales.   Lymphadenopathy:      Cervical: No cervical adenopathy.   Skin:     General: Skin is warm and dry.      Capillary Refill: Capillary refill takes less than 2 seconds.   Neurological:      General: No focal deficit present.      Mental Status: She is alert and oriented to person, place, and time.   Psychiatric:         Mood and Affect: Mood normal.         Behavior: Behavior normal.       Vitals:    05/09/25 0823   BP: 124/78   Pulse: 85   Temp: 98.5 °F (36.9 °C)   TempSrc: Oral   SpO2: 98%   Weight: 127.1 kg (280 lb 3.3 oz)   Height: 5' 6" (1.676 m)      Body mass index is 45.23 kg/m².        History     Past Medical History:   Diagnosis Date    Anxiety     Arthritis     Asthma     Back pain     Bipolar I     BMI 50.0-59.9, adult     Chronic obstructive pulmonary disease, unspecified COPD type 03/21/2022    Depression     Fibromyalgia     GERD (gastroesophageal reflux disease)     HFpEF     Insomnia 11/23/2021    Obesity     SHARON (obstructive sleep apnea)     Tobacco dependence     Urinary incontinence        Family History   Problem Relation Name Age of Onset    Diabetes Mother Mother     [place]'s disease Mother Mother     Hypertension Mother Mother     Heart disease Father Father     Stroke Father Father     Mental illness Father Father         ptsd    Hypertension Father Father     Diabetes Father Father     Depression Father " Father     Arthritis Father Father     No Known Problems Brother      Heart disease Maternal Grandmother Grandmother     Depression Maternal Grandmother Grandmother     COPD Maternal Grandfather Grandfather     Heart disease Maternal Grandfather Grandfather     Stroke Maternal Grandfather Grandfather     Kidney disease Paternal Grandmother Grandmother     Heart disease Paternal Grandmother Grandmother     Heart disease Paternal Grandfather Grandfather     Early death Paternal Grandfather Grandfather     Hypertension Son Son     Asthma Son Son     Rectal cancer Paternal Aunt      Asthma Maternal Aunt Aunt     Diabetes Maternal Aunt Aunt     Asthma Maternal Uncle Uncle        Allergies and Medications: (updated and reviewed)  Review of patient's allergies indicates:   Allergen Reactions    Hydrocodone-acetaminophen Nausea And Vomiting     Current Medications[1]    Patient Care Team:  Shay Dai MD as PCP - General (Internal Medicine)  Delvin Nagy Jr., MD (Inactive) as Consulting Physician (Psychiatry)  José Hernandez MD as Consulting Physician (Endocrinology)  Brittaney Brandon NP (Inactive) as Nurse Practitioner (Urology)  Svetlana Nelson LPN as Care Coordinator  Nick Pearce as ED Navigator         - The patient is given an After Visit Summary that lists all medications with directions, allergies, education, orders placed during this encounter and follow-up instructions.      - I have reviewed the patient's medical information including past medical and family history sections including the medications and allergies.      - We discussed the patient's current medications.   This note was generated with the assistance of ambient listening technology. Verbal consent was obtained by the patient and accompanying visitor(s) for the recording of patient appointment to facilitate this note. I attest to having reviewed and edited the generated note for accuracy, though some syntax or spelling errors  may persist. Please contact the author of this note for any clarification.          Krish Babb NP                      [1]   Current Outpatient Medications   Medication Sig Dispense Refill    acetaminophen (TYLENOL) 500 MG tablet Take 1 tablet (500 mg total) by mouth every 4 (four) hours as needed for Pain or Temperature greater than (100.5 or greater). 30 tablet 0    acetaZOLAMIDE (DIAMOX) 250 MG tablet Take 250 mg by mouth.      albuterol (PROVENTIL/VENTOLIN HFA) 90 mcg/actuation inhaler Inhale 2 puffs into the lungs every 4 (four) hours as needed for Wheezing or Shortness of Breath. Rescue 18 g 2    allopurinoL (ZYLOPRIM) 100 MG tablet Take 1 tablet (100 mg total) by mouth once daily. 90 tablet 1    b complex vitamins tablet Take 1 tablet by mouth once daily.      benzonatate (TESSALON) 200 MG capsule Take 1 capsule (200 mg total) by mouth 3 (three) times daily as needed for Cough. 20 capsule 0    buPROPion (WELLBUTRIN SR) 100 MG TBSR 12 hr tablet Take 2 tabs q am, and 1 tab daily at noon 270 tablet 1    calcium citrate (CALCITRATE) 200 mg (950 mg) tablet Take 1 tablet by mouth 2 (two) times daily.      cetirizine (ZYRTEC) 10 MG tablet Take 1 tablet (10 mg total) by mouth once daily. 30 tablet 2    clotrimazole-betamethasone 1-0.05% (LOTRISONE) cream Apply topically 2 (two) times daily. 45 g 0    cyanocobalamin 500 MCG tablet Take 500 mcg by mouth once daily.      cyclobenzaprine (FLEXERIL) 5 MG tablet Take 1 tablet (5 mg total) by mouth 3 (three) times daily as needed for Muscle spasms. 60 tablet 6    diclofenac sodium (VOLTAREN) 1 % Gel Apply 2 g topically 3 (three) times daily as needed (pain). 100 g 0    estradioL (ESTRACE) 1 MG tablet Take 1 tablet (1 mg total) by mouth once daily. 90 tablet 3    ferrous fumarate/vit Bcomp,C (SUPER B COMPLEX ORAL) Take 1 capsule by mouth once daily.      guaiFENesin (MUCINEX) 600 mg 12 hr tablet Take 2 tablets (1,200 mg total) by mouth 2 (two) times daily. for 10 days  40 tablet 0    LIDOcaine (LIDODERM) 5 % Place 1 patch onto the skin once daily. Remove & Discard patch within 12 hours or as directed by MD 14 patch 0    LORazepam (ATIVAN) 0.5 MG tablet TAKE 2 TABLETS EVERY DAY AS DIRECTED 60 tablet 1    meclizine (ANTIVERT) 25 mg tablet Take 1 tablet (25 mg total) by mouth every 6 (six) hours as needed for Dizziness. 60 tablet 1    metFORMIN (GLUCOPHAGE-XR) 500 MG ER 24hr tablet Take 1 tablet (500 mg total) by mouth 2 (two) times daily with meals. 60 tablet 5    multivitamin capsule Take 1 capsule by mouth 2 (two) times daily.       omeprazole (PRILOSEC) 40 MG capsule TAKE 1 CAPSULE TWICE DAILY BEFORE MEALS 180 capsule 3    ondansetron (ZOFRAN-ODT) 8 MG TbDL Take 1 tablet (8 mg total) by mouth 2 (two) times daily as needed (nausea). 21 tablet 0    phentermine (ADIPEX-P) 37.5 mg tablet Take 1 tablet (37.5 mg total) by mouth before breakfast. 30 tablet 2    potassium chloride (KLOR-CON) 10 MEQ TbSR Take 10 mEq by mouth 2 (two) times daily.      predniSONE (DELTASONE) 50 MG Tab Take 1 tablet (50 mg total) by mouth once daily. 5 tablet 0    promethazine (PHENERGAN) 12.5 MG Tab       promethazine (PHENERGAN) 25 MG tablet Take 25 mg by mouth.      sertraline (ZOLOFT) 100 MG tablet Take 2 tablets (200 mg total) by mouth once daily. 180 tablet 1    tolterodine (DETROL LA) 4 MG 24 hr capsule Take 1 capsule (4 mg total) by mouth once daily. 90 capsule 3    topiramate (TOPAMAX) 200 MG Tab Take 1 tablet (200 mg total) by mouth once daily. 90 tablet 0    colchicine (COLCRYS) 0.6 mg tablet Take 1 tablet (0.6 mg total) by mouth daily as needed (gout flare). 30 tablet 0    doxycycline (VIBRAMYCIN) 100 MG Cap Take 1 capsule (100 mg total) by mouth every 12 (twelve) hours. for 7 days 14 capsule 0    fluticasone propionate (FLONASE) 50 mcg/actuation nasal spray 1 spray (50 mcg total) by Each Nostril route once daily. 16 mL 3    furosemide (LASIX) 20 MG tablet Take 1 tablet (20 mg total) by mouth  daily as needed (leg swelling/fluid buildup). 60 tablet 0    lithium (ESKALITH) 300 MG capsule Take 1 capsule (300 mg total) by mouth 3 (three) times daily with meals. 270 capsule 1    pregabalin (LYRICA) 100 MG capsule Take 1 capsule (100 mg total) by mouth 2 (two) times daily. (Patient taking differently: Take 100 mg by mouth 2 (two) times daily as needed.) 60 capsule 5    risperiDONE (RISPERDAL) 0.5 MG Tab Take 1 tablet (0.5 mg total) by mouth every evening. 90 tablet 0     Current Facility-Administered Medications   Medication Dose Route Frequency Provider Last Rate Last Admin    sodium chloride 0.9% flush 10 mL  10 mL Intravenous PRN Lito Colmenares MD

## 2025-05-11 ENCOUNTER — PATIENT MESSAGE (OUTPATIENT)
Dept: PSYCHIATRY | Facility: CLINIC | Age: 55
End: 2025-05-11
Payer: MEDICARE

## 2025-06-18 ENCOUNTER — OFFICE VISIT (OUTPATIENT)
Dept: FAMILY MEDICINE | Facility: CLINIC | Age: 55
End: 2025-06-18
Payer: MEDICARE

## 2025-06-18 ENCOUNTER — RESULTS FOLLOW-UP (OUTPATIENT)
Dept: FAMILY MEDICINE | Facility: CLINIC | Age: 55
End: 2025-06-18

## 2025-06-18 ENCOUNTER — TELEPHONE (OUTPATIENT)
Dept: RHEUMATOLOGY | Facility: CLINIC | Age: 55
End: 2025-06-18
Payer: MEDICARE

## 2025-06-18 ENCOUNTER — LAB VISIT (OUTPATIENT)
Dept: LAB | Facility: HOSPITAL | Age: 55
End: 2025-06-18
Attending: INTERNAL MEDICINE
Payer: MEDICARE

## 2025-06-18 VITALS
HEIGHT: 66 IN | TEMPERATURE: 98 F | SYSTOLIC BLOOD PRESSURE: 124 MMHG | DIASTOLIC BLOOD PRESSURE: 80 MMHG | RESPIRATION RATE: 16 BRPM | HEART RATE: 80 BPM | WEIGHT: 277.56 LBS | BODY MASS INDEX: 44.61 KG/M2 | OXYGEN SATURATION: 97 %

## 2025-06-18 DIAGNOSIS — M25.522 LEFT ELBOW PAIN: ICD-10-CM

## 2025-06-18 DIAGNOSIS — R63.5 WEIGHT GAIN DUE TO MEDICATION: ICD-10-CM

## 2025-06-18 DIAGNOSIS — T50.905A WEIGHT GAIN DUE TO MEDICATION: ICD-10-CM

## 2025-06-18 DIAGNOSIS — B35.1 ONYCHOMYCOSIS OF GREAT TOE: ICD-10-CM

## 2025-06-18 DIAGNOSIS — E66.813 CLASS 3 SEVERE OBESITY DUE TO EXCESS CALORIES WITH SERIOUS COMORBIDITY AND BODY MASS INDEX (BMI) OF 45.0 TO 49.9 IN ADULT: Primary | ICD-10-CM

## 2025-06-18 DIAGNOSIS — G47.33 OSA (OBSTRUCTIVE SLEEP APNEA): ICD-10-CM

## 2025-06-18 DIAGNOSIS — E66.01 CLASS 3 SEVERE OBESITY DUE TO EXCESS CALORIES WITH SERIOUS COMORBIDITY AND BODY MASS INDEX (BMI) OF 45.0 TO 49.9 IN ADULT: Primary | ICD-10-CM

## 2025-06-18 DIAGNOSIS — Z98.84 HISTORY OF BARIATRIC SURGERY: ICD-10-CM

## 2025-06-18 LAB
ALBUMIN SERPL BCP-MCNC: 3.8 G/DL (ref 3.5–5.2)
ALP SERPL-CCNC: 63 UNIT/L (ref 40–150)
ALT SERPL W/O P-5'-P-CCNC: 19 UNIT/L (ref 10–44)
ANION GAP (OHS): 7 MMOL/L (ref 8–16)
AST SERPL-CCNC: 13 UNIT/L (ref 11–45)
BILIRUB SERPL-MCNC: 0.4 MG/DL (ref 0.1–1)
BUN SERPL-MCNC: 8 MG/DL (ref 6–20)
CALCIUM SERPL-MCNC: 9.5 MG/DL (ref 8.7–10.5)
CHLORIDE SERPL-SCNC: 115 MMOL/L (ref 95–110)
CO2 SERPL-SCNC: 19 MMOL/L (ref 23–29)
CREAT SERPL-MCNC: 0.9 MG/DL (ref 0.5–1.4)
GFR SERPLBLD CREATININE-BSD FMLA CKD-EPI: >60 ML/MIN/1.73/M2
GLUCOSE SERPL-MCNC: 87 MG/DL (ref 70–110)
POTASSIUM SERPL-SCNC: 3.7 MMOL/L (ref 3.5–5.1)
PROT SERPL-MCNC: 7.6 GM/DL (ref 6–8.4)
SODIUM SERPL-SCNC: 141 MMOL/L (ref 136–145)

## 2025-06-18 PROCEDURE — 3008F BODY MASS INDEX DOCD: CPT | Mod: CPTII,HCNC,S$GLB, | Performed by: INTERNAL MEDICINE

## 2025-06-18 PROCEDURE — 1159F MED LIST DOCD IN RCRD: CPT | Mod: CPTII,HCNC,S$GLB, | Performed by: INTERNAL MEDICINE

## 2025-06-18 PROCEDURE — 99214 OFFICE O/P EST MOD 30 MIN: CPT | Mod: HCNC,S$GLB,, | Performed by: INTERNAL MEDICINE

## 2025-06-18 PROCEDURE — 3079F DIAST BP 80-89 MM HG: CPT | Mod: CPTII,HCNC,S$GLB, | Performed by: INTERNAL MEDICINE

## 2025-06-18 PROCEDURE — 36415 COLL VENOUS BLD VENIPUNCTURE: CPT | Mod: HCNC,PN

## 2025-06-18 PROCEDURE — 99999 PR PBB SHADOW E&M-EST. PATIENT-LVL V: CPT | Mod: PBBFAC,HCNC,, | Performed by: INTERNAL MEDICINE

## 2025-06-18 PROCEDURE — 3074F SYST BP LT 130 MM HG: CPT | Mod: CPTII,HCNC,S$GLB, | Performed by: INTERNAL MEDICINE

## 2025-06-18 PROCEDURE — 84295 ASSAY OF SERUM SODIUM: CPT | Mod: HCNC

## 2025-06-18 PROCEDURE — 1160F RVW MEDS BY RX/DR IN RCRD: CPT | Mod: CPTII,HCNC,S$GLB, | Performed by: INTERNAL MEDICINE

## 2025-06-18 PROCEDURE — 3044F HG A1C LEVEL LT 7.0%: CPT | Mod: CPTII,HCNC,S$GLB, | Performed by: INTERNAL MEDICINE

## 2025-06-18 RX ORDER — TERBINAFINE HYDROCHLORIDE 250 MG/1
250 TABLET ORAL DAILY
Qty: 30 TABLET | Refills: 3 | Status: SHIPPED | OUTPATIENT
Start: 2025-06-18 | End: 2025-10-16

## 2025-06-18 RX ORDER — TIRZEPATIDE 5 MG/.5ML
5 INJECTION, SOLUTION SUBCUTANEOUS
Qty: 2 ML | Refills: 2 | Status: SHIPPED | OUTPATIENT
Start: 2025-07-17 | End: 2025-10-09

## 2025-06-18 RX ORDER — TIRZEPATIDE 2.5 MG/.5ML
2.5 INJECTION, SOLUTION SUBCUTANEOUS
Qty: 2 ML | Refills: 0 | Status: SHIPPED | OUTPATIENT
Start: 2025-06-18 | End: 2025-07-16

## 2025-06-18 NOTE — TELEPHONE ENCOUNTER
I returned patients call she wanted to move her appointment back and I explained that the next one would be 4 months, so I just switched her appointment to virtual.   
Self

## 2025-06-18 NOTE — PROGRESS NOTES
HISTORY OF PRESENT ILLNESS:  Zaira Dowell is a 54 y.o. female who presents to the clinic today for bariatric (Est patient with bariatric follow up )    Last seen by me 5/2025.    She currently weighs 277 lbs, 9 ounces. She is on Metformin 500 mg twice daily (started August 2024) and phentermine 37.5 mg daily (started April 2025 for a three-month period). She reports feeling fine on medications with no side effects. She also takes topiramate for migraines, which additionally assists with weight management. Her previously insurance did not cover injectable weight loss medications in 2024.    She reports ongoing left elbow pain. The condition is not improving and continues to cause discomfort. She can flex and extend the elbow but experiences pain during movement. She underwent what she believes was an ulnar nerve release and carpal tunnel surgery in October performed by Dr. Mendez. She currently manages pain with Tylenol. She denies recent falls or direct injuries to the elbow and reports no radiation of pain, numbness, or tingling into the hand.    She reports chronic toenail fungus affecting the right great toe for over 15 years. She describes the toenail as crumbling and the toe as swollen. She has previously had the toenail removed twice, approximately 15+ years ago. She has been applying OTC treatments without improvement. The condition is isolated to the right great toe with no involvement of other toes. She expresses frustration with the persistent nature of the toenail fungus and seeks definitive treatment. She saw podiatrist Dr. Elise in May 2024 for foot pain, but the toenail fungus was not addressed during that visit.    She reports inconsistent nutrition habits with minimal breakfast and lunch, occasionally consuming a breakfast bar, yogurt, or banana. Supper typically consists of grilled meat and vegetables. She drinks zero sugar Dr. Pepper and water. She acknowledges some sugar intake but notes  "she can "take it or leave it" and recognizes being "big on salt". She snacks late at night but attempts to choose healthier options like yogurt instead of chips and tries to avoid snack aisles when grocery shopping. She acknowledges need to increase physical activity and reports doing well with walking. She is a member of the [facility] and has previously gone to the pool but has not yet started going to the gym. She expresses intention to begin gym attendance and appears motivated to improve physical activity levels.    She has a known diagnosis of sleep apnea.  Her most recent sleep study from February 14th, 2024 demonstrated mild sleep apnea with an Apnea-Hypopnea Index (AHI) of 6.9 and Respiratory Disturbance Index (RDI) of 9.1.    She continues to be actively followed by psychiatry clinic. She is currently taking multiple psychiatric medications including bupropion sustained release, sertraline, risperidone, and lithium.     Wt Readings from Last 3 Encounters:   06/18/25 1103 125.9 kg (277 lb 9 oz)   05/09/25 0823 127.1 kg (280 lb 3.3 oz)   05/06/25 1339 126.8 kg (279 lb 8.7 oz)       3/24/2025   Vitals - 1 value per visit    Weight (lb) 285.5           8/6/2024   Vitals - 1 value per visit    Weight (lb) 294.76    Weight (lb) 291.12        Estimated body mass index is 45.23 kg/m² as calculated from the following:    Height as of 5/9/25: 5' 6" (1.676 m).    Weight as of 5/9/25: 127.1 kg (280 lb 3.3 oz).    ROS:  General: -fever, -chills, -fatigue, -weight gain, -weight loss  Eyes: -vision changes, -redness, -discharge  ENT: -ear pain, -nasal congestion, -sore throat  Cardiovascular: -chest pain, -palpitations, -lower extremity edema  Respiratory: -cough, -shortness of breath, +intermittent breathing while sleeping, +apnea  Gastrointestinal: -abdominal pain, -nausea, -vomiting, -diarrhea, -constipation, -blood in stool  Genitourinary: -dysuria, -hematuria, -frequency  Musculoskeletal: +joint pain, -muscle pain, +limb " pain, +limb swelling  Skin: -rash, -lesion, +nail yellowing, +brittle nails  Neurological: -headache, -dizziness, -numbness, -tingling, +migraines  Psychiatric: -anxiety, -depression, -sleep difficulty             PAST MEDICAL HISTORY:  Past Medical History:   Diagnosis Date    Anxiety     Arthritis     Asthma     Back pain     Bipolar I     BMI 50.0-59.9, adult     Chronic obstructive pulmonary disease, unspecified COPD type 2022    Depression     Fibromyalgia     GERD (gastroesophageal reflux disease)     HFpEF     Insomnia 2021    Obesity     SHARON (obstructive sleep apnea)     Tobacco dependence     Urinary incontinence        PAST SURGICAL HISTORY:  Past Surgical History:   Procedure Laterality Date    CARPAL TUNNEL RELEASE       SECTION  1993    CHOLECYSTECTOMY      COLONOSCOPY N/A 2022    Procedure: COLONOSCOPY;  Surgeon: Ernesto Auguste MD;  Location: [facility] ENDO;  Service: Endoscopy;  Laterality: N/A;  fully vaccinated-GT    EPIDURAL STEROID INJECTION Bilateral 2023    Procedure: Bilateral L3, L4, L5 Medial Branch Blocks #1;  Surgeon: Lito Jasso Jr., MD;  Location: [facility] ENDO;  Service: Pain Management;  Laterality: Bilateral;  @1230  No ATC or DM    EPIDURAL STEROID INJECTION Bilateral 2023    Procedure: Bilateral L3, L4, L5 Medial Branch Blocks #2;  Surgeon: Lito Jasso Jr., MD;  Location: [facility] ENDO;  Service: Pain Management;  Laterality: Bilateral;  @1000  No ATC or DM    EPIDURAL STEROID INJECTION Left 2023    Procedure: Left L3, L4, L5 Radiofrequency Thermocoagulation of Medial Branches;  Surgeon: Lito Jasso Jr., MD;  Location: [facility] ENDO;  Service: Pain Management;  Laterality: Left;  @0815  No ATC or DM    EPIDURAL STEROID INJECTION Right 2023    Procedure: Right L3, L4, L5 Radiofrequency Thermocoagulation of Medial Branches;  Surgeon: Lito Jasso Jr., MD;  Location: [facility] ENDO;  Service: Pain Management;   Laterality: Right;  @0930  No ATC or DM  Last 1&75    ESOPHAGOGASTRODUODENOSCOPY N/A 05/21/2019    Procedure: EGD (ESOPHAGOGASTRODUODENOSCOPY);  Surgeon: Michelle Mc MD;  Location: [facility];  Service: Endoscopy;  Laterality: N/A;    HYSTERECTOMY      partial     KNEE SURGERY      LAPAROSCOPIC SLEEVE GASTRECTOMY N/A 10/18/2019    Procedure: GASTRECTOMY, SLEEVE, LAPAROSCOPIC, with intraop EGD;  Surgeon: Ean Kohli MD;  Location: [address];  Service: General;  Laterality: N/A;    LEFT HEART CATHETERIZATION Left 11/7/2023    Procedure: Left heart cath;  Surgeon: Lito Clomenares MD;  Location: [facility] CATH LAB;  Service: Cardiology;  Laterality: Left;  730am start, R rad access  RN PRE OP 11/2/23    pinched nerve      SHOULDER SURGERY      TONSILLECTOMY      WRIST SURGERY      had ganlin cyst       SOCIAL HISTORY:  Social History[1]    FAMILY HISTORY:  Family History   Problem Relation Name Age of Onset    Diabetes Mother Mother     Jose Juan's disease Mother Mother     Hypertension Mother Mother     Heart disease Father Father     Stroke Father Father     Mental illness Father Father         ptsd    Hypertension Father Father     Diabetes Father Father     Depression Father Father     Arthritis Father Father     No Known Problems Brother      Heart disease Maternal Grandmother Grandmother     Depression Maternal Grandmother Grandmother     COPD Maternal Grandfather Grandfather     Heart disease Maternal Grandfather Grandfather     Stroke Maternal Grandfather Grandfather     Kidney disease Paternal Grandmother Grandmother     Heart disease Paternal Grandmother Grandmother     Heart disease Paternal Grandfather Grandfather     Early death Paternal Grandfather Grandfather     Hypertension Son Son     Asthma Son Son     Rectal cancer Paternal Aunt      Asthma Maternal Aunt Aunt     Diabetes Maternal Aunt Aunt     Asthma Maternal Uncle Uncle        ALLERGIES AND MEDICATIONS: updated and  reviewed.  Review of patient's allergies indicates:   Allergen Reactions    Hydrocodone-acetaminophen Nausea And Vomiting     Medication List with Changes/Refills   New Medications    TERBINAFINE HCL (LAMISIL) 250 MG TABLET    Take 1 tablet (250 mg total) by mouth once daily.    TIRZEPATIDE, WEIGHT LOSS, (ZEPBOUND) 2.5 MG/0.5 ML PNIJ    Inject 2.5 mg (one pen) into the skin every 7 days.    TIRZEPATIDE, WEIGHT LOSS, (ZEPBOUND) 5 MG/0.5 ML PNIJ    Inject 5 mg into the skin every 7 days.   Current Medications    ACETAMINOPHEN (TYLENOL) 500 MG TABLET    Take 1 tablet (500 mg total) by mouth every 4 (four) hours as needed for Pain or Temperature greater than (100.5 or greater).    ACETAZOLAMIDE (DIAMOX) 250 MG TABLET    Take 250 mg by mouth.    ALBUTEROL (PROVENTIL/VENTOLIN HFA) 90 MCG/ACTUATION INHALER    Inhale 2 puffs into the lungs every 4 (four) hours as needed for Wheezing or Shortness of Breath. Rescue    ALLOPURINOL (ZYLOPRIM) 100 MG TABLET    Take 1 tablet (100 mg total) by mouth once daily.    B COMPLEX VITAMINS TABLET    Take 1 tablet by mouth once daily.    BUPROPION (WELLBUTRIN SR) 100 MG TBSR 12 HR TABLET    Take 2 tabs q am, and 1 tab daily at noon    CALCIUM CITRATE (CALCITRATE) 200 MG (950 MG) TABLET    Take 1 tablet by mouth 2 (two) times daily.    CETIRIZINE (ZYRTEC) 10 MG TABLET    Take 1 tablet (10 mg total) by mouth once daily.    CLOTRIMAZOLE-BETAMETHASONE 1-0.05% (LOTRISONE) CREAM    Apply topically 2 (two) times daily.    COLCHICINE (COLCRYS) 0.6 MG TABLET    Take 1 tablet (0.6 mg total) by mouth daily as needed (gout flare).    CYANOCOBALAMIN 500 MCG TABLET    Take 500 mcg by mouth once daily.    CYCLOBENZAPRINE (FLEXERIL) 5 MG TABLET    Take 1 tablet (5 mg total) by mouth 3 (three) times daily as needed for Muscle spasms.    DICLOFENAC SODIUM (VOLTAREN) 1 % GEL    Apply 2 g topically 3 (three) times daily as needed (pain).    ESTRADIOL (ESTRACE) 1 MG TABLET    Take 1 tablet (1 mg total) by  mouth once daily.    FERROUS FUMARATE/VIT BCOMP,C (SUPER B COMPLEX ORAL)    Take 1 capsule by mouth once daily.    FLUTICASONE PROPIONATE (FLONASE) 50 MCG/ACTUATION NASAL SPRAY    1 spray (50 mcg total) by Each Nostril route once daily.    FUROSEMIDE (LASIX) 20 MG TABLET    Take 1 tablet (20 mg total) by mouth daily as needed (leg swelling/fluid buildup).    LIDOCAINE (LIDODERM) 5 %    Place 1 patch onto the skin once daily. Remove & Discard patch within 12 hours or as directed by MD    LITHIUM (ESKALITH) 300 MG CAPSULE    Take 1 capsule (300 mg total) by mouth 3 (three) times daily with meals.    LORAZEPAM (ATIVAN) 0.5 MG TABLET    TAKE 2 TABLETS EVERY DAY AS DIRECTED    MECLIZINE (ANTIVERT) 25 MG TABLET    Take 1 tablet (25 mg total) by mouth every 6 (six) hours as needed for Dizziness.    METFORMIN (GLUCOPHAGE-XR) 500 MG ER 24HR TABLET    Take 1 tablet (500 mg total) by mouth 2 (two) times daily with meals.    MULTIVITAMIN CAPSULE    Take 1 capsule by mouth 2 (two) times daily.     OMEPRAZOLE (PRILOSEC) 40 MG CAPSULE    TAKE 1 CAPSULE TWICE DAILY BEFORE MEALS    ONDANSETRON (ZOFRAN-ODT) 8 MG TBDL    Take 1 tablet (8 mg total) by mouth 2 (two) times daily as needed (nausea).    PHENTERMINE (ADIPEX-P) 37.5 MG TABLET    Take 1 tablet (37.5 mg total) by mouth before breakfast.    POTASSIUM CHLORIDE (KLOR-CON) 10 MEQ TBSR    Take 10 mEq by mouth 2 (two) times daily.    PREDNISONE (DELTASONE) 50 MG TAB    Take 1 tablet (50 mg total) by mouth once daily.    PREGABALIN (LYRICA) 100 MG CAPSULE    Take 1 capsule (100 mg total) by mouth 2 (two) times daily.    PROMETHAZINE (PHENERGAN) 12.5 MG TAB        PROMETHAZINE (PHENERGAN) 25 MG TABLET    Take 25 mg by mouth.    RISPERIDONE (RISPERDAL) 0.5 MG TAB    Take 1 tablet (0.5 mg total) by mouth every evening.    SERTRALINE (ZOLOFT) 100 MG TABLET    Take 2 tablets (200 mg total) by mouth once daily.    TOLTERODINE (DETROL LA) 4 MG 24 HR CAPSULE    Take 1 capsule (4 mg total) by  "mouth once daily.    TOPIRAMATE (TOPAMAX) 200 MG TAB    Take 1 tablet (200 mg total) by mouth once daily.          CARE TEAM:  Patient Care Team:  Shay Dai MD as PCP - General (Internal Medicine)  Delvin Nagy Jr., MD (Inactive) as Consulting Physician (Psychiatry)  José Hernandez MD as Consulting Physician (Endocrinology)  Brittaney Brandon NP (Inactive) as Nurse Practitioner (Urology)  Svetlana Nelson LPN as Care Coordinator  Nick Pearce as ED Navigator         PHYSICAL EXAM:   Vitals:    06/18/25 1103   BP: 124/80   Pulse: 80   Resp: 16   Temp: 97.7 °F (36.5 °C)     Weight: 125.9 kg (277 lb 9 oz)   Height: 5' 6" (167.6 cm)   Body mass index is 44.8 kg/m².    Physical Exam    Vitals: Weight: 277 lbs 9 ounces.  General: No acute distress. Well-developed. Well-nourished.  Eyes: EOMI. Sclerae anicteric.  HENT: Normocephalic. Atraumatic. Nares patent. Moist oral mucosa.  Cardiovascular: Regular rate. Regular rhythm. No murmurs. No rubs. No gallops. Normal S1, S2.  Respiratory: Normal respiratory effort. Clear to auscultation bilaterally. No rales. No rhonchi. No wheezing.  Musculoskeletal: No  obvious deformity.  Extremities: No lower extremity edema.  Neurological: Alert & oriented x3. No slurred speech. Normal gait.  Psychiatric: Normal mood. Normal affect. Good insight. Good judgment.  Skin: Warm. Dry. No rash.  MSK: Elbow - Left: Pain on palpation of medial elbow. Mild swelling of elbow. No redness of elbow.             ASSESSMENT AND PLAN:  Class 3 severe obesity due to excess calories with serious comorbidity and body mass index (BMI) of 45.0 to 49.9 in adult  Weight gain due to medication  History of bariatric surgery  SHARON (obstructive sleep apnea)  -     tirzepatide, weight loss, (ZEPBOUND) 2.5 mg/0.5 mL PnIj; Inject 2.5 mg (one pen) into the skin every 7 days.  Dispense: 2 mL; Refill: 0  -     tirzepatide, weight loss, (ZEPBOUND) 5 mg/0.5 mL PnIj; Inject 5 mg into the skin every 7 " days.  Dispense: 2 mL; Refill: 2  - Documented consistent weight reduction trend: current 277 lbs, 9 ounces (down from 297.4 lbs).  - Continuing Metformin 500 mg twice daily (initiated August 2024), Adipex (phentermine) 37.5 mg daily (initiated April 2025, to complete current month), and Topiramate for migraine management with additional benefit of weight management.  - Discussed injectable weight loss medications (Zepbound, Wegovy) as potential options.  - Will resubmit Zepbound prescription to insurance, leveraging sleep apnea diagnosis for potential coverage.  - Advised that if Zepbound is covered, she it to start it only after completion of the phentermine and to not take phentermine concurrently with Zepbound.  - Reviewed potential side effects including nausea, GI distress, constipation, rare risk of acute pancreatitis, and theoretical risk of thyroid tumors.  - Emphasized necessity of modifying eating habits when using these medications, avoiding large, heavy, or fatty meals, and seeking emergency care for significant abdominal pain.  - Advised progressive physical activity plan: visit gym at least once this week for 10-15 minutes, increase to twice weekly the following week, with ultimate goal of 20 minutes, 3 times weekly.  - Zaira to continue current nutrition plan.  - Monitored sleep apnea with documented apnea-hypopnea index (AHI) of 6.9 and respiratory disturbance index (RDI) of 9.1 from February 14, 2024 sleep study.    Onychomycosis of great toe  -     terbinafine HCL (LAMISIL) 250 mg tablet; Take 1 tablet (250 mg total) by mouth once daily.  Dispense: 30 tablet; Refill: 3  -     Comprehensive Metabolic Panel; Standing  -     Ambulatory referral/consult to Podiatry; Future; Expected date: 06/25/2025  - Monitored chronic toenail fungus affecting the right hallux, with nail crumbling and swelling present for years.  - Noted history of two previous nail removals approximately 15 years ago and failed  treatments with OTC products and topical treatment.  - Initiated oral terbinafine treatment, to be taken daily for 12 weeks.  - Ordered CMP monthly for 4 months, first to be completed today to monitor liver function.  - Referred to podiatry for evaluation of chronic onychomycosis.    Left elbow pain  - Monitored left elbow pain, particularly with palpation, noting some swelling without erythema or ulnar nerve symptoms.  - Zaira is taking acetaminophen for pain management.  - Recommend follow-[facility] with [facility] who performed previous elbow surgery.      - Recommend follow-up with psychiatry clinic for medication refills.             Follow up 3 months or sooner as needed.    This note was generated with the assistance of ambient listening technology. Verbal consent was obtained by the patient and accompanying visitor(s) for the recording of patient appointment to facilitate this note. I attest to having reviewed and edited the generated note for accuracy, though some syntax or spelling errors may persist. Please contact the author of this note for any clarification.         [1]   Social History  Socioeconomic History    Marital status:    Tobacco Use    Smoking status: Every Day     Types: Vaping with nicotine    Smokeless tobacco: Current    Tobacco comments:     vaping started April 2020; QUIT cigarettes Sept 2019   Substance and Sexual Activity    Alcohol use: No    Drug use: No    Sexual activity: Yes     Partners: Male     Birth control/protection: None     Social Drivers of Health     Financial Resource Strain: Low Risk  (2/10/2025)    Overall Financial Resource Strain (CARDIA)     Difficulty of Paying Living Expenses: Not hard at all   Food Insecurity: No Food Insecurity (2/10/2025)    Hunger Vital Sign     Worried About Running Out of Food in the Last Year: Never true     Ran Out of Food in the Last Year: Never true   Transportation Needs: No Transportation Needs  (8/21/2024)    Received from [facility]    PRAPARE - Transportation     Lack of Transportation (Medical): No     Lack of Transportation (Non-Medical): No   Physical Activity: Insufficiently Active (2/10/2025)    Exercise Vital Sign     Days of Exercise per Week: 2 days     Minutes of Exercise per Session: 20 min   Stress: Stress Concern Present (2/10/2025)    [employer] [place] [employer] - Occupational Stress Questionnaire     Feeling of Stress : To some extent   Housing Stability: Unknown (2/10/2025)    Housing Stability Vital Sign     Unable to Pay for Housing in the Last Year: No     Homeless in the Last Year: No

## 2025-07-07 ENCOUNTER — OFFICE VISIT (OUTPATIENT)
Dept: FAMILY MEDICINE | Facility: CLINIC | Age: 55
End: 2025-07-07
Payer: MEDICARE

## 2025-07-07 VITALS
TEMPERATURE: 98 F | RESPIRATION RATE: 18 BRPM | BODY MASS INDEX: 44.04 KG/M2 | SYSTOLIC BLOOD PRESSURE: 115 MMHG | OXYGEN SATURATION: 99 % | HEART RATE: 80 BPM | DIASTOLIC BLOOD PRESSURE: 72 MMHG | WEIGHT: 274.06 LBS | HEIGHT: 66 IN

## 2025-07-07 DIAGNOSIS — J06.9 VIRAL URI WITH COUGH: Primary | ICD-10-CM

## 2025-07-07 PROCEDURE — 3008F BODY MASS INDEX DOCD: CPT | Mod: CPTII,S$GLB,, | Performed by: NURSE PRACTITIONER

## 2025-07-07 PROCEDURE — 3078F DIAST BP <80 MM HG: CPT | Mod: CPTII,S$GLB,, | Performed by: NURSE PRACTITIONER

## 2025-07-07 PROCEDURE — 3074F SYST BP LT 130 MM HG: CPT | Mod: CPTII,S$GLB,, | Performed by: NURSE PRACTITIONER

## 2025-07-07 PROCEDURE — G2211 COMPLEX E/M VISIT ADD ON: HCPCS | Mod: S$GLB,,, | Performed by: NURSE PRACTITIONER

## 2025-07-07 PROCEDURE — 99213 OFFICE O/P EST LOW 20 MIN: CPT | Mod: S$GLB,,, | Performed by: NURSE PRACTITIONER

## 2025-07-07 PROCEDURE — 1159F MED LIST DOCD IN RCRD: CPT | Mod: CPTII,S$GLB,, | Performed by: NURSE PRACTITIONER

## 2025-07-07 PROCEDURE — 87637 SARSCOV2&INF A&B&RSV AMP PRB: CPT | Performed by: NURSE PRACTITIONER

## 2025-07-07 PROCEDURE — 3044F HG A1C LEVEL LT 7.0%: CPT | Mod: CPTII,S$GLB,, | Performed by: NURSE PRACTITIONER

## 2025-07-07 PROCEDURE — 99999 PR PBB SHADOW E&M-EST. PATIENT-LVL V: CPT | Mod: PBBFAC,HCNC,, | Performed by: NURSE PRACTITIONER

## 2025-07-07 PROCEDURE — 1160F RVW MEDS BY RX/DR IN RCRD: CPT | Mod: CPTII,S$GLB,, | Performed by: NURSE PRACTITIONER

## 2025-07-07 RX ORDER — BENZONATATE 200 MG/1
200 CAPSULE ORAL 3 TIMES DAILY PRN
Qty: 30 CAPSULE | Refills: 0 | Status: SHIPPED | OUTPATIENT
Start: 2025-07-07 | End: 2025-07-17

## 2025-07-07 RX ORDER — PROMETHAZINE HYDROCHLORIDE AND DEXTROMETHORPHAN HYDROBROMIDE 6.25; 15 MG/5ML; MG/5ML
5 SYRUP ORAL NIGHTLY PRN
Qty: 118 ML | Refills: 0 | Status: SHIPPED | OUTPATIENT
Start: 2025-07-07

## 2025-07-07 NOTE — PROGRESS NOTES
Routine Office Visit    Patient Name: Zaira Dowell    : 1970  MRN: [id number]    Chief Complaint:  Cough, chills, sore throat    Subjective:  Zaira is a 54 y.o. female who presents today for:    Cough, chills, sore throat - patient who is known to me with medical problems as documented below reports today for evaluation.  For the last 4 days has been having a number of upper respiratory symptoms which initially started with hoarseness but now she is having congestion, chills, and productive cough without any wheezing or dyspnea.  She reports that her right ear does feel painful as well.  She recently was around her daughter-in-law who tested positive for COVID and her  who is with her today is sick with similar symptoms as well.  OTC medications have only provided modest benefit.  This is the extent of her concerns at this time.    Past Medical History  Past Medical History:   Diagnosis Date    Anxiety     Arthritis     Asthma     Back pain     Bipolar I     BMI 50.0-59.9, adult     Chronic obstructive pulmonary disease, unspecified COPD type 2022    Depression     Fibromyalgia     GERD (gastroesophageal reflux disease)     HFpEF     Insomnia 2021    Obesity     SHARON (obstructive sleep apnea)     Tobacco dependence     Urinary incontinence        Family History  Family History   Problem Relation Name Age of Onset    Diabetes Mother Mother     Kern's disease Mother Mother     Hypertension Mother Mother     Heart disease Father Father     Stroke Father Father     Mental illness Father Father         ptsd    Hypertension Father Father     Diabetes Father Father     Depression Father Father     Arthritis Father Father     No Known Problems Brother      Heart disease Maternal Grandmother Grandmother     Depression Maternal Grandmother Grandmother     COPD Maternal Grandfather Grandfather     Heart disease Maternal Grandfather Grandfather     Stroke Maternal Grandfather Grandfather      "Kidney disease Paternal Grandmother Grandmother     Heart disease Paternal Grandmother Grandmother     Heart disease Paternal Grandfather Grandfather     Early death Paternal Grandfather Grandfather     Hypertension Son Son     Asthma Son Son     Rectal cancer Paternal Aunt      Asthma Maternal Aunt Aunt     Diabetes Maternal Aunt Aunt     Asthma Maternal Uncle Uncle        Current Medications  Medications Ordered Prior to Encounter[1]    Allergies   Review of patient's allergies indicates:   Allergen Reactions    Hydrocodone-acetaminophen Nausea And Vomiting       Review of Systems (Pertinent positives)  Review of Systems   Constitutional:  Positive for chills and malaise/fatigue. Negative for weight loss.   HENT:  Positive for congestion, ear pain and sore throat. Negative for ear discharge, hearing loss, nosebleeds and sinus pain.    Eyes: Negative.    Respiratory:  Positive for cough and sputum production. Negative for hemoptysis, shortness of breath, wheezing and stridor.    Cardiovascular: Negative.  Negative for palpitations, orthopnea and claudication.   Gastrointestinal: Negative.    Neurological:  Positive for headaches.       /72 (BP Location: Right arm, Patient Position: Sitting)   Pulse 80   Temp 98.1 °F (36.7 °C) (Oral)   Resp 18   Ht 5' 6" (1.676 m)   Wt 124.3 kg (274 lb 0.5 oz)   LMP  (LMP Unknown)   SpO2 99%   BMI 44.23 kg/m²     Physical Exam  Vitals reviewed.   Constitutional:       General: She is not in acute distress.     Appearance: Normal appearance. She is not ill-appearing, toxic-appearing or diaphoretic.   HENT:      Head: Normocephalic and atraumatic.      Right Ear: Ear canal and external ear normal. A middle ear effusion is present.      Left Ear: Tympanic membrane, ear canal and external ear normal.      Nose: Nose normal.      Mouth/Throat:      Lips: Pink.      Mouth: Mucous membranes are moist.      Pharynx: Uvula midline. No pharyngeal swelling, oropharyngeal exudate, " posterior oropharyngeal erythema or uvula swelling.   Cardiovascular:      Rate and Rhythm: Normal rate and regular rhythm.      Pulses: Normal pulses.      Heart sounds: Normal heart sounds.   Pulmonary:      Effort: Pulmonary effort is normal. No respiratory distress.      Breath sounds: Normal breath sounds. No wheezing.   Musculoskeletal:         General: No swelling, tenderness or deformity.   Skin:     General: Skin is warm and dry.      Capillary Refill: Capillary refill takes less than 2 seconds.   Neurological:      General: No focal deficit present.      Mental Status: She is alert and oriented to person, place, and time.   Psychiatric:         Mood and Affect: Mood normal.         Behavior: Behavior normal.            Assessment/Plan:  Zaira Dowell is a 54 y.o. female who presents today for :    Zaira was seen today for cough and nasal congestion.    Diagnoses and all orders for this visit:    Viral URI with cough  -     SARS-Cov2 (COVID) with FLU/RSV by PCR  -     benzonatate (TESSALON) 200 MG capsule; Take 1 capsule (200 mg total) by mouth 3 (three) times daily as needed for Cough.  -     promethazine-dextromethorphan (PROMETHAZINE-DM) 6.25-15 mg/5 mL Syrp; Take 5 mLs by mouth nightly as needed (cough).    No bacterial nidus on examination.  Will check COVID swab as patient was recently exposed to a close contact.  No need for steroids antibiotics today.  Treat with p.r.n. Tessalon and promethazine DM at nighttime for cough.  Recommend Flonase or Astelin nasal sprays and antihistamine OTC as needed.  All questions answered.  Follow up for any worsening recommended.        This office note has been dictated.  This dictation has been generated using M-Modal Fluency Direct dictation; some phonetic errors may occur.          [1]   Current Outpatient Medications on File Prior to Visit   Medication Sig Dispense Refill    acetaminophen (TYLENOL) 500 MG tablet Take 1 tablet (500 mg total) by mouth every  4 (four) hours as needed for Pain or Temperature greater than (100.5 or greater). 30 tablet 0    acetaZOLAMIDE (DIAMOX) 250 MG tablet Take 250 mg by mouth.      albuterol (PROVENTIL/VENTOLIN HFA) 90 mcg/actuation inhaler Inhale 2 puffs into the lungs every 4 (four) hours as needed for Wheezing or Shortness of Breath. Rescue 18 g 2    allopurinoL (ZYLOPRIM) 100 MG tablet Take 1 tablet (100 mg total) by mouth once daily. 90 tablet 1    b complex vitamins tablet Take 1 tablet by mouth once daily.      buPROPion (WELLBUTRIN SR) 100 MG TBSR 12 hr tablet Take 2 tabs q am, and 1 tab daily at noon 270 tablet 1    calcium citrate (CALCITRATE) 200 mg (950 mg) tablet Take 1 tablet by mouth 2 (two) times daily.      cetirizine (ZYRTEC) 10 MG tablet Take 1 tablet (10 mg total) by mouth once daily. 30 tablet 2    clotrimazole-betamethasone 1-0.05% (LOTRISONE) cream Apply topically 2 (two) times daily. 45 g 0    cyanocobalamin 500 MCG tablet Take 500 mcg by mouth once daily.      cyclobenzaprine (FLEXERIL) 5 MG tablet Take 1 tablet (5 mg total) by mouth 3 (three) times daily as needed for Muscle spasms. 60 tablet 6    diclofenac sodium (VOLTAREN) 1 % Gel Apply 2 g topically 3 (three) times daily as needed (pain). 100 g 0    estradioL (ESTRACE) 1 MG tablet Take 1 tablet (1 mg total) by mouth once daily. 90 tablet 3    ferrous fumarate/vit Bcomp,C (SUPER B COMPLEX ORAL) Take 1 capsule by mouth once daily.      fluticasone propionate (FLONASE) 50 mcg/actuation nasal spray 1 spray (50 mcg total) by Each Nostril route once daily. 16 mL 3    LIDOcaine (LIDODERM) 5 % Place 1 patch onto the skin once daily. Remove & Discard patch within 12 hours or as directed by MD 14 patch 0    lithium (ESKALITH) 300 MG capsule Take 1 capsule (300 mg total) by mouth 3 (three) times daily with meals. 270 capsule 1    LORazepam (ATIVAN) 0.5 MG tablet TAKE 2 TABLETS EVERY DAY AS DIRECTED 60 tablet 1    meclizine (ANTIVERT) 25 mg tablet Take 1 tablet (25 mg  total) by mouth every 6 (six) hours as needed for Dizziness. 60 tablet 1    metFORMIN (GLUCOPHAGE-XR) 500 MG ER 24hr tablet Take 1 tablet (500 mg total) by mouth 2 (two) times daily with meals. 60 tablet 5    multivitamin capsule Take 1 capsule by mouth 2 (two) times daily.       omeprazole (PRILOSEC) 40 MG capsule TAKE 1 CAPSULE TWICE DAILY BEFORE MEALS 180 capsule 3    ondansetron (ZOFRAN-ODT) 8 MG TbDL Take 1 tablet (8 mg total) by mouth 2 (two) times daily as needed (nausea). 21 tablet 0    potassium chloride (KLOR-CON) 10 MEQ TbSR Take 10 mEq by mouth 2 (two) times daily.      predniSONE (DELTASONE) 50 MG Tab Take 1 tablet (50 mg total) by mouth once daily. 5 tablet 0    promethazine (PHENERGAN) 12.5 MG Tab       promethazine (PHENERGAN) 25 MG tablet Take 25 mg by mouth.      sertraline (ZOLOFT) 100 MG tablet Take 2 tablets (200 mg total) by mouth once daily. 180 tablet 1    terbinafine HCL (LAMISIL) 250 mg tablet Take 1 tablet (250 mg total) by mouth once daily. 30 tablet 3    tolterodine (DETROL LA) 4 MG 24 hr capsule Take 1 capsule (4 mg total) by mouth once daily. 90 capsule 3    topiramate (TOPAMAX) 200 MG Tab Take 1 tablet (200 mg total) by mouth once daily. 90 tablet 0    colchicine (COLCRYS) 0.6 mg tablet Take 1 tablet (0.6 mg total) by mouth daily as needed (gout flare). 30 tablet 0    furosemide (LASIX) 20 MG tablet Take 1 tablet (20 mg total) by mouth daily as needed (leg swelling/fluid buildup). 60 tablet 0    [] phentermine (ADIPEX-P) 37.5 mg tablet Take 1 tablet (37.5 mg total) by mouth before breakfast. 30 tablet 2    pregabalin (LYRICA) 100 MG capsule Take 1 capsule (100 mg total) by mouth 2 (two) times daily. (Patient taking differently: Take 100 mg by mouth 2 (two) times daily as needed.) 60 capsule 5    risperiDONE (RISPERDAL) 0.5 MG Tab Take 1 tablet (0.5 mg total) by mouth every evening. 90 tablet 0    tirzepatide, weight loss, (ZEPBOUND) 2.5 mg/0.5 mL PnIj Inject 2.5 mg (one pen)  into the skin every 7 days. (Patient not taking: Reported on 7/7/2025) 2 mL 0    [START ON 7/17/2025] tirzepatide, weight loss, (ZEPBOUND) 5 mg/0.5 mL PnIj Inject 5 mg into the skin every 7 days. (Patient not taking: Reported on 7/7/2025) 2 mL 2    [DISCONTINUED] loratadine (CLARITIN) 10 mg tablet Take 1 tablet (10 mg total) by mouth once daily. for 20 days 60 tablet 0     Current Facility-Administered Medications on File Prior to Visit   Medication Dose Route Frequency Provider Last Rate Last Admin    sodium chloride 0.9% flush 10 mL  10 mL Intravenous PRN Lito Colmenares MD

## 2025-07-08 ENCOUNTER — TELEPHONE (OUTPATIENT)
Dept: FAMILY MEDICINE | Facility: CLINIC | Age: 55
End: 2025-07-08
Payer: MEDICARE

## 2025-07-08 ENCOUNTER — PATIENT MESSAGE (OUTPATIENT)
Dept: FAMILY MEDICINE | Facility: CLINIC | Age: 55
End: 2025-07-08
Payer: MEDICARE

## 2025-07-08 DIAGNOSIS — U07.1 COVID-19 VIRUS DETECTED: ICD-10-CM

## 2025-07-08 DIAGNOSIS — U07.1 COVID-19: Primary | ICD-10-CM

## 2025-07-08 LAB
FLUAV AG UPPER RESP QL IA.RAPID: NEGATIVE
FLUBV AG UPPER RESP QL IA.RAPID: NEGATIVE
RSV A 5' UTR RNA NPH QL NAA+PROBE: NEGATIVE
SARS-COV-2 RNA RESP QL NAA+PROBE: POSITIVE

## 2025-07-08 NOTE — TELEPHONE ENCOUNTER
Patient called and notified of COVID positive results.  Paxlovid sent for patient.  Discussed with the patient that Paxlovid could interact with tolterodine, Wellbutrin, and estradiol.  She should monitor for increased or decreased efficacy of the medication with baclofen and notify me if she has any troublesome side effects like dry mouth, constipation etc..  All questions answered

## 2025-07-14 ENCOUNTER — OFFICE VISIT (OUTPATIENT)
Dept: FAMILY MEDICINE | Facility: CLINIC | Age: 55
End: 2025-07-14
Payer: MEDICARE

## 2025-07-14 VITALS
BODY MASS INDEX: 43.79 KG/M2 | HEART RATE: 98 BPM | TEMPERATURE: 99 F | OXYGEN SATURATION: 99 % | HEIGHT: 66 IN | DIASTOLIC BLOOD PRESSURE: 84 MMHG | WEIGHT: 272.5 LBS | SYSTOLIC BLOOD PRESSURE: 118 MMHG

## 2025-07-14 DIAGNOSIS — J06.9 VIRAL URI WITH COUGH: Primary | ICD-10-CM

## 2025-07-14 DIAGNOSIS — R09.81 NASAL CONGESTION: ICD-10-CM

## 2025-07-14 DIAGNOSIS — J31.0 RHINITIS, UNSPECIFIED TYPE: ICD-10-CM

## 2025-07-14 PROCEDURE — 99999 PR PBB SHADOW E&M-EST. PATIENT-LVL V: CPT | Mod: PBBFAC,,, | Performed by: INTERNAL MEDICINE

## 2025-07-14 PROCEDURE — 1160F RVW MEDS BY RX/DR IN RCRD: CPT | Mod: CPTII,S$GLB,, | Performed by: INTERNAL MEDICINE

## 2025-07-14 PROCEDURE — 3008F BODY MASS INDEX DOCD: CPT | Mod: CPTII,S$GLB,, | Performed by: INTERNAL MEDICINE

## 2025-07-14 PROCEDURE — 99214 OFFICE O/P EST MOD 30 MIN: CPT | Mod: S$GLB,,, | Performed by: INTERNAL MEDICINE

## 2025-07-14 PROCEDURE — 3044F HG A1C LEVEL LT 7.0%: CPT | Mod: CPTII,S$GLB,, | Performed by: INTERNAL MEDICINE

## 2025-07-14 PROCEDURE — 3074F SYST BP LT 130 MM HG: CPT | Mod: CPTII,S$GLB,, | Performed by: INTERNAL MEDICINE

## 2025-07-14 PROCEDURE — 1159F MED LIST DOCD IN RCRD: CPT | Mod: CPTII,S$GLB,, | Performed by: INTERNAL MEDICINE

## 2025-07-14 PROCEDURE — 3079F DIAST BP 80-89 MM HG: CPT | Mod: CPTII,S$GLB,, | Performed by: INTERNAL MEDICINE

## 2025-07-14 RX ORDER — FLUTICASONE PROPIONATE 50 MCG
1 SPRAY, SUSPENSION (ML) NASAL DAILY
Qty: 16 ML | Refills: 3 | Status: SHIPPED | OUTPATIENT
Start: 2025-07-14

## 2025-07-14 RX ORDER — CETIRIZINE HYDROCHLORIDE 10 MG/1
10 TABLET ORAL DAILY
Qty: 30 TABLET | Refills: 2 | Status: SHIPPED | OUTPATIENT
Start: 2025-07-14 | End: 2025-10-12

## 2025-07-14 RX ORDER — METHYLPREDNISOLONE 4 MG/1
TABLET ORAL
Qty: 1 EACH | Refills: 0 | Status: SHIPPED | OUTPATIENT
Start: 2025-07-14

## 2025-07-14 NOTE — PROGRESS NOTES
HISTORY OF PRESENT ILLNESS:  Zaira Dowell is a 54 y.o. female who presents to the clinic today for Nasal Congestion, Dizziness, Cough, Diarrhea (X ), loss smell, and loss taste  .       Positive COVID 7/7/25.  Prescribed Paxlovid but did not obtain due to cost.  Zaira presents today for follow up of COVID-19 symptoms.    She reports ongoing COVID-19 symptoms including significant sinus drainage, laryngitis, and voice loss. She experiences intermittent sinus pressure headaches and mild ear pressure. She has persistent diarrhea occurring 3-4 times daily that is not improving, along with ongoing nausea without vomiting. She experiences dizziness with movement and shortness of breath when active, which has led her to limit physical movement. She denies fever, chills, and body aches. She notes significant impact on daily functioning with reduced mobility and persistent symptoms.    She is currently taking Mucinex, Tylenol, and ibuprofen for symptomatic relief. She has been drinking Gatorade for hydration. She denies taking any antihistamines.    She currently uses electronic cigarettes but reports reduced usage recently.      ROS:  General: -fever, -chills, -fatigue, -weight gain, -weight loss  Eyes: -vision changes, -redness, -discharge  ENT: -ear pain, -nasal congestion, -sore throat, +nasal discharge, +voice change, +hoarseness, +ear pressure  Cardiovascular: -chest pain, -palpitations, -lower extremity edema  Respiratory: -cough, -shortness of breath, +exertional dyspnea  Gastrointestinal: -abdominal pain, +nausea, -vomiting, +diarrhea, -constipation, -blood in stool  Genitourinary: -dysuria, -hematuria, -frequency  Musculoskeletal: -joint pain, -muscle pain  Skin: -rash, -lesion  Neurological: +headache, +dizziness, -numbness, -tingling  Psychiatric: -anxiety, -depression, -sleep difficulty             PAST MEDICAL HISTORY:  Past Medical History:   Diagnosis Date    Anxiety     Arthritis     Asthma     Back  pain     Bipolar I     BMI 50.0-59.9, adult     Chronic obstructive pulmonary disease, unspecified COPD type 2022    Depression     Fibromyalgia     GERD (gastroesophageal reflux disease)     HFpEF     Insomnia 2021    Obesity     SHARON (obstructive sleep apnea)     Tobacco dependence     Urinary incontinence        PAST SURGICAL HISTORY:  Past Surgical History:   Procedure Laterality Date    CARPAL TUNNEL RELEASE       SECTION  1993    CHOLECYSTECTOMY      COLONOSCOPY N/A 2022    Procedure: COLONOSCOPY;  Surgeon: Ernesto Auguste MD;  Location: [facility] ENDO;  Service: Endoscopy;  Laterality: N/A;  fully vaccinated-GT    EPIDURAL STEROID INJECTION Bilateral 2023    Procedure: Bilateral L3, L4, L5 Medial Branch Blocks #1;  Surgeon: Lito Jasso Jr., MD;  Location: [facility] ENDO;  Service: Pain Management;  Laterality: Bilateral;  @1230  No ATC or DM    EPIDURAL STEROID INJECTION Bilateral 2023    Procedure: Bilateral L3, L4, L5 Medial Branch Blocks #2;  Surgeon: Lito Jasso Jr., MD;  Location: [facility];  Service: Pain Management;  Laterality: Bilateral;  @1000  No ATC or DM    EPIDURAL STEROID INJECTION Left 2023    Procedure: Left L3, L4, L5 Radiofrequency Thermocoagulation of Medial Branches;  Surgeon: Lito Jasso Jr., MD;  Location: [facility] ENDO;  Service: Pain Management;  Laterality: Left;  @0815  No ATC or DM    EPIDURAL STEROID INJECTION Right 2023    Procedure: Right L3, L4, L5 Radiofrequency Thermocoagulation of Medial Branches;  Surgeon: Lito Jasso Jr., MD;  Location: [facility] ENDO;  Service: Pain Management;  Laterality: Right;  @0930  No ATC or DM  Last 1&75    ESOPHAGOGASTRODUODENOSCOPY N/A 2019    Procedure: EGD (ESOPHAGOGASTRODUODENOSCOPY);  Surgeon: Michelle Mc MD;  Location: [facility] ENDO;  Service: Endoscopy;  Laterality: N/A;    HYSTERECTOMY      partial     KNEE SURGERY      LAPAROSCOPIC SLEEVE GASTRECTOMY N/A  10/18/2019    Procedure: GASTRECTOMY, SLEEVE, LAPAROSCOPIC, with intraop EGD;  Surgeon: Ean Kohli MD;  Location: [facility] OR [address] [place] [address];  Service: General;  Laterality: N/A;    LEFT HEART CATHETERIZATION Left 11/7/2023    Procedure: Left heart cath;  Surgeon: Lito Colmenares MD;  Location: [facility] CATH LAB;  Service: Cardiology;  Laterality: Left;  730am start, R rad access  RN PRE OP 11/2/23    pinched nerve      SHOULDER SURGERY      TONSILLECTOMY      WRIST SURGERY      had ganlin cyst       SOCIAL HISTORY:  Social History[1]    FAMILY HISTORY:  Family History   Problem Relation Name Age of Onset    Diabetes Mother Mother     Jose Juan's disease Mother Mother     Hypertension Mother Mother     Heart disease Father Father     Stroke Father Father     Mental illness Father Father         ptsd    Hypertension Father Father     Diabetes Father Father     Depression Father Father     Arthritis Father Father     No Known Problems Brother      Heart disease Maternal Grandmother Grandmother     Depression Maternal Grandmother Grandmother     COPD Maternal Grandfather Grandfather     Heart disease Maternal Grandfather Grandfather     Stroke Maternal Grandfather Grandfather     Kidney disease Paternal Grandmother Grandmother     Heart disease Paternal Grandmother Grandmother     Heart disease Paternal Grandfather Grandfather     Early death Paternal Grandfather Grandfather     Hypertension Son Son     Asthma Son Son     Rectal cancer Paternal Aunt      Asthma Maternal Aunt Aunt     Diabetes Maternal Aunt Aunt     Asthma Maternal Uncle Uncle        ALLERGIES AND MEDICATIONS: updated and reviewed.  Review of patient's allergies indicates:   Allergen Reactions    Hydrocodone-acetaminophen Nausea And Vomiting     Medication List with Changes/Refills   New Medications    METHYLPREDNISOLONE (MEDROL DOSEPACK) 4 MG TABLET    use as directed   Current Medications    ACETAMINOPHEN (TYLENOL) 500 MG TABLET    Take 1 tablet (500  mg total) by mouth every 4 (four) hours as needed for Pain or Temperature greater than (100.5 or greater).    ACETAZOLAMIDE (DIAMOX) 250 MG TABLET    Take 250 mg by mouth.    ALBUTEROL (PROVENTIL/VENTOLIN HFA) 90 MCG/ACTUATION INHALER    Inhale 2 puffs into the lungs every 4 (four) hours as needed for Wheezing or Shortness of Breath. Rescue    ALLOPURINOL (ZYLOPRIM) 100 MG TABLET    Take 1 tablet (100 mg total) by mouth once daily.    B COMPLEX VITAMINS TABLET    Take 1 tablet by mouth once daily.    BENZONATATE (TESSALON) 200 MG CAPSULE    Take 1 capsule (200 mg total) by mouth 3 (three) times daily as needed for Cough.    BUPROPION (WELLBUTRIN SR) 100 MG TBSR 12 HR TABLET    Take 2 tabs q am, and 1 tab daily at noon    CALCIUM CITRATE (CALCITRATE) 200 MG (950 MG) TABLET    Take 1 tablet by mouth 2 (two) times daily.    CLOTRIMAZOLE-BETAMETHASONE 1-0.05% (LOTRISONE) CREAM    Apply topically 2 (two) times daily.    COLCHICINE (COLCRYS) 0.6 MG TABLET    Take 1 tablet (0.6 mg total) by mouth daily as needed (gout flare).    CYANOCOBALAMIN 500 MCG TABLET    Take 500 mcg by mouth once daily.    CYCLOBENZAPRINE (FLEXERIL) 5 MG TABLET    Take 1 tablet (5 mg total) by mouth 3 (three) times daily as needed for Muscle spasms.    DICLOFENAC SODIUM (VOLTAREN) 1 % GEL    Apply 2 g topically 3 (three) times daily as needed (pain).    ESTRADIOL (ESTRACE) 1 MG TABLET    Take 1 tablet (1 mg total) by mouth once daily.    FERROUS FUMARATE/VIT BCOMP,C (SUPER B COMPLEX ORAL)    Take 1 capsule by mouth once daily.    FUROSEMIDE (LASIX) 20 MG TABLET    Take 1 tablet (20 mg total) by mouth daily as needed (leg swelling/fluid buildup).    LIDOCAINE (LIDODERM) 5 %    Place 1 patch onto the skin once daily. Remove & Discard patch within 12 hours or as directed by MD    LITHIUM (ESKALITH) 300 MG CAPSULE    Take 1 capsule (300 mg total) by mouth 3 (three) times daily with meals.    LORAZEPAM (ATIVAN) 0.5 MG TABLET    TAKE 2 TABLETS EVERY DAY  AS DIRECTED    MECLIZINE (ANTIVERT) 25 MG TABLET    Take 1 tablet (25 mg total) by mouth every 6 (six) hours as needed for Dizziness.    METFORMIN (GLUCOPHAGE-XR) 500 MG ER 24HR TABLET    Take 1 tablet (500 mg total) by mouth 2 (two) times daily with meals.    MULTIVITAMIN CAPSULE    Take 1 capsule by mouth 2 (two) times daily.     OMEPRAZOLE (PRILOSEC) 40 MG CAPSULE    TAKE 1 CAPSULE TWICE DAILY BEFORE MEALS    ONDANSETRON (ZOFRAN-ODT) 8 MG TBDL    Take 1 tablet (8 mg total) by mouth 2 (two) times daily as needed (nausea).    POTASSIUM CHLORIDE (KLOR-CON) 10 MEQ TBSR    Take 10 mEq by mouth 2 (two) times daily.    PREGABALIN (LYRICA) 100 MG CAPSULE    Take 1 capsule (100 mg total) by mouth 2 (two) times daily.    PROMETHAZINE (PHENERGAN) 12.5 MG TAB        PROMETHAZINE (PHENERGAN) 25 MG TABLET    Take 25 mg by mouth.    PROMETHAZINE-DEXTROMETHORPHAN (PROMETHAZINE-DM) 6.25-15 MG/5 ML SYRP    Take 5 mLs by mouth nightly as needed (cough).    RISPERIDONE (RISPERDAL) 0.5 MG TAB    Take 1 tablet (0.5 mg total) by mouth every evening.    SERTRALINE (ZOLOFT) 100 MG TABLET    Take 2 tablets (200 mg total) by mouth once daily.    TERBINAFINE HCL (LAMISIL) 250 MG TABLET    Take 1 tablet (250 mg total) by mouth once daily.    TIRZEPATIDE, WEIGHT LOSS, (ZEPBOUND) 2.5 MG/0.5 ML PNIJ    Inject 2.5 mg (one pen) into the skin every 7 days.    TIRZEPATIDE, WEIGHT LOSS, (ZEPBOUND) 5 MG/0.5 ML PNIJ    Inject 5 mg into the skin every 7 days.    TOLTERODINE (DETROL LA) 4 MG 24 HR CAPSULE    Take 1 capsule (4 mg total) by mouth once daily.    TOPIRAMATE (TOPAMAX) 200 MG TAB    Take 1 tablet (200 mg total) by mouth once daily.   Changed and/or Refilled Medications    Modified Medication Previous Medication    CETIRIZINE (ZYRTEC) 10 MG TABLET cetirizine (ZYRTEC) 10 MG tablet       Take 1 tablet (10 mg total) by mouth once daily.    Take 1 tablet (10 mg total) by mouth once daily.    FLUTICASONE PROPIONATE (FLONASE) 50 MCG/ACTUATION NASAL  "SPRAY fluticasone propionate (FLONASE) 50 mcg/actuation nasal spray       1 spray (50 mcg total) by Each Nostril route once daily.    1 spray (50 mcg total) by Each Nostril route once daily.   Discontinued Medications    PREDNISONE (DELTASONE) 50 MG TAB    Take 1 tablet (50 mg total) by mouth once daily.          CARE TEAM:  Patient Care Team:  Shay Dai MD as PCP - General (Internal Medicine)  Delvin Nagy Jr., MD (Inactive) as Consulting Physician (Psychiatry)  José Hernandez MD as Consulting Physician (Endocrinology)  Brittaney Brandon NP (Inactive) as Nurse Practitioner (Urology)  Svetlana Nelson LPN as Care Coordinator  Nick Pearce as ED Navigator         PHYSICAL EXAM:   Vitals:    07/14/25 0848   BP: 118/84   Pulse: 98   Temp: 98.7 °F (37.1 °C)     Weight: 123.6 kg (272 lb 7.8 oz)   Height: 5' 6" (167.6 cm)   Body mass index is 43.98 kg/m².    Physical Exam    Vitals: SpO2: 99%.  General: No acute distress. Well-developed. Well-nourished.  Eyes: EOMI. Sclerae anicteric.  HENT: Normocephalic. Atraumatic. Nares patent. Moist oral mucosa.  Ears: Fullness of right tympanic membrane without redness or rupture. Fullness of left tympanic membrane. Bilateral EACs clear.  Cardiovascular: Regular rate. Regular rhythm. No murmurs. No rubs. No gallops. Normal S1, S2.  Respiratory: Normal respiratory effort. Clear to auscultation bilaterally. No rales. No rhonchi. No wheezing.  Abdomen: Soft. Non-tender. Non-distended. Normoactive bowel sounds.  Musculoskeletal: No  obvious deformity.  Extremities: No lower extremity edema.  Neurological: Alert & oriented x3. No slurred speech. Normal gait.  Psychiatric: Normal mood. Normal affect. Good insight. Good judgment.  Skin: Warm. Dry. No rash.             ASSESSMENT AND PLAN:  Viral URI with cough  Rhinitis, unspecified type  Nasal congestion  -     methylPREDNISolone (MEDROL DOSEPACK) 4 mg tablet; use as directed  Dispense: 1 each; Refill: 0  -     " cetirizine (ZYRTEC) 10 MG tablet; Take 1 tablet (10 mg total) by mouth once daily.  Dispense: 30 tablet; Refill: 2  -     fluticasone propionate (FLONASE) 50 mcg/actuation nasal spray; 1 spray (50 mcg total) by Each Nostril route once daily.  Dispense: 16 mL; Refill: 3         - Confirmed the patient tested positive for COVID last week.  - Vital signs are reassuring with normal oxygenation at 99%, normal blood pressure, heart rate, and temperature.  - Zaira is outside the time frame for antiviral treatment.  - Recommend supportive care with Mucinex, Tylenol, and ibuprofen for symptom management.  - Discussed the importance of healthy nutrition for recovery.  - Reviewed proper hydration, cautioning against excessive Gatorade consumption and recommending water as the primary hydration source, limiting Gatorade to 1 per day.    - Noted the patient is experiencing laryngitis and losing voice.  - Prescribed Medrol Dosepak (methylprednisolone), tapering dose over 6 days as per package instructions.    - Noted the patient reports diarrhea with 3-4 episodes per day, not improving.  - Prescribed Imodium (loperamide) as needed for persistent diarrhea, per package instructions.    - Noted the patient is experiencing sinus drainage and pressure.  - Prescribed Zyrtec (cetirizine) or its generic version, to be taken daily.  - Prescribed Flonase nasal spray, 1 spray to each nostril daily to reduce inflammation in the sinuses.    - Observed fullness to the tympanic membrane in both ears, with no redness or rupture.    - Advised the patient to stop vaping to aid recovery, explaining that it would hinder recovery from respiratory symptoms.    - Follow up as scheduled for existing upcoming appointment or sooner as needed.    This note was generated with the assistance of ambient listening technology. Verbal consent was obtained by the patient and accompanying visitor(s) for the recording of patient appointment to facilitate this  note. I attest to having reviewed and edited the generated note for accuracy, though some syntax or spelling errors may persist. Please contact the author of this note for any clarification.         [1]   Social History  Socioeconomic History    Marital status:    Tobacco Use    Smoking status: Every Day     Types: Vaping with nicotine    Smokeless tobacco: Current    Tobacco comments:     vaping started April 2020; QUIT cigarettes Sept 2019   Substance and Sexual Activity    Alcohol use: No    Drug use: No    Sexual activity: Yes     Partners: Male     Birth control/protection: None     Social Drivers of Health     Financial Resource Strain: Low Risk  (2/10/2025)    Overall Financial Resource Strain (CARDIA)     Difficulty of Paying Living Expenses: Not hard at all   Food Insecurity: No Food Insecurity (2/10/2025)    Hunger Vital Sign     Worried About Running Out of Food in the Last Year: Never true     Ran Out of Food in the Last Year: Never true   Transportation Needs: No Transportation Needs (8/21/2024)    Received from [facility] - Transportation     Lack of Transportation (Medical): No     Lack of Transportation (Non-Medical): No   Physical Activity: Insufficiently Active (2/10/2025)    Exercise Vital Sign     Days of Exercise per Week: 2 days     Minutes of Exercise per Session: 20 min   Stress: Stress Concern Present (2/10/2025)    [employer] - Occupational Stress Questionnaire     Feeling of Stress : To some extent   Housing Stability: Unknown (2/10/2025)    Housing Stability Vital Sign     Unable to Pay for Housing in the Last Year: No     Homeless in the Last Year: No

## 2025-07-15 DIAGNOSIS — G43.809 OTHER MIGRAINE WITHOUT STATUS MIGRAINOSUS, NOT INTRACTABLE: ICD-10-CM

## 2025-07-15 DIAGNOSIS — M1A.00X0 IDIOPATHIC CHRONIC GOUT WITHOUT TOPHUS, UNSPECIFIED SITE: ICD-10-CM

## 2025-07-15 DIAGNOSIS — K21.9 GASTROESOPHAGEAL REFLUX DISEASE, UNSPECIFIED WHETHER ESOPHAGITIS PRESENT: ICD-10-CM

## 2025-07-15 DIAGNOSIS — Z98.890 POST-OPERATIVE STATE: ICD-10-CM

## 2025-07-16 NOTE — TELEPHONE ENCOUNTER
Care Due:                  Date            Visit Type   Department     Provider  --------------------------------------------------------------------------------                                EP -         [facility]                              PRIMARY      MEDICINE /  Last Visit: 07-      CARE ([facility])   INTERNAL MED   Shay Dai                                           [facility]   MEDICINE /  Next Visit: 09-      PATIENT      INTERNAL MED   Shay Dai                                                            Last  Test          Frequency    Reason                     Performed    Due Date  --------------------------------------------------------------------------------    HBA1C.......  6 months...  metFORMIN, tirzepatide,..  02- 08-    Uric Acid...  12 months..  allopurinoL..............  02- 02-    [facility] Embedded Care Due Messages. Reference number: [id number].   7/15/2025 11:06:01 PM CDT

## 2025-07-17 ENCOUNTER — PATIENT MESSAGE (OUTPATIENT)
Dept: PSYCHIATRY | Facility: CLINIC | Age: 55
End: 2025-07-17
Payer: MEDICARE

## 2025-07-17 DIAGNOSIS — F31.9 BIPOLAR 1 DISORDER: ICD-10-CM

## 2025-07-18 ENCOUNTER — OFFICE VISIT (OUTPATIENT)
Dept: RHEUMATOLOGY | Facility: CLINIC | Age: 55
End: 2025-07-18
Payer: MEDICARE

## 2025-07-18 ENCOUNTER — PATIENT MESSAGE (OUTPATIENT)
Dept: PSYCHIATRY | Facility: CLINIC | Age: 55
End: 2025-07-18
Payer: MEDICARE

## 2025-07-18 DIAGNOSIS — M79.7 FIBROMYALGIA: Primary | ICD-10-CM

## 2025-07-18 DIAGNOSIS — M15.0 PRIMARY OSTEOARTHRITIS INVOLVING MULTIPLE JOINTS: ICD-10-CM

## 2025-07-18 DIAGNOSIS — E66.813 CLASS 3 SEVERE OBESITY DUE TO EXCESS CALORIES WITHOUT SERIOUS COMORBIDITY WITH BODY MASS INDEX (BMI) OF 40.0 TO 44.9 IN ADULT: ICD-10-CM

## 2025-07-18 DIAGNOSIS — Z71.89 COUNSELING AND COORDINATION OF CARE: ICD-10-CM

## 2025-07-18 PROCEDURE — 98006 SYNCH AUDIO-VIDEO EST MOD 30: CPT | Mod: HCNC,95,, | Performed by: STUDENT IN AN ORGANIZED HEALTH CARE EDUCATION/TRAINING PROGRAM

## 2025-07-18 PROCEDURE — 1159F MED LIST DOCD IN RCRD: CPT | Mod: CPTII,HCNC,95, | Performed by: STUDENT IN AN ORGANIZED HEALTH CARE EDUCATION/TRAINING PROGRAM

## 2025-07-18 PROCEDURE — 1160F RVW MEDS BY RX/DR IN RCRD: CPT | Mod: CPTII,HCNC,95, | Performed by: STUDENT IN AN ORGANIZED HEALTH CARE EDUCATION/TRAINING PROGRAM

## 2025-07-18 PROCEDURE — 3044F HG A1C LEVEL LT 7.0%: CPT | Mod: CPTII,HCNC,95, | Performed by: STUDENT IN AN ORGANIZED HEALTH CARE EDUCATION/TRAINING PROGRAM

## 2025-07-18 RX ORDER — OMEPRAZOLE 40 MG/1
CAPSULE, DELAYED RELEASE ORAL
Qty: 180 CAPSULE | Refills: 3 | Status: SHIPPED | OUTPATIENT
Start: 2025-07-18

## 2025-07-18 RX ORDER — TOPIRAMATE 200 MG/1
200 TABLET, FILM COATED ORAL DAILY
Qty: 90 TABLET | Refills: 0 | Status: SHIPPED | OUTPATIENT
Start: 2025-07-18

## 2025-07-18 RX ORDER — SERTRALINE HYDROCHLORIDE 100 MG/1
200 TABLET, FILM COATED ORAL DAILY
Qty: 30 TABLET | Refills: 1 | Status: SHIPPED | OUTPATIENT
Start: 2025-07-18 | End: 2025-08-17

## 2025-07-18 RX ORDER — ALLOPURINOL 100 MG/1
100 TABLET ORAL DAILY
Qty: 90 TABLET | Refills: 1 | Status: SHIPPED | OUTPATIENT
Start: 2025-07-18
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
## 2025-07-21 ENCOUNTER — OFFICE VISIT (OUTPATIENT)
Dept: PODIATRY | Facility: CLINIC | Age: 55
End: 2025-07-21
Payer: MEDICARE

## 2025-07-21 VITALS — HEIGHT: 66 IN | WEIGHT: 272.5 LBS | BODY MASS INDEX: 43.79 KG/M2

## 2025-07-21 DIAGNOSIS — B35.1 ONYCHOMYCOSIS OF GREAT TOE: ICD-10-CM

## 2025-07-21 DIAGNOSIS — L60.0 INGROWN NAIL: Primary | ICD-10-CM

## 2025-07-21 DIAGNOSIS — M79.674 PAIN OF TOE OF RIGHT FOOT: ICD-10-CM

## 2025-07-21 PROCEDURE — 99999 PR PBB SHADOW E&M-EST. PATIENT-LVL IV: CPT | Mod: PBBFAC,HCNC,, | Performed by: PODIATRIST

## 2025-07-21 PROCEDURE — 3008F BODY MASS INDEX DOCD: CPT | Mod: CPTII,HCNC,S$GLB, | Performed by: PODIATRIST

## 2025-07-21 PROCEDURE — 1159F MED LIST DOCD IN RCRD: CPT | Mod: CPTII,HCNC,S$GLB, | Performed by: PODIATRIST

## 2025-07-21 PROCEDURE — 3044F HG A1C LEVEL LT 7.0%: CPT | Mod: CPTII,HCNC,S$GLB, | Performed by: PODIATRIST

## 2025-07-21 PROCEDURE — 99213 OFFICE O/P EST LOW 20 MIN: CPT | Mod: HCNC,S$GLB,, | Performed by: PODIATRIST

## 2025-07-21 RX ORDER — TOBRAMYCIN 3 MG/ML
1 SOLUTION/ DROPS OPHTHALMIC 2 TIMES DAILY
Qty: 5 ML | Refills: 0 | Status: SHIPPED | OUTPATIENT
Start: 2025-07-21

## 2025-07-22 NOTE — PROGRESS NOTES
Subjective:     Patient ID: Zaira Dowell is a 54 y.o. female.    Chief Complaint: Nail Problem    Zaira is a 54 y.o. female who presents to the podiatry clinic  with complaint of  right foot pain. Onset of the symptoms was several weeks ago. Precipitating event: reports first falling up 2 stairs in her home then falling down 2 stairs in her home in the past several weeks. . Current symptoms include: ability to bear weight, but with some pain. Aggravating factors: any weight bearing. Symptoms have progressed to a point and plateaued. Patient has had no prior foot problems.     Review of Systems   Constitutional: Negative for chills.   Cardiovascular:  Negative for chest pain and claudication.   Respiratory:  Negative for cough.    Skin:  Positive for color change, dry skin and nail changes.   Musculoskeletal:  Positive for joint pain.   Gastrointestinal:  Negative for nausea.   Neurological:  Positive for paresthesias. Negative for numbness.   Psychiatric/Behavioral:  The patient is not nervous/anxious.         Objective:     Physical Exam  Constitutional:       Appearance: She is well-developed.      Comments: Oriented to time, place, and person.   Cardiovascular:      Comments: DP and PT pulses are palpable bilaterally. 3 sec capillary refill time and toes and feet are warm to touch proximally .  There is  hair growth on the feet and toes b/l. There is no edema b/l. No spider veins or varicosities present b/l.     Musculoskeletal:      Comments: Equinus noted b/l ankles with < 10 deg DF noted. MMT 5/5 in DF/PF/Inv/Ev resistance with no reproduction of pain in any direction. Passive range of motion of ankle and pedal joints is painless b/l.    Right 2nd toe pain upon ROM DF   Feet:      Right foot:      Skin integrity: No callus or dry skin.      Left foot:      Skin integrity: No callus or dry skin.   Lymphadenopathy:      Comments: Negative lymphadenopathy bilateral popliteal fossa and tarsal tunnel.    Skin:     Comments: No open lesions, lacerations or wounds noted.Interdigital spaces clean, dry and intact b/l. No erythema noted to b/l foot.  Nails normal color and trophic qualities.     Neurological:      Mental Status: She is alert.      Comments: Light touch, proprioception, and sharp/dull sensation are all intact bilaterally. Protective threshold with the [place]permont-Wienstein monofilament is intact bilaterally.    Psychiatric:         Behavior: Behavior is cooperative.           Assessment:      Encounter Diagnoses   Name Primary?    Onychomycosis of great toe     Ingrown nail Yes    Pain of toe of right foot      Plan:     Zaira was seen today for nail problem.    Diagnoses and all orders for this visit:    Ingrown nail    Onychomycosis of great toe  -     Ambulatory referral/consult to Podiatry    Pain of toe of right foot    Other orders  -     tobramycin sulfate 0.3% (TOBREX) 0.3 % ophthalmic solution; Place 1 drop into both eyes 2 (two) times a day.      I counseled the patient on her conditions, their implications and medical management.        Rx. Tobrex drops BID    Discussed treatment options with patient. Options included soaking, avulsion and matrixectomy. Risks and benefits discussed and all questions were answered. The patient wishes to proceed with  Nail avulsion at a later date      In depth conversation on the treatment of ingrown nail; partial nail avulsion vs chemical matrixectomy vs conservative treatment of soaking and nail trimming    Utilizing sterile toenail clippers I aggressively trimmed  the offending right hallux medial nail border approximately 3 mm from its edge and carried the nail plate incision down at an angle in order to wedge out the offending cryptotic portion of the nail plate. The offending border was then removed in total.  No blood was drawn. Patient tolerated the procedure well and related significant relief.    RTC PRN

## 2025-07-31 ENCOUNTER — TELEPHONE (OUTPATIENT)
Dept: PHARMACY | Facility: CLINIC | Age: 55
End: 2025-07-31
Payer: MEDICARE

## 2025-08-19 ENCOUNTER — OFFICE VISIT (OUTPATIENT)
Dept: FAMILY MEDICINE | Facility: CLINIC | Age: 55
End: 2025-08-19
Payer: MEDICARE

## 2025-08-19 VITALS
WEIGHT: 283.94 LBS | OXYGEN SATURATION: 98 % | TEMPERATURE: 98 F | HEART RATE: 75 BPM | RESPIRATION RATE: 18 BRPM | HEIGHT: 66 IN | SYSTOLIC BLOOD PRESSURE: 126 MMHG | DIASTOLIC BLOOD PRESSURE: 84 MMHG | BODY MASS INDEX: 45.63 KG/M2

## 2025-08-19 DIAGNOSIS — M54.9 UPPER BACK PAIN ON LEFT SIDE: Primary | ICD-10-CM

## 2025-08-19 DIAGNOSIS — R39.15 URINARY URGENCY: ICD-10-CM

## 2025-08-19 DIAGNOSIS — M79.7 FIBROMYALGIA: ICD-10-CM

## 2025-08-19 LAB
BILIRUB UR QL STRIP.AUTO: NEGATIVE
CLARITY UR: CLEAR
COLOR UR AUTO: YELLOW
GLUCOSE UR QL STRIP: NEGATIVE
HGB UR QL STRIP: NEGATIVE
KETONES UR QL STRIP: NEGATIVE
LEUKOCYTE ESTERASE UR QL STRIP: NEGATIVE
NITRITE UR QL STRIP: NEGATIVE
PH UR STRIP: 7 [PH]
PROT UR QL STRIP: NEGATIVE
SP GR UR STRIP: 1.01
UROBILINOGEN UR STRIP-ACNC: NEGATIVE EU/DL

## 2025-08-19 PROCEDURE — 3008F BODY MASS INDEX DOCD: CPT | Mod: CPTII,S$GLB,,

## 2025-08-19 PROCEDURE — 3079F DIAST BP 80-89 MM HG: CPT | Mod: CPTII,S$GLB,,

## 2025-08-19 PROCEDURE — 99214 OFFICE O/P EST MOD 30 MIN: CPT | Mod: S$GLB,,,

## 2025-08-19 PROCEDURE — 1159F MED LIST DOCD IN RCRD: CPT | Mod: CPTII,S$GLB,,

## 2025-08-19 PROCEDURE — 3074F SYST BP LT 130 MM HG: CPT | Mod: CPTII,S$GLB,,

## 2025-08-19 PROCEDURE — 99999 PR PBB SHADOW E&M-EST. PATIENT-LVL V: CPT | Mod: PBBFAC,,,

## 2025-08-19 PROCEDURE — 3044F HG A1C LEVEL LT 7.0%: CPT | Mod: CPTII,S$GLB,,

## 2025-08-19 PROCEDURE — 81003 URINALYSIS AUTO W/O SCOPE: CPT

## 2025-08-19 RX ORDER — LIDOCAINE 50 MG/G
1 PATCH TOPICAL DAILY
Qty: 14 PATCH | Refills: 0 | Status: SHIPPED | OUTPATIENT
Start: 2025-08-19

## 2025-08-20 ENCOUNTER — HOSPITAL ENCOUNTER (OUTPATIENT)
Dept: RADIOLOGY | Facility: HOSPITAL | Age: 55
Discharge: HOME | End: 2025-08-20
Payer: MEDICARE

## 2025-08-20 DIAGNOSIS — R39.15 URINARY URGENCY: ICD-10-CM

## 2025-08-20 DIAGNOSIS — M54.9 UPPER BACK PAIN ON LEFT SIDE: ICD-10-CM

## 2025-08-20 LAB — HOLD SPECIMEN: NORMAL

## 2025-08-20 PROCEDURE — 76770 US EXAM ABDO BACK WALL COMP: CPT | Mod: TC

## 2025-08-20 PROCEDURE — 76770 US EXAM ABDO BACK WALL COMP: CPT | Mod: 26,,, | Performed by: STUDENT IN AN ORGANIZED HEALTH CARE EDUCATION/TRAINING PROGRAM

## (undated) DEVICE — ADHESIVE DERMABOND ADVANCED

## (undated) DEVICE — APPLICATOR CHLORAPREP ORN 26ML

## (undated) DEVICE — PACK CATH LAB

## (undated) DEVICE — DRAPE STERI INSTRUMENT 1018

## (undated) DEVICE — TROCAR ENDOPATH XCEL 5X100MM

## (undated) DEVICE — BLADE SURG CARBON STEEL SZ11

## (undated) DEVICE — TRAY MINOR GEN SURG

## (undated) DEVICE — SEE MEDLINE ITEM 146313

## (undated) DEVICE — LUBRICANT SURGILUBE 2 OZ

## (undated) DEVICE — SUT VICRYL 3-0 27 SH

## (undated) DEVICE — KIT SYR REUSABLE

## (undated) DEVICE — POWDER ARISTA AH 3G

## (undated) DEVICE — TUBING HF INSUFFLATION W/ FLTR

## (undated) DEVICE — CANNULA ENDOPATH XCEL 5X100MM

## (undated) DEVICE — RELOAD ECHELON FLEX GRN 60MM

## (undated) DEVICE — STAPLER ECHELON FLEX 60MM 44CM

## (undated) DEVICE — SUT GUT PL. 4-0 27 FS-2

## (undated) DEVICE — TROCAR ENDOPATH XCEL 12X100MM

## (undated) DEVICE — SHEARS HARMONIC 5CM 36CM

## (undated) DEVICE — ADHESIVE MASTISOL VIAL 48/BX

## (undated) DEVICE — PAD DEFIB CADENCE ADULT R2

## (undated) DEVICE — NDL HYPO REG 25G X 1 1/2

## (undated) DEVICE — ELECTRODE REM PLYHSV RETURN 9

## (undated) DEVICE — SEE MEDLINE ITEM 152487

## (undated) DEVICE — HEMOSTAT VASC BAND LONG 27CM

## (undated) DEVICE — RELOAD ECHELON FLEX BLU 60MM

## (undated) DEVICE — PAD RADI FEMORAL

## (undated) DEVICE — SUT COATED VICRYL 4/0 27IN

## (undated) DEVICE — KIT GLIDESHEATH SLEND 6FR 10CM

## (undated) DEVICE — CLOSURE SKIN STERI STRIP 1/2X4

## (undated) DEVICE — APPLICATOR ARISTA FLEX XL

## (undated) DEVICE — SUT 0 VICRYL / UR6 (J603)

## (undated) DEVICE — Device

## (undated) DEVICE — TROCAR ENDOPATH XCEL 12MM 10CM

## (undated) DEVICE — KIT MANIFOLD LOW PRESS TUBING

## (undated) DEVICE — BANDAGE ADHESIVE

## (undated) DEVICE — ANGIOTOUCH KIT

## (undated) DEVICE — CATH JACKY RADIAL 3.5 110CM

## (undated) DEVICE — SOL NS 1000CC

## (undated) DEVICE — SUT STRATAFIX PDO 2-0 SH

## (undated) DEVICE — OMNIPAQUE 350MG 150ML VIAL

## (undated) DEVICE — WIRE GUIDE SAFE-T-J .035 260CM

## (undated) DEVICE — WARMER DRAPE STERILE LF